# Patient Record
Sex: MALE | Race: WHITE | NOT HISPANIC OR LATINO | Employment: FULL TIME | URBAN - METROPOLITAN AREA
[De-identification: names, ages, dates, MRNs, and addresses within clinical notes are randomized per-mention and may not be internally consistent; named-entity substitution may affect disease eponyms.]

---

## 2023-11-02 PROBLEM — K56.691: Status: ACTIVE | Noted: 2023-11-02

## 2023-11-02 PROBLEM — K57.33 DIVERTICULITIS LARGE INTESTINE W/O PERFORATION OR ABSCESS W/BLEEDING: Status: ACTIVE | Noted: 2023-11-02

## 2023-11-02 PROBLEM — K91.30 ANASTOMOTIC STRICTURE OF COLORECTAL REGION: Status: ACTIVE | Noted: 2023-11-02

## 2023-11-02 RX ORDER — EPINEPHRINE 0.3 MG/.3ML
INJECTION SUBCUTANEOUS
COMMUNITY
Start: 2017-08-19

## 2023-11-02 RX ORDER — DOCUSATE SODIUM 100 MG/1
CAPSULE, LIQUID FILLED ORAL
COMMUNITY
Start: 2017-05-11

## 2023-11-03 ENCOUNTER — OFFICE VISIT (OUTPATIENT)
Dept: SURGERY | Facility: CLINIC | Age: 78
End: 2023-11-03
Payer: COMMERCIAL

## 2023-11-03 DIAGNOSIS — Z98.890 S/P COLONOSCOPIC POLYPECTOMY: Primary | ICD-10-CM

## 2023-11-03 PROCEDURE — 99213 OFFICE O/P EST LOW 20 MIN: CPT | Performed by: PHYSICIAN ASSISTANT

## 2023-11-03 PROCEDURE — 1036F TOBACCO NON-USER: CPT | Performed by: PHYSICIAN ASSISTANT

## 2023-11-03 PROCEDURE — 1159F MED LIST DOCD IN RCRD: CPT | Performed by: PHYSICIAN ASSISTANT

## 2023-11-03 NOTE — PROGRESS NOTES
Subjective   Patient ID: Ike Gar is a 77 y.o. male who presents for Follow-up (Colonoscopy on 10/17/23).    HPI 77-year-old male with a history of previous colon resection for recurrent diverticulitis and abscess multiple years ago.  He is status post a colonoscopy 2 weeks ago.  He is here for his pathology results and colonoscopy results.  Patient is doing well without complaint.  No abdominal pain.  No nausea no vomiting.  Moving his bowels well he takes occasional Metamucil.      Review of Systems  Negative other than mentioned in HPI    ENT: No earache, no sore throat, no nosebleeds  Cardiovascular: No chest pain, no shortness of breath, no leg pain, no edema  Respiratory: No shortness of breath on exertion, no wheezing  Gastrointestinal: No abdominal pain, no melena, no nausea, vomiting and/or diarrhea  Musculoskeletal: No pain moving all extremities, no back pain ambulating normally  Skin: No rashes, no lesions, and no skin changes  Neuro: No headache, no confusion, no numbness and tingling  Psychiatric, normal mood, not suicidal, not homicidal, feeling good        Physical Exam  Eyes: Conjunctiva non -icteric and eye lids are without obvious rash or drooping. Pupils are symmetric.   Ears, Nose, Mouth, and Throat: External ears and nose appear to be without deformity or rash. No lesions or masses noted. Hearing is grossly intact.   Neck:. No JVD noted, tracheal position is midline. No thyromegaly, no thyroid nodules  Head and Face: Examination of the head and face revealed no abnormalities.   Respiratory: No gasping or shortness of breath noted, no use of accessory muscles noted. Clear to auscultate bilaterally  Cardiovascular: Examination for edema is normal. Regular rate and rhythm S1 S2 without murmurs  GI: Abdomen non tender to palpation, bowel sounds present no hepatosplenomegaly  Skin: No rashes or open lesions/ulcers identified on skin.   Musk: Digits/nails show no clubbing or cyanosis. No asymmetry  or masses noted of the musculature. Examination of the muscles/joints/bones show normal range of motion. Gait is grossly normally.   Neurologic: Cranial nerves II- XII intact, motor strength 5/5 muscle strength of the lower extremities bilaterally and equal.      Objective     No diagnosis found.   Patient Active Problem List   Diagnosis    Diverticulitis large intestine w/o perforation or abscess w/bleeding    Other complete intestinal obstruction (CMS/HCC)    Anastomotic stricture of colorectal region      Allergies   Allergen Reactions    Meperidine Nausea/vomiting    Prochlorperazine Nausea/vomiting      Medication Documentation Review Audit       Reviewed by Kailyn Melendez MA (Medical Assistant) on 11/03/23 at 0858      Medication Order Taking? Sig Documenting Provider Last Dose Status   docusate sodium (Colace) 100 mg capsule [id number] Yes Colace 100 MG Oral Capsule   Refills: 0        Start : 11-May-2017   Active Historical Provider, MD Taking Active   EPINEPHrine 0.3 mg/0.3 mL injection syringe [id number] Yes USE AS DIRECTED Historical Provider, MD Taking Active                    History reviewed. No pertinent past medical history.  Social History     Tobacco Use   Smoking Status Never   Smokeless Tobacco Never     Family History   Problem Relation Name Age of Onset    Cancer Father        Past Surgical History:   Procedure Laterality Date    ABDOMINAL ADHESION SURGERY  04/10/2017    Laparoscopic Lysis Of Intestinal Adhesions    CHOLECYSTECTOMY  04/10/2017    Cholecystectomy    COLON SURGERY  03/07/2018    Colon Surgery    COLONOSCOPY  05/11/2017    Colonoscopy (Fiberoptic)       Assessment/Plan   Today we discussed his colonoscopy results.  I did remove one polyp that was negative for any malignancies.  There was a small stricture at the anastomotic site that was dilated.  This was discussed with the patient.  He is feeling well and not having any difficulties with bowel movements.  Patient was instructed  to repeat his colonoscopy in 5 years.    Encounter Diagnosis   Name Primary?    S/P colonoscopic polypectomy Yes     I have reviewed all data including labs,radiologic and previous reports.      **Portions of this medical record have been created using voice recognition software and may have minor errors which are inherent in voice recognition systems. It has not been fully edited for typographical or grammatical errors**

## 2024-12-02 ENCOUNTER — APPOINTMENT (OUTPATIENT)
Dept: SURGERY | Facility: CLINIC | Age: 79
End: 2024-12-02
Payer: COMMERCIAL

## 2024-12-02 VITALS
TEMPERATURE: 96.8 F | SYSTOLIC BLOOD PRESSURE: 142 MMHG | OXYGEN SATURATION: 94 % | HEART RATE: 75 BPM | DIASTOLIC BLOOD PRESSURE: 80 MMHG

## 2024-12-02 DIAGNOSIS — R10.31 RIGHT LOWER QUADRANT ABDOMINAL PAIN: Primary | ICD-10-CM

## 2024-12-02 PROCEDURE — 1126F AMNT PAIN NOTED NONE PRSNT: CPT | Performed by: SURGERY

## 2024-12-02 PROCEDURE — 1159F MED LIST DOCD IN RCRD: CPT | Performed by: SURGERY

## 2024-12-02 PROCEDURE — 1036F TOBACCO NON-USER: CPT | Performed by: SURGERY

## 2024-12-02 PROCEDURE — 1160F RVW MEDS BY RX/DR IN RCRD: CPT | Performed by: SURGERY

## 2024-12-02 PROCEDURE — 99214 OFFICE O/P EST MOD 30 MIN: CPT | Performed by: SURGERY

## 2024-12-02 RX ORDER — LISINOPRIL 5 MG/1
5 TABLET ORAL DAILY
COMMUNITY

## 2024-12-02 ASSESSMENT — PAIN SCALES - GENERAL: PAINLEVEL_OUTOF10: 0-NO PAIN

## 2024-12-02 NOTE — PROGRESS NOTES
Subjective   Patient ID: Ike Gar is a 78 y.o. male who presents for Constipation (Recurrent Bowel obstruction, c/o RLQ stabbing pain/on and off x  a day).      HPI the patient was admitted recently to the hospital with recurrent small bowel obstruction.  Patient had a previous multiple abdominal surgeries.  Also patient had recent heavy physical work and developed right groin pain since then  Review of Systems consistent with right lower quadrant abdominal pain.  Review of other 10 system is negative.  Physical Exam abdomen soft, no significant tenderness.  Scar from previous surgeries.  Pupils equal bilaterally, mucosa moist, bilateral breath sounds, clear to auscultation, regular rhythm and rate, no murmurs, palpable peripheral pulse, no focal neurological motor deficits.    Objective skin was consistent with a partial bowel obstruction.  No evidence of nephrolithiasis.  No evidence of hernias    No diagnosis found.   Patient Active Problem List   Diagnosis    Diverticulitis large intestine w/o perforation or abscess w/bleeding    Other complete intestinal obstruction    Anastomotic stricture of colorectal region      Allergies   Allergen Reactions    Meperidine Nausea/vomiting    Prochlorperazine Nausea/vomiting      Medication Documentation Review Audit       Reviewed by Sathish Washburn MD (Physician) on 12/02/24 at 1116      Medication Order Taking? Sig Documenting Provider Last Dose Status   docusate sodium (Colace) 100 mg capsule [id number] Yes Colace 100 MG Oral Capsule   Refills: 0        Start : 11-May-2017   Active Historical Provider, MD  Active   EPINEPHrine 0.3 mg/0.3 mL injection syringe [id number] Yes USE AS DIRECTED Historical Provider, MD  Active   lisinopril 5 mg tablet [id number] Yes Take 1 tablet (5 mg) by mouth once daily. for hypertension Historical Provider, MD  Active                    History reviewed. No pertinent past medical history.  Social History     Tobacco Use   Smoking Status  Never   Smokeless Tobacco Never     Family History   Problem Relation Name Age of Onset    Cancer Father        Past Surgical History:   Procedure Laterality Date    ABDOMINAL ADHESION SURGERY  04/10/2017    Laparoscopic Lysis Of Intestinal Adhesions    CHOLECYSTECTOMY  04/10/2017    Cholecystectomy    COLON SURGERY  03/07/2018    Colon Surgery    COLONOSCOPY  05/11/2017    Colonoscopy (Fiberoptic)       Assessment/Plan   With recurrent small bowel obstruction.  Improvement.  I recommend continue Metamucil.  I recommend MiraLAX every other day.  Follow-up in office in 2 to 3 weeks for reassessment of the pain in the right lower quadrant.  Currently there is no evidence of the hernia.  Most likely secondary to muscle strain.  No indication for surgery      Sathish Washburn MD

## 2024-12-16 ENCOUNTER — APPOINTMENT (OUTPATIENT)
Dept: RADIOLOGY | Facility: HOSPITAL | Age: 79
DRG: 957 | End: 2024-12-16
Payer: MEDICARE

## 2024-12-16 ENCOUNTER — ANESTHESIA EVENT (OUTPATIENT)
Dept: OPERATING ROOM | Facility: HOSPITAL | Age: 79
DRG: 957 | End: 2024-12-16
Payer: MEDICARE

## 2024-12-16 ENCOUNTER — HOSPITAL ENCOUNTER (INPATIENT)
Facility: HOSPITAL | Age: 79
LOS: 1 days | Discharge: ANOTHER HOSPITAL | DRG: 957 | End: 2024-12-17
Attending: STUDENT IN AN ORGANIZED HEALTH CARE EDUCATION/TRAINING PROGRAM | Admitting: SURGERY
Payer: MEDICARE

## 2024-12-16 ENCOUNTER — APPOINTMENT (OUTPATIENT)
Dept: CARDIOLOGY | Facility: HOSPITAL | Age: 79
DRG: 957 | End: 2024-12-16
Payer: MEDICARE

## 2024-12-16 ENCOUNTER — ANESTHESIA (OUTPATIENT)
Dept: OPERATING ROOM | Facility: HOSPITAL | Age: 79
DRG: 957 | End: 2024-12-16
Payer: MEDICARE

## 2024-12-16 DIAGNOSIS — R58 INTRA ABDOMINAL HEMORRHAGE: ICD-10-CM
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
## 2024-12-16 DIAGNOSIS — K63.1 BOWEL PERFORATION (MULTI): ICD-10-CM
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
## 2024-12-16 LAB
ABO GROUP (TYPE) IN BLOOD: NORMAL
ABO GROUP (TYPE) IN BLOOD: NORMAL
ALBUMIN SERPL BCP-MCNC: 3.8 G/DL (ref 3.4–5)
ALP SERPL-CCNC: 63 U/L (ref 33–136)
ALT SERPL W P-5'-P-CCNC: 32 U/L (ref 10–52)
ANION GAP BLDA CALCULATED.4IONS-SCNC: 16 MMO/L (ref 10–25)
ANION GAP BLDA CALCULATED.4IONS-SCNC: 18 MMO/L (ref 10–25)
ANION GAP BLDA CALCULATED.4IONS-SCNC: 18 MMO/L (ref 10–25)
ANION GAP SERPL CALC-SCNC: 14 MMOL/L (ref 10–20)
ANTIBODY SCREEN: NORMAL
AST SERPL W P-5'-P-CCNC: 45 U/L (ref 9–39)
BASE EXCESS BLDA CALC-SCNC: -12.1 MMOL/L (ref -2–3)
BASE EXCESS BLDA CALC-SCNC: -13.6 MMOL/L (ref -2–3)
BASE EXCESS BLDA CALC-SCNC: -14.1 MMOL/L (ref -2–3)
BASOPHILS # BLD AUTO: 0.03 X10*3/UL (ref 0–0.1)
BASOPHILS NFR BLD AUTO: 0.2 %
BILIRUB SERPL-MCNC: 1.9 MG/DL (ref 0–1.2)
BODY TEMPERATURE: ABNORMAL
BUN SERPL-MCNC: 18 MG/DL (ref 6–23)
CA-I BLD-SCNC: 0.98 MMOL/L (ref 1.1–1.33)
CA-I BLDA-SCNC: 1 MMOL/L (ref 1.1–1.33)
CA-I BLDA-SCNC: 1.07 MMOL/L (ref 1.1–1.33)
CA-I BLDA-SCNC: 1.09 MMOL/L (ref 1.1–1.33)
CALCIUM SERPL-MCNC: 8.2 MG/DL (ref 8.6–10.3)
CHLORIDE BLDA-SCNC: 101 MMOL/L (ref 98–107)
CHLORIDE BLDA-SCNC: 103 MMOL/L (ref 98–107)
CHLORIDE BLDA-SCNC: 104 MMOL/L (ref 98–107)
CHLORIDE SERPL-SCNC: 101 MMOL/L (ref 98–107)
CO2 SERPL-SCNC: 21 MMOL/L (ref 21–32)
CREAT SERPL-MCNC: 1.32 MG/DL (ref 0.5–1.3)
CRITICAL CALL TIME: 2305
CRITICAL CALLED BY: ABNORMAL
CRITICAL CALLED TO: ABNORMAL
CRITICAL READ BACK: ABNORMAL
EGFRCR SERPLBLD CKD-EPI 2021: 56 ML/MIN/1.73M*2
EOSINOPHIL # BLD AUTO: 0.07 X10*3/UL (ref 0–0.4)
EOSINOPHIL NFR BLD AUTO: 0.4 %
ERYTHROCYTE [DISTWIDTH] IN BLOOD BY AUTOMATED COUNT: 13.2 % (ref 11.5–14.5)
ETHANOL SERPL-MCNC: <10 MG/DL
GLUCOSE BLDA-MCNC: 166 MG/DL (ref 74–99)
GLUCOSE BLDA-MCNC: 186 MG/DL (ref 74–99)
GLUCOSE BLDA-MCNC: 243 MG/DL (ref 74–99)
GLUCOSE SERPL-MCNC: 217 MG/DL (ref 74–99)
HCO3 BLDA-SCNC: 14.6 MMOL/L (ref 22–26)
HCO3 BLDA-SCNC: 15 MMOL/L (ref 22–26)
HCO3 BLDA-SCNC: 17.2 MMOL/L (ref 22–26)
HCT VFR BLD AUTO: 43 % (ref 41–52)
HCT VFR BLD EST: 34 % (ref 41–52)
HCT VFR BLD EST: 35 % (ref 41–52)
HCT VFR BLD EST: 40 % (ref 41–52)
HGB BLD-MCNC: 14.2 G/DL (ref 13.5–17.5)
HGB BLDA-MCNC: 11.3 G/DL (ref 13.5–17.5)
HGB BLDA-MCNC: 11.6 G/DL (ref 13.5–17.5)
HGB BLDA-MCNC: 13.2 G/DL (ref 13.5–17.5)
IMM GRANULOCYTES # BLD AUTO: 0.08 X10*3/UL (ref 0–0.5)
IMM GRANULOCYTES NFR BLD AUTO: 0.4 % (ref 0–0.9)
INHALED O2 CONCENTRATION: 60 %
INR PPP: 1.1 (ref 0.9–1.1)
LACTATE BLDA-SCNC: 6.2 MMOL/L (ref 0.4–2)
LACTATE BLDA-SCNC: 6.7 MMOL/L (ref 0.4–2)
LACTATE BLDA-SCNC: 8 MMOL/L (ref 0.4–2)
LACTATE SERPL-SCNC: 3.4 MMOL/L (ref 0.4–2)
LYMPHOCYTES # BLD AUTO: 2.39 X10*3/UL (ref 0.8–3)
LYMPHOCYTES NFR BLD AUTO: 13.2 %
MCH RBC QN AUTO: 29.2 PG (ref 26–34)
MCHC RBC AUTO-ENTMCNC: 33 G/DL (ref 32–36)
MCV RBC AUTO: 88 FL (ref 80–100)
MONOCYTES # BLD AUTO: 1.05 X10*3/UL (ref 0.05–0.8)
MONOCYTES NFR BLD AUTO: 5.8 %
NEUTROPHILS # BLD AUTO: 14.45 X10*3/UL (ref 1.6–5.5)
NEUTROPHILS NFR BLD AUTO: 80 %
NRBC BLD-RTO: 0 /100 WBCS (ref 0–0)
OXYHGB MFR BLDA: 97.3 % (ref 94–98)
PCO2 BLDA: 42 MM HG (ref 38–42)
PCO2 BLDA: 46 MM HG (ref 38–42)
PCO2 BLDA: 53 MM HG (ref 38–42)
PEEP CMH2O: 5 CM H2O
PH BLDA: 7.12 PH (ref 7.38–7.42)
PH BLDA: 7.12 PH (ref 7.38–7.42)
PH BLDA: 7.15 PH (ref 7.38–7.42)
PLATELET # BLD AUTO: 278 X10*3/UL (ref 150–450)
PO2 BLDA: 122 MM HG (ref 85–95)
PO2 BLDA: 325 MM HG (ref 85–95)
PO2 BLDA: 372 MM HG (ref 85–95)
POTASSIUM BLDA-SCNC: 3.9 MMOL/L (ref 3.5–5.3)
POTASSIUM BLDA-SCNC: 4.6 MMOL/L (ref 3.5–5.3)
POTASSIUM BLDA-SCNC: 4.7 MMOL/L (ref 3.5–5.3)
POTASSIUM SERPL-SCNC: 4 MMOL/L (ref 3.5–5.3)
PROT SERPL-MCNC: 7.3 G/DL (ref 6.4–8.2)
PROTHROMBIN TIME: 12.5 SECONDS (ref 9.8–12.8)
RBC # BLD AUTO: 4.87 X10*6/UL (ref 4.5–5.9)
RH FACTOR (ANTIGEN D): NORMAL
RH FACTOR (ANTIGEN D): NORMAL
SAO2 % BLDA: 100 % (ref 94–100)
SODIUM BLDA-SCNC: 129 MMOL/L (ref 136–145)
SODIUM BLDA-SCNC: 130 MMOL/L (ref 136–145)
SODIUM BLDA-SCNC: 134 MMOL/L (ref 136–145)
SODIUM SERPL-SCNC: 132 MMOL/L (ref 136–145)
SPECIMEN DRAWN FROM PATIENT: ABNORMAL
TIDAL VOLUME: 450 ML
VENTILATOR RATE: 16 BPM
WBC # BLD AUTO: 18.1 X10*3/UL (ref 4.4–11.3)

## 2024-12-16 PROCEDURE — 80053 COMPREHEN METABOLIC PANEL: CPT | Performed by: STUDENT IN AN ORGANIZED HEALTH CARE EDUCATION/TRAINING PROGRAM

## 2024-12-16 PROCEDURE — 71260 CT THORAX DX C+: CPT | Performed by: RADIOLOGY

## 2024-12-16 PROCEDURE — 86920 COMPATIBILITY TEST SPIN: CPT

## 2024-12-16 PROCEDURE — 2550000001 HC RX 255 CONTRASTS: Performed by: STUDENT IN AN ORGANIZED HEALTH CARE EDUCATION/TRAINING PROGRAM

## 2024-12-16 PROCEDURE — 72131 CT LUMBAR SPINE W/O DYE: CPT | Performed by: RADIOLOGY

## 2024-12-16 PROCEDURE — 2500000004 HC RX 250 GENERAL PHARMACY W/ HCPCS (ALT 636 FOR OP/ED)

## 2024-12-16 PROCEDURE — 2500000004 HC RX 250 GENERAL PHARMACY W/ HCPCS (ALT 636 FOR OP/ED): Performed by: STUDENT IN AN ORGANIZED HEALTH CARE EDUCATION/TRAINING PROGRAM

## 2024-12-16 PROCEDURE — 2500000004 HC RX 250 GENERAL PHARMACY W/ HCPCS (ALT 636 FOR OP/ED): Performed by: SURGERY

## 2024-12-16 PROCEDURE — P9017 PLASMA 1 DONOR FRZ W/IN 8 HR: HCPCS

## 2024-12-16 PROCEDURE — 99291 CRITICAL CARE FIRST HOUR: CPT | Performed by: SURGERY

## 2024-12-16 PROCEDURE — 82435 ASSAY OF BLOOD CHLORIDE: CPT | Performed by: STUDENT IN AN ORGANIZED HEALTH CARE EDUCATION/TRAINING PROGRAM

## 2024-12-16 PROCEDURE — 44121 REMOVAL OF SMALL INTESTINE: CPT | Performed by: SURGERY

## 2024-12-16 PROCEDURE — G0378 HOSPITAL OBSERVATION PER HR: HCPCS

## 2024-12-16 PROCEDURE — 83735 ASSAY OF MAGNESIUM: CPT | Performed by: STUDENT IN AN ORGANIZED HEALTH CARE EDUCATION/TRAINING PROGRAM

## 2024-12-16 PROCEDURE — 90715 TDAP VACCINE 7 YRS/> IM: CPT | Performed by: STUDENT IN AN ORGANIZED HEALTH CARE EDUCATION/TRAINING PROGRAM

## 2024-12-16 PROCEDURE — 93005 ELECTROCARDIOGRAM TRACING: CPT

## 2024-12-16 PROCEDURE — 76377 3D RENDER W/INTRP POSTPROCES: CPT | Performed by: RADIOLOGY

## 2024-12-16 PROCEDURE — 02HV33Z INSERTION OF INFUSION DEVICE INTO SUPERIOR VENA CAVA, PERCUTANEOUS APPROACH: ICD-10-PCS | Performed by: HOSPITALIST

## 2024-12-16 PROCEDURE — 71045 X-RAY EXAM CHEST 1 VIEW: CPT

## 2024-12-16 PROCEDURE — 2500000005 HC RX 250 GENERAL PHARMACY W/O HCPCS: Performed by: HOSPITALIST

## 2024-12-16 PROCEDURE — 99232 SBSQ HOSP IP/OBS MODERATE 35: CPT | Performed by: HOSPITALIST

## 2024-12-16 PROCEDURE — 85730 THROMBOPLASTIN TIME PARTIAL: CPT | Performed by: STUDENT IN AN ORGANIZED HEALTH CARE EDUCATION/TRAINING PROGRAM

## 2024-12-16 PROCEDURE — 82077 ASSAY SPEC XCP UR&BREATH IA: CPT | Performed by: STUDENT IN AN ORGANIZED HEALTH CARE EDUCATION/TRAINING PROGRAM

## 2024-12-16 PROCEDURE — 70450 CT HEAD/BRAIN W/O DYE: CPT

## 2024-12-16 PROCEDURE — P9035 PLATELET PHERES LEUKOREDUCED: HCPCS

## 2024-12-16 PROCEDURE — 71045 X-RAY EXAM CHEST 1 VIEW: CPT | Performed by: RADIOLOGY

## 2024-12-16 PROCEDURE — 83605 ASSAY OF LACTIC ACID: CPT | Performed by: STUDENT IN AN ORGANIZED HEALTH CARE EDUCATION/TRAINING PROGRAM

## 2024-12-16 PROCEDURE — 2500000005 HC RX 250 GENERAL PHARMACY W/O HCPCS: Performed by: STUDENT IN AN ORGANIZED HEALTH CARE EDUCATION/TRAINING PROGRAM

## 2024-12-16 PROCEDURE — 71045 X-RAY EXAM CHEST 1 VIEW: CPT | Performed by: STUDENT IN AN ORGANIZED HEALTH CARE EDUCATION/TRAINING PROGRAM

## 2024-12-16 PROCEDURE — 85027 COMPLETE CBC AUTOMATED: CPT | Performed by: STUDENT IN AN ORGANIZED HEALTH CARE EDUCATION/TRAINING PROGRAM

## 2024-12-16 PROCEDURE — 72128 CT CHEST SPINE W/O DYE: CPT | Mod: RCN

## 2024-12-16 PROCEDURE — 96374 THER/PROPH/DIAG INJ IV PUSH: CPT | Mod: 59

## 2024-12-16 PROCEDURE — 70450 CT HEAD/BRAIN W/O DYE: CPT | Performed by: RADIOLOGY

## 2024-12-16 PROCEDURE — 84484 ASSAY OF TROPONIN QUANT: CPT | Performed by: STUDENT IN AN ORGANIZED HEALTH CARE EDUCATION/TRAINING PROGRAM

## 2024-12-16 PROCEDURE — 72128 CT CHEST SPINE W/O DYE: CPT | Performed by: RADIOLOGY

## 2024-12-16 PROCEDURE — 83605 ASSAY OF LACTIC ACID: CPT

## 2024-12-16 PROCEDURE — G0390 TRAUMA RESPONS W/HOSP CRITI: HCPCS

## 2024-12-16 PROCEDURE — 72131 CT LUMBAR SPINE W/O DYE: CPT | Mod: RCN

## 2024-12-16 PROCEDURE — 82435 ASSAY OF BLOOD CHLORIDE: CPT

## 2024-12-16 PROCEDURE — 96375 TX/PRO/DX INJ NEW DRUG ADDON: CPT | Mod: 59

## 2024-12-16 PROCEDURE — 3700000001 HC GENERAL ANESTHESIA TIME - INITIAL BASE CHARGE: Performed by: SURGERY

## 2024-12-16 PROCEDURE — 36415 COLL VENOUS BLD VENIPUNCTURE: CPT | Performed by: STUDENT IN AN ORGANIZED HEALTH CARE EDUCATION/TRAINING PROGRAM

## 2024-12-16 PROCEDURE — 84132 ASSAY OF SERUM POTASSIUM: CPT

## 2024-12-16 PROCEDURE — 88307 TISSUE EXAM BY PATHOLOGIST: CPT | Mod: TC,ELYLAB | Performed by: SURGERY

## 2024-12-16 PROCEDURE — 82330 ASSAY OF CALCIUM: CPT | Performed by: STUDENT IN AN ORGANIZED HEALTH CARE EDUCATION/TRAINING PROGRAM

## 2024-12-16 PROCEDURE — 84100 ASSAY OF PHOSPHORUS: CPT | Performed by: STUDENT IN AN ORGANIZED HEALTH CARE EDUCATION/TRAINING PROGRAM

## 2024-12-16 PROCEDURE — 2500000005 HC RX 250 GENERAL PHARMACY W/O HCPCS: Performed by: ANESTHESIOLOGY

## 2024-12-16 PROCEDURE — 86900 BLOOD TYPING SEROLOGIC ABO: CPT | Performed by: STUDENT IN AN ORGANIZED HEALTH CARE EDUCATION/TRAINING PROGRAM

## 2024-12-16 PROCEDURE — 85025 COMPLETE CBC W/AUTO DIFF WBC: CPT | Performed by: STUDENT IN AN ORGANIZED HEALTH CARE EDUCATION/TRAINING PROGRAM

## 2024-12-16 PROCEDURE — 3700000002 HC GENERAL ANESTHESIA TIME - EACH INCREMENTAL 1 MINUTE: Performed by: SURGERY

## 2024-12-16 PROCEDURE — 72125 CT NECK SPINE W/O DYE: CPT

## 2024-12-16 PROCEDURE — 86901 BLOOD TYPING SEROLOGIC RH(D): CPT | Performed by: STUDENT IN AN ORGANIZED HEALTH CARE EDUCATION/TRAINING PROGRAM

## 2024-12-16 PROCEDURE — 85384 FIBRINOGEN ACTIVITY: CPT | Performed by: STUDENT IN AN ORGANIZED HEALTH CARE EDUCATION/TRAINING PROGRAM

## 2024-12-16 PROCEDURE — 88307 TISSUE EXAM BY PATHOLOGIST: CPT | Performed by: PATHOLOGY

## 2024-12-16 PROCEDURE — 2500000005 HC RX 250 GENERAL PHARMACY W/O HCPCS: Performed by: SURGERY

## 2024-12-16 PROCEDURE — 84132 ASSAY OF SERUM POTASSIUM: CPT | Performed by: STUDENT IN AN ORGANIZED HEALTH CARE EDUCATION/TRAINING PROGRAM

## 2024-12-16 PROCEDURE — 99291 CRITICAL CARE FIRST HOUR: CPT | Mod: 25 | Performed by: STUDENT IN AN ORGANIZED HEALTH CARE EDUCATION/TRAINING PROGRAM

## 2024-12-16 PROCEDURE — 44120 REMOVAL OF SMALL INTESTINE: CPT | Performed by: SURGERY

## 2024-12-16 PROCEDURE — 72125 CT NECK SPINE W/O DYE: CPT | Performed by: RADIOLOGY

## 2024-12-16 PROCEDURE — 76377 3D RENDER W/INTRP POSTPROCES: CPT

## 2024-12-16 PROCEDURE — 2500000004 HC RX 250 GENERAL PHARMACY W/ HCPCS (ALT 636 FOR OP/ED): Performed by: ANESTHESIOLOGY

## 2024-12-16 PROCEDURE — 85610 PROTHROMBIN TIME: CPT | Performed by: STUDENT IN AN ORGANIZED HEALTH CARE EDUCATION/TRAINING PROGRAM

## 2024-12-16 PROCEDURE — 85018 HEMOGLOBIN: CPT | Performed by: STUDENT IN AN ORGANIZED HEALTH CARE EDUCATION/TRAINING PROGRAM

## 2024-12-16 PROCEDURE — 99285 EMERGENCY DEPT VISIT HI MDM: CPT | Mod: 25 | Performed by: STUDENT IN AN ORGANIZED HEALTH CARE EDUCATION/TRAINING PROGRAM

## 2024-12-16 PROCEDURE — 85007 BL SMEAR W/DIFF WBC COUNT: CPT | Performed by: STUDENT IN AN ORGANIZED HEALTH CARE EDUCATION/TRAINING PROGRAM

## 2024-12-16 PROCEDURE — P9016 RBC LEUKOCYTES REDUCED: HCPCS

## 2024-12-16 PROCEDURE — 36556 INSERT NON-TUNNEL CV CATH: CPT | Performed by: HOSPITALIST

## 2024-12-16 PROCEDURE — 3600000003 HC OR TIME - INITIAL BASE CHARGE - PROCEDURE LEVEL THREE: Performed by: SURGERY

## 2024-12-16 PROCEDURE — 94002 VENT MGMT INPAT INIT DAY: CPT | Mod: CCI

## 2024-12-16 PROCEDURE — 86923 COMPATIBILITY TEST ELECTRIC: CPT

## 2024-12-16 PROCEDURE — 74177 CT ABD & PELVIS W/CONTRAST: CPT | Performed by: RADIOLOGY

## 2024-12-16 PROCEDURE — 2720000007 HC OR 272 NO HCPCS: Performed by: SURGERY

## 2024-12-16 PROCEDURE — 90471 IMMUNIZATION ADMIN: CPT | Performed by: STUDENT IN AN ORGANIZED HEALTH CARE EDUCATION/TRAINING PROGRAM

## 2024-12-16 PROCEDURE — 2020000001 HC ICU ROOM DAILY

## 2024-12-16 PROCEDURE — 36430 TRANSFUSION BLD/BLD COMPNT: CPT

## 2024-12-16 PROCEDURE — 2500000004 HC RX 250 GENERAL PHARMACY W/ HCPCS (ALT 636 FOR OP/ED): Performed by: HOSPITALIST

## 2024-12-16 PROCEDURE — 3E033XZ INTRODUCTION OF VASOPRESSOR INTO PERIPHERAL VEIN, PERCUTANEOUS APPROACH: ICD-10-PCS | Performed by: ANESTHESIOLOGY

## 2024-12-16 PROCEDURE — 70486 CT MAXILLOFACIAL W/O DYE: CPT

## 2024-12-16 PROCEDURE — 3600000008 HC OR TIME - EACH INCREMENTAL 1 MINUTE - PROCEDURE LEVEL THREE: Performed by: SURGERY

## 2024-12-16 PROCEDURE — 37799 UNLISTED PX VASCULAR SURGERY: CPT | Performed by: STUDENT IN AN ORGANIZED HEALTH CARE EDUCATION/TRAINING PROGRAM

## 2024-12-16 PROCEDURE — 70486 CT MAXILLOFACIAL W/O DYE: CPT | Performed by: RADIOLOGY

## 2024-12-16 PROCEDURE — 74177 CT ABD & PELVIS W/CONTRAST: CPT

## 2024-12-16 RX ORDER — PANTOPRAZOLE SODIUM 40 MG/10ML
40 INJECTION, POWDER, LYOPHILIZED, FOR SOLUTION INTRAVENOUS DAILY
Status: DISCONTINUED | OUTPATIENT
Start: 2024-12-17 | End: 2024-12-17

## 2024-12-16 RX ORDER — PHENYLEPHRINE HCL IN 0.9% NACL 1 MG/10 ML
SYRINGE (ML) INTRAVENOUS AS NEEDED
Status: DISCONTINUED | OUTPATIENT
Start: 2024-12-16 | End: 2024-12-16

## 2024-12-16 RX ORDER — FENTANYL CITRATE-0.9 % NACL/PF 10 MCG/ML
0-200 PLASTIC BAG, INJECTION (ML) INTRAVENOUS CONTINUOUS
Status: DISCONTINUED | OUTPATIENT
Start: 2024-12-16 | End: 2024-12-17 | Stop reason: HOSPADM

## 2024-12-16 RX ORDER — CEFAZOLIN SODIUM 2 G/100ML
2 INJECTION, SOLUTION INTRAVENOUS ONCE
Status: DISCONTINUED | OUTPATIENT
Start: 2024-12-16 | End: 2024-12-17

## 2024-12-16 RX ORDER — SODIUM CHLORIDE, SODIUM LACTATE, POTASSIUM CHLORIDE, CALCIUM CHLORIDE 600; 310; 30; 20 MG/100ML; MG/100ML; MG/100ML; MG/100ML
INJECTION, SOLUTION INTRAVENOUS CONTINUOUS PRN
Status: DISCONTINUED | OUTPATIENT
Start: 2024-12-16 | End: 2024-12-16

## 2024-12-16 RX ORDER — PANTOPRAZOLE SODIUM 40 MG/1
40 TABLET, DELAYED RELEASE ORAL DAILY
Status: DISCONTINUED | OUTPATIENT
Start: 2024-12-17 | End: 2024-12-17

## 2024-12-16 RX ORDER — FLUCONAZOLE 2 MG/ML
200 INJECTION, SOLUTION INTRAVENOUS EVERY 24 HOURS
Status: DISCONTINUED | OUTPATIENT
Start: 2024-12-16 | End: 2024-12-17 | Stop reason: HOSPADM

## 2024-12-16 RX ORDER — SODIUM BICARBONATE 1 MEQ/ML
SYRINGE (ML) INTRAVENOUS AS NEEDED
Status: DISCONTINUED | OUTPATIENT
Start: 2024-12-16 | End: 2024-12-16

## 2024-12-16 RX ORDER — CEFAZOLIN SODIUM 2 G/50ML
SOLUTION INTRAVENOUS AS NEEDED
Status: DISCONTINUED | OUTPATIENT
Start: 2024-12-16 | End: 2024-12-16

## 2024-12-16 RX ORDER — PROPOFOL 10 MG/ML
INJECTION, EMULSION INTRAVENOUS CONTINUOUS PRN
Status: DISCONTINUED | OUTPATIENT
Start: 2024-12-16 | End: 2024-12-16

## 2024-12-16 RX ORDER — MIDAZOLAM HYDROCHLORIDE 1 MG/ML
INJECTION, SOLUTION INTRAMUSCULAR; INTRAVENOUS
Status: COMPLETED
Start: 2024-12-16 | End: 2024-12-17

## 2024-12-16 RX ORDER — NOREPINEPHRINE BITARTRATE/D5W 8 MG/250ML
PLASTIC BAG, INJECTION (ML) INTRAVENOUS
Status: DISCONTINUED
Start: 2024-12-16 | End: 2024-12-17 | Stop reason: HOSPADM

## 2024-12-16 RX ORDER — FENTANYL CITRATE 50 UG/ML
50 INJECTION, SOLUTION INTRAMUSCULAR; INTRAVENOUS ONCE
Status: COMPLETED | OUTPATIENT
Start: 2024-12-16 | End: 2024-12-16

## 2024-12-16 RX ORDER — ROCURONIUM BROMIDE 10 MG/ML
INJECTION, SOLUTION INTRAVENOUS AS NEEDED
Status: DISCONTINUED | OUTPATIENT
Start: 2024-12-16 | End: 2024-12-16

## 2024-12-16 RX ORDER — MIDAZOLAM HYDROCHLORIDE 1 MG/ML
INJECTION, SOLUTION INTRAMUSCULAR; INTRAVENOUS AS NEEDED
Status: DISCONTINUED | OUTPATIENT
Start: 2024-12-16 | End: 2024-12-16

## 2024-12-16 RX ORDER — SUCCINYLCHOLINE CHLORIDE 100 MG/5ML
SYRINGE (ML) INTRAVENOUS AS NEEDED
Status: DISCONTINUED | OUTPATIENT
Start: 2024-12-16 | End: 2024-12-16

## 2024-12-16 RX ORDER — FENTANYL CITRATE 50 UG/ML
INJECTION, SOLUTION INTRAMUSCULAR; INTRAVENOUS
Status: COMPLETED
Start: 2024-12-16 | End: 2024-12-16

## 2024-12-16 RX ORDER — NOREPINEPHRINE BITARTRATE/D5W 8 MG/250ML
0-.5 PLASTIC BAG, INJECTION (ML) INTRAVENOUS CONTINUOUS
Status: DISCONTINUED | OUTPATIENT
Start: 2024-12-16 | End: 2024-12-16

## 2024-12-16 RX ORDER — MIDAZOLAM HYDROCHLORIDE 1 MG/ML
1 INJECTION, SOLUTION INTRAMUSCULAR; INTRAVENOUS ONCE
Status: COMPLETED | OUTPATIENT
Start: 2024-12-17 | End: 2024-12-17

## 2024-12-16 RX ORDER — ONDANSETRON HYDROCHLORIDE 2 MG/ML
4 INJECTION, SOLUTION INTRAVENOUS ONCE
Status: COMPLETED | OUTPATIENT
Start: 2024-12-16 | End: 2024-12-16

## 2024-12-16 RX ORDER — METRONIDAZOLE 500 MG/100ML
500 INJECTION, SOLUTION INTRAVENOUS EVERY 8 HOURS
Status: DISCONTINUED | OUTPATIENT
Start: 2024-12-16 | End: 2024-12-16

## 2024-12-16 RX ORDER — NOREPINEPHRINE BITARTRATE 0.03 MG/ML
INJECTION, SOLUTION INTRAVENOUS CONTINUOUS PRN
Status: DISCONTINUED | OUTPATIENT
Start: 2024-12-16 | End: 2024-12-16

## 2024-12-16 RX ORDER — ETOMIDATE 2 MG/ML
INJECTION INTRAVENOUS AS NEEDED
Status: DISCONTINUED | OUTPATIENT
Start: 2024-12-16 | End: 2024-12-16

## 2024-12-16 RX ORDER — SODIUM CHLORIDE 0.9 G/100ML
IRRIGANT IRRIGATION AS NEEDED
Status: DISCONTINUED | OUTPATIENT
Start: 2024-12-16 | End: 2024-12-16 | Stop reason: HOSPADM

## 2024-12-16 RX ORDER — NOREPINEPHRINE BITARTRATE/D5W 16MG/250ML
.01-1 PLASTIC BAG, INJECTION (ML) INTRAVENOUS CONTINUOUS
Status: DISCONTINUED | OUTPATIENT
Start: 2024-12-16 | End: 2024-12-17 | Stop reason: HOSPADM

## 2024-12-16 RX ORDER — CALCIUM CHLORIDE INJECTION 100 MG/ML
INJECTION, SOLUTION INTRAVENOUS AS NEEDED
Status: DISCONTINUED | OUTPATIENT
Start: 2024-12-16 | End: 2024-12-16

## 2024-12-16 RX ORDER — EPINEPHRINE IN 0.9 % SOD CHLOR 4MG/250ML
PLASTIC BAG, INJECTION (ML) INTRAVENOUS CONTINUOUS PRN
Status: DISCONTINUED | OUTPATIENT
Start: 2024-12-16 | End: 2024-12-16

## 2024-12-16 RX ORDER — ONDANSETRON HYDROCHLORIDE 2 MG/ML
INJECTION, SOLUTION INTRAVENOUS
Status: COMPLETED
Start: 2024-12-16 | End: 2024-12-16

## 2024-12-16 RX ORDER — LIDOCAINE HYDROCHLORIDE 20 MG/ML
INJECTION, SOLUTION INFILTRATION; PERINEURAL AS NEEDED
Status: DISCONTINUED | OUTPATIENT
Start: 2024-12-16 | End: 2024-12-16

## 2024-12-16 RX ADMIN — FENTANYL CITRATE 25 MCG/HR: 0.05 INJECTION, SOLUTION INTRAMUSCULAR; INTRAVENOUS at 23:27

## 2024-12-16 RX ADMIN — ONDANSETRON 4 MG: 2 INJECTION INTRAMUSCULAR; INTRAVENOUS at 19:09

## 2024-12-16 RX ADMIN — FENTANYL CITRATE 50 MCG: 50 INJECTION INTRAMUSCULAR; INTRAVENOUS at 23:25

## 2024-12-16 RX ADMIN — FENTANYL CITRATE 50 MCG: 50 INJECTION INTRAMUSCULAR; INTRAVENOUS at 19:11

## 2024-12-16 RX ADMIN — FENTANYL CITRATE 50 MCG: 50 INJECTION INTRAMUSCULAR; INTRAVENOUS at 23:26

## 2024-12-16 RX ADMIN — PIPERACILLIN SODIUM AND TAZOBACTAM SODIUM 3.38 G: 3; .375 INJECTION, SOLUTION INTRAVENOUS at 23:30

## 2024-12-16 RX ADMIN — Medication 70 PERCENT: at 23:15

## 2024-12-16 RX ADMIN — ONDANSETRON HYDROCHLORIDE 4 MG: 2 INJECTION, SOLUTION INTRAVENOUS at 19:09

## 2024-12-16 RX ADMIN — CALCIUM CHLORIDE 1 G: 100 INJECTION, SOLUTION INTRAVENOUS at 23:45

## 2024-12-16 RX ADMIN — TETANUS TOXOID, REDUCED DIPHTHERIA TOXOID AND ACELLULAR PERTUSSIS VACCINE, ADSORBED 0.5 ML: 5; 2.5; 8; 8; 2.5 SUSPENSION INTRAMUSCULAR at 18:57

## 2024-12-16 RX ADMIN — IOHEXOL 100 ML: 350 INJECTION, SOLUTION INTRAVENOUS at 18:49

## 2024-12-16 RX ADMIN — FENTANYL CITRATE 50 MCG: 50 INJECTION, SOLUTION INTRAMUSCULAR; INTRAVENOUS at 19:11

## 2024-12-16 RX ADMIN — FLUCONAZOLE IN SODIUM CHLORIDE 200 MG: 2 INJECTION, SOLUTION INTRAVENOUS at 23:48

## 2024-12-16 RX ADMIN — FENTANYL CITRATE 50 MCG: 50 INJECTION, SOLUTION INTRAMUSCULAR; INTRAVENOUS at 23:25

## 2024-12-16 ASSESSMENT — COLUMBIA-SUICIDE SEVERITY RATING SCALE - C-SSRS
6. HAVE YOU EVER DONE ANYTHING, STARTED TO DO ANYTHING, OR PREPARED TO DO ANYTHING TO END YOUR LIFE?: NO
2. HAVE YOU ACTUALLY HAD ANY THOUGHTS OF KILLING YOURSELF?: NO
1. IN THE PAST MONTH, HAVE YOU WISHED YOU WERE DEAD OR WISHED YOU COULD GO TO SLEEP AND NOT WAKE UP?: NO

## 2024-12-16 ASSESSMENT — LIFESTYLE VARIABLES
HAVE YOU EVER FELT YOU SHOULD CUT DOWN ON YOUR DRINKING: NO
EVER FELT BAD OR GUILTY ABOUT YOUR DRINKING: NO
TOTAL SCORE: 0
HAVE PEOPLE ANNOYED YOU BY CRITICIZING YOUR DRINKING: NO
EVER HAD A DRINK FIRST THING IN THE MORNING TO STEADY YOUR NERVES TO GET RID OF A HANGOVER: NO

## 2024-12-16 ASSESSMENT — PAIN DESCRIPTION - PAIN TYPE: TYPE: ACUTE PAIN

## 2024-12-16 ASSESSMENT — PAIN DESCRIPTION - LOCATION: LOCATION: ABDOMEN

## 2024-12-16 ASSESSMENT — PAIN SCALES - GENERAL
PAIN_LEVEL: 0
PAINLEVEL_OUTOF10: 9

## 2024-12-16 ASSESSMENT — PAIN - FUNCTIONAL ASSESSMENT: PAIN_FUNCTIONAL_ASSESSMENT: 0-10

## 2024-12-16 ASSESSMENT — PAIN DESCRIPTION - FREQUENCY: FREQUENCY: CONSTANT/CONTINUOUS

## 2024-12-16 ASSESSMENT — PAIN DESCRIPTION - DESCRIPTORS: DESCRIPTORS: ACHING

## 2024-12-17 ENCOUNTER — APPOINTMENT (OUTPATIENT)
Dept: CARDIOLOGY | Facility: HOSPITAL | Age: 79
DRG: 957 | End: 2024-12-17
Payer: MEDICARE

## 2024-12-17 ENCOUNTER — HOSPITAL ENCOUNTER (INPATIENT)
Facility: HOSPITAL | Age: 79
End: 2024-12-17
Attending: SURGERY | Admitting: SURGERY
Payer: MEDICARE

## 2024-12-17 ENCOUNTER — ANESTHESIA EVENT (OUTPATIENT)
Dept: OPERATING ROOM | Facility: HOSPITAL | Age: 79
End: 2024-12-17
Payer: MEDICARE

## 2024-12-17 ENCOUNTER — ANESTHESIA (OUTPATIENT)
Dept: OPERATING ROOM | Facility: HOSPITAL | Age: 79
End: 2024-12-17
Payer: MEDICARE

## 2024-12-17 ENCOUNTER — APPOINTMENT (OUTPATIENT)
Dept: RADIOLOGY | Facility: HOSPITAL | Age: 79
End: 2024-12-17
Payer: MEDICARE

## 2024-12-17 ENCOUNTER — APPOINTMENT (OUTPATIENT)
Dept: CARDIOLOGY | Facility: HOSPITAL | Age: 79
End: 2024-12-17
Payer: MEDICARE

## 2024-12-17 VITALS
TEMPERATURE: 96.4 F | RESPIRATION RATE: 25 BRPM | BODY MASS INDEX: 21.21 KG/M2 | HEART RATE: 104 BPM | OXYGEN SATURATION: 95 % | DIASTOLIC BLOOD PRESSURE: 58 MMHG | WEIGHT: 174.16 LBS | HEIGHT: 76 IN | SYSTOLIC BLOOD PRESSURE: 93 MMHG

## 2024-12-17 DIAGNOSIS — Z99.2 HEMODIALYSIS PATIENT (CMS-HCC): ICD-10-CM

## 2024-12-17 DIAGNOSIS — S22.21XA CLOSED FRACTURE OF MANUBRIUM, INITIAL ENCOUNTER: ICD-10-CM

## 2024-12-17 DIAGNOSIS — Z99.2 ESRD (END STAGE RENAL DISEASE) ON DIALYSIS (MULTI): ICD-10-CM

## 2024-12-17 DIAGNOSIS — Z92.89 HISTORY OF BLOOD TRANSFUSION: ICD-10-CM

## 2024-12-17 DIAGNOSIS — R57.9 SHOCK (MULTI): ICD-10-CM
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
## 2024-12-17 DIAGNOSIS — J96.01 ACUTE RESPIRATORY FAILURE WITH HYPOXIA (MULTI): ICD-10-CM

## 2024-12-17 DIAGNOSIS — T79.4XXA: ICD-10-CM

## 2024-12-17 DIAGNOSIS — N17.9 ACUTE KIDNEY INJURY (NONTRAUMATIC) (CMS-HCC): ICD-10-CM

## 2024-12-17 DIAGNOSIS — B99.9 INFECTION REQUIRING CONTACT ISOLATION PRECAUTIONS: ICD-10-CM

## 2024-12-17 DIAGNOSIS — N18.6 ESRD (END STAGE RENAL DISEASE) ON DIALYSIS (MULTI): ICD-10-CM

## 2024-12-17 DIAGNOSIS — R57.8 OTHER SHOCK: ICD-10-CM

## 2024-12-17 DIAGNOSIS — D64.9 ANEMIA, UNSPECIFIED TYPE: ICD-10-CM

## 2024-12-17 DIAGNOSIS — R94.31 ABNORMAL EKG: Primary | ICD-10-CM

## 2024-12-17 DIAGNOSIS — A41.9 SEPTICEMIA (MULTI): ICD-10-CM

## 2024-12-17 DIAGNOSIS — K92.2 GASTROINTESTINAL HEMORRHAGE, UNSPECIFIED GASTROINTESTINAL HEMORRHAGE TYPE: ICD-10-CM
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
## 2024-12-17 DIAGNOSIS — K91.30 GASTROINTESTINAL ANASTOMOTIC STRICTURE: ICD-10-CM

## 2024-12-17 PROBLEM — K57.32 DIVERTICULITIS OF LARGE INTESTINE: Status: ACTIVE | Noted: 2023-11-02

## 2024-12-17 PROBLEM — K66.0 PERITONEAL ADHESIONS: Status: ACTIVE | Noted: 2022-03-01

## 2024-12-17 PROBLEM — E80.4 GILBERT'S SYNDROME: Status: ACTIVE | Noted: 2024-12-17

## 2024-12-17 PROBLEM — K56.601 COMPLETE INTESTINAL OBSTRUCTION (MULTI): Status: ACTIVE | Noted: 2023-11-02

## 2024-12-17 PROBLEM — Z90.49 S/P CHOLECYSTECTOMY: Status: RESOLVED | Noted: 2024-12-17 | Resolved: 2024-12-17

## 2024-12-17 PROBLEM — K57.30 DIVERTICULAR DISEASE OF LARGE INTESTINE: Status: ACTIVE | Noted: 2024-12-17

## 2024-12-17 PROBLEM — K57.92 DIVERTICULITIS: Status: ACTIVE | Noted: 2024-12-17

## 2024-12-17 LAB
ABO GROUP (TYPE) IN BLOOD: NORMAL
ABO GROUP (TYPE) IN BLOOD: NORMAL
ACANTHOCYTES BLD QL SMEAR: ABNORMAL
ALBUMIN SERPL BCP-MCNC: 1.9 G/DL (ref 3.4–5)
ALBUMIN SERPL BCP-MCNC: 2 G/DL (ref 3.4–5)
ALBUMIN SERPL BCP-MCNC: 3 G/DL (ref 3.4–5)
ALBUMIN SERPL BCP-MCNC: 3 G/DL (ref 3.4–5)
ALBUMIN SERPL BCP-MCNC: 3.2 G/DL (ref 3.4–5)
ALP SERPL-CCNC: 36 U/L (ref 33–136)
ALP SERPL-CCNC: 39 U/L (ref 33–136)
ALP SERPL-CCNC: 44 U/L (ref 33–136)
ALT SERPL W P-5'-P-CCNC: 256 U/L (ref 10–52)
ALT SERPL W P-5'-P-CCNC: 36 U/L (ref 10–52)
ALT SERPL W P-5'-P-CCNC: 38 U/L (ref 10–52)
ANION GAP BLDA CALCULATED.4IONS-SCNC: 10 MMO/L (ref 10–25)
ANION GAP BLDA CALCULATED.4IONS-SCNC: 14 MMO/L (ref 10–25)
ANION GAP BLDA CALCULATED.4IONS-SCNC: 15 MMO/L (ref 10–25)
ANION GAP BLDA CALCULATED.4IONS-SCNC: 16 MMO/L (ref 10–25)
ANION GAP BLDA CALCULATED.4IONS-SCNC: 16 MMO/L (ref 10–25)
ANION GAP BLDA CALCULATED.4IONS-SCNC: 18 MMO/L (ref 10–25)
ANION GAP BLDA CALCULATED.4IONS-SCNC: 20 MMO/L (ref 10–25)
ANION GAP BLDA CALCULATED.4IONS-SCNC: 21 MMO/L (ref 10–25)
ANION GAP BLDMV CALCULATED.4IONS-SCNC: 17 MMO/L (ref 10–25)
ANION GAP BLDV CALCULATED.4IONS-SCNC: 18 MMOL/L (ref 10–25)
ANION GAP SERPL CALC-SCNC: 15 MMOL/L (ref 10–20)
ANION GAP SERPL CALC-SCNC: 16 MMOL/L (ref 10–20)
ANION GAP SERPL CALC-SCNC: 17 MMOL/L (ref 10–20)
ANION GAP SERPL CALC-SCNC: 18 MMOL/L (ref 10–20)
ANION GAP SERPL CALC-SCNC: 21 MMOL/L (ref 10–20)
ANION GAP SERPL CALC-SCNC: 24 MMOL/L (ref 10–20)
ANTIBODY SCREEN: NORMAL
APTT PPP: 31 SECONDS (ref 27–38)
APTT PPP: 31 SECONDS (ref 27–38)
AST SERPL W P-5'-P-CCNC: 288 U/L (ref 9–39)
AST SERPL W P-5'-P-CCNC: 67 U/L (ref 9–39)
AST SERPL W P-5'-P-CCNC: 71 U/L (ref 9–39)
ATRIAL RATE: 130 BPM
BASE EXCESS BLDA CALC-SCNC: -11.5 MMOL/L (ref -2–3)
BASE EXCESS BLDA CALC-SCNC: -14.1 MMOL/L (ref -2–3)
BASE EXCESS BLDA CALC-SCNC: -4.1 MMOL/L (ref -2–3)
BASE EXCESS BLDA CALC-SCNC: -4.1 MMOL/L (ref -2–3)
BASE EXCESS BLDA CALC-SCNC: -6.4 MMOL/L (ref -2–3)
BASE EXCESS BLDA CALC-SCNC: -6.9 MMOL/L (ref -2–3)
BASE EXCESS BLDA CALC-SCNC: -6.9 MMOL/L (ref -2–3)
BASE EXCESS BLDA CALC-SCNC: -7.3 MMOL/L (ref -2–3)
BASE EXCESS BLDA CALC-SCNC: -9.6 MMOL/L (ref -2–3)
BASE EXCESS BLDMV CALC-SCNC: -6.7 MMOL/L (ref -2–3)
BASE EXCESS BLDV CALC-SCNC: -6 MMOL/L (ref -2–3)
BASOPHILS # BLD AUTO: 0 X10*3/UL (ref 0–0.1)
BASOPHILS # BLD MANUAL: 0 X10*3/UL (ref 0–0.1)
BASOPHILS # BLD MANUAL: 0 X10*3/UL (ref 0–0.1)
BASOPHILS NFR BLD AUTO: 0 %
BASOPHILS NFR BLD MANUAL: 0 %
BASOPHILS NFR BLD MANUAL: 0 %
BILIRUB DIRECT SERPL-MCNC: 1.1 MG/DL (ref 0–0.3)
BILIRUB SERPL-MCNC: 1.3 MG/DL (ref 0–1.2)
BILIRUB SERPL-MCNC: 1.7 MG/DL (ref 0–1.2)
BILIRUB SERPL-MCNC: 3.9 MG/DL (ref 0–1.2)
BLOOD EXPIRATION DATE: NORMAL
BODY TEMPERATURE: 37 DEGREES CELSIUS
BODY TEMPERATURE: ABNORMAL
BUN SERPL-MCNC: 20 MG/DL (ref 6–23)
BUN SERPL-MCNC: 20 MG/DL (ref 6–23)
BUN SERPL-MCNC: 21 MG/DL (ref 6–23)
BUN SERPL-MCNC: 26 MG/DL (ref 6–23)
BUN SERPL-MCNC: 26 MG/DL (ref 6–23)
BUN SERPL-MCNC: 30 MG/DL (ref 6–23)
BURR CELLS BLD QL SMEAR: ABNORMAL
CA-I BLDA-SCNC: 0.92 MMOL/L (ref 1.1–1.33)
CA-I BLDA-SCNC: 1.04 MMOL/L (ref 1.1–1.33)
CA-I BLDA-SCNC: 1.06 MMOL/L (ref 1.1–1.33)
CA-I BLDA-SCNC: 1.09 MMOL/L (ref 1.1–1.33)
CA-I BLDA-SCNC: 1.11 MMOL/L (ref 1.1–1.33)
CA-I BLDA-SCNC: 1.12 MMOL/L (ref 1.1–1.33)
CA-I BLDA-SCNC: 1.16 MMOL/L (ref 1.1–1.33)
CA-I BLDA-SCNC: 1.26 MMOL/L (ref 1.1–1.33)
CA-I BLDMV-SCNC: 1.17 MMOL/L (ref 1.1–1.33)
CA-I BLDV-SCNC: 1.11 MMOL/L (ref 1.1–1.33)
CALCIUM SERPL-MCNC: 6.6 MG/DL (ref 8.6–10.3)
CALCIUM SERPL-MCNC: 7.1 MG/DL (ref 8.6–10.3)
CALCIUM SERPL-MCNC: 7.7 MG/DL (ref 8.6–10.6)
CALCIUM SERPL-MCNC: 7.9 MG/DL (ref 8.6–10.6)
CALCIUM SERPL-MCNC: 8 MG/DL (ref 8.6–10.6)
CALCIUM SERPL-MCNC: 8 MG/DL (ref 8.6–10.6)
CARDIAC TROPONIN I PNL SERPL HS: 309 NG/L (ref 0–53)
CARDIAC TROPONIN I PNL SERPL HS: 323 NG/L (ref 0–53)
CARDIAC TROPONIN I PNL SERPL HS: 79 NG/L (ref 0–20)
CFT FORM KAOLIN IND BLD RES TEG: 2.2 MIN (ref 0.8–2.1)
CFT FORM KAOLIN IND BLD RES TEG: 2.5 MIN (ref 0.8–2.1)
CHLORIDE BLD-SCNC: 99 MMOL/L (ref 98–107)
CHLORIDE BLDA-SCNC: 101 MMOL/L (ref 98–107)
CHLORIDE BLDA-SCNC: 102 MMOL/L (ref 98–107)
CHLORIDE BLDA-SCNC: 103 MMOL/L (ref 98–107)
CHLORIDE BLDA-SCNC: 104 MMOL/L (ref 98–107)
CHLORIDE BLDA-SCNC: 97 MMOL/L (ref 98–107)
CHLORIDE BLDA-SCNC: 99 MMOL/L (ref 98–107)
CHLORIDE BLDV-SCNC: 98 MMOL/L (ref 98–107)
CHLORIDE SERPL-SCNC: 101 MMOL/L (ref 98–107)
CHLORIDE SERPL-SCNC: 101 MMOL/L (ref 98–107)
CHLORIDE SERPL-SCNC: 102 MMOL/L (ref 98–107)
CHLORIDE SERPL-SCNC: 106 MMOL/L (ref 98–107)
CHLORIDE SERPL-SCNC: 106 MMOL/L (ref 98–107)
CHLORIDE SERPL-SCNC: 98 MMOL/L (ref 98–107)
CLOT ANGLE.KAOLIN INDUCED BLD RES TEG: 62 DEG (ref 63–78)
CLOT ANGLE.KAOLIN INDUCED BLD RES TEG: 63 DEG (ref 63–78)
CLOT INIT KAO IND P HEP NEUT BLD RES TEG: 6 MIN (ref 4.3–8.3)
CLOT INIT KAO IND P HEP NEUT BLD RES TEG: 6.2 MIN (ref 4.3–8.3)
CLOT INIT KAO IND P HEP NEUT BLD RES TEG: 6.3 MIN (ref 4.6–9.1)
CLOT INIT KAO IND P HEP NEUT BLD RES TEG: 7 MIN (ref 4.6–9.1)
CO2 SERPL-SCNC: 17 MMOL/L (ref 21–32)
CO2 SERPL-SCNC: 20 MMOL/L (ref 21–32)
CO2 SERPL-SCNC: 20 MMOL/L (ref 21–32)
CO2 SERPL-SCNC: 21 MMOL/L (ref 21–32)
CO2 SERPL-SCNC: 23 MMOL/L (ref 21–32)
CO2 SERPL-SCNC: 24 MMOL/L (ref 21–32)
CREAT SERPL-MCNC: 1.54 MG/DL (ref 0.5–1.3)
CREAT SERPL-MCNC: 1.57 MG/DL (ref 0.5–1.3)
CREAT SERPL-MCNC: 2.02 MG/DL (ref 0.5–1.3)
CREAT SERPL-MCNC: 2.93 MG/DL (ref 0.5–1.3)
CREAT SERPL-MCNC: 3.07 MG/DL (ref 0.5–1.3)
CREAT SERPL-MCNC: 3.62 MG/DL (ref 0.5–1.3)
CRITICAL CALL TIME: 14
CRITICAL CALLED BY: ABNORMAL
CRITICAL CALLED TO: ABNORMAL
CRITICAL READ BACK: ABNORMAL
DISPENSE STATUS: NORMAL
EGFRCR SERPLBLD CKD-EPI 2021: 17 ML/MIN/1.73M*2
EGFRCR SERPLBLD CKD-EPI 2021: 20 ML/MIN/1.73M*2
EGFRCR SERPLBLD CKD-EPI 2021: 21 ML/MIN/1.73M*2
EGFRCR SERPLBLD CKD-EPI 2021: 34 ML/MIN/1.73M*2
EGFRCR SERPLBLD CKD-EPI 2021: 45 ML/MIN/1.73M*2
EGFRCR SERPLBLD CKD-EPI 2021: 46 ML/MIN/1.73M*2
EOSINOPHIL # BLD AUTO: 0.65 X10*3/UL (ref 0–0.4)
EOSINOPHIL # BLD MANUAL: 0 X10*3/UL (ref 0–0.4)
EOSINOPHIL # BLD MANUAL: 0.12 X10*3/UL (ref 0–0.4)
EOSINOPHIL NFR BLD AUTO: 23.6 %
EOSINOPHIL NFR BLD MANUAL: 0 %
EOSINOPHIL NFR BLD MANUAL: 2.7 %
ERYTHROCYTE [DISTWIDTH] IN BLOOD BY AUTOMATED COUNT: 13.2 % (ref 11.5–14.5)
ERYTHROCYTE [DISTWIDTH] IN BLOOD BY AUTOMATED COUNT: 13.5 % (ref 11.5–14.5)
ERYTHROCYTE [DISTWIDTH] IN BLOOD BY AUTOMATED COUNT: 13.7 % (ref 11.5–14.5)
ERYTHROCYTE [DISTWIDTH] IN BLOOD BY AUTOMATED COUNT: 14.2 % (ref 11.5–14.5)
ERYTHROCYTE [DISTWIDTH] IN BLOOD BY AUTOMATED COUNT: 14.5 % (ref 11.5–14.5)
ERYTHROCYTE [DISTWIDTH] IN BLOOD BY AUTOMATED COUNT: 14.7 % (ref 11.5–14.5)
FIBRINOGEN BLD CALC-MCNC: 299 MG/DL (ref 278–581)
FIBRINOGEN BLD CALC-MCNC: 343 MG/DL (ref 278–581)
FIBRINOGEN PPP-MCNC: 154 MG/DL (ref 200–400)
FIBRINOGEN PPP-MCNC: 222 MG/DL (ref 200–400)
GLUCOSE BLD MANUAL STRIP-MCNC: 109 MG/DL (ref 74–99)
GLUCOSE BLD MANUAL STRIP-MCNC: 197 MG/DL (ref 74–99)
GLUCOSE BLD MANUAL STRIP-MCNC: 44 MG/DL (ref 74–99)
GLUCOSE BLD MANUAL STRIP-MCNC: 55 MG/DL (ref 74–99)
GLUCOSE BLD MANUAL STRIP-MCNC: 57 MG/DL (ref 74–99)
GLUCOSE BLD MANUAL STRIP-MCNC: 83 MG/DL (ref 74–99)
GLUCOSE BLD MANUAL STRIP-MCNC: 84 MG/DL (ref 74–99)
GLUCOSE BLD MANUAL STRIP-MCNC: 96 MG/DL (ref 74–99)
GLUCOSE BLD-MCNC: 170 MG/DL (ref 74–99)
GLUCOSE BLDA-MCNC: 100 MG/DL (ref 74–99)
GLUCOSE BLDA-MCNC: 109 MG/DL (ref 74–99)
GLUCOSE BLDA-MCNC: 131 MG/DL (ref 74–99)
GLUCOSE BLDA-MCNC: 132 MG/DL (ref 74–99)
GLUCOSE BLDA-MCNC: 136 MG/DL (ref 74–99)
GLUCOSE BLDA-MCNC: 158 MG/DL (ref 74–99)
GLUCOSE BLDA-MCNC: 263 MG/DL (ref 74–99)
GLUCOSE BLDA-MCNC: 93 MG/DL (ref 74–99)
GLUCOSE BLDV-MCNC: 77 MG/DL (ref 74–99)
GLUCOSE SERPL-MCNC: 118 MG/DL (ref 74–99)
GLUCOSE SERPL-MCNC: 147 MG/DL (ref 74–99)
GLUCOSE SERPL-MCNC: 250 MG/DL (ref 74–99)
GLUCOSE SERPL-MCNC: 275 MG/DL (ref 74–99)
GLUCOSE SERPL-MCNC: 81 MG/DL (ref 74–99)
GLUCOSE SERPL-MCNC: 90 MG/DL (ref 74–99)
HCO3 BLDA-SCNC: 15.2 MMOL/L (ref 22–26)
HCO3 BLDA-SCNC: 17.1 MMOL/L (ref 22–26)
HCO3 BLDA-SCNC: 18.5 MMOL/L (ref 22–26)
HCO3 BLDA-SCNC: 18.5 MMOL/L (ref 22–26)
HCO3 BLDA-SCNC: 18.7 MMOL/L (ref 22–26)
HCO3 BLDA-SCNC: 21.1 MMOL/L (ref 22–26)
HCO3 BLDA-SCNC: 21.6 MMOL/L (ref 22–26)
HCO3 BLDA-SCNC: 21.6 MMOL/L (ref 22–26)
HCO3 BLDA-SCNC: 21.7 MMOL/L (ref 22–26)
HCO3 BLDMV-SCNC: 21.6 MMOL/L (ref 22–26)
HCO3 BLDV-SCNC: 21.1 MMOL/L (ref 22–26)
HCT VFR BLD AUTO: 29 % (ref 41–52)
HCT VFR BLD AUTO: 34.3 % (ref 41–52)
HCT VFR BLD AUTO: 34.7 % (ref 41–52)
HCT VFR BLD AUTO: 35.2 % (ref 41–52)
HCT VFR BLD AUTO: 38.2 % (ref 41–52)
HCT VFR BLD AUTO: 38.4 % (ref 41–52)
HCT VFR BLD EST: 31 % (ref 41–52)
HCT VFR BLD EST: 32 % (ref 41–52)
HCT VFR BLD EST: 37 % (ref 41–52)
HCT VFR BLD EST: 38 % (ref 41–52)
HCT VFR BLD EST: 39 % (ref 41–52)
HCT VFR BLD EST: 39 % (ref 41–52)
HCT VFR BLD EST: 40 % (ref 41–52)
HCT VFR BLD EST: 42 % (ref 41–52)
HGB BLD-MCNC: 11.3 G/DL (ref 13.5–17.5)
HGB BLD-MCNC: 11.6 G/DL (ref 13.5–17.5)
HGB BLD-MCNC: 11.9 G/DL (ref 13.5–17.5)
HGB BLD-MCNC: 12.5 G/DL (ref 13.5–17.5)
HGB BLD-MCNC: 12.6 G/DL (ref 13.5–17.5)
HGB BLD-MCNC: 9.8 G/DL (ref 13.5–17.5)
HGB BLDA-MCNC: 10.2 G/DL (ref 13.5–17.5)
HGB BLDA-MCNC: 10.7 G/DL (ref 13.5–17.5)
HGB BLDA-MCNC: 12.5 G/DL (ref 13.5–17.5)
HGB BLDA-MCNC: 12.7 G/DL (ref 13.5–17.5)
HGB BLDA-MCNC: 12.8 G/DL (ref 13.5–17.5)
HGB BLDA-MCNC: 12.9 G/DL (ref 13.5–17.5)
HGB BLDA-MCNC: 13.4 G/DL (ref 13.5–17.5)
HGB BLDA-MCNC: 14 G/DL (ref 13.5–17.5)
HGB BLDMV-MCNC: 12.9 G/DL (ref 13.5–17.5)
HGB BLDV-MCNC: 12.4 G/DL (ref 13.5–17.5)
HOLD SPECIMEN: NORMAL
IMM GRANULOCYTES # BLD AUTO: 0.01 X10*3/UL (ref 0–0.5)
IMM GRANULOCYTES # BLD AUTO: 0.07 X10*3/UL (ref 0–0.5)
IMM GRANULOCYTES # BLD AUTO: 0.07 X10*3/UL (ref 0–0.5)
IMM GRANULOCYTES NFR BLD AUTO: 0.3 % (ref 0–0.9)
IMM GRANULOCYTES NFR BLD AUTO: 1.5 % (ref 0–0.9)
IMM GRANULOCYTES NFR BLD AUTO: 2.5 % (ref 0–0.9)
INHALED O2 CONCENTRATION: 60 %
INHALED O2 CONCENTRATION: 70 %
INHALED O2 CONCENTRATION: 80 %
INHALED O2 CONCENTRATION: 90 %
INR PPP: 1.4 (ref 0.9–1.1)
INR PPP: 1.5 (ref 0.9–1.1)
LACTATE BLDA-SCNC: 5.7 MMOL/L (ref 0.4–2)
LACTATE BLDA-SCNC: 6.3 MMOL/L (ref 0.4–2)
LACTATE BLDA-SCNC: 7.1 MMOL/L (ref 0.4–2)
LACTATE BLDA-SCNC: 7.3 MMOL/L (ref 0.4–2)
LACTATE BLDA-SCNC: 7.8 MMOL/L (ref 0.4–2)
LACTATE BLDA-SCNC: 8.5 MMOL/L (ref 0.4–2)
LACTATE BLDA-SCNC: 9 MMOL/L (ref 0.4–2)
LACTATE BLDA-SCNC: 9.8 MMOL/L (ref 0.4–2)
LACTATE BLDMV-SCNC: 9.1 MMOL/L (ref 0.4–2)
LACTATE BLDV-SCNC: 6.8 MMOL/L (ref 0.4–2)
LACTATE BLDV-SCNC: 6.8 MMOL/L (ref 0.4–2)
LACTATE SERPL-SCNC: 9.3 MMOL/L (ref 0.4–2)
LYMPHOCYTES # BLD AUTO: 0.43 X10*3/UL (ref 0.8–3)
LYMPHOCYTES # BLD MANUAL: 0.45 X10*3/UL (ref 0.8–3)
LYMPHOCYTES # BLD MANUAL: 0.58 X10*3/UL (ref 0.8–3)
LYMPHOCYTES NFR BLD AUTO: 15.6 %
LYMPHOCYTES NFR BLD MANUAL: 12.7 %
LYMPHOCYTES NFR BLD MANUAL: 14 %
MA KAOLIN BLD RES TEG: 57 MM (ref 52–69)
MA KAOLIN BLD RES TEG: 58 MM (ref 52–69)
MA KAOLIN+TF BLD RES TEG: 57 MM (ref 52–70)
MA KAOLIN+TF BLD RES TEG: 58 MM (ref 52–70)
MA TF IND+IIB-IIIA INH BLD RES TEG: 16 MM (ref 15–32)
MA TF IND+IIB-IIIA INH BLD RES TEG: 19 MM (ref 15–32)
MAGNESIUM SERPL-MCNC: 1.33 MG/DL (ref 1.6–2.4)
MAGNESIUM SERPL-MCNC: 1.34 MG/DL (ref 1.6–2.4)
MAGNESIUM SERPL-MCNC: 1.67 MG/DL (ref 1.6–2.4)
MCH RBC QN AUTO: 28.4 PG (ref 26–34)
MCH RBC QN AUTO: 28.6 PG (ref 26–34)
MCH RBC QN AUTO: 28.7 PG (ref 26–34)
MCH RBC QN AUTO: 28.8 PG (ref 26–34)
MCH RBC QN AUTO: 28.8 PG (ref 26–34)
MCH RBC QN AUTO: 29 PG (ref 26–34)
MCHC RBC AUTO-ENTMCNC: 32.6 G/DL (ref 32–36)
MCHC RBC AUTO-ENTMCNC: 32.7 G/DL (ref 32–36)
MCHC RBC AUTO-ENTMCNC: 32.8 G/DL (ref 32–36)
MCHC RBC AUTO-ENTMCNC: 33.8 G/DL (ref 32–36)
MCV RBC AUTO: 85 FL (ref 80–100)
MCV RBC AUTO: 87 FL (ref 80–100)
MCV RBC AUTO: 87 FL (ref 80–100)
MCV RBC AUTO: 89 FL (ref 80–100)
METAMYELOCYTES # BLD MANUAL: 0.1 X10*3/UL
METAMYELOCYTES # BLD MANUAL: 0.46 X10*3/UL
METAMYELOCYTES NFR BLD MANUAL: 10 %
METAMYELOCYTES NFR BLD MANUAL: 3 %
MONOCYTES # BLD AUTO: 0.04 X10*3/UL (ref 0.05–0.8)
MONOCYTES # BLD MANUAL: 0.21 X10*3/UL (ref 0.05–0.8)
MONOCYTES # BLD MANUAL: 0.35 X10*3/UL (ref 0.05–0.8)
MONOCYTES NFR BLD AUTO: 1.5 %
MONOCYTES NFR BLD MANUAL: 11 %
MONOCYTES NFR BLD MANUAL: 4.6 %
MYELOCYTES # BLD MANUAL: 0.38 X10*3/UL
MYELOCYTES NFR BLD MANUAL: 8.2 %
NEUTROPHILS # BLD AUTO: 1.56 X10*3/UL (ref 1.6–5.5)
NEUTROPHILS # BLD MANUAL: 2.31 X10*3/UL (ref 1.6–5.5)
NEUTROPHILS # BLD MANUAL: 2.72 X10*3/UL (ref 1.6–5.5)
NEUTROPHILS NFR BLD AUTO: 56.8 %
NEUTS BAND # BLD MANUAL: 0.29 X10*3/UL (ref 0–0.5)
NEUTS BAND # BLD MANUAL: 0.96 X10*3/UL (ref 0–0.5)
NEUTS BAND NFR BLD MANUAL: 20.9 %
NEUTS BAND NFR BLD MANUAL: 9 %
NEUTS SEG # BLD MANUAL: 1.76 X10*3/UL (ref 1.6–5)
NEUTS SEG # BLD MANUAL: 2.02 X10*3/UL (ref 1.6–5)
NEUTS SEG NFR BLD MANUAL: 38.2 %
NEUTS SEG NFR BLD MANUAL: 63 %
NRBC BLD-RTO: 0 /100 WBCS (ref 0–0)
OXYHGB MFR BLDA: 89.8 % (ref 94–98)
OXYHGB MFR BLDA: 91.4 % (ref 94–98)
OXYHGB MFR BLDA: 92.1 % (ref 94–98)
OXYHGB MFR BLDA: 94.6 % (ref 94–98)
OXYHGB MFR BLDA: 95.4 % (ref 94–98)
OXYHGB MFR BLDA: 96 % (ref 94–98)
OXYHGB MFR BLDA: 96.2 % (ref 94–98)
OXYHGB MFR BLDA: 96.3 % (ref 94–98)
OXYHGB MFR BLDA: 96.6 % (ref 94–98)
OXYHGB MFR BLDMV: 68.4 % (ref 45–75)
OXYHGB MFR BLDV: 73.9 % (ref 45–75)
P AXIS: 72 DEGREES
P OFFSET: 194 MS
P ONSET: 144 MS
PCO2 BLDA: 36 MM HG (ref 38–42)
PCO2 BLDA: 36 MM HG (ref 38–42)
PCO2 BLDA: 41 MM HG (ref 38–42)
PCO2 BLDA: 41 MM HG (ref 38–42)
PCO2 BLDA: 48 MM HG (ref 38–42)
PCO2 BLDA: 48 MM HG (ref 38–42)
PCO2 BLDA: 49 MM HG (ref 38–42)
PCO2 BLDA: 53 MM HG (ref 38–42)
PCO2 BLDA: 54 MM HG (ref 38–42)
PCO2 BLDMV: 54 MM HG (ref 41–51)
PCO2 BLDV: 47 MM HG (ref 41–51)
PEEP CMH2O: 5 CM H2O
PH BLDA: 7.11 PH (ref 7.38–7.42)
PH BLDA: 7.16 PH (ref 7.38–7.42)
PH BLDA: 7.19 PH (ref 7.38–7.42)
PH BLDA: 7.2 PH (ref 7.38–7.42)
PH BLDA: 7.22 PH (ref 7.38–7.42)
PH BLDA: 7.32 PH (ref 7.38–7.42)
PH BLDA: 7.32 PH (ref 7.38–7.42)
PH BLDA: 7.33 PH (ref 7.38–7.42)
PH BLDA: 7.33 PH (ref 7.38–7.42)
PH BLDMV: 7.21 PH (ref 7.33–7.43)
PH BLDV: 7.26 PH (ref 7.33–7.43)
PHOSPHATE SERPL-MCNC: 2.5 MG/DL (ref 2.5–4.9)
PHOSPHATE SERPL-MCNC: 3.7 MG/DL (ref 2.5–4.9)
PHOSPHATE SERPL-MCNC: 3.8 MG/DL (ref 2.5–4.9)
PHOSPHATE SERPL-MCNC: 5.5 MG/DL (ref 2.5–4.9)
PLATELET # BLD AUTO: 102 X10*3/UL (ref 150–450)
PLATELET # BLD AUTO: 137 X10*3/UL (ref 150–450)
PLATELET # BLD AUTO: 145 X10*3/UL (ref 150–450)
PLATELET # BLD AUTO: 149 X10*3/UL (ref 150–450)
PLATELET # BLD AUTO: 159 X10*3/UL (ref 150–450)
PLATELET # BLD AUTO: 165 X10*3/UL (ref 150–450)
PO2 BLDA: 113 MM HG (ref 85–95)
PO2 BLDA: 58 MM HG (ref 85–95)
PO2 BLDA: 61 MM HG (ref 85–95)
PO2 BLDA: 67 MM HG (ref 85–95)
PO2 BLDA: 85 MM HG (ref 85–95)
PO2 BLDA: 87 MM HG (ref 85–95)
PO2 BLDA: 94 MM HG (ref 85–95)
PO2 BLDA: 96 MM HG (ref 85–95)
PO2 BLDA: 97 MM HG (ref 85–95)
PO2 BLDMV: 42 MM HG (ref 35–45)
PO2 BLDV: 44 MM HG (ref 35–45)
POTASSIUM BLDA-SCNC: 3.3 MMOL/L (ref 3.5–5.3)
POTASSIUM BLDA-SCNC: 3.4 MMOL/L (ref 3.5–5.3)
POTASSIUM BLDA-SCNC: 3.5 MMOL/L (ref 3.5–5.3)
POTASSIUM BLDA-SCNC: 3.5 MMOL/L (ref 3.5–5.3)
POTASSIUM BLDA-SCNC: 3.9 MMOL/L (ref 3.5–5.3)
POTASSIUM BLDA-SCNC: 4 MMOL/L (ref 3.5–5.3)
POTASSIUM BLDA-SCNC: 4.2 MMOL/L (ref 3.5–5.3)
POTASSIUM BLDA-SCNC: 4.2 MMOL/L (ref 3.5–5.3)
POTASSIUM BLDMV-SCNC: 3.1 MMOL/L (ref 3.5–5.3)
POTASSIUM BLDV-SCNC: 4 MMOL/L (ref 3.5–5.3)
POTASSIUM SERPL-SCNC: 3.4 MMOL/L (ref 3.5–5.3)
POTASSIUM SERPL-SCNC: 3.6 MMOL/L (ref 3.5–5.3)
POTASSIUM SERPL-SCNC: 3.9 MMOL/L (ref 3.5–5.3)
POTASSIUM SERPL-SCNC: 4 MMOL/L (ref 3.5–5.3)
POTASSIUM SERPL-SCNC: 4 MMOL/L (ref 3.5–5.3)
POTASSIUM SERPL-SCNC: 4.1 MMOL/L (ref 3.5–5.3)
PR INTERVAL: 140 MS
PRODUCT BLOOD TYPE: 5100
PRODUCT BLOOD TYPE: 9500
PRODUCT BLOOD TYPE: NORMAL
PRODUCT CODE: NORMAL
PROT SERPL-MCNC: 3.6 G/DL (ref 6.4–8.2)
PROT SERPL-MCNC: 3.8 G/DL (ref 6.4–8.2)
PROT SERPL-MCNC: 4.4 G/DL (ref 6.4–8.2)
PROTHROMBIN TIME: 15.9 SECONDS (ref 9.8–12.8)
PROTHROMBIN TIME: 16.4 SECONDS (ref 9.8–12.8)
Q ONSET: 214 MS
QRS COUNT: 22 BEATS
QRS DURATION: 70 MS
QT INTERVAL: 314 MS
QTC CALCULATION(BAZETT): 462 MS
QTC FREDERICIA: 406 MS
R AXIS: -31 DEGREES
RBC # BLD AUTO: 3.4 X10*6/UL (ref 4.5–5.9)
RBC # BLD AUTO: 3.89 X10*6/UL (ref 4.5–5.9)
RBC # BLD AUTO: 4.04 X10*6/UL (ref 4.5–5.9)
RBC # BLD AUTO: 4.13 X10*6/UL (ref 4.5–5.9)
RBC # BLD AUTO: 4.37 X10*6/UL (ref 4.5–5.9)
RBC # BLD AUTO: 4.44 X10*6/UL (ref 4.5–5.9)
RBC MORPH BLD: ABNORMAL
RBC MORPH BLD: ABNORMAL
RH FACTOR (ANTIGEN D): NORMAL
RH FACTOR (ANTIGEN D): NORMAL
RIGHT VENTRICLE FREE WALL PEAK S': 10.7 CM/S
SAO2 % BLDA: 92 % (ref 94–100)
SAO2 % BLDA: 94 % (ref 94–100)
SAO2 % BLDA: 94 % (ref 94–100)
SAO2 % BLDA: 97 % (ref 94–100)
SAO2 % BLDA: 98 % (ref 94–100)
SAO2 % BLDA: 98 % (ref 94–100)
SAO2 % BLDA: 99 % (ref 94–100)
SAO2 % BLDMV: 70 % (ref 45–75)
SAO2 % BLDV: 76 % (ref 45–75)
SODIUM BLDA-SCNC: 131 MMOL/L (ref 136–145)
SODIUM BLDA-SCNC: 133 MMOL/L (ref 136–145)
SODIUM BLDA-SCNC: 134 MMOL/L (ref 136–145)
SODIUM BLDA-SCNC: 134 MMOL/L (ref 136–145)
SODIUM BLDA-SCNC: 135 MMOL/L (ref 136–145)
SODIUM BLDMV-SCNC: 134 MMOL/L (ref 136–145)
SODIUM BLDV-SCNC: 133 MMOL/L (ref 136–145)
SODIUM SERPL-SCNC: 136 MMOL/L (ref 136–145)
SODIUM SERPL-SCNC: 137 MMOL/L (ref 136–145)
SODIUM SERPL-SCNC: 138 MMOL/L (ref 136–145)
SODIUM SERPL-SCNC: 138 MMOL/L (ref 136–145)
SODIUM SERPL-SCNC: 139 MMOL/L (ref 136–145)
SODIUM SERPL-SCNC: 139 MMOL/L (ref 136–145)
SPECIMEN DRAWN FROM PATIENT: ABNORMAL
T AXIS: 75 DEGREES
T OFFSET: 371 MS
TIDAL VOLUME: 450 ML
TOTAL CELLS COUNTED BLD: 100
TOTAL CELLS COUNTED BLD: 110
UNIT ABO: NORMAL
UNIT NUMBER: NORMAL
UNIT RH: NORMAL
UNIT VOLUME: 239
UNIT VOLUME: 295
UNIT VOLUME: 297
UNIT VOLUME: 308
UNIT VOLUME: 309
UNIT VOLUME: 318
UNIT VOLUME: 319
UNIT VOLUME: 323
UNIT VOLUME: 350
VARIANT LYMPHS # BLD MANUAL: 0.12 X10*3/UL (ref 0–0.3)
VARIANT LYMPHS NFR BLD: 2.7 %
VENTILATOR RATE: 25 BPM
VENTRICULAR RATE: 130 BPM
WBC # BLD AUTO: 1.7 X10*3/UL (ref 4.4–11.3)
WBC # BLD AUTO: 2.8 X10*3/UL (ref 4.4–11.3)
WBC # BLD AUTO: 3.2 X10*3/UL (ref 4.4–11.3)
WBC # BLD AUTO: 4.5 X10*3/UL (ref 4.4–11.3)
WBC # BLD AUTO: 4.6 X10*3/UL (ref 4.4–11.3)
WBC # BLD AUTO: 9.9 X10*3/UL (ref 4.4–11.3)
XM INTEP: NORMAL

## 2024-12-17 PROCEDURE — 2500000005 HC RX 250 GENERAL PHARMACY W/O HCPCS: Performed by: SURGERY

## 2024-12-17 PROCEDURE — 85384 FIBRINOGEN ACTIVITY: CPT | Performed by: STUDENT IN AN ORGANIZED HEALTH CARE EDUCATION/TRAINING PROGRAM

## 2024-12-17 PROCEDURE — 83735 ASSAY OF MAGNESIUM: CPT | Performed by: STUDENT IN AN ORGANIZED HEALTH CARE EDUCATION/TRAINING PROGRAM

## 2024-12-17 PROCEDURE — 84100 ASSAY OF PHOSPHORUS: CPT | Performed by: STUDENT IN AN ORGANIZED HEALTH CARE EDUCATION/TRAINING PROGRAM

## 2024-12-17 PROCEDURE — 71045 X-RAY EXAM CHEST 1 VIEW: CPT

## 2024-12-17 PROCEDURE — 2500000005 HC RX 250 GENERAL PHARMACY W/O HCPCS: Performed by: STUDENT IN AN ORGANIZED HEALTH CARE EDUCATION/TRAINING PROGRAM

## 2024-12-17 PROCEDURE — 2500000004 HC RX 250 GENERAL PHARMACY W/ HCPCS (ALT 636 FOR OP/ED): Performed by: STUDENT IN AN ORGANIZED HEALTH CARE EDUCATION/TRAINING PROGRAM

## 2024-12-17 PROCEDURE — 99223 1ST HOSP IP/OBS HIGH 75: CPT | Performed by: SURGERY

## 2024-12-17 PROCEDURE — 2500000004 HC RX 250 GENERAL PHARMACY W/ HCPCS (ALT 636 FOR OP/ED): Performed by: HOSPITALIST

## 2024-12-17 PROCEDURE — 2500000005 HC RX 250 GENERAL PHARMACY W/O HCPCS

## 2024-12-17 PROCEDURE — 82810 BLOOD GASES O2 SAT ONLY: CPT

## 2024-12-17 PROCEDURE — 85027 COMPLETE CBC AUTOMATED: CPT | Performed by: STUDENT IN AN ORGANIZED HEALTH CARE EDUCATION/TRAINING PROGRAM

## 2024-12-17 PROCEDURE — 0DN80ZZ RELEASE SMALL INTESTINE, OPEN APPROACH: ICD-10-PCS | Performed by: SURGERY

## 2024-12-17 PROCEDURE — 83605 ASSAY OF LACTIC ACID: CPT

## 2024-12-17 PROCEDURE — 2500000005 HC RX 250 GENERAL PHARMACY W/O HCPCS: Performed by: EMERGENCY MEDICINE

## 2024-12-17 PROCEDURE — 88307 TISSUE EXAM BY PATHOLOGIST: CPT | Performed by: PATHOLOGY

## 2024-12-17 PROCEDURE — 87081 CULTURE SCREEN ONLY: CPT | Mod: ELYLAB | Performed by: STUDENT IN AN ORGANIZED HEALTH CARE EDUCATION/TRAINING PROGRAM

## 2024-12-17 PROCEDURE — 82810 BLOOD GASES O2 SAT ONLY: CPT | Performed by: STUDENT IN AN ORGANIZED HEALTH CARE EDUCATION/TRAINING PROGRAM

## 2024-12-17 PROCEDURE — 84484 ASSAY OF TROPONIN QUANT: CPT

## 2024-12-17 PROCEDURE — 0DBH0ZZ EXCISION OF CECUM, OPEN APPROACH: ICD-10-PCS | Performed by: SURGERY

## 2024-12-17 PROCEDURE — 83605 ASSAY OF LACTIC ACID: CPT | Performed by: HOSPITALIST

## 2024-12-17 PROCEDURE — 37799 UNLISTED PX VASCULAR SURGERY: CPT

## 2024-12-17 PROCEDURE — 93325 DOPPLER ECHO COLOR FLOW MAPG: CPT | Performed by: INTERNAL MEDICINE

## 2024-12-17 PROCEDURE — 82310 ASSAY OF CALCIUM: CPT | Performed by: STUDENT IN AN ORGANIZED HEALTH CARE EDUCATION/TRAINING PROGRAM

## 2024-12-17 PROCEDURE — 44160 REMOVAL OF COLON: CPT | Performed by: SURGERY

## 2024-12-17 PROCEDURE — 3700000002 HC GENERAL ANESTHESIA TIME - EACH INCREMENTAL 1 MINUTE: Performed by: SURGERY

## 2024-12-17 PROCEDURE — 2500000004 HC RX 250 GENERAL PHARMACY W/ HCPCS (ALT 636 FOR OP/ED): Performed by: EMERGENCY MEDICINE

## 2024-12-17 PROCEDURE — 0DBU0ZZ EXCISION OF OMENTUM, OPEN APPROACH: ICD-10-PCS | Performed by: SURGERY

## 2024-12-17 PROCEDURE — 85610 PROTHROMBIN TIME: CPT | Performed by: STUDENT IN AN ORGANIZED HEALTH CARE EDUCATION/TRAINING PROGRAM

## 2024-12-17 PROCEDURE — 2720000007 HC OR 272 NO HCPCS: Performed by: SURGERY

## 2024-12-17 PROCEDURE — 2500000004 HC RX 250 GENERAL PHARMACY W/ HCPCS (ALT 636 FOR OP/ED)

## 2024-12-17 PROCEDURE — 2500000005 HC RX 250 GENERAL PHARMACY W/O HCPCS: Performed by: HOSPITALIST

## 2024-12-17 PROCEDURE — P9045 ALBUMIN (HUMAN), 5%, 250 ML: HCPCS | Mod: JZ | Performed by: HOSPITALIST

## 2024-12-17 PROCEDURE — P9045 ALBUMIN (HUMAN), 5%, 250 ML: HCPCS | Mod: JZ | Performed by: STUDENT IN AN ORGANIZED HEALTH CARE EDUCATION/TRAINING PROGRAM

## 2024-12-17 PROCEDURE — 86901 BLOOD TYPING SEROLOGIC RH(D): CPT

## 2024-12-17 PROCEDURE — 85027 COMPLETE CBC AUTOMATED: CPT

## 2024-12-17 PROCEDURE — 85018 HEMOGLOBIN: CPT | Performed by: STUDENT IN AN ORGANIZED HEALTH CARE EDUCATION/TRAINING PROGRAM

## 2024-12-17 PROCEDURE — 83605 ASSAY OF LACTIC ACID: CPT | Performed by: STUDENT IN AN ORGANIZED HEALTH CARE EDUCATION/TRAINING PROGRAM

## 2024-12-17 PROCEDURE — 84484 ASSAY OF TROPONIN QUANT: CPT | Performed by: STUDENT IN AN ORGANIZED HEALTH CARE EDUCATION/TRAINING PROGRAM

## 2024-12-17 PROCEDURE — P9012 CRYOPRECIPITATE EACH UNIT: HCPCS

## 2024-12-17 PROCEDURE — 82805 BLOOD GASES W/O2 SATURATION: CPT | Performed by: SURGERY

## 2024-12-17 PROCEDURE — 2500000002 HC RX 250 W HCPCS SELF ADMINISTERED DRUGS (ALT 637 FOR MEDICARE OP, ALT 636 FOR OP/ED): Performed by: HOSPITALIST

## 2024-12-17 PROCEDURE — 3600000004 HC OR TIME - INITIAL BASE CHARGE - PROCEDURE LEVEL FOUR: Performed by: SURGERY

## 2024-12-17 PROCEDURE — P9045 ALBUMIN (HUMAN), 5%, 250 ML: HCPCS | Mod: JZ | Performed by: EMERGENCY MEDICINE

## 2024-12-17 PROCEDURE — 0DNU0ZZ RELEASE OMENTUM, OPEN APPROACH: ICD-10-PCS | Performed by: SURGERY

## 2024-12-17 PROCEDURE — 82248 BILIRUBIN DIRECT: CPT

## 2024-12-17 PROCEDURE — 83735 ASSAY OF MAGNESIUM: CPT | Performed by: HOSPITALIST

## 2024-12-17 PROCEDURE — 2020000001 HC ICU ROOM DAILY

## 2024-12-17 PROCEDURE — 82435 ASSAY OF BLOOD CHLORIDE: CPT | Performed by: STUDENT IN AN ORGANIZED HEALTH CARE EDUCATION/TRAINING PROGRAM

## 2024-12-17 PROCEDURE — 0DTH0ZZ RESECTION OF CECUM, OPEN APPROACH: ICD-10-PCS | Performed by: SURGERY

## 2024-12-17 PROCEDURE — 0DQ80ZZ REPAIR SMALL INTESTINE, OPEN APPROACH: ICD-10-PCS | Performed by: SURGERY

## 2024-12-17 PROCEDURE — 0DBA0ZZ EXCISION OF JEJUNUM, OPEN APPROACH: ICD-10-PCS | Performed by: SURGERY

## 2024-12-17 PROCEDURE — 85347 COAGULATION TIME ACTIVATED: CPT | Performed by: STUDENT IN AN ORGANIZED HEALTH CARE EDUCATION/TRAINING PROGRAM

## 2024-12-17 PROCEDURE — 3700000001 HC GENERAL ANESTHESIA TIME - INITIAL BASE CHARGE: Performed by: SURGERY

## 2024-12-17 PROCEDURE — C8924 2D TTE W OR W/O FOL W/CON,FU: HCPCS

## 2024-12-17 PROCEDURE — 93321 DOPPLER ECHO F-UP/LMTD STD: CPT | Performed by: INTERNAL MEDICINE

## 2024-12-17 PROCEDURE — 3600000009 HC OR TIME - EACH INCREMENTAL 1 MINUTE - PROCEDURE LEVEL FOUR: Performed by: SURGERY

## 2024-12-17 PROCEDURE — 85007 BL SMEAR W/DIFF WBC COUNT: CPT

## 2024-12-17 PROCEDURE — 84132 ASSAY OF SERUM POTASSIUM: CPT | Performed by: HOSPITALIST

## 2024-12-17 PROCEDURE — 93005 ELECTROCARDIOGRAM TRACING: CPT

## 2024-12-17 PROCEDURE — 99291 CRITICAL CARE FIRST HOUR: CPT | Performed by: EMERGENCY MEDICINE

## 2024-12-17 PROCEDURE — 82330 ASSAY OF CALCIUM: CPT | Performed by: HOSPITALIST

## 2024-12-17 PROCEDURE — 0DQA0ZZ REPAIR JEJUNUM, OPEN APPROACH: ICD-10-PCS | Performed by: SURGERY

## 2024-12-17 PROCEDURE — 88307 TISSUE EXAM BY PATHOLOGIST: CPT | Mod: TC,SUR | Performed by: SURGERY

## 2024-12-17 PROCEDURE — 37799 UNLISTED PX VASCULAR SURGERY: CPT | Performed by: HOSPITALIST

## 2024-12-17 PROCEDURE — 37799 UNLISTED PX VASCULAR SURGERY: CPT | Performed by: STUDENT IN AN ORGANIZED HEALTH CARE EDUCATION/TRAINING PROGRAM

## 2024-12-17 PROCEDURE — 85730 THROMBOPLASTIN TIME PARTIAL: CPT | Performed by: STUDENT IN AN ORGANIZED HEALTH CARE EDUCATION/TRAINING PROGRAM

## 2024-12-17 PROCEDURE — 82947 ASSAY GLUCOSE BLOOD QUANT: CPT

## 2024-12-17 PROCEDURE — 85025 COMPLETE CBC W/AUTO DIFF WBC: CPT | Performed by: HOSPITALIST

## 2024-12-17 PROCEDURE — 94002 VENT MGMT INPAT INIT DAY: CPT

## 2024-12-17 PROCEDURE — 97606 NEG PRS WND THER DME>50 SQCM: CPT | Performed by: SURGERY

## 2024-12-17 PROCEDURE — 71045 X-RAY EXAM CHEST 1 VIEW: CPT | Performed by: RADIOLOGY

## 2024-12-17 PROCEDURE — 82435 ASSAY OF BLOOD CHLORIDE: CPT

## 2024-12-17 PROCEDURE — 93308 TTE F-UP OR LMTD: CPT | Performed by: INTERNAL MEDICINE

## 2024-12-17 PROCEDURE — 86923 COMPATIBILITY TEST ELECTRIC: CPT

## 2024-12-17 PROCEDURE — 93010 ELECTROCARDIOGRAM REPORT: CPT | Performed by: INTERNAL MEDICINE

## 2024-12-17 PROCEDURE — 84075 ASSAY ALKALINE PHOSPHATASE: CPT | Performed by: HOSPITALIST

## 2024-12-17 PROCEDURE — G0378 HOSPITAL OBSERVATION PER HR: HCPCS

## 2024-12-17 PROCEDURE — 94681 O2 UPTK CO2 OUTP % O2 XTRC: CPT

## 2024-12-17 RX ORDER — PROPOFOL 10 MG/ML
0-50 INJECTION, EMULSION INTRAVENOUS CONTINUOUS
Status: DISCONTINUED | OUTPATIENT
Start: 2024-12-17 | End: 2024-12-18

## 2024-12-17 RX ORDER — ALBUMIN HUMAN 50 G/1000ML
25 SOLUTION INTRAVENOUS ONCE
Status: COMPLETED | OUTPATIENT
Start: 2024-12-17 | End: 2024-12-17

## 2024-12-17 RX ORDER — SODIUM BICARBONATE 1 MEQ/ML
SYRINGE (ML) INTRAVENOUS
Status: COMPLETED
Start: 2024-12-17 | End: 2024-12-17

## 2024-12-17 RX ORDER — DEXTROSE 50 % IN WATER (D50W) INTRAVENOUS SYRINGE
25 ONCE
Status: COMPLETED | OUTPATIENT
Start: 2024-12-17 | End: 2024-12-17

## 2024-12-17 RX ORDER — METRONIDAZOLE 500 MG/100ML
500 INJECTION, SOLUTION INTRAVENOUS EVERY 8 HOURS
Status: DISCONTINUED | OUTPATIENT
Start: 2024-12-17 | End: 2024-12-17

## 2024-12-17 RX ORDER — PANTOPRAZOLE SODIUM 40 MG/10ML
40 INJECTION, POWDER, LYOPHILIZED, FOR SOLUTION INTRAVENOUS DAILY
Status: DISCONTINUED | OUTPATIENT
Start: 2024-12-17 | End: 2024-12-17 | Stop reason: HOSPADM

## 2024-12-17 RX ORDER — SODIUM CHLORIDE 0.9 G/100ML
INJECTION, SOLUTION IRRIGATION AS NEEDED
Status: DISCONTINUED | OUTPATIENT
Start: 2024-12-17 | End: 2024-12-17 | Stop reason: HOSPADM

## 2024-12-17 RX ORDER — PANTOPRAZOLE SODIUM 40 MG/10ML
40 INJECTION, POWDER, LYOPHILIZED, FOR SOLUTION INTRAVENOUS
Status: DISCONTINUED | OUTPATIENT
Start: 2024-12-17 | End: 2024-12-28

## 2024-12-17 RX ORDER — SODIUM CHLORIDE, SODIUM LACTATE, POTASSIUM CHLORIDE, CALCIUM CHLORIDE 600; 310; 30; 20 MG/100ML; MG/100ML; MG/100ML; MG/100ML
INJECTION, SOLUTION INTRAVENOUS CONTINUOUS PRN
Status: DISCONTINUED | OUTPATIENT
Start: 2024-12-17 | End: 2024-12-17

## 2024-12-17 RX ORDER — MAGNESIUM SULFATE HEPTAHYDRATE 40 MG/ML
2 INJECTION, SOLUTION INTRAVENOUS ONCE
Status: COMPLETED | OUTPATIENT
Start: 2024-12-17 | End: 2024-12-17

## 2024-12-17 RX ORDER — POTASSIUM CHLORIDE 14.9 MG/ML
20 INJECTION INTRAVENOUS ONCE
Status: COMPLETED | OUTPATIENT
Start: 2024-12-17 | End: 2024-12-17

## 2024-12-17 RX ORDER — ROCURONIUM BROMIDE 10 MG/ML
INJECTION, SOLUTION INTRAVENOUS AS NEEDED
Status: DISCONTINUED | OUTPATIENT
Start: 2024-12-17 | End: 2024-12-17

## 2024-12-17 RX ORDER — EPINEPHRINE HCL IN DEXTROSE 5% 4MG/250ML
0-1 PLASTIC BAG, INJECTION (ML) INTRAVENOUS CONTINUOUS
Status: DISCONTINUED | OUTPATIENT
Start: 2024-12-17 | End: 2024-12-17

## 2024-12-17 RX ORDER — CALCIUM GLUCONATE 20 MG/ML
2 INJECTION, SOLUTION INTRAVENOUS ONCE
Status: COMPLETED | OUTPATIENT
Start: 2024-12-17 | End: 2024-12-17

## 2024-12-17 RX ORDER — CEFTRIAXONE 1 G/50ML
1 INJECTION, SOLUTION INTRAVENOUS ONCE
Status: COMPLETED | OUTPATIENT
Start: 2024-12-17 | End: 2024-12-17

## 2024-12-17 RX ORDER — NOREPINEPHRINE BITARTRATE/D5W 8 MG/250ML
.01-1 PLASTIC BAG, INJECTION (ML) INTRAVENOUS CONTINUOUS
Status: DISCONTINUED | OUTPATIENT
Start: 2024-12-17 | End: 2024-12-24

## 2024-12-17 RX ORDER — EPINEPHRINE 1 MG/ML
INJECTION INTRAMUSCULAR; INTRAVENOUS; SUBCUTANEOUS
Status: DISPENSED
Start: 2024-12-17 | End: 2024-12-17

## 2024-12-17 RX ORDER — VASOPRESSIN 20 UNIT/100 ML (0.2 UNIT/ML) IN 0.9 % SODIUM CHLORIDE IV
0.03 SOLUTION CONTINUOUS
Status: DISCONTINUED | OUTPATIENT
Start: 2024-12-17 | End: 2024-12-17

## 2024-12-17 RX ORDER — PANTOPRAZOLE SODIUM 40 MG/1
40 TABLET, DELAYED RELEASE ORAL
Status: DISCONTINUED | OUTPATIENT
Start: 2024-12-17 | End: 2024-12-17

## 2024-12-17 RX ORDER — SODIUM BICARBONATE 1 MEQ/ML
50 SYRINGE (ML) INTRAVENOUS ONCE
Status: COMPLETED | OUTPATIENT
Start: 2024-12-17 | End: 2024-12-17

## 2024-12-17 RX ORDER — MAGNESIUM SULFATE HEPTAHYDRATE 40 MG/ML
4 INJECTION, SOLUTION INTRAVENOUS ONCE
Status: DISCONTINUED | OUTPATIENT
Start: 2024-12-17 | End: 2024-12-17 | Stop reason: HOSPADM

## 2024-12-17 RX ORDER — DEXTROSE 50 % IN WATER (D50W) INTRAVENOUS SYRINGE
12.5
Status: DISCONTINUED | OUTPATIENT
Start: 2024-12-17 | End: 2024-12-17 | Stop reason: HOSPADM

## 2024-12-17 RX ORDER — DEXTROSE 50 % IN WATER (D50W) INTRAVENOUS SYRINGE
25
Status: DISCONTINUED | OUTPATIENT
Start: 2024-12-17 | End: 2024-12-17 | Stop reason: HOSPADM

## 2024-12-17 RX ORDER — INSULIN LISPRO 100 [IU]/ML
0-10 INJECTION, SOLUTION INTRAVENOUS; SUBCUTANEOUS EVERY 4 HOURS
Status: DISCONTINUED | OUTPATIENT
Start: 2024-12-17 | End: 2024-12-17 | Stop reason: HOSPADM

## 2024-12-17 RX ORDER — THIAMINE HYDROCHLORIDE 100 MG/ML
500 INJECTION, SOLUTION INTRAMUSCULAR; INTRAVENOUS EVERY 8 HOURS
Status: COMPLETED | OUTPATIENT
Start: 2024-12-17 | End: 2024-12-20

## 2024-12-17 RX ORDER — MAGNESIUM SULFATE HEPTAHYDRATE 40 MG/ML
INJECTION, SOLUTION INTRAVENOUS
Status: DISCONTINUED
Start: 2024-12-17 | End: 2024-12-17 | Stop reason: HOSPADM

## 2024-12-17 RX ORDER — EPINEPHRINE IN 0.9 % SOD CHLOR 4MG/250ML
0-.3 PLASTIC BAG, INJECTION (ML) INTRAVENOUS CONTINUOUS
Status: DISCONTINUED | OUTPATIENT
Start: 2024-12-17 | End: 2024-12-17

## 2024-12-17 RX ORDER — METHYLENE BLUE 5 MG/ML
100 INJECTION INTRAVENOUS ONCE
Status: DISCONTINUED | OUTPATIENT
Start: 2024-12-17 | End: 2024-12-17

## 2024-12-17 RX ORDER — FENTANYL CITRATE-0.9 % NACL/PF 10 MCG/ML
25-200 PLASTIC BAG, INJECTION (ML) INTRAVENOUS CONTINUOUS
Status: DISCONTINUED | OUTPATIENT
Start: 2024-12-17 | End: 2024-12-18

## 2024-12-17 RX ORDER — ROSUVASTATIN CALCIUM 5 MG/1
5 TABLET, COATED ORAL DAILY
COMMUNITY
End: 2025-01-18 | Stop reason: HOSPADM

## 2024-12-17 RX ORDER — MAGNESIUM SULFATE HEPTAHYDRATE 40 MG/ML
4 INJECTION, SOLUTION INTRAVENOUS ONCE
Status: DISCONTINUED | OUTPATIENT
Start: 2024-12-17 | End: 2024-12-17

## 2024-12-17 RX ADMIN — ALBUMIN HUMAN 25 G: 0.05 INJECTION, SOLUTION INTRAVENOUS at 05:44

## 2024-12-17 RX ADMIN — Medication 70 PERCENT: at 11:23

## 2024-12-17 RX ADMIN — DEXTROSE MONOHYDRATE 0.05 MG/KG/HR: 50 INJECTION, SOLUTION INTRAVENOUS at 00:30

## 2024-12-17 RX ADMIN — Medication 50 MEQ: at 04:06

## 2024-12-17 RX ADMIN — THIAMINE HYDROCHLORIDE 500 MG: 100 INJECTION, SOLUTION INTRAMUSCULAR; INTRAVENOUS at 16:34

## 2024-12-17 RX ADMIN — ALBUMIN HUMAN 25 G: 0.05 INJECTION, SOLUTION INTRAVENOUS at 19:53

## 2024-12-17 RX ADMIN — PROPOFOL 10 MCG/KG/MIN: 10 INJECTION, EMULSION INTRAVENOUS at 11:27

## 2024-12-17 RX ADMIN — PERFLUTREN 6 ML OF DILUTION: 6.52 INJECTION, SUSPENSION INTRAVENOUS at 12:18

## 2024-12-17 RX ADMIN — MAGNESIUM SULFATE HEPTAHYDRATE 2 G: 40 INJECTION, SOLUTION INTRAVENOUS at 06:03

## 2024-12-17 RX ADMIN — VASOPRESSIN 0.03 UNITS/MIN: 0.2 INJECTION INTRAVENOUS at 21:49

## 2024-12-17 RX ADMIN — HYDROCORTISONE SODIUM SUCCINATE 50 MG: 100 INJECTION, POWDER, FOR SOLUTION INTRAMUSCULAR; INTRAVENOUS at 16:34

## 2024-12-17 RX ADMIN — SODIUM BICARBONATE 125 ML/HR: 84 INJECTION, SOLUTION INTRAVENOUS at 21:49

## 2024-12-17 RX ADMIN — Medication 50 MCG/HR: at 04:32

## 2024-12-17 RX ADMIN — Medication 70 PERCENT: at 08:00

## 2024-12-17 RX ADMIN — VASOPRESSIN 0.03 UNITS/MIN: 0.2 INJECTION INTRAVENOUS at 00:43

## 2024-12-17 RX ADMIN — HYDROCORTISONE SODIUM SUCCINATE 50 MG: 100 INJECTION, POWDER, FOR SOLUTION INTRAMUSCULAR; INTRAVENOUS at 21:55

## 2024-12-17 RX ADMIN — SODIUM BICARBONATE 125 ML/HR: 84 INJECTION, SOLUTION INTRAVENOUS at 13:07

## 2024-12-17 RX ADMIN — POTASSIUM CHLORIDE 20 MEQ: 200 INJECTION, SOLUTION INTRAVENOUS at 06:03

## 2024-12-17 RX ADMIN — PIPERACILLIN SODIUM AND TAZOBACTAM SODIUM 3.38 G: 3; .375 INJECTION, SOLUTION INTRAVENOUS at 11:27

## 2024-12-17 RX ADMIN — SODIUM BICARBONATE 50 MEQ: 84 INJECTION INTRAVENOUS at 04:16

## 2024-12-17 RX ADMIN — HYDROCORTISONE SODIUM SUCCINATE 50 MG: 100 INJECTION, POWDER, FOR SOLUTION INTRAMUSCULAR; INTRAVENOUS at 10:56

## 2024-12-17 RX ADMIN — CALCIUM GLUCONATE 2 G: 20 INJECTION, SOLUTION INTRAVENOUS at 10:51

## 2024-12-17 RX ADMIN — Medication 0.54 MCG/KG/MIN: at 04:31

## 2024-12-17 RX ADMIN — MAGNESIUM SULFATE HEPTAHYDRATE 4 G: 40 INJECTION, SOLUTION INTRAVENOUS at 00:41

## 2024-12-17 RX ADMIN — VASOPRESSIN 0.03 UNITS/MIN: 0.2 INJECTION INTRAVENOUS at 04:31

## 2024-12-17 RX ADMIN — MIDAZOLAM 1 MG: 1 INJECTION INTRAMUSCULAR; INTRAVENOUS at 00:53

## 2024-12-17 RX ADMIN — ALBUMIN HUMAN 25 G: 0.05 INJECTION, SOLUTION INTRAVENOUS at 00:56

## 2024-12-17 RX ADMIN — SODIUM BICARBONATE 50 MEQ: 84 INJECTION INTRAVENOUS at 10:51

## 2024-12-17 RX ADMIN — ALBUMIN HUMAN 25 G: 0.05 INJECTION, SOLUTION INTRAVENOUS at 14:59

## 2024-12-17 RX ADMIN — Medication 50 MCG/HR: at 14:32

## 2024-12-17 RX ADMIN — PIPERACILLIN SODIUM AND TAZOBACTAM SODIUM 3.38 G: 3; .375 INJECTION, SOLUTION INTRAVENOUS at 21:58

## 2024-12-17 RX ADMIN — SODIUM CHLORIDE, POTASSIUM CHLORIDE, SODIUM LACTATE AND CALCIUM CHLORIDE 1000 ML: 600; 310; 30; 20 INJECTION, SOLUTION INTRAVENOUS at 04:06

## 2024-12-17 RX ADMIN — ALBUMIN HUMAN 25 G: 0.05 INJECTION, SOLUTION INTRAVENOUS at 10:51

## 2024-12-17 RX ADMIN — CALCIUM CHLORIDE 1 G: 100 INJECTION, SOLUTION INTRAVENOUS at 00:59

## 2024-12-17 RX ADMIN — EPINEPHRINE IN SODIUM CHLORIDE 0.2 MCG/KG/MIN: 16 INJECTION INTRAVENOUS at 04:53

## 2024-12-17 RX ADMIN — DEXTROSE MONOHYDRATE 25 G: 25 INJECTION, SOLUTION INTRAVENOUS at 12:20

## 2024-12-17 RX ADMIN — MIDAZOLAM HYDROCHLORIDE 1 MG: 1 INJECTION, SOLUTION INTRAMUSCULAR; INTRAVENOUS at 00:53

## 2024-12-17 RX ADMIN — Medication 0.2 MCG/KG/MIN: at 22:37

## 2024-12-17 RX ADMIN — ALBUMIN HUMAN 25 G: 0.05 INJECTION, SOLUTION INTRAVENOUS at 11:27

## 2024-12-17 RX ADMIN — SODIUM BICARBONATE 50 MEQ: 84 INJECTION INTRAVENOUS at 04:07

## 2024-12-17 RX ADMIN — INSULIN LISPRO 6 UNITS: 100 INJECTION, SOLUTION INTRAVENOUS; SUBCUTANEOUS at 01:29

## 2024-12-17 RX ADMIN — SODIUM BICARBONATE 50 MEQ: 84 INJECTION INTRAVENOUS at 04:06

## 2024-12-17 RX ADMIN — SODIUM CHLORIDE, POTASSIUM CHLORIDE, SODIUM LACTATE AND CALCIUM CHLORIDE 1000 ML: 600; 310; 30; 20 INJECTION, SOLUTION INTRAVENOUS at 13:25

## 2024-12-17 RX ADMIN — Medication 0.2 MCG/KG/MIN: at 14:32

## 2024-12-17 RX ADMIN — PIPERACILLIN SODIUM AND TAZOBACTAM SODIUM 3.38 G: 3; .375 INJECTION, SOLUTION INTRAVENOUS at 16:34

## 2024-12-17 RX ADMIN — Medication 70 PERCENT: at 19:47

## 2024-12-17 RX ADMIN — Medication 70 PERCENT: at 01:16

## 2024-12-17 SDOH — SOCIAL STABILITY: SOCIAL INSECURITY: HAVE YOU HAD THOUGHTS OF HARMING ANYONE ELSE?: UNABLE TO ASSESS

## 2024-12-17 SDOH — ECONOMIC STABILITY: FOOD INSECURITY
HOW HARD IS IT FOR YOU TO PAY FOR THE VERY BASICS LIKE FOOD, HOUSING, MEDICAL CARE, AND HEATING?: PATIENT UNABLE TO ANSWER

## 2024-12-17 SDOH — SOCIAL STABILITY: SOCIAL INSECURITY
WITHIN THE LAST YEAR, HAVE YOU BEEN HUMILIATED OR EMOTIONALLY ABUSED IN OTHER WAYS BY YOUR PARTNER OR EX-PARTNER?: PATIENT UNABLE TO ANSWER

## 2024-12-17 SDOH — ECONOMIC STABILITY: HOUSING INSECURITY: IN THE PAST 12 MONTHS, HOW MANY TIMES HAVE YOU MOVED WHERE YOU WERE LIVING?: 1

## 2024-12-17 SDOH — SOCIAL STABILITY: SOCIAL INSECURITY: DOES ANYONE TRY TO KEEP YOU FROM HAVING/CONTACTING OTHER FRIENDS OR DOING THINGS OUTSIDE YOUR HOME?: UNABLE TO ASSESS

## 2024-12-17 SDOH — SOCIAL STABILITY: SOCIAL INSECURITY: DO YOU FEEL ANYONE HAS EXPLOITED OR TAKEN ADVANTAGE OF YOU FINANCIALLY OR OF YOUR PERSONAL PROPERTY?: UNABLE TO ASSESS

## 2024-12-17 SDOH — SOCIAL STABILITY: SOCIAL INSECURITY: ARE YOU OR HAVE YOU BEEN THREATENED OR ABUSED PHYSICALLY, EMOTIONALLY, OR SEXUALLY BY ANYONE?: UNABLE TO ASSESS

## 2024-12-17 SDOH — ECONOMIC STABILITY: FOOD INSECURITY
WITHIN THE PAST 12 MONTHS, YOU WORRIED THAT YOUR FOOD WOULD RUN OUT BEFORE YOU GOT THE MONEY TO BUY MORE.: PATIENT UNABLE TO ANSWER

## 2024-12-17 SDOH — SOCIAL STABILITY: SOCIAL INSECURITY: HAS ANYONE EVER THREATENED TO HURT YOUR FAMILY OR YOUR PETS?: UNABLE TO ASSESS

## 2024-12-17 SDOH — ECONOMIC STABILITY: INCOME INSECURITY
IN THE PAST 12 MONTHS HAS THE ELECTRIC, GAS, OIL, OR WATER COMPANY THREATENED TO SHUT OFF SERVICES IN YOUR HOME?: PATIENT UNABLE TO ANSWER

## 2024-12-17 SDOH — SOCIAL STABILITY: SOCIAL INSECURITY
WITHIN THE LAST YEAR, HAVE YOU BEEN RAPED OR FORCED TO HAVE ANY KIND OF SEXUAL ACTIVITY BY YOUR PARTNER OR EX-PARTNER?: PATIENT UNABLE TO ANSWER

## 2024-12-17 SDOH — SOCIAL STABILITY: SOCIAL INSECURITY: ARE THERE ANY APPARENT SIGNS OF INJURIES/BEHAVIORS THAT COULD BE RELATED TO ABUSE/NEGLECT?: UNABLE TO ASSESS

## 2024-12-17 SDOH — SOCIAL STABILITY: SOCIAL INSECURITY
WITHIN THE LAST YEAR, HAVE YOU BEEN KICKED, HIT, SLAPPED, OR OTHERWISE PHYSICALLY HURT BY YOUR PARTNER OR EX-PARTNER?: PATIENT UNABLE TO ANSWER

## 2024-12-17 SDOH — SOCIAL STABILITY: SOCIAL INSECURITY: DO YOU FEEL UNSAFE GOING BACK TO THE PLACE WHERE YOU ARE LIVING?: UNABLE TO ASSESS

## 2024-12-17 SDOH — SOCIAL STABILITY: SOCIAL INSECURITY: WERE YOU ABLE TO COMPLETE ALL THE BEHAVIORAL HEALTH SCREENINGS?: NO

## 2024-12-17 SDOH — ECONOMIC STABILITY: FOOD INSECURITY
WITHIN THE PAST 12 MONTHS, THE FOOD YOU BOUGHT JUST DIDN'T LAST AND YOU DIDN'T HAVE MONEY TO GET MORE.: PATIENT UNABLE TO ANSWER

## 2024-12-17 SDOH — ECONOMIC STABILITY: TRANSPORTATION INSECURITY
IN THE PAST 12 MONTHS, HAS LACK OF TRANSPORTATION KEPT YOU FROM MEDICAL APPOINTMENTS OR FROM GETTING MEDICATIONS?: PATIENT UNABLE TO ANSWER

## 2024-12-17 SDOH — SOCIAL STABILITY: SOCIAL INSECURITY: WITHIN THE LAST YEAR, HAVE YOU BEEN AFRAID OF YOUR PARTNER OR EX-PARTNER?: PATIENT UNABLE TO ANSWER

## 2024-12-17 SDOH — ECONOMIC STABILITY: HOUSING INSECURITY
IN THE LAST 12 MONTHS, WAS THERE A TIME WHEN YOU WERE NOT ABLE TO PAY THE MORTGAGE OR RENT ON TIME?: PATIENT UNABLE TO ANSWER

## 2024-12-17 SDOH — ECONOMIC STABILITY: HOUSING INSECURITY: AT ANY TIME IN THE PAST 12 MONTHS, WERE YOU HOMELESS OR LIVING IN A SHELTER (INCLUDING NOW)?: PATIENT UNABLE TO ANSWER

## 2024-12-17 SDOH — SOCIAL STABILITY: SOCIAL INSECURITY: ABUSE: ADULT

## 2024-12-17 SDOH — SOCIAL STABILITY: SOCIAL INSECURITY: HAVE YOU HAD ANY THOUGHTS OF HARMING ANYONE ELSE?: UNABLE TO ASSESS

## 2024-12-17 ASSESSMENT — COGNITIVE AND FUNCTIONAL STATUS - GENERAL
MOBILITY SCORE: 24
MOBILITY SCORE: 24
DAILY ACTIVITIY SCORE: 24
DAILY ACTIVITIY SCORE: 24
PATIENT BASELINE BEDBOUND: NO
PATIENT BASELINE BEDBOUND: NO

## 2024-12-17 ASSESSMENT — ACTIVITIES OF DAILY LIVING (ADL)
WALKS IN HOME: UNABLE TO ASSESS
BATHING: UNABLE TO ASSESS
GROOMING: UNABLE TO ASSESS
JUDGMENT_ADEQUATE_SAFELY_COMPLETE_DAILY_ACTIVITIES: UNABLE TO ASSESS
PATIENT'S MEMORY ADEQUATE TO SAFELY COMPLETE DAILY ACTIVITIES?: UNABLE TO ASSESS
TOILETING: UNABLE TO ASSESS
ADEQUATE_TO_COMPLETE_ADL: UNABLE TO ASSESS
ADEQUATE_TO_COMPLETE_ADL: UNABLE TO ASSESS
HEARING - RIGHT EAR: UNABLE TO ASSESS
LACK_OF_TRANSPORTATION: PATIENT UNABLE TO ANSWER
PATIENT'S MEMORY ADEQUATE TO SAFELY COMPLETE DAILY ACTIVITIES?: UNABLE TO ASSESS
DRESSING YOURSELF: UNABLE TO ASSESS
LACK_OF_TRANSPORTATION: PATIENT UNABLE TO ANSWER
LACK_OF_TRANSPORTATION: PATIENT UNABLE TO ANSWER
HEARING - LEFT EAR: UNABLE TO ASSESS
JUDGMENT_ADEQUATE_SAFELY_COMPLETE_DAILY_ACTIVITIES: UNABLE TO ASSESS
HEARING - LEFT EAR: UNABLE TO ASSESS
TOILETING: UNABLE TO ASSESS
GROOMING: UNABLE TO ASSESS
FEEDING YOURSELF: UNABLE TO ASSESS
HEARING - RIGHT EAR: UNABLE TO ASSESS
DRESSING YOURSELF: UNABLE TO ASSESS
FEEDING YOURSELF: UNABLE TO ASSESS
BATHING: UNABLE TO ASSESS
WALKS IN HOME: UNABLE TO ASSESS

## 2024-12-17 ASSESSMENT — COLUMBIA-SUICIDE SEVERITY RATING SCALE - C-SSRS
2. HAVE YOU ACTUALLY HAD ANY THOUGHTS OF KILLING YOURSELF?: NO
6. HAVE YOU EVER DONE ANYTHING, STARTED TO DO ANYTHING, OR PREPARED TO DO ANYTHING TO END YOUR LIFE?: NO
1. IN THE PAST MONTH, HAVE YOU WISHED YOU WERE DEAD OR WISHED YOU COULD GO TO SLEEP AND NOT WAKE UP?: NO

## 2024-12-17 ASSESSMENT — LIFESTYLE VARIABLES
HOW MANY STANDARD DRINKS CONTAINING ALCOHOL DO YOU HAVE ON A TYPICAL DAY: PATIENT UNABLE TO ANSWER
SKIP TO QUESTIONS 9-10: 0
AUDIT-C TOTAL SCORE: -1
SKIP TO QUESTIONS 9-10: 0
AUDIT-C TOTAL SCORE: -1
HOW OFTEN DO YOU HAVE A DRINK CONTAINING ALCOHOL: PATIENT UNABLE TO ANSWER
HOW OFTEN DO YOU HAVE 6 OR MORE DRINKS ON ONE OCCASION: PATIENT UNABLE TO ANSWER
HOW MANY STANDARD DRINKS CONTAINING ALCOHOL DO YOU HAVE ON A TYPICAL DAY: PATIENT UNABLE TO ANSWER
HOW OFTEN DO YOU HAVE 6 OR MORE DRINKS ON ONE OCCASION: PATIENT UNABLE TO ANSWER
HOW OFTEN DO YOU HAVE A DRINK CONTAINING ALCOHOL: PATIENT UNABLE TO ANSWER

## 2024-12-17 ASSESSMENT — PATIENT HEALTH QUESTIONNAIRE - PHQ9
SUM OF ALL RESPONSES TO PHQ9 QUESTIONS 1 & 2: 0
1. LITTLE INTEREST OR PLEASURE IN DOING THINGS: NOT AT ALL
2. FEELING DOWN, DEPRESSED OR HOPELESS: NOT AT ALL
SUM OF ALL RESPONSES TO PHQ9 QUESTIONS 1 & 2: 0
1. LITTLE INTEREST OR PLEASURE IN DOING THINGS: NOT AT ALL
2. FEELING DOWN, DEPRESSED OR HOPELESS: NOT AT ALL

## 2024-12-17 ASSESSMENT — PAIN - FUNCTIONAL ASSESSMENT
PAIN_FUNCTIONAL_ASSESSMENT: CPOT (CRITICAL CARE PAIN OBSERVATION TOOL)

## 2024-12-17 ASSESSMENT — PAIN SCALES - GENERAL: PAINLEVEL_OUTOF10: 0 - NO PAIN
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
## 2024-12-17 NOTE — SIGNIFICANT EVENT
CONSENT CLARIFICATION:    Patient's son, Ike Gar Jr. Was consented over phone for surgery 12/17 (exploratory laparotomy). Witnessed by second physician.    Alfie Guzman MD  General Surgery, pgy4  Trauma [id number]

## 2024-12-17 NOTE — NURSING NOTE
0725: Patient taken to OR, unable to perform a shift assessment due to patient being taken to OR. RN messaged pharmacy to send methylene blue to the OR, as it is due. Flagyl and ceftriaxone was handed to anesthesia to administer during OR.     0954: [facility] Levo @ 0.38, and Angio @ 20, Fent @ 50    1111: Per Yonas Herrera, go off A-line for Vasoactive medication titrations. See new order.    1115: During rounds, RN told Yonas Herrera that the CVC and indwelling catheter was placed at an OSH and recommended it be replaced to ensure sterility. Per Eula, we are okay to use the line. See new orders    1142: BGL 44 on fingertip and 57 on earlobe. MD David, made aware. See new orders    1300: RN told MD Hernandez that the patient has not had any urine output since his return from OR at 1000. No new orders.    1330: RN told MD Hernandez that his troponin is 323. EKG obtained and handed to provider.    1400: RN told FellowGiancarlo MD that the patient still continues to have no urine output. See order for LR bolus.    1415: CVP is attached to the monitor, CVP is 6-7. Fellow Giancarlo ZULUAGA aware    1600: Per Javier, turn off Angio II. Once the Angio II was turned off, MAPs dropped to 50s. Alvin Hernandez MD made aware and at bedside. Per Alvin Hernandez and cuong Mondragon MD, turn Angio II back on.
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
## 2024-12-17 NOTE — SIGNIFICANT EVENT
Since surgery, patient has received 4 units of PRBC, 3 units of FFP, 1 unit of Platelets and 1  unit of Cryo. Central line placed by ICU team. Off Epi; on Levophed 0.6 and Vaso at 0.03. MAP 78-85. 150 ml in clark. Update provided to transfer center and helicopter transport requested. Patient is accepted by Dr. Stewart, trauma surgeon, [place] [facility] [place] Still has C-collar and full spine precaution in place. Update provided to son who is at bedside. Patient started on Zosyn and Diflucan.
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
## 2024-12-17 NOTE — ANESTHESIA PROCEDURE NOTES
Airway  Date/Time: 12/16/2024 8:22 PM  Urgency: elective    Airway not difficult    Staffing  Performed: attending   Authorized by: Sathya Ni MD    Performed by: Sathya Ni MD  Patient location during procedure: OR    Indications and Patient Condition  Indications for airway management: anesthesia  Spontaneous ventilation: present  Sedation level: no sedation  Preoxygenated: yes  Patient position: reverse Trendelenburg  MILS maintained throughout  Mask difficulty assessment: 0 - not attempted  No planned trial extubation    Final Airway Details  Final airway type: endotracheal airway      Successful airway: ETT  Cuffed: yes   Successful intubation technique: video laryngoscopy  Facilitating devices/methods: intubating stylet  Endotracheal tube insertion site: oral  Blade type: [employer].  Blade size: #4  ETT size (mm): 8.0  Cormack-Lehane Classification: grade I - full view of glottis  Placement verified by: chest auscultation and capnometry   Measured from: lips  ETT to lips (cm): 24  Number of attempts at approach: 1  Ventilation between attempts: none  Number of other approaches attempted: 0    Additional Comments  Anterior of C collar removed. In line stabilization maintained. C collar secured after intubation

## 2024-12-17 NOTE — CARE PLAN
Problem: Safety - Medical Restraint  Goal: Remains free of injury from restraints (Restraint for Interference with Medical Device)  Outcome: Progressing  Goal: Free from restraint(s) (Restraint for Interference with Medical Device)  Outcome: Progressing     Problem: Pain - Adult  Goal: Verbalizes/displays adequate comfort level or baseline comfort level  Outcome: Progressing     Problem: Safety - Adult  Goal: Free from fall injury  Outcome: Progressing     Problem: Discharge Planning  Goal: Discharge to home or other facility with appropriate resources  Outcome: Progressing     Problem: Chronic Conditions and Co-morbidities  Goal: Patient's chronic conditions and co-morbidity symptoms are monitored and maintained or improved  Outcome: Progressing     Problem: Skin  Goal: Decreased wound size/increased tissue granulation at next dressing change  Outcome: Progressing  Goal: Participates in plan/prevention/treatment measures  Outcome: Progressing  Goal: Prevent/manage excess moisture  Outcome: Progressing  Goal: Prevent/minimize sheer/friction injuries  Outcome: Progressing  Goal: Promote/optimize nutrition  Outcome: Progressing  Goal: Promote skin healing  Outcome: Progressing     Problem: Fall/Injury  Goal: Not fall by end of shift  Outcome: Progressing  Goal: Be free from injury by end of the shift  Outcome: Progressing  Goal: Verbalize understanding of personal risk factors for fall in the hospital  Outcome: Progressing  Goal: Verbalize understanding of risk factor reduction measures to prevent injury from fall in the home  Outcome: Progressing  Goal: Use assistive devices by end of the shift  Outcome: Progressing  Goal: Pace activities to prevent fatigue by end of the shift  Outcome: Progressing     Problem: Pain  Goal: Takes deep breaths with improved pain control throughout the shift  Outcome: Progressing  Goal: Turns in bed with improved pain control throughout the shift  Outcome: Progressing  Goal: Walks  with improved pain control throughout the shift  Outcome: Progressing  Goal: Performs ADL's with improved pain control throughout shift  Outcome: Progressing  Goal: Participates in PT with improved pain control throughout the shift  Outcome: Progressing  Goal: Free from opioid side effects throughout the shift  Outcome: Progressing  Goal: Free from acute confusion related to pain meds throughout the shift  Outcome: Progressing     Problem: Respiratory  Goal: Clear secretions with interventions this shift  Outcome: Progressing  Goal: Minimize anxiety/maximize coping throughout shift  Outcome: Progressing  Goal: Minimal/no exertional discomfort or dyspnea this shift  Outcome: Progressing  Goal: No signs of respiratory distress (eg. Use of accessory muscles. Peds grunting)  Outcome: Progressing  Goal: Patent airway maintained this shift  Outcome: Progressing  Goal: Tolerate mechanical ventilation evidenced by VS/agitation level this shift  Outcome: Progressing  Goal: Tolerate pulmonary toileting this shift  Outcome: Progressing  Goal: Verbalize decreased shortness of breath this shift  Outcome: Progressing  Goal: Wean oxygen to maintain O2 saturation per order/standard this shift  Outcome: Progressing  Goal: Increase self care and/or family involvement in next 24 hours  Outcome: Progressing
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
## 2024-12-17 NOTE — ANESTHESIA POSTPROCEDURE EVALUATION
Patient: Ike Gar    Procedure Summary       Date: 12/16/24 Room / Location: [facility] / [facility]    Anesthesia Start: 2012 Anesthesia Stop: 2233    Procedure: EXPLORATORY LAPAROTOMY, EXTENSIVE LYSIS OF ADHESIONS, SMALL BOWEL RESECTION LEFT WITH DISCONTINUITY, TEMPORARY BOWEL CLOSURE Diagnosis:     Surgeons: José Terry MD Responsible Provider: Sathya Ni MD    Anesthesia Type: general ASA Status: 5 - Emergent            Anesthesia Type: general    Vitals Value Taken Time   BP 93/58 12/16/24 2240   Temp - 12/16/24 2242   Pulse 144 12/16/24 2240   Resp 26 12/16/24 2240   SpO2 98 12/16/24 2242   Vitals shown include unfiled device data.    Anesthesia Post Evaluation    Patient location during evaluation: ICU  Patient participation: complete - patient cannot participate  Level of consciousness: sedated  Pain score: 0 (unable to assess)  Pain management: adequate  Airway patency: patent  Cardiovascular status: hemodynamically unstable and tachycardic  Respiratory status: intubated and ventilator  Hydration status: hypovolemic  Postoperative Nausea and Vomiting: none  Comments: Septic shock. Hemodynamically unstable.        No notable events documented.
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
## 2024-12-17 NOTE — PROCEDURES
CENTRAL LINE PLACEMENT      Indication(s):  -Inadequate IV access  -Administration of vasoactive or caustic agents  -CVP monitoring     Site:  - Right subclavian vein     Catheter: 8.5 Fr, 4 lumen, 20 cm radiopaque polyurethane     Sedation: Fentanyl IVP  Patient positioned supine + slight Trendelenburg.  Sterility: Chlorhexidine skin prep. Hat and mask on myself and assistant(s). Antiseptic hand foam. Sterile gown, gloves and drape.  Local anesthesia: 0 mL of 1% lidocaine subcutaneously.  Ultrasound-guided insertion:  -YES (with sterile ultrasound probe cover)     Seldinger technique with 18 Ga x 2.5 in introducer needle. Guidewire thread easily through introducer needle.  -Needle position confirmed to be intravenous by fall of blood to gravity within pressure transduction tubing.  -Guidewire position confirmed to be intravenous by visualization on ultrasound in short and long axis views.  -Small skin incision adjacent to guidewire with #11 scalpel. 10 Fr tissue dilator over guidewire. Catheter thread easily over guidewire and guidewire removed. All ports aspirated for complete removal of air, then flushed with sterile NS.  -Catheter secured with sutures @ 19 cm at skin.  -Transparent film dressing with CHG.     Sterility maintained throughout procedure. No complications. Patient tolerated well.     Chest x-ray  -done. Catheter in acceptable central venous position. No PTX. (My read).     Other details: None.     ARROW Quad-Lumen Central Venous Catheterization Kit  Lot [id number]  Exp: 02/28/2025    Denise Liu [facility]-BC  Division of Pulmonary & Critical Care Services
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
## 2024-12-17 NOTE — ANESTHESIA PROCEDURE NOTES
Arterial Line:    Date/Time: 12/16/2024 7:50 PM    Staffing  Performed: attending   Authorized by: Sathya Ni MD    Performed by: Sathya Ni MD    An arterial line was placed. Procedure performed using ultrasound guidance.in the pre-op for the following indication(s): continuous blood pressure monitoring and blood sampling needed.    A 20 gauge (size), 1 and 3/4 inch (length), Arrow (type) catheter was placed into the Left radial artery, secured by Tegaderm,   Seldinger technique used.  Events:  patient tolerated procedure well with no complications.

## 2024-12-18 ENCOUNTER — APPOINTMENT (OUTPATIENT)
Dept: RADIOLOGY | Facility: HOSPITAL | Age: 79
End: 2024-12-18
Payer: MEDICARE

## 2024-12-18 ENCOUNTER — ANESTHESIA EVENT (OUTPATIENT)
Dept: OPERATING ROOM | Facility: HOSPITAL | Age: 79
End: 2024-12-18
Payer: MEDICARE

## 2024-12-18 ENCOUNTER — ANESTHESIA (OUTPATIENT)
Dept: OPERATING ROOM | Facility: HOSPITAL | Age: 79
End: 2024-12-18
Payer: MEDICARE

## 2024-12-18 LAB
ALBUMIN SERPL BCP-MCNC: 3 G/DL (ref 3.4–5)
ALBUMIN SERPL BCP-MCNC: 3.1 G/DL (ref 3.4–5)
ALBUMIN SERPL BCP-MCNC: 3.2 G/DL (ref 3.4–5)
ALBUMIN SERPL BCP-MCNC: 3.3 G/DL (ref 3.4–5)
ANION GAP BLDA CALCULATED.4IONS-SCNC: 19 MMO/L (ref 10–25)
ANION GAP BLDA CALCULATED.4IONS-SCNC: 20 MMO/L (ref 10–25)
ANION GAP BLDA CALCULATED.4IONS-SCNC: 23 MMO/L (ref 10–25)
ANION GAP BLDA CALCULATED.4IONS-SCNC: 25 MMO/L (ref 10–25)
ANION GAP BLDA CALCULATED.4IONS-SCNC: 25 MMO/L (ref 10–25)
ANION GAP SERPL CALC-SCNC: 25 MMOL/L (ref 10–20)
ANION GAP SERPL CALC-SCNC: 27 MMOL/L (ref 10–20)
ANION GAP SERPL CALC-SCNC: 30 MMOL/L (ref 10–20)
ANION GAP SERPL CALC-SCNC: 32 MMOL/L (ref 10–20)
ATRIAL RATE: 113 BPM
BASE EXCESS BLDA CALC-SCNC: -10 MMOL/L (ref -2–3)
BASE EXCESS BLDA CALC-SCNC: -10.2 MMOL/L (ref -2–3)
BASE EXCESS BLDA CALC-SCNC: -10.4 MMOL/L (ref -2–3)
BASE EXCESS BLDA CALC-SCNC: -5.6 MMOL/L (ref -2–3)
BASE EXCESS BLDA CALC-SCNC: -6.2 MMOL/L (ref -2–3)
BASE EXCESS BLDA CALC-SCNC: -7.8 MMOL/L (ref -2–3)
BASE EXCESS BLDA CALC-SCNC: -8.2 MMOL/L (ref -2–3)
BASE EXCESS BLDA CALC-SCNC: -8.8 MMOL/L (ref -2–3)
BASE EXCESS BLDA CALC-SCNC: -9.1 MMOL/L (ref -2–3)
BASE EXCESS BLDA CALC-SCNC: -9.6 MMOL/L (ref -2–3)
BASE EXCESS BLDMV CALC-SCNC: -8.8 MMOL/L (ref -2–3)
BLOOD EXPIRATION DATE: NORMAL
BODY TEMPERATURE: 37 DEGREES CELSIUS
BUN SERPL-MCNC: 34 MG/DL (ref 6–23)
BUN SERPL-MCNC: 35 MG/DL (ref 6–23)
BUN SERPL-MCNC: 36 MG/DL (ref 6–23)
BUN SERPL-MCNC: 39 MG/DL (ref 6–23)
CA-I BLD-SCNC: 0.94 MMOL/L (ref 1.1–1.33)
CA-I BLD-SCNC: 1.05 MMOL/L (ref 1.1–1.33)
CA-I BLDA-SCNC: 0.98 MMOL/L (ref 1.1–1.33)
CA-I BLDA-SCNC: 0.98 MMOL/L (ref 1.1–1.33)
CA-I BLDA-SCNC: 1 MMOL/L (ref 1.1–1.33)
CA-I BLDA-SCNC: 1 MMOL/L (ref 1.1–1.33)
CA-I BLDA-SCNC: 1.05 MMOL/L (ref 1.1–1.33)
CA-I BLDA-SCNC: 1.06 MMOL/L (ref 1.1–1.33)
CA-I BLDA-SCNC: 1.06 MMOL/L (ref 1.1–1.33)
CA-I BLDA-SCNC: 1.08 MMOL/L (ref 1.1–1.33)
CA-I BLDA-SCNC: 1.09 MMOL/L (ref 1.1–1.33)
CALCIUM SERPL-MCNC: 7.4 MG/DL (ref 8.6–10.6)
CALCIUM SERPL-MCNC: 7.6 MG/DL (ref 8.6–10.6)
CALCIUM SERPL-MCNC: 7.7 MG/DL (ref 8.6–10.6)
CALCIUM SERPL-MCNC: 8 MG/DL (ref 8.6–10.6)
CARDIAC TROPONIN I PNL SERPL HS: 312 NG/L (ref 0–53)
CHLORIDE BLDA-SCNC: 92 MMOL/L (ref 98–107)
CHLORIDE BLDA-SCNC: 93 MMOL/L (ref 98–107)
CHLORIDE BLDA-SCNC: 94 MMOL/L (ref 98–107)
CHLORIDE BLDA-SCNC: 94 MMOL/L (ref 98–107)
CHLORIDE BLDA-SCNC: 95 MMOL/L (ref 98–107)
CHLORIDE SERPL-SCNC: 91 MMOL/L (ref 98–107)
CHLORIDE SERPL-SCNC: 92 MMOL/L (ref 98–107)
CHLORIDE SERPL-SCNC: 93 MMOL/L (ref 98–107)
CHLORIDE SERPL-SCNC: 95 MMOL/L (ref 98–107)
CO2 SERPL-SCNC: 17 MMOL/L (ref 21–32)
CO2 SERPL-SCNC: 19 MMOL/L (ref 21–32)
CO2 SERPL-SCNC: 20 MMOL/L (ref 21–32)
CO2 SERPL-SCNC: 21 MMOL/L (ref 21–32)
CREAT SERPL-MCNC: 4.15 MG/DL (ref 0.5–1.3)
CREAT SERPL-MCNC: 4.42 MG/DL (ref 0.5–1.3)
CREAT SERPL-MCNC: 4.66 MG/DL (ref 0.5–1.3)
CREAT SERPL-MCNC: 4.83 MG/DL (ref 0.5–1.3)
DISPENSE STATUS: NORMAL
EGFRCR SERPLBLD CKD-EPI 2021: 12 ML/MIN/1.73M*2
EGFRCR SERPLBLD CKD-EPI 2021: 12 ML/MIN/1.73M*2
EGFRCR SERPLBLD CKD-EPI 2021: 13 ML/MIN/1.73M*2
EGFRCR SERPLBLD CKD-EPI 2021: 14 ML/MIN/1.73M*2
ERYTHROCYTE [DISTWIDTH] IN BLOOD BY AUTOMATED COUNT: 14.8 % (ref 11.5–14.5)
ERYTHROCYTE [DISTWIDTH] IN BLOOD BY AUTOMATED COUNT: 14.9 % (ref 11.5–14.5)
ERYTHROCYTE [DISTWIDTH] IN BLOOD BY AUTOMATED COUNT: 15 % (ref 11.5–14.5)
GLUCOSE BLD MANUAL STRIP-MCNC: 45 MG/DL (ref 74–99)
GLUCOSE BLD MANUAL STRIP-MCNC: 64 MG/DL (ref 74–99)
GLUCOSE BLD MANUAL STRIP-MCNC: 67 MG/DL (ref 74–99)
GLUCOSE BLD MANUAL STRIP-MCNC: 77 MG/DL (ref 74–99)
GLUCOSE BLD MANUAL STRIP-MCNC: 81 MG/DL (ref 74–99)
GLUCOSE BLD MANUAL STRIP-MCNC: 83 MG/DL (ref 74–99)
GLUCOSE BLD MANUAL STRIP-MCNC: 85 MG/DL (ref 74–99)
GLUCOSE BLDA-MCNC: 74 MG/DL (ref 74–99)
GLUCOSE BLDA-MCNC: 77 MG/DL (ref 74–99)
GLUCOSE BLDA-MCNC: 77 MG/DL (ref 74–99)
GLUCOSE BLDA-MCNC: 82 MG/DL (ref 74–99)
GLUCOSE BLDA-MCNC: 86 MG/DL (ref 74–99)
GLUCOSE SERPL-MCNC: 71 MG/DL (ref 74–99)
GLUCOSE SERPL-MCNC: 77 MG/DL (ref 74–99)
GLUCOSE SERPL-MCNC: 79 MG/DL (ref 74–99)
GLUCOSE SERPL-MCNC: 89 MG/DL (ref 74–99)
HCO3 BLDA-SCNC: 15.6 MMOL/L (ref 22–26)
HCO3 BLDA-SCNC: 16.2 MMOL/L (ref 22–26)
HCO3 BLDA-SCNC: 16.2 MMOL/L (ref 22–26)
HCO3 BLDA-SCNC: 16.5 MMOL/L (ref 22–26)
HCO3 BLDA-SCNC: 17 MMOL/L (ref 22–26)
HCO3 BLDA-SCNC: 17.4 MMOL/L (ref 22–26)
HCO3 BLDA-SCNC: 17.5 MMOL/L (ref 22–26)
HCO3 BLDA-SCNC: 17.9 MMOL/L (ref 22–26)
HCO3 BLDA-SCNC: 19.6 MMOL/L (ref 22–26)
HCO3 BLDA-SCNC: 20.7 MMOL/L (ref 22–26)
HCO3 BLDMV-SCNC: 18 MMOL/L (ref 22–26)
HCT VFR BLD AUTO: 24.4 % (ref 41–52)
HCT VFR BLD AUTO: 26.6 % (ref 41–52)
HCT VFR BLD AUTO: 28.3 % (ref 41–52)
HCT VFR BLD EST: 24 % (ref 41–52)
HCT VFR BLD EST: 25 % (ref 41–52)
HCT VFR BLD EST: 26 % (ref 41–52)
HCT VFR BLD EST: 27 % (ref 41–52)
HCT VFR BLD EST: 27 % (ref 41–52)
HCT VFR BLD EST: 28 % (ref 41–52)
HCT VFR BLD EST: 29 % (ref 41–52)
HGB BLD-MCNC: 8.1 G/DL (ref 13.5–17.5)
HGB BLD-MCNC: 8.8 G/DL (ref 13.5–17.5)
HGB BLD-MCNC: 9.3 G/DL (ref 13.5–17.5)
HGB BLDA-MCNC: 8.1 G/DL (ref 13.5–17.5)
HGB BLDA-MCNC: 8.2 G/DL (ref 13.5–17.5)
HGB BLDA-MCNC: 8.4 G/DL (ref 13.5–17.5)
HGB BLDA-MCNC: 8.4 G/DL (ref 13.5–17.5)
HGB BLDA-MCNC: 8.5 G/DL (ref 13.5–17.5)
HGB BLDA-MCNC: 8.9 G/DL (ref 13.5–17.5)
HGB BLDA-MCNC: 8.9 G/DL (ref 13.5–17.5)
HGB BLDA-MCNC: 9.4 G/DL (ref 13.5–17.5)
HGB BLDA-MCNC: 9.8 G/DL (ref 13.5–17.5)
INHALED O2 CONCENTRATION: 100 %
INHALED O2 CONCENTRATION: 70 %
INHALED O2 CONCENTRATION: 80 %
LACTATE BLDA-SCNC: 10.3 MMOL/L (ref 0.4–2)
LACTATE BLDA-SCNC: 11.2 MMOL/L (ref 0.4–2)
LACTATE BLDA-SCNC: 11.7 MMOL/L (ref 0.4–2)
LACTATE BLDA-SCNC: 12.5 MMOL/L (ref 0.4–2)
LACTATE BLDA-SCNC: 13.5 MMOL/L (ref 0.4–2)
LACTATE BLDA-SCNC: 13.5 MMOL/L (ref 0.4–2)
LACTATE BLDA-SCNC: 14 MMOL/L (ref 0.4–2)
LACTATE BLDA-SCNC: 14.9 MMOL/L (ref 0.4–2)
LACTATE BLDA-SCNC: 15.1 MMOL/L (ref 0.4–2)
LACTATE SERPL-SCNC: 16.7 MMOL/L (ref 0.4–2)
LACTATE SERPL-SCNC: 16.9 MMOL/L (ref 0.4–2)
MAGNESIUM SERPL-MCNC: 1.74 MG/DL (ref 1.6–2.4)
MAGNESIUM SERPL-MCNC: 1.8 MG/DL (ref 1.6–2.4)
MAGNESIUM SERPL-MCNC: 1.83 MG/DL (ref 1.6–2.4)
MCH RBC QN AUTO: 28.3 PG (ref 26–34)
MCH RBC QN AUTO: 28.9 PG (ref 26–34)
MCH RBC QN AUTO: 29.1 PG (ref 26–34)
MCHC RBC AUTO-ENTMCNC: 32.9 G/DL (ref 32–36)
MCHC RBC AUTO-ENTMCNC: 33.1 G/DL (ref 32–36)
MCHC RBC AUTO-ENTMCNC: 33.2 G/DL (ref 32–36)
MCV RBC AUTO: 86 FL (ref 80–100)
MCV RBC AUTO: 87 FL (ref 80–100)
MCV RBC AUTO: 88 FL (ref 80–100)
NRBC BLD-RTO: 0 /100 WBCS (ref 0–0)
OXYHGB MFR BLDA: 95.7 % (ref 94–98)
OXYHGB MFR BLDA: 96.2 % (ref 94–98)
OXYHGB MFR BLDA: 96.3 % (ref 94–98)
OXYHGB MFR BLDA: 96.5 % (ref 94–98)
OXYHGB MFR BLDA: 96.5 % (ref 94–98)
OXYHGB MFR BLDA: 96.7 % (ref 94–98)
OXYHGB MFR BLDA: 96.8 % (ref 94–98)
OXYHGB MFR BLDA: 97.2 % (ref 94–98)
OXYHGB MFR BLDA: 97.4 % (ref 94–98)
OXYHGB MFR BLDA: 97.6 % (ref 94–98)
OXYHGB MFR BLDMV: 82.4 % (ref 45–75)
P AXIS: 71 DEGREES
PCO2 BLDA: 34 MM HG (ref 38–42)
PCO2 BLDA: 34 MM HG (ref 38–42)
PCO2 BLDA: 36 MM HG (ref 38–42)
PCO2 BLDA: 36 MM HG (ref 38–42)
PCO2 BLDA: 37 MM HG (ref 38–42)
PCO2 BLDA: 37 MM HG (ref 38–42)
PCO2 BLDA: 38 MM HG (ref 38–42)
PCO2 BLDA: 38 MM HG (ref 38–42)
PCO2 BLDA: 39 MM HG (ref 38–42)
PCO2 BLDA: 43 MM HG (ref 38–42)
PCO2 BLDMV: 41 MM HG (ref 41–51)
PH BLDA: 7.25 PH (ref 7.38–7.42)
PH BLDA: 7.26 PH (ref 7.38–7.42)
PH BLDA: 7.27 PH (ref 7.38–7.42)
PH BLDA: 7.28 PH (ref 7.38–7.42)
PH BLDA: 7.29 PH (ref 7.38–7.42)
PH BLDA: 7.31 PH (ref 7.38–7.42)
PH BLDA: 7.32 PH (ref 7.38–7.42)
PH BLDMV: 7.25 PH (ref 7.33–7.43)
PHOSPHATE SERPL-MCNC: 4.9 MG/DL (ref 2.5–4.9)
PHOSPHATE SERPL-MCNC: 5.7 MG/DL (ref 2.5–4.9)
PHOSPHATE SERPL-MCNC: 6.3 MG/DL (ref 2.5–4.9)
PHOSPHATE SERPL-MCNC: 6.8 MG/DL (ref 2.5–4.9)
PLATELET # BLD AUTO: 67 X10*3/UL (ref 150–450)
PLATELET # BLD AUTO: 88 X10*3/UL (ref 150–450)
PLATELET # BLD AUTO: 89 X10*3/UL (ref 150–450)
PO2 BLDA: 100 MM HG (ref 85–95)
PO2 BLDA: 101 MM HG (ref 85–95)
PO2 BLDA: 105 MM HG (ref 85–95)
PO2 BLDA: 109 MM HG (ref 85–95)
PO2 BLDA: 120 MM HG (ref 85–95)
PO2 BLDA: 83 MM HG (ref 85–95)
PO2 BLDA: 87 MM HG (ref 85–95)
PO2 BLDA: 88 MM HG (ref 85–95)
PO2 BLDA: 88 MM HG (ref 85–95)
PO2 BLDA: 99 MM HG (ref 85–95)
PO2 BLDMV: 53 MM HG (ref 35–45)
POTASSIUM BLDA-SCNC: 4.4 MMOL/L (ref 3.5–5.3)
POTASSIUM BLDA-SCNC: 4.8 MMOL/L (ref 3.5–5.3)
POTASSIUM BLDA-SCNC: 4.9 MMOL/L (ref 3.5–5.3)
POTASSIUM BLDA-SCNC: 5 MMOL/L (ref 3.5–5.3)
POTASSIUM BLDA-SCNC: 5.1 MMOL/L (ref 3.5–5.3)
POTASSIUM BLDA-SCNC: 5.1 MMOL/L (ref 3.5–5.3)
POTASSIUM BLDA-SCNC: 5.2 MMOL/L (ref 3.5–5.3)
POTASSIUM SERPL-SCNC: 4.3 MMOL/L (ref 3.5–5.3)
POTASSIUM SERPL-SCNC: 4.7 MMOL/L (ref 3.5–5.3)
POTASSIUM SERPL-SCNC: 4.8 MMOL/L (ref 3.5–5.3)
POTASSIUM SERPL-SCNC: 4.9 MMOL/L (ref 3.5–5.3)
PR INTERVAL: 192 MS
PRODUCT BLOOD TYPE: 5100
PRODUCT BLOOD TYPE: 7300
PRODUCT BLOOD TYPE: 8400
PRODUCT BLOOD TYPE: 9500
PRODUCT BLOOD TYPE: 9500
PRODUCT CODE: NORMAL
Q ONSET: 222 MS
QRS COUNT: 19 BEATS
QRS DURATION: 84 MS
QT INTERVAL: 300 MS
QTC CALCULATION(BAZETT): 411 MS
QTC FREDERICIA: 370 MS
R AXIS: 44 DEGREES
RBC # BLD AUTO: 2.78 X10*6/UL (ref 4.5–5.9)
RBC # BLD AUTO: 3.05 X10*6/UL (ref 4.5–5.9)
RBC # BLD AUTO: 3.29 X10*6/UL (ref 4.5–5.9)
SAO2 % BLDA: 100 % (ref 94–100)
SAO2 % BLDA: 100 % (ref 94–100)
SAO2 % BLDA: 98 % (ref 94–100)
SAO2 % BLDA: 99 % (ref 94–100)
SAO2 % BLDMV: 85 % (ref 45–75)
SODIUM BLDA-SCNC: 128 MMOL/L (ref 136–145)
SODIUM BLDA-SCNC: 128 MMOL/L (ref 136–145)
SODIUM BLDA-SCNC: 129 MMOL/L (ref 136–145)
SODIUM BLDA-SCNC: 130 MMOL/L (ref 136–145)
SODIUM BLDA-SCNC: 130 MMOL/L (ref 136–145)
SODIUM BLDA-SCNC: 131 MMOL/L (ref 136–145)
SODIUM SERPL-SCNC: 134 MMOL/L (ref 136–145)
SODIUM SERPL-SCNC: 135 MMOL/L (ref 136–145)
SODIUM SERPL-SCNC: 137 MMOL/L (ref 136–145)
SODIUM SERPL-SCNC: 137 MMOL/L (ref 136–145)
T AXIS: 8 DEGREES
T OFFSET: 372 MS
UNIT ABO: NORMAL
UNIT NUMBER: NORMAL
UNIT RH: NORMAL
UNIT VOLUME: 198
UNIT VOLUME: 217
UNIT VOLUME: 219
UNIT VOLUME: 230
UNIT VOLUME: 251
UNIT VOLUME: 291
UNIT VOLUME: 292
UNIT VOLUME: 326
UNIT VOLUME: 350
UNIT VOLUME: 350
UNIT VOLUME: 72
UNIT VOLUME: 81
VENTRICULAR RATE: 113 BPM
WBC # BLD AUTO: 13.5 X10*3/UL (ref 4.4–11.3)
WBC # BLD AUTO: 16.8 X10*3/UL (ref 4.4–11.3)
WBC # BLD AUTO: 19.1 X10*3/UL (ref 4.4–11.3)
XM INTEP: NORMAL
XM INTEP: NORMAL

## 2024-12-18 PROCEDURE — 83605 ASSAY OF LACTIC ACID: CPT | Performed by: NURSE PRACTITIONER

## 2024-12-18 PROCEDURE — 2500000004 HC RX 250 GENERAL PHARMACY W/ HCPCS (ALT 636 FOR OP/ED): Performed by: NURSE PRACTITIONER

## 2024-12-18 PROCEDURE — 2500000004 HC RX 250 GENERAL PHARMACY W/ HCPCS (ALT 636 FOR OP/ED): Performed by: EMERGENCY MEDICINE

## 2024-12-18 PROCEDURE — 85018 HEMOGLOBIN: CPT | Performed by: STUDENT IN AN ORGANIZED HEALTH CARE EDUCATION/TRAINING PROGRAM

## 2024-12-18 PROCEDURE — P9045 ALBUMIN (HUMAN), 5%, 250 ML: HCPCS | Mod: JZ

## 2024-12-18 PROCEDURE — 3600000008 HC OR TIME - EACH INCREMENTAL 1 MINUTE - PROCEDURE LEVEL THREE: Performed by: SURGERY

## 2024-12-18 PROCEDURE — 2500000005 HC RX 250 GENERAL PHARMACY W/O HCPCS

## 2024-12-18 PROCEDURE — 84100 ASSAY OF PHOSPHORUS: CPT | Performed by: NURSE PRACTITIONER

## 2024-12-18 PROCEDURE — 82810 BLOOD GASES O2 SAT ONLY: CPT

## 2024-12-18 PROCEDURE — 3700000002 HC GENERAL ANESTHESIA TIME - EACH INCREMENTAL 1 MINUTE: Performed by: SURGERY

## 2024-12-18 PROCEDURE — 71045 X-RAY EXAM CHEST 1 VIEW: CPT

## 2024-12-18 PROCEDURE — 37799 UNLISTED PX VASCULAR SURGERY: CPT | Performed by: STUDENT IN AN ORGANIZED HEALTH CARE EDUCATION/TRAINING PROGRAM

## 2024-12-18 PROCEDURE — 84484 ASSAY OF TROPONIN QUANT: CPT | Performed by: STUDENT IN AN ORGANIZED HEALTH CARE EDUCATION/TRAINING PROGRAM

## 2024-12-18 PROCEDURE — 83735 ASSAY OF MAGNESIUM: CPT | Performed by: STUDENT IN AN ORGANIZED HEALTH CARE EDUCATION/TRAINING PROGRAM

## 2024-12-18 PROCEDURE — 82805 BLOOD GASES W/O2 SATURATION: CPT | Performed by: STUDENT IN AN ORGANIZED HEALTH CARE EDUCATION/TRAINING PROGRAM

## 2024-12-18 PROCEDURE — 82810 BLOOD GASES O2 SAT ONLY: CPT | Performed by: STUDENT IN AN ORGANIZED HEALTH CARE EDUCATION/TRAINING PROGRAM

## 2024-12-18 PROCEDURE — 2500000004 HC RX 250 GENERAL PHARMACY W/ HCPCS (ALT 636 FOR OP/ED)

## 2024-12-18 PROCEDURE — 80069 RENAL FUNCTION PANEL: CPT | Performed by: STUDENT IN AN ORGANIZED HEALTH CARE EDUCATION/TRAINING PROGRAM

## 2024-12-18 PROCEDURE — 5A1D90Z PERFORMANCE OF URINARY FILTRATION, CONTINUOUS, GREATER THAN 18 HOURS PER DAY: ICD-10-PCS | Performed by: SURGERY

## 2024-12-18 PROCEDURE — 85027 COMPLETE CBC AUTOMATED: CPT | Performed by: STUDENT IN AN ORGANIZED HEALTH CARE EDUCATION/TRAINING PROGRAM

## 2024-12-18 PROCEDURE — 2500000004 HC RX 250 GENERAL PHARMACY W/ HCPCS (ALT 636 FOR OP/ED): Mod: JZ | Performed by: STUDENT IN AN ORGANIZED HEALTH CARE EDUCATION/TRAINING PROGRAM

## 2024-12-18 PROCEDURE — 71045 X-RAY EXAM CHEST 1 VIEW: CPT | Performed by: STUDENT IN AN ORGANIZED HEALTH CARE EDUCATION/TRAINING PROGRAM

## 2024-12-18 PROCEDURE — 82330 ASSAY OF CALCIUM: CPT | Performed by: NURSE PRACTITIONER

## 2024-12-18 PROCEDURE — 2720000007 HC OR 272 NO HCPCS: Performed by: SURGERY

## 2024-12-18 PROCEDURE — 90937 HEMODIALYSIS REPEATED EVAL: CPT

## 2024-12-18 PROCEDURE — 2020000001 HC ICU ROOM DAILY

## 2024-12-18 PROCEDURE — 82330 ASSAY OF CALCIUM: CPT | Performed by: STUDENT IN AN ORGANIZED HEALTH CARE EDUCATION/TRAINING PROGRAM

## 2024-12-18 PROCEDURE — 84295 ASSAY OF SERUM SODIUM: CPT | Performed by: NURSE PRACTITIONER

## 2024-12-18 PROCEDURE — 84295 ASSAY OF SERUM SODIUM: CPT | Performed by: HEALTH CARE PROVIDER

## 2024-12-18 PROCEDURE — P9045 ALBUMIN (HUMAN), 5%, 250 ML: HCPCS | Mod: JZ | Performed by: STUDENT IN AN ORGANIZED HEALTH CARE EDUCATION/TRAINING PROGRAM

## 2024-12-18 PROCEDURE — 3700000001 HC GENERAL ANESTHESIA TIME - INITIAL BASE CHARGE: Performed by: SURGERY

## 2024-12-18 PROCEDURE — 97606 NEG PRS WND THER DME>50 SQCM: CPT | Performed by: SURGERY

## 2024-12-18 PROCEDURE — 2500000005 HC RX 250 GENERAL PHARMACY W/O HCPCS: Performed by: NURSE PRACTITIONER

## 2024-12-18 PROCEDURE — 36556 INSERT NON-TUNNEL CV CATH: CPT | Performed by: HEALTH CARE PROVIDER

## 2024-12-18 PROCEDURE — 85018 HEMOGLOBIN: CPT | Performed by: NURSE PRACTITIONER

## 2024-12-18 PROCEDURE — 99291 CRITICAL CARE FIRST HOUR: CPT | Performed by: EMERGENCY MEDICINE

## 2024-12-18 PROCEDURE — 2500000004 HC RX 250 GENERAL PHARMACY W/ HCPCS (ALT 636 FOR OP/ED): Performed by: STUDENT IN AN ORGANIZED HEALTH CARE EDUCATION/TRAINING PROGRAM

## 2024-12-18 PROCEDURE — 3600000003 HC OR TIME - INITIAL BASE CHARGE - PROCEDURE LEVEL THREE: Performed by: SURGERY

## 2024-12-18 PROCEDURE — 99291 CRITICAL CARE FIRST HOUR: CPT | Performed by: NURSE PRACTITIONER

## 2024-12-18 PROCEDURE — 99232 SBSQ HOSP IP/OBS MODERATE 35: CPT | Performed by: SURGERY

## 2024-12-18 PROCEDURE — 49002 REOPENING OF ABDOMEN: CPT | Performed by: SURGERY

## 2024-12-18 PROCEDURE — 2500000005 HC RX 250 GENERAL PHARMACY W/O HCPCS: Performed by: SURGERY

## 2024-12-18 PROCEDURE — 71045 X-RAY EXAM CHEST 1 VIEW: CPT | Performed by: RADIOLOGY

## 2024-12-18 PROCEDURE — 83735 ASSAY OF MAGNESIUM: CPT | Performed by: NURSE PRACTITIONER

## 2024-12-18 PROCEDURE — 94003 VENT MGMT INPAT SUBQ DAY: CPT

## 2024-12-18 PROCEDURE — 82947 ASSAY GLUCOSE BLOOD QUANT: CPT

## 2024-12-18 PROCEDURE — 85027 COMPLETE CBC AUTOMATED: CPT | Performed by: NURSE PRACTITIONER

## 2024-12-18 PROCEDURE — 2500000005 HC RX 250 GENERAL PHARMACY W/O HCPCS: Performed by: EMERGENCY MEDICINE

## 2024-12-18 RX ORDER — EPINEPHRINE 1 MG/ML
INJECTION INTRAMUSCULAR; INTRAVENOUS; SUBCUTANEOUS
Status: DISPENSED
Start: 2024-12-18 | End: 2024-12-19

## 2024-12-18 RX ORDER — CALCIUM GLUCONATE 20 MG/ML
1 INJECTION, SOLUTION INTRAVENOUS ONCE
Status: COMPLETED | OUTPATIENT
Start: 2024-12-18 | End: 2024-12-18

## 2024-12-18 RX ORDER — NEOSTIGMINE METHYLSULFATE 1 MG/ML
INJECTION INTRAVENOUS AS NEEDED
Status: DISCONTINUED | OUTPATIENT
Start: 2024-12-18 | End: 2024-12-18

## 2024-12-18 RX ORDER — ALBUMIN HUMAN 50 G/1000ML
SOLUTION INTRAVENOUS AS NEEDED
Status: DISCONTINUED | OUTPATIENT
Start: 2024-12-18 | End: 2024-12-18

## 2024-12-18 RX ORDER — ROCURONIUM BROMIDE 10 MG/ML
INJECTION, SOLUTION INTRAVENOUS AS NEEDED
Status: DISCONTINUED | OUTPATIENT
Start: 2024-12-18 | End: 2024-12-18

## 2024-12-18 RX ORDER — CALCIUM CHLORIDE INJECTION 100 MG/ML
INJECTION, SOLUTION INTRAVENOUS AS NEEDED
Status: DISCONTINUED | OUTPATIENT
Start: 2024-12-18 | End: 2024-12-18

## 2024-12-18 RX ORDER — FENTANYL CITRATE-0.9 % NACL/PF 10 MCG/ML
100 PLASTIC BAG, INJECTION (ML) INTRAVENOUS CONTINUOUS
Status: DISCONTINUED | OUTPATIENT
Start: 2024-12-18 | End: 2024-12-20

## 2024-12-18 RX ORDER — ALBUMIN HUMAN 50 G/1000ML
25 SOLUTION INTRAVENOUS ONCE
Status: COMPLETED | OUTPATIENT
Start: 2024-12-18 | End: 2024-12-18

## 2024-12-18 RX ORDER — GLYCOPYRROLATE 0.2 MG/ML
INJECTION INTRAMUSCULAR; INTRAVENOUS AS NEEDED
Status: DISCONTINUED | OUTPATIENT
Start: 2024-12-18 | End: 2024-12-18

## 2024-12-18 RX ORDER — CALCIUM GLUCONATE 20 MG/ML
2 INJECTION, SOLUTION INTRAVENOUS ONCE
Status: COMPLETED | OUTPATIENT
Start: 2024-12-18 | End: 2024-12-18

## 2024-12-18 RX ORDER — PROPOFOL 10 MG/ML
5 INJECTION, EMULSION INTRAVENOUS CONTINUOUS
Status: DISCONTINUED | OUTPATIENT
Start: 2024-12-18 | End: 2024-12-20

## 2024-12-18 RX ORDER — MAGNESIUM SULFATE 1 G/100ML
1 INJECTION INTRAVENOUS ONCE
Status: COMPLETED | OUTPATIENT
Start: 2024-12-18 | End: 2024-12-18

## 2024-12-18 RX ORDER — MIDAZOLAM HYDROCHLORIDE 1 MG/ML
INJECTION, SOLUTION INTRAMUSCULAR; INTRAVENOUS AS NEEDED
Status: DISCONTINUED | OUTPATIENT
Start: 2024-12-18 | End: 2024-12-18

## 2024-12-18 RX ORDER — SODIUM CHLORIDE 0.9 G/100ML
INJECTION, SOLUTION IRRIGATION AS NEEDED
Status: DISCONTINUED | OUTPATIENT
Start: 2024-12-18 | End: 2024-12-18 | Stop reason: HOSPADM

## 2024-12-18 RX ADMIN — PROPOFOL 10 MCG/KG/MIN: 10 INJECTION, EMULSION INTRAVENOUS at 00:33

## 2024-12-18 RX ADMIN — CALCIUM GLUCONATE 1 G: 20 INJECTION, SOLUTION INTRAVENOUS at 02:53

## 2024-12-18 RX ADMIN — VASOPRESSIN 0.03 UNITS/MIN: 0.2 INJECTION INTRAVENOUS at 08:14

## 2024-12-18 RX ADMIN — THIAMINE HYDROCHLORIDE 500 MG: 100 INJECTION, SOLUTION INTRAMUSCULAR; INTRAVENOUS at 07:59

## 2024-12-18 RX ADMIN — PANTOPRAZOLE SODIUM 40 MG: 40 INJECTION, POWDER, FOR SOLUTION INTRAVENOUS at 07:49

## 2024-12-18 RX ADMIN — ALBUMIN HUMAN 25 G: 0.05 INJECTION, SOLUTION INTRAVENOUS at 00:33

## 2024-12-18 RX ADMIN — SODIUM CHLORIDE, POTASSIUM CHLORIDE, SODIUM LACTATE AND CALCIUM CHLORIDE 1000 ML: 600; 310; 30; 20 INJECTION, SOLUTION INTRAVENOUS at 07:47

## 2024-12-18 RX ADMIN — PROPOFOL 10 MCG/KG/MIN: 10 INJECTION, EMULSION INTRAVENOUS at 17:58

## 2024-12-18 RX ADMIN — PIPERACILLIN SODIUM AND TAZOBACTAM SODIUM 3.38 G: 3; .375 INJECTION, SOLUTION INTRAVENOUS at 04:44

## 2024-12-18 RX ADMIN — SODIUM CHLORIDE 20 NG/KG/MIN: 9 INJECTION, SOLUTION INTRAVENOUS at 03:56

## 2024-12-18 RX ADMIN — VASOPRESSIN 0.03 UNITS/MIN: 0.2 INJECTION INTRAVENOUS at 21:10

## 2024-12-18 RX ADMIN — VASOPRESSIN 0.06 UNITS/MIN: 0.2 INJECTION INTRAVENOUS at 14:31

## 2024-12-18 RX ADMIN — Medication 0.45 MCG/KG/MIN: at 10:42

## 2024-12-18 RX ADMIN — CALCIUM CHLORIDE, MAGNESIUM CHLORIDE, DEXTROSE MONOHYDRATE, LACTIC ACID, SODIUM CHLORIDE, SODIUM BICARBONATE AND POTASSIUM CHLORIDE 3000 ML/HR: 5.15; 2.03; 22; 5.4; 6.46; 3.09; .157 INJECTION INTRAVENOUS at 14:29

## 2024-12-18 RX ADMIN — Medication 0.35 MCG/KG/MIN: at 15:10

## 2024-12-18 RX ADMIN — SODIUM BICARBONATE 125 ML/HR: 84 INJECTION, SOLUTION INTRAVENOUS at 17:27

## 2024-12-18 RX ADMIN — ALBUMIN HUMAN 25 G: 0.05 INJECTION, SOLUTION INTRAVENOUS at 06:31

## 2024-12-18 RX ADMIN — HYDROCORTISONE SODIUM SUCCINATE 50 MG: 100 INJECTION, POWDER, FOR SOLUTION INTRAMUSCULAR; INTRAVENOUS at 16:00

## 2024-12-18 RX ADMIN — Medication 100 PERCENT: at 08:15

## 2024-12-18 RX ADMIN — PIPERACILLIN SODIUM AND TAZOBACTAM SODIUM 2.25 G: 2; .25 INJECTION, SOLUTION INTRAVENOUS at 11:05

## 2024-12-18 RX ADMIN — Medication 0.18 MCG/KG/MIN: at 21:10

## 2024-12-18 RX ADMIN — MAGNESIUM SULFATE HEPTAHYDRATE 1 G: 1 INJECTION, SOLUTION INTRAVENOUS at 05:44

## 2024-12-18 RX ADMIN — THIAMINE HYDROCHLORIDE 500 MG: 100 INJECTION, SOLUTION INTRAMUSCULAR; INTRAVENOUS at 16:00

## 2024-12-18 RX ADMIN — Medication 50 MCG/HR: at 08:15

## 2024-12-18 RX ADMIN — PIPERACILLIN SODIUM AND TAZOBACTAM SODIUM 2.25 G: 2; .25 INJECTION, SOLUTION INTRAVENOUS at 22:47

## 2024-12-18 RX ADMIN — PIPERACILLIN SODIUM AND TAZOBACTAM SODIUM 2.25 G: 2; .25 INJECTION, SOLUTION INTRAVENOUS at 15:52

## 2024-12-18 RX ADMIN — SODIUM CHLORIDE, POTASSIUM CHLORIDE, SODIUM LACTATE AND CALCIUM CHLORIDE 2000 ML: 600; 310; 30; 20 INJECTION, SOLUTION INTRAVENOUS at 00:43

## 2024-12-18 RX ADMIN — THIAMINE HYDROCHLORIDE 500 MG: 100 INJECTION, SOLUTION INTRAMUSCULAR; INTRAVENOUS at 00:07

## 2024-12-18 RX ADMIN — SODIUM BICARBONATE 125 ML/HR: 84 INJECTION, SOLUTION INTRAVENOUS at 08:30

## 2024-12-18 RX ADMIN — HYDROCORTISONE SODIUM SUCCINATE 50 MG: 100 INJECTION, POWDER, FOR SOLUTION INTRAMUSCULAR; INTRAVENOUS at 22:47

## 2024-12-18 RX ADMIN — HYDROCORTISONE SODIUM SUCCINATE 50 MG: 100 INJECTION, POWDER, FOR SOLUTION INTRAMUSCULAR; INTRAVENOUS at 11:05

## 2024-12-18 RX ADMIN — Medication 80 PERCENT: at 19:40

## 2024-12-18 RX ADMIN — Medication 0.34 MCG/KG/MIN: at 05:52

## 2024-12-18 RX ADMIN — HYDROCORTISONE SODIUM SUCCINATE 50 MG: 100 INJECTION, POWDER, FOR SOLUTION INTRAMUSCULAR; INTRAVENOUS at 04:40

## 2024-12-18 RX ADMIN — Medication 100 MCG/HR: at 21:11

## 2024-12-18 RX ADMIN — CALCIUM GLUCONATE 2 G: 20 INJECTION, SOLUTION INTRAVENOUS at 08:00

## 2024-12-18 ASSESSMENT — PAIN - FUNCTIONAL ASSESSMENT
PAIN_FUNCTIONAL_ASSESSMENT: CPOT (CRITICAL CARE PAIN OBSERVATION TOOL)
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
## 2024-12-18 NOTE — PROGRESS NOTES
Physical Therapy                 Therapy Communication Note    Patient Name: Ike Gar  MRN: [id number]  Department: [facility] TSICU  Room: 16/16-A  Today's Date: 12/18/2024     Discipline: Physical Therapy    PT Missed Visit: Yes     Missed Visit Reason:  (0843: Pt medically unstable per MD, not appropriate for PT eval at this time. Will continue to follow.)    Missed Time: Attempt
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
## 2024-12-18 NOTE — PROGRESS NOTES
Occupational Therapy                 Therapy Communication Note    Patient Name: Ike Gar  MRN: [id number]  Department: [facility]  Room: 16/16-A  Today's Date: 12/18/2024     Discipline: Occupational Therapy          Missed Visit Reason: Missed Visit Reason: Patient placed on medical hold (850 per ID rounds pt medically unstable will hold OT intervention at this time)    Missed Time: Attempt    Comment:

## 2024-12-18 NOTE — PROCEDURES
Right IJ trialysis placement for initiation of CRRT    A time out was performed identifying the correct procedure and correct location with nursing staff.  The right neck was prepped with 2% Chlorhexidine and draped with a full length sterile sheet in the usual fashion.  The vein was accessed under ultrasound guidance with an 18 gauge thin wall needle. A trialysis catheter was inserted via the seldinger technique. Dark, non pulsatile blood was withdrawn from all lumens and flushed easily with normal saline. The catheter was sutured in place and a sterile dressing was applied over the site prior to removal of the drapes.     The patient tolerated the procedure well and there were no apparent complications.    Chest Xray is pending    Line was placed with the supervision of FORTINO Aldrich-SHAYLA Bates PA-C  Trauma, Critical Care, and Acute Care Surgery  [id number]

## 2024-12-18 NOTE — PROGRESS NOTES
[facility]  TRAUMA SURGERY - ATTENDING PROGRESS NOTE    Patient Name: Ike Gar  MRN: [id number]  Admit Date: 1217  : 1945  AGE: 78 y.o.   GENDER: male    Patient seen and evaluated during multidisciplinary rounds. Discussed with TICU team and Attending.  He remains in what appears to be septic shock. Volume responsive but still under-resuscitated. Escalating pressor requirements. Oliguric (<100/24hr). He is developing ARDS with low lung compliance (PIP 30 to get 6ml/kg) and hypoxia (100% and 12PEEP). Abd is rigid and he is clearly in pain. His sedation/analgesia are quite low in an effort to preserve his MAP.   He needs an urgent return to the OR for re-exploration of the abdomen. Bedside is possible but I expect him to eviscerate given his PIP and abd distension.   Volume loading should continue  Zosyn cont  ICU team will adjust vent and sedation as tolerated.  We will contact family for consent and arrange OR asap for emergent case.      DISPOSITION: Remain in ICU
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
## 2024-12-19 ENCOUNTER — APPOINTMENT (OUTPATIENT)
Dept: RADIOLOGY | Facility: HOSPITAL | Age: 79
End: 2024-12-19
Payer: MEDICARE

## 2024-12-19 LAB
ALBUMIN SERPL BCP-MCNC: 2.7 G/DL (ref 3.4–5)
ALBUMIN SERPL BCP-MCNC: 2.7 G/DL (ref 3.4–5)
ALBUMIN SERPL BCP-MCNC: 2.9 G/DL (ref 3.4–5)
ALBUMIN SERPL BCP-MCNC: 2.9 G/DL (ref 3.4–5)
ALP SERPL-CCNC: 69 U/L (ref 33–136)
ALT SERPL W P-5'-P-CCNC: 977 U/L (ref 10–52)
ANION GAP BLDA CALCULATED.4IONS-SCNC: 14 MMO/L (ref 10–25)
ANION GAP BLDA CALCULATED.4IONS-SCNC: 18 MMO/L (ref 10–25)
ANION GAP BLDA CALCULATED.4IONS-SCNC: 18 MMO/L (ref 10–25)
ANION GAP SERPL CALC-SCNC: 19 MMOL/L (ref 10–20)
ANION GAP SERPL CALC-SCNC: 19 MMOL/L (ref 10–20)
ANION GAP SERPL CALC-SCNC: 23 MMOL/L (ref 10–20)
ANION GAP SERPL CALC-SCNC: 25 MMOL/L (ref 10–20)
APTT PPP: 47 SECONDS (ref 27–38)
AST SERPL W P-5'-P-CCNC: 864 U/L (ref 9–39)
BASE EXCESS BLDA CALC-SCNC: -1.8 MMOL/L (ref -2–3)
BASE EXCESS BLDA CALC-SCNC: -4 MMOL/L (ref -2–3)
BASE EXCESS BLDA CALC-SCNC: -4.5 MMOL/L (ref -2–3)
BASOPHILS # BLD MANUAL: 0 X10*3/UL (ref 0–0.1)
BASOPHILS NFR BLD MANUAL: 0 %
BILIRUB DIRECT SERPL-MCNC: 1.7 MG/DL (ref 0–0.3)
BILIRUB SERPL-MCNC: 6.5 MG/DL (ref 0–1.2)
BODY TEMPERATURE: 37 DEGREES CELSIUS
BUN SERPL-MCNC: 24 MG/DL (ref 6–23)
BUN SERPL-MCNC: 24 MG/DL (ref 6–23)
BUN SERPL-MCNC: 27 MG/DL (ref 6–23)
BUN SERPL-MCNC: 30 MG/DL (ref 6–23)
CA-I BLD-SCNC: 1.04 MMOL/L (ref 1.1–1.33)
CA-I BLD-SCNC: 1.1 MMOL/L (ref 1.1–1.33)
CA-I BLD-SCNC: 1.12 MMOL/L (ref 1.1–1.33)
CA-I BLDA-SCNC: 0.94 MMOL/L (ref 1.1–1.33)
CA-I BLDA-SCNC: 1.17 MMOL/L (ref 1.1–1.33)
CA-I BLDA-SCNC: 1.17 MMOL/L (ref 1.1–1.33)
CALCIUM SERPL-MCNC: 7.8 MG/DL (ref 8.6–10.6)
CALCIUM SERPL-MCNC: 8.1 MG/DL (ref 8.6–10.6)
CALCIUM SERPL-MCNC: 8.1 MG/DL (ref 8.6–10.6)
CHLORIDE BLDA-SCNC: 94 MMOL/L (ref 98–107)
CHLORIDE BLDA-SCNC: 94 MMOL/L (ref 98–107)
CHLORIDE BLDA-SCNC: 95 MMOL/L (ref 98–107)
CHLORIDE SERPL-SCNC: 93 MMOL/L (ref 98–107)
CHLORIDE SERPL-SCNC: 93 MMOL/L (ref 98–107)
CHLORIDE SERPL-SCNC: 94 MMOL/L (ref 98–107)
CHLORIDE SERPL-SCNC: 94 MMOL/L (ref 98–107)
CO2 SERPL-SCNC: 21 MMOL/L (ref 21–32)
CO2 SERPL-SCNC: 22 MMOL/L (ref 21–32)
CO2 SERPL-SCNC: 25 MMOL/L (ref 21–32)
CO2 SERPL-SCNC: 25 MMOL/L (ref 21–32)
CREAT SERPL-MCNC: 2.98 MG/DL (ref 0.5–1.3)
CREAT SERPL-MCNC: 2.98 MG/DL (ref 0.5–1.3)
CREAT SERPL-MCNC: 3.44 MG/DL (ref 0.5–1.3)
CREAT SERPL-MCNC: 3.8 MG/DL (ref 0.5–1.3)
EGFRCR SERPLBLD CKD-EPI 2021: 16 ML/MIN/1.73M*2
EGFRCR SERPLBLD CKD-EPI 2021: 18 ML/MIN/1.73M*2
EGFRCR SERPLBLD CKD-EPI 2021: 21 ML/MIN/1.73M*2
EGFRCR SERPLBLD CKD-EPI 2021: 21 ML/MIN/1.73M*2
EOSINOPHIL # BLD MANUAL: 0 X10*3/UL (ref 0–0.4)
EOSINOPHIL NFR BLD MANUAL: 0 %
ERYTHROCYTE [DISTWIDTH] IN BLOOD BY AUTOMATED COUNT: 14.6 % (ref 11.5–14.5)
ERYTHROCYTE [DISTWIDTH] IN BLOOD BY AUTOMATED COUNT: 14.6 % (ref 11.5–14.5)
ERYTHROCYTE [DISTWIDTH] IN BLOOD BY AUTOMATED COUNT: 14.8 % (ref 11.5–14.5)
GLUCOSE BLD MANUAL STRIP-MCNC: 111 MG/DL (ref 74–99)
GLUCOSE BLD MANUAL STRIP-MCNC: 118 MG/DL (ref 74–99)
GLUCOSE BLD MANUAL STRIP-MCNC: 126 MG/DL (ref 74–99)
GLUCOSE BLD MANUAL STRIP-MCNC: 24 MG/DL (ref 74–99)
GLUCOSE BLD MANUAL STRIP-MCNC: 30 MG/DL (ref 74–99)
GLUCOSE BLD MANUAL STRIP-MCNC: 36 MG/DL (ref 74–99)
GLUCOSE BLD MANUAL STRIP-MCNC: 72 MG/DL (ref 74–99)
GLUCOSE BLD MANUAL STRIP-MCNC: 72 MG/DL (ref 74–99)
GLUCOSE BLD MANUAL STRIP-MCNC: <10 MG/DL (ref 74–99)
GLUCOSE BLDA-MCNC: 103 MG/DL (ref 74–99)
GLUCOSE BLDA-MCNC: 85 MG/DL (ref 74–99)
GLUCOSE BLDA-MCNC: 95 MG/DL (ref 74–99)
GLUCOSE SERPL-MCNC: 71 MG/DL (ref 74–99)
GLUCOSE SERPL-MCNC: 76 MG/DL (ref 74–99)
GLUCOSE SERPL-MCNC: 92 MG/DL (ref 74–99)
HCO3 BLDA-SCNC: 21.1 MMOL/L (ref 22–26)
HCO3 BLDA-SCNC: 21.6 MMOL/L (ref 22–26)
HCO3 BLDA-SCNC: 23.1 MMOL/L (ref 22–26)
HCT VFR BLD AUTO: 23.1 % (ref 41–52)
HCT VFR BLD AUTO: 23.4 % (ref 41–52)
HCT VFR BLD AUTO: 24.8 % (ref 41–52)
HCT VFR BLD EST: 25 % (ref 41–52)
HGB BLD-MCNC: 7.8 G/DL (ref 13.5–17.5)
HGB BLD-MCNC: 7.9 G/DL (ref 13.5–17.5)
HGB BLD-MCNC: 8.1 G/DL (ref 13.5–17.5)
HGB BLDA-MCNC: 8.3 G/DL (ref 13.5–17.5)
HGB BLDA-MCNC: 8.3 G/DL (ref 13.5–17.5)
HGB BLDA-MCNC: 8.4 G/DL (ref 13.5–17.5)
IMM GRANULOCYTES # BLD AUTO: 0.48 X10*3/UL (ref 0–0.5)
IMM GRANULOCYTES NFR BLD AUTO: 2.1 % (ref 0–0.9)
INHALED O2 CONCENTRATION: 60 %
INR PPP: 1.8 (ref 0.9–1.1)
LACTATE BLDA-SCNC: 10 MMOL/L (ref 0.4–2)
LACTATE BLDA-SCNC: 10.5 MMOL/L (ref 0.4–2)
LACTATE BLDA-SCNC: 8 MMOL/L (ref 0.4–2)
LYMPHOCYTES # BLD MANUAL: 0.36 X10*3/UL (ref 0.8–3)
LYMPHOCYTES NFR BLD MANUAL: 1.6 %
MAGNESIUM SERPL-MCNC: 1.69 MG/DL (ref 1.6–2.4)
MAGNESIUM SERPL-MCNC: 1.71 MG/DL (ref 1.6–2.4)
MAGNESIUM SERPL-MCNC: 1.75 MG/DL (ref 1.6–2.4)
MCH RBC QN AUTO: 28.8 PG (ref 26–34)
MCH RBC QN AUTO: 28.9 PG (ref 26–34)
MCH RBC QN AUTO: 29.3 PG (ref 26–34)
MCHC RBC AUTO-ENTMCNC: 32.7 G/DL (ref 32–36)
MCHC RBC AUTO-ENTMCNC: 33.8 G/DL (ref 32–36)
MCHC RBC AUTO-ENTMCNC: 33.8 G/DL (ref 32–36)
MCV RBC AUTO: 86 FL (ref 80–100)
MCV RBC AUTO: 87 FL (ref 80–100)
MCV RBC AUTO: 88 FL (ref 80–100)
METAMYELOCYTES # BLD MANUAL: 0.38 X10*3/UL
METAMYELOCYTES NFR BLD MANUAL: 1.7 %
MONOCYTES # BLD MANUAL: 2.62 X10*3/UL (ref 0.05–0.8)
MONOCYTES NFR BLD MANUAL: 11.7 %
NEUTS SEG # BLD MANUAL: 19.04 X10*3/UL (ref 1.6–5)
NEUTS SEG NFR BLD MANUAL: 85 %
NRBC BLD-RTO: 0 /100 WBCS (ref 0–0)
OXYHGB MFR BLDA: 96.2 % (ref 94–98)
OXYHGB MFR BLDA: 96.4 % (ref 94–98)
OXYHGB MFR BLDA: 96.9 % (ref 94–98)
PCO2 BLDA: 39 MM HG (ref 38–42)
PCO2 BLDA: 40 MM HG (ref 38–42)
PCO2 BLDA: 41 MM HG (ref 38–42)
PH BLDA: 7.33 PH (ref 7.38–7.42)
PH BLDA: 7.33 PH (ref 7.38–7.42)
PH BLDA: 7.38 PH (ref 7.38–7.42)
PHOSPHATE SERPL-MCNC: 3.4 MG/DL (ref 2.5–4.9)
PHOSPHATE SERPL-MCNC: 3.4 MG/DL (ref 2.5–4.9)
PHOSPHATE SERPL-MCNC: 4.3 MG/DL (ref 2.5–4.9)
PHOSPHATE SERPL-MCNC: 5.2 MG/DL (ref 2.5–4.9)
PHOSPHATE SERPL-MCNC: 5.2 MG/DL (ref 2.5–4.9)
PLATELET # BLD AUTO: 52 X10*3/UL (ref 150–450)
PLATELET # BLD AUTO: 57 X10*3/UL (ref 150–450)
PLATELET # BLD AUTO: 58 X10*3/UL (ref 150–450)
PO2 BLDA: 120 MM HG (ref 85–95)
PO2 BLDA: 85 MM HG (ref 85–95)
PO2 BLDA: 93 MM HG (ref 85–95)
POTASSIUM BLDA-SCNC: 4.5 MMOL/L (ref 3.5–5.3)
POTASSIUM BLDA-SCNC: 4.7 MMOL/L (ref 3.5–5.3)
POTASSIUM BLDA-SCNC: 4.7 MMOL/L (ref 3.5–5.3)
POTASSIUM SERPL-SCNC: 4.4 MMOL/L (ref 3.5–5.3)
POTASSIUM SERPL-SCNC: 4.7 MMOL/L (ref 3.5–5.3)
PROT SERPL-MCNC: 3.7 G/DL (ref 6.4–8.2)
PROTHROMBIN TIME: 20.7 SECONDS (ref 9.8–12.8)
RBC # BLD AUTO: 2.7 X10*6/UL (ref 4.5–5.9)
RBC # BLD AUTO: 2.7 X10*6/UL (ref 4.5–5.9)
RBC # BLD AUTO: 2.81 X10*6/UL (ref 4.5–5.9)
RBC MORPH BLD: ABNORMAL
SAO2 % BLDA: 98 % (ref 94–100)
SAO2 % BLDA: 99 % (ref 94–100)
SAO2 % BLDA: 99 % (ref 94–100)
SODIUM BLDA-SCNC: 128 MMOL/L (ref 136–145)
SODIUM BLDA-SCNC: 128 MMOL/L (ref 136–145)
SODIUM BLDA-SCNC: 129 MMOL/L (ref 136–145)
SODIUM SERPL-SCNC: 134 MMOL/L (ref 136–145)
STAPHYLOCOCCUS SPEC CULT: NORMAL
TOTAL CELLS COUNTED BLD: 120
WBC # BLD AUTO: 22.1 X10*3/UL (ref 4.4–11.3)
WBC # BLD AUTO: 22.2 X10*3/UL (ref 4.4–11.3)
WBC # BLD AUTO: 22.4 X10*3/UL (ref 4.4–11.3)

## 2024-12-19 PROCEDURE — 2500000004 HC RX 250 GENERAL PHARMACY W/ HCPCS (ALT 636 FOR OP/ED): Performed by: NURSE PRACTITIONER

## 2024-12-19 PROCEDURE — 37799 UNLISTED PX VASCULAR SURGERY: CPT | Performed by: NURSE PRACTITIONER

## 2024-12-19 PROCEDURE — 71045 X-RAY EXAM CHEST 1 VIEW: CPT | Performed by: RADIOLOGY

## 2024-12-19 PROCEDURE — 90935 HEMODIALYSIS ONE EVALUATION: CPT

## 2024-12-19 PROCEDURE — 85007 BL SMEAR W/DIFF WBC COUNT: CPT | Performed by: NURSE PRACTITIONER

## 2024-12-19 PROCEDURE — 71045 X-RAY EXAM CHEST 1 VIEW: CPT

## 2024-12-19 PROCEDURE — 80069 RENAL FUNCTION PANEL: CPT | Performed by: NURSE PRACTITIONER

## 2024-12-19 PROCEDURE — 82947 ASSAY GLUCOSE BLOOD QUANT: CPT

## 2024-12-19 PROCEDURE — 90937 HEMODIALYSIS REPEATED EVAL: CPT

## 2024-12-19 PROCEDURE — 2500000005 HC RX 250 GENERAL PHARMACY W/O HCPCS: Performed by: NURSE PRACTITIONER

## 2024-12-19 PROCEDURE — 82040 ASSAY OF SERUM ALBUMIN: CPT | Performed by: STUDENT IN AN ORGANIZED HEALTH CARE EDUCATION/TRAINING PROGRAM

## 2024-12-19 PROCEDURE — 84132 ASSAY OF SERUM POTASSIUM: CPT | Performed by: NURSE PRACTITIONER

## 2024-12-19 PROCEDURE — 82330 ASSAY OF CALCIUM: CPT | Performed by: NURSE PRACTITIONER

## 2024-12-19 PROCEDURE — 99291 CRITICAL CARE FIRST HOUR: CPT | Performed by: EMERGENCY MEDICINE

## 2024-12-19 PROCEDURE — 2500000004 HC RX 250 GENERAL PHARMACY W/ HCPCS (ALT 636 FOR OP/ED): Mod: JZ | Performed by: STUDENT IN AN ORGANIZED HEALTH CARE EDUCATION/TRAINING PROGRAM

## 2024-12-19 PROCEDURE — 85610 PROTHROMBIN TIME: CPT

## 2024-12-19 PROCEDURE — 2500000004 HC RX 250 GENERAL PHARMACY W/ HCPCS (ALT 636 FOR OP/ED): Performed by: EMERGENCY MEDICINE

## 2024-12-19 PROCEDURE — 94003 VENT MGMT INPAT SUBQ DAY: CPT

## 2024-12-19 PROCEDURE — 2020000001 HC ICU ROOM DAILY

## 2024-12-19 PROCEDURE — 2500000005 HC RX 250 GENERAL PHARMACY W/O HCPCS: Performed by: EMERGENCY MEDICINE

## 2024-12-19 PROCEDURE — 85027 COMPLETE CBC AUTOMATED: CPT | Performed by: NURSE PRACTITIONER

## 2024-12-19 PROCEDURE — 2500000004 HC RX 250 GENERAL PHARMACY W/ HCPCS (ALT 636 FOR OP/ED)

## 2024-12-19 PROCEDURE — 82435 ASSAY OF BLOOD CHLORIDE: CPT | Performed by: NURSE PRACTITIONER

## 2024-12-19 PROCEDURE — 83735 ASSAY OF MAGNESIUM: CPT | Performed by: NURSE PRACTITIONER

## 2024-12-19 PROCEDURE — 2500000005 HC RX 250 GENERAL PHARMACY W/O HCPCS

## 2024-12-19 RX ORDER — DEXTROSE 50 % IN WATER (D50W) INTRAVENOUS SYRINGE
12.5
Status: DISCONTINUED | OUTPATIENT
Start: 2024-12-19 | End: 2025-01-18 | Stop reason: HOSPADM

## 2024-12-19 RX ORDER — DEXTROSE 50 % IN WATER (D50W) INTRAVENOUS SYRINGE
12.5 ONCE
Status: COMPLETED | OUTPATIENT
Start: 2024-12-19 | End: 2024-12-19

## 2024-12-19 RX ORDER — DEXTROSE 50 % IN WATER (D50W) INTRAVENOUS SYRINGE
25 ONCE
Status: COMPLETED | OUTPATIENT
Start: 2024-12-19 | End: 2024-12-19

## 2024-12-19 RX ORDER — CALCIUM GLUCONATE 20 MG/ML
2 INJECTION, SOLUTION INTRAVENOUS ONCE
Status: COMPLETED | OUTPATIENT
Start: 2024-12-19 | End: 2024-12-19

## 2024-12-19 RX ORDER — DEXTROSE 50 % IN WATER (D50W) INTRAVENOUS SYRINGE
25
Status: DISCONTINUED | OUTPATIENT
Start: 2024-12-19 | End: 2025-01-18 | Stop reason: HOSPADM

## 2024-12-19 RX ORDER — MAGNESIUM SULFATE 1 G/100ML
1 INJECTION INTRAVENOUS ONCE
Status: COMPLETED | OUTPATIENT
Start: 2024-12-19 | End: 2024-12-19

## 2024-12-19 RX ORDER — HEPARIN SODIUM 5000 [USP'U]/ML
5000 INJECTION, SOLUTION INTRAVENOUS; SUBCUTANEOUS EVERY 12 HOURS
Status: DISCONTINUED | OUTPATIENT
Start: 2024-12-19 | End: 2024-12-20

## 2024-12-19 RX ADMIN — DEXTROSE MONOHYDRATE 25 G: 25 INJECTION, SOLUTION INTRAVENOUS at 22:11

## 2024-12-19 RX ADMIN — VASOPRESSIN 0.03 UNITS/MIN: 0.2 INJECTION INTRAVENOUS at 08:22

## 2024-12-19 RX ADMIN — CALCIUM CHLORIDE, MAGNESIUM CHLORIDE, DEXTROSE MONOHYDRATE, LACTIC ACID, SODIUM CHLORIDE, SODIUM BICARBONATE AND POTASSIUM CHLORIDE 3000 ML/HR: 5.15; 2.03; 22; 5.4; 6.46; 3.09; .157 INJECTION INTRAVENOUS at 07:54

## 2024-12-19 RX ADMIN — CALCIUM CHLORIDE, MAGNESIUM CHLORIDE, DEXTROSE MONOHYDRATE, LACTIC ACID, SODIUM CHLORIDE, SODIUM BICARBONATE AND POTASSIUM CHLORIDE 3000 ML/HR: 5.15; 2.03; 22; 5.4; 6.46; 3.09; .157 INJECTION INTRAVENOUS at 02:11

## 2024-12-19 RX ADMIN — HYDROCORTISONE SODIUM SUCCINATE 50 MG: 100 INJECTION, POWDER, FOR SOLUTION INTRAMUSCULAR; INTRAVENOUS at 22:11

## 2024-12-19 RX ADMIN — Medication 100 MCG/HR: at 18:14

## 2024-12-19 RX ADMIN — HYDROCORTISONE SODIUM SUCCINATE 50 MG: 100 INJECTION, POWDER, FOR SOLUTION INTRAMUSCULAR; INTRAVENOUS at 10:14

## 2024-12-19 RX ADMIN — VASOPRESSIN 0.03 UNITS/MIN: 0.2 INJECTION INTRAVENOUS at 18:34

## 2024-12-19 RX ADMIN — THIAMINE HYDROCHLORIDE 500 MG: 100 INJECTION, SOLUTION INTRAMUSCULAR; INTRAVENOUS at 08:23

## 2024-12-19 RX ADMIN — VASOPRESSIN 0.03 UNITS/MIN: 0.2 INJECTION INTRAVENOUS at 06:56

## 2024-12-19 RX ADMIN — CALCIUM CHLORIDE, MAGNESIUM CHLORIDE, DEXTROSE MONOHYDRATE, LACTIC ACID, SODIUM CHLORIDE, SODIUM BICARBONATE AND POTASSIUM CHLORIDE 3000 ML/HR: 5.15; 2.03; 22; 5.4; 6.46; 3.09; .157 INJECTION INTRAVENOUS at 23:10

## 2024-12-19 RX ADMIN — DEXTROSE MONOHYDRATE 12.5 G: 25 INJECTION, SOLUTION INTRAVENOUS at 03:59

## 2024-12-19 RX ADMIN — Medication 0.15 MCG/KG/MIN: at 21:26

## 2024-12-19 RX ADMIN — PIPERACILLIN SODIUM AND TAZOBACTAM SODIUM 3.38 G: 3; .375 INJECTION, SOLUTION INTRAVENOUS at 16:34

## 2024-12-19 RX ADMIN — Medication 0.25 MCG/KG/MIN: at 05:00

## 2024-12-19 RX ADMIN — PIPERACILLIN SODIUM AND TAZOBACTAM SODIUM 2.25 G: 2; .25 INJECTION, SOLUTION INTRAVENOUS at 05:11

## 2024-12-19 RX ADMIN — PIPERACILLIN SODIUM AND TAZOBACTAM SODIUM 3.38 G: 3; .375 INJECTION, SOLUTION INTRAVENOUS at 10:13

## 2024-12-19 RX ADMIN — SODIUM BICARBONATE 125 ML/HR: 84 INJECTION, SOLUTION INTRAVENOUS at 12:11

## 2024-12-19 RX ADMIN — PROPOFOL 5 MCG/KG/MIN: 10 INJECTION, EMULSION INTRAVENOUS at 16:33

## 2024-12-19 RX ADMIN — Medication 60 PERCENT: at 20:02

## 2024-12-19 RX ADMIN — THIAMINE HYDROCHLORIDE 500 MG: 100 INJECTION, SOLUTION INTRAMUSCULAR; INTRAVENOUS at 00:40

## 2024-12-19 RX ADMIN — CALCIUM CHLORIDE, MAGNESIUM CHLORIDE, DEXTROSE MONOHYDRATE, LACTIC ACID, SODIUM CHLORIDE, SODIUM BICARBONATE AND POTASSIUM CHLORIDE 3000 ML/HR: 5.15; 2.03; 22; 5.4; 6.46; 3.09; .157 INJECTION INTRAVENOUS at 23:11

## 2024-12-19 RX ADMIN — PROPOFOL 5 MCG/KG/MIN: 10 INJECTION, EMULSION INTRAVENOUS at 06:23

## 2024-12-19 RX ADMIN — HEPARIN SODIUM 5000 UNITS: 5000 INJECTION, SOLUTION INTRAVENOUS; SUBCUTANEOUS at 10:13

## 2024-12-19 RX ADMIN — PANTOPRAZOLE SODIUM 40 MG: 40 INJECTION, POWDER, FOR SOLUTION INTRAVENOUS at 06:56

## 2024-12-19 RX ADMIN — CALCIUM CHLORIDE, MAGNESIUM CHLORIDE, DEXTROSE MONOHYDRATE, LACTIC ACID, SODIUM CHLORIDE, SODIUM BICARBONATE AND POTASSIUM CHLORIDE 3000 ML/HR: 5.15; 2.03; 22; 5.4; 6.46; 3.09; .157 INJECTION INTRAVENOUS at 23:08

## 2024-12-19 RX ADMIN — MAGNESIUM SULFATE HEPTAHYDRATE 1 G: 1 INJECTION, SOLUTION INTRAVENOUS at 15:23

## 2024-12-19 RX ADMIN — Medication 100 MCG/HR: at 08:31

## 2024-12-19 RX ADMIN — HEPARIN SODIUM 5000 UNITS: 5000 INJECTION, SOLUTION INTRAVENOUS; SUBCUTANEOUS at 22:11

## 2024-12-19 RX ADMIN — CALCIUM CHLORIDE, MAGNESIUM CHLORIDE, DEXTROSE MONOHYDRATE, LACTIC ACID, SODIUM CHLORIDE, SODIUM BICARBONATE AND POTASSIUM CHLORIDE 3000 ML/HR: 5.15; 2.03; 22; 5.4; 6.46; 3.09; .157 INJECTION INTRAVENOUS at 12:31

## 2024-12-19 RX ADMIN — Medication 0.23 MCG/KG/MIN: at 10:53

## 2024-12-19 RX ADMIN — CALCIUM CHLORIDE, MAGNESIUM CHLORIDE, DEXTROSE MONOHYDRATE, LACTIC ACID, SODIUM CHLORIDE, SODIUM BICARBONATE AND POTASSIUM CHLORIDE 3000 ML/HR: 5.15; 2.03; 22; 5.4; 6.46; 3.09; .157 INJECTION INTRAVENOUS at 02:13

## 2024-12-19 RX ADMIN — CALCIUM GLUCONATE 2 G: 20 INJECTION, SOLUTION INTRAVENOUS at 02:54

## 2024-12-19 RX ADMIN — SODIUM BICARBONATE 125 ML/HR: 84 INJECTION, SOLUTION INTRAVENOUS at 02:42

## 2024-12-19 RX ADMIN — HYDROCORTISONE SODIUM SUCCINATE 50 MG: 100 INJECTION, POWDER, FOR SOLUTION INTRAMUSCULAR; INTRAVENOUS at 16:33

## 2024-12-19 RX ADMIN — CALCIUM CHLORIDE, MAGNESIUM CHLORIDE, DEXTROSE MONOHYDRATE, LACTIC ACID, SODIUM CHLORIDE, SODIUM BICARBONATE AND POTASSIUM CHLORIDE 3000 ML/HR: 5.15; 2.03; 22; 5.4; 6.46; 3.09; .157 INJECTION INTRAVENOUS at 02:12

## 2024-12-19 RX ADMIN — PIPERACILLIN SODIUM AND TAZOBACTAM SODIUM 3.38 G: 3; .375 INJECTION, SOLUTION INTRAVENOUS at 22:11

## 2024-12-19 RX ADMIN — HYDROCORTISONE SODIUM SUCCINATE 50 MG: 100 INJECTION, POWDER, FOR SOLUTION INTRAMUSCULAR; INTRAVENOUS at 05:11

## 2024-12-19 RX ADMIN — CALCIUM CHLORIDE, MAGNESIUM CHLORIDE, DEXTROSE MONOHYDRATE, LACTIC ACID, SODIUM CHLORIDE, SODIUM BICARBONATE AND POTASSIUM CHLORIDE 3000 ML/HR: 5.15; 2.03; 22; 5.4; 6.46; 3.09; .157 INJECTION INTRAVENOUS at 17:46

## 2024-12-19 RX ADMIN — THIAMINE HYDROCHLORIDE 500 MG: 100 INJECTION, SOLUTION INTRAMUSCULAR; INTRAVENOUS at 16:33

## 2024-12-19 ASSESSMENT — PAIN - FUNCTIONAL ASSESSMENT
PAIN_FUNCTIONAL_ASSESSMENT: CPOT (CRITICAL CARE PAIN OBSERVATION TOOL)
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
## 2024-12-19 NOTE — CARE PLAN
The clinical goals for the shift include Become/remain hemodynamically stable    Over the shift, the patient did not make progress toward the following goals. Barriers to progression include   Problem: Safety - Medical Restraint  Goal: Remains free of injury from restraints (Restraint for Interference with Medical Device)  Outcome: Progressing  Goal: Free from restraint(s) (Restraint for Interference with Medical Device)  Outcome: Progressing     Problem: Pain - Adult  Goal: Verbalizes/displays adequate comfort level or baseline comfort level  Outcome: Progressing     Problem: Safety - Adult  Goal: Free from fall injury  Outcome: Progressing     Problem: Chronic Conditions and Co-morbidities  Goal: Patient's chronic conditions and co-morbidity symptoms are monitored and maintained or improved  Outcome: Progressing     Problem: Skin  Goal: Decreased wound size/increased tissue granulation at next dressing change  Outcome: Progressing  Goal: Participates in plan/prevention/treatment measures  Outcome: Progressing  Goal: Prevent/manage excess moisture  Outcome: Progressing  Goal: Prevent/minimize sheer/friction injuries  Outcome: Progressing  Goal: Promote/optimize nutrition  Outcome: Progressing  Goal: Promote skin healing  Outcome: Progressing     Problem: Fall/Injury  Goal: Not fall by end of shift  Outcome: Progressing  Goal: Be free from injury by end of the shift  Outcome: Progressing  Goal: Verbalize understanding of personal risk factors for fall in the hospital  Outcome: Progressing  Goal: Verbalize understanding of risk factor reduction measures to prevent injury from fall in the home  Outcome: Progressing  Goal: Use assistive devices by end of the shift  Outcome: Progressing  Goal: Pace activities to prevent fatigue by end of the shift  Outcome: Progressing     Problem: Pain  Goal: Takes deep breaths with improved pain control throughout the shift  Outcome: Progressing  Goal: Turns in bed with improved  pain control throughout the shift  Outcome: Progressing  Goal: Walks with improved pain control throughout the shift  Outcome: Progressing  Goal: Performs ADL's with improved pain control throughout shift  Outcome: Progressing  Goal: Participates in PT with improved pain control throughout the shift  Outcome: Progressing  Goal: Free from opioid side effects throughout the shift  Outcome: Progressing  Goal: Free from acute confusion related to pain meds throughout the shift  Outcome: Progressing     Problem: Respiratory  Goal: Clear secretions with interventions this shift  Outcome: Progressing  Goal: Minimize anxiety/maximize coping throughout shift  Outcome: Progressing  Goal: Minimal/no exertional discomfort or dyspnea this shift  Outcome: Progressing  Goal: No signs of respiratory distress (eg. Use of accessory muscles. Peds grunting)  Outcome: Progressing  Goal: Patent airway maintained this shift  Outcome: Progressing  Goal: Tolerate mechanical ventilation evidenced by VS/agitation level this shift  Outcome: Progressing  Goal: Tolerate pulmonary toileting this shift  Outcome: Progressing  Goal: Verbalize decreased shortness of breath this shift  Outcome: Progressing  Goal: Wean oxygen to maintain O2 saturation per order/standard this shift  Outcome: Progressing  Goal: Increase self care and/or family involvement in next 24 hours  Outcome: Progressing   . Recommendations to address these barriers include SBT daily and decrease pressors .
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
## 2024-12-19 NOTE — CARE PLAN
Problem: Safety - Medical Restraint  Goal: Remains free of injury from restraints (Restraint for Interference with Medical Device)  Outcome: Progressing  Goal: Free from restraint(s) (Restraint for Interference with Medical Device)  Outcome: Progressing     Problem: Safety - Adult  Goal: Free from fall injury  Outcome: Progressing
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
## 2024-12-20 LAB
ALBUMIN SERPL BCP-MCNC: 2.7 G/DL (ref 3.4–5)
ALP SERPL-CCNC: 116 U/L (ref 33–136)
ALT SERPL W P-5'-P-CCNC: 771 U/L (ref 10–52)
ANION GAP BLDA CALCULATED.4IONS-SCNC: 12 MMO/L (ref 10–25)
ANION GAP BLDA CALCULATED.4IONS-SCNC: 6 MMO/L (ref 10–25)
ANION GAP SERPL CALC-SCNC: 9 MMOL/L (ref 10–20)
ANION GAP SERPL CALC-SCNC: 9 MMOL/L (ref 10–20)
APTT PPP: 37 SECONDS (ref 27–38)
AST SERPL W P-5'-P-CCNC: 566 U/L (ref 9–39)
BASE EXCESS BLDA CALC-SCNC: -1.9 MMOL/L (ref -2–3)
BASE EXCESS BLDA CALC-SCNC: 1.8 MMOL/L (ref -2–3)
BASOPHILS # BLD MANUAL: 0 X10*3/UL (ref 0–0.1)
BASOPHILS NFR BLD MANUAL: 0 %
BILIRUB DIRECT SERPL-MCNC: 2.4 MG/DL (ref 0–0.3)
BILIRUB SERPL-MCNC: 7.9 MG/DL (ref 0–1.2)
BODY TEMPERATURE: 37 DEGREES CELSIUS
BODY TEMPERATURE: 37 DEGREES CELSIUS
BUN SERPL-MCNC: 21 MG/DL (ref 6–23)
BUN SERPL-MCNC: 21 MG/DL (ref 6–23)
CA-I BLD-SCNC: 1.26 MMOL/L (ref 1.1–1.33)
CA-I BLDA-SCNC: 1.22 MMOL/L (ref 1.1–1.33)
CA-I BLDA-SCNC: 1.28 MMOL/L (ref 1.1–1.33)
CALCIUM SERPL-MCNC: 8.6 MG/DL (ref 8.6–10.6)
CFT FORM KAOLIN IND BLD RES TEG: 1.3 MIN (ref 0.8–2.1)
CHLORIDE BLDA-SCNC: 100 MMOL/L (ref 98–107)
CHLORIDE BLDA-SCNC: 99 MMOL/L (ref 98–107)
CHLORIDE SERPL-SCNC: 97 MMOL/L (ref 98–107)
CHLORIDE SERPL-SCNC: 97 MMOL/L (ref 98–107)
CLOT ANGLE.KAOLIN INDUCED BLD RES TEG: 72 DEG (ref 63–78)
CLOT INIT KAO IND P HEP NEUT BLD RES TEG: 7.7 MIN (ref 4.3–8.3)
CLOT INIT KAO IND P HEP NEUT BLD RES TEG: 7.9 MIN (ref 4.6–9.1)
CO2 SERPL-SCNC: 32 MMOL/L (ref 21–32)
CO2 SERPL-SCNC: 32 MMOL/L (ref 21–32)
CREAT SERPL-MCNC: 2.39 MG/DL (ref 0.5–1.3)
CREAT SERPL-MCNC: 2.39 MG/DL (ref 0.5–1.3)
EGFRCR SERPLBLD CKD-EPI 2021: 27 ML/MIN/1.73M*2
EGFRCR SERPLBLD CKD-EPI 2021: 27 ML/MIN/1.73M*2
EOSINOPHIL # BLD MANUAL: 0 X10*3/UL (ref 0–0.4)
EOSINOPHIL NFR BLD MANUAL: 0 %
ERYTHROCYTE [DISTWIDTH] IN BLOOD BY AUTOMATED COUNT: 14.4 % (ref 11.5–14.5)
FIBRINOGEN BLD CALC-MCNC: 520 MG/DL (ref 278–581)
GLUCOSE BLD MANUAL STRIP-MCNC: 103 MG/DL (ref 74–99)
GLUCOSE BLD MANUAL STRIP-MCNC: 108 MG/DL (ref 74–99)
GLUCOSE BLD MANUAL STRIP-MCNC: 119 MG/DL (ref 74–99)
GLUCOSE BLD MANUAL STRIP-MCNC: 78 MG/DL (ref 74–99)
GLUCOSE BLD MANUAL STRIP-MCNC: 86 MG/DL (ref 74–99)
GLUCOSE BLD MANUAL STRIP-MCNC: 97 MG/DL (ref 74–99)
GLUCOSE BLDA-MCNC: 108 MG/DL (ref 74–99)
GLUCOSE BLDA-MCNC: 79 MG/DL (ref 74–99)
GLUCOSE SERPL-MCNC: 71 MG/DL (ref 74–99)
HCO3 BLDA-SCNC: 23.1 MMOL/L (ref 22–26)
HCO3 BLDA-SCNC: 26.6 MMOL/L (ref 22–26)
HCT VFR BLD AUTO: 23.1 % (ref 41–52)
HCT VFR BLD EST: 23 % (ref 41–52)
HCT VFR BLD EST: 24 % (ref 41–52)
HGB BLD-MCNC: 7.8 G/DL (ref 13.5–17.5)
HGB BLDA-MCNC: 7.8 G/DL (ref 13.5–17.5)
HGB BLDA-MCNC: 8 G/DL (ref 13.5–17.5)
IMM GRANULOCYTES # BLD AUTO: 2.01 X10*3/UL (ref 0–0.5)
IMM GRANULOCYTES NFR BLD AUTO: 8.3 % (ref 0–0.9)
INHALED O2 CONCENTRATION: 60 %
INHALED O2 CONCENTRATION: 60 %
INR PPP: 1.4 (ref 0.9–1.1)
LACTATE BLDA-SCNC: 2.9 MMOL/L (ref 0.4–2)
LACTATE BLDA-SCNC: 4.9 MMOL/L (ref 0.4–2)
LYMPHOCYTES # BLD MANUAL: 0.55 X10*3/UL (ref 0.8–3)
LYMPHOCYTES NFR BLD MANUAL: 2.3 %
MA KAOLIN BLD RES TEG: 60 MM (ref 52–69)
MA KAOLIN+TF BLD RES TEG: 60 MM (ref 52–70)
MA TF IND+IIB-IIIA INH BLD RES TEG: 28 MM (ref 15–32)
MAGNESIUM SERPL-MCNC: 1.85 MG/DL (ref 1.6–2.4)
MCH RBC QN AUTO: 29.1 PG (ref 26–34)
MCHC RBC AUTO-ENTMCNC: 33.8 G/DL (ref 32–36)
MCV RBC AUTO: 86 FL (ref 80–100)
METAMYELOCYTES # BLD MANUAL: 0.36 X10*3/UL
METAMYELOCYTES NFR BLD MANUAL: 1.5 %
MONOCYTES # BLD MANUAL: 3.74 X10*3/UL (ref 0.05–0.8)
MONOCYTES NFR BLD MANUAL: 15.5 %
NEUTROPHILS # BLD MANUAL: 19.45 X10*3/UL (ref 1.6–5.5)
NEUTS BAND # BLD MANUAL: 1.13 X10*3/UL (ref 0–0.5)
NEUTS BAND NFR BLD MANUAL: 4.7 %
NEUTS SEG # BLD MANUAL: 18.32 X10*3/UL (ref 1.6–5)
NEUTS SEG NFR BLD MANUAL: 76 %
NRBC BLD-RTO: 0.2 /100 WBCS (ref 0–0)
OXYHGB MFR BLDA: 97 % (ref 94–98)
OXYHGB MFR BLDA: 97.5 % (ref 94–98)
PCO2 BLDA: 39 MM HG (ref 38–42)
PCO2 BLDA: 42 MM HG (ref 38–42)
PH BLDA: 7.38 PH (ref 7.38–7.42)
PH BLDA: 7.41 PH (ref 7.38–7.42)
PHOSPHATE SERPL-MCNC: 2.2 MG/DL (ref 2.5–4.9)
PLATELET # BLD AUTO: 45 X10*3/UL (ref 150–450)
PO2 BLDA: 146 MM HG (ref 85–95)
PO2 BLDA: 158 MM HG (ref 85–95)
POTASSIUM BLDA-SCNC: 3.8 MMOL/L (ref 3.5–5.3)
POTASSIUM BLDA-SCNC: 4.1 MMOL/L (ref 3.5–5.3)
POTASSIUM SERPL-SCNC: 4.2 MMOL/L (ref 3.5–5.3)
POTASSIUM SERPL-SCNC: 4.2 MMOL/L (ref 3.5–5.3)
PROT SERPL-MCNC: 4.3 G/DL (ref 6.4–8.2)
PROTHROMBIN TIME: 15.9 SECONDS (ref 9.8–12.8)
RBC # BLD AUTO: 2.68 X10*6/UL (ref 4.5–5.9)
RBC MORPH BLD: ABNORMAL
SAO2 % BLDA: 100 % (ref 94–100)
SAO2 % BLDA: 99 % (ref 94–100)
SODIUM BLDA-SCNC: 129 MMOL/L (ref 136–145)
SODIUM BLDA-SCNC: 130 MMOL/L (ref 136–145)
SODIUM SERPL-SCNC: 134 MMOL/L (ref 136–145)
SODIUM SERPL-SCNC: 134 MMOL/L (ref 136–145)
TOTAL CELLS COUNTED BLD: 129
WBC # BLD AUTO: 24.1 X10*3/UL (ref 4.4–11.3)

## 2024-12-20 PROCEDURE — 99233 SBSQ HOSP IP/OBS HIGH 50: CPT | Performed by: STUDENT IN AN ORGANIZED HEALTH CARE EDUCATION/TRAINING PROGRAM

## 2024-12-20 PROCEDURE — 2500000005 HC RX 250 GENERAL PHARMACY W/O HCPCS: Performed by: NURSE PRACTITIONER

## 2024-12-20 PROCEDURE — 2020000001 HC ICU ROOM DAILY

## 2024-12-20 PROCEDURE — 2500000004 HC RX 250 GENERAL PHARMACY W/ HCPCS (ALT 636 FOR OP/ED): Performed by: NURSE PRACTITIONER

## 2024-12-20 PROCEDURE — 85027 COMPLETE CBC AUTOMATED: CPT

## 2024-12-20 PROCEDURE — 99291 CRITICAL CARE FIRST HOUR: CPT | Performed by: EMERGENCY MEDICINE

## 2024-12-20 PROCEDURE — 82330 ASSAY OF CALCIUM: CPT | Performed by: NURSE PRACTITIONER

## 2024-12-20 PROCEDURE — 37799 UNLISTED PX VASCULAR SURGERY: CPT | Performed by: NURSE PRACTITIONER

## 2024-12-20 PROCEDURE — 2500000005 HC RX 250 GENERAL PHARMACY W/O HCPCS: Performed by: STUDENT IN AN ORGANIZED HEALTH CARE EDUCATION/TRAINING PROGRAM

## 2024-12-20 PROCEDURE — 2500000005 HC RX 250 GENERAL PHARMACY W/O HCPCS

## 2024-12-20 PROCEDURE — 85576 BLOOD PLATELET AGGREGATION: CPT | Performed by: EMERGENCY MEDICINE

## 2024-12-20 PROCEDURE — 2500000004 HC RX 250 GENERAL PHARMACY W/ HCPCS (ALT 636 FOR OP/ED): Performed by: EMERGENCY MEDICINE

## 2024-12-20 PROCEDURE — 85007 BL SMEAR W/DIFF WBC COUNT: CPT

## 2024-12-20 PROCEDURE — 99291 CRITICAL CARE FIRST HOUR: CPT | Performed by: PHYSICIAN ASSISTANT

## 2024-12-20 PROCEDURE — 2500000005 HC RX 250 GENERAL PHARMACY W/O HCPCS: Performed by: EMERGENCY MEDICINE

## 2024-12-20 PROCEDURE — 84100 ASSAY OF PHOSPHORUS: CPT | Performed by: NURSE PRACTITIONER

## 2024-12-20 PROCEDURE — 82947 ASSAY GLUCOSE BLOOD QUANT: CPT

## 2024-12-20 PROCEDURE — 2500000004 HC RX 250 GENERAL PHARMACY W/ HCPCS (ALT 636 FOR OP/ED): Performed by: STUDENT IN AN ORGANIZED HEALTH CARE EDUCATION/TRAINING PROGRAM

## 2024-12-20 PROCEDURE — 2500000004 HC RX 250 GENERAL PHARMACY W/ HCPCS (ALT 636 FOR OP/ED)

## 2024-12-20 PROCEDURE — 99232 SBSQ HOSP IP/OBS MODERATE 35: CPT | Performed by: INTERNAL MEDICINE

## 2024-12-20 PROCEDURE — 94003 VENT MGMT INPAT SUBQ DAY: CPT

## 2024-12-20 PROCEDURE — 37799 UNLISTED PX VASCULAR SURGERY: CPT | Performed by: EMERGENCY MEDICINE

## 2024-12-20 PROCEDURE — 90937 HEMODIALYSIS REPEATED EVAL: CPT

## 2024-12-20 PROCEDURE — 84132 ASSAY OF SERUM POTASSIUM: CPT

## 2024-12-20 PROCEDURE — 82248 BILIRUBIN DIRECT: CPT | Performed by: EMERGENCY MEDICINE

## 2024-12-20 PROCEDURE — 85610 PROTHROMBIN TIME: CPT | Performed by: EMERGENCY MEDICINE

## 2024-12-20 PROCEDURE — 85018 HEMOGLOBIN: CPT

## 2024-12-20 PROCEDURE — 83735 ASSAY OF MAGNESIUM: CPT

## 2024-12-20 RX ORDER — HEPARIN SODIUM 5000 [USP'U]/ML
INJECTION, SOLUTION INTRAVENOUS; SUBCUTANEOUS
Status: DISPENSED
Start: 2024-12-20 | End: 2024-12-20

## 2024-12-20 RX ORDER — DEXTROSE 50 % IN WATER (D50W) INTRAVENOUS SYRINGE
25 ONCE
Status: COMPLETED | OUTPATIENT
Start: 2024-12-20 | End: 2024-12-20

## 2024-12-20 RX ORDER — HEPARIN SODIUM 5000 [USP'U]/ML
5000 INJECTION, SOLUTION INTRAVENOUS; SUBCUTANEOUS EVERY 8 HOURS
Status: DISCONTINUED | OUTPATIENT
Start: 2024-12-20 | End: 2025-01-18 | Stop reason: HOSPADM

## 2024-12-20 RX ORDER — FENTANYL CITRATE-0.9 % NACL/PF 10 MCG/ML
25-75 PLASTIC BAG, INJECTION (ML) INTRAVENOUS CONTINUOUS
Status: DISCONTINUED | OUTPATIENT
Start: 2024-12-20 | End: 2024-12-24

## 2024-12-20 RX ORDER — FENTANYL CITRATE-0.9 % NACL/PF 10 MCG/ML
PLASTIC BAG, INJECTION (ML) INTRAVENOUS
Status: COMPLETED
Start: 2024-12-20 | End: 2024-12-20

## 2024-12-20 RX ORDER — PROPOFOL 10 MG/ML
0-50 INJECTION, EMULSION INTRAVENOUS CONTINUOUS
Status: DISCONTINUED | OUTPATIENT
Start: 2024-12-20 | End: 2024-12-24

## 2024-12-20 RX ORDER — DEXTROSE MONOHYDRATE 100 MG/ML
50 INJECTION, SOLUTION INTRAVENOUS CONTINUOUS
Status: DISPENSED | OUTPATIENT
Start: 2024-12-20 | End: 2024-12-21

## 2024-12-20 RX ORDER — MAGNESIUM SULFATE HEPTAHYDRATE 40 MG/ML
2 INJECTION, SOLUTION INTRAVENOUS ONCE
Status: COMPLETED | OUTPATIENT
Start: 2024-12-20 | End: 2024-12-20

## 2024-12-20 RX ADMIN — Medication 60 PERCENT: at 20:00

## 2024-12-20 RX ADMIN — Medication 60 PERCENT: at 07:50

## 2024-12-20 RX ADMIN — MAGNESIUM SULFATE HEPTAHYDRATE 2 G: 40 INJECTION, SOLUTION INTRAVENOUS at 06:29

## 2024-12-20 RX ADMIN — Medication 100 MCG/HR: at 10:11

## 2024-12-20 RX ADMIN — SODIUM PHOSPHATE, MONOBASIC, MONOHYDRATE AND SODIUM PHOSPHATE, DIBASIC, ANHYDROUS 15 MMOL: 142; 276 INJECTION, SOLUTION INTRAVENOUS at 12:56

## 2024-12-20 RX ADMIN — VASOPRESSIN 0.03 UNITS/MIN: 0.2 INJECTION INTRAVENOUS at 17:23

## 2024-12-20 RX ADMIN — VASOPRESSIN 0.03 UNITS/MIN: 0.2 INJECTION INTRAVENOUS at 05:18

## 2024-12-20 RX ADMIN — Medication 75 MCG/HR: at 17:36

## 2024-12-20 RX ADMIN — CALCIUM CHLORIDE, MAGNESIUM CHLORIDE, DEXTROSE MONOHYDRATE, LACTIC ACID, SODIUM CHLORIDE, SODIUM BICARBONATE AND POTASSIUM CHLORIDE 3000 ML/HR: 5.15; 2.03; 22; 5.4; 6.46; 3.09; .157 INJECTION INTRAVENOUS at 10:08

## 2024-12-20 RX ADMIN — CALCIUM CHLORIDE, MAGNESIUM CHLORIDE, DEXTROSE MONOHYDRATE, LACTIC ACID, SODIUM CHLORIDE, SODIUM BICARBONATE AND POTASSIUM CHLORIDE 3000 ML/HR: 5.15; 2.03; 22; 5.4; 6.46; 3.09; .157 INJECTION INTRAVENOUS at 17:41

## 2024-12-20 RX ADMIN — CALCIUM CHLORIDE, MAGNESIUM CHLORIDE, DEXTROSE MONOHYDRATE, LACTIC ACID, SODIUM CHLORIDE, SODIUM BICARBONATE AND POTASSIUM CHLORIDE 3000 ML/HR: 5.15; 2.03; 22; 5.4; 6.46; 3.09; .157 INJECTION INTRAVENOUS at 10:07

## 2024-12-20 RX ADMIN — CALCIUM CHLORIDE, MAGNESIUM CHLORIDE, DEXTROSE MONOHYDRATE, LACTIC ACID, SODIUM CHLORIDE, SODIUM BICARBONATE AND POTASSIUM CHLORIDE 3000 ML/HR: 5.15; 2.03; 22; 5.4; 6.46; 3.09; .157 INJECTION INTRAVENOUS at 13:01

## 2024-12-20 RX ADMIN — PROPOFOL 5 MCG/KG/MIN: 10 INJECTION, EMULSION INTRAVENOUS at 10:10

## 2024-12-20 RX ADMIN — CALCIUM CHLORIDE, MAGNESIUM CHLORIDE, DEXTROSE MONOHYDRATE, LACTIC ACID, SODIUM CHLORIDE, SODIUM BICARBONATE AND POTASSIUM CHLORIDE 3000 ML/HR: 5.15; 2.03; 22; 5.4; 6.46; 3.09; .157 INJECTION INTRAVENOUS at 13:02

## 2024-12-20 RX ADMIN — PIPERACILLIN SODIUM AND TAZOBACTAM SODIUM 3.38 G: 3; .375 INJECTION, SOLUTION INTRAVENOUS at 15:45

## 2024-12-20 RX ADMIN — HYDROCORTISONE SODIUM SUCCINATE 50 MG: 100 INJECTION, POWDER, FOR SOLUTION INTRAMUSCULAR; INTRAVENOUS at 10:27

## 2024-12-20 RX ADMIN — PANTOPRAZOLE SODIUM 40 MG: 40 INJECTION, POWDER, FOR SOLUTION INTRAVENOUS at 06:29

## 2024-12-20 RX ADMIN — Medication 100 MCG/HR: at 05:18

## 2024-12-20 RX ADMIN — CALCIUM CHLORIDE, MAGNESIUM CHLORIDE, DEXTROSE MONOHYDRATE, LACTIC ACID, SODIUM CHLORIDE, SODIUM BICARBONATE AND POTASSIUM CHLORIDE 3000 ML/HR: 5.15; 2.03; 22; 5.4; 6.46; 3.09; .157 INJECTION INTRAVENOUS at 04:34

## 2024-12-20 RX ADMIN — THIAMINE HYDROCHLORIDE 500 MG: 100 INJECTION, SOLUTION INTRAMUSCULAR; INTRAVENOUS at 08:22

## 2024-12-20 RX ADMIN — PROPOFOL 5 MCG/KG/MIN: 10 INJECTION, EMULSION INTRAVENOUS at 17:00

## 2024-12-20 RX ADMIN — DEXTROSE MONOHYDRATE 25 G: 25 INJECTION, SOLUTION INTRAVENOUS at 04:24

## 2024-12-20 RX ADMIN — CALCIUM CHLORIDE, MAGNESIUM CHLORIDE, DEXTROSE MONOHYDRATE, LACTIC ACID, SODIUM CHLORIDE, SODIUM BICARBONATE AND POTASSIUM CHLORIDE 3000 ML/HR: 5.15; 2.03; 22; 5.4; 6.46; 3.09; .157 INJECTION INTRAVENOUS at 04:33

## 2024-12-20 RX ADMIN — HYDROCORTISONE SODIUM SUCCINATE 50 MG: 100 INJECTION, POWDER, FOR SOLUTION INTRAMUSCULAR; INTRAVENOUS at 22:03

## 2024-12-20 RX ADMIN — HYDROCORTISONE SODIUM SUCCINATE 50 MG: 100 INJECTION, POWDER, FOR SOLUTION INTRAMUSCULAR; INTRAVENOUS at 04:24

## 2024-12-20 RX ADMIN — THIAMINE HYDROCHLORIDE 500 MG: 100 INJECTION, SOLUTION INTRAMUSCULAR; INTRAVENOUS at 00:21

## 2024-12-20 RX ADMIN — HYDROCORTISONE SODIUM SUCCINATE 50 MG: 100 INJECTION, POWDER, FOR SOLUTION INTRAMUSCULAR; INTRAVENOUS at 15:45

## 2024-12-20 RX ADMIN — PIPERACILLIN SODIUM AND TAZOBACTAM SODIUM 3.38 G: 3; .375 INJECTION, SOLUTION INTRAVENOUS at 10:27

## 2024-12-20 RX ADMIN — CALCIUM CHLORIDE, MAGNESIUM CHLORIDE, DEXTROSE MONOHYDRATE, LACTIC ACID, SODIUM CHLORIDE, SODIUM BICARBONATE AND POTASSIUM CHLORIDE 3000 ML/HR: 5.15; 2.03; 22; 5.4; 6.46; 3.09; .157 INJECTION INTRAVENOUS at 17:42

## 2024-12-20 RX ADMIN — CALCIUM CHLORIDE, MAGNESIUM CHLORIDE, DEXTROSE MONOHYDRATE, LACTIC ACID, SODIUM CHLORIDE, SODIUM BICARBONATE AND POTASSIUM CHLORIDE 3000 ML/HR: 5.15; 2.03; 22; 5.4; 6.46; 3.09; .157 INJECTION INTRAVENOUS at 10:06

## 2024-12-20 RX ADMIN — DEXTROSE MONOHYDRATE 50 ML/HR: 100 INJECTION, SOLUTION INTRAVENOUS at 15:17

## 2024-12-20 RX ADMIN — PROPOFOL 5 MCG/KG/MIN: 10 INJECTION, EMULSION INTRAVENOUS at 05:18

## 2024-12-20 RX ADMIN — CALCIUM CHLORIDE, MAGNESIUM CHLORIDE, DEXTROSE MONOHYDRATE, LACTIC ACID, SODIUM CHLORIDE, SODIUM BICARBONATE AND POTASSIUM CHLORIDE 3000 ML/HR: 5.15; 2.03; 22; 5.4; 6.46; 3.09; .157 INJECTION INTRAVENOUS at 13:03

## 2024-12-20 RX ADMIN — Medication 0.17 MCG/KG/MIN: at 09:28

## 2024-12-20 RX ADMIN — DEXTROSE MONOHYDRATE 50 ML/HR: 100 INJECTION, SOLUTION INTRAVENOUS at 21:00

## 2024-12-20 RX ADMIN — CALCIUM CHLORIDE, MAGNESIUM CHLORIDE, DEXTROSE MONOHYDRATE, LACTIC ACID, SODIUM CHLORIDE, SODIUM BICARBONATE AND POTASSIUM CHLORIDE 3000 ML/HR: 5.15; 2.03; 22; 5.4; 6.46; 3.09; .157 INJECTION INTRAVENOUS at 17:40

## 2024-12-20 RX ADMIN — CALCIUM CHLORIDE, MAGNESIUM CHLORIDE, DEXTROSE MONOHYDRATE, LACTIC ACID, SODIUM CHLORIDE, SODIUM BICARBONATE AND POTASSIUM CHLORIDE 3000 ML/HR: 5.15; 2.03; 22; 5.4; 6.46; 3.09; .157 INJECTION INTRAVENOUS at 04:35

## 2024-12-20 RX ADMIN — Medication 0.11 MCG/KG/MIN: at 21:00

## 2024-12-20 RX ADMIN — PIPERACILLIN SODIUM AND TAZOBACTAM SODIUM 3.38 G: 3; .375 INJECTION, SOLUTION INTRAVENOUS at 04:24

## 2024-12-20 RX ADMIN — HEPARIN SODIUM 5000 UNITS: 5000 INJECTION INTRAVENOUS; SUBCUTANEOUS at 17:01

## 2024-12-20 RX ADMIN — PIPERACILLIN SODIUM AND TAZOBACTAM SODIUM 3.38 G: 3; .375 INJECTION, SOLUTION INTRAVENOUS at 22:03

## 2024-12-20 RX ADMIN — DEXTROSE MONOHYDRATE 50 ML/HR: 100 INJECTION, SOLUTION INTRAVENOUS at 10:27

## 2024-12-20 ASSESSMENT — PAIN - FUNCTIONAL ASSESSMENT
PAIN_FUNCTIONAL_ASSESSMENT: CPOT (CRITICAL CARE PAIN OBSERVATION TOOL)

## 2024-12-20 ASSESSMENT — PAIN SCALES - GENERAL: PAINLEVEL_OUTOF10: 0 - NO PAIN
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
## 2024-12-20 NOTE — PROGRESS NOTES
Renal Staff CVVH Note    Patient seen, examined on CVVH  Currently filter changed  No significant filter issues overnight  Urine output, none recorded    Effluent bag clear  Access R int jug    Sedated, intubated, F1O2 60  Pressor requirement continued    Electrolytes, acid base acceptable  Medications reviewed    No CVVH script change  Fluid removal per primary team

## 2024-12-20 NOTE — PROGRESS NOTES
Occupational Therapy                 Therapy Communication Note    Patient Name: Ike Gar  MRN: [id number]  Department: [facility] TSICU  Room: 16/16-A  Today's Date: 12/20/2024     Discipline: Occupational Therapy          Missed Visit Reason: Missed Visit Reason:  (902, pt remains medically tenuous, pending spinal clearance/TLSO)    Missed Time: Attempt    Comment:
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
## 2024-12-21 ENCOUNTER — ANESTHESIA EVENT (OUTPATIENT)
Dept: OPERATING ROOM | Facility: HOSPITAL | Age: 79
End: 2024-12-21
Payer: MEDICARE

## 2024-12-21 ENCOUNTER — APPOINTMENT (OUTPATIENT)
Dept: RADIOLOGY | Facility: HOSPITAL | Age: 79
End: 2024-12-21
Payer: MEDICARE

## 2024-12-21 ENCOUNTER — ANESTHESIA (OUTPATIENT)
Dept: OPERATING ROOM | Facility: HOSPITAL | Age: 79
End: 2024-12-21
Payer: MEDICARE

## 2024-12-21 PROBLEM — N17.9 ACUTE KIDNEY INJURY (NONTRAUMATIC) (CMS-HCC): Status: ACTIVE | Noted: 2024-12-21

## 2024-12-21 PROBLEM — R79.89 ELEVATED LIVER FUNCTION TESTS: Status: ACTIVE | Noted: 2024-12-21

## 2024-12-21 LAB
ABO GROUP (TYPE) IN BLOOD: NORMAL
ALBUMIN SERPL BCP-MCNC: 2.4 G/DL (ref 3.4–5)
ALBUMIN SERPL BCP-MCNC: 2.7 G/DL (ref 3.4–5)
ANION GAP BLDA CALCULATED.4IONS-SCNC: 7 MMO/L (ref 10–25)
ANION GAP BLDA CALCULATED.4IONS-SCNC: 7 MMO/L (ref 10–25)
ANION GAP BLDA CALCULATED.4IONS-SCNC: 8 MMO/L (ref 10–25)
ANION GAP BLDA CALCULATED.4IONS-SCNC: ABNORMAL MMOL/L
ANION GAP SERPL CALC-SCNC: 12 MMOL/L (ref 10–20)
ANION GAP SERPL CALC-SCNC: 13 MMOL/L (ref 10–20)
ANTIBODY SCREEN: NORMAL
BASE EXCESS BLDA CALC-SCNC: -0.8 MMOL/L (ref -2–3)
BASE EXCESS BLDA CALC-SCNC: -0.8 MMOL/L (ref -2–3)
BASE EXCESS BLDA CALC-SCNC: -0.9 MMOL/L (ref -2–3)
BASE EXCESS BLDA CALC-SCNC: 1.3 MMOL/L (ref -2–3)
BLOOD EXPIRATION DATE: NORMAL
BLOOD EXPIRATION DATE: NORMAL
BODY TEMPERATURE: 37 DEGREES CELSIUS
BUN SERPL-MCNC: 22 MG/DL (ref 6–23)
BUN SERPL-MCNC: 24 MG/DL (ref 6–23)
CA-I BLD-SCNC: 1.27 MMOL/L (ref 1.1–1.33)
CA-I BLD-SCNC: 1.37 MMOL/L (ref 1.1–1.33)
CA-I BLDA-SCNC: 1.24 MMOL/L (ref 1.1–1.33)
CA-I BLDA-SCNC: 1.25 MMOL/L (ref 1.1–1.33)
CA-I BLDA-SCNC: 1.3 MMOL/L (ref 1.1–1.33)
CA-I BLDA-SCNC: 1.32 MMOL/L (ref 1.1–1.33)
CALCIUM SERPL-MCNC: 8.3 MG/DL (ref 8.6–10.6)
CALCIUM SERPL-MCNC: 8.6 MG/DL (ref 8.6–10.6)
CHLORIDE BLDA-SCNC: 100 MMOL/L (ref 98–107)
CHLORIDE BLDA-SCNC: 101 MMOL/L (ref 98–107)
CHLORIDE BLDA-SCNC: 102 MMOL/L (ref 98–107)
CHLORIDE BLDA-SCNC: ABNORMAL MMOL/L
CHLORIDE SERPL-SCNC: 99 MMOL/L (ref 98–107)
CHLORIDE SERPL-SCNC: 99 MMOL/L (ref 98–107)
CO2 SERPL-SCNC: 25 MMOL/L (ref 21–32)
CO2 SERPL-SCNC: 26 MMOL/L (ref 21–32)
CREAT SERPL-MCNC: 2.12 MG/DL (ref 0.5–1.3)
CREAT SERPL-MCNC: 2.31 MG/DL (ref 0.5–1.3)
DISPENSE STATUS: NORMAL
DISPENSE STATUS: NORMAL
EGFRCR SERPLBLD CKD-EPI 2021: 29 ML/MIN/1.73M*2
EGFRCR SERPLBLD CKD-EPI 2021: 32 ML/MIN/1.73M*2
ERYTHROCYTE [DISTWIDTH] IN BLOOD BY AUTOMATED COUNT: 14.1 % (ref 11.5–14.5)
ERYTHROCYTE [DISTWIDTH] IN BLOOD BY AUTOMATED COUNT: 14.2 % (ref 11.5–14.5)
GLUCOSE BLD MANUAL STRIP-MCNC: 114 MG/DL (ref 74–99)
GLUCOSE BLD MANUAL STRIP-MCNC: 122 MG/DL (ref 74–99)
GLUCOSE BLD MANUAL STRIP-MCNC: 54 MG/DL (ref 74–99)
GLUCOSE BLD MANUAL STRIP-MCNC: 89 MG/DL (ref 74–99)
GLUCOSE BLD MANUAL STRIP-MCNC: 93 MG/DL (ref 74–99)
GLUCOSE BLD MANUAL STRIP-MCNC: 97 MG/DL (ref 74–99)
GLUCOSE BLDA-MCNC: 116 MG/DL (ref 74–99)
GLUCOSE BLDA-MCNC: 117 MG/DL (ref 74–99)
GLUCOSE BLDA-MCNC: 118 MG/DL (ref 74–99)
GLUCOSE BLDA-MCNC: 119 MG/DL (ref 74–99)
GLUCOSE SERPL-MCNC: 117 MG/DL (ref 74–99)
GLUCOSE SERPL-MCNC: 123 MG/DL (ref 74–99)
HCO3 BLDA-SCNC: 24.3 MMOL/L (ref 22–26)
HCO3 BLDA-SCNC: 24.3 MMOL/L (ref 22–26)
HCO3 BLDA-SCNC: 24.8 MMOL/L (ref 22–26)
HCO3 BLDA-SCNC: 25.9 MMOL/L (ref 22–26)
HCT VFR BLD AUTO: 20.3 % (ref 41–52)
HCT VFR BLD AUTO: 22.4 % (ref 41–52)
HCT VFR BLD EST: 21 % (ref 41–52)
HCT VFR BLD EST: 22 % (ref 41–52)
HCT VFR BLD EST: 23 % (ref 41–52)
HCT VFR BLD EST: 24 % (ref 41–52)
HGB BLD-MCNC: 7.2 G/DL (ref 13.5–17.5)
HGB BLD-MCNC: 7.8 G/DL (ref 13.5–17.5)
HGB BLDA-MCNC: 6.9 G/DL (ref 13.5–17.5)
HGB BLDA-MCNC: 7.3 G/DL (ref 13.5–17.5)
HGB BLDA-MCNC: 7.8 G/DL (ref 13.5–17.5)
HGB BLDA-MCNC: 8 G/DL (ref 13.5–17.5)
INHALED O2 CONCENTRATION: 100 %
INHALED O2 CONCENTRATION: 100 %
INHALED O2 CONCENTRATION: 50 %
INHALED O2 CONCENTRATION: 60 %
LACTATE BLDA-SCNC: 1.6 MMOL/L (ref 0.4–2)
LACTATE BLDA-SCNC: 1.6 MMOL/L (ref 0.4–2)
LACTATE BLDA-SCNC: 1.7 MMOL/L (ref 0.4–2)
LACTATE BLDA-SCNC: 2.2 MMOL/L (ref 0.4–2)
MAGNESIUM SERPL-MCNC: 2 MG/DL (ref 1.6–2.4)
MAGNESIUM SERPL-MCNC: 2.04 MG/DL (ref 1.6–2.4)
MCH RBC QN AUTO: 28.2 PG (ref 26–34)
MCH RBC QN AUTO: 28.7 PG (ref 26–34)
MCHC RBC AUTO-ENTMCNC: 34.8 G/DL (ref 32–36)
MCHC RBC AUTO-ENTMCNC: 35.5 G/DL (ref 32–36)
MCV RBC AUTO: 81 FL (ref 80–100)
MCV RBC AUTO: 81 FL (ref 80–100)
NRBC BLD-RTO: 0.4 /100 WBCS (ref 0–0)
NRBC BLD-RTO: 0.8 /100 WBCS (ref 0–0)
OXYHGB MFR BLDA: 96.9 % (ref 94–98)
OXYHGB MFR BLDA: 97.2 % (ref 94–98)
OXYHGB MFR BLDA: 97.4 % (ref 94–98)
OXYHGB MFR BLDA: 97.7 % (ref 94–98)
PCO2 BLDA: 40 MM HG (ref 38–42)
PCO2 BLDA: 41 MM HG (ref 38–42)
PCO2 BLDA: 42 MM HG (ref 38–42)
PCO2 BLDA: 45 MM HG (ref 38–42)
PH BLDA: 7.35 PH (ref 7.38–7.42)
PH BLDA: 7.37 PH (ref 7.38–7.42)
PH BLDA: 7.38 PH (ref 7.38–7.42)
PH BLDA: 7.42 PH (ref 7.38–7.42)
PHOSPHATE SERPL-MCNC: 1.7 MG/DL (ref 2.5–4.9)
PHOSPHATE SERPL-MCNC: 2.2 MG/DL (ref 2.5–4.9)
PLATELET # BLD AUTO: 42 X10*3/UL (ref 150–450)
PLATELET # BLD AUTO: 46 X10*3/UL (ref 150–450)
PO2 BLDA: 130 MM HG (ref 85–95)
PO2 BLDA: 139 MM HG (ref 85–95)
PO2 BLDA: 145 MM HG (ref 85–95)
PO2 BLDA: 150 MM HG (ref 85–95)
POTASSIUM BLDA-SCNC: 3.4 MMOL/L (ref 3.5–5.3)
POTASSIUM BLDA-SCNC: 3.7 MMOL/L (ref 3.5–5.3)
POTASSIUM BLDA-SCNC: 3.7 MMOL/L (ref 3.5–5.3)
POTASSIUM BLDA-SCNC: 3.8 MMOL/L (ref 3.5–5.3)
POTASSIUM SERPL-SCNC: 3.5 MMOL/L (ref 3.5–5.3)
POTASSIUM SERPL-SCNC: 3.6 MMOL/L (ref 3.5–5.3)
PRODUCT BLOOD TYPE: 5100
PRODUCT BLOOD TYPE: 5100
PRODUCT CODE: NORMAL
PRODUCT CODE: NORMAL
RBC # BLD AUTO: 2.51 X10*6/UL (ref 4.5–5.9)
RBC # BLD AUTO: 2.77 X10*6/UL (ref 4.5–5.9)
RH FACTOR (ANTIGEN D): NORMAL
SAO2 % BLDA: 100 % (ref 94–100)
SAO2 % BLDA: 99 % (ref 94–100)
SODIUM BLDA-SCNC: 129 MMOL/L (ref 136–145)
SODIUM BLDA-SCNC: 130 MMOL/L (ref 136–145)
SODIUM BLDA-SCNC: 130 MMOL/L (ref 136–145)
SODIUM BLDA-SCNC: 133 MMOL/L (ref 136–145)
SODIUM SERPL-SCNC: 133 MMOL/L (ref 136–145)
SODIUM SERPL-SCNC: 133 MMOL/L (ref 136–145)
UNIT ABO: NORMAL
UNIT ABO: NORMAL
UNIT NUMBER: NORMAL
UNIT NUMBER: NORMAL
UNIT RH: NORMAL
UNIT RH: NORMAL
UNIT VOLUME: 290
UNIT VOLUME: 350
WBC # BLD AUTO: 28.9 X10*3/UL (ref 4.4–11.3)
WBC # BLD AUTO: 33.4 X10*3/UL (ref 4.4–11.3)
XM INTEP: NORMAL
XM INTEP: NORMAL

## 2024-12-21 PROCEDURE — 71045 X-RAY EXAM CHEST 1 VIEW: CPT | Performed by: RADIOLOGY

## 2024-12-21 PROCEDURE — 83735 ASSAY OF MAGNESIUM: CPT | Performed by: NURSE PRACTITIONER

## 2024-12-21 PROCEDURE — 2500000004 HC RX 250 GENERAL PHARMACY W/ HCPCS (ALT 636 FOR OP/ED): Performed by: PHYSICIAN ASSISTANT

## 2024-12-21 PROCEDURE — 2500000005 HC RX 250 GENERAL PHARMACY W/O HCPCS: Performed by: PHYSICIAN ASSISTANT

## 2024-12-21 PROCEDURE — 37799 UNLISTED PX VASCULAR SURGERY: CPT | Performed by: STUDENT IN AN ORGANIZED HEALTH CARE EDUCATION/TRAINING PROGRAM

## 2024-12-21 PROCEDURE — 84295 ASSAY OF SERUM SODIUM: CPT

## 2024-12-21 PROCEDURE — 2500000005 HC RX 250 GENERAL PHARMACY W/O HCPCS: Performed by: NURSE PRACTITIONER

## 2024-12-21 PROCEDURE — 97606 NEG PRS WND THER DME>50 SQCM: CPT | Performed by: STUDENT IN AN ORGANIZED HEALTH CARE EDUCATION/TRAINING PROGRAM

## 2024-12-21 PROCEDURE — 37799 UNLISTED PX VASCULAR SURGERY: CPT | Performed by: NURSE PRACTITIONER

## 2024-12-21 PROCEDURE — 3600000008 HC OR TIME - EACH INCREMENTAL 1 MINUTE - PROCEDURE LEVEL THREE: Performed by: STUDENT IN AN ORGANIZED HEALTH CARE EDUCATION/TRAINING PROGRAM

## 2024-12-21 PROCEDURE — 2500000004 HC RX 250 GENERAL PHARMACY W/ HCPCS (ALT 636 FOR OP/ED)

## 2024-12-21 PROCEDURE — 2500000004 HC RX 250 GENERAL PHARMACY W/ HCPCS (ALT 636 FOR OP/ED): Mod: JZ

## 2024-12-21 PROCEDURE — 3700000002 HC GENERAL ANESTHESIA TIME - EACH INCREMENTAL 1 MINUTE: Performed by: STUDENT IN AN ORGANIZED HEALTH CARE EDUCATION/TRAINING PROGRAM

## 2024-12-21 PROCEDURE — 86923 COMPATIBILITY TEST ELECTRIC: CPT

## 2024-12-21 PROCEDURE — 85027 COMPLETE CBC AUTOMATED: CPT | Performed by: PHYSICIAN ASSISTANT

## 2024-12-21 PROCEDURE — 2500000005 HC RX 250 GENERAL PHARMACY W/O HCPCS: Performed by: EMERGENCY MEDICINE

## 2024-12-21 PROCEDURE — 0DBU0ZZ EXCISION OF OMENTUM, OPEN APPROACH: ICD-10-PCS | Performed by: STUDENT IN AN ORGANIZED HEALTH CARE EDUCATION/TRAINING PROGRAM

## 2024-12-21 PROCEDURE — 90937 HEMODIALYSIS REPEATED EVAL: CPT

## 2024-12-21 PROCEDURE — 80069 RENAL FUNCTION PANEL: CPT | Performed by: NURSE PRACTITIONER

## 2024-12-21 PROCEDURE — 2720000007 HC OR 272 NO HCPCS: Performed by: STUDENT IN AN ORGANIZED HEALTH CARE EDUCATION/TRAINING PROGRAM

## 2024-12-21 PROCEDURE — 94003 VENT MGMT INPAT SUBQ DAY: CPT

## 2024-12-21 PROCEDURE — 49002 REOPENING OF ABDOMEN: CPT | Performed by: STUDENT IN AN ORGANIZED HEALTH CARE EDUCATION/TRAINING PROGRAM

## 2024-12-21 PROCEDURE — 2500000004 HC RX 250 GENERAL PHARMACY W/ HCPCS (ALT 636 FOR OP/ED): Performed by: EMERGENCY MEDICINE

## 2024-12-21 PROCEDURE — 71045 X-RAY EXAM CHEST 1 VIEW: CPT

## 2024-12-21 PROCEDURE — 83735 ASSAY OF MAGNESIUM: CPT | Performed by: PHYSICIAN ASSISTANT

## 2024-12-21 PROCEDURE — 88304 TISSUE EXAM BY PATHOLOGIST: CPT | Mod: TC,SUR | Performed by: STUDENT IN AN ORGANIZED HEALTH CARE EDUCATION/TRAINING PROGRAM

## 2024-12-21 PROCEDURE — 3600000003 HC OR TIME - INITIAL BASE CHARGE - PROCEDURE LEVEL THREE: Performed by: STUDENT IN AN ORGANIZED HEALTH CARE EDUCATION/TRAINING PROGRAM

## 2024-12-21 PROCEDURE — 85027 COMPLETE CBC AUTOMATED: CPT | Performed by: NURSE PRACTITIONER

## 2024-12-21 PROCEDURE — 86901 BLOOD TYPING SEROLOGIC RH(D): CPT | Performed by: STUDENT IN AN ORGANIZED HEALTH CARE EDUCATION/TRAINING PROGRAM

## 2024-12-21 PROCEDURE — 84132 ASSAY OF SERUM POTASSIUM: CPT | Performed by: PHYSICIAN ASSISTANT

## 2024-12-21 PROCEDURE — 2500000005 HC RX 250 GENERAL PHARMACY W/O HCPCS

## 2024-12-21 PROCEDURE — 2020000001 HC ICU ROOM DAILY

## 2024-12-21 PROCEDURE — 99291 CRITICAL CARE FIRST HOUR: CPT | Performed by: PHYSICIAN ASSISTANT

## 2024-12-21 PROCEDURE — 2500000004 HC RX 250 GENERAL PHARMACY W/ HCPCS (ALT 636 FOR OP/ED): Performed by: NURSE PRACTITIONER

## 2024-12-21 PROCEDURE — 99233 SBSQ HOSP IP/OBS HIGH 50: CPT | Performed by: STUDENT IN AN ORGANIZED HEALTH CARE EDUCATION/TRAINING PROGRAM

## 2024-12-21 PROCEDURE — 2500000004 HC RX 250 GENERAL PHARMACY W/ HCPCS (ALT 636 FOR OP/ED): Performed by: STUDENT IN AN ORGANIZED HEALTH CARE EDUCATION/TRAINING PROGRAM

## 2024-12-21 PROCEDURE — 88304 TISSUE EXAM BY PATHOLOGIST: CPT | Performed by: PATHOLOGY

## 2024-12-21 PROCEDURE — P9045 ALBUMIN (HUMAN), 5%, 250 ML: HCPCS | Mod: JZ

## 2024-12-21 PROCEDURE — 82330 ASSAY OF CALCIUM: CPT | Performed by: NURSE PRACTITIONER

## 2024-12-21 PROCEDURE — 82947 ASSAY GLUCOSE BLOOD QUANT: CPT

## 2024-12-21 PROCEDURE — 99232 SBSQ HOSP IP/OBS MODERATE 35: CPT | Performed by: INTERNAL MEDICINE

## 2024-12-21 PROCEDURE — 3700000001 HC GENERAL ANESTHESIA TIME - INITIAL BASE CHARGE: Performed by: STUDENT IN AN ORGANIZED HEALTH CARE EDUCATION/TRAINING PROGRAM

## 2024-12-21 PROCEDURE — 82330 ASSAY OF CALCIUM: CPT

## 2024-12-21 RX ORDER — NOREPINEPHRINE BITARTRATE 1 MG/ML
INJECTION, SOLUTION INTRAVENOUS
Status: COMPLETED
Start: 2024-12-21 | End: 2024-12-21

## 2024-12-21 RX ORDER — PROPOFOL 10 MG/ML
INJECTION, EMULSION INTRAVENOUS CONTINUOUS PRN
Status: DISCONTINUED | OUTPATIENT
Start: 2024-12-21 | End: 2024-12-21

## 2024-12-21 RX ORDER — ROCURONIUM BROMIDE 10 MG/ML
INJECTION, SOLUTION INTRAVENOUS AS NEEDED
Status: DISCONTINUED | OUTPATIENT
Start: 2024-12-21 | End: 2024-12-21

## 2024-12-21 RX ORDER — FENTANYL CITRATE-0.9 % NACL/PF 10 MCG/ML
PLASTIC BAG, INJECTION (ML) INTRAVENOUS
Status: COMPLETED
Start: 2024-12-21 | End: 2024-12-21

## 2024-12-21 RX ORDER — ALBUMIN HUMAN 250 G/1000ML
SOLUTION INTRAVENOUS CONTINUOUS PRN
Status: DISCONTINUED | OUTPATIENT
Start: 2024-12-21 | End: 2024-12-21

## 2024-12-21 RX ORDER — HEPARIN SODIUM 1000 [USP'U]/ML
INJECTION, SOLUTION INTRAVENOUS; SUBCUTANEOUS
Status: DISPENSED
Start: 2024-12-21 | End: 2024-12-21

## 2024-12-21 RX ORDER — FENTANYL CITRATE 50 UG/ML
INJECTION, SOLUTION INTRAMUSCULAR; INTRAVENOUS AS NEEDED
Status: DISCONTINUED | OUTPATIENT
Start: 2024-12-21 | End: 2024-12-21

## 2024-12-21 RX ORDER — DEXTROSE MONOHYDRATE 100 MG/ML
50 INJECTION, SOLUTION INTRAVENOUS CONTINUOUS
Status: DISPENSED | OUTPATIENT
Start: 2024-12-21 | End: 2024-12-22

## 2024-12-21 RX ORDER — ALBUMIN HUMAN 50 G/1000ML
SOLUTION INTRAVENOUS AS NEEDED
Status: DISCONTINUED | OUTPATIENT
Start: 2024-12-21 | End: 2024-12-21

## 2024-12-21 RX ADMIN — CALCIUM CHLORIDE, MAGNESIUM CHLORIDE, DEXTROSE MONOHYDRATE, LACTIC ACID, SODIUM CHLORIDE, SODIUM BICARBONATE AND POTASSIUM CHLORIDE 25 ML/KG/HR: 3.68; 3.05; 22; 5.4; 6.46; 3.09; .314 INJECTION INTRAVENOUS at 15:10

## 2024-12-21 RX ADMIN — CALCIUM CHLORIDE, MAGNESIUM CHLORIDE, DEXTROSE MONOHYDRATE, LACTIC ACID, SODIUM CHLORIDE, SODIUM BICARBONATE AND POTASSIUM CHLORIDE 25 ML/KG/HR: 3.68; 3.05; 22; 5.4; 6.46; 3.09; .314 INJECTION INTRAVENOUS at 10:30

## 2024-12-21 RX ADMIN — CALCIUM CHLORIDE, MAGNESIUM CHLORIDE, DEXTROSE MONOHYDRATE, LACTIC ACID, SODIUM CHLORIDE, SODIUM BICARBONATE AND POTASSIUM CHLORIDE 3000 ML/HR: 5.15; 2.03; 22; 5.4; 6.46; 3.09; .157 INJECTION INTRAVENOUS at 06:01

## 2024-12-21 RX ADMIN — HEPARIN SODIUM 5000 UNITS: 5000 INJECTION INTRAVENOUS; SUBCUTANEOUS at 10:47

## 2024-12-21 RX ADMIN — CALCIUM CHLORIDE, MAGNESIUM CHLORIDE, DEXTROSE MONOHYDRATE, LACTIC ACID, SODIUM CHLORIDE, SODIUM BICARBONATE AND POTASSIUM CHLORIDE 25 ML/KG/HR: 3.68; 3.05; 22; 5.4; 6.46; 3.09; .314 INJECTION INTRAVENOUS at 15:14

## 2024-12-21 RX ADMIN — VASOPRESSIN 0.03 UNITS/MIN: 0.2 INJECTION INTRAVENOUS at 03:16

## 2024-12-21 RX ADMIN — PIPERACILLIN SODIUM AND TAZOBACTAM SODIUM 3.38 G: 3; .375 INJECTION, SOLUTION INTRAVENOUS at 22:35

## 2024-12-21 RX ADMIN — DEXTROSE MONOHYDRATE 50 ML/HR: 100 INJECTION, SOLUTION INTRAVENOUS at 16:06

## 2024-12-21 RX ADMIN — Medication 125 MCG/HR: at 03:19

## 2024-12-21 RX ADMIN — PROPOFOL 5 MCG/KG/MIN: 10 INJECTION, EMULSION INTRAVENOUS at 06:44

## 2024-12-21 RX ADMIN — DEXTROSE MONOHYDRATE 50 ML/HR: 100 INJECTION, SOLUTION INTRAVENOUS at 10:41

## 2024-12-21 RX ADMIN — PIPERACILLIN SODIUM AND TAZOBACTAM SODIUM 3.38 G: 3; .375 INJECTION, SOLUTION INTRAVENOUS at 04:45

## 2024-12-21 RX ADMIN — Medication 175 MCG/HR: at 23:57

## 2024-12-21 RX ADMIN — PIPERACILLIN SODIUM AND TAZOBACTAM SODIUM 3.38 G: 3; .375 INJECTION, SOLUTION INTRAVENOUS at 16:07

## 2024-12-21 RX ADMIN — PIPERACILLIN SODIUM AND TAZOBACTAM SODIUM 3.38 G: 3; .375 INJECTION, SOLUTION INTRAVENOUS at 10:16

## 2024-12-21 RX ADMIN — CALCIUM CHLORIDE, MAGNESIUM CHLORIDE, DEXTROSE MONOHYDRATE, LACTIC ACID, SODIUM CHLORIDE, SODIUM BICARBONATE AND POTASSIUM CHLORIDE 3000 ML/HR: 5.15; 2.03; 22; 5.4; 6.46; 3.09; .157 INJECTION INTRAVENOUS at 00:25

## 2024-12-21 RX ADMIN — CALCIUM CHLORIDE, MAGNESIUM CHLORIDE, DEXTROSE MONOHYDRATE, LACTIC ACID, SODIUM CHLORIDE, SODIUM BICARBONATE AND POTASSIUM CHLORIDE 3000 ML/HR: 5.15; 2.03; 22; 5.4; 6.46; 3.09; .157 INJECTION INTRAVENOUS at 05:00

## 2024-12-21 RX ADMIN — DEXTROSE MONOHYDRATE 50 ML/HR: 100 INJECTION, SOLUTION INTRAVENOUS at 20:46

## 2024-12-21 RX ADMIN — NOREPINEPHRINE BITARTRATE 8000 MCG: 1 SOLUTION INTRAVENOUS at 23:24

## 2024-12-21 RX ADMIN — HYDROCORTISONE SODIUM SUCCINATE 50 MG: 100 INJECTION, POWDER, FOR SOLUTION INTRAMUSCULAR; INTRAVENOUS at 22:35

## 2024-12-21 RX ADMIN — Medication 100 MCG/HR: at 14:04

## 2024-12-21 RX ADMIN — CALCIUM CHLORIDE, MAGNESIUM CHLORIDE, DEXTROSE MONOHYDRATE, LACTIC ACID, SODIUM CHLORIDE, SODIUM BICARBONATE AND POTASSIUM CHLORIDE 3000 ML/HR: 5.15; 2.03; 22; 5.4; 6.46; 3.09; .157 INJECTION INTRAVENOUS at 09:39

## 2024-12-21 RX ADMIN — CALCIUM CHLORIDE, MAGNESIUM CHLORIDE, DEXTROSE MONOHYDRATE, LACTIC ACID, SODIUM CHLORIDE, SODIUM BICARBONATE AND POTASSIUM CHLORIDE 3000 ML/HR: 5.15; 2.03; 22; 5.4; 6.46; 3.09; .157 INJECTION INTRAVENOUS at 09:38

## 2024-12-21 RX ADMIN — HEPARIN SODIUM 5000 UNITS: 5000 INJECTION INTRAVENOUS; SUBCUTANEOUS at 17:49

## 2024-12-21 RX ADMIN — CALCIUM CHLORIDE, MAGNESIUM CHLORIDE, DEXTROSE MONOHYDRATE, LACTIC ACID, SODIUM CHLORIDE, SODIUM BICARBONATE AND POTASSIUM CHLORIDE 3000 ML/HR: 5.15; 2.03; 22; 5.4; 6.46; 3.09; .157 INJECTION INTRAVENOUS at 05:01

## 2024-12-21 RX ADMIN — DEXTROSE MONOHYDRATE 12.5 G: 25 INJECTION, SOLUTION INTRAVENOUS at 08:10

## 2024-12-21 RX ADMIN — CALCIUM CHLORIDE, MAGNESIUM CHLORIDE, DEXTROSE MONOHYDRATE, LACTIC ACID, SODIUM CHLORIDE, SODIUM BICARBONATE AND POTASSIUM CHLORIDE 3000 ML/HR: 5.15; 2.03; 22; 5.4; 6.46; 3.09; .157 INJECTION INTRAVENOUS at 00:26

## 2024-12-21 RX ADMIN — DEXTROSE MONOHYDRATE 50 ML/HR: 100 INJECTION, SOLUTION INTRAVENOUS at 08:48

## 2024-12-21 RX ADMIN — DEXTROSE MONOHYDRATE 50 ML/HR: 100 INJECTION, SOLUTION INTRAVENOUS at 04:45

## 2024-12-21 RX ADMIN — CALCIUM CHLORIDE, MAGNESIUM CHLORIDE, DEXTROSE MONOHYDRATE, LACTIC ACID, SODIUM CHLORIDE, SODIUM BICARBONATE AND POTASSIUM CHLORIDE 25 ML/KG/HR: 3.68; 3.05; 22; 5.4; 6.46; 3.09; .314 INJECTION INTRAVENOUS at 21:34

## 2024-12-21 RX ADMIN — CALCIUM CHLORIDE, MAGNESIUM CHLORIDE, DEXTROSE MONOHYDRATE, LACTIC ACID, SODIUM CHLORIDE, SODIUM BICARBONATE AND POTASSIUM CHLORIDE 25 ML/KG/HR: 3.68; 3.05; 22; 5.4; 6.46; 3.09; .314 INJECTION INTRAVENOUS at 10:28

## 2024-12-21 RX ADMIN — CALCIUM CHLORIDE, MAGNESIUM CHLORIDE, DEXTROSE MONOHYDRATE, LACTIC ACID, SODIUM CHLORIDE, SODIUM BICARBONATE AND POTASSIUM CHLORIDE 3000 ML/HR: 5.15; 2.03; 22; 5.4; 6.46; 3.09; .157 INJECTION INTRAVENOUS at 09:35

## 2024-12-21 RX ADMIN — HEPARIN SODIUM 5000 UNITS: 5000 INJECTION INTRAVENOUS; SUBCUTANEOUS at 02:33

## 2024-12-21 RX ADMIN — ASCORBIC ACID, VITAMIN A PALMITATE, CHOLECALCIFEROL, THIAMINE HYDROCHLORIDE, RIBOFLAVIN-5 PHOSPHATE SODIUM, PYRIDOXINE HYDROCHLORIDE, NIACINAMIDE, DEXPANTHENOL, ALPHA-TOCOPHEROL ACETATE, VITAMIN K1, FOLIC ACID, BIOTIN, CYANOCOBALAMIN: 200; 3300; 200; 6; 3.6; 6; 40; 15; 10; 150; 600; 60; 5 INJECTION, SOLUTION INTRAVENOUS at 20:46

## 2024-12-21 RX ADMIN — Medication 0.09 MCG/KG/MIN: at 23:18

## 2024-12-21 RX ADMIN — CALCIUM CHLORIDE, MAGNESIUM CHLORIDE, DEXTROSE MONOHYDRATE, LACTIC ACID, SODIUM CHLORIDE, SODIUM BICARBONATE AND POTASSIUM CHLORIDE 25 ML/KG/HR: 3.68; 3.05; 22; 5.4; 6.46; 3.09; .314 INJECTION INTRAVENOUS at 15:12

## 2024-12-21 RX ADMIN — Medication 60 PERCENT: at 08:00

## 2024-12-21 RX ADMIN — DEXTROSE MONOHYDRATE 12.5 G: 25 INJECTION, SOLUTION INTRAVENOUS at 08:50

## 2024-12-21 RX ADMIN — PANTOPRAZOLE SODIUM 40 MG: 40 INJECTION, POWDER, FOR SOLUTION INTRAVENOUS at 06:37

## 2024-12-21 RX ADMIN — Medication 0.09 MCG/KG/MIN: at 03:15

## 2024-12-21 RX ADMIN — CALCIUM CHLORIDE, MAGNESIUM CHLORIDE, DEXTROSE MONOHYDRATE, LACTIC ACID, SODIUM CHLORIDE, SODIUM BICARBONATE AND POTASSIUM CHLORIDE 3000 ML/HR: 5.15; 2.03; 22; 5.4; 6.46; 3.09; .157 INJECTION INTRAVENOUS at 00:27

## 2024-12-21 RX ADMIN — HYDROCORTISONE SODIUM SUCCINATE 50 MG: 100 INJECTION, POWDER, FOR SOLUTION INTRAMUSCULAR; INTRAVENOUS at 16:07

## 2024-12-21 RX ADMIN — Medication 60 PERCENT: at 19:45

## 2024-12-21 RX ADMIN — POTASSIUM PHOSPHATE, MONOBASIC AND POTASSIUM PHOSPHATE, DIBASIC 15 MMOL: 224; 236 INJECTION, SOLUTION, CONCENTRATE INTRAVENOUS at 02:33

## 2024-12-21 RX ADMIN — CALCIUM CHLORIDE, MAGNESIUM CHLORIDE, DEXTROSE MONOHYDRATE, LACTIC ACID, SODIUM CHLORIDE, SODIUM BICARBONATE AND POTASSIUM CHLORIDE 25 ML/KG/HR: 3.68; 3.05; 22; 5.4; 6.46; 3.09; .314 INJECTION INTRAVENOUS at 21:35

## 2024-12-21 RX ADMIN — CALCIUM CHLORIDE, MAGNESIUM CHLORIDE, DEXTROSE MONOHYDRATE, LACTIC ACID, SODIUM CHLORIDE, SODIUM BICARBONATE AND POTASSIUM CHLORIDE 25 ML/KG/HR: 3.68; 3.05; 22; 5.4; 6.46; 3.09; .314 INJECTION INTRAVENOUS at 21:36

## 2024-12-21 RX ADMIN — HYDROCORTISONE SODIUM SUCCINATE 50 MG: 100 INJECTION, POWDER, FOR SOLUTION INTRAMUSCULAR; INTRAVENOUS at 10:16

## 2024-12-21 RX ADMIN — PROPOFOL 5 MCG/KG/MIN: 10 INJECTION, EMULSION INTRAVENOUS at 20:54

## 2024-12-21 RX ADMIN — HYDROCORTISONE SODIUM SUCCINATE 50 MG: 100 INJECTION, POWDER, FOR SOLUTION INTRAMUSCULAR; INTRAVENOUS at 05:11

## 2024-12-21 RX ADMIN — CALCIUM CHLORIDE, MAGNESIUM CHLORIDE, DEXTROSE MONOHYDRATE, LACTIC ACID, SODIUM CHLORIDE, SODIUM BICARBONATE AND POTASSIUM CHLORIDE 25 ML/KG/HR: 3.68; 3.05; 22; 5.4; 6.46; 3.09; .314 INJECTION INTRAVENOUS at 10:36

## 2024-12-21 ASSESSMENT — PAIN - FUNCTIONAL ASSESSMENT
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
## 2024-12-21 NOTE — PROGRESS NOTES
Occupational Therapy                 Therapy Communication Note    Patient Name: Ike Gar  MRN: [id number]  Department: [facility]  Room: 16/16-A  Today's Date: 12/21/2024     Discipline: Occupational Therapy          Missed Visit Reason: Missed Visit Reason:  (842 per RN plans for OR this AM. Will hold OT intervention until post op and medically appropriate)    Missed Time: Attempt    Comment:
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
## 2024-12-21 NOTE — PROGRESS NOTES
Communication Note    Patient Name: Ike Gar  MRN: [id number]  Today's Date: 12/21/2024   Room: 16/16-A    Discipline: Physical Therapy      PT Missed Visit: Yes  Missed Visit Reason:  (PT evaluation reviewed, patient pending OR.  Will follow up as medically appropriate.)      12/21/24 at 8:50 AM   Wisam Santiago, PT   Rehab Office: [phone]

## 2024-12-22 ENCOUNTER — APPOINTMENT (OUTPATIENT)
Dept: RADIOLOGY | Facility: HOSPITAL | Age: 79
End: 2024-12-22
Payer: MEDICARE

## 2024-12-22 VITALS
SYSTOLIC BLOOD PRESSURE: 123 MMHG | TEMPERATURE: 97.5 F | WEIGHT: 254.85 LBS | BODY MASS INDEX: 31.03 KG/M2 | HEART RATE: 83 BPM | RESPIRATION RATE: 23 BRPM | DIASTOLIC BLOOD PRESSURE: 56 MMHG | OXYGEN SATURATION: 94 % | HEIGHT: 76 IN

## 2024-12-22 LAB
ALBUMIN SERPL BCP-MCNC: 2.5 G/DL (ref 3.4–5)
ALBUMIN SERPL BCP-MCNC: 2.6 G/DL (ref 3.4–5)
ALP SERPL-CCNC: 121 U/L (ref 33–136)
ALT SERPL W P-5'-P-CCNC: 392 U/L (ref 10–52)
ANION GAP BLDA CALCULATED.4IONS-SCNC: 8 MMO/L (ref 10–25)
ANION GAP BLDA CALCULATED.4IONS-SCNC: 8 MMO/L (ref 10–25)
ANION GAP SERPL CALC-SCNC: 11 MMOL/L (ref 10–20)
AST SERPL W P-5'-P-CCNC: 168 U/L (ref 9–39)
BACTERIA BLD CULT: NORMAL
BACTERIA BLD CULT: NORMAL
BASE EXCESS BLDA CALC-SCNC: 0.8 MMOL/L (ref -2–3)
BASE EXCESS BLDA CALC-SCNC: 1.2 MMOL/L (ref -2–3)
BILIRUB DIRECT SERPL-MCNC: 8.6 MG/DL (ref 0–0.3)
BILIRUB SERPL-MCNC: 15.5 MG/DL (ref 0–1.2)
BODY TEMPERATURE: 37 DEGREES CELSIUS
BODY TEMPERATURE: 37 DEGREES CELSIUS
BUN SERPL-MCNC: 28 MG/DL (ref 6–23)
CA-I BLD-SCNC: 1.17 MMOL/L (ref 1.1–1.33)
CA-I BLDA-SCNC: 1.13 MMOL/L (ref 1.1–1.33)
CA-I BLDA-SCNC: 1.22 MMOL/L (ref 1.1–1.33)
CALCIUM SERPL-MCNC: 7.9 MG/DL (ref 8.6–10.6)
CHLORIDE BLDA-SCNC: 100 MMOL/L (ref 98–107)
CHLORIDE BLDA-SCNC: 102 MMOL/L (ref 98–107)
CHLORIDE SERPL-SCNC: 99 MMOL/L (ref 98–107)
CO2 SERPL-SCNC: 27 MMOL/L (ref 21–32)
CREAT SERPL-MCNC: 2.14 MG/DL (ref 0.5–1.3)
EGFRCR SERPLBLD CKD-EPI 2021: 31 ML/MIN/1.73M*2
ERYTHROCYTE [DISTWIDTH] IN BLOOD BY AUTOMATED COUNT: 14.4 % (ref 11.5–14.5)
GLUCOSE BLD MANUAL STRIP-MCNC: 100 MG/DL (ref 74–99)
GLUCOSE BLD MANUAL STRIP-MCNC: 106 MG/DL (ref 74–99)
GLUCOSE BLD MANUAL STRIP-MCNC: 93 MG/DL (ref 74–99)
GLUCOSE BLD MANUAL STRIP-MCNC: 97 MG/DL (ref 74–99)
GLUCOSE BLDA-MCNC: 112 MG/DL (ref 74–99)
GLUCOSE BLDA-MCNC: 98 MG/DL (ref 74–99)
GLUCOSE SERPL-MCNC: 107 MG/DL (ref 74–99)
GRAM STN SPEC: NORMAL
GRAM STN SPEC: NORMAL
HCO3 BLDA-SCNC: 25.3 MMOL/L (ref 22–26)
HCO3 BLDA-SCNC: 26 MMOL/L (ref 22–26)
HCT VFR BLD AUTO: 20.5 % (ref 41–52)
HCT VFR BLD EST: 23 % (ref 41–52)
HCT VFR BLD EST: 23 % (ref 41–52)
HGB BLD-MCNC: 7.4 G/DL (ref 13.5–17.5)
HGB BLDA-MCNC: 7.5 G/DL (ref 13.5–17.5)
HGB BLDA-MCNC: 7.6 G/DL (ref 13.5–17.5)
INHALED O2 CONCENTRATION: 40 %
INHALED O2 CONCENTRATION: 60 %
INR PPP: 1.3 (ref 0.9–1.1)
LACTATE BLDA-SCNC: 1.1 MMOL/L (ref 0.4–2)
LACTATE BLDA-SCNC: 1.3 MMOL/L (ref 0.4–2)
MAGNESIUM SERPL-MCNC: 2.24 MG/DL (ref 1.6–2.4)
MCH RBC QN AUTO: 29 PG (ref 26–34)
MCHC RBC AUTO-ENTMCNC: 36.1 G/DL (ref 32–36)
MCV RBC AUTO: 80 FL (ref 80–100)
NRBC BLD-RTO: 0.9 /100 WBCS (ref 0–0)
OXYHGB MFR BLDA: 95.7 % (ref 94–98)
OXYHGB MFR BLDA: 96.9 % (ref 94–98)
PCO2 BLDA: 39 MM HG (ref 38–42)
PCO2 BLDA: 41 MM HG (ref 38–42)
PH BLDA: 7.41 PH (ref 7.38–7.42)
PH BLDA: 7.42 PH (ref 7.38–7.42)
PHOSPHATE SERPL-MCNC: 1.3 MG/DL (ref 2.5–4.9)
PLATELET # BLD AUTO: 53 X10*3/UL (ref 150–450)
PO2 BLDA: 131 MM HG (ref 85–95)
PO2 BLDA: 76 MM HG (ref 85–95)
POTASSIUM BLDA-SCNC: 3.8 MMOL/L (ref 3.5–5.3)
POTASSIUM BLDA-SCNC: 3.9 MMOL/L (ref 3.5–5.3)
POTASSIUM SERPL-SCNC: 3.5 MMOL/L (ref 3.5–5.3)
PROT SERPL-MCNC: 4 G/DL (ref 6.4–8.2)
PROTHROMBIN TIME: 14.2 SECONDS (ref 9.8–12.8)
RBC # BLD AUTO: 2.55 X10*6/UL (ref 4.5–5.9)
SAO2 % BLDA: 98 % (ref 94–100)
SAO2 % BLDA: 99 % (ref 94–100)
SODIUM BLDA-SCNC: 130 MMOL/L (ref 136–145)
SODIUM BLDA-SCNC: 131 MMOL/L (ref 136–145)
SODIUM SERPL-SCNC: 133 MMOL/L (ref 136–145)
WBC # BLD AUTO: 38.8 X10*3/UL (ref 4.4–11.3)

## 2024-12-22 PROCEDURE — 94003 VENT MGMT INPAT SUBQ DAY: CPT

## 2024-12-22 PROCEDURE — 2500000005 HC RX 250 GENERAL PHARMACY W/O HCPCS: Performed by: NURSE PRACTITIONER

## 2024-12-22 PROCEDURE — 2500000004 HC RX 250 GENERAL PHARMACY W/ HCPCS (ALT 636 FOR OP/ED): Performed by: NURSE PRACTITIONER

## 2024-12-22 PROCEDURE — 2020000001 HC ICU ROOM DAILY

## 2024-12-22 PROCEDURE — 2500000005 HC RX 250 GENERAL PHARMACY W/O HCPCS: Performed by: EMERGENCY MEDICINE

## 2024-12-22 PROCEDURE — 2500000004 HC RX 250 GENERAL PHARMACY W/ HCPCS (ALT 636 FOR OP/ED): Performed by: PHYSICIAN ASSISTANT

## 2024-12-22 PROCEDURE — 85018 HEMOGLOBIN: CPT | Performed by: PHYSICIAN ASSISTANT

## 2024-12-22 PROCEDURE — 2500000004 HC RX 250 GENERAL PHARMACY W/ HCPCS (ALT 636 FOR OP/ED): Performed by: EMERGENCY MEDICINE

## 2024-12-22 PROCEDURE — 99291 CRITICAL CARE FIRST HOUR: CPT | Performed by: EMERGENCY MEDICINE

## 2024-12-22 PROCEDURE — 99232 SBSQ HOSP IP/OBS MODERATE 35: CPT | Performed by: HEALTH CARE PROVIDER

## 2024-12-22 PROCEDURE — 85610 PROTHROMBIN TIME: CPT | Performed by: STUDENT IN AN ORGANIZED HEALTH CARE EDUCATION/TRAINING PROGRAM

## 2024-12-22 PROCEDURE — 94799 UNLISTED PULMONARY SVC/PX: CPT

## 2024-12-22 PROCEDURE — 37799 UNLISTED PX VASCULAR SURGERY: CPT | Performed by: PHYSICIAN ASSISTANT

## 2024-12-22 PROCEDURE — 84132 ASSAY OF SERUM POTASSIUM: CPT | Performed by: STUDENT IN AN ORGANIZED HEALTH CARE EDUCATION/TRAINING PROGRAM

## 2024-12-22 PROCEDURE — 90945 DIALYSIS ONE EVALUATION: CPT | Performed by: INTERNAL MEDICINE

## 2024-12-22 PROCEDURE — 82330 ASSAY OF CALCIUM: CPT | Performed by: PHYSICIAN ASSISTANT

## 2024-12-22 PROCEDURE — 87070 CULTURE OTHR SPECIMN AEROBIC: CPT | Performed by: EMERGENCY MEDICINE

## 2024-12-22 PROCEDURE — 74160 CT ABDOMEN W/CONTRAST: CPT

## 2024-12-22 PROCEDURE — 2500000005 HC RX 250 GENERAL PHARMACY W/O HCPCS: Performed by: PHYSICIAN ASSISTANT

## 2024-12-22 PROCEDURE — 36415 COLL VENOUS BLD VENIPUNCTURE: CPT | Performed by: EMERGENCY MEDICINE

## 2024-12-22 PROCEDURE — 2500000004 HC RX 250 GENERAL PHARMACY W/ HCPCS (ALT 636 FOR OP/ED): Performed by: STUDENT IN AN ORGANIZED HEALTH CARE EDUCATION/TRAINING PROGRAM

## 2024-12-22 PROCEDURE — 82947 ASSAY GLUCOSE BLOOD QUANT: CPT

## 2024-12-22 PROCEDURE — 87075 CULTR BACTERIA EXCEPT BLOOD: CPT | Performed by: EMERGENCY MEDICINE

## 2024-12-22 PROCEDURE — 2550000001 HC RX 255 CONTRASTS: Performed by: SURGERY

## 2024-12-22 PROCEDURE — 71045 X-RAY EXAM CHEST 1 VIEW: CPT | Performed by: RADIOLOGY

## 2024-12-22 PROCEDURE — 83735 ASSAY OF MAGNESIUM: CPT | Performed by: PHYSICIAN ASSISTANT

## 2024-12-22 PROCEDURE — 99233 SBSQ HOSP IP/OBS HIGH 50: CPT | Performed by: STUDENT IN AN ORGANIZED HEALTH CARE EDUCATION/TRAINING PROGRAM

## 2024-12-22 PROCEDURE — 90937 HEMODIALYSIS REPEATED EVAL: CPT

## 2024-12-22 PROCEDURE — 82947 ASSAY GLUCOSE BLOOD QUANT: CPT | Performed by: STUDENT IN AN ORGANIZED HEALTH CARE EDUCATION/TRAINING PROGRAM

## 2024-12-22 PROCEDURE — 2500000004 HC RX 250 GENERAL PHARMACY W/ HCPCS (ALT 636 FOR OP/ED)

## 2024-12-22 PROCEDURE — 3E0436Z INTRODUCTION OF NUTRITIONAL SUBSTANCE INTO CENTRAL VEIN, PERCUTANEOUS APPROACH: ICD-10-PCS

## 2024-12-22 PROCEDURE — 37799 UNLISTED PX VASCULAR SURGERY: CPT | Performed by: STUDENT IN AN ORGANIZED HEALTH CARE EDUCATION/TRAINING PROGRAM

## 2024-12-22 PROCEDURE — 71045 X-RAY EXAM CHEST 1 VIEW: CPT

## 2024-12-22 PROCEDURE — 2500000005 HC RX 250 GENERAL PHARMACY W/O HCPCS: Performed by: HEALTH CARE PROVIDER

## 2024-12-22 PROCEDURE — 82040 ASSAY OF SERUM ALBUMIN: CPT | Performed by: EMERGENCY MEDICINE

## 2024-12-22 PROCEDURE — 85027 COMPLETE CBC AUTOMATED: CPT | Performed by: PHYSICIAN ASSISTANT

## 2024-12-22 RX ORDER — MEROPENEM AND SODIUM CHLORIDE 1 G/50ML
1 INJECTION, SOLUTION INTRAVENOUS EVERY 12 HOURS
Status: DISPENSED | OUTPATIENT
Start: 2024-12-22 | End: 2024-12-27

## 2024-12-22 RX ORDER — VANCOMYCIN HYDROCHLORIDE 1 G/20ML
INJECTION, POWDER, LYOPHILIZED, FOR SOLUTION INTRAVENOUS DAILY PRN
Status: DISCONTINUED | OUTPATIENT
Start: 2024-12-22 | End: 2024-12-29

## 2024-12-22 RX ORDER — FENTANYL CITRATE-0.9 % NACL/PF 10 MCG/ML
PLASTIC BAG, INJECTION (ML) INTRAVENOUS
Status: COMPLETED
Start: 2024-12-22 | End: 2024-12-22

## 2024-12-22 RX ORDER — DEXTROSE MONOHYDRATE 100 MG/ML
50 INJECTION, SOLUTION INTRAVENOUS CONTINUOUS
Status: DISCONTINUED | OUTPATIENT
Start: 2024-12-22 | End: 2024-12-23

## 2024-12-22 RX ADMIN — DEXTROSE MONOHYDRATE 50 ML/HR: 100 INJECTION, SOLUTION INTRAVENOUS at 04:06

## 2024-12-22 RX ADMIN — CALCIUM CHLORIDE, MAGNESIUM CHLORIDE, DEXTROSE MONOHYDRATE, LACTIC ACID, SODIUM CHLORIDE, SODIUM BICARBONATE AND POTASSIUM CHLORIDE 25 ML/KG/HR: 3.68; 3.05; 22; 5.4; 6.46; 3.09; .314 INJECTION INTRAVENOUS at 15:10

## 2024-12-22 RX ADMIN — DEXTROSE MONOHYDRATE 50 ML/HR: 100 INJECTION, SOLUTION INTRAVENOUS at 09:16

## 2024-12-22 RX ADMIN — IOHEXOL 75 ML: 350 INJECTION, SOLUTION INTRAVENOUS at 18:40

## 2024-12-22 RX ADMIN — CALCIUM CHLORIDE, MAGNESIUM CHLORIDE, DEXTROSE MONOHYDRATE, LACTIC ACID, SODIUM CHLORIDE, SODIUM BICARBONATE AND POTASSIUM CHLORIDE 25 ML/KG/HR: 3.68; 3.05; 22; 5.4; 6.46; 3.09; .314 INJECTION INTRAVENOUS at 15:12

## 2024-12-22 RX ADMIN — DEXTROSE MONOHYDRATE 50 ML/HR: 100 INJECTION, SOLUTION INTRAVENOUS at 14:39

## 2024-12-22 RX ADMIN — PROPOFOL 5 MCG/KG/MIN: 10 INJECTION, EMULSION INTRAVENOUS at 19:29

## 2024-12-22 RX ADMIN — CALCIUM CHLORIDE, MAGNESIUM CHLORIDE, DEXTROSE MONOHYDRATE, LACTIC ACID, SODIUM CHLORIDE, SODIUM BICARBONATE AND POTASSIUM CHLORIDE 25 ML/KG/HR: 3.68; 3.05; 22; 5.4; 6.46; 3.09; .314 INJECTION INTRAVENOUS at 04:05

## 2024-12-22 RX ADMIN — HYDROCORTISONE SODIUM SUCCINATE 50 MG: 100 INJECTION, POWDER, FOR SOLUTION INTRAMUSCULAR; INTRAVENOUS at 04:05

## 2024-12-22 RX ADMIN — Medication 50 PERCENT: at 08:00

## 2024-12-22 RX ADMIN — CALCIUM CHLORIDE, MAGNESIUM CHLORIDE, DEXTROSE MONOHYDRATE, LACTIC ACID, SODIUM CHLORIDE, SODIUM BICARBONATE AND POTASSIUM CHLORIDE 25 ML/KG/HR: 3.68; 3.05; 22; 5.4; 6.46; 3.09; .314 INJECTION INTRAVENOUS at 09:16

## 2024-12-22 RX ADMIN — HYDROMORPHONE HYDROCHLORIDE 0.4 MG: 1 INJECTION, SOLUTION INTRAMUSCULAR; INTRAVENOUS; SUBCUTANEOUS at 07:53

## 2024-12-22 RX ADMIN — SODIUM CHLORIDE 15 MMOL: 9 INJECTION, SOLUTION INTRAVENOUS at 06:48

## 2024-12-22 RX ADMIN — MEROPENEM AND SODIUM CHLORIDE 1 G: 1 INJECTION, SOLUTION INTRAVENOUS at 12:48

## 2024-12-22 RX ADMIN — DEXTROSE MONOHYDRATE 50 ML/HR: 100 INJECTION, SOLUTION INTRAVENOUS at 20:30

## 2024-12-22 RX ADMIN — CALCIUM CHLORIDE, MAGNESIUM CHLORIDE, DEXTROSE MONOHYDRATE, LACTIC ACID, SODIUM CHLORIDE, SODIUM BICARBONATE AND POTASSIUM CHLORIDE 25 ML/KG/HR: 3.68; 3.05; 22; 5.4; 6.46; 3.09; .314 INJECTION INTRAVENOUS at 09:15

## 2024-12-22 RX ADMIN — PIPERACILLIN SODIUM AND TAZOBACTAM SODIUM 3.38 G: 3; .375 INJECTION, SOLUTION INTRAVENOUS at 10:13

## 2024-12-22 RX ADMIN — HYDROMORPHONE HYDROCHLORIDE 0.4 MG: 1 INJECTION, SOLUTION INTRAMUSCULAR; INTRAVENOUS; SUBCUTANEOUS at 11:47

## 2024-12-22 RX ADMIN — CALCIUM CHLORIDE, MAGNESIUM CHLORIDE, DEXTROSE MONOHYDRATE, LACTIC ACID, SODIUM CHLORIDE, SODIUM BICARBONATE AND POTASSIUM CHLORIDE 25 ML/KG/HR: 3.68; 3.05; 22; 5.4; 6.46; 3.09; .314 INJECTION INTRAVENOUS at 15:11

## 2024-12-22 RX ADMIN — HYDROMORPHONE HYDROCHLORIDE 0.4 MG: 1 INJECTION, SOLUTION INTRAMUSCULAR; INTRAVENOUS; SUBCUTANEOUS at 19:39

## 2024-12-22 RX ADMIN — CALCIUM CHLORIDE, MAGNESIUM CHLORIDE, DEXTROSE MONOHYDRATE, LACTIC ACID, SODIUM CHLORIDE, SODIUM BICARBONATE AND POTASSIUM CHLORIDE 25 ML/KG/HR: 3.68; 3.05; 22; 5.4; 6.46; 3.09; .314 INJECTION INTRAVENOUS at 04:06

## 2024-12-22 RX ADMIN — ASCORBIC ACID, VITAMIN A PALMITATE, CHOLECALCIFEROL, THIAMINE HYDROCHLORIDE, RIBOFLAVIN-5 PHOSPHATE SODIUM, PYRIDOXINE HYDROCHLORIDE, NIACINAMIDE, DEXPANTHENOL, ALPHA-TOCOPHEROL ACETATE, VITAMIN K1, FOLIC ACID, BIOTIN, CYANOCOBALAMIN: 200; 3300; 200; 6; 3.6; 6; 40; 15; 10; 150; 600; 60; 5 INJECTION, SOLUTION INTRAVENOUS at 19:40

## 2024-12-22 RX ADMIN — HEPARIN SODIUM 5000 UNITS: 5000 INJECTION INTRAVENOUS; SUBCUTANEOUS at 10:13

## 2024-12-22 RX ADMIN — Medication 175 MCG/HR: at 06:03

## 2024-12-22 RX ADMIN — Medication 40 PERCENT: at 19:38

## 2024-12-22 RX ADMIN — CALCIUM CHLORIDE, MAGNESIUM CHLORIDE, DEXTROSE MONOHYDRATE, LACTIC ACID, SODIUM CHLORIDE, SODIUM BICARBONATE AND POTASSIUM CHLORIDE 25 ML/KG/HR: 3.68; 3.05; 22; 5.4; 6.46; 3.09; .314 INJECTION INTRAVENOUS at 04:03

## 2024-12-22 RX ADMIN — HEPARIN SODIUM 5000 UNITS: 5000 INJECTION INTRAVENOUS; SUBCUTANEOUS at 01:29

## 2024-12-22 RX ADMIN — HEPARIN SODIUM 5000 UNITS: 5000 INJECTION INTRAVENOUS; SUBCUTANEOUS at 18:12

## 2024-12-22 RX ADMIN — PIPERACILLIN SODIUM AND TAZOBACTAM SODIUM 3.38 G: 3; .375 INJECTION, SOLUTION INTRAVENOUS at 04:05

## 2024-12-22 RX ADMIN — PANTOPRAZOLE SODIUM 40 MG: 40 INJECTION, POWDER, FOR SOLUTION INTRAVENOUS at 06:04

## 2024-12-22 RX ADMIN — Medication 150 MCG/HR: at 23:46

## 2024-12-22 RX ADMIN — HYDROMORPHONE HYDROCHLORIDE 0.4 MG: 1 INJECTION, SOLUTION INTRAMUSCULAR; INTRAVENOUS; SUBCUTANEOUS at 22:58

## 2024-12-22 RX ADMIN — Medication 100 MCG/HR: at 14:45

## 2024-12-22 RX ADMIN — HYDROMORPHONE HYDROCHLORIDE 0.4 MG: 1 INJECTION, SOLUTION INTRAMUSCULAR; INTRAVENOUS; SUBCUTANEOUS at 15:25

## 2024-12-22 RX ADMIN — VANCOMYCIN HYDROCHLORIDE 1750 MG: 5 INJECTION, POWDER, LYOPHILIZED, FOR SOLUTION INTRAVENOUS at 13:34

## 2024-12-22 RX ADMIN — PROPOFOL 5 MCG/KG/MIN: 10 INJECTION, EMULSION INTRAVENOUS at 08:20

## 2024-12-22 RX ADMIN — Medication 0.07 MCG/KG/MIN: at 08:20

## 2024-12-22 RX ADMIN — CALCIUM CHLORIDE, MAGNESIUM CHLORIDE, DEXTROSE MONOHYDRATE, LACTIC ACID, SODIUM CHLORIDE, SODIUM BICARBONATE AND POTASSIUM CHLORIDE 25 ML/KG/HR: 3.68; 3.05; 22; 5.4; 6.46; 3.09; .314 INJECTION INTRAVENOUS at 09:17

## 2024-12-22 ASSESSMENT — PAIN - FUNCTIONAL ASSESSMENT
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
## 2024-12-22 NOTE — PROGRESS NOTES
Renal Staff CVVH Note    Patient seen, examined on CVVH  No significant filter issues overnight  Urine output, none recorded  M150 no hep  200 bfr 90 / hr fluid removal   1800 rfr 50% pre    4K 2.5 Ca    Effluent bag clear  Access R int jug    Sedated, intubated, F1O2 40  Pressor requirement continued levo    Electrolytes, acid base acceptable except low phos  Medications reviewed    No CVVH script change  Fluid removal per primary team   Suppl phos

## 2024-12-22 NOTE — PROGRESS NOTES
Vancomycin Dosing by Pharmacy- INITIAL    Ike Gar is a 78 y.o. year old male who Pharmacy has been consulted for vancomycin dosing for other intraabdominal . Based on the patient's indication and renal status this patient will be dosed based on a goal trough/random level of 15-20.     Renal function is currently improving KRISTIN    Visit Vitals  /59   Pulse 67   Temp 36.1 °C (97 °F)   Resp 22        Lab Results   Component Value Date    CREATININE 2.14 (H) 2024    CREATININE 2.31 (H) 2024    CREATININE 2.12 (H) 2024    CREATININE 2.39 (H) 2024    CREATININE 2.39 (H) 2024        Patient weight is as follows:   Vitals:    24 0600   Weight: 116 kg (254 lb 13.6 oz)       Cultures:  No results found for the encounter in last 14 days.        I/O last 3 completed shifts:  In: 3391.6 (29.3 mL/kg) [I.V.:2998.5 (25.9 mL/kg); NG/GT:70]  Out: 3479 (30.1 mL/kg) [Urine:15 (0 mL/kg/hr); Emesis/NG output:50; Drains:1800; Other:1614]  Weight: 115.6 kg   I/O during current shift:  I/O this shift:  In: 463.2 [I.V.:323.2]  Out: 430 [Drains:125; Other:305]    Temp (24hrs), Av.5 °C (97.7 °F), Min:35.9 °C (96.6 °F), Max:36.8 °C (98.2 °F)         Assessment/Plan     Patient will not be given a loading dose.  Will initiate vancomycin maintenance, a one time dose of 1750 mg.  Follow-up level will be ordered on  at 1100 unless clinically indicated sooner.  Will continue to monitor renal function daily while on vancomycin and order serum creatinine at least every 48 hours if not already ordered.  Follow for continued vancomycin needs, clinical response, and signs/symptoms of toxicity.       Kailyn Rayo, MohitD
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
## 2024-12-23 ENCOUNTER — ANESTHESIA EVENT (OUTPATIENT)
Dept: OPERATING ROOM | Facility: HOSPITAL | Age: 79
End: 2024-12-23
Payer: MEDICARE

## 2024-12-23 ENCOUNTER — ANESTHESIA (OUTPATIENT)
Dept: OPERATING ROOM | Facility: HOSPITAL | Age: 79
End: 2024-12-23
Payer: MEDICARE

## 2024-12-23 PROBLEM — A41.9 SEPTICEMIA (MULTI): Status: ACTIVE | Noted: 2024-12-23

## 2024-12-23 PROBLEM — R94.31 ABNORMAL EKG: Status: ACTIVE | Noted: 2024-12-23

## 2024-12-23 PROBLEM — D64.9 ANEMIA: Status: ACTIVE | Noted: 2024-12-23

## 2024-12-23 PROBLEM — Z53.1 TRANSFUSION OF BLOOD PRODUCT REFUSED FOR RELIGIOUS REASON: Status: ACTIVE | Noted: 2024-12-23

## 2024-12-23 PROBLEM — K76.9 LIVER DISEASE: Status: ACTIVE | Noted: 2024-12-23

## 2024-12-23 PROBLEM — R07.1 CHEST PAIN ON BREATHING: Status: ACTIVE | Noted: 2024-12-23

## 2024-12-23 PROBLEM — K92.2 GI BLEED: Status: ACTIVE | Noted: 2024-12-23

## 2024-12-23 PROBLEM — B99.9 INFECTION REQUIRING CONTACT ISOLATION PRECAUTIONS: Status: ACTIVE | Noted: 2024-12-23

## 2024-12-23 PROBLEM — M19.90 OSTEOARTHRITIS: Status: ACTIVE | Noted: 2024-12-23

## 2024-12-23 PROBLEM — R94.2 DECREASED FUNCTIONAL RESIDUAL CAPACITY: Status: ACTIVE | Noted: 2024-12-23

## 2024-12-23 PROBLEM — D69.6 THROMBOCYTOPENIA (CMS-HCC): Status: ACTIVE | Noted: 2024-12-23

## 2024-12-23 PROBLEM — Z99.2 HEMODIALYSIS PATIENT (CMS-HCC): Status: ACTIVE | Noted: 2024-12-23

## 2024-12-23 LAB
ALBUMIN SERPL BCP-MCNC: 2.1 G/DL (ref 3.4–5)
ALBUMIN SERPL BCP-MCNC: 2.2 G/DL (ref 3.4–5)
ALBUMIN SERPL BCP-MCNC: 2.2 G/DL (ref 3.4–5)
ALP SERPL-CCNC: 113 U/L (ref 33–136)
ALT SERPL W P-5'-P-CCNC: 275 U/L (ref 10–52)
AMMONIA PLAS-SCNC: 30 UMOL/L (ref 16–53)
ANION GAP BLDA CALCULATED.4IONS-SCNC: 10 MMO/L (ref 10–25)
ANION GAP BLDA CALCULATED.4IONS-SCNC: 12 MMO/L (ref 10–25)
ANION GAP BLDA CALCULATED.4IONS-SCNC: 6 MMO/L (ref 10–25)
ANION GAP BLDA CALCULATED.4IONS-SCNC: ABNORMAL MMOL/L
ANION GAP SERPL CALC-SCNC: 11 MMOL/L (ref 10–20)
ANION GAP SERPL CALC-SCNC: 12 MMOL/L (ref 10–20)
APTT PPP: 26 SECONDS (ref 27–38)
AST SERPL W P-5'-P-CCNC: 101 U/L (ref 9–39)
BASE EXCESS BLDA CALC-SCNC: -2.5 MMOL/L (ref -2–3)
BASE EXCESS BLDA CALC-SCNC: -3.9 MMOL/L (ref -2–3)
BASE EXCESS BLDA CALC-SCNC: 0 MMOL/L (ref -2–3)
BASE EXCESS BLDA CALC-SCNC: 0.9 MMOL/L (ref -2–3)
BILIRUB DIRECT SERPL-MCNC: 14.1 MG/DL (ref 0–0.3)
BILIRUB SERPL-MCNC: 19.9 MG/DL (ref 0–1.2)
BLOOD EXPIRATION DATE: NORMAL
BLOOD EXPIRATION DATE: NORMAL
BODY TEMPERATURE: 37 DEGREES CELSIUS
BUN SERPL-MCNC: 35 MG/DL (ref 6–23)
BUN SERPL-MCNC: 40 MG/DL (ref 6–23)
CA-I BLD-SCNC: 1.12 MMOL/L (ref 1.1–1.33)
CA-I BLD-SCNC: 1.14 MMOL/L (ref 1.1–1.33)
CA-I BLDA-SCNC: 0.99 MMOL/L (ref 1.1–1.33)
CA-I BLDA-SCNC: 1.11 MMOL/L (ref 1.1–1.33)
CA-I BLDA-SCNC: 1.14 MMOL/L (ref 1.1–1.33)
CA-I BLDA-SCNC: 1.2 MMOL/L (ref 1.1–1.33)
CALCIUM SERPL-MCNC: 7.1 MG/DL (ref 8.6–10.6)
CALCIUM SERPL-MCNC: 7.2 MG/DL (ref 8.6–10.6)
CFT FORM KAOLIN IND BLD RES TEG: 1 MIN (ref 0.8–2.1)
CHLORIDE BLDA-SCNC: 101 MMOL/L (ref 98–107)
CHLORIDE BLDA-SCNC: 101 MMOL/L (ref 98–107)
CHLORIDE BLDA-SCNC: 102 MMOL/L (ref 98–107)
CHLORIDE BLDA-SCNC: ABNORMAL MMOL/L
CHLORIDE SERPL-SCNC: 100 MMOL/L (ref 98–107)
CHLORIDE SERPL-SCNC: 103 MMOL/L (ref 98–107)
CLOT ANGLE.KAOLIN INDUCED BLD RES TEG: 76 DEG (ref 63–78)
CLOT INIT KAO IND P HEP NEUT BLD RES TEG: 5 MIN (ref 4.6–9.1)
CLOT INIT KAO IND P HEP NEUT BLD RES TEG: 5.2 MIN (ref 4.3–8.3)
CO2 SERPL-SCNC: 24 MMOL/L (ref 21–32)
CO2 SERPL-SCNC: 27 MMOL/L (ref 21–32)
CREAT SERPL-MCNC: 2.19 MG/DL (ref 0.5–1.3)
CREAT SERPL-MCNC: 2.5 MG/DL (ref 0.5–1.3)
DISPENSE STATUS: NORMAL
DISPENSE STATUS: NORMAL
EGFRCR SERPLBLD CKD-EPI 2021: 26 ML/MIN/1.73M*2
EGFRCR SERPLBLD CKD-EPI 2021: 30 ML/MIN/1.73M*2
ERYTHROCYTE [DISTWIDTH] IN BLOOD BY AUTOMATED COUNT: 14.7 % (ref 11.5–14.5)
ERYTHROCYTE [DISTWIDTH] IN BLOOD BY AUTOMATED COUNT: 15 % (ref 11.5–14.5)
FIBRINOGEN BLD CALC-MCNC: 484 MG/DL (ref 278–581)
GLUCOSE BLD MANUAL STRIP-MCNC: 100 MG/DL (ref 74–99)
GLUCOSE BLD MANUAL STRIP-MCNC: 83 MG/DL (ref 74–99)
GLUCOSE BLD MANUAL STRIP-MCNC: 89 MG/DL (ref 74–99)
GLUCOSE BLD MANUAL STRIP-MCNC: 90 MG/DL (ref 74–99)
GLUCOSE BLD MANUAL STRIP-MCNC: 92 MG/DL (ref 74–99)
GLUCOSE BLD MANUAL STRIP-MCNC: 95 MG/DL (ref 74–99)
GLUCOSE BLD MANUAL STRIP-MCNC: 95 MG/DL (ref 74–99)
GLUCOSE BLDA-MCNC: 100 MG/DL (ref 74–99)
GLUCOSE BLDA-MCNC: 107 MG/DL (ref 74–99)
GLUCOSE BLDA-MCNC: 120 MG/DL (ref 74–99)
GLUCOSE BLDA-MCNC: 92 MG/DL (ref 74–99)
GLUCOSE SERPL-MCNC: 85 MG/DL (ref 74–99)
GLUCOSE SERPL-MCNC: 96 MG/DL (ref 74–99)
HCO3 BLDA-SCNC: 22.5 MMOL/L (ref 22–26)
HCO3 BLDA-SCNC: 22.6 MMOL/L (ref 22–26)
HCO3 BLDA-SCNC: 24.8 MMOL/L (ref 22–26)
HCO3 BLDA-SCNC: 26 MMOL/L (ref 22–26)
HCT VFR BLD AUTO: 20.4 % (ref 41–52)
HCT VFR BLD AUTO: 22.5 % (ref 41–52)
HCT VFR BLD EST: 23 % (ref 41–52)
HCT VFR BLD EST: 24 % (ref 41–52)
HCT VFR BLD EST: 26 % (ref 41–52)
HCT VFR BLD EST: 30 % (ref 41–52)
HGB BLD-MCNC: 7.3 G/DL (ref 13.5–17.5)
HGB BLD-MCNC: 8.1 G/DL (ref 13.5–17.5)
HGB BLDA-MCNC: 10.1 G/DL (ref 13.5–17.5)
HGB BLDA-MCNC: 7.5 G/DL (ref 13.5–17.5)
HGB BLDA-MCNC: 7.9 G/DL (ref 13.5–17.5)
HGB BLDA-MCNC: 8.6 G/DL (ref 13.5–17.5)
INHALED O2 CONCENTRATION: 40 %
INHALED O2 CONCENTRATION: 40 %
INHALED O2 CONCENTRATION: 80 %
INR PPP: 1.2 (ref 0.9–1.1)
LACTATE BLDA-SCNC: 1 MMOL/L (ref 0.4–2)
LACTATE BLDA-SCNC: 1.4 MMOL/L (ref 0.4–2)
LACTATE BLDA-SCNC: 1.4 MMOL/L (ref 0.4–2)
LACTATE BLDA-SCNC: 1.9 MMOL/L (ref 0.4–2)
MA KAOLIN BLD RES TEG: 64 MM (ref 52–69)
MA KAOLIN+TF BLD RES TEG: 63 MM (ref 52–70)
MA TF IND+IIB-IIIA INH BLD RES TEG: 26 MM (ref 15–32)
MAGNESIUM SERPL-MCNC: 2.42 MG/DL (ref 1.6–2.4)
MCH RBC QN AUTO: 29 PG (ref 26–34)
MCH RBC QN AUTO: 29.1 PG (ref 26–34)
MCHC RBC AUTO-ENTMCNC: 35.8 G/DL (ref 32–36)
MCHC RBC AUTO-ENTMCNC: 36 G/DL (ref 32–36)
MCV RBC AUTO: 81 FL (ref 80–100)
MCV RBC AUTO: 81 FL (ref 80–100)
NRBC BLD-RTO: 0.8 /100 WBCS (ref 0–0)
NRBC BLD-RTO: 1.1 /100 WBCS (ref 0–0)
OXYHGB MFR BLDA: 94.6 % (ref 94–98)
OXYHGB MFR BLDA: 96.4 % (ref 94–98)
OXYHGB MFR BLDA: 96.6 % (ref 94–98)
OXYHGB MFR BLDA: 96.7 % (ref 94–98)
PCO2 BLDA: 39 MM HG (ref 38–42)
PCO2 BLDA: 40 MM HG (ref 38–42)
PCO2 BLDA: 43 MM HG (ref 38–42)
PCO2 BLDA: 47 MM HG (ref 38–42)
PH BLDA: 7.29 PH (ref 7.38–7.42)
PH BLDA: 7.37 PH (ref 7.38–7.42)
PH BLDA: 7.39 PH (ref 7.38–7.42)
PH BLDA: 7.4 PH (ref 7.38–7.42)
PHOSPHATE SERPL-MCNC: 1.2 MG/DL (ref 2.5–4.9)
PHOSPHATE SERPL-MCNC: 2.2 MG/DL (ref 2.5–4.9)
PLATELET # BLD AUTO: 62 X10*3/UL (ref 150–450)
PLATELET # BLD AUTO: 73 X10*3/UL (ref 150–450)
PO2 BLDA: 117 MM HG (ref 85–95)
PO2 BLDA: 129 MM HG (ref 85–95)
PO2 BLDA: 77 MM HG (ref 85–95)
PO2 BLDA: 95 MM HG (ref 85–95)
POTASSIUM BLDA-SCNC: 3.8 MMOL/L (ref 3.5–5.3)
POTASSIUM BLDA-SCNC: 3.9 MMOL/L (ref 3.5–5.3)
POTASSIUM BLDA-SCNC: 3.9 MMOL/L (ref 3.5–5.3)
POTASSIUM BLDA-SCNC: 4 MMOL/L (ref 3.5–5.3)
POTASSIUM SERPL-SCNC: 3.5 MMOL/L (ref 3.5–5.3)
POTASSIUM SERPL-SCNC: 3.8 MMOL/L (ref 3.5–5.3)
PRODUCT BLOOD TYPE: 5100
PRODUCT BLOOD TYPE: 5100
PRODUCT CODE: NORMAL
PRODUCT CODE: NORMAL
PROT SERPL-MCNC: 3.7 G/DL (ref 6.4–8.2)
PROTHROMBIN TIME: 13.7 SECONDS (ref 9.8–12.8)
RBC # BLD AUTO: 2.52 X10*6/UL (ref 4.5–5.9)
RBC # BLD AUTO: 2.78 X10*6/UL (ref 4.5–5.9)
SAO2 % BLDA: 100 % (ref 94–100)
SAO2 % BLDA: 100 % (ref 94–100)
SAO2 % BLDA: 98 % (ref 94–100)
SAO2 % BLDA: 99 % (ref 94–100)
SODIUM BLDA-SCNC: 129 MMOL/L (ref 136–145)
SODIUM BLDA-SCNC: 130 MMOL/L (ref 136–145)
SODIUM BLDA-SCNC: 130 MMOL/L (ref 136–145)
SODIUM BLDA-SCNC: 132 MMOL/L (ref 136–145)
SODIUM SERPL-SCNC: 134 MMOL/L (ref 136–145)
SODIUM SERPL-SCNC: 135 MMOL/L (ref 136–145)
UNIT ABO: NORMAL
UNIT ABO: NORMAL
UNIT NUMBER: NORMAL
UNIT NUMBER: NORMAL
UNIT RH: NORMAL
UNIT RH: NORMAL
UNIT VOLUME: 350
UNIT VOLUME: 350
VANCOMYCIN TROUGH SERPL-MCNC: 6.9 UG/ML (ref 5–20)
WBC # BLD AUTO: 27.6 X10*3/UL (ref 4.4–11.3)
WBC # BLD AUTO: 30.6 X10*3/UL (ref 4.4–11.3)
XM INTEP: NORMAL
XM INTEP: NORMAL

## 2024-12-23 PROCEDURE — 82330 ASSAY OF CALCIUM: CPT

## 2024-12-23 PROCEDURE — 44310 ILEOSTOMY/JEJUNOSTOMY: CPT | Performed by: STUDENT IN AN ORGANIZED HEALTH CARE EDUCATION/TRAINING PROGRAM

## 2024-12-23 PROCEDURE — 0D1A0Z4 BYPASS JEJUNUM TO CUTANEOUS, OPEN APPROACH: ICD-10-PCS | Performed by: STUDENT IN AN ORGANIZED HEALTH CARE EDUCATION/TRAINING PROGRAM

## 2024-12-23 PROCEDURE — 82330 ASSAY OF CALCIUM: CPT | Performed by: PHYSICIAN ASSISTANT

## 2024-12-23 PROCEDURE — 85018 HEMOGLOBIN: CPT

## 2024-12-23 PROCEDURE — 2500000005 HC RX 250 GENERAL PHARMACY W/O HCPCS

## 2024-12-23 PROCEDURE — 90937 HEMODIALYSIS REPEATED EVAL: CPT

## 2024-12-23 PROCEDURE — 82040 ASSAY OF SERUM ALBUMIN: CPT | Performed by: PHYSICIAN ASSISTANT

## 2024-12-23 PROCEDURE — 2500000004 HC RX 250 GENERAL PHARMACY W/ HCPCS (ALT 636 FOR OP/ED): Performed by: PHYSICIAN ASSISTANT

## 2024-12-23 PROCEDURE — 85384 FIBRINOGEN ACTIVITY: CPT

## 2024-12-23 PROCEDURE — 80202 ASSAY OF VANCOMYCIN: CPT | Performed by: EMERGENCY MEDICINE

## 2024-12-23 PROCEDURE — 94003 VENT MGMT INPAT SUBQ DAY: CPT

## 2024-12-23 PROCEDURE — 3700000001 HC GENERAL ANESTHESIA TIME - INITIAL BASE CHARGE: Performed by: STUDENT IN AN ORGANIZED HEALTH CARE EDUCATION/TRAINING PROGRAM

## 2024-12-23 PROCEDURE — 3600000008 HC OR TIME - EACH INCREMENTAL 1 MINUTE - PROCEDURE LEVEL THREE: Performed by: STUDENT IN AN ORGANIZED HEALTH CARE EDUCATION/TRAINING PROGRAM

## 2024-12-23 PROCEDURE — 99100 ANES PT EXTEME AGE<1 YR&>70: CPT | Performed by: ANESTHESIOLOGY

## 2024-12-23 PROCEDURE — 83735 ASSAY OF MAGNESIUM: CPT

## 2024-12-23 PROCEDURE — 85610 PROTHROMBIN TIME: CPT | Performed by: PHYSICIAN ASSISTANT

## 2024-12-23 PROCEDURE — 2500000005 HC RX 250 GENERAL PHARMACY W/O HCPCS: Performed by: PHYSICIAN ASSISTANT

## 2024-12-23 PROCEDURE — P9045 ALBUMIN (HUMAN), 5%, 250 ML: HCPCS | Mod: JZ

## 2024-12-23 PROCEDURE — 2020000001 HC ICU ROOM DAILY

## 2024-12-23 PROCEDURE — 37799 UNLISTED PX VASCULAR SURGERY: CPT

## 2024-12-23 PROCEDURE — A47122 PR RESEC LIVER,TRISEGMENTECTOMY: Performed by: ANESTHESIOLOGY

## 2024-12-23 PROCEDURE — 82248 BILIRUBIN DIRECT: CPT | Performed by: STUDENT IN AN ORGANIZED HEALTH CARE EDUCATION/TRAINING PROGRAM

## 2024-12-23 PROCEDURE — P9016 RBC LEUKOCYTES REDUCED: HCPCS

## 2024-12-23 PROCEDURE — 0FB20ZZ EXCISION OF LEFT LOBE LIVER, OPEN APPROACH: ICD-10-PCS | Performed by: STUDENT IN AN ORGANIZED HEALTH CARE EDUCATION/TRAINING PROGRAM

## 2024-12-23 PROCEDURE — 85027 COMPLETE CBC AUTOMATED: CPT

## 2024-12-23 PROCEDURE — 2500000004 HC RX 250 GENERAL PHARMACY W/ HCPCS (ALT 636 FOR OP/ED)

## 2024-12-23 PROCEDURE — 85027 COMPLETE CBC AUTOMATED: CPT | Performed by: PHYSICIAN ASSISTANT

## 2024-12-23 PROCEDURE — 44602 SUTURE SMALL INTESTINE: CPT | Performed by: STUDENT IN AN ORGANIZED HEALTH CARE EDUCATION/TRAINING PROGRAM

## 2024-12-23 PROCEDURE — 2500000004 HC RX 250 GENERAL PHARMACY W/ HCPCS (ALT 636 FOR OP/ED): Mod: JZ

## 2024-12-23 PROCEDURE — 37799 UNLISTED PX VASCULAR SURGERY: CPT | Performed by: PHYSICIAN ASSISTANT

## 2024-12-23 PROCEDURE — 2500000005 HC RX 250 GENERAL PHARMACY W/O HCPCS: Performed by: NURSE PRACTITIONER

## 2024-12-23 PROCEDURE — 2500000004 HC RX 250 GENERAL PHARMACY W/ HCPCS (ALT 636 FOR OP/ED): Performed by: EMERGENCY MEDICINE

## 2024-12-23 PROCEDURE — 88307 TISSUE EXAM BY PATHOLOGIST: CPT | Performed by: PATHOLOGY

## 2024-12-23 PROCEDURE — 2500000004 HC RX 250 GENERAL PHARMACY W/ HCPCS (ALT 636 FOR OP/ED): Performed by: STUDENT IN AN ORGANIZED HEALTH CARE EDUCATION/TRAINING PROGRAM

## 2024-12-23 PROCEDURE — 2500000004 HC RX 250 GENERAL PHARMACY W/ HCPCS (ALT 636 FOR OP/ED): Performed by: NURSE PRACTITIONER

## 2024-12-23 PROCEDURE — 88307 TISSUE EXAM BY PATHOLOGIST: CPT | Mod: TC,SUR | Performed by: SURGERY

## 2024-12-23 PROCEDURE — 2720000007 HC OR 272 NO HCPCS: Performed by: STUDENT IN AN ORGANIZED HEALTH CARE EDUCATION/TRAINING PROGRAM

## 2024-12-23 PROCEDURE — 82140 ASSAY OF AMMONIA: CPT | Performed by: PHYSICIAN ASSISTANT

## 2024-12-23 PROCEDURE — 83605 ASSAY OF LACTIC ACID: CPT | Performed by: PHYSICIAN ASSISTANT

## 2024-12-23 PROCEDURE — 84132 ASSAY OF SERUM POTASSIUM: CPT

## 2024-12-23 PROCEDURE — 47100 WEDGE BIOPSY OF LIVER: CPT | Performed by: STUDENT IN AN ORGANIZED HEALTH CARE EDUCATION/TRAINING PROGRAM

## 2024-12-23 PROCEDURE — 3700000002 HC GENERAL ANESTHESIA TIME - EACH INCREMENTAL 1 MINUTE: Performed by: STUDENT IN AN ORGANIZED HEALTH CARE EDUCATION/TRAINING PROGRAM

## 2024-12-23 PROCEDURE — A47122 PR RESEC LIVER,TRISEGMENTECTOMY

## 2024-12-23 PROCEDURE — 2780000003 HC OR 278 NO HCPCS: Performed by: STUDENT IN AN ORGANIZED HEALTH CARE EDUCATION/TRAINING PROGRAM

## 2024-12-23 PROCEDURE — 36430 TRANSFUSION BLD/BLD COMPNT: CPT

## 2024-12-23 PROCEDURE — 2500000005 HC RX 250 GENERAL PHARMACY W/O HCPCS: Performed by: STUDENT IN AN ORGANIZED HEALTH CARE EDUCATION/TRAINING PROGRAM

## 2024-12-23 PROCEDURE — 3600000003 HC OR TIME - INITIAL BASE CHARGE - PROCEDURE LEVEL THREE: Performed by: STUDENT IN AN ORGANIZED HEALTH CARE EDUCATION/TRAINING PROGRAM

## 2024-12-23 PROCEDURE — 99291 CRITICAL CARE FIRST HOUR: CPT | Performed by: PHYSICIAN ASSISTANT

## 2024-12-23 PROCEDURE — 82947 ASSAY GLUCOSE BLOOD QUANT: CPT

## 2024-12-23 RX ORDER — GLYCOPYRROLATE 0.2 MG/ML
INJECTION INTRAMUSCULAR; INTRAVENOUS AS NEEDED
Status: DISCONTINUED | OUTPATIENT
Start: 2024-12-23 | End: 2024-12-23

## 2024-12-23 RX ORDER — ROCURONIUM BROMIDE 10 MG/ML
INJECTION, SOLUTION INTRAVENOUS AS NEEDED
Status: DISCONTINUED | OUTPATIENT
Start: 2024-12-23 | End: 2024-12-23

## 2024-12-23 RX ORDER — CALCIUM CHLORIDE INJECTION 100 MG/ML
INJECTION, SOLUTION INTRAVENOUS AS NEEDED
Status: DISCONTINUED | OUTPATIENT
Start: 2024-12-23 | End: 2024-12-23

## 2024-12-23 RX ORDER — NEOSTIGMINE METHYLSULFATE 1 MG/ML
INJECTION INTRAVENOUS AS NEEDED
Status: DISCONTINUED | OUTPATIENT
Start: 2024-12-23 | End: 2024-12-23

## 2024-12-23 RX ORDER — ALBUMIN HUMAN 50 G/1000ML
SOLUTION INTRAVENOUS AS NEEDED
Status: DISCONTINUED | OUTPATIENT
Start: 2024-12-23 | End: 2024-12-23

## 2024-12-23 RX ORDER — HEPARIN SODIUM 1000 [USP'U]/ML
INJECTION, SOLUTION INTRAVENOUS; SUBCUTANEOUS
Status: COMPLETED
Start: 2024-12-23 | End: 2024-12-23

## 2024-12-23 RX ORDER — SODIUM CHLORIDE 9 MG/ML
INJECTION, SOLUTION INTRAVENOUS CONTINUOUS PRN
Status: DISCONTINUED | OUTPATIENT
Start: 2024-12-23 | End: 2024-12-23

## 2024-12-23 RX ORDER — ACETAMINOPHEN 10 MG/ML
1000 INJECTION, SOLUTION INTRAVENOUS EVERY 8 HOURS
Status: COMPLETED | OUTPATIENT
Start: 2024-12-23 | End: 2024-12-26

## 2024-12-23 RX ORDER — SODIUM CHLORIDE 0.9 G/100ML
INJECTION, SOLUTION IRRIGATION AS NEEDED
Status: DISCONTINUED | OUTPATIENT
Start: 2024-12-23 | End: 2024-12-23 | Stop reason: HOSPADM

## 2024-12-23 RX ORDER — HEPARIN 100 UNIT/ML
5 SYRINGE INTRAVENOUS AS NEEDED
Status: DISCONTINUED | OUTPATIENT
Start: 2024-12-23 | End: 2025-01-14

## 2024-12-23 RX ORDER — VANCOMYCIN HYDROCHLORIDE 750 MG/150ML
7.5 INJECTION, SOLUTION INTRAVENOUS EVERY 12 HOURS
Status: COMPLETED | OUTPATIENT
Start: 2024-12-24 | End: 2024-12-27

## 2024-12-23 RX ADMIN — HYDROMORPHONE HYDROCHLORIDE 0.4 MG: 1 INJECTION, SOLUTION INTRAMUSCULAR; INTRAVENOUS; SUBCUTANEOUS at 14:37

## 2024-12-23 RX ADMIN — CALCIUM CHLORIDE, MAGNESIUM CHLORIDE, DEXTROSE MONOHYDRATE, LACTIC ACID, SODIUM CHLORIDE, SODIUM BICARBONATE AND POTASSIUM CHLORIDE 25 ML/KG/HR: 3.68; 3.05; 22; 5.4; 6.46; 3.09; .314 INJECTION INTRAVENOUS at 13:31

## 2024-12-23 RX ADMIN — ACETAMINOPHEN 1000 MG: 10 INJECTION, SOLUTION INTRAVENOUS at 21:15

## 2024-12-23 RX ADMIN — CALCIUM CHLORIDE, MAGNESIUM CHLORIDE, DEXTROSE MONOHYDRATE, LACTIC ACID, SODIUM CHLORIDE, SODIUM BICARBONATE AND POTASSIUM CHLORIDE 25 ML/KG/HR: 3.68; 3.05; 22; 5.4; 6.46; 3.09; .314 INJECTION INTRAVENOUS at 13:32

## 2024-12-23 RX ADMIN — CALCIUM CHLORIDE, MAGNESIUM CHLORIDE, DEXTROSE MONOHYDRATE, LACTIC ACID, SODIUM CHLORIDE, SODIUM BICARBONATE AND POTASSIUM CHLORIDE 25 ML/KG/HR: 3.68; 3.05; 22; 5.4; 6.46; 3.09; .314 INJECTION INTRAVENOUS at 01:04

## 2024-12-23 RX ADMIN — LEUCINE, PHENYLALANINE, LYSINE HYDROCHLORIDE, METHIONINE, ISOLEUCINE, VALINE, HISTIDINE, THREONINE, TRYPTOPHAN, ALANINE, GLYCINE, ARGININE, PROLINE, TYROSINE, SERINE 25 G: 730; 560; 580; 400; 600; 580; 480; 420; 180; 2.07; 1.03; 1.15; 680; 40; 5 INJECTION INTRAVENOUS at 14:56

## 2024-12-23 RX ADMIN — DEXTROSE MONOHYDRATE 50 ML/HR: 100 INJECTION, SOLUTION INTRAVENOUS at 02:05

## 2024-12-23 RX ADMIN — MEROPENEM AND SODIUM CHLORIDE 1 G: 1 INJECTION, SOLUTION INTRAVENOUS at 00:30

## 2024-12-23 RX ADMIN — PROPOFOL 5 MCG/KG/MIN: 10 INJECTION, EMULSION INTRAVENOUS at 14:16

## 2024-12-23 RX ADMIN — SODIUM CHLORIDE 15 MMOL: 9 INJECTION, SOLUTION INTRAVENOUS at 17:40

## 2024-12-23 RX ADMIN — HEPARIN SODIUM 5000 UNITS: 5000 INJECTION INTRAVENOUS; SUBCUTANEOUS at 01:47

## 2024-12-23 RX ADMIN — HYDROMORPHONE HYDROCHLORIDE 0.4 MG: 1 INJECTION, SOLUTION INTRAMUSCULAR; INTRAVENOUS; SUBCUTANEOUS at 20:07

## 2024-12-23 RX ADMIN — HEPARIN SODIUM 10000 UNITS: 1000 INJECTION INTRAVENOUS; SUBCUTANEOUS at 07:27

## 2024-12-23 RX ADMIN — HYDROMORPHONE HYDROCHLORIDE 0.4 MG: 1 INJECTION, SOLUTION INTRAMUSCULAR; INTRAVENOUS; SUBCUTANEOUS at 22:10

## 2024-12-23 RX ADMIN — ASCORBIC ACID, VITAMIN A PALMITATE, CHOLECALCIFEROL, THIAMINE HYDROCHLORIDE, RIBOFLAVIN-5 PHOSPHATE SODIUM, PYRIDOXINE HYDROCHLORIDE, NIACINAMIDE, DEXPANTHENOL, ALPHA-TOCOPHEROL ACETATE, VITAMIN K1, FOLIC ACID, BIOTIN, CYANOCOBALAMIN: 200; 3300; 200; 6; 3.6; 6; 40; 15; 10; 150; 600; 60; 5 INJECTION, SOLUTION INTRAVENOUS at 20:07

## 2024-12-23 RX ADMIN — CALCIUM CHLORIDE, MAGNESIUM CHLORIDE, DEXTROSE MONOHYDRATE, LACTIC ACID, SODIUM CHLORIDE, SODIUM BICARBONATE AND POTASSIUM CHLORIDE 25 ML/KG/HR: 3.68; 3.05; 22; 5.4; 6.46; 3.09; .314 INJECTION INTRAVENOUS at 13:30

## 2024-12-23 RX ADMIN — CALCIUM CHLORIDE, MAGNESIUM CHLORIDE, DEXTROSE MONOHYDRATE, LACTIC ACID, SODIUM CHLORIDE, SODIUM BICARBONATE AND POTASSIUM CHLORIDE 25 ML/KG/HR: 3.68; 3.05; 22; 5.4; 6.46; 3.09; .314 INJECTION INTRAVENOUS at 07:13

## 2024-12-23 RX ADMIN — Medication 40 PERCENT: at 08:00

## 2024-12-23 RX ADMIN — Medication 125 MCG/HR: at 05:55

## 2024-12-23 RX ADMIN — MEROPENEM AND SODIUM CHLORIDE 1 G: 1 INJECTION, SOLUTION INTRAVENOUS at 17:40

## 2024-12-23 RX ADMIN — Medication 40 PERCENT: at 19:17

## 2024-12-23 RX ADMIN — VANCOMYCIN HYDROCHLORIDE 1750 MG: 5 INJECTION, POWDER, LYOPHILIZED, FOR SOLUTION INTRAVENOUS at 17:40

## 2024-12-23 RX ADMIN — HYDROMORPHONE HYDROCHLORIDE 0.4 MG: 1 INJECTION, SOLUTION INTRAMUSCULAR; INTRAVENOUS; SUBCUTANEOUS at 06:30

## 2024-12-23 RX ADMIN — CALCIUM CHLORIDE, MAGNESIUM CHLORIDE, DEXTROSE MONOHYDRATE, LACTIC ACID, SODIUM CHLORIDE, SODIUM BICARBONATE AND POTASSIUM CHLORIDE 25 ML/KG/HR: 3.68; 3.05; 22; 5.4; 6.46; 3.09; .314 INJECTION INTRAVENOUS at 01:05

## 2024-12-23 RX ADMIN — DEXTROSE MONOHYDRATE 50 ML/HR: 100 INJECTION, SOLUTION INTRAVENOUS at 07:51

## 2024-12-23 RX ADMIN — CALCIUM CHLORIDE, MAGNESIUM CHLORIDE, DEXTROSE MONOHYDRATE, LACTIC ACID, SODIUM CHLORIDE, SODIUM BICARBONATE AND POTASSIUM CHLORIDE 25 ML/KG/HR: 3.68; 3.05; 22; 5.4; 6.46; 3.09; .314 INJECTION INTRAVENOUS at 07:14

## 2024-12-23 RX ADMIN — CALCIUM CHLORIDE, MAGNESIUM CHLORIDE, DEXTROSE MONOHYDRATE, LACTIC ACID, SODIUM CHLORIDE, SODIUM BICARBONATE AND POTASSIUM CHLORIDE 25 ML/KG/HR: 3.68; 3.05; 22; 5.4; 6.46; 3.09; .314 INJECTION INTRAVENOUS at 01:06

## 2024-12-23 RX ADMIN — SODIUM CHLORIDE 15 MMOL: 9 INJECTION, SOLUTION INTRAVENOUS at 06:04

## 2024-12-23 RX ADMIN — PANTOPRAZOLE SODIUM 40 MG: 40 INJECTION, POWDER, FOR SOLUTION INTRAVENOUS at 06:02

## 2024-12-23 RX ADMIN — Medication 75 MCG/HR: at 14:15

## 2024-12-23 RX ADMIN — ACETAMINOPHEN 1000 MG: 10 INJECTION, SOLUTION INTRAVENOUS at 14:19

## 2024-12-23 RX ADMIN — HEPARIN SODIUM 5000 UNITS: 5000 INJECTION INTRAVENOUS; SUBCUTANEOUS at 17:53

## 2024-12-23 RX ADMIN — CALCIUM CHLORIDE, MAGNESIUM CHLORIDE, DEXTROSE MONOHYDRATE, LACTIC ACID, SODIUM CHLORIDE, SODIUM BICARBONATE AND POTASSIUM CHLORIDE 25 ML/KG/HR: 3.68; 3.05; 22; 5.4; 6.46; 3.09; .314 INJECTION INTRAVENOUS at 07:15

## 2024-12-23 RX ADMIN — HYDROMORPHONE HYDROCHLORIDE 0.4 MG: 1 INJECTION, SOLUTION INTRAMUSCULAR; INTRAVENOUS; SUBCUTANEOUS at 01:57

## 2024-12-23 SDOH — HEALTH STABILITY: MENTAL HEALTH: CURRENT SMOKER: 0

## 2024-12-23 ASSESSMENT — PAIN - FUNCTIONAL ASSESSMENT

## 2024-12-23 ASSESSMENT — PAIN SCALES - PAIN ASSESSMENT IN ADVANCED DEMENTIA (PAINAD): TOTALSCORE: MEDICATION (SEE MAR)
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
## 2024-12-23 NOTE — INTERVAL H&P NOTE
H&P reviewed. The patient was examined and there are no changes to the H&P.    Plan abdominal closure, end jejunostomy, possible wedge resection of liver.
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
## 2024-12-23 NOTE — PROGRESS NOTES
Vancomycin Dosing by Pharmacy- FOLLOW UP    Ike Gar is a 78 y.o. year old male who Pharmacy has been consulted for vancomycin dosing for Abdominal Infection. Based on the patient's indication and renal status this patient is being dosed based on a goal trough/random level of 15-20.     Renal function is currently declined - on CVVH (started 12/21, OFF 12/23 @0730 to go to OR, confirmed with nurse 12/23 @1630 that it was restarted 12/23 @1330)    Current vancomycin dose:  1750mg once 12/22 @1334    Most recent trough level: 6.9 mcg/mL ~24hr level     Estimated Creatinine Clearance: 34.2 mL/min (A) (by C-G formula based on SCr of 2.5 mg/dL (H)).     Results from last 7 days   Lab Units 12/23/24  1358 12/23/24  1253 12/23/24  0145 12/22/24  0121 12/22/24  0120   BUN mg/dL  --  40* 35* 28*  --    CREATININE mg/dL  --  2.50* 2.19* 2.14*  --    WBC AUTO x10*3/uL 27.6*  --  30.6*  --  38.8*   VANCOMYCIN TR ug/mL  --  6.9  --   --   --         Staph/MRSA Screen Culture   Date/Time Value Ref Range Status   12/17/2024 01:23 AM No Staphylococcus aureus isolated  Final     Blood Culture   Date/Time Value Ref Range Status   12/22/2024 11:39 AM No growth at 1 day  Preliminary     Gram Stain   Date/Time Value Ref Range Status   12/22/2024 04:17 PM (2+) Few Polymorphonuclear leukocytes  Preliminary   12/22/2024 04:17 PM No organisms seen  Preliminary        No results found for the last 90 days.      Visit Vitals  /56   Pulse 79   Temp 35.9 °C (96.6 °F)   Resp 23        Assessment/Plan    KRISTIN -currently on CVVH (started 12/21, OFF 12/23 @0730 to go to OR, confirmed with nurse 12/23 @1630 that it was restarted 12/23 @1330)    Pt received Vancomycin 1750mg x1 12/22 @1334  Most recent trough level: 6.9 mcg/mL 12/23 @1253 (~24hr level)     Reload pt with Vancomycin 1750mg x1, scheduled for 12/23 @1730.   Start maintenance dose Vancomycin 750mg every 12 hours, to start 12/24 @0530  The next level will be obtained on 12/24  @1700. May be obtained sooner if clinically indicated.   Will continue to monitor renal function daily while on vancomycin and order serum creatinine at least every 48 hours if not already ordered.  Follow for continued vancomycin needs, clinical response, and signs/symptoms of toxicity.       Carol Trejo, PharmD
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
## 2024-12-24 ENCOUNTER — APPOINTMENT (OUTPATIENT)
Dept: RADIOLOGY | Facility: HOSPITAL | Age: 79
End: 2024-12-24
Payer: MEDICARE

## 2024-12-24 LAB
ABO GROUP (TYPE) IN BLOOD: NORMAL
ALBUMIN SERPL BCP-MCNC: 2 G/DL (ref 3.4–5)
ALBUMIN SERPL BCP-MCNC: 2 G/DL (ref 3.4–5)
ALBUMIN SERPL BCP-MCNC: 2.2 G/DL (ref 3.4–5)
ALP SERPL-CCNC: 107 U/L (ref 33–136)
ALP SERPL-CCNC: 112 U/L (ref 33–136)
ALT SERPL W P-5'-P-CCNC: 115 U/L (ref 10–52)
ALT SERPL W P-5'-P-CCNC: 153 U/L (ref 10–52)
AMMONIA PLAS-SCNC: 29 UMOL/L (ref 16–53)
ANION GAP BLDA CALCULATED.4IONS-SCNC: 8 MMO/L (ref 10–25)
ANION GAP BLDA CALCULATED.4IONS-SCNC: 9 MMO/L (ref 10–25)
ANION GAP SERPL CALC-SCNC: 11 MMOL/L (ref 10–20)
ANTIBODY SCREEN: NORMAL
AST SERPL W P-5'-P-CCNC: 43 U/L (ref 9–39)
AST SERPL W P-5'-P-CCNC: 66 U/L (ref 9–39)
BACTERIA SPEC RESP CULT: NORMAL
BASE EXCESS BLDA CALC-SCNC: -0.3 MMOL/L (ref -2–3)
BASE EXCESS BLDA CALC-SCNC: -0.9 MMOL/L (ref -2–3)
BASE EXCESS BLDA CALC-SCNC: -1.5 MMOL/L (ref -2–3)
BASE EXCESS BLDA CALC-SCNC: 0.5 MMOL/L (ref -2–3)
BILIRUB DIRECT SERPL-MCNC: 12.3 MG/DL (ref 0–0.3)
BILIRUB DIRECT SERPL-MCNC: 15 MG/DL (ref 0–0.3)
BILIRUB FLD-MCNC: 11.8 MG/DL
BILIRUB SERPL-MCNC: 21 MG/DL (ref 0–1.2)
BILIRUB SERPL-MCNC: 21.6 MG/DL (ref 0–1.2)
BODY TEMPERATURE: 37 DEGREES CELSIUS
BUN SERPL-MCNC: 41 MG/DL (ref 6–23)
CA-I BLD-SCNC: 1.06 MMOL/L (ref 1.1–1.33)
CA-I BLDA-SCNC: 1.05 MMOL/L (ref 1.1–1.33)
CA-I BLDA-SCNC: 1.06 MMOL/L (ref 1.1–1.33)
CA-I BLDA-SCNC: 1.08 MMOL/L (ref 1.1–1.33)
CA-I BLDA-SCNC: 1.1 MMOL/L (ref 1.1–1.33)
CALCIUM SERPL-MCNC: 6.9 MG/DL (ref 8.6–10.6)
CFT FORM KAOLIN IND BLD RES TEG: 1.1 MIN (ref 0.8–2.1)
CHLORIDE BLDA-SCNC: 101 MMOL/L (ref 98–107)
CHLORIDE BLDA-SCNC: 103 MMOL/L (ref 98–107)
CHLORIDE BLDA-SCNC: 103 MMOL/L (ref 98–107)
CHLORIDE BLDA-SCNC: 104 MMOL/L (ref 98–107)
CHLORIDE SERPL-SCNC: 103 MMOL/L (ref 98–107)
CLOT ANGLE.KAOLIN INDUCED BLD RES TEG: 74 DEG (ref 63–78)
CLOT INIT KAO IND P HEP NEUT BLD RES TEG: 6.9 MIN (ref 4.3–8.3)
CLOT INIT KAO IND P HEP NEUT BLD RES TEG: 7.1 MIN (ref 4.6–9.1)
CO2 SERPL-SCNC: 24 MMOL/L (ref 21–32)
CREAT SERPL-MCNC: 2.23 MG/DL (ref 0.5–1.3)
EGFRCR SERPLBLD CKD-EPI 2021: 30 ML/MIN/1.73M*2
ERYTHROCYTE [DISTWIDTH] IN BLOOD BY AUTOMATED COUNT: 15.3 % (ref 11.5–14.5)
FIBRINOGEN BLD CALC-MCNC: 524 MG/DL (ref 278–581)
FIBRINOGEN PPP-MCNC: 545 MG/DL (ref 200–400)
GLUCOSE BLD MANUAL STRIP-MCNC: 100 MG/DL (ref 74–99)
GLUCOSE BLD MANUAL STRIP-MCNC: 100 MG/DL (ref 74–99)
GLUCOSE BLD MANUAL STRIP-MCNC: 109 MG/DL (ref 74–99)
GLUCOSE BLD MANUAL STRIP-MCNC: 91 MG/DL (ref 74–99)
GLUCOSE BLDA-MCNC: 103 MG/DL (ref 74–99)
GLUCOSE BLDA-MCNC: 106 MG/DL (ref 74–99)
GLUCOSE BLDA-MCNC: 108 MG/DL (ref 74–99)
GLUCOSE BLDA-MCNC: 92 MG/DL (ref 74–99)
GLUCOSE SERPL-MCNC: 87 MG/DL (ref 74–99)
GRAM STN SPEC: NORMAL
GRAM STN SPEC: NORMAL
HAPTOGLOB SERPL NEPH-MCNC: 169 MG/DL (ref 30–200)
HCO3 BLDA-SCNC: 22.8 MMOL/L (ref 22–26)
HCO3 BLDA-SCNC: 23.2 MMOL/L (ref 22–26)
HCO3 BLDA-SCNC: 23.9 MMOL/L (ref 22–26)
HCO3 BLDA-SCNC: 24.2 MMOL/L (ref 22–26)
HCT VFR BLD AUTO: 22.1 % (ref 41–52)
HCT VFR BLD EST: 23 % (ref 41–52)
HCT VFR BLD EST: 23 % (ref 41–52)
HCT VFR BLD EST: 24 % (ref 41–52)
HCT VFR BLD EST: 32 % (ref 41–52)
HGB BLD-MCNC: 7.9 G/DL (ref 13.5–17.5)
HGB BLDA-MCNC: 10.7 G/DL (ref 13.5–17.5)
HGB BLDA-MCNC: 7.6 G/DL (ref 13.5–17.5)
HGB BLDA-MCNC: 7.8 G/DL (ref 13.5–17.5)
HGB BLDA-MCNC: 8 G/DL (ref 13.5–17.5)
HGB RETIC QN: 29 PG (ref 28–38)
IMMATURE RETIC FRACTION: 38.5 %
INHALED O2 CONCENTRATION: 40 %
INHALED O2 CONCENTRATION: 40 %
INHALED O2 CONCENTRATION: 55 %
INHALED O2 CONCENTRATION: 55 %
LABORATORY COMMENT REPORT: NORMAL
LACTATE BLDA-SCNC: 1 MMOL/L (ref 0.4–2)
LACTATE BLDA-SCNC: 1 MMOL/L (ref 0.4–2)
LACTATE BLDA-SCNC: 1.2 MMOL/L (ref 0.4–2)
LACTATE BLDA-SCNC: 1.4 MMOL/L (ref 0.4–2)
LDH SERPL L TO P-CCNC: 276 U/L (ref 84–246)
MA KAOLIN BLD RES TEG: 65 MM (ref 52–69)
MA KAOLIN+TF BLD RES TEG: 66 MM (ref 52–70)
MA TF IND+IIB-IIIA INH BLD RES TEG: 29 MM (ref 15–32)
MAGNESIUM SERPL-MCNC: 2.38 MG/DL (ref 1.6–2.4)
MCH RBC QN AUTO: 27.9 PG (ref 26–34)
MCHC RBC AUTO-ENTMCNC: 35.7 G/DL (ref 32–36)
MCV RBC AUTO: 78 FL (ref 80–100)
NRBC BLD-RTO: 0.5 /100 WBCS (ref 0–0)
OXYHGB MFR BLDA: 91.2 % (ref 94–98)
OXYHGB MFR BLDA: 94.3 % (ref 94–98)
OXYHGB MFR BLDA: 95.4 % (ref 94–98)
OXYHGB MFR BLDA: 95.5 % (ref 94–98)
PATH REPORT.FINAL DX SPEC: NORMAL
PATH REPORT.GROSS SPEC: NORMAL
PATH REPORT.RELEVANT HX SPEC: NORMAL
PATH REPORT.TOTAL CANCER: NORMAL
PCO2 BLDA: 34 MM HG (ref 38–42)
PCO2 BLDA: 35 MM HG (ref 38–42)
PCO2 BLDA: 36 MM HG (ref 38–42)
PCO2 BLDA: 36 MM HG (ref 38–42)
PH BLDA: 7.41 PH (ref 7.38–7.42)
PH BLDA: 7.43 PH (ref 7.38–7.42)
PH BLDA: 7.43 PH (ref 7.38–7.42)
PH BLDA: 7.46 PH (ref 7.38–7.42)
PHOSPHATE SERPL-MCNC: 2.1 MG/DL (ref 2.5–4.9)
PLATELET # BLD AUTO: 81 X10*3/UL (ref 150–450)
PO2 BLDA: 60 MM HG (ref 85–95)
PO2 BLDA: 71 MM HG (ref 85–95)
PO2 BLDA: 76 MM HG (ref 85–95)
PO2 BLDA: 89 MM HG (ref 85–95)
POTASSIUM BLDA-SCNC: 3.9 MMOL/L (ref 3.5–5.3)
POTASSIUM BLDA-SCNC: 4.1 MMOL/L (ref 3.5–5.3)
POTASSIUM BLDA-SCNC: 4.1 MMOL/L (ref 3.5–5.3)
POTASSIUM BLDA-SCNC: 4.5 MMOL/L (ref 3.5–5.3)
POTASSIUM SERPL-SCNC: 3.8 MMOL/L (ref 3.5–5.3)
PROT SERPL-MCNC: 3.5 G/DL (ref 6.4–8.2)
PROT SERPL-MCNC: 4 G/DL (ref 6.4–8.2)
RBC # BLD AUTO: 2.83 X10*6/UL (ref 4.5–5.9)
RESIDENT REVIEW: NORMAL
RETICS #: 0.09 X10*6/UL (ref 0.02–0.11)
RETICS/RBC NFR AUTO: 3.2 % (ref 0.5–2)
RH FACTOR (ANTIGEN D): NORMAL
SAO2 % BLDA: 94 % (ref 94–100)
SAO2 % BLDA: 97 % (ref 94–100)
SAO2 % BLDA: 98 % (ref 94–100)
SAO2 % BLDA: 99 % (ref 94–100)
SODIUM BLDA-SCNC: 129 MMOL/L (ref 136–145)
SODIUM BLDA-SCNC: 131 MMOL/L (ref 136–145)
SODIUM SERPL-SCNC: 134 MMOL/L (ref 136–145)
VANCOMYCIN TROUGH SERPL-MCNC: 18.7 UG/ML (ref 5–20)
WBC # BLD AUTO: 32.3 X10*3/UL (ref 4.4–11.3)

## 2024-12-24 PROCEDURE — 83615 LACTATE (LD) (LDH) ENZYME: CPT

## 2024-12-24 PROCEDURE — 80202 ASSAY OF VANCOMYCIN: CPT

## 2024-12-24 PROCEDURE — 2500000005 HC RX 250 GENERAL PHARMACY W/O HCPCS

## 2024-12-24 PROCEDURE — 82248 BILIRUBIN DIRECT: CPT

## 2024-12-24 PROCEDURE — 82140 ASSAY OF AMMONIA: CPT

## 2024-12-24 PROCEDURE — 82810 BLOOD GASES O2 SAT ONLY: CPT

## 2024-12-24 PROCEDURE — 99291 CRITICAL CARE FIRST HOUR: CPT | Performed by: SURGERY

## 2024-12-24 PROCEDURE — 2020000001 HC ICU ROOM DAILY

## 2024-12-24 PROCEDURE — 2500000004 HC RX 250 GENERAL PHARMACY W/ HCPCS (ALT 636 FOR OP/ED): Performed by: PHYSICIAN ASSISTANT

## 2024-12-24 PROCEDURE — 2500000004 HC RX 250 GENERAL PHARMACY W/ HCPCS (ALT 636 FOR OP/ED): Mod: JZ

## 2024-12-24 PROCEDURE — 85045 AUTOMATED RETICULOCYTE COUNT: CPT

## 2024-12-24 PROCEDURE — 2500000004 HC RX 250 GENERAL PHARMACY W/ HCPCS (ALT 636 FOR OP/ED)

## 2024-12-24 PROCEDURE — 85027 COMPLETE CBC AUTOMATED: CPT | Performed by: PHYSICIAN ASSISTANT

## 2024-12-24 PROCEDURE — 85384 FIBRINOGEN ACTIVITY: CPT

## 2024-12-24 PROCEDURE — 90937 HEMODIALYSIS REPEATED EVAL: CPT

## 2024-12-24 PROCEDURE — 71045 X-RAY EXAM CHEST 1 VIEW: CPT | Performed by: STUDENT IN AN ORGANIZED HEALTH CARE EDUCATION/TRAINING PROGRAM

## 2024-12-24 PROCEDURE — 94003 VENT MGMT INPAT SUBQ DAY: CPT

## 2024-12-24 PROCEDURE — 2500000004 HC RX 250 GENERAL PHARMACY W/ HCPCS (ALT 636 FOR OP/ED): Performed by: STUDENT IN AN ORGANIZED HEALTH CARE EDUCATION/TRAINING PROGRAM

## 2024-12-24 PROCEDURE — 90945 DIALYSIS ONE EVALUATION: CPT | Performed by: INTERNAL MEDICINE

## 2024-12-24 PROCEDURE — 71045 X-RAY EXAM CHEST 1 VIEW: CPT

## 2024-12-24 PROCEDURE — 82947 ASSAY GLUCOSE BLOOD QUANT: CPT

## 2024-12-24 PROCEDURE — 82435 ASSAY OF BLOOD CHLORIDE: CPT | Performed by: PHYSICIAN ASSISTANT

## 2024-12-24 PROCEDURE — 2500000005 HC RX 250 GENERAL PHARMACY W/O HCPCS: Performed by: PHYSICIAN ASSISTANT

## 2024-12-24 PROCEDURE — 83735 ASSAY OF MAGNESIUM: CPT

## 2024-12-24 PROCEDURE — 80076 HEPATIC FUNCTION PANEL: CPT | Mod: CCI | Performed by: STUDENT IN AN ORGANIZED HEALTH CARE EDUCATION/TRAINING PROGRAM

## 2024-12-24 PROCEDURE — 37799 UNLISTED PX VASCULAR SURGERY: CPT | Performed by: PHYSICIAN ASSISTANT

## 2024-12-24 PROCEDURE — 85576 BLOOD PLATELET AGGREGATION: CPT

## 2024-12-24 PROCEDURE — 86923 COMPATIBILITY TEST ELECTRIC: CPT

## 2024-12-24 PROCEDURE — 86901 BLOOD TYPING SEROLOGIC RH(D): CPT

## 2024-12-24 PROCEDURE — 99024 POSTOP FOLLOW-UP VISIT: CPT | Performed by: STUDENT IN AN ORGANIZED HEALTH CARE EDUCATION/TRAINING PROGRAM

## 2024-12-24 PROCEDURE — P9045 ALBUMIN (HUMAN), 5%, 250 ML: HCPCS | Mod: JZ

## 2024-12-24 PROCEDURE — 74018 RADEX ABDOMEN 1 VIEW: CPT | Performed by: STUDENT IN AN ORGANIZED HEALTH CARE EDUCATION/TRAINING PROGRAM

## 2024-12-24 PROCEDURE — 82330 ASSAY OF CALCIUM: CPT | Performed by: PHYSICIAN ASSISTANT

## 2024-12-24 PROCEDURE — 83010 ASSAY OF HAPTOGLOBIN QUANT: CPT

## 2024-12-24 PROCEDURE — 74018 RADEX ABDOMEN 1 VIEW: CPT

## 2024-12-24 PROCEDURE — 37799 UNLISTED PX VASCULAR SURGERY: CPT

## 2024-12-24 PROCEDURE — 82247 BILIRUBIN TOTAL: CPT | Performed by: STUDENT IN AN ORGANIZED HEALTH CARE EDUCATION/TRAINING PROGRAM

## 2024-12-24 RX ORDER — ALBUMIN HUMAN 50 G/1000ML
12.5 SOLUTION INTRAVENOUS ONCE
Status: DISCONTINUED | OUTPATIENT
Start: 2024-12-24 | End: 2024-12-24

## 2024-12-24 RX ORDER — ALBUMIN HUMAN 50 G/1000ML
25 SOLUTION INTRAVENOUS ONCE
Status: COMPLETED | OUTPATIENT
Start: 2024-12-24 | End: 2024-12-24

## 2024-12-24 RX ORDER — ALBUMIN HUMAN 250 G/1000ML
SOLUTION INTRAVENOUS
Status: DISPENSED
Start: 2024-12-24 | End: 2024-12-25

## 2024-12-24 RX ORDER — HYDROMORPHONE HYDROCHLORIDE 1 MG/ML
0.6 INJECTION, SOLUTION INTRAMUSCULAR; INTRAVENOUS; SUBCUTANEOUS EVERY 2 HOUR PRN
Status: DISCONTINUED | OUTPATIENT
Start: 2024-12-24 | End: 2024-12-25

## 2024-12-24 RX ORDER — METHOCARBAMOL 100 MG/ML
1000 INJECTION, SOLUTION INTRAMUSCULAR; INTRAVENOUS ONCE
Status: COMPLETED | OUTPATIENT
Start: 2024-12-24 | End: 2024-12-24

## 2024-12-24 RX ADMIN — HEPARIN SODIUM 5000 UNITS: 5000 INJECTION INTRAVENOUS; SUBCUTANEOUS at 18:00

## 2024-12-24 RX ADMIN — VANCOMYCIN HYDROCHLORIDE 750 MG: 750 INJECTION, SOLUTION INTRAVENOUS at 04:30

## 2024-12-24 RX ADMIN — CALCIUM CHLORIDE, MAGNESIUM CHLORIDE, DEXTROSE MONOHYDRATE, LACTIC ACID, SODIUM CHLORIDE, SODIUM BICARBONATE AND POTASSIUM CHLORIDE 25 ML/KG/HR: 3.68; 3.05; 22; 5.4; 6.46; 3.09; .314 INJECTION INTRAVENOUS at 01:13

## 2024-12-24 RX ADMIN — CALCIUM CHLORIDE, MAGNESIUM CHLORIDE, DEXTROSE MONOHYDRATE, LACTIC ACID, SODIUM CHLORIDE, SODIUM BICARBONATE AND POTASSIUM CHLORIDE 25 ML/KG/HR: 3.68; 3.05; 22; 5.4; 6.46; 3.09; .314 INJECTION INTRAVENOUS at 13:01

## 2024-12-24 RX ADMIN — MEROPENEM AND SODIUM CHLORIDE 1 G: 1 INJECTION, SOLUTION INTRAVENOUS at 05:18

## 2024-12-24 RX ADMIN — ACETAMINOPHEN 1000 MG: 10 INJECTION, SOLUTION INTRAVENOUS at 06:03

## 2024-12-24 RX ADMIN — PANTOPRAZOLE SODIUM 40 MG: 40 INJECTION, POWDER, FOR SOLUTION INTRAVENOUS at 06:08

## 2024-12-24 RX ADMIN — CALCIUM CHLORIDE, MAGNESIUM CHLORIDE, DEXTROSE MONOHYDRATE, LACTIC ACID, SODIUM CHLORIDE, SODIUM BICARBONATE AND POTASSIUM CHLORIDE 25 ML/KG/HR: 3.68; 3.05; 22; 5.4; 6.46; 3.09; .314 INJECTION INTRAVENOUS at 06:29

## 2024-12-24 RX ADMIN — ASCORBIC ACID, VITAMIN A PALMITATE, CHOLECALCIFEROL, THIAMINE HYDROCHLORIDE, RIBOFLAVIN-5 PHOSPHATE SODIUM, PYRIDOXINE HYDROCHLORIDE, NIACINAMIDE, DEXPANTHENOL, ALPHA-TOCOPHEROL ACETATE, VITAMIN K1, FOLIC ACID, BIOTIN, CYANOCOBALAMIN: 200; 3300; 200; 6; 3.6; 6; 40; 15; 10; 150; 600; 60; 5 INJECTION, SOLUTION INTRAVENOUS at 21:27

## 2024-12-24 RX ADMIN — ACETAMINOPHEN 1000 MG: 10 INJECTION, SOLUTION INTRAVENOUS at 14:01

## 2024-12-24 RX ADMIN — VANCOMYCIN HYDROCHLORIDE 750 MG: 750 INJECTION, SOLUTION INTRAVENOUS at 18:00

## 2024-12-24 RX ADMIN — MEROPENEM AND SODIUM CHLORIDE 1 G: 1 INJECTION, SOLUTION INTRAVENOUS at 18:00

## 2024-12-24 RX ADMIN — Medication 40 PERCENT: at 07:57

## 2024-12-24 RX ADMIN — PROPOFOL 5 MCG/KG/MIN: 10 INJECTION, EMULSION INTRAVENOUS at 03:45

## 2024-12-24 RX ADMIN — METHOCARBAMOL 1000 MG: 1000 INJECTION, SOLUTION INTRAMUSCULAR; INTRAVENOUS at 21:33

## 2024-12-24 RX ADMIN — LEUCINE, PHENYLALANINE, LYSINE HYDROCHLORIDE, METHIONINE, ISOLEUCINE, VALINE, HISTIDINE, THREONINE, TRYPTOPHAN, ALANINE, GLYCINE, ARGININE, PROLINE, TYROSINE, SERINE 25 G: 730; 560; 580; 400; 600; 580; 480; 420; 180; 2.07; 1.03; 1.15; 680; 40; 5 INJECTION INTRAVENOUS at 08:31

## 2024-12-24 RX ADMIN — ALBUMIN HUMAN 25 G: 0.05 INJECTION, SOLUTION INTRAVENOUS at 10:42

## 2024-12-24 RX ADMIN — SODIUM CHLORIDE 30 MMOL: 9 INJECTION, SOLUTION INTRAVENOUS at 06:08

## 2024-12-24 RX ADMIN — HEPARIN SODIUM 5000 UNITS: 5000 INJECTION INTRAVENOUS; SUBCUTANEOUS at 10:41

## 2024-12-24 RX ADMIN — Medication 55 PERCENT: at 20:09

## 2024-12-24 RX ADMIN — CALCIUM CHLORIDE, MAGNESIUM CHLORIDE, DEXTROSE MONOHYDRATE, LACTIC ACID, SODIUM CHLORIDE, SODIUM BICARBONATE AND POTASSIUM CHLORIDE 25 ML/KG/HR: 3.68; 3.05; 22; 5.4; 6.46; 3.09; .314 INJECTION INTRAVENOUS at 06:28

## 2024-12-24 RX ADMIN — HYDROMORPHONE HYDROCHLORIDE 0.4 MG: 1 INJECTION, SOLUTION INTRAMUSCULAR; INTRAVENOUS; SUBCUTANEOUS at 20:23

## 2024-12-24 RX ADMIN — HYDROMORPHONE HYDROCHLORIDE 0.4 MG: 1 INJECTION, SOLUTION INTRAMUSCULAR; INTRAVENOUS; SUBCUTANEOUS at 02:14

## 2024-12-24 RX ADMIN — HYDROMORPHONE HYDROCHLORIDE 0.6 MG: 1 INJECTION, SOLUTION INTRAMUSCULAR; INTRAVENOUS; SUBCUTANEOUS at 23:42

## 2024-12-24 RX ADMIN — CALCIUM CHLORIDE, MAGNESIUM CHLORIDE, DEXTROSE MONOHYDRATE, LACTIC ACID, SODIUM CHLORIDE, SODIUM BICARBONATE AND POTASSIUM CHLORIDE 25 ML/KG/HR: 3.68; 3.05; 22; 5.4; 6.46; 3.09; .314 INJECTION INTRAVENOUS at 01:10

## 2024-12-24 RX ADMIN — CALCIUM CHLORIDE, MAGNESIUM CHLORIDE, DEXTROSE MONOHYDRATE, LACTIC ACID, SODIUM CHLORIDE, SODIUM BICARBONATE AND POTASSIUM CHLORIDE 25 ML/KG/HR: 3.68; 3.05; 22; 5.4; 6.46; 3.09; .314 INJECTION INTRAVENOUS at 06:30

## 2024-12-24 RX ADMIN — CALCIUM CHLORIDE, MAGNESIUM CHLORIDE, DEXTROSE MONOHYDRATE, LACTIC ACID, SODIUM CHLORIDE, SODIUM BICARBONATE AND POTASSIUM CHLORIDE 25 ML/KG/HR: 3.68; 3.05; 22; 5.4; 6.46; 3.09; .314 INJECTION INTRAVENOUS at 01:11

## 2024-12-24 RX ADMIN — HYDROMORPHONE HYDROCHLORIDE 0.4 MG: 1 INJECTION, SOLUTION INTRAMUSCULAR; INTRAVENOUS; SUBCUTANEOUS at 12:39

## 2024-12-24 RX ADMIN — ACETAMINOPHEN 1000 MG: 10 INJECTION, SOLUTION INTRAVENOUS at 22:00

## 2024-12-24 RX ADMIN — HYDROMORPHONE HYDROCHLORIDE 0.4 MG: 1 INJECTION, SOLUTION INTRAMUSCULAR; INTRAVENOUS; SUBCUTANEOUS at 00:16

## 2024-12-24 RX ADMIN — HYDROMORPHONE HYDROCHLORIDE 0.4 MG: 1 INJECTION, SOLUTION INTRAMUSCULAR; INTRAVENOUS; SUBCUTANEOUS at 18:25

## 2024-12-24 RX ADMIN — HYDROMORPHONE HYDROCHLORIDE 0.4 MG: 1 INJECTION, SOLUTION INTRAMUSCULAR; INTRAVENOUS; SUBCUTANEOUS at 04:17

## 2024-12-24 RX ADMIN — Medication 75 MCG/HR: at 07:52

## 2024-12-24 RX ADMIN — HYDROMORPHONE HYDROCHLORIDE 0.4 MG: 1 INJECTION, SOLUTION INTRAMUSCULAR; INTRAVENOUS; SUBCUTANEOUS at 07:54

## 2024-12-24 RX ADMIN — HEPARIN SODIUM 5000 UNITS: 5000 INJECTION INTRAVENOUS; SUBCUTANEOUS at 02:14

## 2024-12-24 RX ADMIN — CALCIUM CHLORIDE, MAGNESIUM CHLORIDE, DEXTROSE MONOHYDRATE, LACTIC ACID, SODIUM CHLORIDE, SODIUM BICARBONATE AND POTASSIUM CHLORIDE 25 ML/KG/HR: 3.68; 3.05; 22; 5.4; 6.46; 3.09; .314 INJECTION INTRAVENOUS at 18:40

## 2024-12-24 ASSESSMENT — PAIN - FUNCTIONAL ASSESSMENT
PAIN_FUNCTIONAL_ASSESSMENT: CPOT (CRITICAL CARE PAIN OBSERVATION TOOL)
PAIN_FUNCTIONAL_ASSESSMENT: CPOT (CRITICAL CARE PAIN OBSERVATION TOOL)
PAIN_FUNCTIONAL_ASSESSMENT: 0-10
PAIN_FUNCTIONAL_ASSESSMENT: CPOT (CRITICAL CARE PAIN OBSERVATION TOOL)

## 2024-12-24 ASSESSMENT — PAIN SCALES - GENERAL: PAINLEVEL_OUTOF10: 0 - NO PAIN

## 2024-12-24 ASSESSMENT — PAIN SCALES - PAIN ASSESSMENT IN ADVANCED DEMENTIA (PAINAD)
TOTALSCORE: MEDICATION (SEE MAR)
TOTALSCORE: MEDICATION (SEE MAR)

## 2024-12-24 NOTE — SIGNIFICANT EVENT
12/24/2024  10:08 AM    Ike Gar  [id number]    I evaluated and examined the patient with the critical care team. As a multidisciplinary team, we discussed all findings, as well as assessment and plan. I personally spent 35 minutes of critical care time excluding procedures at the bedside with the patient. I personally reviewed all labs, results and studies. My independent note with assessment and plan are below:    Neuro  Spinal fx-> strict spinal precautions  Following commands  Comfortable on current pain/sedation regiment  TLSO per spine    CV  Continuing minimal pressor need for MAP goal of 65  Septic shock continues to improve  I think intra-vascularly depleted->albumin challenge    Pulmonary  Weaning to extubate  Pain control/pulmonary hygiene for rib fx    GI  Grade II liver injury stable  NPO, TPN back to goal  Minimal out of ostomies    Renal/  Electrolyte replacements  BID RFPs    Heme  Hb stable  Daily CBC    MSK  No current issues    Endo  Finished stress dose steroids  SSI, good glycemic control    ID  Meropenem/Vanc-> talk to primary for stop date    Skin  ICU surveillance no current DTIs    Lines/Tubes/Drains  ETT, PIV, ostomy bag, arterial line, central line, OGT, intra-abdominal drains    Prophylaxis  subQ hep, SCDs, PPI    Restraints  Yes, re-ordered    Disposition  TICU care    Doug Low, DO, FACS
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
## 2024-12-24 NOTE — CARE PLAN
The clinical goals for the shift include HDS throughout the shift    Problem: Safety - Medical Restraint  Goal: Remains free of injury from restraints (Restraint for Interference with Medical Device)  Outcome: Progressing  Flowsheets (Taken 12/23/2024 2157)  Remains free of injury from restraints (restraint for interference with medical device):   Determine that other, less restrictive measures have been tried or would not be effective before applying the restraint   Evaluate the patient's condition at the time of restraint application   Inform patient/family regarding the reason for restraint   Every 2 hours: Monitor safety, psychosocial status, comfort, nutrition and hydration  Goal: Free from restraint(s) (Restraint for Interference with Medical Device)  Outcome: Progressing  Flowsheets (Taken 12/23/2024 2157)  Free from restraint(s) (restraint for interference with medical device):   Every 24 hours: Continued use of restraint requires Licensed Independent Practitioner to perform face to face examination and written order   ONCE/SHIFT or MINIMUM Every 12 hours: Assess and document the continuing need for restraints   Identify and implement measures to help patient regain control     Problem: Pain - Adult  Goal: Verbalizes/displays adequate comfort level or baseline comfort level  Outcome: Progressing     Problem: Safety - Adult  Goal: Free from fall injury  Outcome: Progressing     Problem: Discharge Planning  Goal: Discharge to home or other facility with appropriate resources  Outcome: Progressing     Problem: Chronic Conditions and Co-morbidities  Goal: Patient's chronic conditions and co-morbidity symptoms are monitored and maintained or improved  Outcome: Progressing     Problem: Skin  Goal: Decreased wound size/increased tissue granulation at next dressing change  Outcome: Progressing  Flowsheets (Taken 12/23/2024 2157)  Decreased wound size/increased tissue granulation at next dressing change:   Promote  sleep for wound healing   Protective dressings over bony prominences  Goal: Participates in plan/prevention/treatment measures  Outcome: Progressing  Flowsheets (Taken 12/23/2024 2157)  Participates in plan/prevention/treatment measures: Elevate heels  Goal: Prevent/manage excess moisture  Outcome: Progressing  Flowsheets (Taken 12/23/2024 2157)  Prevent/manage excess moisture:   Monitor for/manage infection if present   Cleanse incontinence/protect with barrier cream  Goal: Prevent/minimize sheer/friction injuries  Outcome: Progressing  Flowsheets (Taken 12/23/2024 2157)  Prevent/minimize sheer/friction injuries:   Use pull sheet   Turn/reposition every 2 hours/use positioning/transfer devices  Goal: Promote/optimize nutrition  Outcome: Progressing  Flowsheets (Taken 12/23/2024 2157)  Promote/optimize nutrition: Monitor/record intake including meals  Goal: Promote skin healing  Outcome: Progressing  Flowsheets (Taken 12/23/2024 2157)  Promote skin healing:   Turn/reposition every 2 hours/use positioning/transfer devices   Protective dressings over bony prominences   Assess skin/pad under line(s)/device(s)   Rotate device position/do not position patient on device     Problem: Fall/Injury  Goal: Not fall by end of shift  Outcome: Progressing  Goal: Be free from injury by end of the shift  Outcome: Progressing  Goal: Verbalize understanding of personal risk factors for fall in the hospital  Outcome: Progressing  Goal: Verbalize understanding of risk factor reduction measures to prevent injury from fall in the home  Outcome: Progressing  Goal: Use assistive devices by end of the shift  Outcome: Progressing  Goal: Pace activities to prevent fatigue by end of the shift  Outcome: Progressing     Problem: Pain  Goal: Takes deep breaths with improved pain control throughout the shift  Outcome: Progressing  Goal: Turns in bed with improved pain control throughout the shift  Outcome: Progressing  Goal: Walks with improved  pain control throughout the shift  Outcome: Progressing  Goal: Performs ADL's with improved pain control throughout shift  Outcome: Progressing  Goal: Participates in PT with improved pain control throughout the shift  Outcome: Progressing  Goal: Free from opioid side effects throughout the shift  Outcome: Progressing  Goal: Free from acute confusion related to pain meds throughout the shift  Outcome: Progressing     Problem: Respiratory  Goal: Clear secretions with interventions this shift  Outcome: Progressing  Goal: Minimize anxiety/maximize coping throughout shift  Outcome: Progressing  Goal: Minimal/no exertional discomfort or dyspnea this shift  Outcome: Progressing  Goal: No signs of respiratory distress (eg. Use of accessory muscles. Peds grunting)  Outcome: Progressing  Goal: Patent airway maintained this shift  Outcome: Progressing  Goal: Tolerate mechanical ventilation evidenced by VS/agitation level this shift  Outcome: Progressing  Goal: Tolerate pulmonary toileting this shift  Outcome: Progressing  Goal: Verbalize decreased shortness of breath this shift  Outcome: Progressing  Goal: Wean oxygen to maintain O2 saturation per order/standard this shift  Outcome: Progressing  Goal: Increase self care and/or family involvement in next 24 hours  Outcome: Progressing

## 2024-12-24 NOTE — PROGRESS NOTES
Occupational Therapy                 Therapy Communication Note    Patient Name: Ike Gar  MRN: [id number]  [place] [facility]  Room: 16/16-A  Today's Date: 12/24/2024     Discipline: Occupational Therapy          Missed Visit Reason: Missed Visit Reason:  (916 remains in strict TLS precautions, will hold OT intervention at this time)    Missed Time: Attempt    Comment:

## 2024-12-24 NOTE — PROGRESS NOTES
Physical Therapy                 Therapy Communication Note    Patient Name: Ike Gar  MRN: [id number]  [place] [facility]  Room: 16/16-A  Today's Date: 12/24/2024     Discipline: Physical Therapy    PT Missed Visit: Yes     Missed Visit Reason: Missed Visit Reason:  (Pt remains on strict T/L/S precautions, will hold PT eval)    Missed Time: Attempt    Laura Gallagher, PT
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
## 2024-12-24 NOTE — PROGRESS NOTES
Vancomycin Dosing by Pharmacy  FOLLOW UP    Ike Gar is a 78 y.o. male who Pharmacy is consulted to dose vancomycin for abdominal infection.     Based on the patient's indication and renal status, this patient is being dosed based on a goal vancomycin level of 15-20.     Current vancomycin dose: 750 mg every 12 hours    Most recent vancomycin trough: 18.7 mcg/mL    Renal function is currently dependent on Continuous Renal Replacement Therapy (CRRT).    Estimated Creatinine Clearance: 38.6 mL/min (A) (by C-G formula based on SCr of 2.23 mg/dL (H)).    Results from last 7 days   Lab Units 12/24/24  0126 12/23/24  1358 12/23/24  1253 12/23/24  0145 12/22/24  0121 12/22/24  0120   CREATININE mg/dL 2.23*  --  2.50* 2.19* 2.14*  --    BUN mg/dL 41*  --  40* 35* 28*  --    WBC AUTO x10*3/uL 32.3* 27.6*  --  30.6*  --  38.8*        Visit Vitals  /60 (BP Location: Right arm, Patient Position: Lying)   Pulse 93   Temp 36.6 °C (97.9 °F) (Temporal)   Resp 17       Staph/MRSA Screen Culture   Date/Time Value Ref Range Status   12/17/2024 01:23 AM No Staphylococcus aureus isolated  Final     Gram Stain   Date/Time Value Ref Range Status   12/22/2024 04:17 PM (2+) Few Polymorphonuclear leukocytes  Final   12/22/2024 04:17 PM No organisms seen  Final     Blood Culture   Date/Time Value Ref Range Status   12/22/2024 11:39 AM No growth at 2 days  Preliminary        No results found for the last 90 days.      Assessment/Plan    Vancomycin level is within goal range of 15-20. Will continue current regimen.    The next vancomycin level will be ordered for 12/26 PM at 1700, unless clinically indicated sooner.  Will continue to monitor renal function daily while on vancomycin and order serum creatinine at least every 48 hours if not already ordered.  Will follow for continued vancomycin needs, clinical response, and signs/symptoms of toxicity.       Santana Bhakta, MohitD

## 2024-12-25 ENCOUNTER — APPOINTMENT (OUTPATIENT)
Dept: RADIOLOGY | Facility: HOSPITAL | Age: 79
End: 2024-12-25
Payer: MEDICARE

## 2024-12-25 LAB
ALBUMIN SERPL BCP-MCNC: 2.4 G/DL (ref 3.4–5)
ANION GAP BLDA CALCULATED.4IONS-SCNC: 7 MMO/L (ref 10–25)
ANION GAP BLDA CALCULATED.4IONS-SCNC: 8 MMO/L (ref 10–25)
ANION GAP BLDA CALCULATED.4IONS-SCNC: 9 MMO/L (ref 10–25)
ANION GAP SERPL CALC-SCNC: 11 MMOL/L (ref 10–20)
APTT PPP: 27 SECONDS (ref 27–38)
BASE EXCESS BLDA CALC-SCNC: -0.2 MMOL/L (ref -2–3)
BASE EXCESS BLDA CALC-SCNC: -0.2 MMOL/L (ref -2–3)
BASE EXCESS BLDA CALC-SCNC: -0.5 MMOL/L (ref -2–3)
BASE EXCESS BLDA CALC-SCNC: -0.5 MMOL/L (ref -2–3)
BASE EXCESS BLDA CALC-SCNC: -0.7 MMOL/L (ref -2–3)
BILIRUB FLD-MCNC: 11.1 MG/DL
BODY TEMPERATURE: 37 DEGREES CELSIUS
BUN SERPL-MCNC: 39 MG/DL (ref 6–23)
CA-I BLD-SCNC: 1.04 MMOL/L (ref 1.1–1.33)
CA-I BLDA-SCNC: 1.08 MMOL/L (ref 1.1–1.33)
CA-I BLDA-SCNC: 1.09 MMOL/L (ref 1.1–1.33)
CA-I BLDA-SCNC: 1.13 MMOL/L (ref 1.1–1.33)
CALCIUM SERPL-MCNC: 7.5 MG/DL (ref 8.6–10.6)
CHLORIDE BLDA-SCNC: 103 MMOL/L (ref 98–107)
CHLORIDE BLDA-SCNC: 104 MMOL/L (ref 98–107)
CHLORIDE SERPL-SCNC: 101 MMOL/L (ref 98–107)
CO2 SERPL-SCNC: 25 MMOL/L (ref 21–32)
CREAT SERPL-MCNC: 2.15 MG/DL (ref 0.5–1.3)
EGFRCR SERPLBLD CKD-EPI 2021: 31 ML/MIN/1.73M*2
ERYTHROCYTE [DISTWIDTH] IN BLOOD BY AUTOMATED COUNT: 15.5 % (ref 11.5–14.5)
GLUCOSE BLD MANUAL STRIP-MCNC: 100 MG/DL (ref 74–99)
GLUCOSE BLD MANUAL STRIP-MCNC: 102 MG/DL (ref 74–99)
GLUCOSE BLD MANUAL STRIP-MCNC: 111 MG/DL (ref 74–99)
GLUCOSE BLD MANUAL STRIP-MCNC: 116 MG/DL (ref 74–99)
GLUCOSE BLD MANUAL STRIP-MCNC: 119 MG/DL (ref 74–99)
GLUCOSE BLDA-MCNC: 103 MG/DL (ref 74–99)
GLUCOSE BLDA-MCNC: 104 MG/DL (ref 74–99)
GLUCOSE BLDA-MCNC: 109 MG/DL (ref 74–99)
GLUCOSE BLDA-MCNC: 110 MG/DL (ref 74–99)
GLUCOSE BLDA-MCNC: 111 MG/DL (ref 74–99)
GLUCOSE SERPL-MCNC: 117 MG/DL (ref 74–99)
HCO3 BLDA-SCNC: 23.8 MMOL/L (ref 22–26)
HCO3 BLDA-SCNC: 23.8 MMOL/L (ref 22–26)
HCO3 BLDA-SCNC: 24.1 MMOL/L (ref 22–26)
HCO3 BLDA-SCNC: 24.2 MMOL/L (ref 22–26)
HCO3 BLDA-SCNC: 24.2 MMOL/L (ref 22–26)
HCT VFR BLD AUTO: 20.3 % (ref 41–52)
HCT VFR BLD EST: 24 % (ref 41–52)
HGB BLD-MCNC: 7.5 G/DL (ref 13.5–17.5)
HGB BLDA-MCNC: 8 G/DL (ref 13.5–17.5)
HGB BLDA-MCNC: 8.1 G/DL (ref 13.5–17.5)
HGB BLDA-MCNC: 8.1 G/DL (ref 13.5–17.5)
INHALED O2 CONCENTRATION: 100 %
INHALED O2 CONCENTRATION: 50 %
INHALED O2 CONCENTRATION: 50 %
INHALED O2 CONCENTRATION: 80 %
INHALED O2 CONCENTRATION: 80 %
INR PPP: 1.2 (ref 0.9–1.1)
LACTATE BLDA-SCNC: 0.9 MMOL/L (ref 0.4–2)
LACTATE BLDA-SCNC: 1 MMOL/L (ref 0.4–2)
LACTATE BLDA-SCNC: 1.1 MMOL/L (ref 0.4–2)
MAGNESIUM SERPL-MCNC: 2.57 MG/DL (ref 1.6–2.4)
MCH RBC QN AUTO: 28.7 PG (ref 26–34)
MCHC RBC AUTO-ENTMCNC: 36.9 G/DL (ref 32–36)
MCV RBC AUTO: 78 FL (ref 80–100)
NRBC BLD-RTO: 0.2 /100 WBCS (ref 0–0)
OXYHGB MFR BLDA: 91.7 % (ref 94–98)
OXYHGB MFR BLDA: 92.3 % (ref 94–98)
OXYHGB MFR BLDA: 94.4 % (ref 94–98)
OXYHGB MFR BLDA: 96.2 % (ref 94–98)
OXYHGB MFR BLDA: 97.5 % (ref 94–98)
PCO2 BLDA: 35 MM HG (ref 38–42)
PCO2 BLDA: 35 MM HG (ref 38–42)
PCO2 BLDA: 38 MM HG (ref 38–42)
PCO2 BLDA: 39 MM HG (ref 38–42)
PCO2 BLDA: 40 MM HG (ref 38–42)
PH BLDA: 7.39 PH (ref 7.38–7.42)
PH BLDA: 7.4 PH (ref 7.38–7.42)
PH BLDA: 7.41 PH (ref 7.38–7.42)
PH BLDA: 7.44 PH (ref 7.38–7.42)
PH BLDA: 7.44 PH (ref 7.38–7.42)
PHOSPHATE SERPL-MCNC: 2.4 MG/DL (ref 2.5–4.9)
PLATELET # BLD AUTO: 105 X10*3/UL (ref 150–450)
PO2 BLDA: 206 MM HG (ref 85–95)
PO2 BLDA: 62 MM HG (ref 85–95)
PO2 BLDA: 65 MM HG (ref 85–95)
PO2 BLDA: 73 MM HG (ref 85–95)
PO2 BLDA: 82 MM HG (ref 85–95)
POTASSIUM BLDA-SCNC: 4.2 MMOL/L (ref 3.5–5.3)
POTASSIUM BLDA-SCNC: 4.3 MMOL/L (ref 3.5–5.3)
POTASSIUM BLDA-SCNC: 4.3 MMOL/L (ref 3.5–5.3)
POTASSIUM BLDA-SCNC: 4.4 MMOL/L (ref 3.5–5.3)
POTASSIUM BLDA-SCNC: 4.4 MMOL/L (ref 3.5–5.3)
POTASSIUM SERPL-SCNC: 4.1 MMOL/L (ref 3.5–5.3)
PROTHROMBIN TIME: 13 SECONDS (ref 9.8–12.8)
RBC # BLD AUTO: 2.61 X10*6/UL (ref 4.5–5.9)
SAO2 % BLDA: 100 % (ref 94–100)
SAO2 % BLDA: 95 % (ref 94–100)
SAO2 % BLDA: 95 % (ref 94–100)
SAO2 % BLDA: 97 % (ref 94–100)
SAO2 % BLDA: 98 % (ref 94–100)
SODIUM BLDA-SCNC: 131 MMOL/L (ref 136–145)
SODIUM SERPL-SCNC: 133 MMOL/L (ref 136–145)
VANCOMYCIN SERPL-MCNC: 18.9 UG/ML (ref 5–20)
WBC # BLD AUTO: 27.6 X10*3/UL (ref 4.4–11.3)

## 2024-12-25 PROCEDURE — 90945 DIALYSIS ONE EVALUATION: CPT | Performed by: INTERNAL MEDICINE

## 2024-12-25 PROCEDURE — 37799 UNLISTED PX VASCULAR SURGERY: CPT | Performed by: PHYSICIAN ASSISTANT

## 2024-12-25 PROCEDURE — 90937 HEMODIALYSIS REPEATED EVAL: CPT

## 2024-12-25 PROCEDURE — 2020000001 HC ICU ROOM DAILY

## 2024-12-25 PROCEDURE — 2500000004 HC RX 250 GENERAL PHARMACY W/ HCPCS (ALT 636 FOR OP/ED): Performed by: PHYSICIAN ASSISTANT

## 2024-12-25 PROCEDURE — 84132 ASSAY OF SERUM POTASSIUM: CPT | Performed by: PHYSICIAN ASSISTANT

## 2024-12-25 PROCEDURE — 80202 ASSAY OF VANCOMYCIN: CPT | Performed by: STUDENT IN AN ORGANIZED HEALTH CARE EDUCATION/TRAINING PROGRAM

## 2024-12-25 PROCEDURE — 83605 ASSAY OF LACTIC ACID: CPT

## 2024-12-25 PROCEDURE — 2500000005 HC RX 250 GENERAL PHARMACY W/O HCPCS

## 2024-12-25 PROCEDURE — 2500000004 HC RX 250 GENERAL PHARMACY W/ HCPCS (ALT 636 FOR OP/ED): Mod: JZ | Performed by: PHYSICIAN ASSISTANT

## 2024-12-25 PROCEDURE — 85027 COMPLETE CBC AUTOMATED: CPT | Performed by: PHYSICIAN ASSISTANT

## 2024-12-25 PROCEDURE — 83735 ASSAY OF MAGNESIUM: CPT

## 2024-12-25 PROCEDURE — 2500000004 HC RX 250 GENERAL PHARMACY W/ HCPCS (ALT 636 FOR OP/ED)

## 2024-12-25 PROCEDURE — 99291 CRITICAL CARE FIRST HOUR: CPT | Performed by: SURGERY

## 2024-12-25 PROCEDURE — 74018 RADEX ABDOMEN 1 VIEW: CPT

## 2024-12-25 PROCEDURE — 99223 1ST HOSP IP/OBS HIGH 75: CPT | Performed by: STUDENT IN AN ORGANIZED HEALTH CARE EDUCATION/TRAINING PROGRAM

## 2024-12-25 PROCEDURE — 85610 PROTHROMBIN TIME: CPT

## 2024-12-25 PROCEDURE — 71045 X-RAY EXAM CHEST 1 VIEW: CPT | Performed by: STUDENT IN AN ORGANIZED HEALTH CARE EDUCATION/TRAINING PROGRAM

## 2024-12-25 PROCEDURE — 94002 VENT MGMT INPAT INIT DAY: CPT

## 2024-12-25 PROCEDURE — 2500000005 HC RX 250 GENERAL PHARMACY W/O HCPCS: Performed by: STUDENT IN AN ORGANIZED HEALTH CARE EDUCATION/TRAINING PROGRAM

## 2024-12-25 PROCEDURE — 82947 ASSAY GLUCOSE BLOOD QUANT: CPT

## 2024-12-25 PROCEDURE — 82330 ASSAY OF CALCIUM: CPT | Performed by: PHYSICIAN ASSISTANT

## 2024-12-25 PROCEDURE — 37799 UNLISTED PX VASCULAR SURGERY: CPT | Performed by: STUDENT IN AN ORGANIZED HEALTH CARE EDUCATION/TRAINING PROGRAM

## 2024-12-25 PROCEDURE — 2500000004 HC RX 250 GENERAL PHARMACY W/ HCPCS (ALT 636 FOR OP/ED): Performed by: STUDENT IN AN ORGANIZED HEALTH CARE EDUCATION/TRAINING PROGRAM

## 2024-12-25 PROCEDURE — 74018 RADEX ABDOMEN 1 VIEW: CPT | Performed by: STUDENT IN AN ORGANIZED HEALTH CARE EDUCATION/TRAINING PROGRAM

## 2024-12-25 PROCEDURE — 71045 X-RAY EXAM CHEST 1 VIEW: CPT

## 2024-12-25 PROCEDURE — 82247 BILIRUBIN TOTAL: CPT | Performed by: STUDENT IN AN ORGANIZED HEALTH CARE EDUCATION/TRAINING PROGRAM

## 2024-12-25 RX ORDER — PROPOFOL 10 MG/ML
150 INJECTION, EMULSION INTRAVENOUS AS NEEDED
Status: DISCONTINUED | OUTPATIENT
Start: 2024-12-25 | End: 2024-12-30

## 2024-12-25 RX ORDER — NOREPINEPHRINE BITARTRATE/D5W 8 MG/250ML
.01-1 PLASTIC BAG, INJECTION (ML) INTRAVENOUS CONTINUOUS
Status: DISCONTINUED | OUTPATIENT
Start: 2024-12-25 | End: 2024-12-31

## 2024-12-25 RX ORDER — ROCURONIUM BROMIDE 10 MG/ML
70 INJECTION, SOLUTION INTRAVENOUS ONCE
Status: COMPLETED | OUTPATIENT
Start: 2024-12-25 | End: 2024-12-25

## 2024-12-25 RX ORDER — CALCIUM GLUCONATE 20 MG/ML
1 INJECTION, SOLUTION INTRAVENOUS ONCE
Status: COMPLETED | OUTPATIENT
Start: 2024-12-25 | End: 2024-12-25

## 2024-12-25 RX ORDER — PROPOFOL 10 MG/ML
0-50 INJECTION, EMULSION INTRAVENOUS CONTINUOUS
Status: DISCONTINUED | OUTPATIENT
Start: 2024-12-25 | End: 2024-12-30

## 2024-12-25 RX ORDER — ETOMIDATE 2 MG/ML
0.3 INJECTION INTRAVENOUS ONCE
Status: DISCONTINUED | OUTPATIENT
Start: 2024-12-25 | End: 2024-12-25

## 2024-12-25 RX ORDER — ROCURONIUM BROMIDE 10 MG/ML
100 INJECTION, SOLUTION INTRAVENOUS ONCE
Status: DISCONTINUED | OUTPATIENT
Start: 2024-12-25 | End: 2024-12-25

## 2024-12-25 RX ORDER — FENTANYL CITRATE-0.9 % NACL/PF 10 MCG/ML
25-200 PLASTIC BAG, INJECTION (ML) INTRAVENOUS CONTINUOUS
Status: DISCONTINUED | OUTPATIENT
Start: 2024-12-25 | End: 2024-12-30

## 2024-12-25 RX ORDER — PHENYLEPHRINE HCL IN 0.9% NACL 0.4MG/10ML
SYRINGE (ML) INTRAVENOUS
Status: DISPENSED
Start: 2024-12-25 | End: 2024-12-25

## 2024-12-25 RX ADMIN — NOREPINEPHRINE BITARTRATE 0.04 MCG/KG/MIN: 8 INJECTION, SOLUTION INTRAVENOUS at 04:15

## 2024-12-25 RX ADMIN — CALCIUM CHLORIDE, MAGNESIUM CHLORIDE, DEXTROSE MONOHYDRATE, LACTIC ACID, SODIUM CHLORIDE, SODIUM BICARBONATE AND POTASSIUM CHLORIDE 25 ML/KG/HR: 3.68; 3.05; 22; 5.4; 6.46; 3.09; .314 INJECTION INTRAVENOUS at 00:48

## 2024-12-25 RX ADMIN — ACETAMINOPHEN 1000 MG: 10 INJECTION, SOLUTION INTRAVENOUS at 22:07

## 2024-12-25 RX ADMIN — ASCORBIC ACID, VITAMIN A PALMITATE, CHOLECALCIFEROL, THIAMINE HYDROCHLORIDE, RIBOFLAVIN-5 PHOSPHATE SODIUM, PYRIDOXINE HYDROCHLORIDE, NIACINAMIDE, DEXPANTHENOL, ALPHA-TOCOPHEROL ACETATE, VITAMIN K1, FOLIC ACID, BIOTIN, CYANOCOBALAMIN: 200; 3300; 200; 6; 3.6; 6; 40; 15; 10; 150; 600; 60; 5 INJECTION, SOLUTION INTRAVENOUS at 21:30

## 2024-12-25 RX ADMIN — MEROPENEM AND SODIUM CHLORIDE 1 G: 1 INJECTION, SOLUTION INTRAVENOUS at 17:19

## 2024-12-25 RX ADMIN — ACETAMINOPHEN 1000 MG: 10 INJECTION, SOLUTION INTRAVENOUS at 06:32

## 2024-12-25 RX ADMIN — LEUCINE, PHENYLALANINE, LYSINE HYDROCHLORIDE, METHIONINE, ISOLEUCINE, VALINE, HISTIDINE, THREONINE, TRYPTOPHAN, ALANINE, GLYCINE, ARGININE, PROLINE, TYROSINE, SERINE 25 G: 730; 560; 580; 400; 600; 580; 480; 420; 180; 2.07; 1.03; 1.15; 680; 40; 5 INJECTION INTRAVENOUS at 07:38

## 2024-12-25 RX ADMIN — PROPOFOL 20 MCG/KG/MIN: 10 INJECTION, EMULSION INTRAVENOUS at 17:19

## 2024-12-25 RX ADMIN — VANCOMYCIN HYDROCHLORIDE 750 MG: 750 INJECTION, SOLUTION INTRAVENOUS at 05:36

## 2024-12-25 RX ADMIN — HEPARIN SODIUM 5000 UNITS: 5000 INJECTION INTRAVENOUS; SUBCUTANEOUS at 17:19

## 2024-12-25 RX ADMIN — PANTOPRAZOLE SODIUM 40 MG: 40 INJECTION, POWDER, FOR SOLUTION INTRAVENOUS at 07:38

## 2024-12-25 RX ADMIN — Medication 50 MCG/HR: at 15:29

## 2024-12-25 RX ADMIN — HYDROMORPHONE HYDROCHLORIDE 0.6 MG: 1 INJECTION, SOLUTION INTRAMUSCULAR; INTRAVENOUS; SUBCUTANEOUS at 02:53

## 2024-12-25 RX ADMIN — CALCIUM GLUCONATE 1 G: 20 INJECTION, SOLUTION INTRAVENOUS at 03:07

## 2024-12-25 RX ADMIN — ROCURONIUM BROMIDE 70 MG: 10 INJECTION INTRAVENOUS at 04:10

## 2024-12-25 RX ADMIN — SODIUM CHLORIDE 21 MMOL: 9 INJECTION, SOLUTION INTRAVENOUS at 04:49

## 2024-12-25 RX ADMIN — MEROPENEM AND SODIUM CHLORIDE 1 G: 1 INJECTION, SOLUTION INTRAVENOUS at 05:43

## 2024-12-25 RX ADMIN — CALCIUM CHLORIDE, MAGNESIUM CHLORIDE, DEXTROSE MONOHYDRATE, LACTIC ACID, SODIUM CHLORIDE, SODIUM BICARBONATE AND POTASSIUM CHLORIDE 25 ML/KG/HR: 3.68; 3.05; 22; 5.4; 6.46; 3.09; .314 INJECTION INTRAVENOUS at 11:44

## 2024-12-25 RX ADMIN — HEPARIN SODIUM 5000 UNITS: 5000 INJECTION INTRAVENOUS; SUBCUTANEOUS at 02:53

## 2024-12-25 RX ADMIN — PROPOFOL 10 MCG/KG/MIN: 10 INJECTION, EMULSION INTRAVENOUS at 04:07

## 2024-12-25 RX ADMIN — ACETAMINOPHEN 1000 MG: 10 INJECTION, SOLUTION INTRAVENOUS at 14:19

## 2024-12-25 RX ADMIN — Medication 40 PERCENT: at 20:34

## 2024-12-25 RX ADMIN — CALCIUM CHLORIDE, MAGNESIUM CHLORIDE, DEXTROSE MONOHYDRATE, LACTIC ACID, SODIUM CHLORIDE, SODIUM BICARBONATE AND POTASSIUM CHLORIDE 25 ML/KG/HR: 3.68; 3.05; 22; 5.4; 6.46; 3.09; .314 INJECTION INTRAVENOUS at 06:40

## 2024-12-25 RX ADMIN — CALCIUM CHLORIDE, MAGNESIUM CHLORIDE, DEXTROSE MONOHYDRATE, LACTIC ACID, SODIUM CHLORIDE, SODIUM BICARBONATE AND POTASSIUM CHLORIDE 25 ML/KG/HR: 3.68; 3.05; 22; 5.4; 6.46; 3.09; .314 INJECTION INTRAVENOUS at 06:45

## 2024-12-25 RX ADMIN — Medication 25 MCG/HR: at 03:45

## 2024-12-25 RX ADMIN — HEPARIN SODIUM 5000 UNITS: 5000 INJECTION INTRAVENOUS; SUBCUTANEOUS at 09:11

## 2024-12-25 RX ADMIN — CALCIUM CHLORIDE, MAGNESIUM CHLORIDE, DEXTROSE MONOHYDRATE, LACTIC ACID, SODIUM CHLORIDE, SODIUM BICARBONATE AND POTASSIUM CHLORIDE 25 ML/KG/HR: 3.68; 3.05; 22; 5.4; 6.46; 3.09; .314 INJECTION INTRAVENOUS at 18:00

## 2024-12-25 RX ADMIN — CALCIUM CHLORIDE, MAGNESIUM CHLORIDE, DEXTROSE MONOHYDRATE, LACTIC ACID, SODIUM CHLORIDE, SODIUM BICARBONATE AND POTASSIUM CHLORIDE 25 ML/KG/HR: 3.68; 3.05; 22; 5.4; 6.46; 3.09; .314 INJECTION INTRAVENOUS at 01:29

## 2024-12-25 RX ADMIN — PROPOFOL 15 MCG/KG/MIN: 10 INJECTION, EMULSION INTRAVENOUS at 09:11

## 2024-12-25 RX ADMIN — Medication 60 PERCENT: at 07:38

## 2024-12-25 RX ADMIN — PROPOFOL 20 MCG/KG/MIN: 10 INJECTION, EMULSION INTRAVENOUS at 23:53

## 2024-12-25 RX ADMIN — VANCOMYCIN HYDROCHLORIDE 750 MG: 750 INJECTION, SOLUTION INTRAVENOUS at 17:19

## 2024-12-25 RX ADMIN — CALCIUM CHLORIDE, MAGNESIUM CHLORIDE, DEXTROSE MONOHYDRATE, LACTIC ACID, SODIUM CHLORIDE, SODIUM BICARBONATE AND POTASSIUM CHLORIDE 25 ML/KG/HR: 3.68; 3.05; 22; 5.4; 6.46; 3.09; .314 INJECTION INTRAVENOUS at 00:44

## 2024-12-25 ASSESSMENT — PAIN - FUNCTIONAL ASSESSMENT
PAIN_FUNCTIONAL_ASSESSMENT: CPOT (CRITICAL CARE PAIN OBSERVATION TOOL)
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
## 2024-12-25 NOTE — NURSING NOTE
03:40- Trauma and SICU team at bedside for intubation   03:43 100 mg of propofol administered by Nancy ZULUAGA  03:44 70 mg of Rocuronium administered by Nancy ZULUAGA  03:47 Patient intubated by Lama Luis ZULUAGA

## 2024-12-25 NOTE — SIGNIFICANT EVENT
.   12/25/2024  10:52 AM    Ike Amayar  [id number]    I evaluated and examined the patient with the critical care team. As a multidisciplinary team, we discussed all findings, as well as assessment and plan. I personally spent 35 minutes of critical care time excluding procedures at the bedside with the patient. I personally reviewed all labs, results and studies. My independent note with assessment and plan are below:    Neuro  Fent gtt, good control   Mild delirium, possibly related to liver dysfunction secondary to sepsis vs bile leak    CV  MAP >65, Levo at .02 wean as able. Not felt to be pre-load related  Completed stress-dose steroids    Pulmonary  Re-intubated last night secondary to decompensation semi-electively   Felt to be pain/delirium    GI  Obvious bile leak to drain near liver resection  Discuss ERCP with primary  Awaiting jejunostomy output before feeds    Renal/  BID bladder scan    Heme  H/H stable, daily CBC    MSK  No current issues    Endo  Good glycemic control    ID  Vanc/Wil until 12/27    Skin  Midline care per primary    Lines/Tubes/Drains  ETT, Drrain x2, NGT, Art line, Trialysis, Central line    Prophylaxis  PPI, subQ heparin, SCDs    Restraints  Re-ordered    Disposition  TICU    Doug Low, DO, FACS
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
## 2024-12-25 NOTE — PROGRESS NOTES
Renal Staff CVVH Note    Patient seen, examined on CVVH  No significant filter issues overnight  Urine output, none recorded  M150 no hep  200 bfr 90 / hr fluid removal   1800 rfr 50% pre    4K 2.5 Ca    Effluent bag clear  Access R int jug    Sedated, intubated, F1O2 50  Pressor requirement none    Electrolytes, acid base acceptable except low calcium  Medications reviewed    No CVVH script change  Fluid removal per primary team   Suppl miguelito as needed

## 2024-12-26 ENCOUNTER — APPOINTMENT (OUTPATIENT)
Dept: RADIOLOGY | Facility: HOSPITAL | Age: 79
End: 2024-12-26
Payer: MEDICARE

## 2024-12-26 ENCOUNTER — APPOINTMENT (OUTPATIENT)
Dept: SURGERY | Facility: CLINIC | Age: 79
End: 2024-12-26
Payer: COMMERCIAL

## 2024-12-26 LAB
ALBUMIN SERPL BCP-MCNC: 2.1 G/DL (ref 3.4–5)
ALBUMIN SERPL BCP-MCNC: 2.2 G/DL (ref 3.4–5)
ALP SERPL-CCNC: 118 U/L (ref 33–136)
ALT SERPL W P-5'-P-CCNC: 67 U/L (ref 10–52)
ANION GAP BLDA CALCULATED.4IONS-SCNC: 7 MMO/L (ref 10–25)
ANION GAP SERPL CALC-SCNC: 10 MMOL/L (ref 10–20)
AST SERPL W P-5'-P-CCNC: 36 U/L (ref 9–39)
BACTERIA BLD CULT: NORMAL
BACTERIA BLD CULT: NORMAL
BASE EXCESS BLDA CALC-SCNC: 0.4 MMOL/L (ref -2–3)
BILIRUB DIRECT SERPL-MCNC: 7.9 MG/DL (ref 0–0.3)
BILIRUB SERPL-MCNC: 12.3 MG/DL (ref 0–1.2)
BODY TEMPERATURE: 37 DEGREES CELSIUS
BUN SERPL-MCNC: 40 MG/DL (ref 6–23)
CA-I BLD-SCNC: 1.08 MMOL/L (ref 1.1–1.33)
CA-I BLDA-SCNC: 1.08 MMOL/L (ref 1.1–1.33)
CALCIUM SERPL-MCNC: 7.3 MG/DL (ref 8.6–10.6)
CHLORIDE BLDA-SCNC: 103 MMOL/L (ref 98–107)
CHLORIDE SERPL-SCNC: 101 MMOL/L (ref 98–107)
CO2 SERPL-SCNC: 25 MMOL/L (ref 21–32)
CREAT SERPL-MCNC: 1.99 MG/DL (ref 0.5–1.3)
EGFRCR SERPLBLD CKD-EPI 2021: 34 ML/MIN/1.73M*2
ERYTHROCYTE [DISTWIDTH] IN BLOOD BY AUTOMATED COUNT: 16.3 % (ref 11.5–14.5)
GLUCOSE BLD MANUAL STRIP-MCNC: 90 MG/DL (ref 74–99)
GLUCOSE BLD MANUAL STRIP-MCNC: 96 MG/DL (ref 74–99)
GLUCOSE BLDA-MCNC: 97 MG/DL (ref 74–99)
GLUCOSE SERPL-MCNC: 99 MG/DL (ref 74–99)
HCO3 BLDA-SCNC: 24.5 MMOL/L (ref 22–26)
HCT VFR BLD AUTO: 20.9 % (ref 41–52)
HCT VFR BLD EST: 24 % (ref 41–52)
HGB BLD-MCNC: 7.4 G/DL (ref 13.5–17.5)
HGB BLDA-MCNC: 8.1 G/DL (ref 13.5–17.5)
INHALED O2 CONCENTRATION: 40 %
LABORATORY COMMENT REPORT: NORMAL
LACTATE BLDA-SCNC: 1.1 MMOL/L (ref 0.4–2)
MAGNESIUM SERPL-MCNC: 2.57 MG/DL (ref 1.6–2.4)
MCH RBC QN AUTO: 28 PG (ref 26–34)
MCHC RBC AUTO-ENTMCNC: 35.4 G/DL (ref 32–36)
MCV RBC AUTO: 79 FL (ref 80–100)
NRBC BLD-RTO: 0.2 /100 WBCS (ref 0–0)
OXYHGB MFR BLDA: 94.9 % (ref 94–98)
PATH REPORT.FINAL DX SPEC: NORMAL
PATH REPORT.GROSS SPEC: NORMAL
PATH REPORT.RELEVANT HX SPEC: NORMAL
PATH REPORT.TOTAL CANCER: NORMAL
PCO2 BLDA: 36 MM HG (ref 38–42)
PH BLDA: 7.44 PH (ref 7.38–7.42)
PHOSPHATE SERPL-MCNC: 3 MG/DL (ref 2.5–4.9)
PLATELET # BLD AUTO: 145 X10*3/UL (ref 150–450)
PO2 BLDA: 74 MM HG (ref 85–95)
POTASSIUM BLDA-SCNC: 4.4 MMOL/L (ref 3.5–5.3)
POTASSIUM SERPL-SCNC: 4.3 MMOL/L (ref 3.5–5.3)
PROT SERPL-MCNC: 4.8 G/DL (ref 6.4–8.2)
RBC # BLD AUTO: 2.64 X10*6/UL (ref 4.5–5.9)
RESIDENT REVIEW: NORMAL
SAO2 % BLDA: 98 % (ref 94–100)
SODIUM BLDA-SCNC: 130 MMOL/L (ref 136–145)
SODIUM SERPL-SCNC: 132 MMOL/L (ref 136–145)
VANCOMYCIN TROUGH SERPL-MCNC: 20 UG/ML (ref 5–20)
WBC # BLD AUTO: 17.3 X10*3/UL (ref 4.4–11.3)

## 2024-12-26 PROCEDURE — 37799 UNLISTED PX VASCULAR SURGERY: CPT | Performed by: STUDENT IN AN ORGANIZED HEALTH CARE EDUCATION/TRAINING PROGRAM

## 2024-12-26 PROCEDURE — 73130 X-RAY EXAM OF HAND: CPT | Mod: LEFT SIDE | Performed by: STUDENT IN AN ORGANIZED HEALTH CARE EDUCATION/TRAINING PROGRAM

## 2024-12-26 PROCEDURE — 82947 ASSAY GLUCOSE BLOOD QUANT: CPT

## 2024-12-26 PROCEDURE — 2500000004 HC RX 250 GENERAL PHARMACY W/ HCPCS (ALT 636 FOR OP/ED): Performed by: PHYSICIAN ASSISTANT

## 2024-12-26 PROCEDURE — 71045 X-RAY EXAM CHEST 1 VIEW: CPT | Performed by: RADIOLOGY

## 2024-12-26 PROCEDURE — 82330 ASSAY OF CALCIUM: CPT | Performed by: PHYSICIAN ASSISTANT

## 2024-12-26 PROCEDURE — 2500000004 HC RX 250 GENERAL PHARMACY W/ HCPCS (ALT 636 FOR OP/ED): Mod: JZ

## 2024-12-26 PROCEDURE — 80202 ASSAY OF VANCOMYCIN: CPT | Performed by: STUDENT IN AN ORGANIZED HEALTH CARE EDUCATION/TRAINING PROGRAM

## 2024-12-26 PROCEDURE — 71045 X-RAY EXAM CHEST 1 VIEW: CPT

## 2024-12-26 PROCEDURE — 99291 CRITICAL CARE FIRST HOUR: CPT | Performed by: SURGERY

## 2024-12-26 PROCEDURE — 84132 ASSAY OF SERUM POTASSIUM: CPT | Performed by: PHYSICIAN ASSISTANT

## 2024-12-26 PROCEDURE — 83735 ASSAY OF MAGNESIUM: CPT

## 2024-12-26 PROCEDURE — 37799 UNLISTED PX VASCULAR SURGERY: CPT | Performed by: PHYSICIAN ASSISTANT

## 2024-12-26 PROCEDURE — 2500000004 HC RX 250 GENERAL PHARMACY W/ HCPCS (ALT 636 FOR OP/ED): Performed by: STUDENT IN AN ORGANIZED HEALTH CARE EDUCATION/TRAINING PROGRAM

## 2024-12-26 PROCEDURE — 2500000005 HC RX 250 GENERAL PHARMACY W/O HCPCS

## 2024-12-26 PROCEDURE — 2500000004 HC RX 250 GENERAL PHARMACY W/ HCPCS (ALT 636 FOR OP/ED)

## 2024-12-26 PROCEDURE — 85027 COMPLETE CBC AUTOMATED: CPT | Performed by: PHYSICIAN ASSISTANT

## 2024-12-26 PROCEDURE — 2020000001 HC ICU ROOM DAILY

## 2024-12-26 PROCEDURE — 94003 VENT MGMT INPAT SUBQ DAY: CPT

## 2024-12-26 PROCEDURE — 82248 BILIRUBIN DIRECT: CPT | Performed by: STUDENT IN AN ORGANIZED HEALTH CARE EDUCATION/TRAINING PROGRAM

## 2024-12-26 PROCEDURE — 90937 HEMODIALYSIS REPEATED EVAL: CPT

## 2024-12-26 PROCEDURE — 2500000004 HC RX 250 GENERAL PHARMACY W/ HCPCS (ALT 636 FOR OP/ED): Mod: JZ | Performed by: PHYSICIAN ASSISTANT

## 2024-12-26 PROCEDURE — 73130 X-RAY EXAM OF HAND: CPT | Mod: LT

## 2024-12-26 PROCEDURE — 82805 BLOOD GASES W/O2 SATURATION: CPT

## 2024-12-26 RX ORDER — ACETAMINOPHEN 10 MG/ML
1000 INJECTION, SOLUTION INTRAVENOUS EVERY 8 HOURS
Status: DISPENSED | OUTPATIENT
Start: 2024-12-26 | End: 2024-12-29

## 2024-12-26 RX ORDER — CALCIUM GLUCONATE 20 MG/ML
1 INJECTION, SOLUTION INTRAVENOUS ONCE
Status: COMPLETED | OUTPATIENT
Start: 2024-12-26 | End: 2024-12-26

## 2024-12-26 RX ADMIN — HEPARIN SODIUM 5000 UNITS: 5000 INJECTION INTRAVENOUS; SUBCUTANEOUS at 18:23

## 2024-12-26 RX ADMIN — MEROPENEM AND SODIUM CHLORIDE 1 G: 1 INJECTION, SOLUTION INTRAVENOUS at 05:28

## 2024-12-26 RX ADMIN — CALCIUM GLUCONATE 1 G: 20 INJECTION, SOLUTION INTRAVENOUS at 03:10

## 2024-12-26 RX ADMIN — ASCORBIC ACID, VITAMIN A PALMITATE, CHOLECALCIFEROL, THIAMINE HYDROCHLORIDE, RIBOFLAVIN-5 PHOSPHATE SODIUM, PYRIDOXINE HYDROCHLORIDE, NIACINAMIDE, DEXPANTHENOL, ALPHA-TOCOPHEROL ACETATE, VITAMIN K1, FOLIC ACID, BIOTIN, CYANOCOBALAMIN: 200; 3300; 200; 6; 3.6; 6; 40; 15; 10; 150; 600; 60; 5 INJECTION, SOLUTION INTRAVENOUS at 19:46

## 2024-12-26 RX ADMIN — LEUCINE, PHENYLALANINE, LYSINE HYDROCHLORIDE, METHIONINE, ISOLEUCINE, VALINE, HISTIDINE, THREONINE, TRYPTOPHAN, ALANINE, GLYCINE, ARGININE, PROLINE, TYROSINE, SERINE 25 G: 730; 560; 580; 400; 600; 580; 480; 420; 180; 2.07; 1.03; 1.15; 680; 40; 5 INJECTION INTRAVENOUS at 08:29

## 2024-12-26 RX ADMIN — PANTOPRAZOLE SODIUM 40 MG: 40 INJECTION, POWDER, FOR SOLUTION INTRAVENOUS at 08:00

## 2024-12-26 RX ADMIN — CALCIUM CHLORIDE, MAGNESIUM CHLORIDE, DEXTROSE MONOHYDRATE, LACTIC ACID, SODIUM CHLORIDE, SODIUM BICARBONATE AND POTASSIUM CHLORIDE 25 ML/KG/HR: 3.68; 3.05; 22; 5.4; 6.46; 3.09; .314 INJECTION INTRAVENOUS at 00:17

## 2024-12-26 RX ADMIN — CALCIUM CHLORIDE, MAGNESIUM CHLORIDE, DEXTROSE MONOHYDRATE, LACTIC ACID, SODIUM CHLORIDE, SODIUM BICARBONATE AND POTASSIUM CHLORIDE 25 ML/KG/HR: 3.68; 3.05; 22; 5.4; 6.46; 3.09; .314 INJECTION INTRAVENOUS at 00:15

## 2024-12-26 RX ADMIN — ACETAMINOPHEN 1000 MG: 10 INJECTION, SOLUTION INTRAVENOUS at 15:39

## 2024-12-26 RX ADMIN — HEPARIN SODIUM 5000 UNITS: 5000 INJECTION INTRAVENOUS; SUBCUTANEOUS at 10:37

## 2024-12-26 RX ADMIN — CALCIUM CHLORIDE, MAGNESIUM CHLORIDE, DEXTROSE MONOHYDRATE, LACTIC ACID, SODIUM CHLORIDE, SODIUM BICARBONATE AND POTASSIUM CHLORIDE 25 ML/KG/HR: 3.68; 3.05; 22; 5.4; 6.46; 3.09; .314 INJECTION INTRAVENOUS at 18:11

## 2024-12-26 RX ADMIN — CALCIUM CHLORIDE, MAGNESIUM CHLORIDE, DEXTROSE MONOHYDRATE, LACTIC ACID, SODIUM CHLORIDE, SODIUM BICARBONATE AND POTASSIUM CHLORIDE 25 ML/KG/HR: 3.68; 3.05; 22; 5.4; 6.46; 3.09; .314 INJECTION INTRAVENOUS at 06:03

## 2024-12-26 RX ADMIN — CALCIUM CHLORIDE, MAGNESIUM CHLORIDE, DEXTROSE MONOHYDRATE, LACTIC ACID, SODIUM CHLORIDE, SODIUM BICARBONATE AND POTASSIUM CHLORIDE 25 ML/KG/HR: 3.68; 3.05; 22; 5.4; 6.46; 3.09; .314 INJECTION INTRAVENOUS at 00:16

## 2024-12-26 RX ADMIN — HEPARIN SODIUM 5000 UNITS: 5000 INJECTION INTRAVENOUS; SUBCUTANEOUS at 02:00

## 2024-12-26 RX ADMIN — Medication 40 PERCENT: at 08:00

## 2024-12-26 RX ADMIN — CALCIUM CHLORIDE, MAGNESIUM CHLORIDE, DEXTROSE MONOHYDRATE, LACTIC ACID, SODIUM CHLORIDE, SODIUM BICARBONATE AND POTASSIUM CHLORIDE 25 ML/KG/HR: 3.68; 3.05; 22; 5.4; 6.46; 3.09; .314 INJECTION INTRAVENOUS at 06:00

## 2024-12-26 RX ADMIN — PROPOFOL 20 MCG/KG/MIN: 10 INJECTION, EMULSION INTRAVENOUS at 05:39

## 2024-12-26 RX ADMIN — CALCIUM CHLORIDE, MAGNESIUM CHLORIDE, DEXTROSE MONOHYDRATE, LACTIC ACID, SODIUM CHLORIDE, SODIUM BICARBONATE AND POTASSIUM CHLORIDE 25 ML/KG/HR: 3.68; 3.05; 22; 5.4; 6.46; 3.09; .314 INJECTION INTRAVENOUS at 12:08

## 2024-12-26 RX ADMIN — VANCOMYCIN HYDROCHLORIDE 750 MG: 750 INJECTION, SOLUTION INTRAVENOUS at 17:21

## 2024-12-26 RX ADMIN — VANCOMYCIN HYDROCHLORIDE 750 MG: 750 INJECTION, SOLUTION INTRAVENOUS at 05:24

## 2024-12-26 RX ADMIN — PROPOFOL 20 MCG/KG/MIN: 10 INJECTION, EMULSION INTRAVENOUS at 18:48

## 2024-12-26 RX ADMIN — MEROPENEM AND SODIUM CHLORIDE 1 G: 1 INJECTION, SOLUTION INTRAVENOUS at 18:23

## 2024-12-26 RX ADMIN — PROPOFOL 20 MCG/KG/MIN: 10 INJECTION, EMULSION INTRAVENOUS at 12:51

## 2024-12-26 RX ADMIN — ACETAMINOPHEN 1000 MG: 10 INJECTION, SOLUTION INTRAVENOUS at 05:28

## 2024-12-26 RX ADMIN — Medication 50 MCG/HR: at 09:44

## 2024-12-26 RX ADMIN — Medication 40 PERCENT: at 20:05

## 2024-12-26 RX ADMIN — CALCIUM CHLORIDE, MAGNESIUM CHLORIDE, DEXTROSE MONOHYDRATE, LACTIC ACID, SODIUM CHLORIDE, SODIUM BICARBONATE AND POTASSIUM CHLORIDE 25 ML/KG/HR: 3.68; 3.05; 22; 5.4; 6.46; 3.09; .314 INJECTION INTRAVENOUS at 06:01

## 2024-12-26 ASSESSMENT — PAIN - FUNCTIONAL ASSESSMENT
PAIN_FUNCTIONAL_ASSESSMENT: CPOT (CRITICAL CARE PAIN OBSERVATION TOOL)
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
## 2024-12-26 NOTE — PROGRESS NOTES
Vancomycin Dosing by Pharmacy- FOLLOW UP    Ike Gar is a 78 y.o. year old male who Pharmacy has been consulted for vancomycin dosing for other IAI . Today is day 5 of therapy. Based on the patient's indication and renal status this patient is being dosed based on a goal trough/random level of 15-20.     Renal function is currently dependent on CRRT.     Current vancomycin dose: 750 mg given every 12 hours    Most recent trough level: 20 mcg/mL ( at 0448)    Visit Vitals  /59   Pulse 68   Temp 36.4 °C (97.5 °F) (Temporal)   Resp 20        Lab Results   Component Value Date    CREATININE 1.99 (H) 2024    CREATININE 2.15 (H) 2024    CREATININE 2.23 (H) 2024    CREATININE 2.50 (H) 2024        Patient weight is as follows:   Vitals:    24 0544   Weight: 112 kg (246 lb 7.6 oz)       Cultures:  No results found for the encounter in last 14 days.       I/O last 3 completed shifts:  In: 4031.1 (36.1 mL/kg) [I.V.:452 (4 mL/kg); IV Piggyback:1349.6]  Out: 4460 (39.9 mL/kg) [Emesis/NG output:750; Drains:471; Other:3209; Stool:30]  Weight: 111.8 kg   I/O during current shift:  No intake/output data recorded.    Temp (24hrs), Av.2 °C (97.2 °F), Min:36.1 °C (97 °F), Max:36.4 °C (97.5 °F)      Assessment/Plan    Within goal random/trough level. Continue current dosing regimen.    The next level will be obtained on  at 0500. May be obtained sooner if clinically indicated.   Will continue to monitor renal function daily while on vancomycin and order serum creatinine at least every 48 hours if not already ordered.  Follow for changes in renal replacement modalities, continued vancomycin needs, clinical response, and signs/symptoms of toxicity.     Luis Felipe Terrazas, PharmD
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
## 2024-12-26 NOTE — CARE PLAN
The patient's goals for the shift include      The clinical goals for the shift include HDS      Problem: Safety - Medical Restraint  Goal: Remains free of injury from restraints (Restraint for Interference with Medical Device)  Outcome: Progressing  Goal: Free from restraint(s) (Restraint for Interference with Medical Device)  Outcome: Progressing     Problem: Pain - Adult  Goal: Verbalizes/displays adequate comfort level or baseline comfort level  Outcome: Progressing     Problem: Safety - Adult  Goal: Free from fall injury  Outcome: Progressing     Problem: Discharge Planning  Goal: Discharge to home or other facility with appropriate resources  Outcome: Progressing     Problem: Chronic Conditions and Co-morbidities  Goal: Patient's chronic conditions and co-morbidity symptoms are monitored and maintained or improved  Outcome: Progressing     Problem: Skin  Goal: Decreased wound size/increased tissue granulation at next dressing change  Outcome: Progressing  Goal: Participates in plan/prevention/treatment measures  Outcome: Progressing  Goal: Prevent/manage excess moisture  Outcome: Progressing  Goal: Prevent/minimize sheer/friction injuries  Outcome: Progressing  Goal: Promote/optimize nutrition  Outcome: Progressing  Goal: Promote skin healing  Outcome: Progressing     Problem: Fall/Injury  Goal: Not fall by end of shift  Outcome: Progressing  Goal: Be free from injury by end of the shift  Outcome: Progressing  Goal: Verbalize understanding of personal risk factors for fall in the hospital  Outcome: Progressing  Goal: Verbalize understanding of risk factor reduction measures to prevent injury from fall in the home  Outcome: Progressing  Goal: Use assistive devices by end of the shift  Outcome: Progressing  Goal: Pace activities to prevent fatigue by end of the shift  Outcome: Progressing     Problem: Pain  Goal: Takes deep breaths with improved pain control throughout the shift  Outcome: Progressing  Goal:  Turns in bed with improved pain control throughout the shift  Outcome: Progressing  Goal: Walks with improved pain control throughout the shift  Outcome: Progressing  Goal: Performs ADL's with improved pain control throughout shift  Outcome: Progressing  Goal: Participates in PT with improved pain control throughout the shift  Outcome: Progressing  Goal: Free from opioid side effects throughout the shift  Outcome: Progressing  Goal: Free from acute confusion related to pain meds throughout the shift  Outcome: Progressing     Problem: Respiratory  Goal: Clear secretions with interventions this shift  Outcome: Progressing  Goal: Minimize anxiety/maximize coping throughout shift  Outcome: Progressing  Goal: Minimal/no exertional discomfort or dyspnea this shift  Outcome: Progressing  Goal: No signs of respiratory distress (eg. Use of accessory muscles. Peds grunting)  Outcome: Progressing  Goal: Patent airway maintained this shift  Outcome: Progressing  Goal: Tolerate mechanical ventilation evidenced by VS/agitation level this shift  Outcome: Progressing  Goal: Tolerate pulmonary toileting this shift  Outcome: Progressing  Goal: Verbalize decreased shortness of breath this shift  Outcome: Progressing  Goal: Wean oxygen to maintain O2 saturation per order/standard this shift  Outcome: Progressing  Goal: Increase self care and/or family involvement in next 24 hours  Outcome: Progressing
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
## 2024-12-26 NOTE — PROGRESS NOTES
Physical Therapy                 Therapy Communication Note    Patient Name: Ike Gar  MRN: [id number]  Department: [facility] [place] [facility]  Room: 16/16-A  Today's Date: 12/26/2024     Discipline: Physical Therapy    PT Missed Visit: Yes     Missed Visit Reason: Missed Visit Reason:  (Pt remains on TLS precautions pending spine clearance, will hold until appropriate.)    Missed Time: Attempt    Laura Gallagher, PT

## 2024-12-27 ENCOUNTER — APPOINTMENT (OUTPATIENT)
Dept: RADIOLOGY | Facility: HOSPITAL | Age: 79
End: 2024-12-27
Payer: MEDICARE

## 2024-12-27 ENCOUNTER — APPOINTMENT (OUTPATIENT)
Dept: CARDIOLOGY | Facility: HOSPITAL | Age: 79
End: 2024-12-27
Payer: MEDICARE

## 2024-12-27 LAB
ALBUMIN SERPL BCP-MCNC: 1.9 G/DL (ref 3.4–5)
ALBUMIN SERPL BCP-MCNC: 2.1 G/DL (ref 3.4–5)
ALBUMIN SERPL BCP-MCNC: 2.1 G/DL (ref 3.4–5)
ALP SERPL-CCNC: 112 U/L (ref 33–136)
ALP SERPL-CCNC: 118 U/L (ref 33–136)
ALT SERPL W P-5'-P-CCNC: 52 U/L (ref 10–52)
ALT SERPL W P-5'-P-CCNC: 54 U/L (ref 10–52)
ANION GAP BLDA CALCULATED.4IONS-SCNC: 7 MMO/L (ref 10–25)
ANION GAP BLDA CALCULATED.4IONS-SCNC: 7 MMO/L (ref 10–25)
ANION GAP SERPL CALC-SCNC: 10 MMOL/L (ref 10–20)
ANION GAP SERPL CALC-SCNC: 9 MMOL/L (ref 10–20)
AORTIC VALVE PEAK VELOCITY: 1.43 M/S
APTT PPP: 29 SECONDS (ref 27–38)
AST SERPL W P-5'-P-CCNC: 37 U/L (ref 9–39)
AST SERPL W P-5'-P-CCNC: 41 U/L (ref 9–39)
AV PEAK GRADIENT: 8 MMHG
AVA (PEAK VEL): 2.88 CM2
BASE EXCESS BLDA CALC-SCNC: 0.2 MMOL/L (ref -2–3)
BASE EXCESS BLDA CALC-SCNC: 1 MMOL/L (ref -2–3)
BILIRUB DIRECT SERPL-MCNC: 5.3 MG/DL (ref 0–0.3)
BILIRUB SERPL-MCNC: 8.7 MG/DL (ref 0–1.2)
BILIRUB SERPL-MCNC: 9 MG/DL (ref 0–1.2)
BLOOD EXPIRATION DATE: NORMAL
BLOOD EXPIRATION DATE: NORMAL
BNP SERPL-MCNC: 74 PG/ML (ref 0–99)
BODY TEMPERATURE: 37 DEGREES CELSIUS
BODY TEMPERATURE: 37 DEGREES CELSIUS
BUN SERPL-MCNC: 40 MG/DL (ref 6–23)
BUN SERPL-MCNC: 41 MG/DL (ref 6–23)
CA-I BLDA-SCNC: 1.12 MMOL/L (ref 1.1–1.33)
CA-I BLDA-SCNC: 1.12 MMOL/L (ref 1.1–1.33)
CALCIUM SERPL-MCNC: 6.8 MG/DL (ref 8.6–10.6)
CALCIUM SERPL-MCNC: 7.6 MG/DL (ref 8.6–10.6)
CHLORIDE BLDA-SCNC: 103 MMOL/L (ref 98–107)
CHLORIDE BLDA-SCNC: 104 MMOL/L (ref 98–107)
CHLORIDE SERPL-SCNC: 101 MMOL/L (ref 98–107)
CHLORIDE SERPL-SCNC: 101 MMOL/L (ref 98–107)
CO2 SERPL-SCNC: 25 MMOL/L (ref 21–32)
CO2 SERPL-SCNC: 26 MMOL/L (ref 21–32)
CREAT SERPL-MCNC: 1.88 MG/DL (ref 0.5–1.3)
CREAT SERPL-MCNC: 1.88 MG/DL (ref 0.5–1.3)
DISPENSE STATUS: NORMAL
DISPENSE STATUS: NORMAL
EGFRCR SERPLBLD CKD-EPI 2021: 37 ML/MIN/1.73M*2
EGFRCR SERPLBLD CKD-EPI 2021: 37 ML/MIN/1.73M*2
ERYTHROCYTE [DISTWIDTH] IN BLOOD BY AUTOMATED COUNT: 16.9 % (ref 11.5–14.5)
ERYTHROCYTE [DISTWIDTH] IN BLOOD BY AUTOMATED COUNT: 17.1 % (ref 11.5–14.5)
GLUCOSE BLD MANUAL STRIP-MCNC: 85 MG/DL (ref 74–99)
GLUCOSE BLD MANUAL STRIP-MCNC: 90 MG/DL (ref 74–99)
GLUCOSE BLD MANUAL STRIP-MCNC: 93 MG/DL (ref 74–99)
GLUCOSE BLDA-MCNC: 90 MG/DL (ref 74–99)
GLUCOSE BLDA-MCNC: 94 MG/DL (ref 74–99)
GLUCOSE SERPL-MCNC: 83 MG/DL (ref 74–99)
GLUCOSE SERPL-MCNC: 86 MG/DL (ref 74–99)
HCO3 BLDA-SCNC: 23.8 MMOL/L (ref 22–26)
HCO3 BLDA-SCNC: 25 MMOL/L (ref 22–26)
HCT VFR BLD AUTO: 20.2 % (ref 41–52)
HCT VFR BLD AUTO: 20.4 % (ref 41–52)
HCT VFR BLD EST: 15 % (ref 41–52)
HCT VFR BLD EST: 24 % (ref 41–52)
HGB BLD-MCNC: 7.4 G/DL (ref 13.5–17.5)
HGB BLD-MCNC: 7.5 G/DL (ref 13.5–17.5)
HGB BLDA-MCNC: 5 G/DL (ref 13.5–17.5)
HGB BLDA-MCNC: 7.9 G/DL (ref 13.5–17.5)
INHALED O2 CONCENTRATION: 40 %
INHALED O2 CONCENTRATION: 40 %
INR PPP: 1.1 (ref 0.9–1.1)
LACTATE BLDA-SCNC: 1.2 MMOL/L (ref 0.4–2)
LACTATE BLDA-SCNC: 1.2 MMOL/L (ref 0.4–2)
LEFT VENTRICULAR OUTFLOW TRACT DIAMETER: 2.1 CM
MAGNESIUM SERPL-MCNC: 2.47 MG/DL (ref 1.6–2.4)
MAGNESIUM SERPL-MCNC: 2.55 MG/DL (ref 1.6–2.4)
MCH RBC QN AUTO: 28.5 PG (ref 26–34)
MCH RBC QN AUTO: 28.6 PG (ref 26–34)
MCHC RBC AUTO-ENTMCNC: 36.3 G/DL (ref 32–36)
MCHC RBC AUTO-ENTMCNC: 37.1 G/DL (ref 32–36)
MCV RBC AUTO: 77 FL (ref 80–100)
MCV RBC AUTO: 79 FL (ref 80–100)
MITRAL VALVE E/A RATIO: 0.9
NRBC BLD-RTO: 0 /100 WBCS (ref 0–0)
NRBC BLD-RTO: 0.1 /100 WBCS (ref 0–0)
OXYHGB MFR BLDA: 94.8 % (ref 94–98)
OXYHGB MFR BLDA: 95.4 % (ref 94–98)
PCO2 BLDA: 32 MM HG (ref 38–42)
PCO2 BLDA: 36 MM HG (ref 38–42)
PH BLDA: 7.45 PH (ref 7.38–7.42)
PH BLDA: 7.48 PH (ref 7.38–7.42)
PHOSPHATE SERPL-MCNC: 2.7 MG/DL (ref 2.5–4.9)
PHOSPHATE SERPL-MCNC: 3 MG/DL (ref 2.5–4.9)
PLATELET # BLD AUTO: 184 X10*3/UL (ref 150–450)
PLATELET # BLD AUTO: 228 X10*3/UL (ref 150–450)
PO2 BLDA: 74 MM HG (ref 85–95)
PO2 BLDA: 75 MM HG (ref 85–95)
POTASSIUM BLDA-SCNC: 4.8 MMOL/L (ref 3.5–5.3)
POTASSIUM BLDA-SCNC: 4.9 MMOL/L (ref 3.5–5.3)
POTASSIUM SERPL-SCNC: 4.5 MMOL/L (ref 3.5–5.3)
POTASSIUM SERPL-SCNC: 4.8 MMOL/L (ref 3.5–5.3)
PRODUCT BLOOD TYPE: 5100
PRODUCT BLOOD TYPE: 5100
PRODUCT CODE: NORMAL
PRODUCT CODE: NORMAL
PROT SERPL-MCNC: 5 G/DL (ref 6.4–8.2)
PROT SERPL-MCNC: 5 G/DL (ref 6.4–8.2)
PROTHROMBIN TIME: 12 SECONDS (ref 9.8–12.8)
RBC # BLD AUTO: 2.6 X10*6/UL (ref 4.5–5.9)
RBC # BLD AUTO: 2.62 X10*6/UL (ref 4.5–5.9)
RIGHT VENTRICLE FREE WALL PEAK S': 12.8 CM/S
SAO2 % BLDA: 98 % (ref 94–100)
SAO2 % BLDA: 99 % (ref 94–100)
SODIUM BLDA-SCNC: 130 MMOL/L (ref 136–145)
SODIUM BLDA-SCNC: 130 MMOL/L (ref 136–145)
SODIUM SERPL-SCNC: 131 MMOL/L (ref 136–145)
SODIUM SERPL-SCNC: 131 MMOL/L (ref 136–145)
TRICUSPID ANNULAR PLANE SYSTOLIC EXCURSION: 1.8 CM
UNIT ABO: NORMAL
UNIT ABO: NORMAL
UNIT NUMBER: NORMAL
UNIT NUMBER: NORMAL
UNIT RH: NORMAL
UNIT RH: NORMAL
UNIT VOLUME: 277
UNIT VOLUME: 350
VANCOMYCIN TROUGH SERPL-MCNC: 20.8 UG/ML (ref 5–20)
WBC # BLD AUTO: 14.5 X10*3/UL (ref 4.4–11.3)
WBC # BLD AUTO: 15.6 X10*3/UL (ref 4.4–11.3)
XM INTEP: NORMAL
XM INTEP: NORMAL

## 2024-12-27 PROCEDURE — 85027 COMPLETE CBC AUTOMATED: CPT | Performed by: PHYSICIAN ASSISTANT

## 2024-12-27 PROCEDURE — 85027 COMPLETE CBC AUTOMATED: CPT

## 2024-12-27 PROCEDURE — 37799 UNLISTED PX VASCULAR SURGERY: CPT

## 2024-12-27 PROCEDURE — 2500000005 HC RX 250 GENERAL PHARMACY W/O HCPCS

## 2024-12-27 PROCEDURE — 2500000004 HC RX 250 GENERAL PHARMACY W/ HCPCS (ALT 636 FOR OP/ED)

## 2024-12-27 PROCEDURE — 85610 PROTHROMBIN TIME: CPT

## 2024-12-27 PROCEDURE — 2500000004 HC RX 250 GENERAL PHARMACY W/ HCPCS (ALT 636 FOR OP/ED): Performed by: PHYSICIAN ASSISTANT

## 2024-12-27 PROCEDURE — 32551 INSERTION OF CHEST TUBE: CPT | Performed by: SURGERY

## 2024-12-27 PROCEDURE — 71045 X-RAY EXAM CHEST 1 VIEW: CPT

## 2024-12-27 PROCEDURE — 83880 ASSAY OF NATRIURETIC PEPTIDE: CPT

## 2024-12-27 PROCEDURE — 82435 ASSAY OF BLOOD CHLORIDE: CPT

## 2024-12-27 PROCEDURE — 94003 VENT MGMT INPAT SUBQ DAY: CPT

## 2024-12-27 PROCEDURE — 99291 CRITICAL CARE FIRST HOUR: CPT | Performed by: SURGERY

## 2024-12-27 PROCEDURE — 90937 HEMODIALYSIS REPEATED EVAL: CPT

## 2024-12-27 PROCEDURE — 71045 X-RAY EXAM CHEST 1 VIEW: CPT | Performed by: RADIOLOGY

## 2024-12-27 PROCEDURE — 2500000004 HC RX 250 GENERAL PHARMACY W/ HCPCS (ALT 636 FOR OP/ED): Mod: JZ

## 2024-12-27 PROCEDURE — 82040 ASSAY OF SERUM ALBUMIN: CPT

## 2024-12-27 PROCEDURE — 90945 DIALYSIS ONE EVALUATION: CPT | Performed by: INTERNAL MEDICINE

## 2024-12-27 PROCEDURE — 3E0G76Z INTRODUCTION OF NUTRITIONAL SUBSTANCE INTO UPPER GI, VIA NATURAL OR ARTIFICIAL OPENING: ICD-10-PCS | Performed by: SURGERY

## 2024-12-27 PROCEDURE — 2500000004 HC RX 250 GENERAL PHARMACY W/ HCPCS (ALT 636 FOR OP/ED): Performed by: STUDENT IN AN ORGANIZED HEALTH CARE EDUCATION/TRAINING PROGRAM

## 2024-12-27 PROCEDURE — 93325 DOPPLER ECHO COLOR FLOW MAPG: CPT

## 2024-12-27 PROCEDURE — C8924 2D TTE W OR W/O FOL W/CON,FU: HCPCS

## 2024-12-27 PROCEDURE — 80202 ASSAY OF VANCOMYCIN: CPT

## 2024-12-27 PROCEDURE — 82947 ASSAY GLUCOSE BLOOD QUANT: CPT

## 2024-12-27 PROCEDURE — 82248 BILIRUBIN DIRECT: CPT

## 2024-12-27 PROCEDURE — 93308 TTE F-UP OR LMTD: CPT | Performed by: SPECIALIST

## 2024-12-27 PROCEDURE — 93325 DOPPLER ECHO COLOR FLOW MAPG: CPT | Performed by: SPECIALIST

## 2024-12-27 PROCEDURE — 83735 ASSAY OF MAGNESIUM: CPT

## 2024-12-27 PROCEDURE — 93321 DOPPLER ECHO F-UP/LMTD STD: CPT | Performed by: SPECIALIST

## 2024-12-27 PROCEDURE — 2500000004 HC RX 250 GENERAL PHARMACY W/ HCPCS (ALT 636 FOR OP/ED): Mod: JZ | Performed by: PHYSICIAN ASSISTANT

## 2024-12-27 PROCEDURE — 2020000001 HC ICU ROOM DAILY

## 2024-12-27 PROCEDURE — 84100 ASSAY OF PHOSPHORUS: CPT | Performed by: PHYSICIAN ASSISTANT

## 2024-12-27 PROCEDURE — 84100 ASSAY OF PHOSPHORUS: CPT

## 2024-12-27 PROCEDURE — 71045 X-RAY EXAM CHEST 1 VIEW: CPT | Performed by: STUDENT IN AN ORGANIZED HEALTH CARE EDUCATION/TRAINING PROGRAM

## 2024-12-27 RX ADMIN — PROPOFOL 20 MCG/KG/MIN: 10 INJECTION, EMULSION INTRAVENOUS at 08:58

## 2024-12-27 RX ADMIN — ASCORBIC ACID, VITAMIN A PALMITATE, CHOLECALCIFEROL, THIAMINE HYDROCHLORIDE, RIBOFLAVIN-5 PHOSPHATE SODIUM, PYRIDOXINE HYDROCHLORIDE, NIACINAMIDE, DEXPANTHENOL, ALPHA-TOCOPHEROL ACETATE, VITAMIN K1, FOLIC ACID, BIOTIN, CYANOCOBALAMIN: 200; 3300; 200; 6; 3.6; 6; 40; 15; 10; 150; 600; 60; 5 INJECTION, SOLUTION INTRAVENOUS at 20:03

## 2024-12-27 RX ADMIN — CALCIUM CHLORIDE, MAGNESIUM CHLORIDE, DEXTROSE MONOHYDRATE, LACTIC ACID, SODIUM CHLORIDE, SODIUM BICARBONATE AND POTASSIUM CHLORIDE 25 ML/KG/HR: 3.68; 3.05; 22; 5.4; 6.46; 3.09; .314 INJECTION INTRAVENOUS at 01:02

## 2024-12-27 RX ADMIN — MEROPENEM AND SODIUM CHLORIDE 1 G: 1 INJECTION, SOLUTION INTRAVENOUS at 19:00

## 2024-12-27 RX ADMIN — CALCIUM CHLORIDE, MAGNESIUM CHLORIDE, DEXTROSE MONOHYDRATE, LACTIC ACID, SODIUM CHLORIDE, SODIUM BICARBONATE AND POTASSIUM CHLORIDE 25 ML/KG/HR: 3.68; 3.05; 22; 5.4; 6.46; 3.09; .314 INJECTION INTRAVENOUS at 07:00

## 2024-12-27 RX ADMIN — Medication 50 MCG/HR: at 21:08

## 2024-12-27 RX ADMIN — HEPARIN SODIUM 5000 UNITS: 5000 INJECTION INTRAVENOUS; SUBCUTANEOUS at 18:40

## 2024-12-27 RX ADMIN — CALCIUM CHLORIDE, MAGNESIUM CHLORIDE, DEXTROSE MONOHYDRATE, LACTIC ACID, SODIUM CHLORIDE, SODIUM BICARBONATE AND POTASSIUM CHLORIDE 25 ML/KG/HR: 3.68; 3.05; 22; 5.4; 6.46; 3.09; .314 INJECTION INTRAVENOUS at 01:01

## 2024-12-27 RX ADMIN — ACETAMINOPHEN 1000 MG: 10 INJECTION, SOLUTION INTRAVENOUS at 06:30

## 2024-12-27 RX ADMIN — PROPOFOL 20 MCG/KG/MIN: 10 INJECTION, EMULSION INTRAVENOUS at 22:04

## 2024-12-27 RX ADMIN — MEROPENEM AND SODIUM CHLORIDE 1 G: 1 INJECTION, SOLUTION INTRAVENOUS at 06:29

## 2024-12-27 RX ADMIN — LEUCINE, PHENYLALANINE, LYSINE HYDROCHLORIDE, METHIONINE, ISOLEUCINE, VALINE, HISTIDINE, THREONINE, TRYPTOPHAN, ALANINE, GLYCINE, ARGININE, PROLINE, TYROSINE, SERINE 25 G: 730; 560; 580; 400; 600; 580; 480; 420; 180; 2.07; 1.03; 1.15; 680; 40; 5 INJECTION INTRAVENOUS at 08:46

## 2024-12-27 RX ADMIN — PERFLUTREN 5 ML OF DILUTION: 6.52 INJECTION, SUSPENSION INTRAVENOUS at 09:44

## 2024-12-27 RX ADMIN — PANTOPRAZOLE SODIUM 40 MG: 40 INJECTION, POWDER, FOR SOLUTION INTRAVENOUS at 06:30

## 2024-12-27 RX ADMIN — CALCIUM CHLORIDE, MAGNESIUM CHLORIDE, DEXTROSE MONOHYDRATE, LACTIC ACID, SODIUM CHLORIDE, SODIUM BICARBONATE AND POTASSIUM CHLORIDE 25 ML/KG/HR: 3.68; 3.05; 22; 5.4; 6.46; 3.09; .314 INJECTION INTRAVENOUS at 18:56

## 2024-12-27 RX ADMIN — CALCIUM CHLORIDE, MAGNESIUM CHLORIDE, DEXTROSE MONOHYDRATE, LACTIC ACID, SODIUM CHLORIDE, SODIUM BICARBONATE AND POTASSIUM CHLORIDE 25 ML/KG/HR: 3.68; 3.05; 22; 5.4; 6.46; 3.09; .314 INJECTION INTRAVENOUS at 01:00

## 2024-12-27 RX ADMIN — VANCOMYCIN HYDROCHLORIDE 750 MG: 750 INJECTION, SOLUTION INTRAVENOUS at 05:31

## 2024-12-27 RX ADMIN — PROPOFOL 20 MCG/KG/MIN: 10 INJECTION, EMULSION INTRAVENOUS at 15:02

## 2024-12-27 RX ADMIN — CALCIUM CHLORIDE, MAGNESIUM CHLORIDE, DEXTROSE MONOHYDRATE, LACTIC ACID, SODIUM CHLORIDE, SODIUM BICARBONATE AND POTASSIUM CHLORIDE 25 ML/KG/HR: 3.68; 3.05; 22; 5.4; 6.46; 3.09; .314 INJECTION INTRAVENOUS at 06:28

## 2024-12-27 RX ADMIN — ACETAMINOPHEN 1000 MG: 10 INJECTION, SOLUTION INTRAVENOUS at 00:14

## 2024-12-27 RX ADMIN — ACETAMINOPHEN 1000 MG: 10 INJECTION, SOLUTION INTRAVENOUS at 23:16

## 2024-12-27 RX ADMIN — CALCIUM CHLORIDE, MAGNESIUM CHLORIDE, DEXTROSE MONOHYDRATE, LACTIC ACID, SODIUM CHLORIDE, SODIUM BICARBONATE AND POTASSIUM CHLORIDE 25 ML/KG/HR: 3.68; 3.05; 22; 5.4; 6.46; 3.09; .314 INJECTION INTRAVENOUS at 12:49

## 2024-12-27 RX ADMIN — HEPARIN SODIUM 5000 UNITS: 5000 INJECTION INTRAVENOUS; SUBCUTANEOUS at 01:03

## 2024-12-27 RX ADMIN — Medication 50 MCG/HR: at 01:03

## 2024-12-27 RX ADMIN — CALCIUM CHLORIDE, MAGNESIUM CHLORIDE, DEXTROSE MONOHYDRATE, LACTIC ACID, SODIUM CHLORIDE, SODIUM BICARBONATE AND POTASSIUM CHLORIDE 25 ML/KG/HR: 3.68; 3.05; 22; 5.4; 6.46; 3.09; .314 INJECTION INTRAVENOUS at 06:59

## 2024-12-27 RX ADMIN — VANCOMYCIN HYDROCHLORIDE 750 MG: 750 INJECTION, SOLUTION INTRAVENOUS at 18:30

## 2024-12-27 RX ADMIN — Medication 40 PERCENT: at 08:00

## 2024-12-27 RX ADMIN — HEPARIN SODIUM 5000 UNITS: 5000 INJECTION INTRAVENOUS; SUBCUTANEOUS at 10:21

## 2024-12-27 RX ADMIN — Medication 40 PERCENT: at 20:09

## 2024-12-27 RX ADMIN — PROPOFOL 20 MCG/KG/MIN: 10 INJECTION, EMULSION INTRAVENOUS at 02:33

## 2024-12-27 ASSESSMENT — PAIN - FUNCTIONAL ASSESSMENT
PAIN_FUNCTIONAL_ASSESSMENT: CPOT (CRITICAL CARE PAIN OBSERVATION TOOL)

## 2024-12-27 NOTE — PROGRESS NOTES
Renal Staff [facility] Note    Patient seen, examined on [facility]  No significant filter issues overnight  Urine output, none recorded  M150 no hep  200 bfr 130 / hr fluid removal 2.4 off  1800 rfr 50% pre    4K 2.5 Ca    Effluent bag clear  Access R int jug    Sedated, intubated, F1O2 40  Pressor requirement none    Electrolytes, acid base acceptable except low calcium  Medications reviewed    No [facility] script change  Fluid removal per primary team
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
## 2024-12-27 NOTE — PROGRESS NOTES
Vancomycin Dosing by Pharmacy- FOLLOW UP    Ike Gar is a 78 y.o. year old male who Pharmacy has been consulted for vancomycin dosing for abdominal infection. Based on the patient's indication and renal status this patient is being dosed based on a goal trough/random level of 15-20.     Patient is currently on CVVH.    Current vancomycin dose: 750 mg given every 12 hours    Most recent trough level: 20.8 mcg/mL    Visit Vitals  BP 94/53   Pulse 67   Temp 36.1 °C (97 °F) (Temporal)   Resp 15        Lab Results   Component Value Date    CREATININE 1.88 (H) 2024    CREATININE 1.99 (H) 2024    CREATININE 2.15 (H) 2024    CREATININE 2.23 (H) 2024        Patient weight is as follows:   Vitals:    24 0430   Weight: 103 kg (226 lb 6.6 oz)       Cultures:  No results found for the encounter in last 14 days.       I/O last 3 completed shifts:  In: 4000.6 (39 mL/kg) [I.V.:663.8 (6.5 mL/kg); IV Piggyback:1000]  Out: 5060 (49.3 mL/kg) [Emesis/NG output:1200; Drains:275; Other:3550; Stool:35]  Weight: 102.7 kg   I/O during current shift:  I/O this shift:  In: 83.4 [I.V.:18.4]  Out: 142 [Other:142]    Temp (24hrs), Av.3 °C (97.3 °F), Min:36 °C (96.8 °F), Max:36.6 °C (97.8 °F)      Assessment/Plan    Vancomycin trough slightly supratherapeutic at 20.8 mcg/mL.  Will continue 750 mg Q12.  The next level will be obtained on  at 0530. May be obtained sooner if clinically indicated.   Will continue to monitor renal function daily while on vancomycin and order serum creatinine at least every 48 hours if not already ordered.  Follow for continued vancomycin needs, clinical response, and signs/symptoms of toxicity.       Rich Saldana, PharmD

## 2024-12-27 NOTE — PROCEDURES
[facility]  DEPARTMENT OF SURGERY    PROCEDURE NOTE    PROCEDURE:  Insertion right chest pigtail catheter    INDICATION:  Right-sided pleural effusion, acute hypoxic respiratory failure    PERFORMING PHYSICIAN:  Jay Fleming MD    ASSISTING PHYSICIAN:  None    CONSENT:  Written, obtained from spouse    ESTIMATED BLOOD LOSS:  5 cc    ANESTHESIA:  Local, sedation    PROCEDURE DETAILS:  The patient was prepped in proper sterile fashion.  Anesthesia was obtained with local infiltration of 5 ml 1% lidocaine. A finder needle attached to a syringe with saline was inserted just superior to the 5th rib at the midaxillary line. The syringe was aspirated as it was advanced until bubbles were observed in the syringe. The syringe was then removed and a guide wire was passed through the needle. An 11 blade scalpel was then use to make a skin incision adjacent to the wire. The needle was then removed and a dilator was passed over the wire and used to dilate the skin. The dilator was then removed and the 14Fr pigtail, with the obturator in place, was passed over the wire. The pigtail catheter was then advanced and the wire was removed. The  pigtail catheter was then connected to a pleuravac on suction and air was observed in the pleuravac. The pigtail catheter was then secured to the skin with suture. A post procedure chest x-ray was ordered pending review.     COMPLICATIONS:  None    PATIENT CONDITION:  Tolerated well
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
## 2024-12-27 NOTE — SIGNIFICANT EVENT
12/27/2024  12:48 PM    Ike Gar  [id number]    I evaluated and examined the patient with the critical care team. As a multidisciplinary team, we discussed all findings, as well as assessment and plan. I personally spent 35 minutes of critical care time excluding procedures at the bedside with the patient. I personally reviewed all labs, results and studies. My independent note with assessment and plan are below:    Neuro  Interactive and comfortable of current regiment    CV  Pressors briefly while pulling fluid off CRRT  MAP >65    Pulmonary  Bilateral pigtails to help with pleural effusions  Thoracic sx -> no indication currently for surgery  PS as much as tolerated    GI  LLQ decreasing  Other stable  Drains gradually decreasing in character  Trickle TF today  Bowel sweat and gas to ostomy bag    Renal/  Negative 700cc  CRRT with volume off  Still anuric    Heme  Daily CBC    MSK  TLSO orthotics on the way    Endo  Good glycemic control    ID  Final days of abx    Skin  Hand wound-> non op, local wound care  Pressor toes CDI  Scrotal support    Lines/Tubes/Drains  ETT, central line, Trialysis line, A line, PIV    Prophylaxis  SCDs, subQ heparin    Restraints  Re-ordered    Disposition  ALLEGRA Low, DO, FACS

## 2024-12-27 NOTE — SIGNIFICANT EVENT
Trauma ICU Goals of Care Discussion  Dr. Low held discussion with patient's son regarding his clinical picture and ongoing ventilatory requirements.  Discussed with patient's son that while he is in a much better place than he was last week, in that he is no longer requiring pressor support, his abdomen closed, no longer in septic shock, we still have several systems with ongoing issues.  These include his ongoing renal failure, requiring CRRT, as patient remains anuric. Further, we have been unable to wean him off the ventilator since his prior extubation, though he has been stable on CPAP for the last 2 days.  Per thoracic surgery, he is unlikely to benefit from rib plating.  As a result, there is a very real possibility the patient ends up requiring a tracheostomy.  On top of that, given his continuous requirement for CRRT, it is possible that he may require dialysis in the long-term if his kidneys do not recover from this insult.  Made it clear to the son that best case scenario, patient ends up either with several procedures to potentially optimize his respiratory status prior to possible extubation, with low confidence that he would succeed with extubation versus a tracheostomy, which could potentially be temporary.  Discussed that his path to recovery is going to be very long, and again would likely require him to be weaned from a tracheostomy, as well as long-term placement in either a LTAC or SNF.  With this knowledge, patient's son requested that we optimize patient to trial extubation 1 more time, and if he is not able to be successfully extubated, or if he does not remain extubated, would like to proceed with tracheostomy.  Patient's son, as well as his wife, believes that if there is a chance that patient can be weaned from a tracheostomy, they would like to pursue that chance.  They believe this is in line with the patient's wishes.  We will proceed with attempts to optimize patient for possible  extubation, including right sided pigtail, with possible left-sided pigtail down the line, TLSO brace so that we may sit patient upright, and continued spontaneous breathing trials.  If we are unable to successfully extubate the patient, patient's family would like to proceed with tracheostomy, but we will revisit it at that time.  Discussed this with patient's wife as well while consenting her for pigtail, and she agrees that this is what the patient would likely wish for himself.    Jay Fleming MD  PGY-2 General Surgery  Trauma ICU [id number]

## 2024-12-28 ENCOUNTER — APPOINTMENT (OUTPATIENT)
Dept: RADIOLOGY | Facility: HOSPITAL | Age: 79
End: 2024-12-28
Payer: MEDICARE

## 2024-12-28 LAB
ALBUMIN SERPL BCP-MCNC: 2 G/DL (ref 3.4–5)
ANION GAP BLDA CALCULATED.4IONS-SCNC: 5 MMO/L (ref 10–25)
ANION GAP BLDA CALCULATED.4IONS-SCNC: 6 MMO/L (ref 10–25)
ANION GAP BLDA CALCULATED.4IONS-SCNC: 7 MMO/L (ref 10–25)
ANION GAP SERPL CALC-SCNC: 10 MMOL/L (ref 10–20)
APTT PPP: 28 SECONDS (ref 27–38)
BASE EXCESS BLDA CALC-SCNC: -2.7 MMOL/L (ref -2–3)
BASE EXCESS BLDA CALC-SCNC: -3.1 MMOL/L (ref -2–3)
BASE EXCESS BLDA CALC-SCNC: 0.2 MMOL/L (ref -2–3)
BODY TEMPERATURE: 37 DEGREES CELSIUS
BUN SERPL-MCNC: 39 MG/DL (ref 6–23)
CA-I BLD-SCNC: 1.09 MMOL/L (ref 1.1–1.33)
CA-I BLDA-SCNC: 1.13 MMOL/L (ref 1.1–1.33)
CA-I BLDA-SCNC: 1.18 MMOL/L (ref 1.1–1.33)
CA-I BLDA-SCNC: 1.2 MMOL/L (ref 1.1–1.33)
CALCIUM SERPL-MCNC: 7.3 MG/DL (ref 8.6–10.6)
CHLORIDE BLDA-SCNC: 102 MMOL/L (ref 98–107)
CHLORIDE BLDA-SCNC: 103 MMOL/L (ref 98–107)
CHLORIDE BLDA-SCNC: 104 MMOL/L (ref 98–107)
CHLORIDE SERPL-SCNC: 100 MMOL/L (ref 98–107)
CO2 SERPL-SCNC: 26 MMOL/L (ref 21–32)
CREAT SERPL-MCNC: 1.79 MG/DL (ref 0.5–1.3)
EGFRCR SERPLBLD CKD-EPI 2021: 39 ML/MIN/1.73M*2
ERYTHROCYTE [DISTWIDTH] IN BLOOD BY AUTOMATED COUNT: 17.6 % (ref 11.5–14.5)
ERYTHROCYTE [DISTWIDTH] IN BLOOD BY AUTOMATED COUNT: 19 % (ref 11.5–14.5)
GLUCOSE BLD MANUAL STRIP-MCNC: 100 MG/DL (ref 74–99)
GLUCOSE BLD MANUAL STRIP-MCNC: 116 MG/DL (ref 74–99)
GLUCOSE BLD MANUAL STRIP-MCNC: 117 MG/DL (ref 74–99)
GLUCOSE BLD MANUAL STRIP-MCNC: 123 MG/DL (ref 74–99)
GLUCOSE BLD MANUAL STRIP-MCNC: 136 MG/DL (ref 74–99)
GLUCOSE BLD MANUAL STRIP-MCNC: 148 MG/DL (ref 74–99)
GLUCOSE BLD MANUAL STRIP-MCNC: 91 MG/DL (ref 74–99)
GLUCOSE BLDA-MCNC: 116 MG/DL (ref 74–99)
GLUCOSE BLDA-MCNC: 156 MG/DL (ref 74–99)
GLUCOSE BLDA-MCNC: 157 MG/DL (ref 74–99)
GLUCOSE SERPL-MCNC: 97 MG/DL (ref 74–99)
HCO3 BLDA-SCNC: 23.2 MMOL/L (ref 22–26)
HCO3 BLDA-SCNC: 24.4 MMOL/L (ref 22–26)
HCO3 BLDA-SCNC: 24.6 MMOL/L (ref 22–26)
HCT VFR BLD AUTO: 21.2 % (ref 41–52)
HCT VFR BLD AUTO: 21.9 % (ref 41–52)
HCT VFR BLD EST: 21 % (ref 41–52)
HCT VFR BLD EST: 24 % (ref 41–52)
HCT VFR BLD EST: 29 % (ref 41–52)
HGB BLD-MCNC: 7.4 G/DL (ref 13.5–17.5)
HGB BLD-MCNC: 7.6 G/DL (ref 13.5–17.5)
HGB BLDA-MCNC: 7.1 G/DL (ref 13.5–17.5)
HGB BLDA-MCNC: 8 G/DL (ref 13.5–17.5)
HGB BLDA-MCNC: 9.6 G/DL (ref 13.5–17.5)
INHALED O2 CONCENTRATION: 40 %
INR PPP: 1 (ref 0.9–1.1)
LACTATE BLDA-SCNC: 0.8 MMOL/L (ref 0.4–2)
LACTATE BLDA-SCNC: 0.9 MMOL/L (ref 0.4–2)
LACTATE BLDA-SCNC: 1.2 MMOL/L (ref 0.4–2)
MAGNESIUM SERPL-MCNC: 2.61 MG/DL (ref 1.6–2.4)
MCH RBC QN AUTO: 28.2 PG (ref 26–34)
MCH RBC QN AUTO: 28.8 PG (ref 26–34)
MCHC RBC AUTO-ENTMCNC: 33.8 G/DL (ref 32–36)
MCHC RBC AUTO-ENTMCNC: 35.8 G/DL (ref 32–36)
MCV RBC AUTO: 80 FL (ref 80–100)
MCV RBC AUTO: 84 FL (ref 80–100)
NRBC BLD-RTO: 0 /100 WBCS (ref 0–0)
NRBC BLD-RTO: 0 /100 WBCS (ref 0–0)
OXYHGB MFR BLDA: 95.3 % (ref 94–98)
OXYHGB MFR BLDA: 96 % (ref 94–98)
OXYHGB MFR BLDA: 96.6 % (ref 94–98)
PCO2 BLDA: 38 MM HG (ref 38–42)
PCO2 BLDA: 45 MM HG (ref 38–42)
PCO2 BLDA: 57 MM HG (ref 38–42)
PH BLDA: 7.24 PH (ref 7.38–7.42)
PH BLDA: 7.32 PH (ref 7.38–7.42)
PH BLDA: 7.42 PH (ref 7.38–7.42)
PHOSPHATE SERPL-MCNC: 3.1 MG/DL (ref 2.5–4.9)
PLATELET # BLD AUTO: 254 X10*3/UL (ref 150–450)
PLATELET # BLD AUTO: 305 X10*3/UL (ref 150–450)
PO2 BLDA: 110 MM HG (ref 85–95)
PO2 BLDA: 86 MM HG (ref 85–95)
PO2 BLDA: 86 MM HG (ref 85–95)
POTASSIUM BLDA-SCNC: 4.9 MMOL/L (ref 3.5–5.3)
POTASSIUM BLDA-SCNC: 5.1 MMOL/L (ref 3.5–5.3)
POTASSIUM BLDA-SCNC: 5.3 MMOL/L (ref 3.5–5.3)
POTASSIUM SERPL-SCNC: 5 MMOL/L (ref 3.5–5.3)
PROTHROMBIN TIME: 11.7 SECONDS (ref 9.8–12.8)
RBC # BLD AUTO: 2.62 X10*6/UL (ref 4.5–5.9)
RBC # BLD AUTO: 2.64 X10*6/UL (ref 4.5–5.9)
SAO2 % BLDA: 98 % (ref 94–100)
SAO2 % BLDA: 99 % (ref 94–100)
SAO2 % BLDA: 99 % (ref 94–100)
SODIUM BLDA-SCNC: 127 MMOL/L (ref 136–145)
SODIUM BLDA-SCNC: 128 MMOL/L (ref 136–145)
SODIUM BLDA-SCNC: 129 MMOL/L (ref 136–145)
SODIUM SERPL-SCNC: 131 MMOL/L (ref 136–145)
VANCOMYCIN SERPL-MCNC: 21.7 UG/ML (ref 5–20)
WBC # BLD AUTO: 17.4 X10*3/UL (ref 4.4–11.3)
WBC # BLD AUTO: 25.1 X10*3/UL (ref 4.4–11.3)

## 2024-12-28 PROCEDURE — 2500000004 HC RX 250 GENERAL PHARMACY W/ HCPCS (ALT 636 FOR OP/ED)

## 2024-12-28 PROCEDURE — 80202 ASSAY OF VANCOMYCIN: CPT | Performed by: PHYSICIAN ASSISTANT

## 2024-12-28 PROCEDURE — 32556 INSERT CATH PLEURA W/O IMAGE: CPT | Performed by: SURGERY

## 2024-12-28 PROCEDURE — 2500000004 HC RX 250 GENERAL PHARMACY W/ HCPCS (ALT 636 FOR OP/ED): Mod: JZ

## 2024-12-28 PROCEDURE — 2500000004 HC RX 250 GENERAL PHARMACY W/ HCPCS (ALT 636 FOR OP/ED): Performed by: STUDENT IN AN ORGANIZED HEALTH CARE EDUCATION/TRAINING PROGRAM

## 2024-12-28 PROCEDURE — 82330 ASSAY OF CALCIUM: CPT | Performed by: PHYSICIAN ASSISTANT

## 2024-12-28 PROCEDURE — 2500000005 HC RX 250 GENERAL PHARMACY W/O HCPCS

## 2024-12-28 PROCEDURE — 80069 RENAL FUNCTION PANEL: CPT | Performed by: PHYSICIAN ASSISTANT

## 2024-12-28 PROCEDURE — 83735 ASSAY OF MAGNESIUM: CPT

## 2024-12-28 PROCEDURE — 85027 COMPLETE CBC AUTOMATED: CPT | Performed by: PHYSICIAN ASSISTANT

## 2024-12-28 PROCEDURE — 90937 HEMODIALYSIS REPEATED EVAL: CPT

## 2024-12-28 PROCEDURE — 94003 VENT MGMT INPAT SUBQ DAY: CPT

## 2024-12-28 PROCEDURE — 82947 ASSAY GLUCOSE BLOOD QUANT: CPT

## 2024-12-28 PROCEDURE — 71045 X-RAY EXAM CHEST 1 VIEW: CPT

## 2024-12-28 PROCEDURE — 71045 X-RAY EXAM CHEST 1 VIEW: CPT | Performed by: STUDENT IN AN ORGANIZED HEALTH CARE EDUCATION/TRAINING PROGRAM

## 2024-12-28 PROCEDURE — 99223 1ST HOSP IP/OBS HIGH 75: CPT | Performed by: STUDENT IN AN ORGANIZED HEALTH CARE EDUCATION/TRAINING PROGRAM

## 2024-12-28 PROCEDURE — 2500000005 HC RX 250 GENERAL PHARMACY W/O HCPCS: Performed by: STUDENT IN AN ORGANIZED HEALTH CARE EDUCATION/TRAINING PROGRAM

## 2024-12-28 PROCEDURE — 2500000004 HC RX 250 GENERAL PHARMACY W/ HCPCS (ALT 636 FOR OP/ED): Performed by: PHYSICIAN ASSISTANT

## 2024-12-28 PROCEDURE — 0W9B30Z DRAINAGE OF LEFT PLEURAL CAVITY WITH DRAINAGE DEVICE, PERCUTANEOUS APPROACH: ICD-10-PCS

## 2024-12-28 PROCEDURE — P9047 ALBUMIN (HUMAN), 25%, 50ML: HCPCS | Mod: JZ

## 2024-12-28 PROCEDURE — 99291 CRITICAL CARE FIRST HOUR: CPT | Performed by: SURGERY

## 2024-12-28 PROCEDURE — 82435 ASSAY OF BLOOD CHLORIDE: CPT

## 2024-12-28 PROCEDURE — 99232 SBSQ HOSP IP/OBS MODERATE 35: CPT | Performed by: SURGERY

## 2024-12-28 PROCEDURE — 85610 PROTHROMBIN TIME: CPT

## 2024-12-28 PROCEDURE — 37799 UNLISTED PX VASCULAR SURGERY: CPT | Performed by: PHYSICIAN ASSISTANT

## 2024-12-28 PROCEDURE — 2020000001 HC ICU ROOM DAILY

## 2024-12-28 PROCEDURE — 90945 DIALYSIS ONE EVALUATION: CPT | Performed by: INTERNAL MEDICINE

## 2024-12-28 RX ORDER — FAMOTIDINE 40 MG/5ML
20 POWDER, FOR SUSPENSION ORAL 2 TIMES DAILY
Status: DISCONTINUED | OUTPATIENT
Start: 2024-12-28 | End: 2024-12-28

## 2024-12-28 RX ORDER — CALCIUM GLUCONATE 20 MG/ML
1 INJECTION, SOLUTION INTRAVENOUS EVERY 4 HOURS
Status: COMPLETED | OUTPATIENT
Start: 2024-12-28 | End: 2024-12-28

## 2024-12-28 RX ORDER — FENTANYL CITRATE 50 UG/ML
50 INJECTION, SOLUTION INTRAMUSCULAR; INTRAVENOUS ONCE
Status: COMPLETED | OUTPATIENT
Start: 2024-12-28 | End: 2024-12-28

## 2024-12-28 RX ORDER — VANCOMYCIN HYDROCHLORIDE 500 MG/100ML
500 INJECTION, SOLUTION INTRAVENOUS EVERY 12 HOURS
Status: DISCONTINUED | OUTPATIENT
Start: 2024-12-28 | End: 2024-12-29

## 2024-12-28 RX ORDER — FAMOTIDINE 10 MG/ML
20 INJECTION INTRAVENOUS EVERY 24 HOURS
Status: DISCONTINUED | OUTPATIENT
Start: 2024-12-28 | End: 2024-12-29

## 2024-12-28 RX ORDER — ALBUMIN HUMAN 250 G/1000ML
12.5 SOLUTION INTRAVENOUS ONCE
Status: COMPLETED | OUTPATIENT
Start: 2024-12-28 | End: 2024-12-28

## 2024-12-28 RX ADMIN — HEPARIN SODIUM 5000 UNITS: 5000 INJECTION INTRAVENOUS; SUBCUTANEOUS at 02:13

## 2024-12-28 RX ADMIN — NOREPINEPHRINE BITARTRATE 0.05 MCG/KG/MIN: 8 INJECTION, SOLUTION INTRAVENOUS at 23:27

## 2024-12-28 RX ADMIN — ALBUMIN HUMAN 12.5 G: 0.25 SOLUTION INTRAVENOUS at 20:04

## 2024-12-28 RX ADMIN — PROPOFOL 20 MCG/KG/MIN: 10 INJECTION, EMULSION INTRAVENOUS at 03:59

## 2024-12-28 RX ADMIN — FAMOTIDINE 20 MG: 10 INJECTION INTRAVENOUS at 23:05

## 2024-12-28 RX ADMIN — PANTOPRAZOLE SODIUM 40 MG: 40 INJECTION, POWDER, FOR SOLUTION INTRAVENOUS at 07:26

## 2024-12-28 RX ADMIN — CALCIUM CHLORIDE, MAGNESIUM CHLORIDE, DEXTROSE MONOHYDRATE, LACTIC ACID, SODIUM CHLORIDE, SODIUM BICARBONATE AND POTASSIUM CHLORIDE 26 ML/KG/HR: 5.15; 2.03; 22; 5.4; 6.46; 3.09; .157 INJECTION INTRAVENOUS at 17:23

## 2024-12-28 RX ADMIN — VASOPRESSIN 0.03 UNITS/MIN: 0.2 INJECTION INTRAVENOUS at 20:04

## 2024-12-28 RX ADMIN — ACETAMINOPHEN 1000 MG: 10 INJECTION, SOLUTION INTRAVENOUS at 17:21

## 2024-12-28 RX ADMIN — Medication 50 MCG/HR: at 17:59

## 2024-12-28 RX ADMIN — PROPOFOL 30 MCG/KG/MIN: 10 INJECTION, EMULSION INTRAVENOUS at 22:05

## 2024-12-28 RX ADMIN — ASCORBIC ACID, VITAMIN A PALMITATE, CHOLECALCIFEROL, THIAMINE HYDROCHLORIDE, RIBOFLAVIN-5 PHOSPHATE SODIUM, PYRIDOXINE HYDROCHLORIDE, NIACINAMIDE, DEXPANTHENOL, ALPHA-TOCOPHEROL ACETATE, VITAMIN K1, FOLIC ACID, BIOTIN, CYANOCOBALAMIN: 200; 3300; 200; 6; 3.6; 6; 40; 15; 10; 150; 600; 60; 5 INJECTION, SOLUTION INTRAVENOUS at 20:00

## 2024-12-28 RX ADMIN — CALCIUM GLUCONATE 1 G: 20 INJECTION, SOLUTION INTRAVENOUS at 05:10

## 2024-12-28 RX ADMIN — Medication 40 PERCENT: at 08:39

## 2024-12-28 RX ADMIN — CALCIUM CHLORIDE, MAGNESIUM CHLORIDE, DEXTROSE MONOHYDRATE, LACTIC ACID, SODIUM CHLORIDE, SODIUM BICARBONATE AND POTASSIUM CHLORIDE 25 ML/KG/HR: 3.68; 3.05; 22; 5.4; 6.46; 3.09; .314 INJECTION INTRAVENOUS at 05:46

## 2024-12-28 RX ADMIN — PROPOFOL 25 MCG/KG/MIN: 10 INJECTION, EMULSION INTRAVENOUS at 17:17

## 2024-12-28 RX ADMIN — CALCIUM GLUCONATE 1 G: 20 INJECTION, SOLUTION INTRAVENOUS at 08:28

## 2024-12-28 RX ADMIN — FENTANYL CITRATE 50 MCG: 50 INJECTION, SOLUTION INTRAMUSCULAR; INTRAVENOUS at 20:26

## 2024-12-28 RX ADMIN — HEPARIN SODIUM 5000 UNITS: 5000 INJECTION INTRAVENOUS; SUBCUTANEOUS at 09:12

## 2024-12-28 RX ADMIN — LEUCINE, PHENYLALANINE, LYSINE HYDROCHLORIDE, METHIONINE, ISOLEUCINE, VALINE, HISTIDINE, THREONINE, TRYPTOPHAN, ALANINE, GLYCINE, ARGININE, PROLINE, TYROSINE, SERINE 25 G: 730; 560; 580; 400; 600; 580; 480; 420; 180; 2.07; 1.03; 1.15; 680; 40; 5 INJECTION INTRAVENOUS at 08:28

## 2024-12-28 RX ADMIN — Medication 40 PERCENT: at 20:00

## 2024-12-28 RX ADMIN — HEPARIN SODIUM 5000 UNITS: 5000 INJECTION INTRAVENOUS; SUBCUTANEOUS at 17:21

## 2024-12-28 RX ADMIN — ACETAMINOPHEN 1000 MG: 10 INJECTION, SOLUTION INTRAVENOUS at 07:26

## 2024-12-28 RX ADMIN — PROPOFOL 20 MCG/KG/MIN: 10 INJECTION, EMULSION INTRAVENOUS at 11:24

## 2024-12-28 RX ADMIN — VANCOMYCIN HYDROCHLORIDE 500 MG: 500 INJECTION, SOLUTION INTRAVENOUS at 17:20

## 2024-12-28 RX ADMIN — ACETAMINOPHEN 1000 MG: 10 INJECTION, SOLUTION INTRAVENOUS at 23:06

## 2024-12-28 ASSESSMENT — PAIN - FUNCTIONAL ASSESSMENT
PAIN_FUNCTIONAL_ASSESSMENT: CPOT (CRITICAL CARE PAIN OBSERVATION TOOL)

## 2024-12-28 NOTE — CONSULTS
Ike Gar is a 78 y.o. year old male patient who presents for Procedure(s):  Re-opening of recent laparotomy, wedge resection segment 3 liver, creation of jejunostomy w/ mucous fistula. with Swapnil Stewart MD on 12/23/2024.  Acute Pain consulted for assistance with pain control.     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    History of transfusion     Lumbosacral plexus lesion     Peritoneal abscess (Multi)     Peritoneal adhesions 03/01/2022    S/P cholecystectomy 12/17/2024    SBO (small bowel obstruction) (Multi)         Past Surgical History:   Procedure Laterality Date    ABDOMINAL ADHESION SURGERY  04/10/2017    Laparoscopic Lysis Of Intestinal Adhesions    CHOLECYSTECTOMY  04/10/2017    Cholecystectomy    COLECTOMY PARTIAL / TOTAL Left     Partial with lysis of adhesions    COLONOSCOPY  05/11/2017    Colonoscopy (Fiberoptic)    SIGMOIDECTOMY      open        Family History   Problem Relation Name Age of Onset    Cancer Father          Social History     Socioeconomic History    Marital status:      Spouse name: Not on file    Number of children: Not on file    Years of education: Not on file    Highest education level: Not on file   Occupational History    Not on file   Tobacco Use    Smoking status: Never    Smokeless tobacco: Never   Vaping Use    Vaping status: Never Used   Substance and Sexual Activity    Alcohol use: Yes     Comment: occ    Drug use: Never    Sexual activity: Defer   Other Topics Concern    Not on file   Social History Narrative     Not on file     Social Drivers of Health     Financial Resource Strain: Patient Unable To Answer (12/17/2024)    Overall Financial Resource Strain (CARDIA)     Difficulty of Paying Living Expenses: Patient unable to answer   Food Insecurity: Patient Unable To Answer (12/17/2024)    Hunger Vital Sign     Worried About Running Out of Food in the Last Year: Patient unable to answer     Ran Out of Food in the Last Year: Patient unable to answer   Transportation Needs: Patient Unable To Answer (12/17/2024)    PRAPARE - Transportation     Lack of Transportation (Medical): Patient unable to answer     Lack of Transportation (Non-Medical): Patient unable to answer   Physical Activity: Not on file   Stress: Not on file   Social Connections: Not on file   Intimate Partner Violence: Patient Unable To Answer (12/17/2024)    Humiliation, Afraid, Rape, and Kick questionnaire     Fear of Current or Ex-Partner: Patient unable to answer     Emotionally Abused: Patient unable to answer     Physically Abused: Patient unable to answer     Sexually Abused: Patient unable to answer   Housing Stability: Patient Unable To Answer (12/17/2024)    Housing Stability Vital Sign     Unable to Pay for Housing in the Last Year: Patient unable to answer     Number of Times Moved in the Last Year: 1     Homeless in the Last Year: Patient unable to answer        Allergies   Allergen Reactions    Meperidine Nausea/vomiting    Prochlorperazine Nausea/vomiting         Review of Systems  Gen: No fatigue, anorexia, insomnia, fever.   Eyes: No vision loss, double vision, drainage, eye pain.   ENT: No pharyngitis, dry mouth, no hearing changes or ear discharge  Cardiac: No chest pain, palpitations, syncope, near syncope.   Pulmonary: No shortness of breath, cough, hemoptysis.   Heme/lymph: No swollen glands, fever, bleeding.   GI: No abdominal pain, change in bowel habits, melena, hematemesis, hematochezia, nausea, vomiting, diarrhea.   : No discharge,  dysuria, frequency, urgency, hematuria.  Endo: No polyuria or weight loss.   Musculoskeletal: Negative for any pain or loss of ROM/weakness  Skin: No rashes or lesions  Neuro: Normal speech, no numbness or weakness. No gait difficulties  Review of systems is otherwise negative unless stated above or in history of present illness.    Physical Exam:  Constitutional:  no distress, alert and cooperative  Eyes: clear sclera  Head/Neck: No apparent injury, trachea midline  Respiratory/Thorax: Patent airways, thorax symmetric, breathing comfortably  Cardiovascular: no pitting edema  Gastrointestinal: Nondistended  Musculoskeletal: ROM intact  Extremities: no clubbing  Neurological: alert, green x4  Psychological: Appropriate affect    Results for orders placed or performed during the hospital encounter of 12/17/24 (from the past 24 hours)   Prepare RBC: 2 Units   Result Value Ref Range    PRODUCT CODE D2718R73     Unit Number [id number]     Unit ABO O     Unit RH POS     XM INTEP COMP     Dispense Status RE     Blood Expiration Date 1/15/2025 11:59:00 PM EST     PRODUCT BLOOD TYPE 5100     UNIT VOLUME 277     PRODUCT CODE Q6462G02     Unit Number [id number]     Unit ABO O     Unit RH POS     XM INTEP COMP     Dispense Status RE     Blood Expiration Date 1/14/2025 11:59:00 PM EST     PRODUCT BLOOD TYPE 5100     UNIT VOLUME 350    Blood Gas Arterial Full Panel   Result Value Ref Range    POCT pH, Arterial 7.48 (H) 7.38 - 7.42 pH    POCT pCO2, Arterial 32 (L) 38 - 42 mm Hg    POCT pO2, Arterial 74 (L) 85 - 95 mm Hg    POCT SO2, Arterial 99 94 - 100 %    POCT Oxy Hemoglobin, Arterial 95.4 94.0 - 98.0 %    POCT Hematocrit Calculated, Arterial 15.0 (L) 41.0 - 52.0 %    POCT Sodium, Arterial 130 (L) 136 - 145 mmol/L    POCT Potassium, Arterial 4.9 3.5 - 5.3 mmol/L    POCT Chloride, Arterial 104 98 - 107 mmol/L    POCT Ionized Calcium, Arterial 1.12 1.10 - 1.33 mmol/L    POCT Glucose, Arterial 90 74 - 99 mg/dL    POCT  Lactate, Arterial 1.2 0.4 - 2.0 mmol/L    POCT Base Excess, Arterial 0.2 -2.0 - 3.0 mmol/L    POCT HCO3 Calculated, Arterial 23.8 22.0 - 26.0 mmol/L    POCT Hemoglobin, Arterial 5.0 (LL) 13.5 - 17.5 g/dL    POCT Anion Gap, Arterial 7 (L) 10 - 25 mmo/L    Patient Temperature 37.0 degrees Celsius    FiO2 40 %   CBC   Result Value Ref Range    WBC 15.6 (H) 4.4 - 11.3 x10*3/uL    nRBC 0.0 0.0 - 0.0 /100 WBCs    RBC 2.62 (L) 4.50 - 5.90 x10*6/uL    Hemoglobin 7.5 (L) 13.5 - 17.5 g/dL    Hematocrit 20.2 (L) 41.0 - 52.0 %    MCV 77 (L) 80 - 100 fL    MCH 28.6 26.0 - 34.0 pg    MCHC 37.1 (H) 32.0 - 36.0 g/dL    RDW 17.1 (H) 11.5 - 14.5 %    Platelets 228 150 - 450 x10*3/uL   Comprehensive Metabolic Panel   Result Value Ref Range    Glucose 86 74 - 99 mg/dL    Sodium 131 (L) 136 - 145 mmol/L    Potassium 4.8 3.5 - 5.3 mmol/L    Chloride 101 98 - 107 mmol/L    Bicarbonate 26 21 - 32 mmol/L    Anion Gap 9 (L) 10 - 20 mmol/L    Urea Nitrogen 40 (H) 6 - 23 mg/dL    Creatinine 1.88 (H) 0.50 - 1.30 mg/dL    eGFR 37 (L) >60 mL/min/1.73m*2    Calcium 7.6 (L) 8.6 - 10.6 mg/dL    Albumin 2.1 (L) 3.4 - 5.0 g/dL    Alkaline Phosphatase 118 33 - 136 U/L    Total Protein 5.0 (L) 6.4 - 8.2 g/dL    AST 41 (H) 9 - 39 U/L    Bilirubin, Total 8.7 (H) 0.0 - 1.2 mg/dL    ALT 52 10 - 52 U/L   Magnesium   Result Value Ref Range    Magnesium 2.55 (H) 1.60 - 2.40 mg/dL   Phosphorus   Result Value Ref Range    Phosphorus 2.7 2.5 - 4.9 mg/dL   POCT GLUCOSE   Result Value Ref Range    POCT Glucose 85 74 - 99 mg/dL   Calcium, ionized   Result Value Ref Range    POCT Calcium, Ionized 1.09 (L) 1.1 - 1.33 mmol/L   CBC   Result Value Ref Range    WBC 17.4 (H) 4.4 - 11.3 x10*3/uL    nRBC 0.0 0.0 - 0.0 /100 WBCs    RBC 2.64 (L) 4.50 - 5.90 x10*6/uL    Hemoglobin 7.6 (L) 13.5 - 17.5 g/dL    Hematocrit 21.2 (L) 41.0 - 52.0 %    MCV 80 80 - 100 fL    MCH 28.8 26.0 - 34.0 pg    MCHC 35.8 32.0 - 36.0 g/dL    RDW 17.6 (H) 11.5 - 14.5 %    Platelets 254 150 - 450  x10*3/uL   Coagulation Screen   Result Value Ref Range    Protime 11.7 9.8 - 12.8 seconds    INR 1.0 0.9 - 1.1    aPTT 28 27 - 38 seconds   Magnesium   Result Value Ref Range    Magnesium 2.61 (H) 1.60 - 2.40 mg/dL   Renal function panel   Result Value Ref Range    Glucose 97 74 - 99 mg/dL    Sodium 131 (L) 136 - 145 mmol/L    Potassium 5.0 3.5 - 5.3 mmol/L    Chloride 100 98 - 107 mmol/L    Bicarbonate 26 21 - 32 mmol/L    Anion Gap 10 10 - 20 mmol/L    Urea Nitrogen 39 (H) 6 - 23 mg/dL    Creatinine 1.79 (H) 0.50 - 1.30 mg/dL    eGFR 39 (L) >60 mL/min/1.73m*2    Calcium 7.3 (L) 8.6 - 10.6 mg/dL    Phosphorus 3.1 2.5 - 4.9 mg/dL    Albumin 2.0 (L) 3.4 - 5.0 g/dL   POCT GLUCOSE   Result Value Ref Range    POCT Glucose 91 74 - 99 mg/dL   POCT GLUCOSE   Result Value Ref Range    POCT Glucose 100 (H) 74 - 99 mg/dL   Blood Gas Arterial Full Panel   Result Value Ref Range    POCT pH, Arterial 7.42 7.38 - 7.42 pH    POCT pCO2, Arterial 38 38 - 42 mm Hg    POCT pO2, Arterial 110 (H) 85 - 95 mm Hg    POCT SO2, Arterial 99 94 - 100 %    POCT Oxy Hemoglobin, Arterial 96.6 94.0 - 98.0 %    POCT Hematocrit Calculated, Arterial 29.0 (L) 41.0 - 52.0 %    POCT Sodium, Arterial 128 (L) 136 - 145 mmol/L    POCT Potassium, Arterial 4.9 3.5 - 5.3 mmol/L    POCT Chloride, Arterial 102 98 - 107 mmol/L    POCT Ionized Calcium, Arterial 1.13 1.10 - 1.33 mmol/L    POCT Glucose, Arterial 116 (H) 74 - 99 mg/dL    POCT Lactate, Arterial 1.2 0.4 - 2.0 mmol/L    POCT Base Excess, Arterial 0.2 -2.0 - 3.0 mmol/L    POCT HCO3 Calculated, Arterial 24.6 22.0 - 26.0 mmol/L    POCT Hemoglobin, Arterial 9.6 (L) 13.5 - 17.5 g/dL    POCT Anion Gap, Arterial 6 (L) 10 - 25 mmo/L    Patient Temperature 37.0 degrees Celsius    FiO2 40 %        Ike Gar is a 78 y.o. year old male patient who presents for Procedure(s):  Re-opening of recent laparotomy, wedge resection segment 3 liver, creation of jejunostomy w/ mucous fistula. with Swapnil Stewart MD on  12/23/2024. Acute Pain consulted for assistance with pain control.     Plan:  - Oxy 5mg/10mg Q4H for moderate pain/severe  - Dilaudid 0.2mg Q2H for breakthrough pain  - Continue Tylenol as scheduled  - IV mag 2g x 1  - Plan for nerve block on 12/30 if post-extubation successful   - Continuous pulse ox    Acute Pain Resident  pg [phone] ph [phone]       Leighann Cartwright MD

## 2024-12-28 NOTE — PROGRESS NOTES
Physical Therapy                 Therapy Communication Note    Patient Name: Ike Gar  MRN: [id number]  Department: [facility] TSICU  Room: 16/16-A  Today's Date: 12/28/2024     Discipline: Physical Therapy    PT Missed Visit: Yes     Missed Visit Reason: Missed Visit Reason:  (Pending TLSO, will re-attempt once brace properly donned)    Missed Time: Attempt    Laura Gallagher, PT

## 2024-12-28 NOTE — PROGRESS NOTES
Vancomycin Dosing by Pharmacy- FOLLOW UP    Ike Gar is a 78 y.o. year old male who Pharmacy has been consulted for vancomycin dosing for other abdominal infection . Based on the patient's indication and renal status this patient is being dosed based on a goal AUC of 400-600.     Renal function is currently on CVVH.    Current vancomycin dose: 750 mg given every 12 hours    Most recent random level: 21.7 mcg/mL    Visit Vitals  /59   Pulse 69   Temp 36.2 °C (97.2 °F) (Temporal)   Resp 21        Lab Results   Component Value Date    CREATININE 1.79 (H) 2024    CREATININE 1.88 (H) 2024    CREATININE 1.88 (H) 2024    CREATININE 1.99 (H) 2024        Patient weight is as follows:   Vitals:    24 0430   Weight: 103 kg (226 lb 6.6 oz)       Cultures:  No results found for the encounter in last 14 days.       I/O last 3 completed shifts:  In: 3987.7 (38.8 mL/kg) [I.V.:710 (6.9 mL/kg); NG/GT:270; IV Piggyback:750]  Out: 7151 (69.6 mL/kg) [Emesis/NG output:1100; Drains:130; Other:4471; Chest Tube:1450]  Weight: 102.7 kg   I/O during current shift:  I/O this shift:  In: 917.1 [I.V.:167.1; NG/GT:10; IV Piggyback:350]  Out: 172 [Drains:5; Other:147; Chest Tube:20]    Temp (24hrs), Av °C (96.8 °F), Min:35.9 °C (96.6 °F), Max:36.2 °C (97.2 °F)      Assessment/Plan    Above goal random/trough level. Orders placed for new vancomycin regimen of 500 every 12 hours to begin at 1415 .    The next level will be obtained on  at 1300. May be obtained sooner if clinically indicated.   Will continue to monitor renal function daily while on vancomycin and order serum creatinine at least every 48 hours if not already ordered.  Follow for continued vancomycin needs, clinical response, and signs/symptoms of toxicity.       Janet Forde, PharmD

## 2024-12-28 NOTE — SIGNIFICANT EVENT
GASTROENTEROLOGY SIGNOFF NOTE    Liver enzymes continue to normalize and hyperbilirubinemia is improving consistent with resolving ischemic hepatitis. No further indication for endoscopic intervention at this time.    Gastroenterology will sign off at this time. Please reach out with further questions or concerns should they arise.     Roberto Jolly MD  Gastroenterology Fellow
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
## 2024-12-28 NOTE — PROCEDURES
[facility]  DEPARTMENT OF SURGERY    PROCEDURE NOTE    PROCEDURE:  Left chest pigtail insertion    INDICATION:  Pleural effusions    PERFORMING PHYSICIAN:  DO Jay Otero MD    ASSISTING PHYSICIAN:  None    CONSENT:  Written, obtained from spouse.    ESTIMATED BLOOD LOSS:  5 cc    ANESTHESIA:  Moderate sedation, lidocaine 1% w/o epi 5cc    PROCEDURE DETAILS:  The patient was prepped in proper sterile fashion.  Anesthesia was obtained with local infiltration of 5 ml 1% lidocaine. A finder needle attached to a syringe with saline was inserted just superior to the 5th rib at the midaxillary line. The syringe was aspirated as it was advanced until bubbles were observed in the syringe. The syringe was then removed and a guide wire was passed through the needle. An 11 blade scalpel was then use to make a skin incision adjacent to the wire. The needle was then removed and a dilator was passed over the wire and used to dilate the skin. The dilator was then removed and the 14 Fr pigtail, with the obturator in place, was passed over the wire. The pigtail catheter was then advanced and the wire was removed. The  pigtail catheter was then connected to a pleuravac on suction and air was observed in the pleuravac. The pigtail catheter was then secured to the skin with suture. A post procedure chest x-ray was ordered pending review.     COMPLICATIONS:  None    PATIENT CONDITION:  Tolerated well

## 2024-12-28 NOTE — SIGNIFICANT EVENT
12/28/2024  9:21 AM    Ike Cong  [id number]    I evaluated and examined the patient with the critical care team. As a multidisciplinary team, we discussed all findings, as well as assessment and plan. I personally spent 35 minutes of critical care time excluding procedures at the bedside with the patient. I personally reviewed all labs, results and studies. My independent note with assessment and plan are below:    TLSO yesterday. Discuss with neuro timing of upright XR. Comfortable with fentanyl/propofol. Oxygenation much better with R pigtail. Place one on left today. Tbili continues to fall. Pressor requirement only needed when taking fluid off with CRRT. Remains anuric. Doing well on Pressure support. TF to 20cc/hr. Bile and gas in his ostomy bag. GRACIELA output decreasing. Character improving. Hand/toes stable. Abx conclude today.     Doug Low, DO, FACS

## 2024-12-29 ENCOUNTER — APPOINTMENT (OUTPATIENT)
Dept: RADIOLOGY | Facility: HOSPITAL | Age: 79
End: 2024-12-29
Payer: MEDICARE

## 2024-12-29 VITALS
TEMPERATURE: 97.5 F | OXYGEN SATURATION: 98 % | RESPIRATION RATE: 14 BRPM | HEIGHT: 76 IN | WEIGHT: 226.41 LBS | DIASTOLIC BLOOD PRESSURE: 45 MMHG | HEART RATE: 83 BPM | BODY MASS INDEX: 27.57 KG/M2 | SYSTOLIC BLOOD PRESSURE: 104 MMHG

## 2024-12-29 LAB
ABO GROUP (TYPE) IN BLOOD: NORMAL
ALBUMIN SERPL BCP-MCNC: 2.2 G/DL (ref 3.4–5)
ALP SERPL-CCNC: 125 U/L (ref 33–136)
ALT SERPL W P-5'-P-CCNC: 44 U/L (ref 10–52)
ANION GAP BLDA CALCULATED.4IONS-SCNC: 5 MMO/L (ref 10–25)
ANION GAP BLDA CALCULATED.4IONS-SCNC: 7 MMO/L (ref 10–25)
ANION GAP BLDA CALCULATED.4IONS-SCNC: 7 MMO/L (ref 10–25)
ANION GAP SERPL CALC-SCNC: 13 MMOL/L (ref 10–20)
ANTIBODY SCREEN: NORMAL
APPEARANCE UR: ABNORMAL
APTT PPP: 29 SECONDS (ref 27–38)
AST SERPL W P-5'-P-CCNC: 41 U/L (ref 9–39)
BACTERIA #/AREA URNS AUTO: ABNORMAL /HPF
BACTERIA BLD CULT: NORMAL
BACTERIA BLD CULT: NORMAL
BASE EXCESS BLDA CALC-SCNC: -0.1 MMOL/L (ref -2–3)
BASE EXCESS BLDA CALC-SCNC: -0.2 MMOL/L (ref -2–3)
BASE EXCESS BLDA CALC-SCNC: 0.3 MMOL/L (ref -2–3)
BILIRUB DIRECT SERPL-MCNC: 4.6 MG/DL (ref 0–0.3)
BILIRUB FLD-MCNC: 5.8 MG/DL
BILIRUB SERPL-MCNC: 7 MG/DL (ref 0–1.2)
BILIRUB UR STRIP.AUTO-MCNC: ABNORMAL MG/DL
BLOOD EXPIRATION DATE: NORMAL
BODY TEMPERATURE: 37 DEGREES CELSIUS
BUN SERPL-MCNC: 37 MG/DL (ref 6–23)
CA-I BLD-SCNC: 1.19 MMOL/L (ref 1.1–1.33)
CA-I BLDA-SCNC: 1.19 MMOL/L (ref 1.1–1.33)
CA-I BLDA-SCNC: 1.26 MMOL/L (ref 1.1–1.33)
CA-I BLDA-SCNC: 1.29 MMOL/L (ref 1.1–1.33)
CALCIUM SERPL-MCNC: 8 MG/DL (ref 8.6–10.6)
CHLORIDE BLDA-SCNC: 101 MMOL/L (ref 98–107)
CHLORIDE SERPL-SCNC: 98 MMOL/L (ref 98–107)
CO2 SERPL-SCNC: 25 MMOL/L (ref 21–32)
COLOR UR: ABNORMAL
CREAT SERPL-MCNC: 1.61 MG/DL (ref 0.5–1.3)
DISPENSE STATUS: NORMAL
EGFRCR SERPLBLD CKD-EPI 2021: 44 ML/MIN/1.73M*2
ERYTHROCYTE [DISTWIDTH] IN BLOOD BY AUTOMATED COUNT: 18.2 % (ref 11.5–14.5)
ERYTHROCYTE [DISTWIDTH] IN BLOOD BY AUTOMATED COUNT: 18.8 % (ref 11.5–14.5)
GLUCOSE BLD MANUAL STRIP-MCNC: 101 MG/DL (ref 74–99)
GLUCOSE BLD MANUAL STRIP-MCNC: 117 MG/DL (ref 74–99)
GLUCOSE BLD MANUAL STRIP-MCNC: 120 MG/DL (ref 74–99)
GLUCOSE BLD MANUAL STRIP-MCNC: 132 MG/DL (ref 74–99)
GLUCOSE BLDA-MCNC: 123 MG/DL (ref 74–99)
GLUCOSE BLDA-MCNC: 128 MG/DL (ref 74–99)
GLUCOSE BLDA-MCNC: 140 MG/DL (ref 74–99)
GLUCOSE SERPL-MCNC: 126 MG/DL (ref 74–99)
GLUCOSE UR STRIP.AUTO-MCNC: ABNORMAL MG/DL
HCO3 BLDA-SCNC: 23.8 MMOL/L (ref 22–26)
HCO3 BLDA-SCNC: 24.6 MMOL/L (ref 22–26)
HCO3 BLDA-SCNC: 24.8 MMOL/L (ref 22–26)
HCT VFR BLD AUTO: 20.1 % (ref 41–52)
HCT VFR BLD AUTO: 22.9 % (ref 41–52)
HCT VFR BLD EST: 14 % (ref 41–52)
HCT VFR BLD EST: 24 % (ref 41–52)
HCT VFR BLD EST: 25 % (ref 41–52)
HGB BLD-MCNC: 6.8 G/DL (ref 13.5–17.5)
HGB BLD-MCNC: 8.2 G/DL (ref 13.5–17.5)
HGB BLDA-MCNC: 4.7 G/DL (ref 13.5–17.5)
HGB BLDA-MCNC: 8.1 G/DL (ref 13.5–17.5)
HGB BLDA-MCNC: 8.4 G/DL (ref 13.5–17.5)
INHALED O2 CONCENTRATION: 40 %
INR PPP: 1 (ref 0.9–1.1)
KETONES UR STRIP.AUTO-MCNC: NEGATIVE MG/DL
LACTATE BLDA-SCNC: 1.3 MMOL/L (ref 0.4–2)
LACTATE BLDA-SCNC: 1.6 MMOL/L (ref 0.4–2)
LACTATE BLDA-SCNC: 1.9 MMOL/L (ref 0.4–2)
LACTATE SERPL-SCNC: 1.4 MMOL/L (ref 0.4–2)
LEUKOCYTE ESTERASE UR QL STRIP.AUTO: NEGATIVE
MAGNESIUM SERPL-MCNC: 2.26 MG/DL (ref 1.6–2.4)
MCH RBC QN AUTO: 28.5 PG (ref 26–34)
MCH RBC QN AUTO: 28.9 PG (ref 26–34)
MCHC RBC AUTO-ENTMCNC: 33.8 G/DL (ref 32–36)
MCHC RBC AUTO-ENTMCNC: 35.8 G/DL (ref 32–36)
MCV RBC AUTO: 81 FL (ref 80–100)
MCV RBC AUTO: 84 FL (ref 80–100)
MUCOUS THREADS #/AREA URNS AUTO: ABNORMAL /LPF
NITRITE UR QL STRIP.AUTO: NEGATIVE
NRBC BLD-RTO: 0 /100 WBCS (ref 0–0)
NRBC BLD-RTO: 0 /100 WBCS (ref 0–0)
OXYHGB MFR BLDA: 96.6 % (ref 94–98)
OXYHGB MFR BLDA: 96.8 % (ref 94–98)
OXYHGB MFR BLDA: 97.4 % (ref 94–98)
PCO2 BLDA: 35 MM HG (ref 38–42)
PCO2 BLDA: 37 MM HG (ref 38–42)
PCO2 BLDA: 41 MM HG (ref 38–42)
PH BLDA: 7.39 PH (ref 7.38–7.42)
PH BLDA: 7.43 PH (ref 7.38–7.42)
PH BLDA: 7.44 PH (ref 7.38–7.42)
PH UR STRIP.AUTO: 6.5 [PH]
PHOSPHATE SERPL-MCNC: 3.9 MG/DL (ref 2.5–4.9)
PLATELET # BLD AUTO: 288 X10*3/UL (ref 150–450)
PLATELET # BLD AUTO: 293 X10*3/UL (ref 150–450)
PO2 BLDA: 100 MM HG (ref 85–95)
PO2 BLDA: 102 MM HG (ref 85–95)
PO2 BLDA: 90 MM HG (ref 85–95)
POTASSIUM BLDA-SCNC: 5.1 MMOL/L (ref 3.5–5.3)
POTASSIUM BLDA-SCNC: 5.2 MMOL/L (ref 3.5–5.3)
POTASSIUM BLDA-SCNC: 5.3 MMOL/L (ref 3.5–5.3)
POTASSIUM SERPL-SCNC: 5.1 MMOL/L (ref 3.5–5.3)
PRODUCT BLOOD TYPE: 5100
PRODUCT CODE: NORMAL
PROT SERPL-MCNC: 4.7 G/DL (ref 6.4–8.2)
PROT UR STRIP.AUTO-MCNC: ABNORMAL MG/DL
PROTHROMBIN TIME: 11.6 SECONDS (ref 9.8–12.8)
RBC # BLD AUTO: 2.39 X10*6/UL (ref 4.5–5.9)
RBC # BLD AUTO: 2.84 X10*6/UL (ref 4.5–5.9)
RBC # UR STRIP.AUTO: ABNORMAL /UL
RBC #/AREA URNS AUTO: >20 /HPF
RH FACTOR (ANTIGEN D): NORMAL
SAO2 % BLDA: 99 % (ref 94–100)
SODIUM BLDA-SCNC: 126 MMOL/L (ref 136–145)
SODIUM BLDA-SCNC: 126 MMOL/L (ref 136–145)
SODIUM BLDA-SCNC: 127 MMOL/L (ref 136–145)
SODIUM SERPL-SCNC: 131 MMOL/L (ref 136–145)
SP GR UR STRIP.AUTO: 1.02
UNIT ABO: NORMAL
UNIT NUMBER: NORMAL
UNIT RH: NORMAL
UNIT VOLUME: 350
UROBILINOGEN UR STRIP.AUTO-MCNC: NORMAL MG/DL
VANCOMYCIN SERPL-MCNC: 16.2 UG/ML (ref 5–20)
WBC # BLD AUTO: 27.9 X10*3/UL (ref 4.4–11.3)
WBC # BLD AUTO: 31.6 X10*3/UL (ref 4.4–11.3)
WBC #/AREA URNS AUTO: ABNORMAL /HPF
XM INTEP: NORMAL

## 2024-12-29 PROCEDURE — 86901 BLOOD TYPING SEROLOGIC RH(D): CPT

## 2024-12-29 PROCEDURE — 2500000005 HC RX 250 GENERAL PHARMACY W/O HCPCS

## 2024-12-29 PROCEDURE — 86923 COMPATIBILITY TEST ELECTRIC: CPT

## 2024-12-29 PROCEDURE — 2500000004 HC RX 250 GENERAL PHARMACY W/ HCPCS (ALT 636 FOR OP/ED): Performed by: PHYSICIAN ASSISTANT

## 2024-12-29 PROCEDURE — 85027 COMPLETE CBC AUTOMATED: CPT | Performed by: PHYSICIAN ASSISTANT

## 2024-12-29 PROCEDURE — 99291 CRITICAL CARE FIRST HOUR: CPT | Performed by: SURGERY

## 2024-12-29 PROCEDURE — 83605 ASSAY OF LACTIC ACID: CPT

## 2024-12-29 PROCEDURE — P9016 RBC LEUKOCYTES REDUCED: HCPCS

## 2024-12-29 PROCEDURE — 83605 ASSAY OF LACTIC ACID: CPT | Performed by: PHYSICIAN ASSISTANT

## 2024-12-29 PROCEDURE — 2500000004 HC RX 250 GENERAL PHARMACY W/ HCPCS (ALT 636 FOR OP/ED)

## 2024-12-29 PROCEDURE — 83735 ASSAY OF MAGNESIUM: CPT

## 2024-12-29 PROCEDURE — 82435 ASSAY OF BLOOD CHLORIDE: CPT | Performed by: PHYSICIAN ASSISTANT

## 2024-12-29 PROCEDURE — 87086 URINE CULTURE/COLONY COUNT: CPT

## 2024-12-29 PROCEDURE — 2500000004 HC RX 250 GENERAL PHARMACY W/ HCPCS (ALT 636 FOR OP/ED): Performed by: STUDENT IN AN ORGANIZED HEALTH CARE EDUCATION/TRAINING PROGRAM

## 2024-12-29 PROCEDURE — 74018 RADEX ABDOMEN 1 VIEW: CPT

## 2024-12-29 PROCEDURE — 36415 COLL VENOUS BLD VENIPUNCTURE: CPT

## 2024-12-29 PROCEDURE — 2020000001 HC ICU ROOM DAILY

## 2024-12-29 PROCEDURE — 82247 BILIRUBIN TOTAL: CPT

## 2024-12-29 PROCEDURE — 82330 ASSAY OF CALCIUM: CPT | Performed by: PHYSICIAN ASSISTANT

## 2024-12-29 PROCEDURE — 84100 ASSAY OF PHOSPHORUS: CPT | Performed by: PHYSICIAN ASSISTANT

## 2024-12-29 PROCEDURE — 2500000001 HC RX 250 WO HCPCS SELF ADMINISTERED DRUGS (ALT 637 FOR MEDICARE OP)

## 2024-12-29 PROCEDURE — 82947 ASSAY GLUCOSE BLOOD QUANT: CPT

## 2024-12-29 PROCEDURE — 99232 SBSQ HOSP IP/OBS MODERATE 35: CPT | Performed by: SURGERY

## 2024-12-29 PROCEDURE — P9047 ALBUMIN (HUMAN), 25%, 50ML: HCPCS | Mod: JZ

## 2024-12-29 PROCEDURE — 87075 CULTR BACTERIA EXCEPT BLOOD: CPT

## 2024-12-29 PROCEDURE — 80202 ASSAY OF VANCOMYCIN: CPT

## 2024-12-29 PROCEDURE — 37799 UNLISTED PX VASCULAR SURGERY: CPT | Performed by: PHYSICIAN ASSISTANT

## 2024-12-29 PROCEDURE — 94003 VENT MGMT INPAT SUBQ DAY: CPT

## 2024-12-29 PROCEDURE — 85730 THROMBOPLASTIN TIME PARTIAL: CPT

## 2024-12-29 PROCEDURE — 90945 DIALYSIS ONE EVALUATION: CPT | Performed by: INTERNAL MEDICINE

## 2024-12-29 PROCEDURE — 36430 TRANSFUSION BLD/BLD COMPNT: CPT

## 2024-12-29 PROCEDURE — 81001 URINALYSIS AUTO W/SCOPE: CPT

## 2024-12-29 PROCEDURE — 90937 HEMODIALYSIS REPEATED EVAL: CPT

## 2024-12-29 PROCEDURE — 99231 SBSQ HOSP IP/OBS SF/LOW 25: CPT | Performed by: STUDENT IN AN ORGANIZED HEALTH CARE EDUCATION/TRAINING PROGRAM

## 2024-12-29 PROCEDURE — 82248 BILIRUBIN DIRECT: CPT

## 2024-12-29 RX ORDER — PANTOPRAZOLE SODIUM 40 MG/10ML
40 INJECTION, POWDER, LYOPHILIZED, FOR SOLUTION INTRAVENOUS EVERY 12 HOURS
Status: DISCONTINUED | OUTPATIENT
Start: 2024-12-29 | End: 2024-12-31

## 2024-12-29 RX ORDER — MIDODRINE HYDROCHLORIDE 10 MG/1
10 TABLET ORAL EVERY 8 HOURS
Status: DISCONTINUED | OUTPATIENT
Start: 2024-12-29 | End: 2024-12-31

## 2024-12-29 RX ORDER — ACETAMINOPHEN 10 MG/ML
1000 INJECTION, SOLUTION INTRAVENOUS EVERY 8 HOURS
Status: DISCONTINUED | OUTPATIENT
Start: 2024-12-29 | End: 2025-01-02

## 2024-12-29 RX ORDER — ALBUMIN HUMAN 250 G/1000ML
12.5 SOLUTION INTRAVENOUS ONCE
Status: COMPLETED | OUTPATIENT
Start: 2024-12-29 | End: 2024-12-29

## 2024-12-29 RX ADMIN — ASCORBIC ACID, VITAMIN A PALMITATE, CHOLECALCIFEROL, THIAMINE HYDROCHLORIDE, RIBOFLAVIN-5 PHOSPHATE SODIUM, PYRIDOXINE HYDROCHLORIDE, NIACINAMIDE, DEXPANTHENOL, ALPHA-TOCOPHEROL ACETATE, VITAMIN K1, FOLIC ACID, BIOTIN, CYANOCOBALAMIN: 200; 3300; 200; 6; 3.6; 6; 40; 15; 10; 150; 600; 60; 5 INJECTION, SOLUTION INTRAVENOUS at 19:49

## 2024-12-29 RX ADMIN — ACETAMINOPHEN 1000 MG: 10 INJECTION, SOLUTION INTRAVENOUS at 07:34

## 2024-12-29 RX ADMIN — MIDODRINE HYDROCHLORIDE 10 MG: 10 TABLET ORAL at 15:18

## 2024-12-29 RX ADMIN — HEPARIN SODIUM 5000 UNITS: 5000 INJECTION INTRAVENOUS; SUBCUTANEOUS at 19:30

## 2024-12-29 RX ADMIN — VANCOMYCIN HYDROCHLORIDE 500 MG: 500 INJECTION, SOLUTION INTRAVENOUS at 02:11

## 2024-12-29 RX ADMIN — PROPOFOL 25 MCG/KG/MIN: 10 INJECTION, EMULSION INTRAVENOUS at 17:03

## 2024-12-29 RX ADMIN — PROPOFOL 30 MCG/KG/MIN: 10 INJECTION, EMULSION INTRAVENOUS at 12:55

## 2024-12-29 RX ADMIN — ALBUMIN HUMAN 12.5 G: 0.25 SOLUTION INTRAVENOUS at 01:22

## 2024-12-29 RX ADMIN — HEPARIN SODIUM 5000 UNITS: 5000 INJECTION INTRAVENOUS; SUBCUTANEOUS at 10:59

## 2024-12-29 RX ADMIN — ACETAMINOPHEN 1000 MG: 10 INJECTION, SOLUTION INTRAVENOUS at 15:18

## 2024-12-29 RX ADMIN — FAMOTIDINE 20 MG: 10 INJECTION INTRAVENOUS at 22:20

## 2024-12-29 RX ADMIN — HEPARIN SODIUM 5000 UNITS: 5000 INJECTION INTRAVENOUS; SUBCUTANEOUS at 01:22

## 2024-12-29 RX ADMIN — Medication 40 PERCENT: at 08:53

## 2024-12-29 RX ADMIN — PROPOFOL 35 MCG/KG/MIN: 10 INJECTION, EMULSION INTRAVENOUS at 02:40

## 2024-12-29 RX ADMIN — Medication 40 PERCENT: at 20:02

## 2024-12-29 RX ADMIN — PANTOPRAZOLE SODIUM 40 MG: 40 INJECTION, POWDER, FOR SOLUTION INTRAVENOUS at 23:34

## 2024-12-29 RX ADMIN — LEUCINE, PHENYLALANINE, LYSINE HYDROCHLORIDE, METHIONINE, ISOLEUCINE, VALINE, HISTIDINE, THREONINE, TRYPTOPHAN, ALANINE, GLYCINE, ARGININE, PROLINE, TYROSINE, SERINE 25 G: 730; 560; 580; 400; 600; 580; 480; 420; 180; 2.07; 1.03; 1.15; 680; 40; 5 INJECTION INTRAVENOUS at 08:22

## 2024-12-29 RX ADMIN — PROPOFOL 35 MCG/KG/MIN: 10 INJECTION, EMULSION INTRAVENOUS at 06:14

## 2024-12-29 RX ADMIN — Medication 75 MCG/HR: at 19:48

## 2024-12-29 RX ADMIN — ACETAMINOPHEN 1000 MG: 10 INJECTION, SOLUTION INTRAVENOUS at 22:20

## 2024-12-29 RX ADMIN — VASOPRESSIN 0.03 UNITS/MIN: 0.2 INJECTION INTRAVENOUS at 06:14

## 2024-12-29 RX ADMIN — Medication 100 MCG/HR: at 06:14

## 2024-12-29 RX ADMIN — PROPOFOL 25 MCG/KG/MIN: 10 INJECTION, EMULSION INTRAVENOUS at 22:20

## 2024-12-29 ASSESSMENT — PAIN - FUNCTIONAL ASSESSMENT
PAIN_FUNCTIONAL_ASSESSMENT: CPOT (CRITICAL CARE PAIN OBSERVATION TOOL)
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
## 2024-12-29 NOTE — PROGRESS NOTES
Vancomycin Dosing by Pharmacy- Cessation of Therapy    Consult to pharmacy for vancomycin dosing has been discontinued by the prescriber, pharmacy will sign off at this time.    Please call pharmacy if there are further questions or re-enter a consult if vancomycin is resumed.     Kings [place], MohitD
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
## 2024-12-29 NOTE — PROGRESS NOTES
12/29/2024  2:12 PM    Ike Gar  [id number]    I evaluated and examined the patient with the critical care team. As a multidisciplinary team, we discussed all findings, as well as assessment and plan. I personally spent 35 minutes of critical care time excluding procedures at the bedside with the patient. I personally reviewed all labs, results and studies. My independent note with assessment and plan are below:    Neuro  Comfortable with current pain/sedation regiment  Block tomorrow AM before extubation attempt    CV  Pressor requirements temporarily increased, likely over-diuresed  Responded with Fluid  Try midodrine today    Pulmonary  CXR good  No secretions  WS both pigtails    GI  Ostomy output increased  TF to goal, then DC TPN  Tbili stable, following    Renal/  Continued CRRT per Nephro    Heme  Daily CBC    MSK  PT/OT    Endo  Good glycemic control    ID  Leukocytosis increased  ? Secondary to pulmonary re-expansion, also possibly volume status  UA with reflex UC  Blood cultures    Skin  Scrotum improved    Lines/Tubes/Drains  ETT, Trialysis, CVC, NGT, Chest tubes, PIV    Prophylaxis  Hold morning chemoprophylaxis tomorrow, SCDs    Restraints  reordered    Disposition  TICU    Doug Low, DO, FACS
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
## 2024-12-29 NOTE — PROGRESS NOTES
Renal Staff CVVH Note     Patient seen, examined on CVVH  No significant filter issues overnight  Urine output, none recorded  M150 no hep  200 bfr 130 / hr fluid removal 2.4 off  1800 rfr 50% pre     4K 2.5 Ca     Effluent bag clear  Access R int jugular     Sedated, intubated, F1O2 40  Pressor requirement none     Electrolytes, acid base acceptable Medications reviewed    Change to 2K replacement fluid  Fluid removal per primary team

## 2024-12-30 LAB
ALBUMIN SERPL BCP-MCNC: 2 G/DL (ref 3.4–5)
ANION GAP BLDA CALCULATED.4IONS-SCNC: 6 MMO/L (ref 10–25)
ANION GAP SERPL CALC-SCNC: 10 MMOL/L (ref 10–20)
BACTERIA UR CULT: NO GROWTH
BASE EXCESS BLDA CALC-SCNC: 0.3 MMOL/L (ref -2–3)
BLOOD EXPIRATION DATE: NORMAL
BODY TEMPERATURE: 37 DEGREES CELSIUS
BUN SERPL-MCNC: 37 MG/DL (ref 6–23)
CA-I BLD-SCNC: 1.28 MMOL/L (ref 1.1–1.33)
CA-I BLDA-SCNC: 1.32 MMOL/L (ref 1.1–1.33)
CALCIUM SERPL-MCNC: 8.1 MG/DL (ref 8.6–10.6)
CHLORIDE BLDA-SCNC: 102 MMOL/L (ref 98–107)
CHLORIDE SERPL-SCNC: 100 MMOL/L (ref 98–107)
CO2 SERPL-SCNC: 25 MMOL/L (ref 21–32)
CREAT SERPL-MCNC: 1.7 MG/DL (ref 0.5–1.3)
DISPENSE STATUS: NORMAL
EGFRCR SERPLBLD CKD-EPI 2021: 41 ML/MIN/1.73M*2
ERYTHROCYTE [DISTWIDTH] IN BLOOD BY AUTOMATED COUNT: 18.7 % (ref 11.5–14.5)
GLUCOSE BLD MANUAL STRIP-MCNC: 100 MG/DL (ref 74–99)
GLUCOSE BLD MANUAL STRIP-MCNC: 107 MG/DL (ref 74–99)
GLUCOSE BLD MANUAL STRIP-MCNC: 109 MG/DL (ref 74–99)
GLUCOSE BLD MANUAL STRIP-MCNC: 84 MG/DL (ref 74–99)
GLUCOSE BLD MANUAL STRIP-MCNC: 84 MG/DL (ref 74–99)
GLUCOSE BLD MANUAL STRIP-MCNC: 92 MG/DL (ref 74–99)
GLUCOSE BLDA-MCNC: 85 MG/DL (ref 74–99)
GLUCOSE SERPL-MCNC: 80 MG/DL (ref 74–99)
HCO3 BLDA-SCNC: 24.6 MMOL/L (ref 22–26)
HCT VFR BLD AUTO: 21.3 % (ref 41–52)
HCT VFR BLD EST: 24 % (ref 41–52)
HGB BLD-MCNC: 7.5 G/DL (ref 13.5–17.5)
HGB BLDA-MCNC: 8 G/DL (ref 13.5–17.5)
HOLD SPECIMEN: NORMAL
INHALED O2 CONCENTRATION: 40 %
LACTATE BLDA-SCNC: 1.1 MMOL/L (ref 0.4–2)
MAGNESIUM SERPL-MCNC: 1.99 MG/DL (ref 1.6–2.4)
MCH RBC QN AUTO: 28.8 PG (ref 26–34)
MCHC RBC AUTO-ENTMCNC: 35.2 G/DL (ref 32–36)
MCV RBC AUTO: 82 FL (ref 80–100)
NRBC BLD-RTO: 0 /100 WBCS (ref 0–0)
OXYHGB MFR BLDA: 97.1 % (ref 94–98)
PCO2 BLDA: 37 MM HG (ref 38–42)
PH BLDA: 7.43 PH (ref 7.38–7.42)
PHOSPHATE SERPL-MCNC: 3.2 MG/DL (ref 2.5–4.9)
PLATELET # BLD AUTO: 292 X10*3/UL (ref 150–450)
PO2 BLDA: 108 MM HG (ref 85–95)
POTASSIUM BLDA-SCNC: 4.5 MMOL/L (ref 3.5–5.3)
POTASSIUM SERPL-SCNC: 4.3 MMOL/L (ref 3.5–5.3)
PRODUCT BLOOD TYPE: 5100
PRODUCT CODE: NORMAL
RBC # BLD AUTO: 2.6 X10*6/UL (ref 4.5–5.9)
SAO2 % BLDA: 100 % (ref 94–100)
SODIUM BLDA-SCNC: 128 MMOL/L (ref 136–145)
SODIUM SERPL-SCNC: 131 MMOL/L (ref 136–145)
UNIT ABO: NORMAL
UNIT NUMBER: NORMAL
UNIT RH: NORMAL
UNIT VOLUME: 295
WBC # BLD AUTO: 27.7 X10*3/UL (ref 4.4–11.3)
XM INTEP: NORMAL

## 2024-12-30 PROCEDURE — 97530 THERAPEUTIC ACTIVITIES: CPT | Mod: GO

## 2024-12-30 PROCEDURE — 99024 POSTOP FOLLOW-UP VISIT: CPT | Performed by: SURGERY

## 2024-12-30 PROCEDURE — 2500000005 HC RX 250 GENERAL PHARMACY W/O HCPCS

## 2024-12-30 PROCEDURE — 85027 COMPLETE CBC AUTOMATED: CPT | Performed by: PHYSICIAN ASSISTANT

## 2024-12-30 PROCEDURE — 2500000004 HC RX 250 GENERAL PHARMACY W/ HCPCS (ALT 636 FOR OP/ED): Performed by: STUDENT IN AN ORGANIZED HEALTH CARE EDUCATION/TRAINING PROGRAM

## 2024-12-30 PROCEDURE — 2500000001 HC RX 250 WO HCPCS SELF ADMINISTERED DRUGS (ALT 637 FOR MEDICARE OP)

## 2024-12-30 PROCEDURE — 90945 DIALYSIS ONE EVALUATION: CPT | Performed by: INTERNAL MEDICINE

## 2024-12-30 PROCEDURE — 97530 THERAPEUTIC ACTIVITIES: CPT | Mod: GP | Performed by: PHYSICAL THERAPIST

## 2024-12-30 PROCEDURE — 82947 ASSAY GLUCOSE BLOOD QUANT: CPT

## 2024-12-30 PROCEDURE — 94003 VENT MGMT INPAT SUBQ DAY: CPT

## 2024-12-30 PROCEDURE — 2500000004 HC RX 250 GENERAL PHARMACY W/ HCPCS (ALT 636 FOR OP/ED)

## 2024-12-30 PROCEDURE — 97162 PT EVAL MOD COMPLEX 30 MIN: CPT | Mod: GP | Performed by: PHYSICAL THERAPIST

## 2024-12-30 PROCEDURE — 82330 ASSAY OF CALCIUM: CPT | Performed by: PHYSICIAN ASSISTANT

## 2024-12-30 PROCEDURE — 36430 TRANSFUSION BLD/BLD COMPNT: CPT

## 2024-12-30 PROCEDURE — 2500000005 HC RX 250 GENERAL PHARMACY W/O HCPCS: Performed by: STUDENT IN AN ORGANIZED HEALTH CARE EDUCATION/TRAINING PROGRAM

## 2024-12-30 PROCEDURE — 90937 HEMODIALYSIS REPEATED EVAL: CPT

## 2024-12-30 PROCEDURE — 83735 ASSAY OF MAGNESIUM: CPT

## 2024-12-30 PROCEDURE — 99291 CRITICAL CARE FIRST HOUR: CPT | Performed by: SURGERY

## 2024-12-30 PROCEDURE — 37799 UNLISTED PX VASCULAR SURGERY: CPT | Performed by: PHYSICIAN ASSISTANT

## 2024-12-30 PROCEDURE — 2020000001 HC ICU ROOM DAILY

## 2024-12-30 PROCEDURE — 97166 OT EVAL MOD COMPLEX 45 MIN: CPT | Mod: GO

## 2024-12-30 PROCEDURE — 2500000004 HC RX 250 GENERAL PHARMACY W/ HCPCS (ALT 636 FOR OP/ED): Mod: JZ

## 2024-12-30 PROCEDURE — 83605 ASSAY OF LACTIC ACID: CPT

## 2024-12-30 PROCEDURE — 2500000004 HC RX 250 GENERAL PHARMACY W/ HCPCS (ALT 636 FOR OP/ED): Performed by: PHYSICIAN ASSISTANT

## 2024-12-30 PROCEDURE — 99231 SBSQ HOSP IP/OBS SF/LOW 25: CPT

## 2024-12-30 PROCEDURE — 82435 ASSAY OF BLOOD CHLORIDE: CPT

## 2024-12-30 PROCEDURE — P9016 RBC LEUKOCYTES REDUCED: HCPCS

## 2024-12-30 RX ORDER — ROPIVACAINE IN 0.9% SOD CHL/PF 0.2 %
14 PLASTIC BAG, INJECTION (ML) EPIDURAL CONTINUOUS
Status: DISCONTINUED | OUTPATIENT
Start: 2024-12-30 | End: 2025-01-05

## 2024-12-30 RX ADMIN — NOREPINEPHRINE BITARTRATE 0.03 MCG/KG/MIN: 8 INJECTION, SOLUTION INTRAVENOUS at 04:36

## 2024-12-30 RX ADMIN — HYDROMORPHONE HYDROCHLORIDE 0.4 MG: 1 INJECTION, SOLUTION INTRAMUSCULAR; INTRAVENOUS; SUBCUTANEOUS at 15:18

## 2024-12-30 RX ADMIN — HYDROMORPHONE HYDROCHLORIDE 0.4 MG: 1 INJECTION, SOLUTION INTRAMUSCULAR; INTRAVENOUS; SUBCUTANEOUS at 20:23

## 2024-12-30 RX ADMIN — HEPARIN SODIUM 5000 UNITS: 5000 INJECTION INTRAVENOUS; SUBCUTANEOUS at 18:22

## 2024-12-30 RX ADMIN — MIDODRINE HYDROCHLORIDE 10 MG: 10 TABLET ORAL at 06:16

## 2024-12-30 RX ADMIN — MIDODRINE HYDROCHLORIDE 10 MG: 10 TABLET ORAL at 23:45

## 2024-12-30 RX ADMIN — MIDODRINE HYDROCHLORIDE 10 MG: 10 TABLET ORAL at 15:19

## 2024-12-30 RX ADMIN — Medication 4 L/MIN: at 20:00

## 2024-12-30 RX ADMIN — HYDROMORPHONE HYDROCHLORIDE 0.4 MG: 1 INJECTION, SOLUTION INTRAMUSCULAR; INTRAVENOUS; SUBCUTANEOUS at 18:21

## 2024-12-30 RX ADMIN — ACETAMINOPHEN 1000 MG: 10 INJECTION, SOLUTION INTRAVENOUS at 06:16

## 2024-12-30 RX ADMIN — PROPOFOL 25 MCG/KG/MIN: 10 INJECTION, EMULSION INTRAVENOUS at 04:36

## 2024-12-30 RX ADMIN — LEUCINE, PHENYLALANINE, LYSINE HYDROCHLORIDE, METHIONINE, ISOLEUCINE, VALINE, HISTIDINE, THREONINE, TRYPTOPHAN, ALANINE, GLYCINE, ARGININE, PROLINE, TYROSINE, SERINE 25 G: 730; 560; 580; 400; 600; 580; 480; 420; 180; 2.07; 1.03; 1.15; 680; 40; 5 INJECTION INTRAVENOUS at 08:10

## 2024-12-30 RX ADMIN — ACETAMINOPHEN 1000 MG: 10 INJECTION, SOLUTION INTRAVENOUS at 23:45

## 2024-12-30 RX ADMIN — PANTOPRAZOLE SODIUM 40 MG: 40 INJECTION, POWDER, FOR SOLUTION INTRAVENOUS at 11:54

## 2024-12-30 RX ADMIN — Medication 50 MCG/HR: at 08:10

## 2024-12-30 RX ADMIN — CALCIUM CHLORIDE, MAGNESIUM CHLORIDE, DEXTROSE MONOHYDRATE, LACTIC ACID, SODIUM CHLORIDE, SODIUM BICARBONATE AND POTASSIUM CHLORIDE 26 ML/KG/HR: 5.15; 2.03; 22; 5.4; 6.46; 3.09; .157 INJECTION INTRAVENOUS at 18:42

## 2024-12-30 RX ADMIN — Medication 40 PERCENT: at 07:45

## 2024-12-30 RX ADMIN — PANTOPRAZOLE SODIUM 40 MG: 40 INJECTION, POWDER, FOR SOLUTION INTRAVENOUS at 23:45

## 2024-12-30 RX ADMIN — ASCORBIC ACID, VITAMIN A PALMITATE, CHOLECALCIFEROL, THIAMINE HYDROCHLORIDE, RIBOFLAVIN-5 PHOSPHATE SODIUM, PYRIDOXINE HYDROCHLORIDE, NIACINAMIDE, DEXPANTHENOL, ALPHA-TOCOPHEROL ACETATE, VITAMIN K1, FOLIC ACID, BIOTIN, CYANOCOBALAMIN: 200; 3300; 200; 6; 3.6; 6; 40; 15; 10; 150; 600; 60; 5 INJECTION, SOLUTION INTRAVENOUS at 19:57

## 2024-12-30 RX ADMIN — ACETAMINOPHEN 1000 MG: 10 INJECTION, SOLUTION INTRAVENOUS at 15:18

## 2024-12-30 ASSESSMENT — COGNITIVE AND FUNCTIONAL STATUS - GENERAL
WALKING IN HOSPITAL ROOM: TOTAL
MOVING TO AND FROM BED TO CHAIR: TOTAL
PERSONAL GROOMING: TOTAL
STANDING UP FROM CHAIR USING ARMS: TOTAL
CLIMB 3 TO 5 STEPS WITH RAILING: TOTAL
EATING MEALS: TOTAL
HELP NEEDED FOR BATHING: TOTAL
TURNING FROM BACK TO SIDE WHILE IN FLAT BAD: TOTAL
DAILY ACTIVITIY SCORE: 6
MOVING FROM LYING ON BACK TO SITTING ON SIDE OF FLAT BED WITH BEDRAILS: TOTAL
MOBILITY SCORE: 6
TOILETING: TOTAL
DRESSING REGULAR LOWER BODY CLOTHING: TOTAL
DRESSING REGULAR UPPER BODY CLOTHING: TOTAL

## 2024-12-30 ASSESSMENT — PAIN DESCRIPTION - LOCATION: LOCATION: ABDOMEN

## 2024-12-30 ASSESSMENT — PAIN SCALES - GENERAL: PAINLEVEL_OUTOF10: 9

## 2024-12-30 ASSESSMENT — PAIN - FUNCTIONAL ASSESSMENT
PAIN_FUNCTIONAL_ASSESSMENT: CPOT (CRITICAL CARE PAIN OBSERVATION TOOL)
PAIN_FUNCTIONAL_ASSESSMENT: 0-10
PAIN_FUNCTIONAL_ASSESSMENT: 0-10

## 2024-12-30 ASSESSMENT — ACTIVITIES OF DAILY LIVING (ADL): BATHING_ASSISTANCE: TOTAL
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
## 2024-12-30 NOTE — PROCEDURES
Peripheral Block    Patient location during procedure: pre-op  Start time: 12/30/2024 8:20 AM  End time: 12/30/2024 8:45 AM  Reason for block: at surgeon's request and post-op pain management  Staffing  Performed: resident   Authorized by: Sanjuanita Mcgovern MD    Performed by: Sanjuanita Mcgovern MD  Preanesthetic Checklist  Completed: patient identified, IV checked, site marked, risks and benefits discussed, surgical consent, monitors and equipment checked, pre-op evaluation and timeout performed   Timeout performed at: 12/30/2024 8:18 AM  Peripheral Block  Patient position: sitting  Prep: ChloraPrep  Patient monitoring: heart rate and continuous pulse ox  Block type: ADAM  Injection technique: catheter  Guidance: ultrasound guided  Local infiltration: ropivacaine  Needle  Needle type: Tuohy   Needle gauge: 22 G  Needle length: 8 cm  Needle localization: ultrasound guidance     image stored in chart  Assessment  Injection assessment: negative aspiration for heme, no paresthesia on injection, incremental injection and local visualized surrounding nerve on ultrasound  Additional Notes  Erector spinae plane block: Informed consent obtained.  Risks, benefits, and alternatives discussed.  ASA monitors placed, and timeout performed.  Patient laterally positioned, prepped with chlorhexidine, and draped with sterile towels. Anatomical landmarks clearly marked.    Ultrasound guidance used to visualize the erector spine a muscle above the TP/costal junction at right T7, left T10.  Good visualization of the needle throughout duration of the procedure.  Aspiration was negative. A total of Type of Local: 30 cc of 0.5% ropivacaine, was divided and injected bilaterally.  Catheters threaded and secured. Patient tolerated procedure well.

## 2024-12-30 NOTE — PROGRESS NOTES
[facility]  TRAUMA ICU - ATTENDING PROGRESS NOTE    Patient Name: Ike Gar  MRN: [id number]  : 1945  AGE: 79 y.o.   GENDER: male  ==============================================================================    2024  1:27 PM    I evaluated and examined the patient with the critical care team. As a multidisciplinary team, we discussed all findings, as well as assessment and plan. I personally spent 35 minutes of critical care time excluding procedures at the bedside with the patient. I personally reviewed all labs, results and studies. My independent note with assessment and plan are below:    Neurologic:        Analgesia: DC fentanyl drip, IV PRN dilaudid, tylenol       Sedation: DC propofol  HEENT: none  Cardiovascular:        Blunt cardiac injury, resolved       Undifferentiated shock  Midodrine 10mg Q8       Vasopressors: none  Pulmonary:        Acute hypoxic respiratory failure, daily CXR  Multiple rib fx (L 1,3, 8,9, 11, 12) (R 6, 7,9), Sternal fx   Nerve blocks   Right hemothorax, pigtail  Left hemothorax, pigtail  SAT/SBT for extubation evaluation  Initially extubated, reintubated        Oxygen requirement:  CPAP 5/5/40%  Gastrointestinal:        Grade 2 liver laceration       Small bowel injury:   : Ex-lap, TENNILLE, SBR X2, GRACIELA x3, discontinuity, ABTHERA  : Ex-lap, washout, SBR, ileocecetomy, ABTHERA  : Ex-lap, washout, ABTHERA  : partial omentectomy, ABTHERA  : partial hepatectomy, end jejunostomy with mucous fistula creation, fascial closure       Diet: NPO for extubation, TPN       GI prophylaxis: PPI  Renal/:        Acute renal failure, anuric   CVVH, net 0       Clark catheter: DC clark  Hematologic:       Central line: yes, maintain       Arterial line: yes, maintain       DVT prophylaxis: SCD's; heparin  Musculoskeletal:       T5 vertebral body fracture       L4/L5 superior endplate fracture       ICU Mobility Score:  3       Skin breakdown: none       Restraints: yes, renew  Endocrine:       MARLYS       Glucose control <180, POC glucose checks and insulin sliding scale  ID:       Completed empiric vancomycin, meropenem       Antibiotics: none    Disposition: The patient remains remains critically ill.    Willi Murillo MD
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
## 2024-12-30 NOTE — PROGRESS NOTES
Occupational Therapy                 Therapy Communication Note    Patient Name: Ike Gar  MRN: [id number]  Department: [facility]  Room: 16/16-A  Today's Date: 12/30/2024     Discipline: Occupational Therapy          Missed Visit Reason: Missed Visit Reason:  (notified team that pt still does not have straps for TLSO. Will reattempt as appropriate equipment in place)    Missed Time: Attempt    Comment:
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
## 2024-12-30 NOTE — PROCEDURES
I evaluated the patient during while he was receiving CVVH.    BP: 123/58 on levo BFR: 200  Replacement fluid: Prismasol 2K/3.5Ca  Flow rate: 900/pump/900/filter/900    Plan: Continue CVVH until BP stable enough for IHD or renal function recovers.
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
## 2024-12-30 NOTE — PROCEDURES
Renal Staff CVVH Note     Patient seen, examined on CVVH  No significant filter issues overnight  M150 filter, no heparin   200 bfr    2K 2.5 Ca     Effluent bag clear  Access R int jugular     Sedated, intubated, F1O2 40  On pressors     Electrolytes, acid base acceptable Medications reviewed  Continue current regimen, fluid removal per primary team

## 2024-12-31 ENCOUNTER — APPOINTMENT (OUTPATIENT)
Dept: RADIOLOGY | Facility: HOSPITAL | Age: 79
End: 2024-12-31
Payer: MEDICARE

## 2024-12-31 LAB
ALBUMIN SERPL BCP-MCNC: 1.9 G/DL (ref 3.4–5)
ANION GAP BLDA CALCULATED.4IONS-SCNC: 7 MMO/L (ref 10–25)
ANION GAP BLDA CALCULATED.4IONS-SCNC: 8 MMO/L (ref 10–25)
ANION GAP SERPL CALC-SCNC: 15 MMOL/L (ref 10–20)
APTT PPP: 30 SECONDS (ref 27–38)
BASE EXCESS BLDA CALC-SCNC: -0.2 MMOL/L (ref -2–3)
BASE EXCESS BLDA CALC-SCNC: 0.5 MMOL/L (ref -2–3)
BODY TEMPERATURE: 37 DEGREES CELSIUS
BODY TEMPERATURE: 37 DEGREES CELSIUS
BUN SERPL-MCNC: 38 MG/DL (ref 6–23)
CA-I BLD-SCNC: 1.27 MMOL/L (ref 1.1–1.33)
CA-I BLDA-SCNC: 1.28 MMOL/L (ref 1.1–1.33)
CA-I BLDA-SCNC: 1.3 MMOL/L (ref 1.1–1.33)
CALCIUM SERPL-MCNC: 8.4 MG/DL (ref 8.6–10.6)
CHLORIDE BLDA-SCNC: 101 MMOL/L (ref 98–107)
CHLORIDE BLDA-SCNC: 103 MMOL/L (ref 98–107)
CHLORIDE SERPL-SCNC: 99 MMOL/L (ref 98–107)
CO2 SERPL-SCNC: 23 MMOL/L (ref 21–32)
CREAT SERPL-MCNC: 1.91 MG/DL (ref 0.5–1.3)
EGFRCR SERPLBLD CKD-EPI 2021: 36 ML/MIN/1.73M*2
ERYTHROCYTE [DISTWIDTH] IN BLOOD BY AUTOMATED COUNT: 17.7 % (ref 11.5–14.5)
FLOW: 4 LPM
GLUCOSE BLD MANUAL STRIP-MCNC: 103 MG/DL (ref 74–99)
GLUCOSE BLD MANUAL STRIP-MCNC: 87 MG/DL (ref 74–99)
GLUCOSE BLD MANUAL STRIP-MCNC: 90 MG/DL (ref 74–99)
GLUCOSE BLD MANUAL STRIP-MCNC: 91 MG/DL (ref 74–99)
GLUCOSE BLD MANUAL STRIP-MCNC: 91 MG/DL (ref 74–99)
GLUCOSE BLDA-MCNC: 101 MG/DL (ref 74–99)
GLUCOSE BLDA-MCNC: 89 MG/DL (ref 74–99)
GLUCOSE SERPL-MCNC: 100 MG/DL (ref 74–99)
HCO3 BLDA-SCNC: 23.8 MMOL/L (ref 22–26)
HCO3 BLDA-SCNC: 23.8 MMOL/L (ref 22–26)
HCT VFR BLD AUTO: 22 % (ref 41–52)
HCT VFR BLD EST: 23 % (ref 41–52)
HCT VFR BLD EST: 25 % (ref 41–52)
HGB BLD-MCNC: 8 G/DL (ref 13.5–17.5)
HGB BLDA-MCNC: 7.6 G/DL (ref 13.5–17.5)
HGB BLDA-MCNC: 8.3 G/DL (ref 13.5–17.5)
INHALED O2 CONCENTRATION: 0 %
INHALED O2 CONCENTRATION: 36 %
INR PPP: 1.2 (ref 0.9–1.1)
LABORATORY COMMENT REPORT: NORMAL
LABORATORY COMMENT REPORT: NORMAL
LACTATE BLDA-SCNC: 1.2 MMOL/L (ref 0.4–2)
LACTATE BLDA-SCNC: 1.8 MMOL/L (ref 0.4–2)
MAGNESIUM SERPL-MCNC: 2.02 MG/DL (ref 1.6–2.4)
MCH RBC QN AUTO: 28.8 PG (ref 26–34)
MCHC RBC AUTO-ENTMCNC: 36.4 G/DL (ref 32–36)
MCV RBC AUTO: 79 FL (ref 80–100)
NRBC BLD-RTO: 0 /100 WBCS (ref 0–0)
OXYHGB MFR BLDA: 94.3 % (ref 94–98)
OXYHGB MFR BLDA: 96.4 % (ref 94–98)
PATH REPORT.FINAL DX SPEC: NORMAL
PATH REPORT.FINAL DX SPEC: NORMAL
PATH REPORT.GROSS SPEC: NORMAL
PATH REPORT.GROSS SPEC: NORMAL
PATH REPORT.RELEVANT HX SPEC: NORMAL
PATH REPORT.RELEVANT HX SPEC: NORMAL
PATH REPORT.TOTAL CANCER: NORMAL
PATH REPORT.TOTAL CANCER: NORMAL
PCO2 BLDA: 32 MM HG (ref 38–42)
PCO2 BLDA: 35 MM HG (ref 38–42)
PH BLDA: 7.44 PH (ref 7.38–7.42)
PH BLDA: 7.48 PH (ref 7.38–7.42)
PHOSPHATE SERPL-MCNC: 3.3 MG/DL (ref 2.5–4.9)
PLATELET # BLD AUTO: 322 X10*3/UL (ref 150–450)
PO2 BLDA: 72 MM HG (ref 85–95)
PO2 BLDA: 91 MM HG (ref 85–95)
POTASSIUM BLDA-SCNC: 4.7 MMOL/L (ref 3.5–5.3)
POTASSIUM BLDA-SCNC: 5 MMOL/L (ref 3.5–5.3)
POTASSIUM SERPL-SCNC: 4.7 MMOL/L (ref 3.5–5.3)
PROTHROMBIN TIME: 13.8 SECONDS (ref 9.8–12.8)
RBC # BLD AUTO: 2.78 X10*6/UL (ref 4.5–5.9)
RESIDENT REVIEW: NORMAL
SAO2 % BLDA: 97 % (ref 94–100)
SAO2 % BLDA: 99 % (ref 94–100)
SODIUM BLDA-SCNC: 128 MMOL/L (ref 136–145)
SODIUM BLDA-SCNC: 129 MMOL/L (ref 136–145)
SODIUM SERPL-SCNC: 132 MMOL/L (ref 136–145)
WBC # BLD AUTO: 22.9 X10*3/UL (ref 4.4–11.3)

## 2024-12-31 PROCEDURE — 85027 COMPLETE CBC AUTOMATED: CPT | Performed by: PHYSICIAN ASSISTANT

## 2024-12-31 PROCEDURE — 82435 ASSAY OF BLOOD CHLORIDE: CPT

## 2024-12-31 PROCEDURE — 2500000005 HC RX 250 GENERAL PHARMACY W/O HCPCS

## 2024-12-31 PROCEDURE — 2500000004 HC RX 250 GENERAL PHARMACY W/ HCPCS (ALT 636 FOR OP/ED): Performed by: STUDENT IN AN ORGANIZED HEALTH CARE EDUCATION/TRAINING PROGRAM

## 2024-12-31 PROCEDURE — 2500000004 HC RX 250 GENERAL PHARMACY W/ HCPCS (ALT 636 FOR OP/ED)

## 2024-12-31 PROCEDURE — 82947 ASSAY GLUCOSE BLOOD QUANT: CPT

## 2024-12-31 PROCEDURE — 99231 SBSQ HOSP IP/OBS SF/LOW 25: CPT

## 2024-12-31 PROCEDURE — 71045 X-RAY EXAM CHEST 1 VIEW: CPT | Performed by: RADIOLOGY

## 2024-12-31 PROCEDURE — 83735 ASSAY OF MAGNESIUM: CPT

## 2024-12-31 PROCEDURE — 37799 UNLISTED PX VASCULAR SURGERY: CPT | Performed by: PHYSICIAN ASSISTANT

## 2024-12-31 PROCEDURE — 92610 EVALUATE SWALLOWING FUNCTION: CPT | Mod: GN | Performed by: SPEECH-LANGUAGE PATHOLOGIST

## 2024-12-31 PROCEDURE — 99024 POSTOP FOLLOW-UP VISIT: CPT | Performed by: SURGERY

## 2024-12-31 PROCEDURE — 82330 ASSAY OF CALCIUM: CPT | Performed by: PHYSICIAN ASSISTANT

## 2024-12-31 PROCEDURE — 83605 ASSAY OF LACTIC ACID: CPT

## 2024-12-31 PROCEDURE — 37799 UNLISTED PX VASCULAR SURGERY: CPT

## 2024-12-31 PROCEDURE — 90945 DIALYSIS ONE EVALUATION: CPT | Performed by: INTERNAL MEDICINE

## 2024-12-31 PROCEDURE — 85730 THROMBOPLASTIN TIME PARTIAL: CPT

## 2024-12-31 PROCEDURE — 97530 THERAPEUTIC ACTIVITIES: CPT | Mod: GP | Performed by: PHYSICAL THERAPIST

## 2024-12-31 PROCEDURE — 2500000001 HC RX 250 WO HCPCS SELF ADMINISTERED DRUGS (ALT 637 FOR MEDICARE OP): Performed by: STUDENT IN AN ORGANIZED HEALTH CARE EDUCATION/TRAINING PROGRAM

## 2024-12-31 PROCEDURE — 2500000001 HC RX 250 WO HCPCS SELF ADMINISTERED DRUGS (ALT 637 FOR MEDICARE OP)

## 2024-12-31 PROCEDURE — 2020000001 HC ICU ROOM DAILY

## 2024-12-31 PROCEDURE — 2500000004 HC RX 250 GENERAL PHARMACY W/ HCPCS (ALT 636 FOR OP/ED): Performed by: PHYSICIAN ASSISTANT

## 2024-12-31 PROCEDURE — 71045 X-RAY EXAM CHEST 1 VIEW: CPT

## 2024-12-31 PROCEDURE — 90937 HEMODIALYSIS REPEATED EVAL: CPT

## 2024-12-31 RX ORDER — METHOCARBAMOL 500 MG/1
500 TABLET, FILM COATED ORAL EVERY 6 HOURS SCHEDULED
Status: DISCONTINUED | OUTPATIENT
Start: 2024-12-31 | End: 2025-01-03

## 2024-12-31 RX ORDER — GABAPENTIN 100 MG/1
200 CAPSULE ORAL 2 TIMES DAILY
Status: DISCONTINUED | OUTPATIENT
Start: 2024-12-31 | End: 2025-01-03

## 2024-12-31 RX ORDER — OXYCODONE HYDROCHLORIDE 5 MG/1
5 TABLET ORAL
Status: DISCONTINUED | OUTPATIENT
Start: 2024-12-31 | End: 2025-01-01

## 2024-12-31 RX ORDER — MIDODRINE HYDROCHLORIDE 2.5 MG/1
5 TABLET ORAL EVERY 8 HOURS
Status: DISCONTINUED | OUTPATIENT
Start: 2024-12-31 | End: 2025-01-01

## 2024-12-31 RX ORDER — INSULIN LISPRO 100 [IU]/ML
0-5 INJECTION, SOLUTION INTRAVENOUS; SUBCUTANEOUS EVERY 6 HOURS
Status: DISCONTINUED | OUTPATIENT
Start: 2024-12-31 | End: 2025-01-18 | Stop reason: HOSPADM

## 2024-12-31 RX ORDER — OXYCODONE HYDROCHLORIDE 5 MG/1
5 TABLET ORAL EVERY 4 HOURS PRN
Status: DISCONTINUED | OUTPATIENT
Start: 2024-12-31 | End: 2024-12-31

## 2024-12-31 RX ADMIN — Medication 4 L/MIN: at 20:00

## 2024-12-31 RX ADMIN — METHOCARBAMOL TABLETS 500 MG: 500 TABLET, COATED ORAL at 18:22

## 2024-12-31 RX ADMIN — METHOCARBAMOL TABLETS 500 MG: 500 TABLET, COATED ORAL at 23:39

## 2024-12-31 RX ADMIN — ASCORBIC ACID, VITAMIN A PALMITATE, CHOLECALCIFEROL, THIAMINE HYDROCHLORIDE, RIBOFLAVIN-5 PHOSPHATE SODIUM, PYRIDOXINE HYDROCHLORIDE, NIACINAMIDE, DEXPANTHENOL, ALPHA-TOCOPHEROL ACETATE, VITAMIN K1, FOLIC ACID, BIOTIN, CYANOCOBALAMIN: 200; 3300; 200; 6; 3.6; 6; 40; 15; 10; 150; 600; 60; 5 INJECTION, SOLUTION INTRAVENOUS at 20:00

## 2024-12-31 RX ADMIN — HYDROMORPHONE HYDROCHLORIDE 0.4 MG: 1 INJECTION, SOLUTION INTRAMUSCULAR; INTRAVENOUS; SUBCUTANEOUS at 21:06

## 2024-12-31 RX ADMIN — Medication 4 L/MIN: at 04:00

## 2024-12-31 RX ADMIN — PANTOPRAZOLE SODIUM 40 MG: 40 INJECTION, POWDER, FOR SOLUTION INTRAVENOUS at 10:17

## 2024-12-31 RX ADMIN — Medication 4 L/MIN: at 07:57

## 2024-12-31 RX ADMIN — ACETAMINOPHEN 1000 MG: 10 INJECTION, SOLUTION INTRAVENOUS at 15:19

## 2024-12-31 RX ADMIN — MIDODRINE HYDROCHLORIDE 10 MG: 10 TABLET ORAL at 06:41

## 2024-12-31 RX ADMIN — HYDROMORPHONE HYDROCHLORIDE 0.4 MG: 1 INJECTION, SOLUTION INTRAMUSCULAR; INTRAVENOUS; SUBCUTANEOUS at 15:51

## 2024-12-31 RX ADMIN — GABAPENTIN 200 MG: 100 CAPSULE ORAL at 20:18

## 2024-12-31 RX ADMIN — ACETAMINOPHEN 1000 MG: 10 INJECTION, SOLUTION INTRAVENOUS at 06:41

## 2024-12-31 RX ADMIN — HYDROMORPHONE HYDROCHLORIDE 0.4 MG: 1 INJECTION, SOLUTION INTRAMUSCULAR; INTRAVENOUS; SUBCUTANEOUS at 03:27

## 2024-12-31 RX ADMIN — OXYCODONE HYDROCHLORIDE 5 MG: 5 TABLET ORAL at 13:37

## 2024-12-31 RX ADMIN — ACETAMINOPHEN 1000 MG: 10 INJECTION, SOLUTION INTRAVENOUS at 23:39

## 2024-12-31 RX ADMIN — HEPARIN SODIUM 5000 UNITS: 5000 INJECTION INTRAVENOUS; SUBCUTANEOUS at 10:16

## 2024-12-31 RX ADMIN — HEPARIN SODIUM 5000 UNITS: 5000 INJECTION INTRAVENOUS; SUBCUTANEOUS at 01:25

## 2024-12-31 RX ADMIN — MIDODRINE HYDROCHLORIDE 5 MG: 2.5 TABLET ORAL at 23:39

## 2024-12-31 RX ADMIN — LEUCINE, PHENYLALANINE, LYSINE HYDROCHLORIDE, METHIONINE, ISOLEUCINE, VALINE, HISTIDINE, THREONINE, TRYPTOPHAN, ALANINE, GLYCINE, ARGININE, PROLINE, TYROSINE, SERINE 25 G: 730; 560; 580; 400; 600; 580; 480; 420; 180; 2.07; 1.03; 1.15; 680; 40; 5 INJECTION INTRAVENOUS at 07:59

## 2024-12-31 RX ADMIN — HEPARIN SODIUM 5000 UNITS: 5000 INJECTION INTRAVENOUS; SUBCUTANEOUS at 18:22

## 2024-12-31 RX ADMIN — MIDODRINE HYDROCHLORIDE 5 MG: 2.5 TABLET ORAL at 15:18

## 2024-12-31 RX ADMIN — Medication 4 L/MIN: at 00:00

## 2024-12-31 RX ADMIN — GABAPENTIN 200 MG: 100 CAPSULE ORAL at 12:38

## 2024-12-31 RX ADMIN — METHOCARBAMOL TABLETS 500 MG: 500 TABLET, COATED ORAL at 12:38

## 2024-12-31 ASSESSMENT — COGNITIVE AND FUNCTIONAL STATUS - GENERAL
MOBILITY SCORE: 6
STANDING UP FROM CHAIR USING ARMS: TOTAL
MOVING TO AND FROM BED TO CHAIR: TOTAL
TURNING FROM BACK TO SIDE WHILE IN FLAT BAD: TOTAL
CLIMB 3 TO 5 STEPS WITH RAILING: TOTAL
WALKING IN HOSPITAL ROOM: TOTAL
MOVING FROM LYING ON BACK TO SITTING ON SIDE OF FLAT BED WITH BEDRAILS: TOTAL

## 2024-12-31 ASSESSMENT — PAIN - FUNCTIONAL ASSESSMENT
PAIN_FUNCTIONAL_ASSESSMENT: CPOT (CRITICAL CARE PAIN OBSERVATION TOOL)
PAIN_FUNCTIONAL_ASSESSMENT: 0-10
PAIN_FUNCTIONAL_ASSESSMENT: CPOT (CRITICAL CARE PAIN OBSERVATION TOOL)
PAIN_FUNCTIONAL_ASSESSMENT: 0-10
PAIN_FUNCTIONAL_ASSESSMENT: CPOT (CRITICAL CARE PAIN OBSERVATION TOOL)

## 2024-12-31 ASSESSMENT — ACTIVITIES OF DAILY LIVING (ADL): LACK_OF_TRANSPORTATION: NO

## 2024-12-31 ASSESSMENT — PAIN DESCRIPTION - LOCATION: LOCATION: ABDOMEN

## 2024-12-31 ASSESSMENT — PAIN SCALES - GENERAL
PAINLEVEL_OUTOF10: 7
PAINLEVEL_OUTOF10: 0 - NO PAIN
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
## 2024-12-31 NOTE — CARE PLAN
Problem: Skin  Goal: Decreased wound size/increased tissue granulation at next dressing change  Outcome: Progressing  Goal: Participates in plan/prevention/treatment measures  Outcome: Progressing  Flowsheets (Taken 12/23/2024 2157 by Hank Renae RN)  Participates in plan/prevention/treatment measures: Elevate heels  Goal: Prevent/manage excess moisture  Outcome: Progressing  Flowsheets (Taken 12/23/2024 2157 by Hank Renae RN)  Prevent/manage excess moisture:   Monitor for/manage infection if present   Cleanse incontinence/protect with barrier cream  Goal: Prevent/minimize sheer/friction injuries  Outcome: Progressing  Flowsheets (Taken 12/23/2024 2157 by Hank Renae RN)  Prevent/minimize sheer/friction injuries:   Use pull sheet   Turn/reposition every 2 hours/use positioning/transfer devices  Goal: Promote/optimize nutrition  Outcome: Progressing  Flowsheets (Taken 12/23/2024 2157 by Hank Renae RN)  Promote/optimize nutrition: Monitor/record intake including meals  Goal: Promote skin healing  Outcome: Progressing  Flowsheets (Taken 12/23/2024 2157 by Hank Renae RN)  Promote skin healing:   Turn/reposition every 2 hours/use positioning/transfer devices   Protective dressings over bony prominences   Assess skin/pad under line(s)/device(s)   Rotate device position/do not position patient on device

## 2024-12-31 NOTE — PROCEDURES
I evaluated the patient during while he was receiving CVVH.    BP: 145/64 on midodrine BFR: 200  Replacement fluid: Prismasol 2K/3.5Ca  Flow rate: 900/pump/900/filter/900    Plan: Continue CVVH until until tonight. Will then eval for possible SLED tomorrow.
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
## 2024-12-31 NOTE — PROGRESS NOTES
Postop Pain HPI -   Palliative: relieved with IV analgesics and regional local anesthetics  Provocative: movement  Quality:  burning and aching  Radiation:  none  Severity:  Unable to obtain pain score due to patient being delirious.  Timing: constant    24-HOUR OPIOID CONSUMPTION:  Dilaudid 1.6 mg     Scheduled medications  acetaminophen, 1,000 mg, intravenous, q8h  heparin, 5,000 Units, subcutaneous, q8h  midodrine, 10 mg, nasogastric tube, q8h  oxygen, , inhalation, Continuous - Inhalation  pantoprazole, 40 mg, intravenous, q12h  Travasol, 25 g, intravenous, Daily Amino Acids      Continuous medications  Adult Clinimix Parenteral Nutrition Continuous, 40 mL/hr, Last Rate: 40 mL/hr (12/31/24 0800)  PrismaSol BGK 2/3.5, 26 mL/kg/hr, Last Rate: 26 mL/kg/hr (12/30/24 1842)  ropivacaine (PF) in NS cmpd, 14 mL/hr      PRN medications  PRN medications: alteplase, dextrose, dextrose, glucagon, glucagon, heparin flush, HYDROmorphone, lubricating eye drops, oxygen     Physical Exam:  Constitutional:  no distress, alert and cooperative  Eyes: clear sclera  Head/Neck: No apparent injury, trachea midline  Respiratory/Thorax: Patent airways, thorax symmetric, breathing comfortably  Cardiovascular: no pitting edema  Gastrointestinal: Nondistended  Musculoskeletal: ROM intact  Extremities: no clubbing  Neurological: alert, green x4  Psychological: Appropriate affect    Results for orders placed or performed during the hospital encounter of 12/17/24 (from the past 24 hours)   POCT GLUCOSE   Result Value Ref Range    POCT Glucose 84 74 - 99 mg/dL   Prepare RBC: 1 Units   Result Value Ref Range    PRODUCT CODE E6435C08     Unit Number [id number]     Unit ABO O     Unit RH POS     XM INTEP COMP     Dispense Status TR     Blood Expiration Date 1/2/2025 11:59:00 PM EST     PRODUCT BLOOD TYPE 5100     UNIT VOLUME 295    POCT GLUCOSE   Result Value Ref Range    POCT Glucose 107 (H) 74 - 99 mg/dL   POCT GLUCOSE   Result Value Ref  Range    POCT Glucose 109 (H) 74 - 99 mg/dL   POCT GLUCOSE   Result Value Ref Range    POCT Glucose 100 (H) 74 - 99 mg/dL   Calcium, ionized   Result Value Ref Range    POCT Calcium, Ionized 1.27 1.1 - 1.33 mmol/L   Renal Function Panel   Result Value Ref Range    Glucose 100 (H) 74 - 99 mg/dL    Sodium 132 (L) 136 - 145 mmol/L    Potassium 4.7 3.5 - 5.3 mmol/L    Chloride 99 98 - 107 mmol/L    Bicarbonate 23 21 - 32 mmol/L    Anion Gap 15 10 - 20 mmol/L    Urea Nitrogen 38 (H) 6 - 23 mg/dL    Creatinine 1.91 (H) 0.50 - 1.30 mg/dL    eGFR 36 (L) >60 mL/min/1.73m*2    Calcium 8.4 (L) 8.6 - 10.6 mg/dL    Phosphorus 3.3 2.5 - 4.9 mg/dL    Albumin 1.9 (L) 3.4 - 5.0 g/dL   Magnesium   Result Value Ref Range    Magnesium 2.02 1.60 - 2.40 mg/dL   Coagulation Screen   Result Value Ref Range    Protime 13.8 (H) 9.8 - 12.8 seconds    INR 1.2 (H) 0.9 - 1.1    aPTT 30 27 - 38 seconds   CBC   Result Value Ref Range    WBC 22.9 (H) 4.4 - 11.3 x10*3/uL    nRBC 0.0 0.0 - 0.0 /100 WBCs    RBC 2.78 (L) 4.50 - 5.90 x10*6/uL    Hemoglobin 8.0 (L) 13.5 - 17.5 g/dL    Hematocrit 22.0 (L) 41.0 - 52.0 %    MCV 79 (L) 80 - 100 fL    MCH 28.8 26.0 - 34.0 pg    MCHC 36.4 (H) 32.0 - 36.0 g/dL    RDW 17.7 (H) 11.5 - 14.5 %    Platelets 322 150 - 450 x10*3/uL   Blood Gas Arterial Full Panel   Result Value Ref Range    POCT pH, Arterial 7.48 (H) 7.38 - 7.42 pH    POCT pCO2, Arterial 32 (L) 38 - 42 mm Hg    POCT pO2, Arterial 72 (L) 85 - 95 mm Hg    POCT SO2, Arterial 97 94 - 100 %    POCT Oxy Hemoglobin, Arterial 94.3 94.0 - 98.0 %    POCT Hematocrit Calculated, Arterial 25.0 (L) 41.0 - 52.0 %    POCT Sodium, Arterial 128 (L) 136 - 145 mmol/L    POCT Potassium, Arterial 5.0 3.5 - 5.3 mmol/L    POCT Chloride, Arterial 101 98 - 107 mmol/L    POCT Ionized Calcium, Arterial 1.30 1.10 - 1.33 mmol/L    POCT Glucose, Arterial 101 (H) 74 - 99 mg/dL    POCT Lactate, Arterial 1.8 0.4 - 2.0 mmol/L    POCT Base Excess, Arterial 0.5 -2.0 - 3.0 mmol/L    POCT  HCO3 Calculated, Arterial 23.8 22.0 - 26.0 mmol/L    POCT Hemoglobin, Arterial 8.3 (L) 13.5 - 17.5 g/dL    POCT Anion Gap, Arterial 8 (L) 10 - 25 mmo/L    Patient Temperature 37.0 degrees Celsius    FiO2 36 %   POCT GLUCOSE   Result Value Ref Range    POCT Glucose 91 74 - 99 mg/dL       Ike Gar is a 78 y.o. year old male patient who presents for Procedure(s):  Re-opening of recent laparotomy, wedge resection segment 3 liver, creation of jejunostomy w/ mucous fistula. with Swapnil Stewart MD on 12/23/2024. Acute Pain consulted for assistance with pain control.      Plan:  - Bilateral erector spinae blocks with catheters performed preoperatively on 12/30/2024  - Ambit ball with Ropivacaine 0.2%/NaCl 0.9% 500mL, Rate 7 cc/hr bilaterally  - Ambit medication will not interfere with pain medication prescribed by the primary team.   - Please be aware of local anesthetic toxic dose and absorption variability before considering lidocaine patches  - Ok to restart heparin for DVT prophylaxis.  - Acute pain service will follow while catheters in place  - Rest of pain management per primary team     Acute Pain Resident  pg [phone] ph [phone]
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
## 2024-12-31 NOTE — PROCEDURES
Left IJ Trialysis Placement for Continuation of CRRT     A time out was performed identifying the correct procedure and correct location with nursing staff.  The left neck was prepped with 2% Chlorhexidine and draped with a full length sterile sheet in the usual fashion.  The vein was accessed under ultrasound guidance with an 18 gauge thin wall needle. A trialysis catheter was inserted via the seldinger technique. Dark, non pulsatile blood was withdrawn from all lumens and flushed easily with normal saline. The catheter was sutured in place and a sterile dressing was applied over the site prior to removal of the drapes.      The patient tolerated the procedure well without apparent complications.  CXR is pending.    Kori August DO, PGY3

## 2024-12-31 NOTE — SIGNIFICANT EVENT
Patient's RIJ trialysis line was accidentally pulled out by nursing staff during repositioning of patient. Due to emergent need for CVVH continuation, LIJ trialysis was placed with consent obtained from patient's son via telephone. Patient care discussed with attending physician.    Kori August DO, PGY3

## 2024-12-31 NOTE — PROGRESS NOTES
[facility]  TRAUMA ICU - ATTENDING PROGRESS NOTE    Patient Name: Ike Gar  MRN: [id number]  : 1945  AGE: 79 y.o.   GENDER: male  ==============================================================================    2024  11:02 AM    I evaluated and examined the patient with the critical care team. As a multidisciplinary team, we discussed all findings, as well as assessment and plan. I personally spent 35 minutes of critical care time excluding procedures at the bedside with the patient. I personally reviewed all labs, results and studies. My independent note with assessment and plan are below:    Neurologic:        Analgesia: Tylenol, dilaudid, oxycodone       Sedation: none  HEENT: none  Cardiovascular:        Blunt cardiac injury, resolved       Undifferentiated shock  Midodrine 5mg Q8, goal off        Vasopressors: none  Pulmonary:        Multiple rib fx (L 1,3, 8,9, 11, 12) (R 6, 7,9), Sternal fx  Nerve blocks   Right hemothorax, pigtail, WS  Left hemothorax, pigtail, WS  Daily CXR       Oxygen requirement: Extubated ; 4L NC  Gastrointestinal:        Grade 2 liver laceration       Small bowel injury:   : Ex-lap, TENNILLE, SBR X2, GRACIELA x3, discontinuity, ABTHERA  : Ex-lap, washout, SBR, ileocecetomy, ABTHERA  : Ex-lap, washout, ABTHERA  : partial omentectomy, ABTHERA  : partial hepatectomy, end jejunostomy with mucous fistula creation, fascial closure       Diet: Swallow study before diet; TPN; Trickle TF via NG       GI prophylaxis: none  Renal/:        Acute renal failure, anuric, CVVH, -20mL/hour       Clark catheter: no clark, daily bladder scan  Hematologic:       Central line: HD line (), central line for TPN       Arterial line: yes, maintain       DVT prophylaxis: SCD's; heparin  Musculoskeletal:       T5 vertebral body fracture, L4/L5 superior endplate fracture   Non-op   TLSO       ICU Mobility Score: 3       Skin  breakdown: none       Restraints: none  Endocrine:       MARLYS       Glucose control <180, POC glucose checks and insulin sliding scale  ID:       Completed empiric vancomycin, meropenem; trend leukocytosis       Antibiotics: none    Disposition: The patient remains remains critically ill.    Willi Murillo MD

## 2025-01-01 ENCOUNTER — APPOINTMENT (OUTPATIENT)
Dept: RADIOLOGY | Facility: HOSPITAL | Age: 80
End: 2025-01-01
Payer: MEDICARE

## 2025-01-01 LAB
ABO GROUP (TYPE) IN BLOOD: NORMAL
ALBUMIN SERPL BCP-MCNC: 1.8 G/DL (ref 3.4–5)
ALBUMIN SERPL BCP-MCNC: 1.9 G/DL (ref 3.4–5)
ALBUMIN SERPL BCP-MCNC: 2.1 G/DL (ref 3.4–5)
ALP SERPL-CCNC: 128 U/L (ref 33–136)
ALT SERPL W P-5'-P-CCNC: 56 U/L (ref 10–52)
ANION GAP BLDA CALCULATED.4IONS-SCNC: 11 MMO/L (ref 10–25)
ANION GAP BLDA CALCULATED.4IONS-SCNC: 12 MMO/L (ref 10–25)
ANION GAP BLDA CALCULATED.4IONS-SCNC: 12 MMO/L (ref 10–25)
ANION GAP BLDA CALCULATED.4IONS-SCNC: 8 MMO/L (ref 10–25)
ANION GAP SERPL CALC-SCNC: 12 MMOL/L (ref 10–20)
ANION GAP SERPL CALC-SCNC: 14 MMOL/L (ref 10–20)
ANION GAP SERPL CALC-SCNC: 15 MMOL/L (ref 10–20)
ANTIBODY SCREEN: NORMAL
APTT PPP: 28 SECONDS (ref 27–38)
AST SERPL W P-5'-P-CCNC: 76 U/L (ref 9–39)
BASE EXCESS BLDA CALC-SCNC: -2.4 MMOL/L (ref -2–3)
BASE EXCESS BLDA CALC-SCNC: -5.3 MMOL/L (ref -2–3)
BASE EXCESS BLDA CALC-SCNC: -5.7 MMOL/L (ref -2–3)
BASE EXCESS BLDA CALC-SCNC: -5.7 MMOL/L (ref -2–3)
BASE EXCESS BLDA CALC-SCNC: -7.2 MMOL/L (ref -2–3)
BASOPHILS # BLD AUTO: 0.02 X10*3/UL (ref 0–0.1)
BASOPHILS NFR BLD AUTO: 0.1 %
BILIRUB DIRECT SERPL-MCNC: 3.5 MG/DL (ref 0–0.3)
BILIRUB SERPL-MCNC: 6 MG/DL (ref 0–1.2)
BLOOD EXPIRATION DATE: NORMAL
BLOOD EXPIRATION DATE: NORMAL
BODY TEMPERATURE: 37 DEGREES CELSIUS
BUN SERPL-MCNC: 60 MG/DL (ref 6–23)
BUN SERPL-MCNC: 78 MG/DL (ref 6–23)
BUN SERPL-MCNC: 82 MG/DL (ref 6–23)
CA-I BLD-SCNC: 1.18 MMOL/L (ref 1.1–1.33)
CA-I BLD-SCNC: 1.29 MMOL/L (ref 1.1–1.33)
CA-I BLDA-SCNC: 1.03 MMOL/L (ref 1.1–1.33)
CA-I BLDA-SCNC: 1.19 MMOL/L (ref 1.1–1.33)
CA-I BLDA-SCNC: 1.2 MMOL/L (ref 1.1–1.33)
CA-I BLDA-SCNC: 1.21 MMOL/L (ref 1.1–1.33)
CALCIUM SERPL-MCNC: 7.8 MG/DL (ref 8.6–10.6)
CALCIUM SERPL-MCNC: 8.1 MG/DL (ref 8.6–10.6)
CALCIUM SERPL-MCNC: 8.2 MG/DL (ref 8.6–10.6)
CHLORIDE BLDA-SCNC: 100 MMOL/L (ref 98–107)
CHLORIDE BLDA-SCNC: 100 MMOL/L (ref 98–107)
CHLORIDE BLDA-SCNC: 101 MMOL/L (ref 98–107)
CHLORIDE BLDA-SCNC: 106 MMOL/L (ref 98–107)
CHLORIDE SERPL-SCNC: 100 MMOL/L (ref 98–107)
CHLORIDE SERPL-SCNC: 98 MMOL/L (ref 98–107)
CHLORIDE SERPL-SCNC: 98 MMOL/L (ref 98–107)
CO2 SERPL-SCNC: 23 MMOL/L (ref 21–32)
CREAT SERPL-MCNC: 2.76 MG/DL (ref 0.5–1.3)
CREAT SERPL-MCNC: 3.53 MG/DL (ref 0.5–1.3)
CREAT SERPL-MCNC: 3.85 MG/DL (ref 0.5–1.3)
DISPENSE STATUS: NORMAL
DISPENSE STATUS: NORMAL
EGFRCR SERPLBLD CKD-EPI 2021: 15 ML/MIN/1.73M*2
EGFRCR SERPLBLD CKD-EPI 2021: 17 ML/MIN/1.73M*2
EGFRCR SERPLBLD CKD-EPI 2021: 23 ML/MIN/1.73M*2
EOSINOPHIL # BLD AUTO: 0.02 X10*3/UL (ref 0–0.4)
EOSINOPHIL NFR BLD AUTO: 0.1 %
ERYTHROCYTE [DISTWIDTH] IN BLOOD BY AUTOMATED COUNT: 17.9 % (ref 11.5–14.5)
ERYTHROCYTE [DISTWIDTH] IN BLOOD BY AUTOMATED COUNT: 18.1 % (ref 11.5–14.5)
ERYTHROCYTE [DISTWIDTH] IN BLOOD BY AUTOMATED COUNT: 19.1 % (ref 11.5–14.5)
ERYTHROCYTE [DISTWIDTH] IN BLOOD BY AUTOMATED COUNT: 19.4 % (ref 11.5–14.5)
GLUCOSE BLD MANUAL STRIP-MCNC: 78 MG/DL (ref 74–99)
GLUCOSE BLD MANUAL STRIP-MCNC: 82 MG/DL (ref 74–99)
GLUCOSE BLD MANUAL STRIP-MCNC: 84 MG/DL (ref 74–99)
GLUCOSE BLD MANUAL STRIP-MCNC: 84 MG/DL (ref 74–99)
GLUCOSE BLD MANUAL STRIP-MCNC: 89 MG/DL (ref 74–99)
GLUCOSE BLD MANUAL STRIP-MCNC: 92 MG/DL (ref 74–99)
GLUCOSE BLD MANUAL STRIP-MCNC: 94 MG/DL (ref 74–99)
GLUCOSE BLDA-MCNC: 68 MG/DL (ref 74–99)
GLUCOSE BLDA-MCNC: 75 MG/DL (ref 74–99)
GLUCOSE BLDA-MCNC: 85 MG/DL (ref 74–99)
GLUCOSE BLDA-MCNC: 93 MG/DL (ref 74–99)
GLUCOSE SERPL-MCNC: 81 MG/DL (ref 74–99)
GLUCOSE SERPL-MCNC: 82 MG/DL (ref 74–99)
GLUCOSE SERPL-MCNC: 84 MG/DL (ref 74–99)
HCO3 BLDA-SCNC: 20 MMOL/L (ref 22–26)
HCO3 BLDA-SCNC: 20.1 MMOL/L (ref 22–26)
HCO3 BLDA-SCNC: 20.5 MMOL/L (ref 22–26)
HCO3 BLDA-SCNC: 21.4 MMOL/L (ref 22–26)
HCO3 BLDA-SCNC: 21.6 MMOL/L (ref 22–26)
HCT VFR BLD AUTO: 17.5 % (ref 41–52)
HCT VFR BLD AUTO: 17.8 % (ref 41–52)
HCT VFR BLD AUTO: 19.1 % (ref 41–52)
HCT VFR BLD AUTO: 19.5 % (ref 41–52)
HCT VFR BLD EST: 22 % (ref 41–52)
HCT VFR BLD EST: 23 % (ref 41–52)
HCT VFR BLD EST: 26 % (ref 41–52)
HCT VFR BLD EST: ABNORMAL %
HGB BLD-MCNC: 6.1 G/DL (ref 13.5–17.5)
HGB BLD-MCNC: 6.2 G/DL (ref 13.5–17.5)
HGB BLD-MCNC: 6.8 G/DL (ref 13.5–17.5)
HGB BLD-MCNC: 7.1 G/DL (ref 13.5–17.5)
HGB BLDA-MCNC: 7.3 G/DL (ref 13.5–17.5)
HGB BLDA-MCNC: 7.5 G/DL (ref 13.5–17.5)
HGB BLDA-MCNC: 8.5 G/DL (ref 13.5–17.5)
HGB BLDA-MCNC: <3 G/DL (ref 13.5–17.5)
IMM GRANULOCYTES # BLD AUTO: 0.13 X10*3/UL (ref 0–0.5)
IMM GRANULOCYTES NFR BLD AUTO: 0.8 % (ref 0–0.9)
INHALED O2 CONCENTRATION: 100 %
INHALED O2 CONCENTRATION: 36 %
INHALED O2 CONCENTRATION: 60 %
INHALED O2 CONCENTRATION: 60 %
INHALED O2 CONCENTRATION: 80 %
INR PPP: 1.1 (ref 0.9–1.1)
LACTATE BLDA-SCNC: 0.6 MMOL/L (ref 0.4–2)
LACTATE BLDA-SCNC: 0.9 MMOL/L (ref 0.4–2)
LACTATE BLDA-SCNC: 1 MMOL/L (ref 0.4–2)
LACTATE BLDA-SCNC: 1.6 MMOL/L (ref 0.4–2)
LYMPHOCYTES # BLD AUTO: 0.93 X10*3/UL (ref 0.8–3)
LYMPHOCYTES NFR BLD AUTO: 5.6 %
MAGNESIUM SERPL-MCNC: 2.14 MG/DL (ref 1.6–2.4)
MAGNESIUM SERPL-MCNC: 2.73 MG/DL (ref 1.6–2.4)
MCH RBC QN AUTO: 28.6 PG (ref 26–34)
MCH RBC QN AUTO: 29.5 PG (ref 26–34)
MCH RBC QN AUTO: 29.6 PG (ref 26–34)
MCH RBC QN AUTO: 29.6 PG (ref 26–34)
MCHC RBC AUTO-ENTMCNC: 34.3 G/DL (ref 32–36)
MCHC RBC AUTO-ENTMCNC: 35.4 G/DL (ref 32–36)
MCHC RBC AUTO-ENTMCNC: 35.6 G/DL (ref 32–36)
MCHC RBC AUTO-ENTMCNC: 36.4 G/DL (ref 32–36)
MCV RBC AUTO: 81 FL (ref 80–100)
MCV RBC AUTO: 83 FL (ref 80–100)
MCV RBC AUTO: 83 FL (ref 80–100)
MCV RBC AUTO: 84 FL (ref 80–100)
MONOCYTES # BLD AUTO: 1.74 X10*3/UL (ref 0.05–0.8)
MONOCYTES NFR BLD AUTO: 10.5 %
NEUTROPHILS # BLD AUTO: 13.81 X10*3/UL (ref 1.6–5.5)
NEUTROPHILS NFR BLD AUTO: 82.9 %
NRBC BLD-RTO: 0 /100 WBCS (ref 0–0)
OXYHGB MFR BLDA: 94 % (ref 94–98)
OXYHGB MFR BLDA: 95.4 % (ref 94–98)
OXYHGB MFR BLDA: 96 % (ref 94–98)
OXYHGB MFR BLDA: 96.9 % (ref 94–98)
OXYHGB MFR BLDA: ABNORMAL %
PCO2 BLDA: 34 MM HG (ref 38–42)
PCO2 BLDA: 37 MM HG (ref 38–42)
PCO2 BLDA: 40 MM HG (ref 38–42)
PCO2 BLDA: 49 MM HG (ref 38–42)
PCO2 BLDA: 50 MM HG (ref 38–42)
PH BLDA: 7.23 PH (ref 7.38–7.42)
PH BLDA: 7.24 PH (ref 7.38–7.42)
PH BLDA: 7.31 PH (ref 7.38–7.42)
PH BLDA: 7.34 PH (ref 7.38–7.42)
PH BLDA: 7.41 PH (ref 7.38–7.42)
PHOSPHATE SERPL-MCNC: 4.2 MG/DL (ref 2.5–4.9)
PHOSPHATE SERPL-MCNC: 5.3 MG/DL (ref 2.5–4.9)
PHOSPHATE SERPL-MCNC: 6.1 MG/DL (ref 2.5–4.9)
PLATELET # BLD AUTO: 306 X10*3/UL (ref 150–450)
PLATELET # BLD AUTO: 330 X10*3/UL (ref 150–450)
PLATELET # BLD AUTO: 338 X10*3/UL (ref 150–450)
PLATELET # BLD AUTO: 345 X10*3/UL (ref 150–450)
PO2 BLDA: 176 MM HG (ref 85–95)
PO2 BLDA: 78 MM HG (ref 85–95)
PO2 BLDA: 82 MM HG (ref 85–95)
PO2 BLDA: 92 MM HG (ref 85–95)
PO2 BLDA: 98 MM HG (ref 85–95)
POTASSIUM BLDA-SCNC: 4.5 MMOL/L (ref 3.5–5.3)
POTASSIUM BLDA-SCNC: 5.8 MMOL/L (ref 3.5–5.3)
POTASSIUM BLDA-SCNC: 5.9 MMOL/L (ref 3.5–5.3)
POTASSIUM BLDA-SCNC: 5.9 MMOL/L (ref 3.5–5.3)
POTASSIUM SERPL-SCNC: 4.8 MMOL/L (ref 3.5–5.3)
POTASSIUM SERPL-SCNC: 5.2 MMOL/L (ref 3.5–5.3)
POTASSIUM SERPL-SCNC: 5.6 MMOL/L (ref 3.5–5.3)
PRODUCT BLOOD TYPE: 5100
PRODUCT BLOOD TYPE: 5100
PRODUCT CODE: NORMAL
PRODUCT CODE: NORMAL
PROT SERPL-MCNC: 4.6 G/DL (ref 6.4–8.2)
PROTHROMBIN TIME: 12.5 SECONDS (ref 9.8–12.8)
RBC # BLD AUTO: 2.1 X10*6/UL (ref 4.5–5.9)
RBC # BLD AUTO: 2.13 X10*6/UL (ref 4.5–5.9)
RBC # BLD AUTO: 2.3 X10*6/UL (ref 4.5–5.9)
RBC # BLD AUTO: 2.4 X10*6/UL (ref 4.5–5.9)
RH FACTOR (ANTIGEN D): NORMAL
SAO2 % BLDA: 100 % (ref 94–100)
SAO2 % BLDA: 97 % (ref 94–100)
SAO2 % BLDA: 99 % (ref 94–100)
SAO2 % BLDA: 99 % (ref 94–100)
SAO2 % BLDA: ABNORMAL %
SODIUM BLDA-SCNC: 126 MMOL/L (ref 136–145)
SODIUM BLDA-SCNC: 126 MMOL/L (ref 136–145)
SODIUM BLDA-SCNC: 127 MMOL/L (ref 136–145)
SODIUM BLDA-SCNC: 131 MMOL/L (ref 136–145)
SODIUM SERPL-SCNC: 128 MMOL/L (ref 136–145)
SODIUM SERPL-SCNC: 129 MMOL/L (ref 136–145)
SODIUM SERPL-SCNC: 133 MMOL/L (ref 136–145)
UNIT ABO: NORMAL
UNIT ABO: NORMAL
UNIT NUMBER: NORMAL
UNIT NUMBER: NORMAL
UNIT RH: NORMAL
UNIT RH: NORMAL
UNIT VOLUME: 282
UNIT VOLUME: 350
WBC # BLD AUTO: 16.5 X10*3/UL (ref 4.4–11.3)
WBC # BLD AUTO: 16.7 X10*3/UL (ref 4.4–11.3)
WBC # BLD AUTO: 16.8 X10*3/UL (ref 4.4–11.3)
WBC # BLD AUTO: 18 X10*3/UL (ref 4.4–11.3)
XM INTEP: NORMAL
XM INTEP: NORMAL

## 2025-01-01 PROCEDURE — 83735 ASSAY OF MAGNESIUM: CPT

## 2025-01-01 PROCEDURE — 2500000005 HC RX 250 GENERAL PHARMACY W/O HCPCS

## 2025-01-01 PROCEDURE — 94002 VENT MGMT INPAT INIT DAY: CPT

## 2025-01-01 PROCEDURE — 85027 COMPLETE CBC AUTOMATED: CPT | Performed by: PHYSICIAN ASSISTANT

## 2025-01-01 PROCEDURE — 90937 HEMODIALYSIS REPEATED EVAL: CPT

## 2025-01-01 PROCEDURE — 99231 SBSQ HOSP IP/OBS SF/LOW 25: CPT

## 2025-01-01 PROCEDURE — 2500000004 HC RX 250 GENERAL PHARMACY W/ HCPCS (ALT 636 FOR OP/ED)

## 2025-01-01 PROCEDURE — 82330 ASSAY OF CALCIUM: CPT

## 2025-01-01 PROCEDURE — P9047 ALBUMIN (HUMAN), 25%, 50ML: HCPCS | Mod: JZ,TB | Performed by: STUDENT IN AN ORGANIZED HEALTH CARE EDUCATION/TRAINING PROGRAM

## 2025-01-01 PROCEDURE — 2500000004 HC RX 250 GENERAL PHARMACY W/ HCPCS (ALT 636 FOR OP/ED): Performed by: INTERNAL MEDICINE

## 2025-01-01 PROCEDURE — 84100 ASSAY OF PHOSPHORUS: CPT

## 2025-01-01 PROCEDURE — P9016 RBC LEUKOCYTES REDUCED: HCPCS

## 2025-01-01 PROCEDURE — 84295 ASSAY OF SERUM SODIUM: CPT

## 2025-01-01 PROCEDURE — 82947 ASSAY GLUCOSE BLOOD QUANT: CPT

## 2025-01-01 PROCEDURE — 71045 X-RAY EXAM CHEST 1 VIEW: CPT

## 2025-01-01 PROCEDURE — 2500000005 HC RX 250 GENERAL PHARMACY W/O HCPCS: Performed by: STUDENT IN AN ORGANIZED HEALTH CARE EDUCATION/TRAINING PROGRAM

## 2025-01-01 PROCEDURE — 94640 AIRWAY INHALATION TREATMENT: CPT

## 2025-01-01 PROCEDURE — 94667 MNPJ CHEST WALL 1ST: CPT

## 2025-01-01 PROCEDURE — 2500000001 HC RX 250 WO HCPCS SELF ADMINISTERED DRUGS (ALT 637 FOR MEDICARE OP): Performed by: STUDENT IN AN ORGANIZED HEALTH CARE EDUCATION/TRAINING PROGRAM

## 2025-01-01 PROCEDURE — 84075 ASSAY ALKALINE PHOSPHATASE: CPT

## 2025-01-01 PROCEDURE — 31720 CLEARANCE OF AIRWAYS: CPT

## 2025-01-01 PROCEDURE — 2500000004 HC RX 250 GENERAL PHARMACY W/ HCPCS (ALT 636 FOR OP/ED): Mod: JZ,TB | Performed by: STUDENT IN AN ORGANIZED HEALTH CARE EDUCATION/TRAINING PROGRAM

## 2025-01-01 PROCEDURE — 2500000004 HC RX 250 GENERAL PHARMACY W/ HCPCS (ALT 636 FOR OP/ED): Performed by: STUDENT IN AN ORGANIZED HEALTH CARE EDUCATION/TRAINING PROGRAM

## 2025-01-01 PROCEDURE — 2500000004 HC RX 250 GENERAL PHARMACY W/ HCPCS (ALT 636 FOR OP/ED): Performed by: PHYSICIAN ASSISTANT

## 2025-01-01 PROCEDURE — 85027 COMPLETE CBC AUTOMATED: CPT

## 2025-01-01 PROCEDURE — 99233 SBSQ HOSP IP/OBS HIGH 50: CPT

## 2025-01-01 PROCEDURE — 37799 UNLISTED PX VASCULAR SURGERY: CPT

## 2025-01-01 PROCEDURE — 71045 X-RAY EXAM CHEST 1 VIEW: CPT | Performed by: RADIOLOGY

## 2025-01-01 PROCEDURE — 37799 UNLISTED PX VASCULAR SURGERY: CPT | Performed by: PHYSICIAN ASSISTANT

## 2025-01-01 PROCEDURE — 84132 ASSAY OF SERUM POTASSIUM: CPT

## 2025-01-01 PROCEDURE — 82374 ASSAY BLOOD CARBON DIOXIDE: CPT

## 2025-01-01 PROCEDURE — 82805 BLOOD GASES W/O2 SATURATION: CPT | Performed by: INTERNAL MEDICINE

## 2025-01-01 PROCEDURE — 86901 BLOOD TYPING SEROLOGIC RH(D): CPT

## 2025-01-01 PROCEDURE — 85730 THROMBOPLASTIN TIME PARTIAL: CPT

## 2025-01-01 PROCEDURE — 82330 ASSAY OF CALCIUM: CPT | Performed by: PHYSICIAN ASSISTANT

## 2025-01-01 PROCEDURE — 2500000001 HC RX 250 WO HCPCS SELF ADMINISTERED DRUGS (ALT 637 FOR MEDICARE OP)

## 2025-01-01 PROCEDURE — 85025 COMPLETE CBC W/AUTO DIFF WBC: CPT

## 2025-01-01 PROCEDURE — 2020000001 HC ICU ROOM DAILY

## 2025-01-01 PROCEDURE — 86923 COMPATIBILITY TEST ELECTRIC: CPT

## 2025-01-01 PROCEDURE — 36430 TRANSFUSION BLD/BLD COMPNT: CPT

## 2025-01-01 PROCEDURE — 31500 INSERT EMERGENCY AIRWAY: CPT | Performed by: SURGERY

## 2025-01-01 PROCEDURE — 94660 CPAP INITIATION&MGMT: CPT

## 2025-01-01 PROCEDURE — 99291 CRITICAL CARE FIRST HOUR: CPT | Performed by: SURGERY

## 2025-01-01 PROCEDURE — P9040 RBC LEUKOREDUCED IRRADIATED: HCPCS

## 2025-01-01 RX ORDER — NALOXONE HYDROCHLORIDE 0.4 MG/ML
0.2 INJECTION, SOLUTION INTRAMUSCULAR; INTRAVENOUS; SUBCUTANEOUS EVERY 5 MIN PRN
Status: DISCONTINUED | OUTPATIENT
Start: 2025-01-01 | End: 2025-01-18 | Stop reason: HOSPADM

## 2025-01-01 RX ORDER — ROCURONIUM BROMIDE 10 MG/ML
1 INJECTION, SOLUTION INTRAVENOUS ONCE
Status: COMPLETED | OUTPATIENT
Start: 2025-01-01 | End: 2025-01-01

## 2025-01-01 RX ORDER — NOREPINEPHRINE BITARTRATE/D5W 8 MG/250ML
PLASTIC BAG, INJECTION (ML) INTRAVENOUS
Status: COMPLETED
Start: 2025-01-01 | End: 2025-01-01

## 2025-01-01 RX ORDER — ROCURONIUM BROMIDE 10 MG/ML
INJECTION, SOLUTION INTRAVENOUS
Status: COMPLETED
Start: 2025-01-01 | End: 2025-01-01

## 2025-01-01 RX ORDER — ETOMIDATE 2 MG/ML
INJECTION INTRAVENOUS
Status: COMPLETED
Start: 2025-01-01 | End: 2025-01-01

## 2025-01-01 RX ORDER — ETOMIDATE 2 MG/ML
INJECTION INTRAVENOUS
Status: DISPENSED
Start: 2025-01-01 | End: 2025-01-02

## 2025-01-01 RX ORDER — NOREPINEPHRINE BITARTRATE/D5W 8 MG/250ML
.01-1 PLASTIC BAG, INJECTION (ML) INTRAVENOUS CONTINUOUS
Status: DISCONTINUED | OUTPATIENT
Start: 2025-01-01 | End: 2025-01-06

## 2025-01-01 RX ORDER — ALBUMIN HUMAN 250 G/1000ML
50 SOLUTION INTRAVENOUS ONCE
Status: COMPLETED | OUTPATIENT
Start: 2025-01-01 | End: 2025-01-01

## 2025-01-01 RX ORDER — OXYCODONE HYDROCHLORIDE 5 MG/1
5 TABLET ORAL
Status: DISCONTINUED | OUTPATIENT
Start: 2025-01-01 | End: 2025-01-04

## 2025-01-01 RX ORDER — MAGNESIUM SULFATE HEPTAHYDRATE 40 MG/ML
2 INJECTION, SOLUTION INTRAVENOUS ONCE
Status: COMPLETED | OUTPATIENT
Start: 2025-01-01 | End: 2025-01-01

## 2025-01-01 RX ORDER — PHENYLEPHRINE HCL IN 0.9% NACL 0.4MG/10ML
160 SYRINGE (ML) INTRAVENOUS ONCE
Status: COMPLETED | OUTPATIENT
Start: 2025-01-01 | End: 2025-01-01

## 2025-01-01 RX ORDER — ETOMIDATE 2 MG/ML
0.3 INJECTION INTRAVENOUS ONCE
Status: COMPLETED | OUTPATIENT
Start: 2025-01-01 | End: 2025-01-01

## 2025-01-01 RX ORDER — HEPARIN SODIUM 1000 [USP'U]/ML
1000 INJECTION, SOLUTION INTRAVENOUS; SUBCUTANEOUS AS NEEDED
Status: DISCONTINUED | OUTPATIENT
Start: 2025-01-01 | End: 2025-01-14

## 2025-01-01 RX ORDER — PROPOFOL 10 MG/ML
0-50 INJECTION, EMULSION INTRAVENOUS CONTINUOUS
Status: DISCONTINUED | OUTPATIENT
Start: 2025-01-01 | End: 2025-01-02

## 2025-01-01 RX ORDER — PROPOFOL 10 MG/ML
INJECTION, EMULSION INTRAVENOUS
Status: COMPLETED
Start: 2025-01-01 | End: 2025-01-01

## 2025-01-01 RX ADMIN — ACETAMINOPHEN 1000 MG: 10 INJECTION, SOLUTION INTRAVENOUS at 16:21

## 2025-01-01 RX ADMIN — HEPARIN SODIUM 5000 UNITS: 5000 INJECTION INTRAVENOUS; SUBCUTANEOUS at 10:21

## 2025-01-01 RX ADMIN — NOREPINEPHRINE BITARTRATE 0.06 MCG/KG/MIN: 8 INJECTION, SOLUTION INTRAVENOUS at 19:58

## 2025-01-01 RX ADMIN — Medication 80 PERCENT: at 20:22

## 2025-01-01 RX ADMIN — METHOCARBAMOL TABLETS 500 MG: 500 TABLET, COATED ORAL at 12:46

## 2025-01-01 RX ADMIN — MAGNESIUM SULFATE HEPTAHYDRATE 2 G: 2 INJECTION, SOLUTION INTRAVENOUS at 16:45

## 2025-01-01 RX ADMIN — ALBUMIN HUMAN 50 G: 0.25 SOLUTION INTRAVENOUS at 19:42

## 2025-01-01 RX ADMIN — GABAPENTIN 200 MG: 100 CAPSULE ORAL at 22:18

## 2025-01-01 RX ADMIN — HEPARIN SODIUM 5000 UNITS: 5000 INJECTION INTRAVENOUS; SUBCUTANEOUS at 02:25

## 2025-01-01 RX ADMIN — HEPARIN SODIUM 5000 UNITS: 5000 INJECTION INTRAVENOUS; SUBCUTANEOUS at 17:47

## 2025-01-01 RX ADMIN — Medication 160 MCG: at 22:14

## 2025-01-01 RX ADMIN — PROPOFOL 10 MCG/KG/MIN: 10 INJECTION, EMULSION INTRAVENOUS at 22:16

## 2025-01-01 RX ADMIN — ACETAMINOPHEN 1000 MG: 10 INJECTION, SOLUTION INTRAVENOUS at 06:43

## 2025-01-01 RX ADMIN — METHOCARBAMOL TABLETS 500 MG: 500 TABLET, COATED ORAL at 17:47

## 2025-01-01 RX ADMIN — ROCURONIUM BROMIDE 92 MG: 10 INJECTION INTRAVENOUS at 22:13

## 2025-01-01 RX ADMIN — HYDROMORPHONE HYDROCHLORIDE 0.4 MG: 1 INJECTION, SOLUTION INTRAMUSCULAR; INTRAVENOUS; SUBCUTANEOUS at 17:47

## 2025-01-01 RX ADMIN — ROCURONIUM BROMIDE 92 MG: 10 INJECTION, SOLUTION INTRAVENOUS at 22:13

## 2025-01-01 RX ADMIN — ETOMIDATE 28 MG: 2 INJECTION INTRAVENOUS at 22:13

## 2025-01-01 RX ADMIN — Medication 60 L/MIN: at 19:52

## 2025-01-01 RX ADMIN — METHOCARBAMOL TABLETS 500 MG: 500 TABLET, COATED ORAL at 23:45

## 2025-01-01 RX ADMIN — HYDROMORPHONE HYDROCHLORIDE 0.4 MG: 1 INJECTION, SOLUTION INTRAMUSCULAR; INTRAVENOUS; SUBCUTANEOUS at 04:45

## 2025-01-01 RX ADMIN — LEUCINE, PHENYLALANINE, LYSINE HYDROCHLORIDE, METHIONINE, ISOLEUCINE, VALINE, HISTIDINE, THREONINE, TRYPTOPHAN, ALANINE, GLYCINE, ARGININE, PROLINE, TYROSINE, SERINE 25 G: 730; 560; 580; 400; 600; 580; 480; 420; 180; 2.07; 1.03; 1.15; 680; 40; 5 INJECTION INTRAVENOUS at 08:03

## 2025-01-01 RX ADMIN — GABAPENTIN 200 MG: 100 CAPSULE ORAL at 08:03

## 2025-01-01 RX ADMIN — CALCIUM CHLORIDE, MAGNESIUM CHLORIDE, DEXTROSE MONOHYDRATE, LACTIC ACID, SODIUM CHLORIDE, SODIUM BICARBONATE AND POTASSIUM CHLORIDE 26 ML/KG/HR: 5.15; 2.03; 22; 5.4; 6.46; 3.09; .157 INJECTION INTRAVENOUS at 23:45

## 2025-01-01 RX ADMIN — HYDROMORPHONE HYDROCHLORIDE 0.4 MG: 1 INJECTION, SOLUTION INTRAMUSCULAR; INTRAVENOUS; SUBCUTANEOUS at 12:46

## 2025-01-01 RX ADMIN — ACETAMINOPHEN 1000 MG: 10 INJECTION, SOLUTION INTRAVENOUS at 23:45

## 2025-01-01 RX ADMIN — NALOXONE HYDROCHLORIDE 0.2 MG: 0.4 INJECTION, SOLUTION INTRAMUSCULAR; INTRAVENOUS; SUBCUTANEOUS at 19:42

## 2025-01-01 RX ADMIN — ASCORBIC ACID, VITAMIN A PALMITATE, CHOLECALCIFEROL, THIAMINE HYDROCHLORIDE, RIBOFLAVIN-5 PHOSPHATE SODIUM, PYRIDOXINE HYDROCHLORIDE, NIACINAMIDE, DEXPANTHENOL, ALPHA-TOCOPHEROL ACETATE, VITAMIN K1, FOLIC ACID, BIOTIN, CYANOCOBALAMIN: 200; 3300; 200; 6; 3.6; 6; 40; 15; 10; 150; 600; 60; 5 INJECTION, SOLUTION INTRAVENOUS at 20:12

## 2025-01-01 RX ADMIN — MIDODRINE HYDROCHLORIDE 5 MG: 2.5 TABLET ORAL at 06:43

## 2025-01-01 RX ADMIN — METHOCARBAMOL TABLETS 500 MG: 500 TABLET, COATED ORAL at 06:43

## 2025-01-01 ASSESSMENT — PAIN - FUNCTIONAL ASSESSMENT
PAIN_FUNCTIONAL_ASSESSMENT: CPOT (CRITICAL CARE PAIN OBSERVATION TOOL)
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
## 2025-01-01 NOTE — PROGRESS NOTES
[facility]  TRAUMA ICU - ATTENDING PROGRESS NOTE    Patient Name: Ike Gar  MRN: [id number]  : 1945  AGE: 79 y.o.   GENDER: male  ==============================================================================    2025  11:47 AM    I evaluated and examined the patient with the critical care team. As a multidisciplinary team, we discussed all findings, as well as assessment and plan. I personally spent 35 minutes of critical care time excluding procedures at the bedside with the patient. I personally reviewed all labs, results and studies. My independent note with assessment and plan are below:    Neurologic:        Analgesia: Tylenol, dilaudid, oxycodone, robaxin       Sedation: none  HEENT: none  Cardiovascular:        Blunt cardiac injury, resolved       Undifferentiated shock, resolved  Pulmonary:        Multiple rib fx (L 1,3, 8,9, 11, 12) (R 6, 7,9), Sternal fx  Nerve blocks   Right hemothorax, pigtail, WS  Left hemothorax, pigtail, WS  Daily CXR       Oxygen requirement: Extubated ; 4L NC  Gastrointestinal:        Grade 2 liver laceration       Elevated bilirubin, mixed, history of gilberts       Small bowel injury:   : Ex-lap, TENNILLE, SBR X2, GRACIELA x3, discontinuity, ABTHERA  : Ex-lap, washout, SBR, ileocecetomy, ABTHERA  : Ex-lap, washout, ABTHERA  : partial omentectomy, ABTHERA  : partial hepatectomy, end jejunostomy with mucous fistula creation, fascial closure       Dysphagia, failed MBS, recheck weekly       Diet: TPN; Trickle TF via NG       GI prophylaxis: none  Renal/:        Acute renal failure, anuric, CVVH to SLED       Clark catheter: no clark, daily bladder scan  Hematologic:       Central line: HD line (), central line for TPN       Arterial line: yes, maintain       DVT prophylaxis: SCD's; heparin  Musculoskeletal:       T5 vertebral body fracture, L4/L5 superior endplate fracture   Non-op   TLSO       ICU  Mobility Score: 3       Skin breakdown: none       Restraints: none  Endocrine:       MARLYS       Glucose control <180, POC glucose checks and insulin sliding scale  ID:       Completed empiric vancomycin, meropenem; trend leukocytosis       Antibiotics: none    Disposition: The patient remains remains critically ill.    Willi Murillo MD
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
## 2025-01-02 ENCOUNTER — APPOINTMENT (OUTPATIENT)
Dept: RADIOLOGY | Facility: HOSPITAL | Age: 80
End: 2025-01-02
Payer: MEDICARE

## 2025-01-02 LAB
ALBUMIN SERPL BCP-MCNC: 2.2 G/DL (ref 3.4–5)
ALBUMIN SERPL BCP-MCNC: 2.3 G/DL (ref 3.4–5)
ALBUMIN SERPL BCP-MCNC: 2.4 G/DL (ref 3.4–5)
ALP SERPL-CCNC: 145 U/L (ref 33–136)
ALT SERPL W P-5'-P-CCNC: 73 U/L (ref 10–52)
ANION GAP BLDA CALCULATED.4IONS-SCNC: 10 MMO/L (ref 10–25)
ANION GAP BLDA CALCULATED.4IONS-SCNC: 10 MMO/L (ref 10–25)
ANION GAP BLDA CALCULATED.4IONS-SCNC: 11 MMO/L (ref 10–25)
ANION GAP BLDA CALCULATED.4IONS-SCNC: 8 MMO/L (ref 10–25)
ANION GAP BLDA CALCULATED.4IONS-SCNC: 9 MMO/L (ref 10–25)
ANION GAP BLDA CALCULATED.4IONS-SCNC: 9 MMO/L (ref 10–25)
ANION GAP SERPL CALC-SCNC: 12 MMOL/L (ref 10–20)
ANION GAP SERPL CALC-SCNC: 14 MMOL/L (ref 10–20)
ANION GAP SERPL CALC-SCNC: 15 MMOL/L (ref 10–20)
ANION GAP SERPL CALC-SCNC: 15 MMOL/L (ref 10–20)
ANION GAP SERPL CALC-SCNC: 16 MMOL/L (ref 10–20)
APTT PPP: 26 SECONDS (ref 27–38)
AST SERPL W P-5'-P-CCNC: 96 U/L (ref 9–39)
BACTERIA BLD CULT: NORMAL
BACTERIA BLD CULT: NORMAL
BASE EXCESS BLDA CALC-SCNC: -2.6 MMOL/L (ref -2–3)
BASE EXCESS BLDA CALC-SCNC: -3.2 MMOL/L (ref -2–3)
BASE EXCESS BLDA CALC-SCNC: -3.6 MMOL/L (ref -2–3)
BASE EXCESS BLDA CALC-SCNC: -3.8 MMOL/L (ref -2–3)
BASE EXCESS BLDA CALC-SCNC: -4.1 MMOL/L (ref -2–3)
BASE EXCESS BLDA CALC-SCNC: -5.2 MMOL/L (ref -2–3)
BASOPHILS # BLD AUTO: 0.04 X10*3/UL (ref 0–0.1)
BASOPHILS NFR BLD AUTO: 0.3 %
BILIRUB DIRECT SERPL-MCNC: 4.6 MG/DL (ref 0–0.3)
BILIRUB SERPL-MCNC: 7.1 MG/DL (ref 0–1.2)
BODY TEMPERATURE: 37 DEGREES CELSIUS
BUN SERPL-MCNC: 66 MG/DL (ref 6–23)
BUN SERPL-MCNC: 66 MG/DL (ref 6–23)
BUN SERPL-MCNC: 76 MG/DL (ref 6–23)
BUN SERPL-MCNC: 85 MG/DL (ref 6–23)
BUN SERPL-MCNC: 85 MG/DL (ref 6–23)
CA-I BLD-SCNC: 1.12 MMOL/L (ref 1.1–1.33)
CA-I BLD-SCNC: 1.23 MMOL/L (ref 1.1–1.33)
CA-I BLD-SCNC: 1.23 MMOL/L (ref 1.1–1.33)
CA-I BLDA-SCNC: 1.15 MMOL/L (ref 1.1–1.33)
CA-I BLDA-SCNC: 1.19 MMOL/L (ref 1.1–1.33)
CA-I BLDA-SCNC: 1.2 MMOL/L (ref 1.1–1.33)
CA-I BLDA-SCNC: 1.22 MMOL/L (ref 1.1–1.33)
CA-I BLDA-SCNC: 1.24 MMOL/L (ref 1.1–1.33)
CA-I BLDA-SCNC: 1.28 MMOL/L (ref 1.1–1.33)
CALCIUM SERPL-MCNC: 7.7 MG/DL (ref 8.6–10.6)
CALCIUM SERPL-MCNC: 8 MG/DL (ref 8.6–10.6)
CALCIUM SERPL-MCNC: 8 MG/DL (ref 8.6–10.6)
CHLORIDE BLDA-SCNC: 100 MMOL/L (ref 98–107)
CHLORIDE BLDA-SCNC: 100 MMOL/L (ref 98–107)
CHLORIDE BLDA-SCNC: 101 MMOL/L (ref 98–107)
CHLORIDE SERPL-SCNC: 100 MMOL/L (ref 98–107)
CHLORIDE SERPL-SCNC: 98 MMOL/L (ref 98–107)
CHLORIDE SERPL-SCNC: 99 MMOL/L (ref 98–107)
CO2 SERPL-SCNC: 21 MMOL/L (ref 21–32)
CO2 SERPL-SCNC: 21 MMOL/L (ref 21–32)
CO2 SERPL-SCNC: 22 MMOL/L (ref 21–32)
CO2 SERPL-SCNC: 24 MMOL/L (ref 21–32)
CO2 SERPL-SCNC: 24 MMOL/L (ref 21–32)
CREAT SERPL-MCNC: 2.98 MG/DL (ref 0.5–1.3)
CREAT SERPL-MCNC: 2.99 MG/DL (ref 0.5–1.3)
CREAT SERPL-MCNC: 3.42 MG/DL (ref 0.5–1.3)
CREAT SERPL-MCNC: 3.9 MG/DL (ref 0.5–1.3)
CREAT SERPL-MCNC: 3.9 MG/DL (ref 0.5–1.3)
EGFRCR SERPLBLD CKD-EPI 2021: 15 ML/MIN/1.73M*2
EGFRCR SERPLBLD CKD-EPI 2021: 15 ML/MIN/1.73M*2
EGFRCR SERPLBLD CKD-EPI 2021: 18 ML/MIN/1.73M*2
EGFRCR SERPLBLD CKD-EPI 2021: 21 ML/MIN/1.73M*2
EGFRCR SERPLBLD CKD-EPI 2021: 21 ML/MIN/1.73M*2
EOSINOPHIL # BLD AUTO: 0.03 X10*3/UL (ref 0–0.4)
EOSINOPHIL NFR BLD AUTO: 0.2 %
ERYTHROCYTE [DISTWIDTH] IN BLOOD BY AUTOMATED COUNT: 16.9 % (ref 11.5–14.5)
ERYTHROCYTE [DISTWIDTH] IN BLOOD BY AUTOMATED COUNT: 17.5 % (ref 11.5–14.5)
ERYTHROCYTE [DISTWIDTH] IN BLOOD BY AUTOMATED COUNT: 17.6 % (ref 11.5–14.5)
GLUCOSE BLD MANUAL STRIP-MCNC: 101 MG/DL (ref 74–99)
GLUCOSE BLD MANUAL STRIP-MCNC: 75 MG/DL (ref 74–99)
GLUCOSE BLD MANUAL STRIP-MCNC: 78 MG/DL (ref 74–99)
GLUCOSE BLD MANUAL STRIP-MCNC: 82 MG/DL (ref 74–99)
GLUCOSE BLDA-MCNC: 80 MG/DL (ref 74–99)
GLUCOSE BLDA-MCNC: 83 MG/DL (ref 74–99)
GLUCOSE BLDA-MCNC: 87 MG/DL (ref 74–99)
GLUCOSE BLDA-MCNC: 87 MG/DL (ref 74–99)
GLUCOSE BLDA-MCNC: 91 MG/DL (ref 74–99)
GLUCOSE BLDA-MCNC: 92 MG/DL (ref 74–99)
GLUCOSE SERPL-MCNC: 74 MG/DL (ref 74–99)
GLUCOSE SERPL-MCNC: 83 MG/DL (ref 74–99)
GLUCOSE SERPL-MCNC: 93 MG/DL (ref 74–99)
HAPTOGLOB SERPL NEPH-MCNC: 245 MG/DL (ref 30–200)
HCO3 BLDA-SCNC: 19.9 MMOL/L (ref 22–26)
HCO3 BLDA-SCNC: 21.5 MMOL/L (ref 22–26)
HCO3 BLDA-SCNC: 22 MMOL/L (ref 22–26)
HCO3 BLDA-SCNC: 22.1 MMOL/L (ref 22–26)
HCO3 BLDA-SCNC: 22.1 MMOL/L (ref 22–26)
HCO3 BLDA-SCNC: 22.6 MMOL/L (ref 22–26)
HCT VFR BLD AUTO: 18.9 % (ref 41–52)
HCT VFR BLD AUTO: 22.4 % (ref 41–52)
HCT VFR BLD AUTO: 22.8 % (ref 41–52)
HCT VFR BLD EST: 22 % (ref 41–52)
HCT VFR BLD EST: 24 % (ref 41–52)
HCT VFR BLD EST: 26 % (ref 41–52)
HCT VFR BLD EST: 29 % (ref 41–52)
HGB BLD-MCNC: 6.8 G/DL (ref 13.5–17.5)
HGB BLD-MCNC: 7.8 G/DL (ref 13.5–17.5)
HGB BLD-MCNC: 7.9 G/DL (ref 13.5–17.5)
HGB BLDA-MCNC: 7.3 G/DL (ref 13.5–17.5)
HGB BLDA-MCNC: 8 G/DL (ref 13.5–17.5)
HGB BLDA-MCNC: 8.5 G/DL (ref 13.5–17.5)
HGB BLDA-MCNC: 8.5 G/DL (ref 13.5–17.5)
HGB BLDA-MCNC: 8.6 G/DL (ref 13.5–17.5)
HGB BLDA-MCNC: 9.5 G/DL (ref 13.5–17.5)
IMM GRANULOCYTES # BLD AUTO: 0.12 X10*3/UL (ref 0–0.5)
IMM GRANULOCYTES NFR BLD AUTO: 0.9 % (ref 0–0.9)
INHALED O2 CONCENTRATION: 50 %
INHALED O2 CONCENTRATION: 60 %
INHALED O2 CONCENTRATION: 70 %
INHALED O2 CONCENTRATION: 80 %
INR PPP: 1.1 (ref 0.9–1.1)
LACTATE BLDA-SCNC: 0.9 MMOL/L (ref 0.4–2)
LACTATE BLDA-SCNC: 0.9 MMOL/L (ref 0.4–2)
LACTATE BLDA-SCNC: 1 MMOL/L (ref 0.4–2)
LACTATE BLDA-SCNC: 1.1 MMOL/L (ref 0.4–2)
LACTATE BLDA-SCNC: 1.1 MMOL/L (ref 0.4–2)
LACTATE BLDA-SCNC: 1.2 MMOL/L (ref 0.4–2)
LDH SERPL L TO P-CCNC: 179 U/L (ref 84–246)
LYMPHOCYTES # BLD AUTO: 1.11 X10*3/UL (ref 0.8–3)
LYMPHOCYTES NFR BLD AUTO: 8.4 %
MAGNESIUM SERPL-MCNC: 2.25 MG/DL (ref 1.6–2.4)
MAGNESIUM SERPL-MCNC: 2.58 MG/DL (ref 1.6–2.4)
MCH RBC QN AUTO: 29.3 PG (ref 26–34)
MCH RBC QN AUTO: 29.6 PG (ref 26–34)
MCH RBC QN AUTO: 29.7 PG (ref 26–34)
MCHC RBC AUTO-ENTMCNC: 34.2 G/DL (ref 32–36)
MCHC RBC AUTO-ENTMCNC: 35.3 G/DL (ref 32–36)
MCHC RBC AUTO-ENTMCNC: 36 G/DL (ref 32–36)
MCV RBC AUTO: 82 FL (ref 80–100)
MCV RBC AUTO: 84 FL (ref 80–100)
MCV RBC AUTO: 86 FL (ref 80–100)
MONOCYTES # BLD AUTO: 1.68 X10*3/UL (ref 0.05–0.8)
MONOCYTES NFR BLD AUTO: 12.7 %
NEUTROPHILS # BLD AUTO: 10.28 X10*3/UL (ref 1.6–5.5)
NEUTROPHILS NFR BLD AUTO: 77.5 %
NRBC BLD-RTO: 0 /100 WBCS (ref 0–0)
OXYHGB MFR BLDA: 92.1 % (ref 94–98)
OXYHGB MFR BLDA: 94.4 % (ref 94–98)
OXYHGB MFR BLDA: 96.4 % (ref 94–98)
OXYHGB MFR BLDA: 97 % (ref 94–98)
OXYHGB MFR BLDA: 97.1 % (ref 94–98)
OXYHGB MFR BLDA: 97.7 % (ref 94–98)
PCO2 BLDA: 36 MM HG (ref 38–42)
PCO2 BLDA: 39 MM HG (ref 38–42)
PCO2 BLDA: 39 MM HG (ref 38–42)
PCO2 BLDA: 40 MM HG (ref 38–42)
PCO2 BLDA: 42 MM HG (ref 38–42)
PCO2 BLDA: 45 MM HG (ref 38–42)
PH BLDA: 7.3 PH (ref 7.38–7.42)
PH BLDA: 7.33 PH (ref 7.38–7.42)
PH BLDA: 7.35 PH (ref 7.38–7.42)
PH BLDA: 7.35 PH (ref 7.38–7.42)
PH BLDA: 7.36 PH (ref 7.38–7.42)
PH BLDA: 7.36 PH (ref 7.38–7.42)
PHOSPHATE SERPL-MCNC: 4.2 MG/DL (ref 2.5–4.9)
PHOSPHATE SERPL-MCNC: 4.3 MG/DL (ref 2.5–4.9)
PHOSPHATE SERPL-MCNC: 5.1 MG/DL (ref 2.5–4.9)
PHOSPHATE SERPL-MCNC: 5.7 MG/DL (ref 2.5–4.9)
PLATELET # BLD AUTO: 254 X10*3/UL (ref 150–450)
PLATELET # BLD AUTO: 285 X10*3/UL (ref 150–450)
PLATELET # BLD AUTO: 313 X10*3/UL (ref 150–450)
PO2 BLDA: 101 MM HG (ref 85–95)
PO2 BLDA: 111 MM HG (ref 85–95)
PO2 BLDA: 150 MM HG (ref 85–95)
PO2 BLDA: 66 MM HG (ref 85–95)
PO2 BLDA: 75 MM HG (ref 85–95)
PO2 BLDA: 85 MM HG (ref 85–95)
POTASSIUM BLDA-SCNC: 4.7 MMOL/L (ref 3.5–5.3)
POTASSIUM BLDA-SCNC: 4.7 MMOL/L (ref 3.5–5.3)
POTASSIUM BLDA-SCNC: 4.8 MMOL/L (ref 3.5–5.3)
POTASSIUM BLDA-SCNC: 4.9 MMOL/L (ref 3.5–5.3)
POTASSIUM BLDA-SCNC: 5 MMOL/L (ref 3.5–5.3)
POTASSIUM BLDA-SCNC: 5.6 MMOL/L (ref 3.5–5.3)
POTASSIUM SERPL-SCNC: 4.6 MMOL/L (ref 3.5–5.3)
POTASSIUM SERPL-SCNC: 4.8 MMOL/L (ref 3.5–5.3)
POTASSIUM SERPL-SCNC: 5 MMOL/L (ref 3.5–5.3)
POTASSIUM SERPL-SCNC: 5.4 MMOL/L (ref 3.5–5.3)
POTASSIUM SERPL-SCNC: 5.4 MMOL/L (ref 3.5–5.3)
PROT SERPL-MCNC: 5.1 G/DL (ref 6.4–8.2)
PROTHROMBIN TIME: 12.4 SECONDS (ref 9.8–12.8)
RBC # BLD AUTO: 2.3 X10*6/UL (ref 4.5–5.9)
RBC # BLD AUTO: 2.66 X10*6/UL (ref 4.5–5.9)
RBC # BLD AUTO: 2.66 X10*6/UL (ref 4.5–5.9)
SAO2 % BLDA: 100 % (ref 94–100)
SAO2 % BLDA: 100 % (ref 94–100)
SAO2 % BLDA: 95 % (ref 94–100)
SAO2 % BLDA: 97 % (ref 94–100)
SAO2 % BLDA: 99 % (ref 94–100)
SAO2 % BLDA: 99 % (ref 94–100)
SODIUM BLDA-SCNC: 125 MMOL/L (ref 136–145)
SODIUM BLDA-SCNC: 127 MMOL/L (ref 136–145)
SODIUM BLDA-SCNC: 128 MMOL/L (ref 136–145)
SODIUM SERPL-SCNC: 129 MMOL/L (ref 136–145)
SODIUM SERPL-SCNC: 129 MMOL/L (ref 136–145)
SODIUM SERPL-SCNC: 131 MMOL/L (ref 136–145)
SODIUM SERPL-SCNC: 131 MMOL/L (ref 136–145)
SODIUM SERPL-SCNC: 132 MMOL/L (ref 136–145)
WBC # BLD AUTO: 13.3 X10*3/UL (ref 4.4–11.3)
WBC # BLD AUTO: 13.7 X10*3/UL (ref 4.4–11.3)
WBC # BLD AUTO: 15.5 X10*3/UL (ref 4.4–11.3)

## 2025-01-02 PROCEDURE — 2500000001 HC RX 250 WO HCPCS SELF ADMINISTERED DRUGS (ALT 637 FOR MEDICARE OP)

## 2025-01-02 PROCEDURE — 2020000001 HC ICU ROOM DAILY

## 2025-01-02 PROCEDURE — 94003 VENT MGMT INPAT SUBQ DAY: CPT

## 2025-01-02 PROCEDURE — 80069 RENAL FUNCTION PANEL: CPT | Mod: CCI

## 2025-01-02 PROCEDURE — 2500000004 HC RX 250 GENERAL PHARMACY W/ HCPCS (ALT 636 FOR OP/ED): Mod: JZ,TB

## 2025-01-02 PROCEDURE — 2500000004 HC RX 250 GENERAL PHARMACY W/ HCPCS (ALT 636 FOR OP/ED): Performed by: STUDENT IN AN ORGANIZED HEALTH CARE EDUCATION/TRAINING PROGRAM

## 2025-01-02 PROCEDURE — 02HV33Z INSERTION OF INFUSION DEVICE INTO SUPERIOR VENA CAVA, PERCUTANEOUS APPROACH: ICD-10-PCS | Performed by: STUDENT IN AN ORGANIZED HEALTH CARE EDUCATION/TRAINING PROGRAM

## 2025-01-02 PROCEDURE — 85018 HEMOGLOBIN: CPT

## 2025-01-02 PROCEDURE — 84100 ASSAY OF PHOSPHORUS: CPT | Performed by: INTERNAL MEDICINE

## 2025-01-02 PROCEDURE — 82330 ASSAY OF CALCIUM: CPT | Performed by: PHYSICIAN ASSISTANT

## 2025-01-02 PROCEDURE — 2500000005 HC RX 250 GENERAL PHARMACY W/O HCPCS: Performed by: STUDENT IN AN ORGANIZED HEALTH CARE EDUCATION/TRAINING PROGRAM

## 2025-01-02 PROCEDURE — 82330 ASSAY OF CALCIUM: CPT | Performed by: SURGERY

## 2025-01-02 PROCEDURE — 2500000005 HC RX 250 GENERAL PHARMACY W/O HCPCS

## 2025-01-02 PROCEDURE — 71045 X-RAY EXAM CHEST 1 VIEW: CPT

## 2025-01-02 PROCEDURE — 99231 SBSQ HOSP IP/OBS SF/LOW 25: CPT | Performed by: STUDENT IN AN ORGANIZED HEALTH CARE EDUCATION/TRAINING PROGRAM

## 2025-01-02 PROCEDURE — 83010 ASSAY OF HAPTOGLOBIN QUANT: CPT | Performed by: STUDENT IN AN ORGANIZED HEALTH CARE EDUCATION/TRAINING PROGRAM

## 2025-01-02 PROCEDURE — 84100 ASSAY OF PHOSPHORUS: CPT | Performed by: SURGERY

## 2025-01-02 PROCEDURE — 36430 TRANSFUSION BLD/BLD COMPNT: CPT

## 2025-01-02 PROCEDURE — 80069 RENAL FUNCTION PANEL: CPT | Mod: CCI | Performed by: INTERNAL MEDICINE

## 2025-01-02 PROCEDURE — 37799 UNLISTED PX VASCULAR SURGERY: CPT

## 2025-01-02 PROCEDURE — 85025 COMPLETE CBC W/AUTO DIFF WBC: CPT

## 2025-01-02 PROCEDURE — 71045 X-RAY EXAM CHEST 1 VIEW: CPT | Performed by: RADIOLOGY

## 2025-01-02 PROCEDURE — 99291 CRITICAL CARE FIRST HOUR: CPT | Performed by: SURGERY

## 2025-01-02 PROCEDURE — 83735 ASSAY OF MAGNESIUM: CPT

## 2025-01-02 PROCEDURE — 36556 INSERT NON-TUNNEL CV CATH: CPT | Performed by: STUDENT IN AN ORGANIZED HEALTH CARE EDUCATION/TRAINING PROGRAM

## 2025-01-02 PROCEDURE — 82248 BILIRUBIN DIRECT: CPT | Performed by: INTERNAL MEDICINE

## 2025-01-02 PROCEDURE — 90945 DIALYSIS ONE EVALUATION: CPT | Performed by: INTERNAL MEDICINE

## 2025-01-02 PROCEDURE — 99232 SBSQ HOSP IP/OBS MODERATE 35: CPT | Performed by: SURGERY

## 2025-01-02 PROCEDURE — 90937 HEMODIALYSIS REPEATED EVAL: CPT

## 2025-01-02 PROCEDURE — P9016 RBC LEUKOCYTES REDUCED: HCPCS

## 2025-01-02 PROCEDURE — 80047 BASIC METABLC PNL IONIZED CA: CPT | Performed by: INTERNAL MEDICINE

## 2025-01-02 PROCEDURE — 2500000004 HC RX 250 GENERAL PHARMACY W/ HCPCS (ALT 636 FOR OP/ED): Performed by: INTERNAL MEDICINE

## 2025-01-02 PROCEDURE — 36558 INSERT TUNNELED CV CATH: CPT | Performed by: SURGERY

## 2025-01-02 PROCEDURE — 82947 ASSAY GLUCOSE BLOOD QUANT: CPT

## 2025-01-02 PROCEDURE — 85027 COMPLETE CBC AUTOMATED: CPT | Performed by: PHYSICIAN ASSISTANT

## 2025-01-02 PROCEDURE — 37799 UNLISTED PX VASCULAR SURGERY: CPT | Performed by: INTERNAL MEDICINE

## 2025-01-02 PROCEDURE — 84295 ASSAY OF SERUM SODIUM: CPT | Performed by: SURGERY

## 2025-01-02 PROCEDURE — 83735 ASSAY OF MAGNESIUM: CPT | Performed by: INTERNAL MEDICINE

## 2025-01-02 PROCEDURE — 83615 LACTATE (LD) (LDH) ENZYME: CPT | Performed by: STUDENT IN AN ORGANIZED HEALTH CARE EDUCATION/TRAINING PROGRAM

## 2025-01-02 PROCEDURE — 84520 ASSAY OF UREA NITROGEN: CPT | Performed by: SURGERY

## 2025-01-02 PROCEDURE — 82435 ASSAY OF BLOOD CHLORIDE: CPT

## 2025-01-02 PROCEDURE — 85730 THROMBOPLASTIN TIME PARTIAL: CPT

## 2025-01-02 RX ORDER — DEXMEDETOMIDINE HYDROCHLORIDE 4 UG/ML
0-1.5 INJECTION, SOLUTION INTRAVENOUS CONTINUOUS
Status: DISCONTINUED | OUTPATIENT
Start: 2025-01-02 | End: 2025-01-07

## 2025-01-02 RX ORDER — FAMOTIDINE 10 MG/ML
20 INJECTION INTRAVENOUS
Status: DISCONTINUED | OUTPATIENT
Start: 2025-01-02 | End: 2025-01-03

## 2025-01-02 RX ADMIN — HEPARIN SODIUM 5000 UNITS: 5000 INJECTION INTRAVENOUS; SUBCUTANEOUS at 03:30

## 2025-01-02 RX ADMIN — CALCIUM CHLORIDE, MAGNESIUM CHLORIDE, DEXTROSE MONOHYDRATE, LACTIC ACID, SODIUM CHLORIDE, SODIUM BICARBONATE AND POTASSIUM CHLORIDE 26 ML/KG/HR: 5.15; 2.03; 22; 5.4; 6.46; 3.09; .157 INJECTION INTRAVENOUS at 20:34

## 2025-01-02 RX ADMIN — ASCORBIC ACID, VITAMIN A PALMITATE, CHOLECALCIFEROL, THIAMINE HYDROCHLORIDE, RIBOFLAVIN-5 PHOSPHATE SODIUM, PYRIDOXINE HYDROCHLORIDE, NIACINAMIDE, DEXPANTHENOL, ALPHA-TOCOPHEROL ACETATE, VITAMIN K1, FOLIC ACID, BIOTIN, CYANOCOBALAMIN: 200; 3300; 200; 6; 3.6; 6; 40; 15; 10; 150; 600; 60; 5 INJECTION, SOLUTION INTRAVENOUS at 20:05

## 2025-01-02 RX ADMIN — Medication 50 PERCENT: at 20:02

## 2025-01-02 RX ADMIN — HEPARIN SODIUM 5000 UNITS: 5000 INJECTION INTRAVENOUS; SUBCUTANEOUS at 18:06

## 2025-01-02 RX ADMIN — METHOCARBAMOL TABLETS 500 MG: 500 TABLET, COATED ORAL at 06:17

## 2025-01-02 RX ADMIN — ACETAMINOPHEN 1000 MG: 10 INJECTION, SOLUTION INTRAVENOUS at 06:17

## 2025-01-02 RX ADMIN — DEXMEDETOMIDINE HYDROCHLORIDE 0.2 MCG/KG/HR: 400 INJECTION INTRAVENOUS at 08:45

## 2025-01-02 RX ADMIN — HEPARIN SODIUM 5000 UNITS: 5000 INJECTION INTRAVENOUS; SUBCUTANEOUS at 09:46

## 2025-01-02 RX ADMIN — CALCIUM CHLORIDE, MAGNESIUM CHLORIDE, DEXTROSE MONOHYDRATE, LACTIC ACID, SODIUM CHLORIDE, SODIUM BICARBONATE AND POTASSIUM CHLORIDE 26 ML/KG/HR: 5.15; 2.03; 22; 5.4; 6.46; 3.09; .157 INJECTION INTRAVENOUS at 12:05

## 2025-01-02 RX ADMIN — FAMOTIDINE 20 MG: 10 INJECTION INTRAVENOUS at 08:45

## 2025-01-02 RX ADMIN — GABAPENTIN 200 MG: 100 CAPSULE ORAL at 08:45

## 2025-01-02 RX ADMIN — DEXMEDETOMIDINE HYDROCHLORIDE 0.2 MCG/KG/HR: 400 INJECTION INTRAVENOUS at 18:07

## 2025-01-02 RX ADMIN — CALCIUM CHLORIDE, MAGNESIUM CHLORIDE, DEXTROSE MONOHYDRATE, LACTIC ACID, SODIUM CHLORIDE, SODIUM BICARBONATE AND POTASSIUM CHLORIDE 26 ML/KG/HR: 5.15; 2.03; 22; 5.4; 6.46; 3.09; .157 INJECTION INTRAVENOUS at 20:38

## 2025-01-02 RX ADMIN — GABAPENTIN 200 MG: 100 CAPSULE ORAL at 20:05

## 2025-01-02 RX ADMIN — NOREPINEPHRINE BITARTRATE 0.04 MCG/KG/MIN: 8 INJECTION, SOLUTION INTRAVENOUS at 18:07

## 2025-01-02 RX ADMIN — METHOCARBAMOL TABLETS 500 MG: 500 TABLET, COATED ORAL at 12:08

## 2025-01-02 RX ADMIN — CALCIUM CHLORIDE, MAGNESIUM CHLORIDE, DEXTROSE MONOHYDRATE, LACTIC ACID, SODIUM CHLORIDE, SODIUM BICARBONATE AND POTASSIUM CHLORIDE 26 ML/KG/HR: 5.15; 2.03; 22; 5.4; 6.46; 3.09; .157 INJECTION INTRAVENOUS at 20:37

## 2025-01-02 RX ADMIN — CALCIUM CHLORIDE, MAGNESIUM CHLORIDE, DEXTROSE MONOHYDRATE, LACTIC ACID, SODIUM CHLORIDE, SODIUM BICARBONATE AND POTASSIUM CHLORIDE 26 ML/KG/HR: 5.15; 2.03; 22; 5.4; 6.46; 3.09; .157 INJECTION INTRAVENOUS at 05:45

## 2025-01-02 RX ADMIN — METHOCARBAMOL TABLETS 500 MG: 500 TABLET, COATED ORAL at 18:07

## 2025-01-02 RX ADMIN — Medication 60 PERCENT: at 07:34

## 2025-01-02 ASSESSMENT — PAIN - FUNCTIONAL ASSESSMENT
PAIN_FUNCTIONAL_ASSESSMENT: CPOT (CRITICAL CARE PAIN OBSERVATION TOOL)
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
## 2025-01-02 NOTE — NURSING NOTE
TSICU team brought to beside for emergent intubation due to worsening respiratory status. Levophed and phenylephrine at bedside for rescue meds. Intubation medications include 28 mg etomidate and 92 rocuronium. Post intubation medications include propofol. Dr. Malave and Dr. Low at bedside for intubation.    2117- timeout performed. Pt identified with 2 identifiers and allergies and code status checked    2118 - 28 mg etomidate given    2119 - 92 mg rocuronium given    2120 - levophed increased from 0.06 to 0.1 mcg/kg    2120 - 2 ml, (80 mcg), of phenyl given    2121 - levophed increased 0.1 to 0.14 mcg/kg    2122 - 2 ml, (80 mcg), phenylephrine given    2122 - color change confirmed, 27 @ teeth, 7.0 ETT     2123 - bilateral breath sounds confirmed    2124- chest Xray ordered

## 2025-01-02 NOTE — PROGRESS NOTES
25 1030   Ileostomy Other (Comment) RUQ   Placement Date/Time: 24 1122   Placed by: hilary ZULUAGA  Hand Hygiene Completed: Yes  Ileostomy Type: (c) Other (Comment)  Location: RUQ   Stomal Appliance 1 piece   Site/Stoma Assessment Clean;Intact;Red   Stoma Size (cm)   (1 3/4 oval)   Peristomal Assessment Clean;Intact   Treatment Pouch change;Site care   Output Description Brown       Ostomy type: jejunostomy and mucus fistula  size: 1 3/4 oval color: red and moist    protruding: budded   Ricky: none   Functioning: loose green effluent   Mucocutaneous junction: intact   Peristomal skin: fragile, clean, dry and intact   Pouchin piece Crystal flat premier pouch and barrier ring

## 2025-01-02 NOTE — PROGRESS NOTES
Ike Gar is a 78 y.o. year old male patient who presents for Procedure(s):  Re-opening of recent laparotomy, wedge resection segment 3 liver, creation of jejunostomy w/ mucous fistula. with Swapnil Stewart MD on 12/23/2024. Acute Pain consulted for assistance with pain control.       Postop Pain HPI -   Palliative: relieved with IV analgesics and regional local anesthetics  Provocative: movement  Quality:  burning and aching  Radiation:  none  Severity: Pt intubated/sedated   Timing: constant    24-HOUR OPIOID CONSUMPTION:  Dilaudid 0.8 mg     Scheduled medications  acetaminophen, 1,000 mg, intravenous, q8h  etomidate, , ,   gabapentin, 200 mg, oral, BID  heparin, 5,000 Units, subcutaneous, q8h  insulin lispro, 0-5 Units, subcutaneous, q6h  methocarbamol, 500 mg, oral, q6h WILLIAM  oxygen, , inhalation, Continuous - Inhalation  oxygen, , inhalation, Continuous - Inhalation  oxygen, , inhalation, Continuous - Inhalation  Travasol, 25 g, intravenous, Daily Amino Acids      Continuous medications  Adult Clinimix Parenteral Nutrition Continuous, 40 mL/hr, Last Rate: 40 mL/hr (01/02/25 0400)  norepinephrine, 0.01-1 mcg/kg/min, Last Rate: 0.04 mcg/kg/min (01/02/25 0748)  PrismaSol BGK 2/3.5, 26 mL/kg/hr, Last Rate: 26 mL/kg/hr (01/02/25 0545)  propofol, 0-50 mcg/kg/min, Last Rate: 15 mcg/kg/min (01/02/25 0400)  ropivacaine (PF) in NS cmpd, 14 mL/hr      PRN medications  PRN medications: alteplase, dextrose, dextrose, etomidate, glucagon, glucagon, heparin, heparin, heparin flush, lubricating eye drops, naloxone, oxyCODONE, oxygen     Physical Exam:  Constitutional:  obtunded   Eyes: clear sclera  Head/Neck: No apparent injury, trachea midline  Respiratory/Thorax: Patent airways, thorax symmetric, tachypnea   Cardiovascular: no pitting edema  Gastrointestinal: Nondistended  Musculoskeletal: ROM intact  Extremities: no clubbing  Neurological: obtunded, will open eyes to name  Psychological: can not access    Results for  orders placed or performed during the hospital encounter of 12/17/24 (from the past 24 hours)   CBC   Result Value Ref Range    WBC 16.8 (H) 4.4 - 11.3 x10*3/uL    nRBC 0.0 0.0 - 0.0 /100 WBCs    RBC 2.40 (L) 4.50 - 5.90 x10*6/uL    Hemoglobin 7.1 (L) 13.5 - 17.5 g/dL    Hematocrit 19.5 (L) 41.0 - 52.0 %    MCV 81 80 - 100 fL    MCH 29.6 26.0 - 34.0 pg    MCHC 36.4 (H) 32.0 - 36.0 g/dL    RDW 18.1 (H) 11.5 - 14.5 %    Platelets 330 150 - 450 x10*3/uL   POCT GLUCOSE   Result Value Ref Range    POCT Glucose 92 74 - 99 mg/dL   POCT GLUCOSE   Result Value Ref Range    POCT Glucose 78 74 - 99 mg/dL   BLOOD GAS ARTERIAL FULL PANEL   Result Value Ref Range    POCT pH, Arterial 7.34 (L) 7.38 - 7.42 pH    POCT pCO2, Arterial 37 (L) 38 - 42 mm Hg    POCT pO2, Arterial 92 85 - 95 mm Hg    POCT SO2, Arterial 99 94 - 100 %    POCT Oxy Hemoglobin, Arterial 95.4 94.0 - 98.0 %    POCT Hematocrit Calculated, Arterial 26.0 (L) 41.0 - 52.0 %    POCT Sodium, Arterial 127 (L) 136 - 145 mmol/L    POCT Potassium, Arterial 5.8 (H) 3.5 - 5.3 mmol/L    POCT Chloride, Arterial 101 98 - 107 mmol/L    POCT Ionized Calcium, Arterial 1.21 1.10 - 1.33 mmol/L    POCT Glucose, Arterial 68 (L) 74 - 99 mg/dL    POCT Lactate, Arterial 1.6 0.4 - 2.0 mmol/L    POCT Base Excess, Arterial -5.3 (L) -2.0 - 3.0 mmol/L    POCT HCO3 Calculated, Arterial 20.0 (L) 22.0 - 26.0 mmol/L    POCT Hemoglobin, Arterial 8.5 (L) 13.5 - 17.5 g/dL    POCT Anion Gap, Arterial 12 10 - 25 mmo/L    Patient Temperature 37.0 degrees Celsius    FiO2 60 %   Renal Function Panel   Result Value Ref Range    Glucose 81 74 - 99 mg/dL    Sodium 128 (L) 136 - 145 mmol/L    Potassium 5.2 3.5 - 5.3 mmol/L    Chloride 98 98 - 107 mmol/L    Bicarbonate 23 21 - 32 mmol/L    Anion Gap 12 10 - 20 mmol/L    Urea Nitrogen 78 (H) 6 - 23 mg/dL    Creatinine 3.53 (H) 0.50 - 1.30 mg/dL    eGFR 17 (L) >60 mL/min/1.73m*2    Calcium 8.2 (L) 8.6 - 10.6 mg/dL    Phosphorus 5.3 (H) 2.5 - 4.9 mg/dL     Albumin 2.1 (L) 3.4 - 5.0 g/dL   POCT GLUCOSE   Result Value Ref Range    POCT Glucose 84 74 - 99 mg/dL   CBC   Result Value Ref Range    WBC 18.0 (H) 4.4 - 11.3 x10*3/uL    nRBC 0.0 0.0 - 0.0 /100 WBCs    RBC 2.30 (L) 4.50 - 5.90 x10*6/uL    Hemoglobin 6.8 (L) 13.5 - 17.5 g/dL    Hematocrit 19.1 (L) 41.0 - 52.0 %    MCV 83 80 - 100 fL    MCH 29.6 26.0 - 34.0 pg    MCHC 35.6 32.0 - 36.0 g/dL    RDW 17.9 (H) 11.5 - 14.5 %    Platelets 306 150 - 450 x10*3/uL   POCT GLUCOSE   Result Value Ref Range    POCT Glucose 84 74 - 99 mg/dL   Calcium, ionized   Result Value Ref Range    POCT Calcium, Ionized 1.18 1.1 - 1.33 mmol/L   Coagulation Screen   Result Value Ref Range    Protime 12.5 9.8 - 12.8 seconds    INR 1.1 0.9 - 1.1    aPTT 28 27 - 38 seconds   Magnesium   Result Value Ref Range    Magnesium 2.73 (H) 1.60 - 2.40 mg/dL   Renal Function Panel   Result Value Ref Range    Glucose 84 74 - 99 mg/dL    Sodium 129 (L) 136 - 145 mmol/L    Potassium 5.6 (H) 3.5 - 5.3 mmol/L    Chloride 98 98 - 107 mmol/L    Bicarbonate 23 21 - 32 mmol/L    Anion Gap 14 10 - 20 mmol/L    Urea Nitrogen 82 (H) 6 - 23 mg/dL    Creatinine 3.85 (H) 0.50 - 1.30 mg/dL    eGFR 15 (L) >60 mL/min/1.73m*2    Calcium 7.8 (L) 8.6 - 10.6 mg/dL    Phosphorus 6.1 (H) 2.5 - 4.9 mg/dL    Albumin 1.9 (L) 3.4 - 5.0 g/dL   BLOOD GAS ARTERIAL FULL PANEL   Result Value Ref Range    POCT pH, Arterial 7.24 (LL) 7.38 - 7.42 pH    POCT pCO2, Arterial 50 (H) 38 - 42 mm Hg    POCT pO2, Arterial 78 (L) 85 - 95 mm Hg    POCT SO2, Arterial 97 94 - 100 %    POCT Oxy Hemoglobin, Arterial 94.0 94.0 - 98.0 %    POCT Hematocrit Calculated, Arterial 22.0 (L) 41.0 - 52.0 %    POCT Sodium, Arterial 126 (L) 136 - 145 mmol/L    POCT Potassium, Arterial 5.9 (H) 3.5 - 5.3 mmol/L    POCT Chloride, Arterial 100 98 - 107 mmol/L    POCT Ionized Calcium, Arterial 1.03 (L) 1.10 - 1.33 mmol/L    POCT Glucose, Arterial 85 74 - 99 mg/dL    POCT Lactate, Arterial 0.6 0.4 - 2.0 mmol/L    POCT  Base Excess, Arterial -5.7 (L) -2.0 - 3.0 mmol/L    POCT HCO3 Calculated, Arterial 21.4 (L) 22.0 - 26.0 mmol/L    POCT Hemoglobin, Arterial 7.3 (L) 13.5 - 17.5 g/dL    POCT Anion Gap, Arterial 11 10 - 25 mmo/L    Patient Temperature 37.0 degrees Celsius    FiO2 60 %   Prepare RBC: 1 Units   Result Value Ref Range    PRODUCT CODE [id number]     Unit Number [id number]     Unit ABO O     Unit RH POS     XM INTEP COMP     Dispense Status RE     Blood Expiration Date 1/18/2025 11:59:00 PM EST     PRODUCT BLOOD TYPE 5100     UNIT VOLUME 282    Blood Gas Arterial   Result Value Ref Range    POCT pH, Arterial 7.23 (LL) 7.38 - 7.42 pH    POCT pCO2, Arterial 49 (H) 38 - 42 mm Hg    POCT pO2, Arterial 98 (H) 85 - 95 mm Hg    POCT SO2, Arterial 99 94 - 100 %    POCT Oxy Hemoglobin, Arterial 96.0 94.0 - 98.0 %    POCT Base Excess, Arterial -7.2 (L) -2.0 - 3.0 mmol/L    POCT HCO3 Calculated, Arterial 20.5 (L) 22.0 - 26.0 mmol/L    Patient Temperature 37.0 degrees Celsius    FiO2 80 %   BLOOD GAS ARTERIAL FULL PANEL   Result Value Ref Range    POCT pH, Arterial 7.31 (L) 7.38 - 7.42 pH    POCT pCO2, Arterial 40 38 - 42 mm Hg    POCT pO2, Arterial 176 (H) 85 - 95 mm Hg    POCT SO2, Arterial 100 94 - 100 %    POCT Oxy Hemoglobin, Arterial 96.9 94.0 - 98.0 %    POCT Hematocrit Calculated, Arterial 23.0 (L) 41.0 - 52.0 %    POCT Sodium, Arterial 126 (L) 136 - 145 mmol/L    POCT Potassium, Arterial 5.9 (H) 3.5 - 5.3 mmol/L    POCT Chloride, Arterial 100 98 - 107 mmol/L    POCT Ionized Calcium, Arterial 1.19 1.10 - 1.33 mmol/L    POCT Glucose, Arterial 93 74 - 99 mg/dL    POCT Lactate, Arterial 0.9 0.4 - 2.0 mmol/L    POCT Base Excess, Arterial -5.7 (L) -2.0 - 3.0 mmol/L    POCT HCO3 Calculated, Arterial 20.1 (L) 22.0 - 26.0 mmol/L    POCT Hemoglobin, Arterial 7.5 (L) 13.5 - 17.5 g/dL    POCT Anion Gap, Arterial 12 10 - 25 mmo/L    Patient Temperature 37.0 degrees Celsius    FiO2 100 %   POCT GLUCOSE   Result Value Ref Range    POCT  Glucose 94 74 - 99 mg/dL   Magnesium   Result Value Ref Range    Magnesium 2.58 (H) 1.60 - 2.40 mg/dL   Hepatic function panel   Result Value Ref Range    Albumin 2.4 (L) 3.4 - 5.0 g/dL    Bilirubin, Total 7.1 (H) 0.0 - 1.2 mg/dL    Bilirubin, Direct 4.6 (H) 0.0 - 0.3 mg/dL    Alkaline Phosphatase 145 (H) 33 - 136 U/L    ALT 73 (H) 10 - 52 U/L    AST 96 (H) 9 - 39 U/L    Total Protein 5.1 (L) 6.4 - 8.2 g/dL   BUN   Result Value Ref Range    Urea Nitrogen 85 (H) 6 - 23 mg/dL   Creatinine, Serum   Result Value Ref Range    Creatinine 3.90 (H) 0.50 - 1.30 mg/dL    eGFR 15 (L) >60 mL/min/1.73m*2   Electrolyte panel   Result Value Ref Range    Sodium 129 (L) 136 - 145 mmol/L    Potassium 5.4 (H) 3.5 - 5.3 mmol/L    Chloride 98 98 - 107 mmol/L    Bicarbonate 21 21 - 32 mmol/L    Anion Gap 15 10 - 20 mmol/L   Calcium, ionized   Result Value Ref Range    POCT Calcium, Ionized 1.12 1.1 - 1.33 mmol/L   Phosphorus   Result Value Ref Range    Phosphorus 5.7 (H) 2.5 - 4.9 mg/dL   CBC   Result Value Ref Range    WBC 15.5 (H) 4.4 - 11.3 x10*3/uL    nRBC 0.0 0.0 - 0.0 /100 WBCs    RBC 2.30 (L) 4.50 - 5.90 x10*6/uL    Hemoglobin 6.8 (L) 13.5 - 17.5 g/dL    Hematocrit 18.9 (L) 41.0 - 52.0 %    MCV 82 80 - 100 fL    MCH 29.6 26.0 - 34.0 pg    MCHC 36.0 32.0 - 36.0 g/dL    RDW 16.9 (H) 11.5 - 14.5 %    Platelets 313 150 - 450 x10*3/uL   Coagulation Screen   Result Value Ref Range    Protime 12.4 9.8 - 12.8 seconds    INR 1.1 0.9 - 1.1    aPTT 26 (L) 27 - 38 seconds   Basic Metabolic Panel   Result Value Ref Range    Glucose 93 74 - 99 mg/dL    Sodium 129 (L) 136 - 145 mmol/L    Potassium 5.4 (H) 3.5 - 5.3 mmol/L    Chloride 98 98 - 107 mmol/L    Bicarbonate 21 21 - 32 mmol/L    Anion Gap 15 10 - 20 mmol/L    Urea Nitrogen 85 (H) 6 - 23 mg/dL    Creatinine 3.90 (H) 0.50 - 1.30 mg/dL    eGFR 15 (L) >60 mL/min/1.73m*2    Calcium 7.7 (L) 8.6 - 10.6 mg/dL   BLOOD GAS ARTERIAL FULL PANEL   Result Value Ref Range    POCT pH, Arterial 7.35 (L)  7.38 - 7.42 pH    POCT pCO2, Arterial 36 (L) 38 - 42 mm Hg    POCT pO2, Arterial 150 (H) 85 - 95 mm Hg    POCT SO2, Arterial 100 94 - 100 %    POCT Oxy Hemoglobin, Arterial 97.7 94.0 - 98.0 %    POCT Hematocrit Calculated, Arterial 22.0 (L) 41.0 - 52.0 %    POCT Sodium, Arterial 125 (L) 136 - 145 mmol/L    POCT Potassium, Arterial 5.6 (H) 3.5 - 5.3 mmol/L    POCT Chloride, Arterial 100 98 - 107 mmol/L    POCT Ionized Calcium, Arterial 1.15 1.10 - 1.33 mmol/L    POCT Glucose, Arterial 92 74 - 99 mg/dL    POCT Lactate, Arterial 1.2 0.4 - 2.0 mmol/L    POCT Base Excess, Arterial -5.2 (L) -2.0 - 3.0 mmol/L    POCT HCO3 Calculated, Arterial 19.9 (L) 22.0 - 26.0 mmol/L    POCT Hemoglobin, Arterial 7.3 (L) 13.5 - 17.5 g/dL    POCT Anion Gap, Arterial 11 10 - 25 mmo/L    Patient Temperature 37.0 degrees Celsius    FiO2 80 %   Haptoglobin   Result Value Ref Range    Haptoglobin 245 (H) 30 - 200 mg/dL   Lactate dehydrogenase   Result Value Ref Range     84 - 246 U/L   Prepare RBC: 1 Units   Result Value Ref Range    PRODUCT CODE H7614S19     Unit Number [id number]     Unit ABO O     Unit RH POS     XM INTEP COMP     Dispense Status IS     Blood Expiration Date 1/12/2025 11:59:00 PM EST     PRODUCT BLOOD TYPE 5100     UNIT VOLUME 350    POCT GLUCOSE   Result Value Ref Range    POCT Glucose 75 74 - 99 mg/dL   CBC and Auto Differential   Result Value Ref Range    WBC 13.3 (H) 4.4 - 11.3 x10*3/uL    nRBC 0.0 0.0 - 0.0 /100 WBCs    RBC 2.66 (L) 4.50 - 5.90 x10*6/uL    Hemoglobin 7.8 (L) 13.5 - 17.5 g/dL    Hematocrit 22.8 (L) 41.0 - 52.0 %    MCV 86 80 - 100 fL    MCH 29.3 26.0 - 34.0 pg    MCHC 34.2 32.0 - 36.0 g/dL    RDW 17.6 (H) 11.5 - 14.5 %    Platelets 285 150 - 450 x10*3/uL    Neutrophils % 77.5 40.0 - 80.0 %    Immature Granulocytes %, Automated 0.9 0.0 - 0.9 %    Lymphocytes % 8.4 13.0 - 44.0 %    Monocytes % 12.7 2.0 - 10.0 %    Eosinophils % 0.2 0.0 - 6.0 %    Basophils % 0.3 0.0 - 2.0 %    Neutrophils  Absolute 10.28 (H) 1.60 - 5.50 x10*3/uL    Immature Granulocytes Absolute, Automated 0.12 0.00 - 0.50 x10*3/uL    Lymphocytes Absolute 1.11 0.80 - 3.00 x10*3/uL    Monocytes Absolute 1.68 (H) 0.05 - 0.80 x10*3/uL    Eosinophils Absolute 0.03 0.00 - 0.40 x10*3/uL    Basophils Absolute 0.04 0.00 - 0.10 x10*3/uL   Renal function panel   Result Value Ref Range    Glucose 74 74 - 99 mg/dL    Sodium 131 (L) 136 - 145 mmol/L    Potassium 5.0 3.5 - 5.3 mmol/L    Chloride 98 98 - 107 mmol/L    Bicarbonate 22 21 - 32 mmol/L    Anion Gap 16 10 - 20 mmol/L    Urea Nitrogen 76 (H) 6 - 23 mg/dL    Creatinine 3.42 (H) 0.50 - 1.30 mg/dL    eGFR 18 (L) >60 mL/min/1.73m*2    Calcium 8.0 (L) 8.6 - 10.6 mg/dL    Phosphorus 5.1 (H) 2.5 - 4.9 mg/dL    Albumin 2.3 (L) 3.4 - 5.0 g/dL   BLOOD GAS ARTERIAL FULL PANEL   Result Value Ref Range    POCT pH, Arterial 7.33 (L) 7.38 - 7.42 pH    POCT pCO2, Arterial 42 38 - 42 mm Hg    POCT pO2, Arterial 75 (L) 85 - 95 mm Hg    POCT SO2, Arterial 97 94 - 100 %    POCT Oxy Hemoglobin, Arterial 94.4 94.0 - 98.0 %    POCT Hematocrit Calculated, Arterial 26.0 (L) 41.0 - 52.0 %    POCT Sodium, Arterial 127 (L) 136 - 145 mmol/L    POCT Potassium, Arterial 5.0 3.5 - 5.3 mmol/L    POCT Chloride, Arterial 100 98 - 107 mmol/L    POCT Ionized Calcium, Arterial 1.20 1.10 - 1.33 mmol/L    POCT Glucose, Arterial 80 74 - 99 mg/dL    POCT Lactate, Arterial 1.0 0.4 - 2.0 mmol/L    POCT Base Excess, Arterial -3.6 (L) -2.0 - 3.0 mmol/L    POCT HCO3 Calculated, Arterial 22.1 22.0 - 26.0 mmol/L    POCT Hemoglobin, Arterial 8.5 (L) 13.5 - 17.5 g/dL    POCT Anion Gap, Arterial 10 10 - 25 mmo/L    Patient Temperature 37.0 degrees Celsius    FiO2 60 %   POCT GLUCOSE   Result Value Ref Range    POCT Glucose 78 74 - 99 mg/dL   BLOOD GAS ARTERIAL FULL PANEL   Result Value Ref Range    POCT pH, Arterial 7.30 (L) 7.38 - 7.42 pH    POCT pCO2, Arterial 45 (H) 38 - 42 mm Hg    POCT pO2, Arterial 66 (L) 85 - 95 mm Hg    POCT SO2,  Arterial 95 94 - 100 %    POCT Oxy Hemoglobin, Arterial 92.1 (L) 94.0 - 98.0 %    POCT Hematocrit Calculated, Arterial 26.0 (L) 41.0 - 52.0 %    POCT Sodium, Arterial 127 (L) 136 - 145 mmol/L    POCT Potassium, Arterial 4.9 3.5 - 5.3 mmol/L    POCT Chloride, Arterial 101 98 - 107 mmol/L    POCT Ionized Calcium, Arterial 1.24 1.10 - 1.33 mmol/L    POCT Glucose, Arterial 91 74 - 99 mg/dL    POCT Lactate, Arterial 0.9 0.4 - 2.0 mmol/L    POCT Base Excess, Arterial -4.1 (L) -2.0 - 3.0 mmol/L    POCT HCO3 Calculated, Arterial 22.1 22.0 - 26.0 mmol/L    POCT Hemoglobin, Arterial 8.6 (L) 13.5 - 17.5 g/dL    POCT Anion Gap, Arterial 9 (L) 10 - 25 mmo/L    Patient Temperature 37.0 degrees Celsius    FiO2 60 %       Ike Gar is a 78 y.o. year old male patient who presents for Procedure(s):  Re-opening of recent laparotomy, wedge resection segment 3 liver, creation of jejunostomy w/ mucous fistula. with Swapnil Stewart MD on 12/23/2024. Acute Pain consulted for assistance with pain control.      Plan:  - Bilateral erector spinae blocks with catheters performed preoperatively on 12/30/2024  - Bilateral catheters removed with tips intact on 1/2/25, pt tolerated procedure well.  - Rest of pain management per primary team  - Acute Pain team will sign off at this time, thank you      Acute Pain Resident  pg [phone] ph [phone]
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
## 2025-01-02 NOTE — PROCEDURES
Central Line    Date/Time: 1/2/2025 2:04 PM    Performed by: Elvie De Leon MD  Authorized by: Elvie De Leon MD    Consent:     Consent obtained:  Verbal    Consent given by:  Healthcare agent    Risks, benefits, and alternatives were discussed: yes      Risks discussed:  Arterial puncture, incorrect placement, bleeding, infection and pneumothorax    Alternatives discussed:  Delayed treatment  Universal protocol:     Procedure explained and questions answered to patient or proxy's satisfaction: yes      Relevant documents present and verified: yes      Test results available: yes      Imaging studies available: yes      Required blood products, implants, devices, and special equipment available: yes      Site/side marked: yes      Immediately prior to procedure, a time out was called: yes      Patient identity confirmed:  Hospital-assigned identification number and arm band  Pre-procedure details:     Indication(s): central venous access and insufficient peripheral access      Hand hygiene: Hand hygiene performed prior to insertion      Sterile barrier technique: All elements of maximal sterile technique followed      Skin preparation:  Chlorhexidine    Skin preparation agent: Skin preparation agent completely dried prior to procedure    Sedation:     Sedation type:  Moderate sedation  Anesthesia:     Anesthesia method:  Local infiltration    Local anesthetic:  Lidocaine 1% w/o epi  Procedure details:     Location:  L subclavian    Patient position:  Trendelenburg    Procedural supplies:  Triple lumen    Catheter size:  7 Fr    Catheter length:  20    Landmarks identified: yes      Number of attempts:  2    Successful placement: yes    Post-procedure details:     Post-procedure:  Dressing applied and line sutured    Assessment:  Blood return through all ports and free fluid flow    Procedure completion:  Tolerated well, no immediate complications  Comments:      Left subclavian central line placed under  seldinger technique. Venous cannulation confirmed with manometry tubing.  Catheter thread over guidewire and guidewire removed. Return of dark venous blood through all ports. Patient tolerated well. Post procedure CXR pending.         Elvie De Leon MD   Surgical Critical Care Fellow

## 2025-01-02 NOTE — SIGNIFICANT EVENT
Patient with an acute episode of hypotension and hypoxia around 6:30 PM.  Started on Levophed and given albumin given respiratory variation and A-line.  Stat labs sent.  Noted to be hypercarbic on gas.,  Required transition to Airvo, and then BiPAP.    Concern for hypercarbic respiratory failure in the setting of worsening mental status/hemodynamic status after CRRT was discontinued today.  He does have rib fractures that make ventilation a challenge as well as a TLSO brace.    I updated family about his clinical status.  At this point they are okay with proceeding with intubation I spoke to his son, who also conferences with his POA, his wife.  They were okay with intubation.  I updated Dr. Low who came to bedside.    Plan for short interval ABG and possible intubation.    Austin Malave MD  General Surgery Resident  TICU [id number]    After 30 minutes on BIPAP, escalated to FiO2 of 80%, patient did not have an appreciable improvement in his PCO2. He appeared tired and was confused. At this point decision was made to intubate for impending respiratory failure. Family was notified and consented to intubation, and were updated afterwards. See note for intubation.     Will resume CVVH and resusictate as needed to reduce levophed requirement.     Austin Malave MD  General Surgery Resident  TICU [id number]

## 2025-01-02 NOTE — PROGRESS NOTES
[facility]  TRAUMA ICU - ATTENDING PROGRESS NOTE    Patient Name: Ike Gar  MRN: [id number]  : 1945  AGE: 79 y.o.   GENDER: male  ==============================================================================    2025  8:20 AM    I evaluated and examined the patient with the critical care team. As a multidisciplinary team, we discussed all findings, as well as assessment and plan. I personally spent 35 minutes of critical care time excluding procedures at the bedside with the patient. I personally reviewed all labs, results and studies. My independent note with assessment and plan are below:    Neurologic:        Analgesia: oxycodone, robaxin, pain block       Sedation: propofol to precidex  HEENT: none  Cardiovascular:        Blunt cardiac injury, resolved       Undifferentiated shock   Vasopressors: levophed 0.03  Pulmonary:        Multiple rib fx (L 1,3, 8,9, 11, 12) (R 6, 7,9), Sternal fx  Nerve blocks   Right hemothorax, pigtail, WS  Left hemothorax, pigtail, WS       Acute hypoxic respiratory failure   Extubated , reintubated 1/   Daily CXR  Oxygen requirement: CMV 20/450/8/70  Gastrointestinal:        Grade 2 liver laceration       Elevated bilirubin, mixed, history of gilberts       Small bowel injury:   : Ex-lap, TENNILLE, SBR X2, GRACIELA x3, discontinuity, ABTHERA  : Ex-lap, washout, SBR, ileocecetomy, ABTHERA  : Ex-lap, washout, ABTHERA  : partial omentectomy, ABTHERA  : partial hepatectomy, end jejunostomy with mucous fistula creation, fascial closure       Dysphagia, failed MBS, recheck weekly       Diet: TPN; ostomy functioning, Trickle TF advance to goal via NG       GI prophylaxis: none  Renal/:        Acute renal failure, anuric, CVVH, goal net 0       Clark catheter: no clark, daily bladder scan  Hematologic:       Central line: HD line (), central line       Arterial line: yes, maintain       DVT prophylaxis: SCD's;  heparin  Musculoskeletal:       T5 vertebral body fracture, L4/L5 superior endplate fracture   Non-op   TLSO       ICU Mobility Score: 3       Skin breakdown: none       Restraints: yes, renew  Endocrine:       MARLYS       Glucose control <180, POC glucose checks and insulin sliding scale  ID:       Completed empiric vancomycin, meropenem; trend leukocytosis       Antibiotics: none    Disposition: The patient remains remains critically ill.    Willi Murillo MD

## 2025-01-02 NOTE — PROCEDURES
Endotracheal Intubation Procedure Note  Indication for endotracheal intubation: impending respiratory failure  Sedation: etomidate  Paralytic: rocuronium  Lidocaine: no  Atropine: no  Equipment:  Video laryngoscope blade and 7.0mm cuffed endotracheal tube  Cricoid Pressure: yes  Number of attempts: 2  ETT location confirmed by by auscultation, by CXR, and ETCO2 monitor.    Comments  Upper Airway with crusted blood, grade 1 view after suctioning. First attempt with 7.5 tube which couldn't easily pass. Second attempt with 7.0 tube passed easily, 27 cm at the teeth.     Dr. Low was present for the intubation    Austin Malave MD  General Surgery Resident  TICU [id number]

## 2025-01-02 NOTE — PROCEDURES
I evaluated the patient during while he was receiving CVVH.    BP: 144/72 on levophed BFR: 200  Replacement fluid: Prismasol 2K/3.5Ca  Flow rate: 800/pump/800/filter/800    Plan: Continue CVVH until SLED or IHD is hemodynamically tolerated.

## 2025-01-03 ENCOUNTER — APPOINTMENT (OUTPATIENT)
Dept: RADIOLOGY | Facility: HOSPITAL | Age: 80
End: 2025-01-03
Payer: MEDICARE

## 2025-01-03 LAB
ALBUMIN SERPL BCP-MCNC: 1.9 G/DL (ref 3.4–5)
ALBUMIN SERPL BCP-MCNC: 2 G/DL (ref 3.4–5)
ALBUMIN SERPL BCP-MCNC: 2.2 G/DL (ref 3.4–5)
ALP SERPL-CCNC: 143 U/L (ref 33–136)
ALP SERPL-CCNC: 145 U/L (ref 33–136)
ALP SERPL-CCNC: 150 U/L (ref 33–136)
ALT SERPL W P-5'-P-CCNC: 78 U/L (ref 10–52)
ALT SERPL W P-5'-P-CCNC: 91 U/L (ref 10–52)
ALT SERPL W P-5'-P-CCNC: 91 U/L (ref 10–52)
ANION GAP BLDA CALCULATED.4IONS-SCNC: 9 MMO/L (ref 10–25)
ANION GAP BLDA CALCULATED.4IONS-SCNC: 9 MMO/L (ref 10–25)
ANION GAP SERPL CALC-SCNC: 13 MMOL/L (ref 10–20)
ANION GAP SERPL CALC-SCNC: 13 MMOL/L (ref 10–20)
ANION GAP SERPL CALC-SCNC: 14 MMOL/L (ref 10–20)
ANION GAP SERPL CALC-SCNC: 15 MMOL/L (ref 10–20)
APTT PPP: 26 SECONDS (ref 27–38)
APTT PPP: 26 SECONDS (ref 27–38)
AST SERPL W P-5'-P-CCNC: 80 U/L (ref 9–39)
AST SERPL W P-5'-P-CCNC: 81 U/L (ref 9–39)
AST SERPL W P-5'-P-CCNC: 83 U/L (ref 9–39)
BASE EXCESS BLDA CALC-SCNC: -1.7 MMOL/L (ref -2–3)
BASE EXCESS BLDA CALC-SCNC: -1.7 MMOL/L (ref -2–3)
BASOPHILS # BLD AUTO: 0.03 X10*3/UL (ref 0–0.1)
BASOPHILS # BLD AUTO: 0.04 X10*3/UL (ref 0–0.1)
BASOPHILS NFR BLD AUTO: 0.2 %
BASOPHILS NFR BLD AUTO: 0.3 %
BILIRUB DIRECT SERPL-MCNC: 3.3 MG/DL (ref 0–0.3)
BILIRUB DIRECT SERPL-MCNC: 3.7 MG/DL (ref 0–0.3)
BILIRUB SERPL-MCNC: 5.3 MG/DL (ref 0–1.2)
BILIRUB SERPL-MCNC: 5.6 MG/DL (ref 0–1.2)
BILIRUB SERPL-MCNC: 5.6 MG/DL (ref 0–1.2)
BLOOD EXPIRATION DATE: NORMAL
BLOOD EXPIRATION DATE: NORMAL
BODY TEMPERATURE: 37 DEGREES CELSIUS
BODY TEMPERATURE: 37 DEGREES CELSIUS
BUN SERPL-MCNC: 61 MG/DL (ref 6–23)
BUN SERPL-MCNC: 61 MG/DL (ref 6–23)
BUN SERPL-MCNC: 67 MG/DL (ref 6–23)
BUN SERPL-MCNC: 67 MG/DL (ref 6–23)
CA-I BLD-SCNC: 1.25 MMOL/L (ref 1.1–1.33)
CA-I BLDA-SCNC: 1.27 MMOL/L (ref 1.1–1.33)
CA-I BLDA-SCNC: 1.29 MMOL/L (ref 1.1–1.33)
CALCIUM SERPL-MCNC: 8 MG/DL (ref 8.6–10.6)
CALCIUM SERPL-MCNC: 8 MG/DL (ref 8.6–10.6)
CALCIUM SERPL-MCNC: 8.5 MG/DL (ref 8.6–10.6)
CHLORIDE BLDA-SCNC: 101 MMOL/L (ref 98–107)
CHLORIDE BLDA-SCNC: 101 MMOL/L (ref 98–107)
CHLORIDE SERPL-SCNC: 100 MMOL/L (ref 98–107)
CHLORIDE SERPL-SCNC: 100 MMOL/L (ref 98–107)
CHLORIDE SERPL-SCNC: 99 MMOL/L (ref 98–107)
CHLORIDE SERPL-SCNC: 99 MMOL/L (ref 98–107)
CO2 SERPL-SCNC: 23 MMOL/L (ref 21–32)
CO2 SERPL-SCNC: 23 MMOL/L (ref 21–32)
CO2 SERPL-SCNC: 24 MMOL/L (ref 21–32)
CO2 SERPL-SCNC: 24 MMOL/L (ref 21–32)
CREAT SERPL-MCNC: 2.81 MG/DL (ref 0.5–1.3)
CREAT SERPL-MCNC: 2.81 MG/DL (ref 0.5–1.3)
CREAT SERPL-MCNC: 3.19 MG/DL (ref 0.5–1.3)
CREAT SERPL-MCNC: 3.21 MG/DL (ref 0.5–1.3)
DISPENSE STATUS: NORMAL
DISPENSE STATUS: NORMAL
EGFRCR SERPLBLD CKD-EPI 2021: 19 ML/MIN/1.73M*2
EGFRCR SERPLBLD CKD-EPI 2021: 19 ML/MIN/1.73M*2
EGFRCR SERPLBLD CKD-EPI 2021: 22 ML/MIN/1.73M*2
EGFRCR SERPLBLD CKD-EPI 2021: 22 ML/MIN/1.73M*2
EOSINOPHIL # BLD AUTO: 0.05 X10*3/UL (ref 0–0.4)
EOSINOPHIL # BLD AUTO: 0.19 X10*3/UL (ref 0–0.4)
EOSINOPHIL NFR BLD AUTO: 0.4 %
EOSINOPHIL NFR BLD AUTO: 1.2 %
ERYTHROCYTE [DISTWIDTH] IN BLOOD BY AUTOMATED COUNT: 17.3 % (ref 11.5–14.5)
ERYTHROCYTE [DISTWIDTH] IN BLOOD BY AUTOMATED COUNT: 17.7 % (ref 11.5–14.5)
GLUCOSE BLD MANUAL STRIP-MCNC: 119 MG/DL (ref 74–99)
GLUCOSE BLD MANUAL STRIP-MCNC: 125 MG/DL (ref 74–99)
GLUCOSE BLD MANUAL STRIP-MCNC: 91 MG/DL (ref 74–99)
GLUCOSE BLD MANUAL STRIP-MCNC: 94 MG/DL (ref 74–99)
GLUCOSE BLD MANUAL STRIP-MCNC: 95 MG/DL (ref 74–99)
GLUCOSE BLDA-MCNC: 104 MG/DL (ref 74–99)
GLUCOSE BLDA-MCNC: 111 MG/DL (ref 74–99)
GLUCOSE SERPL-MCNC: 107 MG/DL (ref 74–99)
GLUCOSE SERPL-MCNC: 109 MG/DL (ref 74–99)
GLUCOSE SERPL-MCNC: 88 MG/DL (ref 74–99)
HCO3 BLDA-SCNC: 22.9 MMOL/L (ref 22–26)
HCO3 BLDA-SCNC: 22.9 MMOL/L (ref 22–26)
HCT VFR BLD AUTO: 19.7 % (ref 41–52)
HCT VFR BLD AUTO: 22 % (ref 41–52)
HCT VFR BLD EST: 22 % (ref 41–52)
HCT VFR BLD EST: 23 % (ref 41–52)
HGB BLD-MCNC: 6.8 G/DL (ref 13.5–17.5)
HGB BLD-MCNC: 7.5 G/DL (ref 13.5–17.5)
HGB BLDA-MCNC: 7.3 G/DL (ref 13.5–17.5)
HGB BLDA-MCNC: 7.5 G/DL (ref 13.5–17.5)
IMM GRANULOCYTES # BLD AUTO: 0.11 X10*3/UL (ref 0–0.5)
IMM GRANULOCYTES # BLD AUTO: 0.21 X10*3/UL (ref 0–0.5)
IMM GRANULOCYTES NFR BLD AUTO: 0.8 % (ref 0–0.9)
IMM GRANULOCYTES NFR BLD AUTO: 1.3 % (ref 0–0.9)
INHALED O2 CONCENTRATION: 40 %
INHALED O2 CONCENTRATION: 40 %
INR PPP: 1.3 (ref 0.9–1.1)
INR PPP: 1.3 (ref 0.9–1.1)
LACTATE BLDA-SCNC: 1.5 MMOL/L (ref 0.4–2)
LACTATE BLDA-SCNC: 1.6 MMOL/L (ref 0.4–2)
LACTATE SERPL-SCNC: 1.6 MMOL/L (ref 0.4–2)
LYMPHOCYTES # BLD AUTO: 0.73 X10*3/UL (ref 0.8–3)
LYMPHOCYTES # BLD AUTO: 1.4 X10*3/UL (ref 0.8–3)
LYMPHOCYTES NFR BLD AUTO: 5.5 %
LYMPHOCYTES NFR BLD AUTO: 8.8 %
MAGNESIUM SERPL-MCNC: 2.08 MG/DL (ref 1.6–2.4)
MAGNESIUM SERPL-MCNC: 2.11 MG/DL (ref 1.6–2.4)
MCH RBC QN AUTO: 29.1 PG (ref 26–34)
MCH RBC QN AUTO: 29.3 PG (ref 26–34)
MCHC RBC AUTO-ENTMCNC: 34.1 G/DL (ref 32–36)
MCHC RBC AUTO-ENTMCNC: 34.5 G/DL (ref 32–36)
MCV RBC AUTO: 85 FL (ref 80–100)
MCV RBC AUTO: 85 FL (ref 80–100)
MONOCYTES # BLD AUTO: 1.51 X10*3/UL (ref 0.05–0.8)
MONOCYTES # BLD AUTO: 1.8 X10*3/UL (ref 0.05–0.8)
MONOCYTES NFR BLD AUTO: 11.3 %
MONOCYTES NFR BLD AUTO: 11.4 %
NEUTROPHILS # BLD AUTO: 10.86 X10*3/UL (ref 1.6–5.5)
NEUTROPHILS # BLD AUTO: 12.26 X10*3/UL (ref 1.6–5.5)
NEUTROPHILS NFR BLD AUTO: 77.1 %
NEUTROPHILS NFR BLD AUTO: 81.7 %
NRBC BLD-RTO: 0 /100 WBCS (ref 0–0)
NRBC BLD-RTO: 0 /100 WBCS (ref 0–0)
OXYHGB MFR BLDA: 96 % (ref 94–98)
OXYHGB MFR BLDA: 97.3 % (ref 94–98)
PCO2 BLDA: 37 MM HG (ref 38–42)
PCO2 BLDA: 37 MM HG (ref 38–42)
PH BLDA: 7.4 PH (ref 7.38–7.42)
PH BLDA: 7.4 PH (ref 7.38–7.42)
PHOSPHATE SERPL-MCNC: 3.2 MG/DL (ref 2.5–4.9)
PHOSPHATE SERPL-MCNC: 3.4 MG/DL (ref 2.5–4.9)
PLATELET # BLD AUTO: 218 X10*3/UL (ref 150–450)
PLATELET # BLD AUTO: 241 X10*3/UL (ref 150–450)
PO2 BLDA: 82 MM HG (ref 85–95)
PO2 BLDA: 87 MM HG (ref 85–95)
POTASSIUM BLDA-SCNC: 4.6 MMOL/L (ref 3.5–5.3)
POTASSIUM BLDA-SCNC: 4.8 MMOL/L (ref 3.5–5.3)
POTASSIUM SERPL-SCNC: 4.6 MMOL/L (ref 3.5–5.3)
POTASSIUM SERPL-SCNC: 4.6 MMOL/L (ref 3.5–5.3)
POTASSIUM SERPL-SCNC: 4.7 MMOL/L (ref 3.5–5.3)
POTASSIUM SERPL-SCNC: 4.7 MMOL/L (ref 3.5–5.3)
PRODUCT BLOOD TYPE: 5100
PRODUCT BLOOD TYPE: 5100
PRODUCT CODE: NORMAL
PRODUCT CODE: NORMAL
PROT SERPL-MCNC: 4.6 G/DL (ref 6.4–8.2)
PROT SERPL-MCNC: 4.7 G/DL (ref 6.4–8.2)
PROT SERPL-MCNC: 5.3 G/DL (ref 6.4–8.2)
PROTHROMBIN TIME: 14.1 SECONDS (ref 9.8–12.8)
PROTHROMBIN TIME: 14.5 SECONDS (ref 9.8–12.8)
RBC # BLD AUTO: 2.32 X10*6/UL (ref 4.5–5.9)
RBC # BLD AUTO: 2.58 X10*6/UL (ref 4.5–5.9)
SAO2 % BLDA: 100 % (ref 94–100)
SAO2 % BLDA: 99 % (ref 94–100)
SODIUM BLDA-SCNC: 128 MMOL/L (ref 136–145)
SODIUM BLDA-SCNC: 128 MMOL/L (ref 136–145)
SODIUM SERPL-SCNC: 131 MMOL/L (ref 136–145)
SODIUM SERPL-SCNC: 131 MMOL/L (ref 136–145)
SODIUM SERPL-SCNC: 132 MMOL/L (ref 136–145)
SODIUM SERPL-SCNC: 133 MMOL/L (ref 136–145)
UNIT ABO: NORMAL
UNIT ABO: NORMAL
UNIT NUMBER: NORMAL
UNIT NUMBER: NORMAL
UNIT RH: NORMAL
UNIT RH: NORMAL
UNIT VOLUME: 324
UNIT VOLUME: 350
WBC # BLD AUTO: 13.3 X10*3/UL (ref 4.4–11.3)
WBC # BLD AUTO: 15.9 X10*3/UL (ref 4.4–11.3)
XM INTEP: NORMAL
XM INTEP: NORMAL

## 2025-01-03 PROCEDURE — 85025 COMPLETE CBC W/AUTO DIFF WBC: CPT

## 2025-01-03 PROCEDURE — 82248 BILIRUBIN DIRECT: CPT

## 2025-01-03 PROCEDURE — 0BH17EZ INSERTION OF ENDOTRACHEAL AIRWAY INTO TRACHEA, VIA NATURAL OR ARTIFICIAL OPENING: ICD-10-PCS | Performed by: STUDENT IN AN ORGANIZED HEALTH CARE EDUCATION/TRAINING PROGRAM

## 2025-01-03 PROCEDURE — 97530 THERAPEUTIC ACTIVITIES: CPT | Mod: GO

## 2025-01-03 PROCEDURE — 2020000001 HC ICU ROOM DAILY

## 2025-01-03 PROCEDURE — 87040 BLOOD CULTURE FOR BACTERIA: CPT

## 2025-01-03 PROCEDURE — 2500000004 HC RX 250 GENERAL PHARMACY W/ HCPCS (ALT 636 FOR OP/ED): Performed by: STUDENT IN AN ORGANIZED HEALTH CARE EDUCATION/TRAINING PROGRAM

## 2025-01-03 PROCEDURE — 71045 X-RAY EXAM CHEST 1 VIEW: CPT

## 2025-01-03 PROCEDURE — 84100 ASSAY OF PHOSPHORUS: CPT

## 2025-01-03 PROCEDURE — 2500000005 HC RX 250 GENERAL PHARMACY W/O HCPCS

## 2025-01-03 PROCEDURE — P9045 ALBUMIN (HUMAN), 5%, 250 ML: HCPCS | Mod: JZ,TB

## 2025-01-03 PROCEDURE — 2500000004 HC RX 250 GENERAL PHARMACY W/ HCPCS (ALT 636 FOR OP/ED): Performed by: INTERNAL MEDICINE

## 2025-01-03 PROCEDURE — 37799 UNLISTED PX VASCULAR SURGERY: CPT

## 2025-01-03 PROCEDURE — 37799 UNLISTED PX VASCULAR SURGERY: CPT | Performed by: SURGERY

## 2025-01-03 PROCEDURE — 84100 ASSAY OF PHOSPHORUS: CPT | Performed by: SURGERY

## 2025-01-03 PROCEDURE — 31500 INSERT EMERGENCY AIRWAY: CPT | Performed by: SURGERY

## 2025-01-03 PROCEDURE — 31500 INSERT EMERGENCY AIRWAY: CPT | Performed by: STUDENT IN AN ORGANIZED HEALTH CARE EDUCATION/TRAINING PROGRAM

## 2025-01-03 PROCEDURE — 36556 INSERT NON-TUNNEL CV CATH: CPT | Performed by: SURGERY

## 2025-01-03 PROCEDURE — 83605 ASSAY OF LACTIC ACID: CPT

## 2025-01-03 PROCEDURE — 87075 CULTR BACTERIA EXCEPT BLOOD: CPT

## 2025-01-03 PROCEDURE — 36415 COLL VENOUS BLD VENIPUNCTURE: CPT

## 2025-01-03 PROCEDURE — 2500000004 HC RX 250 GENERAL PHARMACY W/ HCPCS (ALT 636 FOR OP/ED): Mod: JZ,TB

## 2025-01-03 PROCEDURE — 2500000001 HC RX 250 WO HCPCS SELF ADMINISTERED DRUGS (ALT 637 FOR MEDICARE OP)

## 2025-01-03 PROCEDURE — 2500000005 HC RX 250 GENERAL PHARMACY W/O HCPCS: Performed by: STUDENT IN AN ORGANIZED HEALTH CARE EDUCATION/TRAINING PROGRAM

## 2025-01-03 PROCEDURE — 82435 ASSAY OF BLOOD CHLORIDE: CPT

## 2025-01-03 PROCEDURE — 84295 ASSAY OF SERUM SODIUM: CPT

## 2025-01-03 PROCEDURE — 83735 ASSAY OF MAGNESIUM: CPT

## 2025-01-03 PROCEDURE — 85610 PROTHROMBIN TIME: CPT

## 2025-01-03 PROCEDURE — 99291 CRITICAL CARE FIRST HOUR: CPT | Performed by: SURGERY

## 2025-01-03 PROCEDURE — 90945 DIALYSIS ONE EVALUATION: CPT | Performed by: INTERNAL MEDICINE

## 2025-01-03 PROCEDURE — 80047 BASIC METABLC PNL IONIZED CA: CPT | Performed by: SURGERY

## 2025-01-03 PROCEDURE — 94003 VENT MGMT INPAT SUBQ DAY: CPT

## 2025-01-03 PROCEDURE — 2500000004 HC RX 250 GENERAL PHARMACY W/ HCPCS (ALT 636 FOR OP/ED): Performed by: PHYSICIAN ASSISTANT

## 2025-01-03 PROCEDURE — 71045 X-RAY EXAM CHEST 1 VIEW: CPT | Performed by: RADIOLOGY

## 2025-01-03 PROCEDURE — 99232 SBSQ HOSP IP/OBS MODERATE 35: CPT | Performed by: SURGERY

## 2025-01-03 PROCEDURE — 87070 CULTURE OTHR SPECIMN AEROBIC: CPT

## 2025-01-03 PROCEDURE — 5A1955Z RESPIRATORY VENTILATION, GREATER THAN 96 CONSECUTIVE HOURS: ICD-10-PCS | Performed by: STUDENT IN AN ORGANIZED HEALTH CARE EDUCATION/TRAINING PROGRAM

## 2025-01-03 PROCEDURE — 82947 ASSAY GLUCOSE BLOOD QUANT: CPT

## 2025-01-03 RX ORDER — ALBUMIN HUMAN 50 G/1000ML
25 SOLUTION INTRAVENOUS ONCE
Status: DISCONTINUED | OUTPATIENT
Start: 2025-01-03 | End: 2025-01-04

## 2025-01-03 RX ORDER — ETOMIDATE 2 MG/ML
20 INJECTION INTRAVENOUS ONCE
Status: COMPLETED | OUTPATIENT
Start: 2025-01-03 | End: 2025-01-03

## 2025-01-03 RX ORDER — MEROPENEM AND SODIUM CHLORIDE 1 G/50ML
1 INJECTION, SOLUTION INTRAVENOUS EVERY 12 HOURS
Status: DISCONTINUED | OUTPATIENT
Start: 2025-01-03 | End: 2025-01-04

## 2025-01-03 RX ORDER — GABAPENTIN 100 MG/1
100 CAPSULE ORAL DAILY
Status: DISCONTINUED | OUTPATIENT
Start: 2025-01-04 | End: 2025-01-03

## 2025-01-03 RX ORDER — ACETAMINOPHEN 325 MG/1
650 TABLET ORAL EVERY 6 HOURS PRN
Status: DISCONTINUED | OUTPATIENT
Start: 2025-01-03 | End: 2025-01-05

## 2025-01-03 RX ORDER — SUCCINYLCHOLINE CHLORIDE 20 MG/ML
100 INJECTION INTRAMUSCULAR; INTRAVENOUS ONCE
Status: COMPLETED | OUTPATIENT
Start: 2025-01-03 | End: 2025-01-03

## 2025-01-03 RX ORDER — MICAFUNGIN SODIUM 100 MG/5ML
100 INJECTION, POWDER, LYOPHILIZED, FOR SOLUTION INTRAVENOUS EVERY 24 HOURS
Status: DISCONTINUED | OUTPATIENT
Start: 2025-01-03 | End: 2025-01-05

## 2025-01-03 RX ORDER — VASOPRESSIN 20 UNIT/100 ML (0.2 UNIT/ML) IN 0.9 % SODIUM CHLORIDE IV
0.03 SOLUTION CONTINUOUS
Status: DISCONTINUED | OUTPATIENT
Start: 2025-01-03 | End: 2025-01-03

## 2025-01-03 RX ADMIN — CALCIUM CHLORIDE, MAGNESIUM CHLORIDE, DEXTROSE MONOHYDRATE, LACTIC ACID, SODIUM CHLORIDE, SODIUM BICARBONATE AND POTASSIUM CHLORIDE 26 ML/KG/HR: 5.15; 2.03; 22; 5.4; 6.46; 3.09; .157 INJECTION INTRAVENOUS at 09:00

## 2025-01-03 RX ADMIN — METHOCARBAMOL TABLETS 500 MG: 500 TABLET, COATED ORAL at 11:41

## 2025-01-03 RX ADMIN — ETOMIDATE 20 MG: 2 INJECTION INTRAVENOUS at 13:19

## 2025-01-03 RX ADMIN — ALBUMIN HUMAN 25 G: 0.05 INJECTION, SOLUTION INTRAVENOUS at 22:24

## 2025-01-03 RX ADMIN — SUCCINYLCHOLINE CHLORIDE 100 MG: 20 INJECTION, SOLUTION INTRAMUSCULAR; INTRAVENOUS at 13:19

## 2025-01-03 RX ADMIN — HEPARIN SODIUM 5000 UNITS: 5000 INJECTION INTRAVENOUS; SUBCUTANEOUS at 17:53

## 2025-01-03 RX ADMIN — GABAPENTIN 200 MG: 100 CAPSULE ORAL at 08:32

## 2025-01-03 RX ADMIN — Medication 40 PERCENT: at 08:00

## 2025-01-03 RX ADMIN — LEUCINE, PHENYLALANINE, LYSINE HYDROCHLORIDE, METHIONINE, ISOLEUCINE, VALINE, HISTIDINE, THREONINE, TRYPTOPHAN, ALANINE, GLYCINE, ARGININE, PROLINE, TYROSINE, SERINE 25 G: 730; 560; 580; 400; 600; 580; 480; 420; 180; 2.07; 1.03; 1.15; 680; 40; 5 INJECTION INTRAVENOUS at 08:32

## 2025-01-03 RX ADMIN — Medication 40 PERCENT: at 00:44

## 2025-01-03 RX ADMIN — HEPARIN SODIUM 5000 UNITS: 5000 INJECTION INTRAVENOUS; SUBCUTANEOUS at 02:28

## 2025-01-03 RX ADMIN — METHOCARBAMOL TABLETS 500 MG: 500 TABLET, COATED ORAL at 17:53

## 2025-01-03 RX ADMIN — METHOCARBAMOL TABLETS 500 MG: 500 TABLET, COATED ORAL at 06:12

## 2025-01-03 RX ADMIN — FAMOTIDINE 20 MG: 10 INJECTION INTRAVENOUS at 08:32

## 2025-01-03 RX ADMIN — DEXMEDETOMIDINE HYDROCHLORIDE 0.4 MCG/KG/HR: 400 INJECTION INTRAVENOUS at 09:06

## 2025-01-03 RX ADMIN — CALCIUM CHLORIDE, MAGNESIUM CHLORIDE, DEXTROSE MONOHYDRATE, LACTIC ACID, SODIUM CHLORIDE, SODIUM BICARBONATE AND POTASSIUM CHLORIDE 26 ML/KG/HR: 5.15; 2.03; 22; 5.4; 6.46; 3.09; .157 INJECTION INTRAVENOUS at 09:01

## 2025-01-03 RX ADMIN — METHOCARBAMOL TABLETS 500 MG: 500 TABLET, COATED ORAL at 00:40

## 2025-01-03 RX ADMIN — DEXMEDETOMIDINE HYDROCHLORIDE 0.4 MCG/KG/HR: 400 INJECTION INTRAVENOUS at 20:35

## 2025-01-03 RX ADMIN — SODIUM CHLORIDE, POTASSIUM CHLORIDE, SODIUM LACTATE AND CALCIUM CHLORIDE 1000 ML: 600; 310; 30; 20 INJECTION, SOLUTION INTRAVENOUS at 22:48

## 2025-01-03 RX ADMIN — HEPARIN SODIUM 5000 UNITS: 5000 INJECTION INTRAVENOUS; SUBCUTANEOUS at 09:06

## 2025-01-03 RX ADMIN — Medication 40 PERCENT: at 20:21

## 2025-01-03 RX ADMIN — ACETAMINOPHEN 650 MG: 325 TABLET ORAL at 21:08

## 2025-01-03 RX ADMIN — CALCIUM CHLORIDE, MAGNESIUM CHLORIDE, DEXTROSE MONOHYDRATE, LACTIC ACID, SODIUM CHLORIDE, SODIUM BICARBONATE AND POTASSIUM CHLORIDE 26 ML/KG/HR: 5.15; 2.03; 22; 5.4; 6.46; 3.09; .157 INJECTION INTRAVENOUS at 09:02

## 2025-01-03 ASSESSMENT — COGNITIVE AND FUNCTIONAL STATUS - GENERAL
TOILETING: TOTAL
EATING MEALS: TOTAL
HELP NEEDED FOR BATHING: TOTAL
DRESSING REGULAR UPPER BODY CLOTHING: TOTAL
DRESSING REGULAR LOWER BODY CLOTHING: TOTAL
PERSONAL GROOMING: TOTAL
DAILY ACTIVITIY SCORE: 6

## 2025-01-03 ASSESSMENT — PAIN - FUNCTIONAL ASSESSMENT
PAIN_FUNCTIONAL_ASSESSMENT: CPOT (CRITICAL CARE PAIN OBSERVATION TOOL)

## 2025-01-03 ASSESSMENT — PAIN SCALES - GENERAL
PAINLEVEL_OUTOF10: 0 - NO PAIN
PAINLEVEL_OUTOF10: 0 - NO PAIN

## 2025-01-03 NOTE — PROGRESS NOTES
[facility]  TRAUMA ICU - ATTENDING PROGRESS NOTE    Patient Name: Ike Gar  MRN: [id number]  : 1945  AGE: 79 y.o.   GENDER: male  ==============================================================================    1/3/2025  10:53 AM    I evaluated and examined the patient with the critical care team. As a multidisciplinary team, we discussed all findings, as well as assessment and plan. I personally spent 35 minutes of critical care time excluding procedures at the bedside with the patient. I personally reviewed all labs, results and studies. My independent note with assessment and plan are below:    Neurologic:        Analgesia: oxycodone, robaxin, pain block       Sedation: precidex  HEENT: none  Cardiovascular:        Blunt cardiac injury, resolved       Undifferentiated shock, resolved   Vasopressors: none  Pulmonary:        Multiple rib fx (L 1,3, 8,9, 11, 12) (R 6, 7,9), Sternal fx  Nerve blocks   Right hemothorax, pigtail, WS  Left hemothorax, pigtail, WS       Acute hypoxic respiratory failure   Extubated , reintubated , tube exchange planned 1/3   GOC planned, possible tracheostomy   Daily CXR  Oxygen requirement: CMV 20/450/8/70  Gastrointestinal:        Grade 2 liver laceration       Elevated bilirubin, mixed, history of gilberts       Small bowel injury:   : Ex-lap, TENNILLE, SBR X2, GRACIELA x3, discontinuity, ABTHERA  : Ex-lap, washout, SBR, ileocecetomy, ABTHERA  : Ex-lap, washout, ABTHERA  : partial omentectomy, ABTHERA  : partial hepatectomy, end jejunostomy with mucous fistula creation, fascial closure       Dysphagia, failed MBS, recheck weekly       Diet: DC TPN; TF at goal; monitor ostomy output       GI prophylaxis: DC pepcid  Renal/:        Acute renal failure, anuric, CVVH, goal net 0       Clark catheter: no clark, daily bladder scan  Hematologic:       Anemia of chronic disease, transfuse to hemoglobin >7       Central  line: HD line (12/30), central line       Arterial line: yes, maintain       DVT prophylaxis: SCD's; heparin  Musculoskeletal:       T5 vertebral body fracture, L4/L5 superior endplate fracture   Non-op   TLSO       ICU Mobility Score: 3       Skin breakdown: none       Restraints: yes, renew  Endocrine:       MARLYS       Glucose control <180, POC glucose checks and insulin sliding scale  ID:       Completed empiric vancomycin, meropenem; trend leukocytosis       Antibiotics: none    Disposition: The patient remains remains critically ill.    Willi Murillo MD

## 2025-01-03 NOTE — PROCEDURES
I evaluated the patient during while he was receiving CVVH.    BP: 126/53  BFR: 200  Replacement fluid: Prismasol 2K/3.5Ca  Flow rate: 800/pump/800/filter/800    Plan: Can stop CVVH today and consider SLED or IHD tomorrow.
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
## 2025-01-03 NOTE — PROCEDURES
Intubation    Date/Time: 1/3/2025 3:19 PM    Performed by: Elvie De Leon MD  Authorized by: Elvie De Leon MD    Consent:     Consent obtained:  Verbal    Consent given by:  Healthcare agent    Risks, benefits, and alternatives were discussed: yes      Risks discussed:  Aspiration, bleeding, hypoxia and pneumothorax    Alternatives discussed:  Delayed treatment  Universal protocol:     Procedure explained and questions answered to patient or proxy's satisfaction: yes      Relevant documents present and verified: yes      Test results available: yes      Imaging studies available: yes      Required blood products, implants, devices, and special equipment available: yes      Site/side marked: yes      Immediately prior to procedure, a time out was called: yes      Patient identity confirmed:  Hospital-assigned identification number and arm band  Pre-procedure details:     Indications: airway protection      Patient status:  Altered mental status    Look externally: no concerns      Neck mobility: normal      Pharmacologic strategy: RSI      Induction agents:  Etomidate    Paralytics:  Succinylcholine  Procedure details:     Preoxygenation: ventilator.  Successful intubation attempt details:     Intubation technique: video assisted      Laryngoscope blade:  Mac 4    Bougie used: yes      Grade view: I      Tube size (mm):  8.0    Tube type:  Cuffed    Tube visualized through cords: yes    Placement assessment:     ETT at teeth/gumline (cm):  25    Tube secured with:  ETT nair    Breath sounds:  Equal    Placement verification: chest rise, colorimetric ETCO2 and equal breath sounds      CXR findings:  Appropriate position  Post-procedure details:     Procedure completion:  Tolerated well, no immediate complications  Comments:      Prior 7 ET tube exchanged over bougie under direct visualization for 8 ET tube due to persistent air leak. Tolerated well.         Elvie De Leon MD   Surgical Critical Care  Fellow

## 2025-01-03 NOTE — CARE PLAN
The patient's goals for the shift include  SUSAN    The clinical goals for the shift include patient will remain HDS while titrating vasopressors    Over the shift, the patient did not make progress toward the following goals. Barriers to progression include blood pressure labile with fluid shifting. Recommendations to address these barriers include slow repositioning and allowing adequate time for fluid to shift before changing positions to normalize blood pressures.      Problem: Safety - Medical Restraint  Goal: Remains free of injury from restraints (Restraint for Interference with Medical Device)  Outcome: Progressing  Goal: Free from restraint(s) (Restraint for Interference with Medical Device)  Outcome: Progressing     Problem: Pain - Adult  Goal: Verbalizes/displays adequate comfort level or baseline comfort level  Outcome: Progressing     Problem: Safety - Adult  Goal: Free from fall injury  Outcome: Progressing     Problem: Discharge Planning  Goal: Discharge to home or other facility with appropriate resources  Outcome: Progressing     Problem: Chronic Conditions and Co-morbidities  Goal: Patient's chronic conditions and co-morbidity symptoms are monitored and maintained or improved  Outcome: Progressing     Problem: Skin  Goal: Decreased wound size/increased tissue granulation at next dressing change  Outcome: Progressing  Flowsheets (Taken 1/2/2025 2329)  Decreased wound size/increased tissue granulation at next dressing change:   Promote sleep for wound healing   Protective dressings over bony prominences   Utilize specialty bed per algorithm  Goal: Participates in plan/prevention/treatment measures  Outcome: Progressing  Flowsheets (Taken 1/2/2025 2329)  Participates in plan/prevention/treatment measures:   Discuss with provider PT/OT consult   Elevate heels   Increase activity/out of bed for meals  Goal: Prevent/manage excess moisture  Outcome: Progressing  Flowsheets (Taken 1/2/2025  2329)  Prevent/manage excess moisture:   Cleanse incontinence/protect with barrier cream   Monitor for/manage infection if present   Follow provider orders for dressing changes   Moisturize dry skin  Goal: Prevent/minimize sheer/friction injuries  Outcome: Progressing  Flowsheets (Taken 1/2/2025 2329)  Prevent/minimize sheer/friction injuries:   Complete micro-shifts as needed if patient unable. Adjust patient position to relieve pressure points, not a full turn   Increase activity/out of bed for meals   Use pull sheet   HOB 30 degrees or less   Turn/reposition every 2 hours/use positioning/transfer devices   Utilize specialty bed per algorithm  Goal: Promote/optimize nutrition  Outcome: Progressing  Flowsheets (Taken 1/2/2025 2329)  Promote/optimize nutrition:   Discuss with provider if NPO > 2 days   Reassess MST if dietician not consulted  Goal: Promote skin healing  Outcome: Progressing  Flowsheets (Taken 1/2/2025 2329)  Promote skin healing:   Assess skin/pad under line(s)/device(s)   Protective dressings over bony prominences   Turn/reposition every 2 hours/use positioning/transfer devices   Ensure correct size (line/device) and apply per  instructions   Rotate device position/do not position patient on device     Problem: Fall/Injury  Goal: Not fall by end of shift  Outcome: Progressing  Goal: Be free from injury by end of the shift  Outcome: Progressing  Goal: Verbalize understanding of personal risk factors for fall in the hospital  Outcome: Progressing  Goal: Verbalize understanding of risk factor reduction measures to prevent injury from fall in the home  Outcome: Progressing  Goal: Use assistive devices by end of the shift  Outcome: Progressing  Goal: Pace activities to prevent fatigue by end of the shift  Outcome: Progressing     Problem: Pain  Goal: Takes deep breaths with improved pain control throughout the shift  Outcome: Progressing  Goal: Turns in bed with improved pain control  throughout the shift  Outcome: Progressing  Goal: Walks with improved pain control throughout the shift  Outcome: Progressing  Goal: Performs ADL's with improved pain control throughout shift  Outcome: Progressing  Goal: Participates in PT with improved pain control throughout the shift  Outcome: Progressing  Goal: Free from opioid side effects throughout the shift  Outcome: Progressing  Goal: Free from acute confusion related to pain meds throughout the shift  Outcome: Progressing     Problem: Respiratory  Goal: Clear secretions with interventions this shift  Outcome: Progressing  Goal: Minimize anxiety/maximize coping throughout shift  Outcome: Progressing  Goal: Minimal/no exertional discomfort or dyspnea this shift  Outcome: Progressing  Goal: No signs of respiratory distress (eg. Use of accessory muscles. Peds grunting)  Outcome: Progressing  Goal: Patent airway maintained this shift  Outcome: Progressing  Goal: Tolerate mechanical ventilation evidenced by VS/agitation level this shift  Outcome: Progressing  Goal: Tolerate pulmonary toileting this shift  Outcome: Progressing  Goal: Verbalize decreased shortness of breath this shift  Outcome: Progressing  Goal: Wean oxygen to maintain O2 saturation per order/standard this shift  Outcome: Progressing  Goal: Increase self care and/or family involvement in next 24 hours  Outcome: Progressing     Problem: Swallowing  Goal: LTG - Patient will demonstrate safe swallowing Intervention/techniques  Outcome: Progressing

## 2025-01-03 NOTE — CARE PLAN
The patient's goals for the shift include      The clinical goals for the shift include HDS      Problem: Safety - Medical Restraint  Goal: Remains free of injury from restraints (Restraint for Interference with Medical Device)  Outcome: Progressing  Goal: Free from restraint(s) (Restraint for Interference with Medical Device)  Outcome: Progressing     Problem: Pain - Adult  Goal: Verbalizes/displays adequate comfort level or baseline comfort level  Outcome: Progressing     Problem: Safety - Adult  Goal: Free from fall injury  Outcome: Progressing     Problem: Discharge Planning  Goal: Discharge to home or other facility with appropriate resources  Outcome: Progressing     Problem: Chronic Conditions and Co-morbidities  Goal: Patient's chronic conditions and co-morbidity symptoms are monitored and maintained or improved  Outcome: Progressing     Problem: Skin  Goal: Decreased wound size/increased tissue granulation at next dressing change  Outcome: Progressing  Goal: Participates in plan/prevention/treatment measures  Outcome: Progressing  Goal: Prevent/manage excess moisture  Outcome: Progressing  Goal: Prevent/minimize sheer/friction injuries  Outcome: Progressing  Goal: Promote/optimize nutrition  Outcome: Progressing  Goal: Promote skin healing  Outcome: Progressing     Problem: Fall/Injury  Goal: Not fall by end of shift  Outcome: Progressing  Goal: Be free from injury by end of the shift  Outcome: Progressing  Goal: Verbalize understanding of personal risk factors for fall in the hospital  Outcome: Progressing  Goal: Verbalize understanding of risk factor reduction measures to prevent injury from fall in the home  Outcome: Progressing  Goal: Use assistive devices by end of the shift  Outcome: Progressing  Goal: Pace activities to prevent fatigue by end of the shift  Outcome: Progressing     Problem: Pain  Goal: Takes deep breaths with improved pain control throughout the shift  Outcome: Progressing  Goal:  Turns in bed with improved pain control throughout the shift  Outcome: Progressing  Goal: Walks with improved pain control throughout the shift  Outcome: Progressing  Goal: Performs ADL's with improved pain control throughout shift  Outcome: Progressing  Goal: Participates in PT with improved pain control throughout the shift  Outcome: Progressing  Goal: Free from opioid side effects throughout the shift  Outcome: Progressing  Goal: Free from acute confusion related to pain meds throughout the shift  Outcome: Progressing     Problem: Respiratory  Goal: Clear secretions with interventions this shift  Outcome: Progressing  Goal: Minimize anxiety/maximize coping throughout shift  Outcome: Progressing  Goal: Minimal/no exertional discomfort or dyspnea this shift  Outcome: Progressing  Goal: No signs of respiratory distress (eg. Use of accessory muscles. Peds grunting)  Outcome: Progressing  Goal: Patent airway maintained this shift  Outcome: Progressing  Goal: Tolerate mechanical ventilation evidenced by VS/agitation level this shift  Outcome: Progressing  Goal: Tolerate pulmonary toileting this shift  Outcome: Progressing  Goal: Verbalize decreased shortness of breath this shift  Outcome: Progressing  Goal: Wean oxygen to maintain O2 saturation per order/standard this shift  Outcome: Progressing  Goal: Increase self care and/or family involvement in next 24 hours  Outcome: Progressing     Problem: Swallowing  Goal: LTG - Patient will demonstrate safe swallowing Intervention/techniques  Outcome: Progressing

## 2025-01-03 NOTE — NURSING NOTE
1301: Tube exchange, jessica performed    1302: Etomidate given    1303: 100 succ given    1307: 8.0 ett @ 25 lip    1307: + color change

## 2025-01-03 NOTE — PROGRESS NOTES
Occupational Therapy    OT Treatment    Patient Name: Ike Gar  MRN: [id number]  Department: [facility] TSICU  Room: 16/16-A  Today's Date: 1/3/2025  Time Calculation  Start Time: 1437  Stop Time: 1501  Time Calculation (min): 24 min        Assessment:  OT Assessment: Ike continues to require skilled OT intervention to address current functional goals and return to PLOF  Barriers to Discharge Home: Caregiver assistance, Physical needs, Cognition needs  Caregiver Assistance: Caregiver assistance needed per identified barriers - however, level of patient's required assistance exceeds assistance available at home  Cognition Needs: 24hr supervision for safety awareness needed  Physical Needs: 24hr mobility assistance needed, 24hr ADL assistance needed     Plan:  Treatment Interventions: ADL retraining, Functional transfer training, UE strengthening/ROM, Cognitive reorientation, Patient/family training, Equipment evaluation/education, Neuromuscular reeducation, Compensatory technique education  OT Frequency: 3 times per week  OT Discharge Recommendations: Moderate intensity level of continued care  Equipment Recommended upon Discharge:  (TBD)  OT Recommended Transfer Status: Dependent  OT - OK to Discharge: Yes  Treatment Interventions: ADL retraining, Functional transfer training, UE strengthening/ROM, Cognitive reorientation, Patient/family training, Equipment evaluation/education, Neuromuscular reeducation, Compensatory technique education    Subjective   Previous Visit Info:  OT Last Visit  OT Received On: 01/03/25  General:  General  Prior to Session Communication: Bedside nurse  Patient Position Received: Bed, 3 rail up  Preferred Learning Style: auditory, verbal, visual  General Comment: Pt supine in bed upon entry to room, multiple nurses in room to assist wth bed change, bathing and repositioning. OT entering to assist with doffing/donning brace appropriately, currently under gown and pt has skin  wear.  Precautions:  Medical Precautions: Fall precautions, Spinal precautions (MITT precautions)  Post-Surgical Precautions: Abdominal surgery precautions  Braces Applied: TLSO needed for mobility - ABD binder on to protect skin/incisions due to multiple drains, ostomy, and CTs.    Vital Signs (Past 2hrs)        Date/Time Vitals Session Patient Position Pulse Resp SpO2 BP MAP (mmHg)    01/03/25 1330 --  --  71  22  94 %  135/51  74     01/03/25 1345 --  --  73  23  93 %  --  --     01/03/25 1400 --  --  79  25  88 %  138/123  130     01/03/25 1415 --  --  75  22  94 %  --  --     01/03/25 1430 --  --  73  25  92 %  --  55     01/03/25 1445 --  --  79  23  96 %  --  --     01/03/25 1500 --  --  84  25  94 %  144/59  82     01/03/25 1515 --  --  85  24  95 %  --  --                         Pain:  Pain Assessment  Pain Assessment: CPOT (Critical Care Pain Observation Tool)  0-10 (Numeric) Pain Score: 0 - No pain    Objective    Cognition:  Cognition  Overall Cognitive Status: Impaired  l Standing Tolerance:     Bed Mobility/Transfers: Bed Mobility  Bed Mobility: Yes  Bed Mobility 1  Bed Mobility 1: Rolling right, Rolling left  Level of Assistance 1: Dependent (x2)  Bed Mobility Comments 1: bed flat, x2 rolls each direction for bedding, pads, abdominal binder and TLSO positioning       Therapy/Activity: Therapeutic Activity  Therapeutic Activity Performed: Yes  Therapeutic Activity 1: Additional time in room to assist nursing with appropriate fitting and wear of TLSO. Pt in supine for entire session. Dependent x2 for rolling each way. therapist doffing old abdominal binder and adjusting TLSO. Pt demos proper fit    Outcome Measures:[facility] Daily Activity  Putting on and taking off regular lower body clothing: Total  Bathing (including washing, rinsing, drying): Total  Putting on and taking off regular upper body clothing: Total  Toileting, which includes using toilet, bedpan or urinal: Total  Taking care of personal  grooming such as brushing teeth: Total  Eating Meals: Total  Daily Activity - Total Score: 6        , Confusion Assessment Method-ICU (CAM-ICU)  Feature 1: Acute Onset or Fluctuating Course: Positive  Feature 2: Inattention: Positive  Feature 4: Disorganized Thinking: Positive  Overall CAM-ICU: Positive  , and Early Mobility/Exercise Safety Screen: Proceed with mobilization - No exclusion criteria met  ICU Mobility Scale: Sitting in bed, exercises in bed [1]    Education Documentation  Body Mechanics, taught by Shabana Ordonez OT at 1/3/2025  3:19 PM.  Learner: Patient  Readiness: Acceptance  Method: Explanation  Response: Needs Reinforcement  Comment: educated on OT POC, TLSO donning, upright activity    Precautions, taught by Shabana Ordonez OT at 1/3/2025  3:19 PM.  Learner: Patient  Readiness: Acceptance  Method: Explanation  Response: Needs Reinforcement  Comment: educated on OT POC, TLSO donning, upright activity    ADL Training, taught by Shabana Ordonez OT at 1/3/2025  3:19 PM.  Learner: Patient  Readiness: Acceptance  Method: Explanation  Response: Needs Reinforcement  Comment: educated on OT POC, TLSO donning, upright activity    Education Comments  No comments found.        OP EDUCATION:       Goals:  Encounter Problems       Encounter Problems (Active)       ADLs       Patient will complete daily grooming tasks  with set-up level of assistance and PRN adaptive equipment while supported sitting. (Not Progressing)       Start:  12/30/24    Expected End:  01/20/25               BALANCE       Pt will maintain dynamic sitting  balance during ADL task with moderate assist level of assistance in order to demonstrate decreased risk of falling and improved postural control. (Not Progressing)       Start:  12/30/24    Expected End:  01/20/25               COGNITION/SAFETY       Patient will recall and adhere to spinal, MITT and abdominal precautions during all functional mobility/ADL tasks in order to demonstrate  improved understanding and promote healing post op (Progressing)       Start:  12/30/24    Expected End:  01/20/25            Patient will score WFL on standardized cognitive assessment with visual cues and within reasonable time frame (Not Progressing)       Start:  12/30/24    Expected End:  01/20/25            Patient will follow >75% Simple commands to allow improved ADL performance. (Not Progressing)       Start:  12/30/24    Expected End:  01/20/25               TRANSFERS       Patient will perform bed mobility moderate assist level of assistance and bed rails and draw sheet in order to improve safety and independence with mobility (Not Progressing)       Start:  12/30/24    Expected End:  01/20/25            Patient will complete functional transfer to chair/BSC with least restrictive device with moderate assist level of assistance. (Not Progressing)       Start:  12/30/24    Expected End:  01/20/25 01/03/25 at 3:21 PM - Shabana Ordonez OT

## 2025-01-04 ENCOUNTER — APPOINTMENT (OUTPATIENT)
Dept: RADIOLOGY | Facility: HOSPITAL | Age: 80
End: 2025-01-04
Payer: MEDICARE

## 2025-01-04 LAB
ABO GROUP (TYPE) IN BLOOD: NORMAL
ALBUMIN SERPL BCP-MCNC: 1.9 G/DL (ref 3.4–5)
ALBUMIN SERPL BCP-MCNC: 2 G/DL (ref 3.4–5)
ALP SERPL-CCNC: 153 U/L (ref 33–136)
ALT SERPL W P-5'-P-CCNC: 90 U/L (ref 10–52)
ANION GAP BLDA CALCULATED.4IONS-SCNC: 10 MMO/L (ref 10–25)
ANION GAP BLDA CALCULATED.4IONS-SCNC: 9 MMO/L (ref 10–25)
ANION GAP SERPL CALC-SCNC: 14 MMOL/L (ref 10–20)
ANION GAP SERPL CALC-SCNC: 14 MMOL/L (ref 10–20)
ANTIBODY SCREEN: NORMAL
APPEARANCE UR: ABNORMAL
APTT PPP: 25 SECONDS (ref 27–38)
APTT PPP: 27 SECONDS (ref 27–38)
AST SERPL W P-5'-P-CCNC: 78 U/L (ref 9–39)
BASE EXCESS BLDA CALC-SCNC: -2.1 MMOL/L (ref -2–3)
BASE EXCESS BLDA CALC-SCNC: -2.7 MMOL/L (ref -2–3)
BASOPHILS # BLD AUTO: 0.05 X10*3/UL (ref 0–0.1)
BASOPHILS NFR BLD AUTO: 0.3 %
BASOPHILS NFR FLD MANUAL: 0 %
BILIRUB DIRECT SERPL-MCNC: 4.5 MG/DL (ref 0–0.3)
BILIRUB SERPL-MCNC: 6.6 MG/DL (ref 0–1.2)
BILIRUB UR STRIP.AUTO-MCNC: NEGATIVE MG/DL
BLASTS NFR FLD MANUAL: 0 %
BODY TEMPERATURE: 37 DEGREES CELSIUS
BODY TEMPERATURE: 37 DEGREES CELSIUS
BUN SERPL-MCNC: 72 MG/DL (ref 6–23)
BUN SERPL-MCNC: 76 MG/DL (ref 6–23)
CA-I BLDA-SCNC: 1.2 MMOL/L (ref 1.1–1.33)
CA-I BLDA-SCNC: 1.25 MMOL/L (ref 1.1–1.33)
CALCIUM SERPL-MCNC: 7.4 MG/DL (ref 8.6–10.6)
CALCIUM SERPL-MCNC: 7.8 MG/DL (ref 8.6–10.6)
CHLORIDE BLDA-SCNC: 101 MMOL/L (ref 98–107)
CHLORIDE BLDA-SCNC: 102 MMOL/L (ref 98–107)
CHLORIDE SERPL-SCNC: 100 MMOL/L (ref 98–107)
CHLORIDE SERPL-SCNC: 98 MMOL/L (ref 98–107)
CLARITY FLD: ABNORMAL
CO2 SERPL-SCNC: 21 MMOL/L (ref 21–32)
CO2 SERPL-SCNC: 22 MMOL/L (ref 21–32)
COLOR FLD: YELLOW
COLOR UR: ABNORMAL
CREAT SERPL-MCNC: 3.46 MG/DL (ref 0.5–1.3)
CREAT SERPL-MCNC: 4.1 MG/DL (ref 0.5–1.3)
EGFRCR SERPLBLD CKD-EPI 2021: 14 ML/MIN/1.73M*2
EGFRCR SERPLBLD CKD-EPI 2021: 17 ML/MIN/1.73M*2
EOSINOPHIL # BLD AUTO: 0.11 X10*3/UL (ref 0–0.4)
EOSINOPHIL NFR BLD AUTO: 0.7 %
EOSINOPHIL NFR FLD MANUAL: 0 %
ERYTHROCYTE [DISTWIDTH] IN BLOOD BY AUTOMATED COUNT: 17.2 % (ref 11.5–14.5)
ERYTHROCYTE [DISTWIDTH] IN BLOOD BY AUTOMATED COUNT: 17.2 % (ref 11.5–14.5)
ERYTHROCYTE [DISTWIDTH] IN BLOOD BY AUTOMATED COUNT: 17.9 % (ref 11.5–14.5)
GLUCOSE BLD MANUAL STRIP-MCNC: 112 MG/DL (ref 74–99)
GLUCOSE BLD MANUAL STRIP-MCNC: 89 MG/DL (ref 74–99)
GLUCOSE BLD MANUAL STRIP-MCNC: 92 MG/DL (ref 74–99)
GLUCOSE BLDA-MCNC: 114 MG/DL (ref 74–99)
GLUCOSE BLDA-MCNC: 90 MG/DL (ref 74–99)
GLUCOSE SERPL-MCNC: 114 MG/DL (ref 74–99)
GLUCOSE SERPL-MCNC: 89 MG/DL (ref 74–99)
GLUCOSE UR STRIP.AUTO-MCNC: ABNORMAL MG/DL
HCO3 BLDA-SCNC: 20.9 MMOL/L (ref 22–26)
HCO3 BLDA-SCNC: 21.9 MMOL/L (ref 22–26)
HCT VFR BLD AUTO: 18.4 % (ref 41–52)
HCT VFR BLD AUTO: 19.9 % (ref 41–52)
HCT VFR BLD AUTO: 20.2 % (ref 41–52)
HCT VFR BLD EST: 22 % (ref 41–52)
HCT VFR BLD EST: 23 % (ref 41–52)
HGB BLD-MCNC: 6.6 G/DL (ref 13.5–17.5)
HGB BLD-MCNC: 6.8 G/DL (ref 13.5–17.5)
HGB BLD-MCNC: 7.2 G/DL (ref 13.5–17.5)
HGB BLDA-MCNC: 7.4 G/DL (ref 13.5–17.5)
HGB BLDA-MCNC: 7.7 G/DL (ref 13.5–17.5)
HOLD SPECIMEN: NORMAL
IMM GRANULOCYTES # BLD AUTO: 0.25 X10*3/UL (ref 0–0.5)
IMM GRANULOCYTES NFR BLD AUTO: 1.6 % (ref 0–0.9)
IMMATURE GRANULOCYTES IN FLUID: 0 %
INHALED O2 CONCENTRATION: 100 %
INHALED O2 CONCENTRATION: 40 %
INR PPP: 1.3 (ref 0.9–1.1)
INR PPP: 1.5 (ref 0.9–1.1)
KETONES UR STRIP.AUTO-MCNC: NEGATIVE MG/DL
LACTATE BLDA-SCNC: 1.1 MMOL/L (ref 0.4–2)
LACTATE BLDA-SCNC: 1.3 MMOL/L (ref 0.4–2)
LACTATE SERPL-SCNC: 1.3 MMOL/L (ref 0.4–2)
LEUKOCYTE ESTERASE UR QL STRIP.AUTO: NEGATIVE
LYMPHOCYTES # BLD AUTO: 1.4 X10*3/UL (ref 0.8–3)
LYMPHOCYTES NFR BLD AUTO: 9.2 %
LYMPHOCYTES NFR FLD MANUAL: 0 %
MAGNESIUM SERPL-MCNC: 2.07 MG/DL (ref 1.6–2.4)
MAGNESIUM SERPL-MCNC: 2.1 MG/DL (ref 1.6–2.4)
MCH RBC QN AUTO: 28 PG (ref 26–34)
MCH RBC QN AUTO: 28.6 PG (ref 26–34)
MCH RBC QN AUTO: 29.3 PG (ref 26–34)
MCHC RBC AUTO-ENTMCNC: 33.7 G/DL (ref 32–36)
MCHC RBC AUTO-ENTMCNC: 35.9 G/DL (ref 32–36)
MCHC RBC AUTO-ENTMCNC: 36.2 G/DL (ref 32–36)
MCV RBC AUTO: 80 FL (ref 80–100)
MCV RBC AUTO: 81 FL (ref 80–100)
MCV RBC AUTO: 83 FL (ref 80–100)
MONOCYTES # BLD AUTO: 1.59 X10*3/UL (ref 0.05–0.8)
MONOCYTES NFR BLD AUTO: 10.4 %
MONOS+MACROS NFR FLD MANUAL: 0 %
MUCOUS THREADS #/AREA URNS AUTO: ABNORMAL /LPF
NEUTROPHILS # BLD AUTO: 11.83 X10*3/UL (ref 1.6–5.5)
NEUTROPHILS NFR BLD AUTO: 77.8 %
NEUTROPHILS NFR FLD MANUAL: 0 %
NITRITE UR QL STRIP.AUTO: NEGATIVE
NRBC BLD-RTO: 0 /100 WBCS (ref 0–0)
OTHER CELLS NFR FLD MANUAL: 0 %
OXYHGB MFR BLDA: 95.9 % (ref 94–98)
OXYHGB MFR BLDA: 97.3 % (ref 94–98)
PCO2 BLDA: 30 MM HG (ref 38–42)
PCO2 BLDA: 33 MM HG (ref 38–42)
PH BLDA: 7.43 PH (ref 7.38–7.42)
PH BLDA: 7.45 PH (ref 7.38–7.42)
PH UR STRIP.AUTO: 6.5 [PH]
PHOSPHATE SERPL-MCNC: 3.2 MG/DL (ref 2.5–4.9)
PHOSPHATE SERPL-MCNC: 3.2 MG/DL (ref 2.5–4.9)
PLASMA CELLS NFR FLD MANUAL: 0 %
PLATELET # BLD AUTO: 202 X10*3/UL (ref 150–450)
PLATELET # BLD AUTO: 220 X10*3/UL (ref 150–450)
PLATELET # BLD AUTO: 222 X10*3/UL (ref 150–450)
PO2 BLDA: 261 MM HG (ref 85–95)
PO2 BLDA: 80 MM HG (ref 85–95)
POLYCHROMASIA BLD QL SMEAR: NORMAL
POTASSIUM BLDA-SCNC: 5.3 MMOL/L (ref 3.5–5.3)
POTASSIUM BLDA-SCNC: 5.3 MMOL/L (ref 3.5–5.3)
POTASSIUM SERPL-SCNC: 5 MMOL/L (ref 3.5–5.3)
POTASSIUM SERPL-SCNC: 5.1 MMOL/L (ref 3.5–5.3)
PROT SERPL-MCNC: 4.4 G/DL (ref 6.4–8.2)
PROT UR STRIP.AUTO-MCNC: ABNORMAL MG/DL
PROTHROMBIN TIME: 15.1 SECONDS (ref 9.8–12.8)
PROTHROMBIN TIME: 16.7 SECONDS (ref 9.8–12.8)
RBC # BLD AUTO: 2.31 X10*6/UL (ref 4.5–5.9)
RBC # BLD AUTO: 2.43 X10*6/UL (ref 4.5–5.9)
RBC # BLD AUTO: 2.46 X10*6/UL (ref 4.5–5.9)
RBC # FLD AUTO: ABNORMAL /UL
RBC # UR STRIP.AUTO: ABNORMAL /UL
RBC #/AREA URNS AUTO: ABNORMAL /HPF
RBC MORPH BLD: NORMAL
RH FACTOR (ANTIGEN D): NORMAL
SAO2 % BLDA: 100 % (ref 94–100)
SAO2 % BLDA: 99 % (ref 94–100)
SODIUM BLDA-SCNC: 127 MMOL/L (ref 136–145)
SODIUM BLDA-SCNC: 128 MMOL/L (ref 136–145)
SODIUM SERPL-SCNC: 128 MMOL/L (ref 136–145)
SODIUM SERPL-SCNC: 131 MMOL/L (ref 136–145)
SP GR UR STRIP.AUTO: 1.02
TARGETS BLD QL SMEAR: NORMAL
TOTAL CELLS COUNTED FLD: 0
UROBILINOGEN UR STRIP.AUTO-MCNC: NORMAL MG/DL
WBC # BLD AUTO: 15.2 X10*3/UL (ref 4.4–11.3)
WBC # BLD AUTO: 16.2 X10*3/UL (ref 4.4–11.3)
WBC # BLD AUTO: 16.7 X10*3/UL (ref 4.4–11.3)
WBC # FLD AUTO: ABNORMAL /UL
WBC #/AREA URNS AUTO: ABNORMAL /HPF

## 2025-01-04 PROCEDURE — 87075 CULTR BACTERIA EXCEPT BLOOD: CPT

## 2025-01-04 PROCEDURE — 71260 CT THORAX DX C+: CPT | Performed by: STUDENT IN AN ORGANIZED HEALTH CARE EDUCATION/TRAINING PROGRAM

## 2025-01-04 PROCEDURE — 2720000007 HC OR 272 NO HCPCS

## 2025-01-04 PROCEDURE — 85027 COMPLETE CBC AUTOMATED: CPT | Performed by: PHYSICIAN ASSISTANT

## 2025-01-04 PROCEDURE — 36430 TRANSFUSION BLD/BLD COMPNT: CPT

## 2025-01-04 PROCEDURE — 84295 ASSAY OF SERUM SODIUM: CPT

## 2025-01-04 PROCEDURE — 84100 ASSAY OF PHOSPHORUS: CPT

## 2025-01-04 PROCEDURE — 94003 VENT MGMT INPAT SUBQ DAY: CPT

## 2025-01-04 PROCEDURE — 86923 COMPATIBILITY TEST ELECTRIC: CPT

## 2025-01-04 PROCEDURE — 49406 IMAGE CATH FLUID PERI/RETRO: CPT

## 2025-01-04 PROCEDURE — 0W9F3ZZ DRAINAGE OF ABDOMINAL WALL, PERCUTANEOUS APPROACH: ICD-10-PCS | Performed by: RADIOLOGY

## 2025-01-04 PROCEDURE — 37799 UNLISTED PX VASCULAR SURGERY: CPT | Performed by: PHYSICIAN ASSISTANT

## 2025-01-04 PROCEDURE — 2500000001 HC RX 250 WO HCPCS SELF ADMINISTERED DRUGS (ALT 637 FOR MEDICARE OP)

## 2025-01-04 PROCEDURE — 82330 ASSAY OF CALCIUM: CPT

## 2025-01-04 PROCEDURE — 80069 RENAL FUNCTION PANEL: CPT | Mod: CCI

## 2025-01-04 PROCEDURE — 83605 ASSAY OF LACTIC ACID: CPT | Performed by: PHYSICIAN ASSISTANT

## 2025-01-04 PROCEDURE — 99232 SBSQ HOSP IP/OBS MODERATE 35: CPT | Performed by: SURGERY

## 2025-01-04 PROCEDURE — 99232 SBSQ HOSP IP/OBS MODERATE 35: CPT | Performed by: INTERNAL MEDICINE

## 2025-01-04 PROCEDURE — 85025 COMPLETE CBC W/AUTO DIFF WBC: CPT

## 2025-01-04 PROCEDURE — 89051 BODY FLUID CELL COUNT: CPT

## 2025-01-04 PROCEDURE — 2020000001 HC ICU ROOM DAILY

## 2025-01-04 PROCEDURE — 2500000004 HC RX 250 GENERAL PHARMACY W/ HCPCS (ALT 636 FOR OP/ED): Performed by: STUDENT IN AN ORGANIZED HEALTH CARE EDUCATION/TRAINING PROGRAM

## 2025-01-04 PROCEDURE — 2500000004 HC RX 250 GENERAL PHARMACY W/ HCPCS (ALT 636 FOR OP/ED)

## 2025-01-04 PROCEDURE — 87081 CULTURE SCREEN ONLY: CPT

## 2025-01-04 PROCEDURE — 37799 UNLISTED PX VASCULAR SURGERY: CPT

## 2025-01-04 PROCEDURE — C1729 CATH, DRAINAGE: HCPCS

## 2025-01-04 PROCEDURE — 90937 HEMODIALYSIS REPEATED EVAL: CPT

## 2025-01-04 PROCEDURE — 2500000005 HC RX 250 GENERAL PHARMACY W/O HCPCS: Performed by: STUDENT IN AN ORGANIZED HEALTH CARE EDUCATION/TRAINING PROGRAM

## 2025-01-04 PROCEDURE — 87086 URINE CULTURE/COLONY COUNT: CPT

## 2025-01-04 PROCEDURE — 99291 CRITICAL CARE FIRST HOUR: CPT | Performed by: SURGERY

## 2025-01-04 PROCEDURE — 82248 BILIRUBIN DIRECT: CPT

## 2025-01-04 PROCEDURE — 71260 CT THORAX DX C+: CPT

## 2025-01-04 PROCEDURE — P9016 RBC LEUKOCYTES REDUCED: HCPCS

## 2025-01-04 PROCEDURE — 88104 CYTOPATH FL NONGYN SMEARS: CPT | Performed by: PATHOLOGY

## 2025-01-04 PROCEDURE — 86901 BLOOD TYPING SEROLOGIC RH(D): CPT

## 2025-01-04 PROCEDURE — 74177 CT ABD & PELVIS W/CONTRAST: CPT | Performed by: STUDENT IN AN ORGANIZED HEALTH CARE EDUCATION/TRAINING PROGRAM

## 2025-01-04 PROCEDURE — 83735 ASSAY OF MAGNESIUM: CPT

## 2025-01-04 PROCEDURE — 85730 THROMBOPLASTIN TIME PARTIAL: CPT

## 2025-01-04 PROCEDURE — 74177 CT ABD & PELVIS W/CONTRAST: CPT

## 2025-01-04 PROCEDURE — 2500000004 HC RX 250 GENERAL PHARMACY W/ HCPCS (ALT 636 FOR OP/ED): Mod: TB

## 2025-01-04 PROCEDURE — 82947 ASSAY GLUCOSE BLOOD QUANT: CPT

## 2025-01-04 PROCEDURE — 76937 US GUIDE VASCULAR ACCESS: CPT

## 2025-01-04 PROCEDURE — 2550000001 HC RX 255 CONTRASTS: Performed by: SURGERY

## 2025-01-04 PROCEDURE — 81001 URINALYSIS AUTO W/SCOPE: CPT

## 2025-01-04 RX ORDER — VANCOMYCIN HYDROCHLORIDE 1 G/20ML
INJECTION, POWDER, LYOPHILIZED, FOR SOLUTION INTRAVENOUS DAILY PRN
Status: DISCONTINUED | OUTPATIENT
Start: 2025-01-04 | End: 2025-01-05

## 2025-01-04 RX ORDER — HYDROMORPHONE HYDROCHLORIDE 0.2 MG/ML
0.2 INJECTION INTRAMUSCULAR; INTRAVENOUS; SUBCUTANEOUS EVERY 4 HOURS PRN
Status: DISCONTINUED | OUTPATIENT
Start: 2025-01-04 | End: 2025-01-07

## 2025-01-04 RX ORDER — VANCOMYCIN 2 GRAM/500 ML IN 0.9 % SODIUM CHLORIDE INTRAVENOUS
2000 ONCE
Status: COMPLETED | OUTPATIENT
Start: 2025-01-04 | End: 2025-01-04

## 2025-01-04 RX ORDER — MORPHINE SULFATE 4 MG/ML
2 INJECTION INTRAVENOUS ONCE
Status: COMPLETED | OUTPATIENT
Start: 2025-01-04 | End: 2025-01-04

## 2025-01-04 RX ORDER — MORPHINE SULFATE 4 MG/ML
INJECTION INTRAVENOUS
Status: COMPLETED
Start: 2025-01-04 | End: 2025-01-04

## 2025-01-04 RX ORDER — SODIUM CHLORIDE, SODIUM LACTATE, POTASSIUM CHLORIDE, CALCIUM CHLORIDE 600; 310; 30; 20 MG/100ML; MG/100ML; MG/100ML; MG/100ML
50 INJECTION, SOLUTION INTRAVENOUS CONTINUOUS
Status: DISCONTINUED | OUTPATIENT
Start: 2025-01-04 | End: 2025-01-05

## 2025-01-04 RX ADMIN — MICAFUNGIN SODIUM 100 MG: 100 INJECTION, POWDER, LYOPHILIZED, FOR SOLUTION INTRAVENOUS at 00:52

## 2025-01-04 RX ADMIN — MEROPENEM AND SODIUM CHLORIDE 1 G: 1 INJECTION, SOLUTION INTRAVENOUS at 00:19

## 2025-01-04 RX ADMIN — DEXMEDETOMIDINE HYDROCHLORIDE 0.75 MCG/KG/HR: 400 INJECTION INTRAVENOUS at 17:30

## 2025-01-04 RX ADMIN — Medication 40 PERCENT: at 20:00

## 2025-01-04 RX ADMIN — MORPHINE SULFATE 2 MG: 4 INJECTION INTRAVENOUS at 02:30

## 2025-01-04 RX ADMIN — IOHEXOL 90 ML: 350 INJECTION, SOLUTION INTRAVENOUS at 02:15

## 2025-01-04 RX ADMIN — PIPERACILLIN SODIUM AND TAZOBACTAM SODIUM 2.25 G: 2; .25 INJECTION, SOLUTION INTRAVENOUS at 17:47

## 2025-01-04 RX ADMIN — VASOPRESSIN 0.03 UNITS/MIN: 0.2 INJECTION INTRAVENOUS at 00:18

## 2025-01-04 RX ADMIN — NOREPINEPHRINE BITARTRATE 0.06 MCG/KG/MIN: 8 INJECTION, SOLUTION INTRAVENOUS at 02:00

## 2025-01-04 RX ADMIN — ACETAMINOPHEN 650 MG: 325 TABLET ORAL at 08:51

## 2025-01-04 RX ADMIN — PIPERACILLIN SODIUM AND TAZOBACTAM SODIUM 2.25 G: 2; .25 INJECTION, SOLUTION INTRAVENOUS at 12:27

## 2025-01-04 RX ADMIN — VASOPRESSIN 0.03 UNITS/MIN: 0.2 INJECTION INTRAVENOUS at 10:04

## 2025-01-04 RX ADMIN — DEXMEDETOMIDINE HYDROCHLORIDE 0.75 MCG/KG/HR: 400 INJECTION INTRAVENOUS at 23:38

## 2025-01-04 RX ADMIN — SODIUM CHLORIDE, POTASSIUM CHLORIDE, SODIUM LACTATE AND CALCIUM CHLORIDE 50 ML/HR: 600; 310; 30; 20 INJECTION, SOLUTION INTRAVENOUS at 13:06

## 2025-01-04 RX ADMIN — DEXMEDETOMIDINE HYDROCHLORIDE 0.75 MCG/KG/HR: 400 INJECTION INTRAVENOUS at 05:01

## 2025-01-04 RX ADMIN — HEPARIN SODIUM 5000 UNITS: 5000 INJECTION INTRAVENOUS; SUBCUTANEOUS at 21:33

## 2025-01-04 RX ADMIN — SODIUM CHLORIDE, POTASSIUM CHLORIDE, SODIUM LACTATE AND CALCIUM CHLORIDE 500 ML: 600; 310; 30; 20 INJECTION, SOLUTION INTRAVENOUS at 12:05

## 2025-01-04 RX ADMIN — DEXMEDETOMIDINE HYDROCHLORIDE 0.75 MCG/KG/HR: 400 INJECTION INTRAVENOUS at 11:07

## 2025-01-04 RX ADMIN — HEPARIN SODIUM 5000 UNITS: 5000 INJECTION INTRAVENOUS; SUBCUTANEOUS at 13:27

## 2025-01-04 RX ADMIN — PIPERACILLIN SODIUM AND TAZOBACTAM SODIUM 2.25 G: 2; .25 INJECTION, SOLUTION INTRAVENOUS at 23:02

## 2025-01-04 RX ADMIN — ACETAMINOPHEN 650 MG: 325 TABLET ORAL at 18:56

## 2025-01-04 RX ADMIN — MICAFUNGIN SODIUM 100 MG: 100 INJECTION, POWDER, LYOPHILIZED, FOR SOLUTION INTRAVENOUS at 23:02

## 2025-01-04 RX ADMIN — Medication 40 PERCENT: at 08:27

## 2025-01-04 RX ADMIN — SODIUM CHLORIDE, POTASSIUM CHLORIDE, SODIUM LACTATE AND CALCIUM CHLORIDE 1000 ML: 600; 310; 30; 20 INJECTION, SOLUTION INTRAVENOUS at 00:52

## 2025-01-04 RX ADMIN — Medication 2000 MG: at 05:01

## 2025-01-04 RX ADMIN — HEPARIN SODIUM 5000 UNITS: 5000 INJECTION INTRAVENOUS; SUBCUTANEOUS at 04:46

## 2025-01-04 RX ADMIN — MEROPENEM AND SODIUM CHLORIDE 1 G: 1 INJECTION, SOLUTION INTRAVENOUS at 10:05

## 2025-01-04 RX ADMIN — SODIUM CHLORIDE, POTASSIUM CHLORIDE, SODIUM LACTATE AND CALCIUM CHLORIDE 50 ML/HR: 600; 310; 30; 20 INJECTION, SOLUTION INTRAVENOUS at 23:03

## 2025-01-04 ASSESSMENT — PAIN - FUNCTIONAL ASSESSMENT
PAIN_FUNCTIONAL_ASSESSMENT: 0-10
PAIN_FUNCTIONAL_ASSESSMENT: CPOT (CRITICAL CARE PAIN OBSERVATION TOOL)
PAIN_FUNCTIONAL_ASSESSMENT: 0-10
PAIN_FUNCTIONAL_ASSESSMENT: 0-10
PAIN_FUNCTIONAL_ASSESSMENT: CPOT (CRITICAL CARE PAIN OBSERVATION TOOL)

## 2025-01-04 ASSESSMENT — PAIN SCALES - GENERAL
PAINLEVEL_OUTOF10: 0 - NO PAIN

## 2025-01-04 NOTE — CONSULTS
Vancomycin Dosing by Pharmacy- INITIAL    Ike Gar is a 79 y.o. year old male who Pharmacy has been consulted for vancomycin dosing for pneumonia. Based on the patient's indication and renal status this patient will be dosed based on a goal trough/random level of 15-20.     Renal function is currently unstable.  -CVVH stopped 1/3  -per nephro notes, plan for either SLED or iHD today     Visit Vitals  /57   Pulse 68   Temp (!) 38.4 °C (101.1 °F) (Esophageal)   Resp 22        Lab Results   Component Value Date    CREATININE 3.19 (H) 2025    CREATININE 3.21 (H) 2025    CREATININE 2.81 (H) 2025    CREATININE 2.81 (H) 2025        Patient weight is as follows:   Vitals:    25 0600   Weight: 104 kg (229 lb 15 oz)       Cultures:  No results found for the encounter in last 14 days.        I/O last 3 completed shifts:  In: 3442.6 (33 mL/kg) [I.V.:423.3 (4.1 mL/kg); NG/GT:1330; IV Piggyback:250]  Out: 3495 (33.5 mL/kg) [Emesis/NG output:100; Drains:55; Other:770; Stool:1900; Chest Tube:670]  Weight: 104.3 kg   I/O during current shift:  I/O this shift:  In: 444.2 [I.V.:20.2; Blood:100; NG/GT:180; IV Piggyback:50]  Out: 570 [Drains:20; Stool:550]    Temp (24hrs), Av.8 °C (100.1 °F), Min:36 °C (96.8 °F), Max:38.4 °C (101.1 °F)         Assessment/Plan     Patient will be given a loading dose of 2000 mg once    Will initiate vancomycin maintenance, once renal plans confirmed.    Follow-up level will be ordered on , unless clinically indicated sooner.  Will continue to monitor renal function daily while on vancomycin and order serum creatinine at least every 48 hours if not already ordered.  Follow for continued vancomycin needs, clinical response, and signs/symptoms of toxicity.       Prerna Carey, PharmD

## 2025-01-04 NOTE — SIGNIFICANT EVENT
Patient requiring increasing Levophed doses with maps below 60.  Neurologic exam appears stable.  Patient also has a low-grade temperature.  Sepsis workup initiated, blood cultures obtained.  Wound culture due to purulent discharge from penis obtained as well. No palpable crepitus concerning for acute necrotizing infection. Penis is edematous. Tylenol given.  Will likely initiate broad spectrum antibiotics after obtaining pan cultures. Meropenem and micafungin ordered.  Will obtain CT C/A/P as well. Nephrology reengaged for CVVH.  Fluid resuscitation initiated with IVF LR. Will start vasopressin as well and hold tube feeds. Bladder scan obtained. Nephrology states they will likely restart CVVH tomorrow. Family updated via phone call.
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
## 2025-01-04 NOTE — SIGNIFICANT EVENT
Patient has been identified as having an emergent need for administration of iodinated contrast for CT scan prior to result of laboratory studies OR despite known elevated GFR due to possibility of life and/or limb threatening pathology.    I acknowledge the risks and benefits of emergently proceeding with contrast administration including that, at present, it is the position of the [employer] that contrast induced nephropathy (ASHLIE) is a rare but possible consequence. At this time the benefits of proceeding with contrast administration outweigh the risks.    Attempts will be made to mitigate possible ASHLIE risk with IV fluid hydration if able.    Kori August DO,PGY3
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
## 2025-01-04 NOTE — POST-PROCEDURE NOTE
Interventional Radiology Brief Postprocedure Note    Attending: Grabiel Hartmann MD    Assistant: Kevin Gutiérrez MD    Diagnosis: Intrabdominal abscess    Description of procedure: US guided intrabdominal abscess drainage    Patient was brought to the procedure area.  Limited diagnostic scanning of the abdomen was performed, which demonstrated a hypoechoic fluid collection in the  Left hemiabdomen .  Subsequently the patient was prepped and draped in the usual sterile manner.  Lidocaine was administered for local analgesia.  Subsequently, the fluid collection was accessed under ultrasound guidance with a 10F pigtail skater drain. 120 mL of purulent fluid was aspirated for cytology and microbiology/chemistry.  Patient tolerated the procedure. See PACS for full details.      If abscess drain is not draining correctly or there are questions regarding care and management of this drain while patient is inpatient status, please call [phone] for IR nursing to triage.      Anesthesia:  Patient on precedex drip    Complications: None    Estimated Blood Loss: minimal    See detailed result report with images in PACS.    The patient tolerated the procedure well without incident or complication and is in stable condition.     Reviewed and approved by KEVIN GUTIÉRREZ on 1/4/25 at 3:25 PM.

## 2025-01-04 NOTE — PROGRESS NOTES
[facility]  TRAUMA ICU - ATTENDING PROGRESS NOTE    Patient Name: Ike Gar  MRN: [id number]  : 1945  AGE: 79 y.o.   GENDER: male  ==============================================================================    2025  11:03 AM    I evaluated and examined the patient with the critical care team. As a multidisciplinary team, we discussed all findings, as well as assessment and plan. I personally spent 35 minutes of critical care time excluding procedures at the bedside with the patient. I personally reviewed all labs, results and studies. My independent note with assessment and plan are below:    Neurologic:        Analgesia: dilaudid, tylenol, pain block       Sedation: precidex  HEENT: none  Cardiovascular:        Blunt cardiac injury, resolved       Septic shock   3L fluid volume expansion   Cultures obtained   Source abdomen   Vasopressors: levophed 0.04, vasopressin 0.03  Pulmonary:        Multiple rib fx (L 1,3, 8,9, 11, 12) (R 6, 7,9), Sternal fx  Nerve blocks   Right hemothorax, pigtail, WS  Left hemothorax, pigtail, WS       Acute hypoxic respiratory failure   Extubated , reintubated , tube exchange planned 1/3   GOC planned, anticipate tracheostomy   Daily CXR  Oxygen requirement: CMV 22/450/8/40  Gastrointestinal:        Grade 2 liver laceration       Elevated bilirubin, mixed, history of gilberts       Small bowel injury:   : Ex-lap, TENNILLE, SBR X2, GRACIELA x3, discontinuity, ABTHERA  : Ex-lap, washout, SBR, ileocecetomy, ABTHERA  : Ex-lap, washout, ABTHERA  : partial omentectomy, ABTHERA  : partial hepatectomy, end jejunostomy with mucous fistula creation, fascial closure       Dysphagia, failed MBS       Diet: TF at goal; monitor ostomy output       GI prophylaxis: DC pepcid  Renal/:        LR@50       Acute renal failure  Anuric  Nephrology planning SLED, given hemodynamics will clarify vs CRRT       Barrett catheter: no  clark, daily bladder scan  Hematologic:       Anemia of chronic disease, transfuse to hemoglobin >7       Central line: HD line (12/30), central line       Arterial line: yes, maintain       DVT prophylaxis: SCD's; heparin  Musculoskeletal:       T5 vertebral body fracture, L4/L5 superior endplate fracture   Non-op   TLSO       ICU Mobility Score: 3       Skin breakdown: none       Restraints: yes, renew  Endocrine:       MARLYS       Glucose control <180, POC glucose checks and insulin sliding scale  ID:       Completed empiric vancomycin, meropenem       Fever, hypotension 1/3, trend leukocytosis  Blood cultures negative to date  UA negative  Respiratory culture GPC likely contaminate  CT A/P, Abdominal abscess, IR consult for drain       Antibiotics: Vancomycin, meropenem to zosyn, micafungin    Disposition: The patient remains remains critically ill.    Willi Murillo MD
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
## 2025-01-05 ENCOUNTER — APPOINTMENT (OUTPATIENT)
Dept: RADIOLOGY | Facility: HOSPITAL | Age: 80
End: 2025-01-05
Payer: MEDICARE

## 2025-01-05 VITALS
HEIGHT: 76 IN | RESPIRATION RATE: 28 BRPM | SYSTOLIC BLOOD PRESSURE: 120 MMHG | OXYGEN SATURATION: 95 % | WEIGHT: 229.72 LBS | TEMPERATURE: 100.9 F | DIASTOLIC BLOOD PRESSURE: 53 MMHG | BODY MASS INDEX: 27.97 KG/M2 | HEART RATE: 71 BPM

## 2025-01-05 LAB
ALBUMIN SERPL BCP-MCNC: 1.8 G/DL (ref 3.4–5)
ALBUMIN SERPL BCP-MCNC: 1.9 G/DL (ref 3.4–5)
ALP SERPL-CCNC: 186 U/L (ref 33–136)
ALP SERPL-CCNC: 215 U/L (ref 33–136)
ALT SERPL W P-5'-P-CCNC: 89 U/L (ref 10–52)
ALT SERPL W P-5'-P-CCNC: 97 U/L (ref 10–52)
ANION GAP BLDA CALCULATED.4IONS-SCNC: 7 MMO/L (ref 10–25)
ANION GAP SERPL CALC-SCNC: 11 MMOL/L (ref 10–20)
ANION GAP SERPL CALC-SCNC: 13 MMOL/L (ref 10–20)
APTT PPP: 24 SECONDS (ref 27–38)
APTT PPP: 26 SECONDS (ref 27–38)
AST SERPL W P-5'-P-CCNC: 75 U/L (ref 9–39)
AST SERPL W P-5'-P-CCNC: 81 U/L (ref 9–39)
BACTERIA BLD CULT: NORMAL
BACTERIA BLD CULT: NORMAL
BACTERIA FLD CULT: NORMAL
BACTERIA SPEC CULT: ABNORMAL
BACTERIA SPEC CULT: ABNORMAL
BACTERIA SPEC RESP CULT: ABNORMAL
BACTERIA UR CULT: NORMAL
BASE EXCESS BLDA CALC-SCNC: 2.4 MMOL/L (ref -2–3)
BASOPHILS # BLD MANUAL: 0 X10*3/UL (ref 0–0.1)
BASOPHILS NFR BLD MANUAL: 0 %
BILIRUB DIRECT SERPL-MCNC: 2.8 MG/DL (ref 0–0.3)
BILIRUB DIRECT SERPL-MCNC: 3.7 MG/DL (ref 0–0.3)
BILIRUB SERPL-MCNC: 4.5 MG/DL (ref 0–1.2)
BILIRUB SERPL-MCNC: 5.8 MG/DL (ref 0–1.2)
BLOOD EXPIRATION DATE: NORMAL
BLOOD EXPIRATION DATE: NORMAL
BODY TEMPERATURE: 37 DEGREES CELSIUS
BUN SERPL-MCNC: 28 MG/DL (ref 6–23)
BUN SERPL-MCNC: 40 MG/DL (ref 6–23)
CA-I BLDA-SCNC: 1.14 MMOL/L (ref 1.1–1.33)
CALCIUM SERPL-MCNC: 7.1 MG/DL (ref 8.6–10.6)
CALCIUM SERPL-MCNC: 7.3 MG/DL (ref 8.6–10.6)
CHLORIDE BLDA-SCNC: 102 MMOL/L (ref 98–107)
CHLORIDE SERPL-SCNC: 97 MMOL/L (ref 98–107)
CHLORIDE SERPL-SCNC: 97 MMOL/L (ref 98–107)
CO2 SERPL-SCNC: 25 MMOL/L (ref 21–32)
CO2 SERPL-SCNC: 27 MMOL/L (ref 21–32)
CREAT SERPL-MCNC: 1.64 MG/DL (ref 0.5–1.3)
CREAT SERPL-MCNC: 2.6 MG/DL (ref 0.5–1.3)
DISPENSE STATUS: NORMAL
DISPENSE STATUS: NORMAL
EGFRCR SERPLBLD CKD-EPI 2021: 25 ML/MIN/1.73M*2
EGFRCR SERPLBLD CKD-EPI 2021: 43 ML/MIN/1.73M*2
EOSINOPHIL # BLD MANUAL: 0 X10*3/UL (ref 0–0.4)
EOSINOPHIL NFR BLD MANUAL: 0 %
ERYTHROCYTE [DISTWIDTH] IN BLOOD BY AUTOMATED COUNT: 16.3 % (ref 11.5–14.5)
ERYTHROCYTE [DISTWIDTH] IN BLOOD BY AUTOMATED COUNT: 16.9 % (ref 11.5–14.5)
ERYTHROCYTE [DISTWIDTH] IN BLOOD BY AUTOMATED COUNT: 17.4 % (ref 11.5–14.5)
GLUCOSE BLD MANUAL STRIP-MCNC: 115 MG/DL (ref 74–99)
GLUCOSE BLD MANUAL STRIP-MCNC: 130 MG/DL (ref 74–99)
GLUCOSE BLD MANUAL STRIP-MCNC: 88 MG/DL (ref 74–99)
GLUCOSE BLDA-MCNC: 109 MG/DL (ref 74–99)
GLUCOSE SERPL-MCNC: 101 MG/DL (ref 74–99)
GLUCOSE SERPL-MCNC: 108 MG/DL (ref 74–99)
GRAM STN SPEC: ABNORMAL
GRAM STN SPEC: NORMAL
GRAM STN SPEC: NORMAL
HCO3 BLDA-SCNC: 25.1 MMOL/L (ref 22–26)
HCT VFR BLD AUTO: 18.5 % (ref 41–52)
HCT VFR BLD AUTO: 19.9 % (ref 41–52)
HCT VFR BLD AUTO: 20.1 % (ref 41–52)
HCT VFR BLD EST: 21 % (ref 41–52)
HGB BLD-MCNC: 6.5 G/DL (ref 13.5–17.5)
HGB BLD-MCNC: 6.9 G/DL (ref 13.5–17.5)
HGB BLD-MCNC: 7.1 G/DL (ref 13.5–17.5)
HGB BLDA-MCNC: 7.1 G/DL (ref 13.5–17.5)
IMM GRANULOCYTES # BLD AUTO: 0.53 X10*3/UL (ref 0–0.5)
IMM GRANULOCYTES NFR BLD AUTO: 2.6 % (ref 0–0.9)
INHALED O2 CONCENTRATION: 40 %
INR PPP: 1.4 (ref 0.9–1.1)
INR PPP: 1.5 (ref 0.9–1.1)
LACTATE BLDA-SCNC: 0.8 MMOL/L (ref 0.4–2)
LYMPHOCYTES # BLD MANUAL: 1.22 X10*3/UL (ref 0.8–3)
LYMPHOCYTES NFR BLD MANUAL: 5.9 %
MAGNESIUM SERPL-MCNC: 1.81 MG/DL (ref 1.6–2.4)
MAGNESIUM SERPL-MCNC: 1.85 MG/DL (ref 1.6–2.4)
MCH RBC QN AUTO: 28.5 PG (ref 26–34)
MCH RBC QN AUTO: 28.6 PG (ref 26–34)
MCH RBC QN AUTO: 28.8 PG (ref 26–34)
MCHC RBC AUTO-ENTMCNC: 34.3 G/DL (ref 32–36)
MCHC RBC AUTO-ENTMCNC: 35.1 G/DL (ref 32–36)
MCHC RBC AUTO-ENTMCNC: 35.7 G/DL (ref 32–36)
MCV RBC AUTO: 80 FL (ref 80–100)
MCV RBC AUTO: 82 FL (ref 80–100)
MCV RBC AUTO: 83 FL (ref 80–100)
MONOCYTES # BLD MANUAL: 0.7 X10*3/UL (ref 0.05–0.8)
MONOCYTES NFR BLD MANUAL: 3.4 %
MYELOCYTES # BLD MANUAL: 0.19 X10*3/UL
MYELOCYTES NFR BLD MANUAL: 0.9 %
NEUTROPHILS # BLD MANUAL: 18.15 X10*3/UL (ref 1.6–5.5)
NEUTS BAND # BLD MANUAL: 1.57 X10*3/UL (ref 0–0.5)
NEUTS BAND NFR BLD MANUAL: 7.6 %
NEUTS SEG # BLD MANUAL: 16.58 X10*3/UL (ref 1.6–5)
NEUTS SEG NFR BLD MANUAL: 80.5 %
NRBC BLD-RTO: 0 /100 WBCS (ref 0–0)
OXYHGB MFR BLDA: 96.7 % (ref 94–98)
PCO2 BLDA: 30 MM HG (ref 38–42)
PH BLDA: 7.53 PH (ref 7.38–7.42)
PHOSPHATE SERPL-MCNC: 1.7 MG/DL (ref 2.5–4.9)
PHOSPHATE SERPL-MCNC: 2 MG/DL (ref 2.5–4.9)
PLATELET # BLD AUTO: 226 X10*3/UL (ref 150–450)
PLATELET # BLD AUTO: 230 X10*3/UL (ref 150–450)
PLATELET # BLD AUTO: 244 X10*3/UL (ref 150–450)
PO2 BLDA: 95 MM HG (ref 85–95)
POTASSIUM BLDA-SCNC: 3.6 MMOL/L (ref 3.5–5.3)
POTASSIUM SERPL-SCNC: 3.4 MMOL/L (ref 3.5–5.3)
POTASSIUM SERPL-SCNC: 4 MMOL/L (ref 3.5–5.3)
PRODUCT BLOOD TYPE: 5100
PRODUCT BLOOD TYPE: 5100
PRODUCT CODE: NORMAL
PRODUCT CODE: NORMAL
PROT SERPL-MCNC: 4.8 G/DL (ref 6.4–8.2)
PROT SERPL-MCNC: 4.8 G/DL (ref 6.4–8.2)
PROTHROMBIN TIME: 16.1 SECONDS (ref 9.8–12.8)
PROTHROMBIN TIME: 16.7 SECONDS (ref 9.8–12.8)
RBC # BLD AUTO: 2.26 X10*6/UL (ref 4.5–5.9)
RBC # BLD AUTO: 2.41 X10*6/UL (ref 4.5–5.9)
RBC # BLD AUTO: 2.49 X10*6/UL (ref 4.5–5.9)
RBC MORPH BLD: ABNORMAL
SAO2 % BLDA: 99 % (ref 94–100)
SODIUM BLDA-SCNC: 130 MMOL/L (ref 136–145)
SODIUM SERPL-SCNC: 131 MMOL/L (ref 136–145)
SODIUM SERPL-SCNC: 132 MMOL/L (ref 136–145)
STAPHYLOCOCCUS SPEC CULT: NORMAL
TARGETS BLD QL SMEAR: ABNORMAL
TOTAL CELLS COUNTED BLD: 118
UNIT ABO: NORMAL
UNIT ABO: NORMAL
UNIT NUMBER: NORMAL
UNIT NUMBER: NORMAL
UNIT RH: NORMAL
UNIT RH: NORMAL
UNIT VOLUME: 350
UNIT VOLUME: 350
VANCOMYCIN SERPL-MCNC: 12.3 UG/ML (ref 5–20)
VARIANT LYMPHS # BLD MANUAL: 0.35 X10*3/UL (ref 0–0.3)
VARIANT LYMPHS NFR BLD: 1.7 %
WBC # BLD AUTO: 18.1 X10*3/UL (ref 4.4–11.3)
WBC # BLD AUTO: 18.1 X10*3/UL (ref 4.4–11.3)
WBC # BLD AUTO: 20.6 X10*3/UL (ref 4.4–11.3)
XM INTEP: NORMAL
XM INTEP: NORMAL

## 2025-01-05 PROCEDURE — 80053 COMPREHEN METABOLIC PANEL: CPT

## 2025-01-05 PROCEDURE — 37799 UNLISTED PX VASCULAR SURGERY: CPT

## 2025-01-05 PROCEDURE — P9016 RBC LEUKOCYTES REDUCED: HCPCS

## 2025-01-05 PROCEDURE — 83735 ASSAY OF MAGNESIUM: CPT

## 2025-01-05 PROCEDURE — 82248 BILIRUBIN DIRECT: CPT

## 2025-01-05 PROCEDURE — 80202 ASSAY OF VANCOMYCIN: CPT

## 2025-01-05 PROCEDURE — 85007 BL SMEAR W/DIFF WBC COUNT: CPT

## 2025-01-05 PROCEDURE — 2020000001 HC ICU ROOM DAILY

## 2025-01-05 PROCEDURE — 71045 X-RAY EXAM CHEST 1 VIEW: CPT | Performed by: RADIOLOGY

## 2025-01-05 PROCEDURE — 85610 PROTHROMBIN TIME: CPT

## 2025-01-05 PROCEDURE — 85027 COMPLETE CBC AUTOMATED: CPT

## 2025-01-05 PROCEDURE — 2500000005 HC RX 250 GENERAL PHARMACY W/O HCPCS: Performed by: STUDENT IN AN ORGANIZED HEALTH CARE EDUCATION/TRAINING PROGRAM

## 2025-01-05 PROCEDURE — 71045 X-RAY EXAM CHEST 1 VIEW: CPT

## 2025-01-05 PROCEDURE — 36430 TRANSFUSION BLD/BLD COMPNT: CPT

## 2025-01-05 PROCEDURE — 85027 COMPLETE CBC AUTOMATED: CPT | Performed by: PHYSICIAN ASSISTANT

## 2025-01-05 PROCEDURE — 2500000001 HC RX 250 WO HCPCS SELF ADMINISTERED DRUGS (ALT 637 FOR MEDICARE OP)

## 2025-01-05 PROCEDURE — 82947 ASSAY GLUCOSE BLOOD QUANT: CPT

## 2025-01-05 PROCEDURE — 2500000004 HC RX 250 GENERAL PHARMACY W/ HCPCS (ALT 636 FOR OP/ED): Performed by: STUDENT IN AN ORGANIZED HEALTH CARE EDUCATION/TRAINING PROGRAM

## 2025-01-05 PROCEDURE — 99291 CRITICAL CARE FIRST HOUR: CPT | Performed by: SURGERY

## 2025-01-05 PROCEDURE — 94003 VENT MGMT INPAT SUBQ DAY: CPT

## 2025-01-05 PROCEDURE — 99233 SBSQ HOSP IP/OBS HIGH 50: CPT | Performed by: INTERNAL MEDICINE

## 2025-01-05 PROCEDURE — 2500000004 HC RX 250 GENERAL PHARMACY W/ HCPCS (ALT 636 FOR OP/ED)

## 2025-01-05 PROCEDURE — 84100 ASSAY OF PHOSPHORUS: CPT

## 2025-01-05 PROCEDURE — 82810 BLOOD GASES O2 SAT ONLY: CPT

## 2025-01-05 RX ORDER — SODIUM CHLORIDE, SODIUM LACTATE, POTASSIUM CHLORIDE, CALCIUM CHLORIDE 600; 310; 30; 20 MG/100ML; MG/100ML; MG/100ML; MG/100ML
50 INJECTION, SOLUTION INTRAVENOUS CONTINUOUS
Status: DISCONTINUED | OUTPATIENT
Start: 2025-01-05 | End: 2025-01-06

## 2025-01-05 RX ORDER — ACETAMINOPHEN 10 MG/ML
1000 INJECTION, SOLUTION INTRAVENOUS EVERY 6 HOURS SCHEDULED
Status: DISCONTINUED | OUTPATIENT
Start: 2025-01-06 | End: 2025-01-06

## 2025-01-05 RX ORDER — METHOCARBAMOL 100 MG/ML
1000 INJECTION, SOLUTION INTRAMUSCULAR; INTRAVENOUS ONCE
Status: COMPLETED | OUTPATIENT
Start: 2025-01-06 | End: 2025-01-05

## 2025-01-05 RX ADMIN — HEPARIN SODIUM 5000 UNITS: 5000 INJECTION INTRAVENOUS; SUBCUTANEOUS at 12:04

## 2025-01-05 RX ADMIN — PIPERACILLIN SODIUM AND TAZOBACTAM SODIUM 2.25 G: 2; .25 INJECTION, SOLUTION INTRAVENOUS at 04:47

## 2025-01-05 RX ADMIN — SODIUM CHLORIDE, POTASSIUM CHLORIDE, SODIUM LACTATE AND CALCIUM CHLORIDE 50 ML/HR: 600; 310; 30; 20 INJECTION, SOLUTION INTRAVENOUS at 18:36

## 2025-01-05 RX ADMIN — HEPARIN SODIUM 5000 UNITS: 5000 INJECTION INTRAVENOUS; SUBCUTANEOUS at 20:56

## 2025-01-05 RX ADMIN — PIPERACILLIN SODIUM AND TAZOBACTAM SODIUM 2.25 G: 2; .25 INJECTION, SOLUTION INTRAVENOUS at 12:04

## 2025-01-05 RX ADMIN — DEXMEDETOMIDINE HYDROCHLORIDE 0.75 MCG/KG/HR: 400 INJECTION INTRAVENOUS at 16:31

## 2025-01-05 RX ADMIN — METHOCARBAMOL 1000 MG: 1000 INJECTION, SOLUTION INTRAMUSCULAR; INTRAVENOUS at 23:43

## 2025-01-05 RX ADMIN — DEXMEDETOMIDINE HYDROCHLORIDE 0.75 MCG/KG/HR: 400 INJECTION INTRAVENOUS at 21:45

## 2025-01-05 RX ADMIN — PIPERACILLIN SODIUM AND TAZOBACTAM SODIUM 2.25 G: 2; .25 INJECTION, SOLUTION INTRAVENOUS at 18:36

## 2025-01-05 RX ADMIN — ACETAMINOPHEN 1000 MG: 10 INJECTION, SOLUTION INTRAVENOUS at 23:43

## 2025-01-05 RX ADMIN — DEXMEDETOMIDINE HYDROCHLORIDE 0.75 MCG/KG/HR: 400 INJECTION INTRAVENOUS at 04:32

## 2025-01-05 RX ADMIN — Medication 40 PERCENT: at 08:26

## 2025-01-05 RX ADMIN — HYDROMORPHONE HYDROCHLORIDE 0.2 MG: 0.2 INJECTION, SOLUTION INTRAMUSCULAR; INTRAVENOUS; SUBCUTANEOUS at 20:52

## 2025-01-05 RX ADMIN — PIPERACILLIN SODIUM AND TAZOBACTAM SODIUM 2.25 G: 2; .25 INJECTION, SOLUTION INTRAVENOUS at 23:43

## 2025-01-05 RX ADMIN — HEPARIN SODIUM 5000 UNITS: 5000 INJECTION INTRAVENOUS; SUBCUTANEOUS at 04:33

## 2025-01-05 RX ADMIN — ACETAMINOPHEN 650 MG: 325 TABLET ORAL at 15:52

## 2025-01-05 RX ADMIN — DEXMEDETOMIDINE HYDROCHLORIDE 0.75 MCG/KG/HR: 400 INJECTION INTRAVENOUS at 10:29

## 2025-01-05 ASSESSMENT — PAIN - FUNCTIONAL ASSESSMENT
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
## 2025-01-05 NOTE — PROGRESS NOTES
Vancomycin Dosing by Pharmacy- Cessation of Therapy    Consult to pharmacy for vancomycin dosing has been discontinued by the prescriber, pharmacy will sign off at this time.    Please call pharmacy if there are further questions or re-enter a consult if vancomycin is resumed.     Danni Nash, MohitD

## 2025-01-05 NOTE — PROGRESS NOTES
[facility]  TRAUMA ICU - ATTENDING PROGRESS NOTE    Patient Name: Ike Gar  MRN: [id number]  : 1945  AGE: 79 y.o.   GENDER: male  ==============================================================================    2025  12:20 PM    I evaluated and examined the patient with the critical care team. As a multidisciplinary team, we discussed all findings, as well as assessment and plan. I personally spent 35 minutes of critical care time excluding procedures at the bedside with the patient. I personally reviewed all labs, results and studies. My independent note with assessment and plan are below:    Neurologic:        Analgesia: dilaudid, tylenol       Sedation: precidex  HEENT: none  Cardiovascular:        Blunt cardiac injury, resolved       Septic shock   3L fluid volume expansion   Cultures obtained   Source abdomen   Vasopressors: none  Pulmonary:        Multiple rib fx (L 1,3, 8,9, 11, 12) (R 6, 7,9), Sternal fx  Nerve blocks . Removed   Right hemothorax, pigtail, WS  Left hemothorax, pigtail, WS, remove        Acute hypoxic respiratory failure   Extubated , reintubated , tube exchange planned 1/3   GOC planned, anticipate tracheostomy   Daily CXR  Oxygen requirement: CMV 22/450/8/40  Gastrointestinal:        Grade 2 liver laceration       Elevated bilirubin, mixed, history of gilberts       Small bowel injury:   : Ex-lap, TENNILLE, SBR X2, GRACIELA x3, discontinuity, ABTHERA  : Ex-lap, washout, SBR, ileocecetomy, ABTHERA  : Ex-lap, washout, ABTHERA  : partial omentectomy, ABTHERA  : partial hepatectomy, end jejunostomy with mucous fistula creation, fascial closure       Dysphagia, failed MBS       Diet: TF at goal; monitor ostomy output       GI prophylaxis: none  Renal/:        Acute renal failure  Anuric  SLED, next session   LR 50%       Clark catheter: no clark, daily bladder scan  Hematologic:       Anemia of chronic disease,  transfuse to hemoglobin >7       Central line: HD line (12/30), central line       Arterial line: yes, maintain       DVT prophylaxis: SCD's; heparin  Musculoskeletal:       T5 vertebral body fracture, L4/L5 superior endplate fracture   Non-op   TLSO       ICU Mobility Score: 3       Skin breakdown: none       Restraints: yes, renew  Endocrine:       MARLYS       Glucose control <180, POC glucose checks and insulin sliding scale  ID:       Completed empiric vancomycin, meropenem       Fever, hypotension 1/3, trend leukocytosis  Blood cultures negative to date  UA negative  Respiratory culture GPC likely contaminate  Abdominal abscess, IR drain 1/4       Antibiotics: zosyn, DC micafungin, DC Vancomycin,     Disposition: The patient remains remains critically ill.    Willi Murillo MD
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
## 2025-01-06 ENCOUNTER — APPOINTMENT (OUTPATIENT)
Dept: RADIOLOGY | Facility: HOSPITAL | Age: 80
End: 2025-01-06
Payer: MEDICARE

## 2025-01-06 LAB
ALBUMIN SERPL BCP-MCNC: 1.8 G/DL (ref 3.4–5)
ALBUMIN SERPL BCP-MCNC: 1.9 G/DL (ref 3.4–5)
ALP SERPL-CCNC: 281 U/L (ref 33–136)
ALT SERPL W P-5'-P-CCNC: 100 U/L (ref 10–52)
ANION GAP BLDA CALCULATED.4IONS-SCNC: 9 MMO/L (ref 10–25)
ANION GAP SERPL CALC-SCNC: 13 MMOL/L (ref 10–20)
APTT PPP: 25 SECONDS (ref 27–38)
AST SERPL W P-5'-P-CCNC: 93 U/L (ref 9–39)
BACTERIA SPEC CULT: ABNORMAL
BACTERIA SPEC CULT: ABNORMAL
BACTERIA SPEC RESP CULT: ABNORMAL
BASE EXCESS BLDA CALC-SCNC: 1.6 MMOL/L (ref -2–3)
BILIRUB DIRECT SERPL-MCNC: 3.1 MG/DL (ref 0–0.3)
BILIRUB SERPL-MCNC: 5 MG/DL (ref 0–1.2)
BODY TEMPERATURE: 37 DEGREES CELSIUS
BUN SERPL-MCNC: 46 MG/DL (ref 6–23)
CA-I BLD-SCNC: 1.11 MMOL/L (ref 1.1–1.33)
CA-I BLDA-SCNC: 0.99 MMOL/L (ref 1.1–1.33)
CALCIUM SERPL-MCNC: 7.2 MG/DL (ref 8.6–10.6)
CHLORIDE BLDA-SCNC: 100 MMOL/L (ref 98–107)
CHLORIDE SERPL-SCNC: 98 MMOL/L (ref 98–107)
CO2 SERPL-SCNC: 25 MMOL/L (ref 21–32)
CREAT SERPL-MCNC: 3.15 MG/DL (ref 0.5–1.3)
EGFRCR SERPLBLD CKD-EPI 2021: 20 ML/MIN/1.73M*2
ERYTHROCYTE [DISTWIDTH] IN BLOOD BY AUTOMATED COUNT: 16.8 % (ref 11.5–14.5)
GLUCOSE BLD MANUAL STRIP-MCNC: 72 MG/DL (ref 74–99)
GLUCOSE BLD MANUAL STRIP-MCNC: 81 MG/DL (ref 74–99)
GLUCOSE BLD MANUAL STRIP-MCNC: 86 MG/DL (ref 74–99)
GLUCOSE BLD MANUAL STRIP-MCNC: 91 MG/DL (ref 74–99)
GLUCOSE BLD MANUAL STRIP-MCNC: 92 MG/DL (ref 74–99)
GLUCOSE BLDA-MCNC: 82 MG/DL (ref 74–99)
GLUCOSE SERPL-MCNC: 79 MG/DL (ref 74–99)
GRAM STN SPEC: ABNORMAL
HCO3 BLDA-SCNC: 24.5 MMOL/L (ref 22–26)
HCT VFR BLD AUTO: 20.4 % (ref 41–52)
HCT VFR BLD EST: 20 % (ref 41–52)
HGB BLD-MCNC: 7.1 G/DL (ref 13.5–17.5)
HGB BLDA-MCNC: 6.6 G/DL (ref 13.5–17.5)
INHALED O2 CONCENTRATION: 40 %
INR PPP: 1.5 (ref 0.9–1.1)
LACTATE BLDA-SCNC: 1 MMOL/L (ref 0.4–2)
MAGNESIUM SERPL-MCNC: 1.89 MG/DL (ref 1.6–2.4)
MCH RBC QN AUTO: 28.7 PG (ref 26–34)
MCHC RBC AUTO-ENTMCNC: 34.8 G/DL (ref 32–36)
MCV RBC AUTO: 83 FL (ref 80–100)
NRBC BLD-RTO: 0 /100 WBCS (ref 0–0)
OXYHGB MFR BLDA: 97.3 % (ref 94–98)
PATH REVIEW-CELL CT,FLUID: NORMAL
PCO2 BLDA: 30 MM HG (ref 38–42)
PH BLDA: 7.52 PH (ref 7.38–7.42)
PHOSPHATE SERPL-MCNC: 2.3 MG/DL (ref 2.5–4.9)
PLATELET # BLD AUTO: 267 X10*3/UL (ref 150–450)
PO2 BLDA: 86 MM HG (ref 85–95)
POTASSIUM BLDA-SCNC: 4.1 MMOL/L (ref 3.5–5.3)
POTASSIUM SERPL-SCNC: 4.1 MMOL/L (ref 3.5–5.3)
PROT SERPL-MCNC: 4.9 G/DL (ref 6.4–8.2)
PROTHROMBIN TIME: 16.5 SECONDS (ref 9.8–12.8)
RBC # BLD AUTO: 2.47 X10*6/UL (ref 4.5–5.9)
SAO2 % BLDA: 100 % (ref 94–100)
SODIUM BLDA-SCNC: 129 MMOL/L (ref 136–145)
SODIUM SERPL-SCNC: 132 MMOL/L (ref 136–145)
WBC # BLD AUTO: 19.5 X10*3/UL (ref 4.4–11.3)

## 2025-01-06 PROCEDURE — 82330 ASSAY OF CALCIUM: CPT | Performed by: STUDENT IN AN ORGANIZED HEALTH CARE EDUCATION/TRAINING PROGRAM

## 2025-01-06 PROCEDURE — 37799 UNLISTED PX VASCULAR SURGERY: CPT

## 2025-01-06 PROCEDURE — 99291 CRITICAL CARE FIRST HOUR: CPT | Performed by: SURGERY

## 2025-01-06 PROCEDURE — 82248 BILIRUBIN DIRECT: CPT

## 2025-01-06 PROCEDURE — 71045 X-RAY EXAM CHEST 1 VIEW: CPT

## 2025-01-06 PROCEDURE — 82947 ASSAY GLUCOSE BLOOD QUANT: CPT

## 2025-01-06 PROCEDURE — 83735 ASSAY OF MAGNESIUM: CPT

## 2025-01-06 PROCEDURE — 2020000001 HC ICU ROOM DAILY

## 2025-01-06 PROCEDURE — 85027 COMPLETE CBC AUTOMATED: CPT | Performed by: PHYSICIAN ASSISTANT

## 2025-01-06 PROCEDURE — 99024 POSTOP FOLLOW-UP VISIT: CPT | Performed by: SURGERY

## 2025-01-06 PROCEDURE — 71045 X-RAY EXAM CHEST 1 VIEW: CPT | Performed by: RADIOLOGY

## 2025-01-06 PROCEDURE — 2500000004 HC RX 250 GENERAL PHARMACY W/ HCPCS (ALT 636 FOR OP/ED): Performed by: STUDENT IN AN ORGANIZED HEALTH CARE EDUCATION/TRAINING PROGRAM

## 2025-01-06 PROCEDURE — 99233 SBSQ HOSP IP/OBS HIGH 50: CPT

## 2025-01-06 PROCEDURE — 85730 THROMBOPLASTIN TIME PARTIAL: CPT

## 2025-01-06 PROCEDURE — 94003 VENT MGMT INPAT SUBQ DAY: CPT

## 2025-01-06 PROCEDURE — 37799 UNLISTED PX VASCULAR SURGERY: CPT | Performed by: STUDENT IN AN ORGANIZED HEALTH CARE EDUCATION/TRAINING PROGRAM

## 2025-01-06 PROCEDURE — 2500000004 HC RX 250 GENERAL PHARMACY W/ HCPCS (ALT 636 FOR OP/ED)

## 2025-01-06 PROCEDURE — 2500000004 HC RX 250 GENERAL PHARMACY W/ HCPCS (ALT 636 FOR OP/ED): Mod: JZ,TB | Performed by: STUDENT IN AN ORGANIZED HEALTH CARE EDUCATION/TRAINING PROGRAM

## 2025-01-06 PROCEDURE — 82330 ASSAY OF CALCIUM: CPT

## 2025-01-06 RX ORDER — FLUCONAZOLE 2 MG/ML
400 INJECTION, SOLUTION INTRAVENOUS EVERY 24 HOURS
Status: DISCONTINUED | OUTPATIENT
Start: 2025-01-06 | End: 2025-01-11

## 2025-01-06 RX ORDER — MAGNESIUM SULFATE 1 G/100ML
1 INJECTION INTRAVENOUS ONCE
Status: COMPLETED | OUTPATIENT
Start: 2025-01-06 | End: 2025-01-06

## 2025-01-06 RX ORDER — MICAFUNGIN SODIUM 100 MG/5ML
100 INJECTION, POWDER, LYOPHILIZED, FOR SOLUTION INTRAVENOUS EVERY 24 HOURS
Status: DISCONTINUED | OUTPATIENT
Start: 2025-01-06 | End: 2025-01-06

## 2025-01-06 RX ORDER — NOREPINEPHRINE BITARTRATE/D5W 8 MG/250ML
0-1 PLASTIC BAG, INJECTION (ML) INTRAVENOUS CONTINUOUS
Status: DISCONTINUED | OUTPATIENT
Start: 2025-01-06 | End: 2025-01-07

## 2025-01-06 RX ORDER — ACETAMINOPHEN 160 MG/5ML
650 SOLUTION ORAL EVERY 6 HOURS PRN
Status: DISCONTINUED | OUTPATIENT
Start: 2025-01-06 | End: 2025-01-14

## 2025-01-06 RX ORDER — FLUCONAZOLE 2 MG/ML
200 INJECTION, SOLUTION INTRAVENOUS EVERY 24 HOURS
Status: DISCONTINUED | OUTPATIENT
Start: 2025-01-06 | End: 2025-01-06

## 2025-01-06 RX ORDER — FLUCONAZOLE 2 MG/ML
400 INJECTION, SOLUTION INTRAVENOUS EVERY 24 HOURS
Status: DISCONTINUED | OUTPATIENT
Start: 2025-01-06 | End: 2025-01-06

## 2025-01-06 RX ADMIN — PIPERACILLIN SODIUM AND TAZOBACTAM SODIUM 2.25 G: 2; .25 INJECTION, SOLUTION INTRAVENOUS at 12:27

## 2025-01-06 RX ADMIN — DEXMEDETOMIDINE HYDROCHLORIDE 0.75 MCG/KG/HR: 400 INJECTION INTRAVENOUS at 19:00

## 2025-01-06 RX ADMIN — FLUCONAZOLE 400 MG: 2 INJECTION, SOLUTION INTRAVENOUS at 16:00

## 2025-01-06 RX ADMIN — HEPARIN SODIUM 5000 UNITS: 5000 INJECTION INTRAVENOUS; SUBCUTANEOUS at 12:05

## 2025-01-06 RX ADMIN — HYDROMORPHONE HYDROCHLORIDE 0.2 MG: 0.2 INJECTION, SOLUTION INTRAMUSCULAR; INTRAVENOUS; SUBCUTANEOUS at 01:56

## 2025-01-06 RX ADMIN — ACETAMINOPHEN 1000 MG: 10 INJECTION, SOLUTION INTRAVENOUS at 05:03

## 2025-01-06 RX ADMIN — DEXMEDETOMIDINE HYDROCHLORIDE 0.93 MCG/KG/HR: 400 INJECTION INTRAVENOUS at 08:52

## 2025-01-06 RX ADMIN — PIPERACILLIN SODIUM AND TAZOBACTAM SODIUM 3.38 G: 3; .375 INJECTION, SOLUTION INTRAVENOUS at 18:44

## 2025-01-06 RX ADMIN — DEXMEDETOMIDINE HYDROCHLORIDE 0.93 MCG/KG/HR: 400 INJECTION INTRAVENOUS at 22:50

## 2025-01-06 RX ADMIN — PIPERACILLIN SODIUM AND TAZOBACTAM SODIUM 3.38 G: 3; .375 INJECTION, SOLUTION INTRAVENOUS at 22:55

## 2025-01-06 RX ADMIN — HEPARIN SODIUM 5000 UNITS: 5000 INJECTION INTRAVENOUS; SUBCUTANEOUS at 05:04

## 2025-01-06 RX ADMIN — MAGNESIUM SULFATE HEPTAHYDRATE 1 G: 1 INJECTION, SOLUTION INTRAVENOUS at 05:00

## 2025-01-06 RX ADMIN — ACETAMINOPHEN 1000 MG: 10 INJECTION, SOLUTION INTRAVENOUS at 12:05

## 2025-01-06 RX ADMIN — PIPERACILLIN SODIUM AND TAZOBACTAM SODIUM 2.25 G: 2; .25 INJECTION, SOLUTION INTRAVENOUS at 05:03

## 2025-01-06 RX ADMIN — HEPARIN SODIUM 5000 UNITS: 5000 INJECTION INTRAVENOUS; SUBCUTANEOUS at 20:11

## 2025-01-06 RX ADMIN — DEXMEDETOMIDINE HYDROCHLORIDE 0.93 MCG/KG/HR: 400 INJECTION INTRAVENOUS at 13:37

## 2025-01-06 RX ADMIN — HYDROMORPHONE HYDROCHLORIDE 0.2 MG: 0.2 INJECTION, SOLUTION INTRAMUSCULAR; INTRAVENOUS; SUBCUTANEOUS at 22:50

## 2025-01-06 RX ADMIN — DEXMEDETOMIDINE HYDROCHLORIDE 0.93 MCG/KG/HR: 400 INJECTION INTRAVENOUS at 04:33

## 2025-01-06 RX ADMIN — HYDROMORPHONE HYDROCHLORIDE 0.2 MG: 0.2 INJECTION, SOLUTION INTRAMUSCULAR; INTRAVENOUS; SUBCUTANEOUS at 17:54

## 2025-01-06 ASSESSMENT — PAIN - FUNCTIONAL ASSESSMENT
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
## 2025-01-06 NOTE — PROGRESS NOTES
Ostomy Care & Teaching Progress Note     01/06/25 1414   Ileostomy Other (Comment) RUQ   Placement Date/Time: 12/23/24 1122   Placed by: hilary ZULUAGA  Hand Hygiene Completed: Yes  Ileostomy Type: (c) Other (Comment)  Location: RUQ   Stomal Appliance 1 piece;Changed   Site/Stoma Assessment Pink;Red   Peristomal Assessment Other (Comment)  (healing blisters, midline wound packed w2d)   Treatment Site care;Pouch change   Output (mL) 20 mL   Output Description Brown;Loose     Pt seen resting intubated in TSICU bed. Jejunostomy and mucous fistula present in RLQ. Bedside nurse reporting c/f high output (expected with jejunostomy & tube feeds) and is requesting high output pouch. Old appliance removed and site cleaned. Stoma appears pink, lightly budded, oval-shaped and intact. ~20cc dark brown liquid stool in old appliance. Skin prepped with barrier film, and new 57mm 2-piece flat appliance with high output pouch placed, dated. Explained to bedside RN that this appliance can easily be connected to gravity drainage to keep up with high output.    Pt not appropriate for teaching at this time. No visitors present. Will continue to follow.    Machelle Clifton, RN, CWON  Wound/Ostomy Nurse
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
## 2025-01-06 NOTE — CARE PLAN
Transitional Care Coordinator Note: Patient discussed with medical team, per medical team patient is not medically ready. Pending tracheostomy tomorrow. 1/6  Discharge dispo: VIRGINIE Lopez RN  Transitional Care Coordinator
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
## 2025-01-07 ENCOUNTER — APPOINTMENT (OUTPATIENT)
Dept: RADIOLOGY | Facility: HOSPITAL | Age: 80
End: 2025-01-07
Payer: MEDICARE

## 2025-01-07 ENCOUNTER — ANESTHESIA (OUTPATIENT)
Dept: OPERATING ROOM | Facility: HOSPITAL | Age: 80
End: 2025-01-07
Payer: MEDICARE

## 2025-01-07 ENCOUNTER — ANESTHESIA EVENT (OUTPATIENT)
Dept: OPERATING ROOM | Facility: HOSPITAL | Age: 80
End: 2025-01-07
Payer: MEDICARE

## 2025-01-07 LAB
ABO GROUP (TYPE) IN BLOOD: NORMAL
ALBUMIN SERPL BCP-MCNC: 1.7 G/DL (ref 3.4–5)
ALBUMIN SERPL BCP-MCNC: 2 G/DL (ref 3.4–5)
ALP SERPL-CCNC: 280 U/L (ref 33–136)
ALT SERPL W P-5'-P-CCNC: 107 U/L (ref 10–52)
ANION GAP BLDA CALCULATED.4IONS-SCNC: 6 MMO/L (ref 10–25)
ANION GAP BLDA CALCULATED.4IONS-SCNC: 9 MMO/L (ref 10–25)
ANION GAP SERPL CALC-SCNC: 13 MMOL/L (ref 10–20)
ANION GAP SERPL CALC-SCNC: 14 MMOL/L (ref 10–20)
ANTIBODY SCREEN: NORMAL
APTT PPP: 25 SECONDS (ref 27–38)
APTT PPP: 26 SECONDS (ref 27–38)
AST SERPL W P-5'-P-CCNC: 104 U/L (ref 9–39)
BACTERIA FLD CULT: ABNORMAL
BASE EXCESS BLDA CALC-SCNC: 0.4 MMOL/L (ref -2–3)
BASE EXCESS BLDA CALC-SCNC: 3 MMOL/L (ref -2–3)
BASOPHILS # BLD AUTO: 0.05 X10*3/UL (ref 0–0.1)
BASOPHILS # BLD AUTO: 0.06 X10*3/UL (ref 0–0.1)
BASOPHILS NFR BLD AUTO: 0.3 %
BASOPHILS NFR BLD AUTO: 0.3 %
BILIRUB DIRECT SERPL-MCNC: 2.3 MG/DL (ref 0–0.3)
BILIRUB SERPL-MCNC: 3.8 MG/DL (ref 0–1.2)
BLOOD EXPIRATION DATE: NORMAL
BODY TEMPERATURE: 37 DEGREES CELSIUS
BODY TEMPERATURE: 37 DEGREES CELSIUS
BUN SERPL-MCNC: 28 MG/DL (ref 6–23)
BUN SERPL-MCNC: 31 MG/DL (ref 6–23)
CA-I BLD-SCNC: 1.05 MMOL/L (ref 1.1–1.33)
CA-I BLD-SCNC: 1.1 MMOL/L (ref 1.1–1.33)
CA-I BLDA-SCNC: 1.08 MMOL/L (ref 1.1–1.33)
CA-I BLDA-SCNC: 1.1 MMOL/L (ref 1.1–1.33)
CALCIUM SERPL-MCNC: 7 MG/DL (ref 8.6–10.6)
CALCIUM SERPL-MCNC: 7 MG/DL (ref 8.6–10.6)
CHLORIDE BLDA-SCNC: 101 MMOL/L (ref 98–107)
CHLORIDE BLDA-SCNC: 99 MMOL/L (ref 98–107)
CHLORIDE SERPL-SCNC: 97 MMOL/L (ref 98–107)
CHLORIDE SERPL-SCNC: 98 MMOL/L (ref 98–107)
CO2 SERPL-SCNC: 25 MMOL/L (ref 21–32)
CO2 SERPL-SCNC: 26 MMOL/L (ref 21–32)
CREAT SERPL-MCNC: 2.1 MG/DL (ref 0.5–1.3)
CREAT SERPL-MCNC: 2.5 MG/DL (ref 0.5–1.3)
DISPENSE STATUS: NORMAL
EGFRCR SERPLBLD CKD-EPI 2021: 26 ML/MIN/1.73M*2
EGFRCR SERPLBLD CKD-EPI 2021: 32 ML/MIN/1.73M*2
EOSINOPHIL # BLD AUTO: 0.24 X10*3/UL (ref 0–0.4)
EOSINOPHIL # BLD AUTO: 0.25 X10*3/UL (ref 0–0.4)
EOSINOPHIL NFR BLD AUTO: 1.3 %
EOSINOPHIL NFR BLD AUTO: 1.3 %
ERYTHROCYTE [DISTWIDTH] IN BLOOD BY AUTOMATED COUNT: 15.8 % (ref 11.5–14.5)
ERYTHROCYTE [DISTWIDTH] IN BLOOD BY AUTOMATED COUNT: 15.8 % (ref 11.5–14.5)
ERYTHROCYTE [DISTWIDTH] IN BLOOD BY AUTOMATED COUNT: 16.1 % (ref 11.5–14.5)
GLUCOSE BLD MANUAL STRIP-MCNC: 102 MG/DL (ref 74–99)
GLUCOSE BLD MANUAL STRIP-MCNC: 78 MG/DL (ref 74–99)
GLUCOSE BLD MANUAL STRIP-MCNC: 82 MG/DL (ref 74–99)
GLUCOSE BLD MANUAL STRIP-MCNC: 95 MG/DL (ref 74–99)
GLUCOSE BLDA-MCNC: 80 MG/DL (ref 74–99)
GLUCOSE BLDA-MCNC: 80 MG/DL (ref 74–99)
GLUCOSE SERPL-MCNC: 75 MG/DL (ref 74–99)
GLUCOSE SERPL-MCNC: 80 MG/DL (ref 74–99)
GRAM STN SPEC: ABNORMAL
GRAM STN SPEC: ABNORMAL
HCO3 BLDA-SCNC: 24.3 MMOL/L (ref 22–26)
HCO3 BLDA-SCNC: 26.5 MMOL/L (ref 22–26)
HCT VFR BLD AUTO: 19.3 % (ref 41–52)
HCT VFR BLD AUTO: 22.1 % (ref 41–52)
HCT VFR BLD AUTO: 26 % (ref 41–52)
HCT VFR BLD EST: 16 % (ref 41–52)
HCT VFR BLD EST: 25 % (ref 41–52)
HGB BLD-MCNC: 6.9 G/DL (ref 13.5–17.5)
HGB BLD-MCNC: 7.7 G/DL (ref 13.5–17.5)
HGB BLD-MCNC: 9.2 G/DL (ref 13.5–17.5)
HGB BLDA-MCNC: 5.3 G/DL (ref 13.5–17.5)
HGB BLDA-MCNC: 8.3 G/DL (ref 13.5–17.5)
IMM GRANULOCYTES # BLD AUTO: 0.4 X10*3/UL (ref 0–0.5)
IMM GRANULOCYTES # BLD AUTO: 0.66 X10*3/UL (ref 0–0.5)
IMM GRANULOCYTES NFR BLD AUTO: 2 % (ref 0–0.9)
IMM GRANULOCYTES NFR BLD AUTO: 3.5 % (ref 0–0.9)
INHALED O2 CONCENTRATION: 40 %
INHALED O2 CONCENTRATION: 40 %
INR PPP: 1.4 (ref 0.9–1.1)
INR PPP: 1.4 (ref 0.9–1.1)
LACTATE BLDA-SCNC: 0.8 MMOL/L (ref 0.4–2)
LACTATE BLDA-SCNC: 0.9 MMOL/L (ref 0.4–2)
LACTATE SERPL-SCNC: 0.7 MMOL/L (ref 0.4–2)
LYMPHOCYTES # BLD AUTO: 0.98 X10*3/UL (ref 0.8–3)
LYMPHOCYTES # BLD AUTO: 1.01 X10*3/UL (ref 0.8–3)
LYMPHOCYTES NFR BLD AUTO: 4.9 %
LYMPHOCYTES NFR BLD AUTO: 5.4 %
MAGNESIUM SERPL-MCNC: 1.8 MG/DL (ref 1.6–2.4)
MAGNESIUM SERPL-MCNC: 2.24 MG/DL (ref 1.6–2.4)
MCH RBC QN AUTO: 29.1 PG (ref 26–34)
MCH RBC QN AUTO: 29.2 PG (ref 26–34)
MCH RBC QN AUTO: 29.6 PG (ref 26–34)
MCHC RBC AUTO-ENTMCNC: 34.8 G/DL (ref 32–36)
MCHC RBC AUTO-ENTMCNC: 35.4 G/DL (ref 32–36)
MCHC RBC AUTO-ENTMCNC: 35.8 G/DL (ref 32–36)
MCV RBC AUTO: 81 FL (ref 80–100)
MCV RBC AUTO: 84 FL (ref 80–100)
MCV RBC AUTO: 84 FL (ref 80–100)
MONOCYTES # BLD AUTO: 0.98 X10*3/UL (ref 0.05–0.8)
MONOCYTES # BLD AUTO: 1.28 X10*3/UL (ref 0.05–0.8)
MONOCYTES NFR BLD AUTO: 5.2 %
MONOCYTES NFR BLD AUTO: 6.4 %
NEUTROPHILS # BLD AUTO: 15.77 X10*3/UL (ref 1.6–5.5)
NEUTROPHILS # BLD AUTO: 17.02 X10*3/UL (ref 1.6–5.5)
NEUTROPHILS NFR BLD AUTO: 84.3 %
NEUTROPHILS NFR BLD AUTO: 85.1 %
NRBC BLD-RTO: 0 /100 WBCS (ref 0–0)
OXYHGB MFR BLDA: 94 % (ref 94–98)
OXYHGB MFR BLDA: 96.6 % (ref 94–98)
PCO2 BLDA: 34 MM HG (ref 38–42)
PCO2 BLDA: 35 MM HG (ref 38–42)
PH BLDA: 7.45 PH (ref 7.38–7.42)
PH BLDA: 7.5 PH (ref 7.38–7.42)
PHOSPHATE SERPL-MCNC: 2.2 MG/DL (ref 2.5–4.9)
PHOSPHATE SERPL-MCNC: 3 MG/DL (ref 2.5–4.9)
PLATELET # BLD AUTO: 280 X10*3/UL (ref 150–450)
PLATELET # BLD AUTO: 311 X10*3/UL (ref 150–450)
PLATELET # BLD AUTO: 408 X10*3/UL (ref 150–450)
PO2 BLDA: 69 MM HG (ref 85–95)
PO2 BLDA: 96 MM HG (ref 85–95)
POTASSIUM BLDA-SCNC: 4 MMOL/L (ref 3.5–5.3)
POTASSIUM BLDA-SCNC: 4.2 MMOL/L (ref 3.5–5.3)
POTASSIUM SERPL-SCNC: 3.8 MMOL/L (ref 3.5–5.3)
POTASSIUM SERPL-SCNC: 3.9 MMOL/L (ref 3.5–5.3)
PRODUCT BLOOD TYPE: 5100
PRODUCT CODE: NORMAL
PROT SERPL-MCNC: 4.5 G/DL (ref 6.4–8.2)
PROTHROMBIN TIME: 15.4 SECONDS (ref 9.8–12.8)
PROTHROMBIN TIME: 15.8 SECONDS (ref 9.8–12.8)
RBC # BLD AUTO: 2.37 X10*6/UL (ref 4.5–5.9)
RBC # BLD AUTO: 2.64 X10*6/UL (ref 4.5–5.9)
RBC # BLD AUTO: 3.11 X10*6/UL (ref 4.5–5.9)
RH FACTOR (ANTIGEN D): NORMAL
SAO2 % BLDA: 97 % (ref 94–100)
SAO2 % BLDA: 99 % (ref 94–100)
SODIUM BLDA-SCNC: 128 MMOL/L (ref 136–145)
SODIUM BLDA-SCNC: 129 MMOL/L (ref 136–145)
SODIUM SERPL-SCNC: 132 MMOL/L (ref 136–145)
SODIUM SERPL-SCNC: 133 MMOL/L (ref 136–145)
UNIT ABO: NORMAL
UNIT NUMBER: NORMAL
UNIT RH: NORMAL
UNIT VOLUME: 274
UNIT VOLUME: 278
UNIT VOLUME: 350
WBC # BLD AUTO: 18.7 X10*3/UL (ref 4.4–11.3)
WBC # BLD AUTO: 19.4 X10*3/UL (ref 4.4–11.3)
WBC # BLD AUTO: 20 X10*3/UL (ref 4.4–11.3)
XM INTEP: NORMAL

## 2025-01-07 PROCEDURE — 85730 THROMBOPLASTIN TIME PARTIAL: CPT

## 2025-01-07 PROCEDURE — 2020000001 HC ICU ROOM DAILY

## 2025-01-07 PROCEDURE — 2500000004 HC RX 250 GENERAL PHARMACY W/ HCPCS (ALT 636 FOR OP/ED)

## 2025-01-07 PROCEDURE — 85025 COMPLETE CBC W/AUTO DIFF WBC: CPT

## 2025-01-07 PROCEDURE — 82947 ASSAY GLUCOSE BLOOD QUANT: CPT

## 2025-01-07 PROCEDURE — 86850 RBC ANTIBODY SCREEN: CPT

## 2025-01-07 PROCEDURE — 99291 CRITICAL CARE FIRST HOUR: CPT | Performed by: SURGERY

## 2025-01-07 PROCEDURE — 71045 X-RAY EXAM CHEST 1 VIEW: CPT

## 2025-01-07 PROCEDURE — 2720000007 HC OR 272 NO HCPCS: Performed by: SURGERY

## 2025-01-07 PROCEDURE — 3600000009 HC OR TIME - EACH INCREMENTAL 1 MINUTE - PROCEDURE LEVEL FOUR: Performed by: SURGERY

## 2025-01-07 PROCEDURE — 36430 TRANSFUSION BLD/BLD COMPNT: CPT

## 2025-01-07 PROCEDURE — 85027 COMPLETE CBC AUTOMATED: CPT

## 2025-01-07 PROCEDURE — 82330 ASSAY OF CALCIUM: CPT

## 2025-01-07 PROCEDURE — 71045 X-RAY EXAM CHEST 1 VIEW: CPT | Performed by: STUDENT IN AN ORGANIZED HEALTH CARE EDUCATION/TRAINING PROGRAM

## 2025-01-07 PROCEDURE — 37799 UNLISTED PX VASCULAR SURGERY: CPT | Performed by: STUDENT IN AN ORGANIZED HEALTH CARE EDUCATION/TRAINING PROGRAM

## 2025-01-07 PROCEDURE — 31600 PLANNED TRACHEOSTOMY: CPT | Performed by: SURGERY

## 2025-01-07 PROCEDURE — 85018 HEMOGLOBIN: CPT

## 2025-01-07 PROCEDURE — 36430 TRANSFUSION BLD/BLD COMPNT: CPT | Performed by: ANESTHESIOLOGIST ASSISTANT

## 2025-01-07 PROCEDURE — 83735 ASSAY OF MAGNESIUM: CPT

## 2025-01-07 PROCEDURE — 86923 COMPATIBILITY TEST ELECTRIC: CPT

## 2025-01-07 PROCEDURE — 2500000004 HC RX 250 GENERAL PHARMACY W/ HCPCS (ALT 636 FOR OP/ED): Performed by: SURGERY

## 2025-01-07 PROCEDURE — A31600 PR TRACHEOSTOMY, PLANNED: Performed by: ANESTHESIOLOGY

## 2025-01-07 PROCEDURE — 0DH63UZ INSERTION OF FEEDING DEVICE INTO STOMACH, PERCUTANEOUS APPROACH: ICD-10-PCS | Performed by: SURGERY

## 2025-01-07 PROCEDURE — 2500000001 HC RX 250 WO HCPCS SELF ADMINISTERED DRUGS (ALT 637 FOR MEDICARE OP)

## 2025-01-07 PROCEDURE — 2500000005 HC RX 250 GENERAL PHARMACY W/O HCPCS: Performed by: SURGERY

## 2025-01-07 PROCEDURE — 82248 BILIRUBIN DIRECT: CPT

## 2025-01-07 PROCEDURE — 2780000003 HC OR 278 NO HCPCS: Performed by: SURGERY

## 2025-01-07 PROCEDURE — 3600000004 HC OR TIME - INITIAL BASE CHARGE - PROCEDURE LEVEL FOUR: Performed by: SURGERY

## 2025-01-07 PROCEDURE — 2500000004 HC RX 250 GENERAL PHARMACY W/ HCPCS (ALT 636 FOR OP/ED): Performed by: STUDENT IN AN ORGANIZED HEALTH CARE EDUCATION/TRAINING PROGRAM

## 2025-01-07 PROCEDURE — 99100 ANES PT EXTEME AGE<1 YR&>70: CPT | Performed by: ANESTHESIOLOGY

## 2025-01-07 PROCEDURE — 2500000005 HC RX 250 GENERAL PHARMACY W/O HCPCS: Performed by: STUDENT IN AN ORGANIZED HEALTH CARE EDUCATION/TRAINING PROGRAM

## 2025-01-07 PROCEDURE — 84100 ASSAY OF PHOSPHORUS: CPT

## 2025-01-07 PROCEDURE — 83605 ASSAY OF LACTIC ACID: CPT | Performed by: PHYSICIAN ASSISTANT

## 2025-01-07 PROCEDURE — A31600 PR TRACHEOSTOMY, PLANNED: Performed by: ANESTHESIOLOGIST ASSISTANT

## 2025-01-07 PROCEDURE — P9045 ALBUMIN (HUMAN), 5%, 250 ML: HCPCS | Mod: JZ,TB | Performed by: ANESTHESIOLOGIST ASSISTANT

## 2025-01-07 PROCEDURE — P9016 RBC LEUKOCYTES REDUCED: HCPCS

## 2025-01-07 PROCEDURE — 3700000002 HC GENERAL ANESTHESIA TIME - EACH INCREMENTAL 1 MINUTE: Performed by: SURGERY

## 2025-01-07 PROCEDURE — 82310 ASSAY OF CALCIUM: CPT

## 2025-01-07 PROCEDURE — 3700000001 HC GENERAL ANESTHESIA TIME - INITIAL BASE CHARGE: Performed by: SURGERY

## 2025-01-07 PROCEDURE — 2500000005 HC RX 250 GENERAL PHARMACY W/O HCPCS

## 2025-01-07 PROCEDURE — 43246 EGD PLACE GASTROSTOMY TUBE: CPT | Performed by: SURGERY

## 2025-01-07 PROCEDURE — 3E043XZ INTRODUCTION OF VASOPRESSOR INTO CENTRAL VEIN, PERCUTANEOUS APPROACH: ICD-10-PCS | Performed by: SURGERY

## 2025-01-07 PROCEDURE — 2500000004 HC RX 250 GENERAL PHARMACY W/ HCPCS (ALT 636 FOR OP/ED): Performed by: ANESTHESIOLOGIST ASSISTANT

## 2025-01-07 PROCEDURE — 82330 ASSAY OF CALCIUM: CPT | Performed by: STUDENT IN AN ORGANIZED HEALTH CARE EDUCATION/TRAINING PROGRAM

## 2025-01-07 PROCEDURE — 0B110F4 BYPASS TRACHEA TO CUTANEOUS WITH TRACHEOSTOMY DEVICE, OPEN APPROACH: ICD-10-PCS | Performed by: SURGERY

## 2025-01-07 PROCEDURE — 94003 VENT MGMT INPAT SUBQ DAY: CPT

## 2025-01-07 RX ORDER — PHENYLEPHRINE HCL IN 0.9% NACL 0.4MG/10ML
SYRINGE (ML) INTRAVENOUS AS NEEDED
Status: DISCONTINUED | OUTPATIENT
Start: 2025-01-07 | End: 2025-01-07

## 2025-01-07 RX ORDER — ALBUMIN HUMAN 50 G/1000ML
SOLUTION INTRAVENOUS AS NEEDED
Status: DISCONTINUED | OUTPATIENT
Start: 2025-01-07 | End: 2025-01-07

## 2025-01-07 RX ORDER — PROPOFOL 10 MG/ML
INJECTION, EMULSION INTRAVENOUS
Status: COMPLETED
Start: 2025-01-07 | End: 2025-01-07

## 2025-01-07 RX ORDER — ROCURONIUM BROMIDE 10 MG/ML
INJECTION, SOLUTION INTRAVENOUS AS NEEDED
Status: DISCONTINUED | OUTPATIENT
Start: 2025-01-07 | End: 2025-01-07

## 2025-01-07 RX ORDER — SODIUM CHLORIDE 0.9 G/100ML
INJECTION, SOLUTION IRRIGATION AS NEEDED
Status: DISCONTINUED | OUTPATIENT
Start: 2025-01-07 | End: 2025-01-07 | Stop reason: HOSPADM

## 2025-01-07 RX ORDER — MAGNESIUM SULFATE HEPTAHYDRATE 40 MG/ML
2 INJECTION, SOLUTION INTRAVENOUS ONCE
Status: COMPLETED | OUTPATIENT
Start: 2025-01-07 | End: 2025-01-07

## 2025-01-07 RX ORDER — PROPOFOL 10 MG/ML
0-50 INJECTION, EMULSION INTRAVENOUS CONTINUOUS
Status: DISCONTINUED | OUTPATIENT
Start: 2025-01-07 | End: 2025-01-07

## 2025-01-07 RX ORDER — MIDAZOLAM HYDROCHLORIDE 1 MG/ML
INJECTION INTRAMUSCULAR; INTRAVENOUS AS NEEDED
Status: DISCONTINUED | OUTPATIENT
Start: 2025-01-07 | End: 2025-01-07

## 2025-01-07 RX ORDER — OXYCODONE HYDROCHLORIDE 5 MG/1
5 TABLET ORAL EVERY 4 HOURS PRN
Status: DISCONTINUED | OUTPATIENT
Start: 2025-01-07 | End: 2025-01-09

## 2025-01-07 RX ORDER — MORPHINE SULFATE 4 MG/ML
1 INJECTION INTRAVENOUS EVERY 2 HOUR PRN
Status: DISCONTINUED | OUTPATIENT
Start: 2025-01-07 | End: 2025-01-09

## 2025-01-07 RX ORDER — PROPOFOL 10 MG/ML
INJECTION, EMULSION INTRAVENOUS AS NEEDED
Status: DISCONTINUED | OUTPATIENT
Start: 2025-01-07 | End: 2025-01-07

## 2025-01-07 RX ORDER — PANTOPRAZOLE SODIUM 40 MG/10ML
40 INJECTION, POWDER, LYOPHILIZED, FOR SOLUTION INTRAVENOUS DAILY
Status: DISCONTINUED | OUTPATIENT
Start: 2025-01-07 | End: 2025-01-08

## 2025-01-07 RX ORDER — LIDOCAINE HYDROCHLORIDE 10 MG/ML
INJECTION, SOLUTION INFILTRATION; PERINEURAL AS NEEDED
Status: DISCONTINUED | OUTPATIENT
Start: 2025-01-07 | End: 2025-01-07 | Stop reason: HOSPADM

## 2025-01-07 RX ORDER — CEFAZOLIN 1 G/1
INJECTION, POWDER, FOR SOLUTION INTRAVENOUS AS NEEDED
Status: DISCONTINUED | OUTPATIENT
Start: 2025-01-07 | End: 2025-01-07

## 2025-01-07 RX ORDER — OXYBUTYNIN CHLORIDE 5 MG/5ML
5 SYRUP ORAL 2 TIMES DAILY
Status: DISCONTINUED | OUTPATIENT
Start: 2025-01-07 | End: 2025-01-17

## 2025-01-07 RX ORDER — HYDROMORPHONE HYDROCHLORIDE 0.2 MG/ML
0.2 INJECTION INTRAMUSCULAR; INTRAVENOUS; SUBCUTANEOUS EVERY 4 HOURS PRN
Status: DISCONTINUED | OUTPATIENT
Start: 2025-01-07 | End: 2025-01-07

## 2025-01-07 RX ADMIN — MAGNESIUM SULFATE HEPTAHYDRATE 2 G: 40 INJECTION, SOLUTION INTRAVENOUS at 06:53

## 2025-01-07 RX ADMIN — HEPARIN SODIUM 5000 UNITS: 5000 INJECTION INTRAVENOUS; SUBCUTANEOUS at 05:50

## 2025-01-07 RX ADMIN — DEXMEDETOMIDINE HYDROCHLORIDE 0.75 MCG/KG/HR: 400 INJECTION INTRAVENOUS at 06:54

## 2025-01-07 RX ADMIN — PIPERACILLIN SODIUM AND TAZOBACTAM SODIUM 3.38 G: 3; .375 INJECTION, SOLUTION INTRAVENOUS at 17:10

## 2025-01-07 RX ADMIN — PROPOFOL 50 MG: 10 INJECTION, EMULSION INTRAVENOUS at 09:05

## 2025-01-07 RX ADMIN — MIDAZOLAM HYDROCHLORIDE 2 MG: 1 INJECTION, SOLUTION INTRAMUSCULAR; INTRAVENOUS at 07:43

## 2025-01-07 RX ADMIN — PROPOFOL 60 MG: 10 INJECTION, EMULSION INTRAVENOUS at 07:53

## 2025-01-07 RX ADMIN — PIPERACILLIN SODIUM AND TAZOBACTAM SODIUM 3.38 G: 3; .375 INJECTION, SOLUTION INTRAVENOUS at 05:50

## 2025-01-07 RX ADMIN — CEFAZOLIN 2 G: 1 INJECTION, POWDER, FOR SOLUTION INTRAMUSCULAR; INTRAVENOUS at 07:54

## 2025-01-07 RX ADMIN — MORPHINE SULFATE 1 MG: 4 INJECTION, SOLUTION INTRAMUSCULAR; INTRAVENOUS at 20:50

## 2025-01-07 RX ADMIN — ROCURONIUM BROMIDE 50 MG: 10 INJECTION INTRAVENOUS at 07:43

## 2025-01-07 RX ADMIN — FLUCONAZOLE 400 MG: 2 INJECTION, SOLUTION INTRAVENOUS at 15:18

## 2025-01-07 RX ADMIN — ALBUMIN HUMAN 250 ML: 0.05 INJECTION, SOLUTION INTRAVENOUS at 08:48

## 2025-01-07 RX ADMIN — PROPOFOL 50 MG: 10 INJECTION, EMULSION INTRAVENOUS at 08:42

## 2025-01-07 RX ADMIN — HYDROMORPHONE HYDROCHLORIDE 0.2 MG: 0.2 INJECTION, SOLUTION INTRAMUSCULAR; INTRAVENOUS; SUBCUTANEOUS at 18:47

## 2025-01-07 RX ADMIN — PROPOFOL 40 MG: 10 INJECTION, EMULSION INTRAVENOUS at 08:05

## 2025-01-07 RX ADMIN — HEPARIN SODIUM 5000 UNITS: 5000 INJECTION INTRAVENOUS; SUBCUTANEOUS at 13:28

## 2025-01-07 RX ADMIN — SODIUM CHLORIDE, SODIUM LACTATE, POTASSIUM CHLORIDE, AND CALCIUM CHLORIDE: 600; 310; 30; 20 INJECTION, SOLUTION INTRAVENOUS at 07:43

## 2025-01-07 RX ADMIN — ROCURONIUM BROMIDE 20 MG: 10 INJECTION INTRAVENOUS at 08:42

## 2025-01-07 RX ADMIN — Medication 120 MCG: at 08:09

## 2025-01-07 RX ADMIN — Medication 40 MCG: at 08:15

## 2025-01-07 RX ADMIN — HEPARIN SODIUM 5000 UNITS: 5000 INJECTION INTRAVENOUS; SUBCUTANEOUS at 20:50

## 2025-01-07 RX ADMIN — SODIUM CHLORIDE, POTASSIUM CHLORIDE, SODIUM LACTATE AND CALCIUM CHLORIDE 500 ML: 600; 310; 30; 20 INJECTION, SOLUTION INTRAVENOUS at 03:25

## 2025-01-07 RX ADMIN — NOREPINEPHRINE BITARTRATE 0.02 MCG/KG/MIN: 8 INJECTION, SOLUTION INTRAVENOUS at 03:26

## 2025-01-07 RX ADMIN — OXYBUTYNIN CHLORIDE 5 MG: 5 SYRUP ORAL at 21:09

## 2025-01-07 RX ADMIN — Medication 80 MCG: at 08:01

## 2025-01-07 RX ADMIN — ALBUMIN HUMAN 250 ML: 0.05 INJECTION, SOLUTION INTRAVENOUS at 09:02

## 2025-01-07 RX ADMIN — PROPOFOL 5 MCG/KG/MIN: 10 INJECTION, EMULSION INTRAVENOUS at 03:10

## 2025-01-07 RX ADMIN — Medication 80 MCG: at 08:35

## 2025-01-07 RX ADMIN — SUGAMMADEX 200 MG: 100 INJECTION, SOLUTION INTRAVENOUS at 09:20

## 2025-01-07 RX ADMIN — PANTOPRAZOLE SODIUM 40 MG: 40 INJECTION, POWDER, FOR SOLUTION INTRAVENOUS at 20:50

## 2025-01-07 RX ADMIN — Medication 40 PERCENT: at 07:55

## 2025-01-07 ASSESSMENT — PAIN - FUNCTIONAL ASSESSMENT

## 2025-01-07 ASSESSMENT — PAIN SCALES - GENERAL
PAINLEVEL_OUTOF10: 0 - NO PAIN
PAIN_LEVEL: 0
PAINLEVEL_OUTOF10: 4
PAINLEVEL_OUTOF10: 0 - NO PAIN

## 2025-01-07 ASSESSMENT — PAIN DESCRIPTION - ORIENTATION: ORIENTATION: MID

## 2025-01-07 NOTE — ANESTHESIA PREPROCEDURE EVALUATION
Patient: Ike Gar    Procedure Information       Anesthesia Start Date/Time: 01/07/25 0748    Procedures:       CREATION, TRACHEOSTOMY      EGD, WITH PEG TUBE INSERTION    Location: [facility] OR  / [facility]    Surgeons: Mitchell Putnam MD            Relevant Problems   Cardiac   (+) Abnormal EKG   (+) Chest pain on breathing      Pulmonary   (+) Decreased functional residual capacity      GI   (+) GI bleed      /Renal   (+) Acute kidney injury (nontraumatic) (CMS-HCC)   (+) Hemodialysis patient (CMS-[facility])      Liver   (+) Elevated liver function tests   (+) Liver disease      Hematology   (+) Anemia (Severe Anemia between 5.3 and 6.9)   (+) History of blood transfusion   (+) Thrombocytopenia (CMS-[facility])      Musculoskeletal   (+) Osteoarthritis      ID   (+) Infection requiring contact isolation precautions   (+) Septicemia (Multi)       Clinical information reviewed:   Tobacco  Allergies  Meds   Med Hx  Surg Hx   Fam Hx  Soc Hx        NPO Detail:  No data recorded     Physical Exam    Airway  Mallampati: unable to assess     Cardiovascular - normal exam  Rhythm: regular  Rate: normal     Dental    Pulmonary   (+) rhonchi     Abdominal            Anesthesia Plan    History of general anesthesia?: yes  History of complications of general anesthesia?: no    ASA 4     general     Patient did not smoke on day of procedure.    intravenous induction   Postoperative administration of opioids is intended.    Plan discussed with attending.

## 2025-01-07 NOTE — PROGRESS NOTES
Occupational Therapy    Communication Note    Patient Name: Ike Gar  MRN: [id number]  Today's Date: 1/7/2025   Room: [facility]*    Discipline: Occupational Therapy      Missed Visit Reason:  (0838: pt off unit for tracheostomy. not available for OT evaluation)      01/07/25 at 8:39 AM   Radha Foster, OT   Rehab Office: [phone]
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
## 2025-01-07 NOTE — CARE PLAN
Problem: Safety - Medical Restraint  Goal: Remains free of injury from restraints (Restraint for Interference with Medical Device)  Outcome: Progressing  Goal: Free from restraint(s) (Restraint for Interference with Medical Device)  Outcome: Progressing     Problem: Pain - Adult  Goal: Verbalizes/displays adequate comfort level or baseline comfort level  Outcome: Progressing     Problem: Safety - Adult  Goal: Free from fall injury  Outcome: Progressing     Problem: Discharge Planning  Goal: Discharge to home or other facility with appropriate resources  Outcome: Progressing     Problem: Chronic Conditions and Co-morbidities  Goal: Patient's chronic conditions and co-morbidity symptoms are monitored and maintained or improved  Outcome: Progressing     Problem: Skin  Goal: Decreased wound size/increased tissue granulation at next dressing change  Outcome: Progressing  Goal: Participates in plan/prevention/treatment measures  Outcome: Progressing  Goal: Prevent/manage excess moisture  Outcome: Progressing  Goal: Prevent/minimize sheer/friction injuries  Outcome: Progressing  Goal: Promote/optimize nutrition  Outcome: Progressing  Goal: Promote skin healing  Outcome: Progressing     Problem: Fall/Injury  Goal: Not fall by end of shift  Outcome: Progressing  Goal: Be free from injury by end of the shift  Outcome: Progressing  Goal: Verbalize understanding of personal risk factors for fall in the hospital  Outcome: Progressing  Goal: Verbalize understanding of risk factor reduction measures to prevent injury from fall in the home  Outcome: Progressing  Goal: Use assistive devices by end of the shift  Outcome: Progressing  Goal: Pace activities to prevent fatigue by end of the shift  Outcome: Progressing     Problem: Pain  Goal: Takes deep breaths with improved pain control throughout the shift  Outcome: Progressing  Goal: Turns in bed with improved pain control throughout the shift  Outcome: Progressing  Goal: Walks  with improved pain control throughout the shift  Outcome: Progressing  Goal: Performs ADL's with improved pain control throughout shift  Outcome: Progressing  Goal: Participates in PT with improved pain control throughout the shift  Outcome: Progressing  Goal: Free from opioid side effects throughout the shift  Outcome: Progressing  Goal: Free from acute confusion related to pain meds throughout the shift  Outcome: Progressing     Problem: Respiratory  Goal: Clear secretions with interventions this shift  Outcome: Progressing  Goal: Minimize anxiety/maximize coping throughout shift  Outcome: Progressing  Goal: Minimal/no exertional discomfort or dyspnea this shift  Outcome: Progressing  Goal: No signs of respiratory distress (eg. Use of accessory muscles. Peds grunting)  Outcome: Progressing  Goal: Patent airway maintained this shift  Outcome: Progressing  Goal: Tolerate mechanical ventilation evidenced by VS/agitation level this shift  Outcome: Progressing  Goal: Tolerate pulmonary toileting this shift  Outcome: Progressing  Goal: Verbalize decreased shortness of breath this shift  Outcome: Progressing  Goal: Wean oxygen to maintain O2 saturation per order/standard this shift  Outcome: Progressing  Goal: Increase self care and/or family involvement in next 24 hours  Outcome: Progressing     Problem: Swallowing  Goal: LTG - Patient will demonstrate safe swallowing Intervention/techniques  Outcome: Progressing
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
## 2025-01-07 NOTE — SIGNIFICANT EVENT
Nephrology entry     Patient off floor for Trach/PEG   SLED overnight last evening   No plans for RRT today   Will re-assess for dialysis needs tomorrow     Jorge Alberto Farah MD   Nephrology fellow   Nephrology pager [phone]  Available via Epic Chat
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
## 2025-01-08 ENCOUNTER — APPOINTMENT (OUTPATIENT)
Dept: DIALYSIS | Facility: HOSPITAL | Age: 80
End: 2025-01-08
Payer: MEDICARE

## 2025-01-08 ENCOUNTER — APPOINTMENT (OUTPATIENT)
Dept: RADIOLOGY | Facility: HOSPITAL | Age: 80
End: 2025-01-08
Payer: MEDICARE

## 2025-01-08 PROBLEM — N18.6 ESRD (END STAGE RENAL DISEASE) ON DIALYSIS (MULTI): Status: ACTIVE | Noted: 2025-01-08

## 2025-01-08 PROBLEM — Z99.2 ESRD (END STAGE RENAL DISEASE) ON DIALYSIS (MULTI): Status: ACTIVE | Noted: 2025-01-08

## 2025-01-08 LAB
ALBUMIN SERPL BCP-MCNC: 2.1 G/DL (ref 3.4–5)
ANION GAP SERPL CALC-SCNC: 14 MMOL/L (ref 10–20)
APTT PPP: 27 SECONDS (ref 27–38)
APTT PPP: 27 SECONDS (ref 27–38)
BACTERIA BLD CULT: NORMAL
BACTERIA BLD CULT: NORMAL
BASOPHILS # BLD AUTO: 0.09 X10*3/UL (ref 0–0.1)
BASOPHILS NFR BLD AUTO: 0.3 %
BLOOD EXPIRATION DATE: NORMAL
BUN SERPL-MCNC: 37 MG/DL (ref 6–23)
CA-I BLD-SCNC: 1.04 MMOL/L (ref 1.1–1.33)
CALCIUM SERPL-MCNC: 7.1 MG/DL (ref 8.6–10.6)
CFT FORM KAOLIN IND BLD RES TEG: 0.8 MIN (ref 0.8–2.1)
CFT FORM KAOLIN IND BLD RES TEG: 0.9 MIN (ref 0.8–2.1)
CHLORIDE SERPL-SCNC: 97 MMOL/L (ref 98–107)
CLOT ANGLE.KAOLIN INDUCED BLD RES TEG: 78 DEG (ref 63–78)
CLOT ANGLE.KAOLIN INDUCED BLD RES TEG: 81 DEG (ref 63–78)
CLOT INIT KAO IND P HEP NEUT BLD RES TEG: 4.8 MIN (ref 4.3–8.3)
CLOT INIT KAO IND P HEP NEUT BLD RES TEG: 5.3 MIN (ref 4.6–9.1)
CLOT INIT KAO IND P HEP NEUT BLD RES TEG: 5.9 MIN (ref 4.6–9.1)
CLOT INIT KAO IND P HEP NEUT BLD RES TEG: 6.9 MIN (ref 4.3–8.3)
CO2 SERPL-SCNC: 25 MMOL/L (ref 21–32)
CREAT SERPL-MCNC: 3.33 MG/DL (ref 0.5–1.3)
DISPENSE STATUS: NORMAL
EGFRCR SERPLBLD CKD-EPI 2021: 18 ML/MIN/1.73M*2
EOSINOPHIL # BLD AUTO: 0.13 X10*3/UL (ref 0–0.4)
EOSINOPHIL NFR BLD AUTO: 0.5 %
ERYTHROCYTE [DISTWIDTH] IN BLOOD BY AUTOMATED COUNT: 15.7 % (ref 11.5–14.5)
ERYTHROCYTE [DISTWIDTH] IN BLOOD BY AUTOMATED COUNT: 15.7 % (ref 11.5–14.5)
ERYTHROCYTE [DISTWIDTH] IN BLOOD BY AUTOMATED COUNT: 15.9 % (ref 11.5–14.5)
ERYTHROCYTE [DISTWIDTH] IN BLOOD BY AUTOMATED COUNT: 15.9 % (ref 11.5–14.5)
ERYTHROCYTE [DISTWIDTH] IN BLOOD BY AUTOMATED COUNT: 16 % (ref 11.5–14.5)
FIBRINOGEN BLD CALC-MCNC: 595 MG/DL (ref 278–581)
FIBRINOGEN BLD CALC-MCNC: 630 MG/DL (ref 278–581)
GLUCOSE BLD MANUAL STRIP-MCNC: 125 MG/DL (ref 74–99)
GLUCOSE BLD MANUAL STRIP-MCNC: 126 MG/DL (ref 74–99)
GLUCOSE BLD MANUAL STRIP-MCNC: 83 MG/DL (ref 74–99)
GLUCOSE BLD MANUAL STRIP-MCNC: 91 MG/DL (ref 74–99)
GLUCOSE SERPL-MCNC: 82 MG/DL (ref 74–99)
HBV SURFACE AB SER-ACNC: 3.5 MIU/ML
HBV SURFACE AG SERPL QL IA: NONREACTIVE
HCT VFR BLD AUTO: 19.2 % (ref 41–52)
HCT VFR BLD AUTO: 21.7 % (ref 41–52)
HCT VFR BLD AUTO: 22.2 % (ref 41–52)
HCT VFR BLD AUTO: 22.5 % (ref 41–52)
HCT VFR BLD AUTO: 23.9 % (ref 41–52)
HGB BLD-MCNC: 6.4 G/DL (ref 13.5–17.5)
HGB BLD-MCNC: 7.5 G/DL (ref 13.5–17.5)
HGB BLD-MCNC: 7.7 G/DL (ref 13.5–17.5)
HGB BLD-MCNC: 7.9 G/DL (ref 13.5–17.5)
HGB BLD-MCNC: 8.3 G/DL (ref 13.5–17.5)
IMM GRANULOCYTES # BLD AUTO: 0.54 X10*3/UL (ref 0–0.5)
IMM GRANULOCYTES NFR BLD AUTO: 2 % (ref 0–0.9)
INR PPP: 1.3 (ref 0.9–1.1)
INR PPP: 1.4 (ref 0.9–1.1)
LYMPHOCYTES # BLD AUTO: 1.15 X10*3/UL (ref 0.8–3)
LYMPHOCYTES NFR BLD AUTO: 4.3 %
MA KAOLIN BLD RES TEG: 68 MM (ref 52–69)
MA KAOLIN BLD RES TEG: 70 MM (ref 52–69)
MA KAOLIN+TF BLD RES TEG: 70 MM (ref 52–70)
MA KAOLIN+TF BLD RES TEG: 71 MM (ref 52–70)
MA TF IND+IIB-IIIA INH BLD RES TEG: 33 MM (ref 15–32)
MA TF IND+IIB-IIIA INH BLD RES TEG: 34 MM (ref 15–32)
MAGNESIUM SERPL-MCNC: 1.93 MG/DL (ref 1.6–2.4)
MCH RBC QN AUTO: 29.2 PG (ref 26–34)
MCH RBC QN AUTO: 29.4 PG (ref 26–34)
MCH RBC QN AUTO: 29.5 PG (ref 26–34)
MCH RBC QN AUTO: 29.7 PG (ref 26–34)
MCH RBC QN AUTO: 29.8 PG (ref 26–34)
MCHC RBC AUTO-ENTMCNC: 33.3 G/DL (ref 32–36)
MCHC RBC AUTO-ENTMCNC: 34.6 G/DL (ref 32–36)
MCHC RBC AUTO-ENTMCNC: 34.7 G/DL (ref 32–36)
MCHC RBC AUTO-ENTMCNC: 34.7 G/DL (ref 32–36)
MCHC RBC AUTO-ENTMCNC: 35.1 G/DL (ref 32–36)
MCV RBC AUTO: 85 FL (ref 80–100)
MCV RBC AUTO: 86 FL (ref 80–100)
MCV RBC AUTO: 88 FL (ref 80–100)
MONOCYTES # BLD AUTO: 1.84 X10*3/UL (ref 0.05–0.8)
MONOCYTES NFR BLD AUTO: 6.8 %
NEUTROPHILS # BLD AUTO: 23.15 X10*3/UL (ref 1.6–5.5)
NEUTROPHILS NFR BLD AUTO: 86.1 %
NRBC BLD-RTO: 0 /100 WBCS (ref 0–0)
PHOSPHATE SERPL-MCNC: 3 MG/DL (ref 2.5–4.9)
PLATELET # BLD AUTO: 401 X10*3/UL (ref 150–450)
PLATELET # BLD AUTO: 410 X10*3/UL (ref 150–450)
PLATELET # BLD AUTO: 424 X10*3/UL (ref 150–450)
PLATELET # BLD AUTO: 430 X10*3/UL (ref 150–450)
PLATELET # BLD AUTO: 437 X10*3/UL (ref 150–450)
POTASSIUM SERPL-SCNC: 4.2 MMOL/L (ref 3.5–5.3)
PRODUCT BLOOD TYPE: 5100
PRODUCT BLOOD TYPE: 6200
PRODUCT CODE: NORMAL
PROTHROMBIN TIME: 14.2 SECONDS (ref 9.8–12.8)
PROTHROMBIN TIME: 15.5 SECONDS (ref 9.8–12.8)
RBC # BLD AUTO: 2.19 X10*6/UL (ref 4.5–5.9)
RBC # BLD AUTO: 2.54 X10*6/UL (ref 4.5–5.9)
RBC # BLD AUTO: 2.62 X10*6/UL (ref 4.5–5.9)
RBC # BLD AUTO: 2.65 X10*6/UL (ref 4.5–5.9)
RBC # BLD AUTO: 2.79 X10*6/UL (ref 4.5–5.9)
SODIUM SERPL-SCNC: 132 MMOL/L (ref 136–145)
UNIT ABO: NORMAL
UNIT NUMBER: NORMAL
UNIT RH: NORMAL
UNIT VOLUME: 289
UNIT VOLUME: 290
UNIT VOLUME: 350
UNIT VOLUME: 350
WBC # BLD AUTO: 21.3 X10*3/UL (ref 4.4–11.3)
WBC # BLD AUTO: 24.4 X10*3/UL (ref 4.4–11.3)
WBC # BLD AUTO: 26.9 X10*3/UL (ref 4.4–11.3)
WBC # BLD AUTO: 28.8 X10*3/UL (ref 4.4–11.3)
WBC # BLD AUTO: 31.2 X10*3/UL (ref 4.4–11.3)
XM INTEP: NORMAL

## 2025-01-08 PROCEDURE — 82947 ASSAY GLUCOSE BLOOD QUANT: CPT

## 2025-01-08 PROCEDURE — 36430 TRANSFUSION BLD/BLD COMPNT: CPT

## 2025-01-08 PROCEDURE — 99024 POSTOP FOLLOW-UP VISIT: CPT | Performed by: SURGERY

## 2025-01-08 PROCEDURE — 83735 ASSAY OF MAGNESIUM: CPT

## 2025-01-08 PROCEDURE — 85027 COMPLETE CBC AUTOMATED: CPT | Performed by: STUDENT IN AN ORGANIZED HEALTH CARE EDUCATION/TRAINING PROGRAM

## 2025-01-08 PROCEDURE — 2500000004 HC RX 250 GENERAL PHARMACY W/ HCPCS (ALT 636 FOR OP/ED)

## 2025-01-08 PROCEDURE — 94003 VENT MGMT INPAT SUBQ DAY: CPT

## 2025-01-08 PROCEDURE — 86706 HEP B SURFACE ANTIBODY: CPT

## 2025-01-08 PROCEDURE — 97530 THERAPEUTIC ACTIVITIES: CPT | Mod: GO

## 2025-01-08 PROCEDURE — 71045 X-RAY EXAM CHEST 1 VIEW: CPT | Performed by: RADIOLOGY

## 2025-01-08 PROCEDURE — 99232 SBSQ HOSP IP/OBS MODERATE 35: CPT | Performed by: INTERNAL MEDICINE

## 2025-01-08 PROCEDURE — 85576 BLOOD PLATELET AGGREGATION: CPT | Performed by: STUDENT IN AN ORGANIZED HEALTH CARE EDUCATION/TRAINING PROGRAM

## 2025-01-08 PROCEDURE — 99291 CRITICAL CARE FIRST HOUR: CPT | Performed by: SURGERY

## 2025-01-08 PROCEDURE — 8010000001 HC DIALYSIS - HEMODIALYSIS PER DAY

## 2025-01-08 PROCEDURE — 2500000001 HC RX 250 WO HCPCS SELF ADMINISTERED DRUGS (ALT 637 FOR MEDICARE OP): Performed by: STUDENT IN AN ORGANIZED HEALTH CARE EDUCATION/TRAINING PROGRAM

## 2025-01-08 PROCEDURE — 85384 FIBRINOGEN ACTIVITY: CPT

## 2025-01-08 PROCEDURE — P9016 RBC LEUKOCYTES REDUCED: HCPCS

## 2025-01-08 PROCEDURE — 85027 COMPLETE CBC AUTOMATED: CPT

## 2025-01-08 PROCEDURE — 71045 X-RAY EXAM CHEST 1 VIEW: CPT

## 2025-01-08 PROCEDURE — 85025 COMPLETE CBC W/AUTO DIFF WBC: CPT

## 2025-01-08 PROCEDURE — 97530 THERAPEUTIC ACTIVITIES: CPT | Mod: GP

## 2025-01-08 PROCEDURE — 82330 ASSAY OF CALCIUM: CPT

## 2025-01-08 PROCEDURE — 85610 PROTHROMBIN TIME: CPT | Performed by: STUDENT IN AN ORGANIZED HEALTH CARE EDUCATION/TRAINING PROGRAM

## 2025-01-08 PROCEDURE — 87340 HEPATITIS B SURFACE AG IA: CPT

## 2025-01-08 PROCEDURE — 85730 THROMBOPLASTIN TIME PARTIAL: CPT

## 2025-01-08 PROCEDURE — 80069 RENAL FUNCTION PANEL: CPT

## 2025-01-08 PROCEDURE — 2500000001 HC RX 250 WO HCPCS SELF ADMINISTERED DRUGS (ALT 637 FOR MEDICARE OP)

## 2025-01-08 PROCEDURE — 2500000005 HC RX 250 GENERAL PHARMACY W/O HCPCS

## 2025-01-08 PROCEDURE — 2020000001 HC ICU ROOM DAILY

## 2025-01-08 PROCEDURE — 37799 UNLISTED PX VASCULAR SURGERY: CPT

## 2025-01-08 PROCEDURE — 2500000004 HC RX 250 GENERAL PHARMACY W/ HCPCS (ALT 636 FOR OP/ED): Performed by: STUDENT IN AN ORGANIZED HEALTH CARE EDUCATION/TRAINING PROGRAM

## 2025-01-08 RX ORDER — HYDRALAZINE HYDROCHLORIDE 20 MG/ML
10 INJECTION INTRAMUSCULAR; INTRAVENOUS ONCE
Status: COMPLETED | OUTPATIENT
Start: 2025-01-08 | End: 2025-01-08

## 2025-01-08 RX ORDER — PANTOPRAZOLE SODIUM 40 MG/10ML
40 INJECTION, POWDER, LYOPHILIZED, FOR SOLUTION INTRAVENOUS EVERY 12 HOURS
Status: DISCONTINUED | OUTPATIENT
Start: 2025-01-08 | End: 2025-01-10

## 2025-01-08 RX ORDER — LOPERAMIDE HYDROCHLORIDE 2 MG/1
2 CAPSULE ORAL 4 TIMES DAILY
Status: DISCONTINUED | OUTPATIENT
Start: 2025-01-08 | End: 2025-01-13

## 2025-01-08 RX ORDER — HEPARIN SODIUM 1000 [USP'U]/ML
1000 INJECTION, SOLUTION INTRAVENOUS; SUBCUTANEOUS
Status: DISCONTINUED | OUTPATIENT
Start: 2025-01-09 | End: 2025-01-14

## 2025-01-08 RX ADMIN — OXYCODONE HYDROCHLORIDE 5 MG: 5 TABLET ORAL at 07:48

## 2025-01-08 RX ADMIN — LOPERAMIDE HYDROCHLORIDE 2 MG: 2 CAPSULE ORAL at 16:00

## 2025-01-08 RX ADMIN — PANTOPRAZOLE SODIUM 40 MG: 40 INJECTION, POWDER, FOR SOLUTION INTRAVENOUS at 22:10

## 2025-01-08 RX ADMIN — HEPARIN SODIUM 5000 UNITS: 5000 INJECTION INTRAVENOUS; SUBCUTANEOUS at 05:59

## 2025-01-08 RX ADMIN — PIPERACILLIN SODIUM AND TAZOBACTAM SODIUM 2.25 G: 2; .25 INJECTION, SOLUTION INTRAVENOUS at 22:10

## 2025-01-08 RX ADMIN — LOPERAMIDE HYDROCHLORIDE 2 MG: 2 CAPSULE ORAL at 20:25

## 2025-01-08 RX ADMIN — OXYBUTYNIN CHLORIDE 5 MG: 5 SYRUP ORAL at 20:25

## 2025-01-08 RX ADMIN — FLUCONAZOLE 400 MG: 2 INJECTION, SOLUTION INTRAVENOUS at 14:54

## 2025-01-08 RX ADMIN — PIPERACILLIN SODIUM AND TAZOBACTAM SODIUM 2.25 G: 2; .25 INJECTION, SOLUTION INTRAVENOUS at 13:17

## 2025-01-08 RX ADMIN — OXYCODONE HYDROCHLORIDE 5 MG: 5 TABLET ORAL at 00:12

## 2025-01-08 RX ADMIN — OXYCODONE HYDROCHLORIDE 5 MG: 5 TABLET ORAL at 13:14

## 2025-01-08 RX ADMIN — PANTOPRAZOLE SODIUM 40 MG: 40 INJECTION, POWDER, FOR SOLUTION INTRAVENOUS at 08:29

## 2025-01-08 RX ADMIN — Medication 40 PERCENT: at 07:49

## 2025-01-08 RX ADMIN — OXYBUTYNIN CHLORIDE 5 MG: 5 SYRUP ORAL at 08:29

## 2025-01-08 RX ADMIN — MORPHINE SULFATE 1 MG: 4 INJECTION, SOLUTION INTRAMUSCULAR; INTRAVENOUS at 11:21

## 2025-01-08 RX ADMIN — HYDRALAZINE HYDROCHLORIDE 10 MG: 20 INJECTION INTRAMUSCULAR; INTRAVENOUS at 13:28

## 2025-01-08 RX ADMIN — ALTEPLASE 1 MG: 2.2 INJECTION, POWDER, LYOPHILIZED, FOR SOLUTION INTRAVENOUS at 10:51

## 2025-01-08 RX ADMIN — PIPERACILLIN SODIUM AND TAZOBACTAM SODIUM 3.38 G: 3; .375 INJECTION, SOLUTION INTRAVENOUS at 05:58

## 2025-01-08 RX ADMIN — LOPERAMIDE HYDROCHLORIDE 2 MG: 2 CAPSULE ORAL at 13:17

## 2025-01-08 ASSESSMENT — COGNITIVE AND FUNCTIONAL STATUS - GENERAL
WALKING IN HOSPITAL ROOM: TOTAL
MOBILITY SCORE: 6
MOVING FROM LYING ON BACK TO SITTING ON SIDE OF FLAT BED WITH BEDRAILS: TOTAL
DRESSING REGULAR UPPER BODY CLOTHING: A LOT
DRESSING REGULAR LOWER BODY CLOTHING: TOTAL
EATING MEALS: TOTAL
STANDING UP FROM CHAIR USING ARMS: TOTAL
HELP NEEDED FOR BATHING: TOTAL
TOILETING: TOTAL
PERSONAL GROOMING: A LOT
MOVING TO AND FROM BED TO CHAIR: TOTAL
CLIMB 3 TO 5 STEPS WITH RAILING: TOTAL
TURNING FROM BACK TO SIDE WHILE IN FLAT BAD: TOTAL
DAILY ACTIVITIY SCORE: 8

## 2025-01-08 ASSESSMENT — PAIN - FUNCTIONAL ASSESSMENT
PAIN_FUNCTIONAL_ASSESSMENT: 0-10
PAIN_FUNCTIONAL_ASSESSMENT: CPOT (CRITICAL CARE PAIN OBSERVATION TOOL)
PAIN_FUNCTIONAL_ASSESSMENT: 0-10
PAIN_FUNCTIONAL_ASSESSMENT: CPOT (CRITICAL CARE PAIN OBSERVATION TOOL)

## 2025-01-08 ASSESSMENT — PAIN SCALES - GENERAL
PAINLEVEL_OUTOF10: 4
PAINLEVEL_OUTOF10: 0 - NO PAIN
PAINLEVEL_OUTOF10: 4
PAINLEVEL_OUTOF10: 0 - NO PAIN
PAINLEVEL_OUTOF10: 4
PAINLEVEL_OUTOF10: 0 - NO PAIN

## 2025-01-08 ASSESSMENT — PAIN DESCRIPTION - DESCRIPTORS
DESCRIPTORS: ACHING;DISCOMFORT
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
## 2025-01-08 NOTE — PROGRESS NOTES
LSW consulted with medical on patient's dispo and ADOD. It is anticipated that the patient may be ready early next week. LSW will continue to follow patient to ensure a safe and timely discharge.

## 2025-01-08 NOTE — CARE PLAN
Problem: Safety - Medical Restraint  Goal: Remains free of injury from restraints (Restraint for Interference with Medical Device)  Outcome: Met  Goal: Free from restraint(s) (Restraint for Interference with Medical Device)  Outcome: Met     Problem: Pain - Adult  Goal: Verbalizes/displays adequate comfort level or baseline comfort level  Outcome: Progressing     Problem: Safety - Adult  Goal: Free from fall injury  Outcome: Progressing     Problem: Discharge Planning  Goal: Discharge to home or other facility with appropriate resources  Outcome: Progressing     Problem: Chronic Conditions and Co-morbidities  Goal: Patient's chronic conditions and co-morbidity symptoms are monitored and maintained or improved  Outcome: Progressing     Problem: Skin  Goal: Decreased wound size/increased tissue granulation at next dressing change  Outcome: Progressing  Goal: Participates in plan/prevention/treatment measures  Outcome: Progressing  Goal: Prevent/manage excess moisture  Outcome: Progressing  Goal: Prevent/minimize sheer/friction injuries  Outcome: Progressing  Goal: Promote/optimize nutrition  Outcome: Progressing  Goal: Promote skin healing  Outcome: Progressing     Problem: Fall/Injury  Goal: Not fall by end of shift  Outcome: Progressing  Goal: Be free from injury by end of the shift  Outcome: Progressing  Goal: Verbalize understanding of personal risk factors for fall in the hospital  Outcome: Progressing  Goal: Verbalize understanding of risk factor reduction measures to prevent injury from fall in the home  Outcome: Progressing  Goal: Use assistive devices by end of the shift  Outcome: Progressing  Goal: Pace activities to prevent fatigue by end of the shift  Outcome: Progressing     Problem: Pain  Goal: Takes deep breaths with improved pain control throughout the shift  Outcome: Progressing  Goal: Turns in bed with improved pain control throughout the shift  Outcome: Progressing  Goal: Walks with improved pain  control throughout the shift  Outcome: Progressing  Goal: Performs ADL's with improved pain control throughout shift  Outcome: Progressing  Goal: Participates in PT with improved pain control throughout the shift  Outcome: Progressing  Goal: Free from opioid side effects throughout the shift  Outcome: Progressing  Goal: Free from acute confusion related to pain meds throughout the shift  Outcome: Progressing     Problem: Respiratory  Goal: Clear secretions with interventions this shift  Outcome: Progressing  Goal: Minimize anxiety/maximize coping throughout shift  Outcome: Progressing  Goal: Minimal/no exertional discomfort or dyspnea this shift  Outcome: Progressing  Goal: No signs of respiratory distress (eg. Use of accessory muscles. Peds grunting)  Outcome: Progressing  Goal: Patent airway maintained this shift  Outcome: Progressing  Goal: Tolerate mechanical ventilation evidenced by VS/agitation level this shift  Outcome: Progressing  Goal: Tolerate pulmonary toileting this shift  Outcome: Progressing  Goal: Verbalize decreased shortness of breath this shift  Outcome: Progressing  Goal: Wean oxygen to maintain O2 saturation per order/standard this shift  Outcome: Progressing  Goal: Increase self care and/or family involvement in next 24 hours  Outcome: Progressing

## 2025-01-08 NOTE — PROGRESS NOTES
Social Work Note:  BREE called patient's son Ike yesterday to discuss discharge plans.  Patient got trach yesterday. PAULAW explained to patient's son that patient may benefit from LTACH.  PAULAW explained what an LTACH is.  Patient's son was agreeable.  BREE discussed locations and patient's son wanted [facility] in [place].  BREE sent a referral there.  BREE will continue to follow  CLAUDE Ahn, BREE-S
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
## 2025-01-08 NOTE — PROGRESS NOTES
Physical Therapy    Physical Therapy Treatment    Patient Name: Ike Gar  MRN: [id number]  Department: [facility]  Room: 16/16A  Today's Date: 1/8/2025  Time Calculation  Start Time: 0946  Stop Time: 1025  Time Calculation (min): 39 min         Assessment/Plan   PT Assessment  End of Session Communication: Bedside nurse  Assessment Comment: Pt progressed to sitting EOB with min-modA x 15 minutes and trialled standing with maxA x 2. Pt tolerated session well with mild increase in abdominal pain and fatigue with activity. Pt continues to benefit from skilled PT to address ongoing deficits.  End of Session Patient Position: Bed, 3 rail up, Alarm off, not on at start of session     PT Plan  Treatment/Interventions: Bed mobility, Transfer training, Gait training, Balance training, Neuromuscular re-education, Strengthening, Endurance training, Range of motion, Therapeutic exercise, Home exercise program, Therapeutic activity, Positioning, Postural re-education, Orthotic fitting/training  PT Plan: Ongoing PT  PT Frequency: 3 times per week  PT Discharge Recommendations: Moderate intensity level of continued care  PT Recommended Transfer Status: Total assist  PT - OK to Discharge: Yes      General Visit Information:   PT  Visit  PT Received On: 01/08/25  General  Co-Treatment: OT  Co-Treatment Reason: vent dependence with intent to mobilize requiring 2 skilled therapists  Prior to Session Communication: Bedside nurse  Patient Position Received: Bed, 3 rail up  General Comment: Pt received tracheostomy/PEG since last PT visit, supine in bed with TLSO donned, agreeable to PT. Pt trach to vent, CPAP 40% FiO2. 8 PEEP. 8 Psupp. multiple abdominal drains, 1 noted to be leaking, RN notified and applied new dressing prior to mobilization    Subjective   Precautions:  Precautions  Medical Precautions: Fall precautions, Spinal precautions (MITT)  Post-Surgical Precautions: Abdominal surgery precautions  Braces Applied: TLSO needed  for mobility - ABD binder on to protect skin/incisions due to multiple drains and ostomy  Precautions Comment: SBP >90, SpO2 >92%    Vital Signs (Past 2hrs)        Date/Time Vitals Session Patient Position Pulse Resp SpO2 BP MAP (mmHg)    01/08/25 0945 Pre PT  --  89  22  94 %  163/72  99     01/08/25 1000 --  --  94  26  95 %  169/74  100     01/08/25 1024 Post PT  --  90  22  95 %  162/69  94     01/08/25 1100 --  --  85  25  97 %  159/59  86                         Objective   Pain:  Pain Assessment  Pain Assessment: 0-10  0-10 (Numeric) Pain Score: 0 - No pain  Cognition:  Cognition  Overall Cognitive Status: Impaired  Orientation Level: Disoriented to situation, Disoriented to time (nodded yes to December)  Following Commands: Follows one step commands with increased time  Cognition Comments: overall flat affect    Postural Control:  Postural Control  Postural Control: Impaired  Head Control: WFL    Activity Tolerance:  Activity Tolerance  Endurance: Tolerates 10 - 20 min exercise with multiple rests  Early Mobility/Exercise Safety Screen: Proceed with mobilization - No exclusion criteria met  Treatments:       Therapeutic Activity  Therapeutic Activity Performed: Yes  Therapeutic Activity 1: Additional time spent changing gown prior to mobilization 2/2 drain leak and adjusting TLSO brace. Once sitting applied new abdominal binder.  Therapeutic Activity 2: Pt tolerated sitting EOB ~15 minutes with mostly modA, occasional Siri with cues for core engagement and upright posture. Pt with R lean t/o  Therapeutic Activity 3: Pt stood 75% with maxA x 2 ~10 seconds with B arm in arm assist, knee block, heavy R lean         Bed Mobility  Bed Mobility: Yes  Bed Mobility 1  Bed Mobility 1: Rolling left  Level of Assistance 1: Dependent  Bed Mobility Comments 1: x2 rolls  Bed Mobility 2  Bed Mobility  2: Supine to sitting, Sitting to supine  Level of Assistance 2: Dependent (x2)  Bed Mobility Comments 2: HOB elevated, draw  sheet, modified log roll       Transfers  Transfer: Yes  Transfer 1  Transfer From 1: Sit to, Stand to  Transfer to 1: Sit, Stand  Technique 1: Stand to sit, Sit to stand  Transfer Level of Assistance 1: Maximum assistance (x2)  Trials/Comments 1: B arm in arm, B knee block, able to achieve 75% of full stand         Outcome Measures:  [facility] Basic Mobility  Turning from your back to your side while in a flat bed without using bedrails: Total  Moving from lying on your back to sitting on the side of a flat bed without using bedrails: Total  Moving to and from bed to chair (including a wheelchair): Total  Standing up from a chair using your arms (e.g. wheelchair or bedside chair): Total  To walk in hospital room: Total  Climbing 3-5 steps with railing: Total  Basic Mobility - Total Score: 6    FSS-ICU  Ambulation: Unable to attempt due to weakness  Rolling: Total assistance (performs 25% or requires another person)  Sitting: Moderate assistance (performs 50 - 74% of task)  Transfer Sit-to-Stand: Total assistance (performs 25% or requires another person)  Transfer Supine-to-Sit: Total assistance (performs 25% or requires another person)  Total Score: 6      Early Mobility/Exercise Safety Screen: Proceed with mobilization - No exclusion criteria met  ICU Mobility Scale: Standing [4]    Education Documentation  Precautions, taught by Laura Gallagher PT at 1/8/2025 11:30 AM.  Learner: Patient  Readiness: Acceptance  Method: Explanation  Response: Needs Reinforcement  Comment: PT POC, precautions    Body Mechanics, taught by Laura Gallagher PT at 1/8/2025 11:30 AM.  Learner: Patient  Readiness: Acceptance  Method: Explanation  Response: Needs Reinforcement  Comment: PT POC, precautions    Mobility Training, taught by Laura Gallagher PT at 1/8/2025 11:30 AM.  Learner: Patient  Readiness: Acceptance  Method: Explanation  Response: Needs Reinforcement  Comment: PT POC, precautions    Education Comments  No comments  found.        OP EDUCATION:       Encounter Problems       Encounter Problems (Active)       Balance       STG - Maintains dynamic standing balance with upper extremity support on LRAD with Mod A x1  (Progressing)       Start:  12/30/24    Expected End:  01/20/25            STG - Maintains dynamic sitting balance without upper extremity support with Min A  (Progressing)       Start:  12/30/24    Expected End:  01/20/25               Mobility       STG - Patient will ambulate x10 steps to assist with transfers with Max A x1 using LRAD (Progressing)       Start:  12/30/24    Expected End:  01/20/25               PT Transfers       STG - Patient will perform bed mobility with Mod A  (Progressing)       Start:  12/30/24    Expected End:  01/20/25            STG - Patient will transfer sit to and from stand with Mod A using LRAD (Progressing)       Start:  12/30/24    Expected End:  01/20/25            Bilat LE strength grossly >/= 3+/5 (Progressing)       Start:  12/30/24    Expected End:  01/20/25               Pain - Adult                  Laura Gallagher, PT

## 2025-01-08 NOTE — PROGRESS NOTES
Occupational Therapy    Occupational Therapy Treatment    Name: Ike Gar  MRN: [id number]  : 1945  Date: 25  Room:       Time Calculation  Start Time: 946  Stop Time:   Time Calculation (min): 40 min    Assessment:  Barriers to Discharge Home: Caregiver assistance, Physical needs, Cognition needs  Caregiver Assistance: Caregiver assistance needed per identified barriers - however, level of patient's required assistance exceeds assistance available at home  Cognition Needs: 24hr supervision for safety awareness needed  Physical Needs: 24hr mobility assistance needed, 24hr ADL assistance needed  End of Session Communication: Bedside nurse  End of Session Patient Position: Bed, 3 rail up, Alarm off, not on at start of session    Plan:  Treatment Interventions: ADL retraining, Functional transfer training, UE strengthening/ROM, Cognitive reorientation, Patient/family training, Equipment evaluation/education, Neuromuscular reeducation, Compensatory technique education  OT Frequency: 3 times per week  OT Discharge Recommendations: Moderate intensity level of continued care  Equipment Recommended upon Discharge:  (TBD)  OT Recommended Transfer Status: Dependent  OT - OK to Discharge: Yes    Subjective   General:  OT Last Visit  OT Received On: 25  Co-Treatment: PT  Co-Treatment Reason: vent dependence with intent to mobilize requiring 2 skilled therapists  Prior to Session Communication: Bedside nurse  Patient Position Received: Bed, 3 rail up  Family/Caregiver Present: No  General Comment: Pt supine in bed on arrival. S/p trach and PEG since last OT session. Tracheostomy 40% FiO2, PEEP/PS 8. Pt with multiple abd drains, 1 leaking on arrival. RN notified and site assessed and redressed prior to mobility.     Precautions:  Medical Precautions: Fall precautions, Spinal precautions  Post-Surgical Precautions: Abdominal surgery precautions  Braces Applied: TLSO needed for mobility - ABD binder  on to protect skin/incisions due to multiple drains and ostomy  Precautions Comment: MITT precautions, SBP >90, SpO2 >92%    Vitals:  Vital Signs (Past 2hrs)        Date/Time Vitals Session Patient Position Pulse Resp SpO2 BP MAP (mmHg)    01/08/25 0945 Pre OT  --  89  22  94 %  163/72  99     01/08/25 1000 --  --  94  26  95 %  169/74  100     01/08/25 1024 Post OT  --  90  22  95 %  162/69  94                   Lines/Tubes/Drains:  CVC 01/02/25 Triple lumen Non-tunneled Left Subclavian (Active)   Number of days: 5       Surgical Airway Shiley Cuffed 6 (Active)   Number of days: 1       Closed/Suction Drain 1 Left LUQ Bulb 19 Fr. (Active)   Number of days: 16       Closed/Suction Drain Lateral LUQ Accordion 10 Fr. (Active)   Number of days: 3       Gastrostomy/Enterostomy Percutaneous endoscopic gastrostomy (PEG) 1 20 Fr. LUQ (Active)   Number of days: 1       Ileostomy Other (Comment) RUQ (Active)   Number of days: 15       Hemodialysis Cath 12/31/24 Triple lumen Left Non-tunneled catheter Jugular (Active)   Number of days: 8       Cognition:  Overall Cognitive Status: Impaired  Orientation Level: Disoriented to time, Disoriented to situation (with choices)  Following Commands: Follows one step commands with increased time (and repetition)  Cognition Comments: flat affect, initiation deficits present.  Processing Speed: Delayed    Pain Assessment:  Pain Assessment  Pain Assessment:  (0/10 at rest, endorsed abdominal pain with activity.)     Objective   Activities of Daily Living:     UE Dressing  UE Dressing Level of Assistance: Maximum assistance  UE Dressing Where Assessed: Bed level  UE Dressing Comments: Don gown     Bed Mobility/Transfers:   Bed Mobility  Bed Mobility: Yes  Bed Mobility 1  Bed Mobility 1: Rolling left  Level of Assistance 1: Dependent  Bed Mobility Comments 1: x2 rolls  Bed Mobility 2  Bed Mobility  2: Supine to sitting, Sitting to supine  Level of Assistance 2: Dependent, +2  Bed Mobility  Comments 2: HOB elevated, draw sheet, log roll  Transfers  Transfer: Yes  Transfer 1  Transfer From 1: Sit to  Transfer to 1: Stand  Transfer Level of Assistance 1: Maximum assistance, +2  Trials/Comments 1: bilateral arm in arm assist, draw sheet under hips. achieved ~75% partial stand. Significant lateral lean R.      Therapy/Activity:   Therapeutic Exercise  Therapeutic Exercise Performed: Yes  Therapeutic Exercise Activity 1: Pt provided with yellow resistive sponges for engagement in hand exercises outside of therapy for improved grasp strength.  Therapeutic Activity  Therapeutic Activity Performed: Yes  Therapeutic Activity 1: Additional time adjusting TLSO and replacing abdominal binder 2/2 leakage from abdomen.  Therapeutic Activity 2: Pt sat EOB ~10 minutes. mostly mod A, occasional min A with cues for postural adjustments. R lateral lean present, able to correct with cues but unable to sustain neutral posture.  Therapeutic Activity 3: Pt performed x5 AAROM BUE shoulder flexion to 90 degrees. Increased assist with RUE versus LUE and initiation deficits appreciated.     Outcome Measures:  [facility] Daily Activity  Putting on and taking off regular lower body clothing: Total  Bathing (including washing, rinsing, drying): Total  Putting on and taking off regular upper body clothing: A lot  Toileting, which includes using toilet, bedpan or urinal: Total  Taking care of personal grooming such as brushing teeth: A lot  Eating Meals: Total  Daily Activity - Total Score: 8  ICU Mobility Screen  ICU Mobility Scale: Standing     Education Documentation  Body Mechanics, taught by Radha Foster OT at 1/8/2025 11:01 AM.  Learner: Patient  Readiness: Acceptance  Method: Explanation  Response: Needs Reinforcement    Precautions, taught by Radha Foster OT at 1/8/2025 11:01 AM.  Learner: Patient  Readiness: Acceptance  Method: Explanation  Response: Needs Reinforcement    ADL Training, taught by  Radha Foster, OT at 1/8/2025 11:01 AM.  Learner: Patient  Readiness: Acceptance  Method: Explanation  Response: Needs Reinforcement    Education Comments  No comments found.        Goals:  Encounter Problems       Encounter Problems (Active)       ADLs       Patient will complete daily grooming tasks  with set-up level of assistance and PRN adaptive equipment while supported sitting. (Progressing)       Start:  12/30/24    Expected End:  01/20/25               BALANCE       Pt will maintain dynamic sitting  balance during ADL task with moderate assist level of assistance in order to demonstrate decreased risk of falling and improved postural control. (Progressing)       Start:  12/30/24    Expected End:  01/20/25               COGNITION/SAFETY       Patient will recall and adhere to spinal, MITT and abdominal precautions during all functional mobility/ADL tasks in order to demonstrate improved understanding and promote healing post op (Progressing)       Start:  12/30/24    Expected End:  01/20/25            Patient will score WFL on standardized cognitive assessment with visual cues and within reasonable time frame (Progressing)       Start:  12/30/24    Expected End:  01/20/25            Patient will follow >75% Simple commands to allow improved ADL performance. (Progressing)       Start:  12/30/24    Expected End:  01/20/25               TRANSFERS       Patient will perform bed mobility moderate assist level of assistance and bed rails and draw sheet in order to improve safety and independence with mobility (Progressing)       Start:  12/30/24    Expected End:  01/20/25            Patient will complete functional transfer to chair/BSC with least restrictive device with moderate assist level of assistance. (Progressing)       Start:  12/30/24    Expected End:  01/20/25 01/08/25 at 11:02 AM   Radha Foster, OT   [phone]
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
## 2025-01-08 NOTE — ASSESSMENT & PLAN NOTE
KRISTIN-D  -Baseline Cr: Uncertain, 1.3 on presentation  -Etiology of KRISTIN: Ischemic ATN from hemorrhagic shock and ASHLIE  -CVVH  12/18- 12/30 , 01/01-01/03  -Had SLED, for HD today
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
## 2025-01-09 ENCOUNTER — APPOINTMENT (OUTPATIENT)
Dept: GASTROENTEROLOGY | Facility: HOSPITAL | Age: 80
End: 2025-01-09
Payer: MEDICARE

## 2025-01-09 ENCOUNTER — APPOINTMENT (OUTPATIENT)
Dept: RADIOLOGY | Facility: HOSPITAL | Age: 80
End: 2025-01-09
Payer: MEDICARE

## 2025-01-09 LAB
ABO GROUP (TYPE) IN BLOOD: NORMAL
ALBUMIN SERPL BCP-MCNC: 1.8 G/DL (ref 3.4–5)
ALP SERPL-CCNC: 193 U/L (ref 33–136)
ALT SERPL W P-5'-P-CCNC: 59 U/L (ref 10–52)
ANION GAP SERPL CALC-SCNC: 12 MMOL/L (ref 10–20)
ANTIBODY SCREEN: NORMAL
APTT PPP: 26 SECONDS (ref 27–38)
APTT PPP: 26 SECONDS (ref 27–38)
AST SERPL W P-5'-P-CCNC: 79 U/L (ref 9–39)
BASOPHILS # BLD MANUAL: 0 X10*3/UL (ref 0–0.1)
BASOPHILS NFR BLD MANUAL: 0 %
BILIRUB DIRECT SERPL-MCNC: 2 MG/DL (ref 0–0.3)
BILIRUB SERPL-MCNC: 3.6 MG/DL (ref 0–1.2)
BLOOD EXPIRATION DATE: NORMAL
BUN SERPL-MCNC: 36 MG/DL (ref 6–23)
CA-I BLD-SCNC: 1.06 MMOL/L (ref 1.1–1.33)
CALCIUM SERPL-MCNC: 7 MG/DL (ref 8.6–10.6)
CFT FORM KAOLIN IND BLD RES TEG: 1 MIN (ref 0.8–2.1)
CHLORIDE SERPL-SCNC: 99 MMOL/L (ref 98–107)
CLOT ANGLE.KAOLIN INDUCED BLD RES TEG: 78 DEG (ref 63–78)
CLOT INIT KAO IND P HEP NEUT BLD RES TEG: 5.1 MIN (ref 4.6–9.1)
CLOT INIT KAO IND P HEP NEUT BLD RES TEG: 5.2 MIN (ref 4.3–8.3)
CO2 SERPL-SCNC: 26 MMOL/L (ref 21–32)
CREAT SERPL-MCNC: 3.34 MG/DL (ref 0.5–1.3)
DISPENSE STATUS: NORMAL
EGFRCR SERPLBLD CKD-EPI 2021: 18 ML/MIN/1.73M*2
EOSINOPHIL # BLD MANUAL: 0 X10*3/UL (ref 0–0.4)
EOSINOPHIL NFR BLD MANUAL: 0 %
ERYTHROCYTE [DISTWIDTH] IN BLOOD BY AUTOMATED COUNT: 15.9 % (ref 11.5–14.5)
ERYTHROCYTE [DISTWIDTH] IN BLOOD BY AUTOMATED COUNT: 16.1 % (ref 11.5–14.5)
ERYTHROCYTE [DISTWIDTH] IN BLOOD BY AUTOMATED COUNT: 16.1 % (ref 11.5–14.5)
ERYTHROCYTE [DISTWIDTH] IN BLOOD BY AUTOMATED COUNT: 16.9 % (ref 11.5–14.5)
FIBRINOGEN BLD CALC-MCNC: 573 MG/DL (ref 278–581)
GLUCOSE BLD MANUAL STRIP-MCNC: 111 MG/DL (ref 74–99)
GLUCOSE BLD MANUAL STRIP-MCNC: 115 MG/DL (ref 74–99)
GLUCOSE BLD MANUAL STRIP-MCNC: 82 MG/DL (ref 74–99)
GLUCOSE BLD MANUAL STRIP-MCNC: 86 MG/DL (ref 74–99)
GLUCOSE BLD MANUAL STRIP-MCNC: 95 MG/DL (ref 74–99)
GLUCOSE SERPL-MCNC: 122 MG/DL (ref 74–99)
HCT VFR BLD AUTO: 17.8 % (ref 41–52)
HCT VFR BLD AUTO: 20.3 % (ref 41–52)
HCT VFR BLD AUTO: 21.6 % (ref 41–52)
HCT VFR BLD AUTO: 24 % (ref 41–52)
HGB BLD-MCNC: 6.2 G/DL (ref 13.5–17.5)
HGB BLD-MCNC: 7.3 G/DL (ref 13.5–17.5)
HGB BLD-MCNC: 7.5 G/DL (ref 13.5–17.5)
HGB BLD-MCNC: 8 G/DL (ref 13.5–17.5)
IMM GRANULOCYTES # BLD AUTO: 0.34 X10*3/UL (ref 0–0.5)
IMM GRANULOCYTES NFR BLD AUTO: 1.4 % (ref 0–0.9)
INR PPP: 1.3 (ref 0.9–1.1)
INR PPP: 1.4 (ref 0.9–1.1)
LACTATE SERPL-SCNC: 0.8 MMOL/L (ref 0.4–2)
LYMPHOCYTES # BLD MANUAL: 0.65 X10*3/UL (ref 0.8–3)
LYMPHOCYTES NFR BLD MANUAL: 2.6 %
MA KAOLIN BLD RES TEG: 69 MM (ref 52–69)
MA KAOLIN+TF BLD RES TEG: 70 MM (ref 52–70)
MA TF IND+IIB-IIIA INH BLD RES TEG: 31 MM (ref 15–32)
MAGNESIUM SERPL-MCNC: 2.12 MG/DL (ref 1.6–2.4)
MCH RBC QN AUTO: 28.8 PG (ref 26–34)
MCH RBC QN AUTO: 29.1 PG (ref 26–34)
MCH RBC QN AUTO: 29.4 PG (ref 26–34)
MCH RBC QN AUTO: 29.7 PG (ref 26–34)
MCHC RBC AUTO-ENTMCNC: 33.3 G/DL (ref 32–36)
MCHC RBC AUTO-ENTMCNC: 34.7 G/DL (ref 32–36)
MCHC RBC AUTO-ENTMCNC: 34.8 G/DL (ref 32–36)
MCHC RBC AUTO-ENTMCNC: 36 G/DL (ref 32–36)
MCV RBC AUTO: 82 FL (ref 80–100)
MCV RBC AUTO: 84 FL (ref 80–100)
MCV RBC AUTO: 85 FL (ref 80–100)
MCV RBC AUTO: 86 FL (ref 80–100)
MONOCYTES # BLD MANUAL: 0.65 X10*3/UL (ref 0.05–0.8)
MONOCYTES NFR BLD MANUAL: 2.6 %
MYELOCYTES # BLD MANUAL: 0.2 X10*3/UL
MYELOCYTES NFR BLD MANUAL: 0.8 %
NEUTS SEG # BLD MANUAL: 23.39 X10*3/UL (ref 1.6–5)
NEUTS SEG NFR BLD MANUAL: 93.2 %
NRBC BLD-RTO: 0 /100 WBCS (ref 0–0)
PHOSPHATE SERPL-MCNC: 3.7 MG/DL (ref 2.5–4.9)
PLATELET # BLD AUTO: 365 X10*3/UL (ref 150–450)
PLATELET # BLD AUTO: 398 X10*3/UL (ref 150–450)
PLATELET # BLD AUTO: 423 X10*3/UL (ref 150–450)
PLATELET # BLD AUTO: 433 X10*3/UL (ref 150–450)
POTASSIUM SERPL-SCNC: 4.7 MMOL/L (ref 3.5–5.3)
PRODUCT BLOOD TYPE: 5100
PRODUCT CODE: NORMAL
PROT SERPL-MCNC: 4.3 G/DL (ref 6.4–8.2)
PROTHROMBIN TIME: 15.2 SECONDS (ref 9.8–12.8)
PROTHROMBIN TIME: 15.6 SECONDS (ref 9.8–12.8)
RBC # BLD AUTO: 2.09 X10*6/UL (ref 4.5–5.9)
RBC # BLD AUTO: 2.48 X10*6/UL (ref 4.5–5.9)
RBC # BLD AUTO: 2.58 X10*6/UL (ref 4.5–5.9)
RBC # BLD AUTO: 2.78 X10*6/UL (ref 4.5–5.9)
RBC MORPH BLD: ABNORMAL
RH FACTOR (ANTIGEN D): NORMAL
SODIUM SERPL-SCNC: 132 MMOL/L (ref 136–145)
TOTAL CELLS COUNTED BLD: 117
UNIT ABO: NORMAL
UNIT NUMBER: NORMAL
UNIT RH: NORMAL
UNIT VOLUME: 350
VARIANT LYMPHS # BLD MANUAL: 0.2 X10*3/UL (ref 0–0.3)
VARIANT LYMPHS NFR BLD: 0.8 %
WBC # BLD AUTO: 22.9 X10*3/UL (ref 4.4–11.3)
WBC # BLD AUTO: 24.3 X10*3/UL (ref 4.4–11.3)
WBC # BLD AUTO: 24.5 X10*3/UL (ref 4.4–11.3)
WBC # BLD AUTO: 25.1 X10*3/UL (ref 4.4–11.3)
XM INTEP: NORMAL

## 2025-01-09 PROCEDURE — 99024 POSTOP FOLLOW-UP VISIT: CPT | Performed by: SURGERY

## 2025-01-09 PROCEDURE — 83735 ASSAY OF MAGNESIUM: CPT

## 2025-01-09 PROCEDURE — 36430 TRANSFUSION BLD/BLD COMPNT: CPT

## 2025-01-09 PROCEDURE — 84100 ASSAY OF PHOSPHORUS: CPT

## 2025-01-09 PROCEDURE — 94003 VENT MGMT INPAT SUBQ DAY: CPT

## 2025-01-09 PROCEDURE — 85027 COMPLETE CBC AUTOMATED: CPT

## 2025-01-09 PROCEDURE — 2500000004 HC RX 250 GENERAL PHARMACY W/ HCPCS (ALT 636 FOR OP/ED)

## 2025-01-09 PROCEDURE — 85007 BL SMEAR W/DIFF WBC COUNT: CPT

## 2025-01-09 PROCEDURE — 37799 UNLISTED PX VASCULAR SURGERY: CPT

## 2025-01-09 PROCEDURE — 2500000004 HC RX 250 GENERAL PHARMACY W/ HCPCS (ALT 636 FOR OP/ED): Performed by: STUDENT IN AN ORGANIZED HEALTH CARE EDUCATION/TRAINING PROGRAM

## 2025-01-09 PROCEDURE — 44360 SMALL BOWEL ENDOSCOPY: CPT | Performed by: INTERNAL MEDICINE

## 2025-01-09 PROCEDURE — 2500000001 HC RX 250 WO HCPCS SELF ADMINISTERED DRUGS (ALT 637 FOR MEDICARE OP)

## 2025-01-09 PROCEDURE — 86923 COMPATIBILITY TEST ELECTRIC: CPT

## 2025-01-09 PROCEDURE — 82248 BILIRUBIN DIRECT: CPT

## 2025-01-09 PROCEDURE — 83605 ASSAY OF LACTIC ACID: CPT

## 2025-01-09 PROCEDURE — 80053 COMPREHEN METABOLIC PANEL: CPT

## 2025-01-09 PROCEDURE — 85610 PROTHROMBIN TIME: CPT

## 2025-01-09 PROCEDURE — 71045 X-RAY EXAM CHEST 1 VIEW: CPT

## 2025-01-09 PROCEDURE — 82330 ASSAY OF CALCIUM: CPT

## 2025-01-09 PROCEDURE — 85576 BLOOD PLATELET AGGREGATION: CPT

## 2025-01-09 PROCEDURE — 2500000005 HC RX 250 GENERAL PHARMACY W/O HCPCS

## 2025-01-09 PROCEDURE — 86901 BLOOD TYPING SEROLOGIC RH(D): CPT

## 2025-01-09 PROCEDURE — 99222 1ST HOSP IP/OBS MODERATE 55: CPT | Performed by: STUDENT IN AN ORGANIZED HEALTH CARE EDUCATION/TRAINING PROGRAM

## 2025-01-09 PROCEDURE — 44382 SMALL BOWEL ENDOSCOPY: CPT | Performed by: INTERNAL MEDICINE

## 2025-01-09 PROCEDURE — 71045 X-RAY EXAM CHEST 1 VIEW: CPT | Performed by: RADIOLOGY

## 2025-01-09 PROCEDURE — P9016 RBC LEUKOCYTES REDUCED: HCPCS

## 2025-01-09 PROCEDURE — 2500000001 HC RX 250 WO HCPCS SELF ADMINISTERED DRUGS (ALT 637 FOR MEDICARE OP): Performed by: STUDENT IN AN ORGANIZED HEALTH CARE EDUCATION/TRAINING PROGRAM

## 2025-01-09 PROCEDURE — 82947 ASSAY GLUCOSE BLOOD QUANT: CPT

## 2025-01-09 PROCEDURE — 99291 CRITICAL CARE FIRST HOUR: CPT | Performed by: SURGERY

## 2025-01-09 PROCEDURE — 2020000001 HC ICU ROOM DAILY

## 2025-01-09 RX ORDER — OXYCODONE HYDROCHLORIDE 5 MG/1
5 TABLET ORAL EVERY 4 HOURS PRN
Status: DISCONTINUED | OUTPATIENT
Start: 2025-01-09 | End: 2025-01-14

## 2025-01-09 RX ORDER — NOREPINEPHRINE BITARTRATE/D5W 8 MG/250ML
.01-1 PLASTIC BAG, INJECTION (ML) INTRAVENOUS CONTINUOUS
Status: DISCONTINUED | OUTPATIENT
Start: 2025-01-09 | End: 2025-01-09

## 2025-01-09 RX ORDER — CALCIUM GLUCONATE 20 MG/ML
1 INJECTION, SOLUTION INTRAVENOUS ONCE
Status: COMPLETED | OUTPATIENT
Start: 2025-01-09 | End: 2025-01-09

## 2025-01-09 RX ORDER — NOREPINEPHRINE BITARTRATE/D5W 8 MG/250ML
PLASTIC BAG, INJECTION (ML) INTRAVENOUS
Status: DISPENSED
Start: 2025-01-09 | End: 2025-01-10

## 2025-01-09 RX ORDER — PROPOFOL 10 MG/ML
0-50 INJECTION, EMULSION INTRAVENOUS CONTINUOUS
Status: DISCONTINUED | OUTPATIENT
Start: 2025-01-09 | End: 2025-01-09

## 2025-01-09 RX ADMIN — Medication 40 PERCENT: at 08:02

## 2025-01-09 RX ADMIN — LOPERAMIDE HYDROCHLORIDE 2 MG: 2 CAPSULE ORAL at 20:07

## 2025-01-09 RX ADMIN — PIPERACILLIN SODIUM AND TAZOBACTAM SODIUM 2.25 G: 2; .25 INJECTION, SOLUTION INTRAVENOUS at 12:54

## 2025-01-09 RX ADMIN — PANTOPRAZOLE SODIUM 40 MG: 40 INJECTION, POWDER, FOR SOLUTION INTRAVENOUS at 08:01

## 2025-01-09 RX ADMIN — OXYBUTYNIN CHLORIDE 5 MG: 5 SYRUP ORAL at 20:07

## 2025-01-09 RX ADMIN — Medication 40 PERCENT: at 19:39

## 2025-01-09 RX ADMIN — PIPERACILLIN SODIUM AND TAZOBACTAM SODIUM 2.25 G: 2; .25 INJECTION, SOLUTION INTRAVENOUS at 20:50

## 2025-01-09 RX ADMIN — PANTOPRAZOLE SODIUM 40 MG: 40 INJECTION, POWDER, FOR SOLUTION INTRAVENOUS at 20:07

## 2025-01-09 RX ADMIN — SODIUM CHLORIDE 1000 ML: 9 INJECTION, SOLUTION INTRAVENOUS at 16:53

## 2025-01-09 RX ADMIN — OXYBUTYNIN CHLORIDE 5 MG: 5 SYRUP ORAL at 08:03

## 2025-01-09 RX ADMIN — CALCIUM GLUCONATE 1 G: 20 INJECTION, SOLUTION INTRAVENOUS at 10:26

## 2025-01-09 RX ADMIN — FLUCONAZOLE 400 MG: 2 INJECTION, SOLUTION INTRAVENOUS at 15:58

## 2025-01-09 RX ADMIN — LOPERAMIDE HYDROCHLORIDE 2 MG: 2 CAPSULE ORAL at 16:01

## 2025-01-09 RX ADMIN — PROPOFOL 50 MCG/KG/MIN: 10 INJECTION, EMULSION INTRAVENOUS at 13:54

## 2025-01-09 RX ADMIN — PIPERACILLIN SODIUM AND TAZOBACTAM SODIUM 2.25 G: 2; .25 INJECTION, SOLUTION INTRAVENOUS at 05:28

## 2025-01-09 ASSESSMENT — PAIN - FUNCTIONAL ASSESSMENT
PAIN_FUNCTIONAL_ASSESSMENT: 0-10
PAIN_FUNCTIONAL_ASSESSMENT: CPOT (CRITICAL CARE PAIN OBSERVATION TOOL)
PAIN_FUNCTIONAL_ASSESSMENT: CPOT (CRITICAL CARE PAIN OBSERVATION TOOL)
PAIN_FUNCTIONAL_ASSESSMENT: 0-10
PAIN_FUNCTIONAL_ASSESSMENT: 0-10

## 2025-01-09 ASSESSMENT — PAIN SCALES - GENERAL
PAINLEVEL_OUTOF10: 0 - NO PAIN

## 2025-01-09 NOTE — PROGRESS NOTES
According to medical, the patient's anticipated discharge should be early next week. LSW will continue to monitor to ensure a safe and timely discharge.
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
## 2025-01-09 NOTE — CARE PLAN
Problem: Pain - Adult  Goal: Verbalizes/displays adequate comfort level or baseline comfort level  Outcome: Progressing     Problem: Safety - Adult  Goal: Free from fall injury  Outcome: Progressing     Problem: Discharge Planning  Goal: Discharge to home or other facility with appropriate resources  Outcome: Progressing     Problem: Chronic Conditions and Co-morbidities  Goal: Patient's chronic conditions and co-morbidity symptoms are monitored and maintained or improved  Outcome: Progressing     Problem: Skin  Goal: Decreased wound size/increased tissue granulation at next dressing change  Outcome: Progressing  Goal: Participates in plan/prevention/treatment measures  Outcome: Progressing  Goal: Prevent/manage excess moisture  Outcome: Progressing  Goal: Prevent/minimize sheer/friction injuries  Outcome: Progressing  Goal: Promote/optimize nutrition  Outcome: Progressing  Goal: Promote skin healing  Outcome: Progressing     Problem: Fall/Injury  Goal: Not fall by end of shift  Outcome: Progressing  Goal: Be free from injury by end of the shift  Outcome: Progressing  Goal: Verbalize understanding of personal risk factors for fall in the hospital  Outcome: Progressing  Goal: Verbalize understanding of risk factor reduction measures to prevent injury from fall in the home  Outcome: Progressing  Goal: Use assistive devices by end of the shift  Outcome: Progressing  Goal: Pace activities to prevent fatigue by end of the shift  Outcome: Progressing     Problem: Pain  Goal: Takes deep breaths with improved pain control throughout the shift  Outcome: Progressing  Goal: Turns in bed with improved pain control throughout the shift  Outcome: Progressing  Goal: Walks with improved pain control throughout the shift  Outcome: Progressing  Goal: Performs ADL's with improved pain control throughout shift  Outcome: Progressing  Goal: Participates in PT with improved pain control throughout the shift  Outcome: Progressing  Goal:  Free from opioid side effects throughout the shift  Outcome: Progressing  Goal: Free from acute confusion related to pain meds throughout the shift  Outcome: Progressing     Problem: Respiratory  Goal: Clear secretions with interventions this shift  Outcome: Progressing     Problem: Respiratory  Goal: Clear secretions with interventions this shift  Outcome: Progressing  Goal: Minimize anxiety/maximize coping throughout shift  Outcome: Progressing  Goal: Minimal/no exertional discomfort or dyspnea this shift  Outcome: Progressing  Goal: No signs of respiratory distress (eg. Use of accessory muscles. Peds grunting)  Outcome: Progressing  Goal: Patent airway maintained this shift  Outcome: Progressing  Goal: Tolerate mechanical ventilation evidenced by VS/agitation level this shift  Outcome: Progressing  Goal: Tolerate pulmonary toileting this shift  Outcome: Progressing  Goal: Verbalize decreased shortness of breath this shift  Outcome: Progressing  Goal: Wean oxygen to maintain O2 saturation per order/standard this shift  Outcome: Progressing  Goal: Increase self care and/or family involvement in next 24 hours  Outcome: Progressing     Problem: Swallowing  Goal: LTG - Patient will demonstrate safe swallowing Intervention/techniques  Outcome: Progressing
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
## 2025-01-10 ENCOUNTER — APPOINTMENT (OUTPATIENT)
Dept: RADIOLOGY | Facility: HOSPITAL | Age: 80
End: 2025-01-10
Payer: MEDICARE

## 2025-01-10 ENCOUNTER — APPOINTMENT (OUTPATIENT)
Dept: DIALYSIS | Facility: HOSPITAL | Age: 80
End: 2025-01-10
Payer: MEDICARE

## 2025-01-10 ENCOUNTER — APPOINTMENT (OUTPATIENT)
Dept: CARDIOLOGY | Facility: HOSPITAL | Age: 80
End: 2025-01-10
Payer: MEDICARE

## 2025-01-10 LAB
ALBUMIN SERPL BCP-MCNC: 1.9 G/DL (ref 3.4–5)
ALP SERPL-CCNC: 242 U/L (ref 33–136)
ALT SERPL W P-5'-P-CCNC: 64 U/L (ref 10–52)
ANION GAP SERPL CALC-SCNC: 16 MMOL/L (ref 10–20)
AST SERPL W P-5'-P-CCNC: 106 U/L (ref 9–39)
BASOPHILS # BLD AUTO: 0.11 X10*3/UL (ref 0–0.1)
BASOPHILS NFR BLD AUTO: 0.5 %
BILIRUB DIRECT SERPL-MCNC: 2 MG/DL (ref 0–0.3)
BILIRUB SERPL-MCNC: 3.3 MG/DL (ref 0–1.2)
BUN SERPL-MCNC: 49 MG/DL (ref 6–23)
CA-I BLD-SCNC: 1.07 MMOL/L (ref 1.1–1.33)
CALCIUM SERPL-MCNC: 7.3 MG/DL (ref 8.6–10.6)
CHLORIDE SERPL-SCNC: 101 MMOL/L (ref 98–107)
CO2 SERPL-SCNC: 23 MMOL/L (ref 21–32)
CREAT SERPL-MCNC: 4.81 MG/DL (ref 0.5–1.3)
EGFRCR SERPLBLD CKD-EPI 2021: 12 ML/MIN/1.73M*2
EOSINOPHIL # BLD AUTO: 0.19 X10*3/UL (ref 0–0.4)
EOSINOPHIL NFR BLD AUTO: 0.8 %
ERYTHROCYTE [DISTWIDTH] IN BLOOD BY AUTOMATED COUNT: 16.4 % (ref 11.5–14.5)
GLUCOSE BLD MANUAL STRIP-MCNC: 76 MG/DL (ref 74–99)
GLUCOSE BLD MANUAL STRIP-MCNC: 83 MG/DL (ref 74–99)
GLUCOSE BLD MANUAL STRIP-MCNC: 87 MG/DL (ref 74–99)
GLUCOSE BLD MANUAL STRIP-MCNC: 88 MG/DL (ref 74–99)
GLUCOSE BLD MANUAL STRIP-MCNC: 90 MG/DL (ref 74–99)
GLUCOSE SERPL-MCNC: 71 MG/DL (ref 74–99)
HCT VFR BLD AUTO: 20.6 % (ref 41–52)
HGB BLD-MCNC: 7.3 G/DL (ref 13.5–17.5)
IMM GRANULOCYTES # BLD AUTO: 0.47 X10*3/UL (ref 0–0.5)
IMM GRANULOCYTES NFR BLD AUTO: 2.1 % (ref 0–0.9)
LYMPHOCYTES # BLD AUTO: 1.95 X10*3/UL (ref 0.8–3)
LYMPHOCYTES NFR BLD AUTO: 8.6 %
MAGNESIUM SERPL-MCNC: 2.16 MG/DL (ref 1.6–2.4)
MCH RBC QN AUTO: 29.6 PG (ref 26–34)
MCHC RBC AUTO-ENTMCNC: 35.4 G/DL (ref 32–36)
MCV RBC AUTO: 83 FL (ref 80–100)
MONOCYTES # BLD AUTO: 1.74 X10*3/UL (ref 0.05–0.8)
MONOCYTES NFR BLD AUTO: 7.7 %
NEUTROPHILS # BLD AUTO: 18.19 X10*3/UL (ref 1.6–5.5)
NEUTROPHILS NFR BLD AUTO: 80.3 %
NRBC BLD-RTO: 0 /100 WBCS (ref 0–0)
PHOSPHATE SERPL-MCNC: 4.5 MG/DL (ref 2.5–4.9)
PLATELET # BLD AUTO: 498 X10*3/UL (ref 150–450)
POTASSIUM SERPL-SCNC: 4.7 MMOL/L (ref 3.5–5.3)
PROT SERPL-MCNC: 4.7 G/DL (ref 6.4–8.2)
RBC # BLD AUTO: 2.47 X10*6/UL (ref 4.5–5.9)
RBC MORPH BLD: NORMAL
SODIUM SERPL-SCNC: 135 MMOL/L (ref 136–145)
WBC # BLD AUTO: 22.7 X10*3/UL (ref 4.4–11.3)

## 2025-01-10 PROCEDURE — 82330 ASSAY OF CALCIUM: CPT

## 2025-01-10 PROCEDURE — 2550000001 HC RX 255 CONTRASTS

## 2025-01-10 PROCEDURE — 93005 ELECTROCARDIOGRAM TRACING: CPT

## 2025-01-10 PROCEDURE — 80053 COMPREHEN METABOLIC PANEL: CPT

## 2025-01-10 PROCEDURE — 97530 THERAPEUTIC ACTIVITIES: CPT | Mod: GO

## 2025-01-10 PROCEDURE — 97530 THERAPEUTIC ACTIVITIES: CPT | Mod: GP

## 2025-01-10 PROCEDURE — 94003 VENT MGMT INPAT SUBQ DAY: CPT

## 2025-01-10 PROCEDURE — 82947 ASSAY GLUCOSE BLOOD QUANT: CPT

## 2025-01-10 PROCEDURE — 99024 POSTOP FOLLOW-UP VISIT: CPT | Performed by: SURGERY

## 2025-01-10 PROCEDURE — 74177 CT ABD & PELVIS W/CONTRAST: CPT

## 2025-01-10 PROCEDURE — 2500000001 HC RX 250 WO HCPCS SELF ADMINISTERED DRUGS (ALT 637 FOR MEDICARE OP): Performed by: STUDENT IN AN ORGANIZED HEALTH CARE EDUCATION/TRAINING PROGRAM

## 2025-01-10 PROCEDURE — 2500000004 HC RX 250 GENERAL PHARMACY W/ HCPCS (ALT 636 FOR OP/ED)

## 2025-01-10 PROCEDURE — 99291 CRITICAL CARE FIRST HOUR: CPT | Performed by: SURGERY

## 2025-01-10 PROCEDURE — 2500000001 HC RX 250 WO HCPCS SELF ADMINISTERED DRUGS (ALT 637 FOR MEDICARE OP)

## 2025-01-10 PROCEDURE — 83735 ASSAY OF MAGNESIUM: CPT

## 2025-01-10 PROCEDURE — 2020000001 HC ICU ROOM DAILY

## 2025-01-10 PROCEDURE — 74177 CT ABD & PELVIS W/CONTRAST: CPT | Performed by: RADIOLOGY

## 2025-01-10 PROCEDURE — 8010000001 HC DIALYSIS - HEMODIALYSIS PER DAY

## 2025-01-10 PROCEDURE — 36573 INSJ PICC RS&I 5 YR+: CPT

## 2025-01-10 PROCEDURE — 2500000004 HC RX 250 GENERAL PHARMACY W/ HCPCS (ALT 636 FOR OP/ED): Performed by: STUDENT IN AN ORGANIZED HEALTH CARE EDUCATION/TRAINING PROGRAM

## 2025-01-10 PROCEDURE — 99223 1ST HOSP IP/OBS HIGH 75: CPT | Performed by: STUDENT IN AN ORGANIZED HEALTH CARE EDUCATION/TRAINING PROGRAM

## 2025-01-10 PROCEDURE — 37799 UNLISTED PX VASCULAR SURGERY: CPT

## 2025-01-10 PROCEDURE — 82248 BILIRUBIN DIRECT: CPT

## 2025-01-10 PROCEDURE — 2780000003 HC OR 278 NO HCPCS

## 2025-01-10 PROCEDURE — 93010 ELECTROCARDIOGRAM REPORT: CPT | Performed by: INTERNAL MEDICINE

## 2025-01-10 PROCEDURE — 71045 X-RAY EXAM CHEST 1 VIEW: CPT | Performed by: RADIOLOGY

## 2025-01-10 PROCEDURE — 84100 ASSAY OF PHOSPHORUS: CPT

## 2025-01-10 PROCEDURE — 2500000002 HC RX 250 W HCPCS SELF ADMINISTERED DRUGS (ALT 637 FOR MEDICARE OP, ALT 636 FOR OP/ED): Performed by: STUDENT IN AN ORGANIZED HEALTH CARE EDUCATION/TRAINING PROGRAM

## 2025-01-10 PROCEDURE — A9698 NON-RAD CONTRAST MATERIALNOC: HCPCS

## 2025-01-10 PROCEDURE — 85025 COMPLETE CBC W/AUTO DIFF WBC: CPT

## 2025-01-10 PROCEDURE — 99231 SBSQ HOSP IP/OBS SF/LOW 25: CPT | Performed by: STUDENT IN AN ORGANIZED HEALTH CARE EDUCATION/TRAINING PROGRAM

## 2025-01-10 PROCEDURE — C1751 CATH, INF, PER/CENT/MIDLINE: HCPCS

## 2025-01-10 PROCEDURE — 2550000001 HC RX 255 CONTRASTS: Performed by: SURGERY

## 2025-01-10 PROCEDURE — 71045 X-RAY EXAM CHEST 1 VIEW: CPT

## 2025-01-10 PROCEDURE — 99232 SBSQ HOSP IP/OBS MODERATE 35: CPT

## 2025-01-10 RX ORDER — PANTOPRAZOLE SODIUM 40 MG/10ML
40 INJECTION, POWDER, LYOPHILIZED, FOR SOLUTION INTRAVENOUS DAILY
Status: DISCONTINUED | OUTPATIENT
Start: 2025-01-11 | End: 2025-01-18 | Stop reason: HOSPADM

## 2025-01-10 RX ORDER — ROSUVASTATIN CALCIUM 10 MG/1
5 TABLET, COATED ORAL NIGHTLY
Status: DISCONTINUED | OUTPATIENT
Start: 2025-01-10 | End: 2025-01-17

## 2025-01-10 RX ORDER — DEXTROSE, SODIUM CHLORIDE, SODIUM LACTATE, POTASSIUM CHLORIDE, AND CALCIUM CHLORIDE 5; .6; .31; .03; .02 G/100ML; G/100ML; G/100ML; G/100ML; G/100ML
75 INJECTION, SOLUTION INTRAVENOUS CONTINUOUS
Status: DISPENSED | OUTPATIENT
Start: 2025-01-10 | End: 2025-01-11

## 2025-01-10 RX ORDER — LIDOCAINE HYDROCHLORIDE 10 MG/ML
5 INJECTION, SOLUTION INFILTRATION; PERINEURAL ONCE
Status: DISCONTINUED | OUTPATIENT
Start: 2025-01-10 | End: 2025-01-12

## 2025-01-10 RX ADMIN — PIPERACILLIN SODIUM AND TAZOBACTAM SODIUM 2.25 G: 2; .25 INJECTION, SOLUTION INTRAVENOUS at 20:47

## 2025-01-10 RX ADMIN — LOPERAMIDE HYDROCHLORIDE 2 MG: 2 CAPSULE ORAL at 07:48

## 2025-01-10 RX ADMIN — IOHEXOL 90 ML: 350 INJECTION, SOLUTION INTRAVENOUS at 14:40

## 2025-01-10 RX ADMIN — LOPERAMIDE HYDROCHLORIDE 2 MG: 2 CAPSULE ORAL at 17:24

## 2025-01-10 RX ADMIN — IOHEXOL 90 ML: 350 INJECTION, SOLUTION INTRAVENOUS at 14:31

## 2025-01-10 RX ADMIN — PIPERACILLIN SODIUM AND TAZOBACTAM SODIUM 2.25 G: 2; .25 INJECTION, SOLUTION INTRAVENOUS at 13:10

## 2025-01-10 RX ADMIN — ROSUVASTATIN 5 MG: 10 TABLET, FILM COATED ORAL at 20:47

## 2025-01-10 RX ADMIN — IOHEXOL 100 ML: 12 SOLUTION ORAL at 12:43

## 2025-01-10 RX ADMIN — OXYBUTYNIN CHLORIDE 5 MG: 5 SYRUP ORAL at 20:47

## 2025-01-10 RX ADMIN — PANTOPRAZOLE SODIUM 40 MG: 40 INJECTION, POWDER, FOR SOLUTION INTRAVENOUS at 08:50

## 2025-01-10 RX ADMIN — LOPERAMIDE HYDROCHLORIDE 2 MG: 2 CAPSULE ORAL at 12:43

## 2025-01-10 RX ADMIN — HEPARIN SODIUM 5000 UNITS: 5000 INJECTION INTRAVENOUS; SUBCUTANEOUS at 12:43

## 2025-01-10 RX ADMIN — PIPERACILLIN SODIUM AND TAZOBACTAM SODIUM 2.25 G: 2; .25 INJECTION, SOLUTION INTRAVENOUS at 04:55

## 2025-01-10 RX ADMIN — FLUCONAZOLE 400 MG: 2 INJECTION, SOLUTION INTRAVENOUS at 14:59

## 2025-01-10 RX ADMIN — SODIUM CHLORIDE, SODIUM LACTATE, POTASSIUM CHLORIDE, CALCIUM CHLORIDE AND DEXTROSE MONOHYDRATE 75 ML/HR: 5; 600; 310; 30; 20 INJECTION, SOLUTION INTRAVENOUS at 10:41

## 2025-01-10 RX ADMIN — HEPARIN SODIUM 5000 UNITS: 5000 INJECTION INTRAVENOUS; SUBCUTANEOUS at 20:47

## 2025-01-10 RX ADMIN — OXYBUTYNIN CHLORIDE 5 MG: 5 SYRUP ORAL at 08:50

## 2025-01-10 RX ADMIN — LOPERAMIDE HYDROCHLORIDE 2 MG: 2 CAPSULE ORAL at 20:47

## 2025-01-10 ASSESSMENT — COGNITIVE AND FUNCTIONAL STATUS - GENERAL
MOVING TO AND FROM BED TO CHAIR: TOTAL
HELP NEEDED FOR BATHING: A LOT
DRESSING REGULAR UPPER BODY CLOTHING: A LOT
DRESSING REGULAR LOWER BODY CLOTHING: TOTAL
MOVING FROM LYING ON BACK TO SITTING ON SIDE OF FLAT BED WITH BEDRAILS: TOTAL
PERSONAL GROOMING: A LOT
TOILETING: TOTAL
MOBILITY SCORE: 6
TURNING FROM BACK TO SIDE WHILE IN FLAT BAD: TOTAL
CLIMB 3 TO 5 STEPS WITH RAILING: TOTAL
WALKING IN HOSPITAL ROOM: TOTAL
EATING MEALS: TOTAL
STANDING UP FROM CHAIR USING ARMS: TOTAL
DAILY ACTIVITIY SCORE: 9

## 2025-01-10 ASSESSMENT — PAIN - FUNCTIONAL ASSESSMENT
PAIN_FUNCTIONAL_ASSESSMENT: 0-10

## 2025-01-10 ASSESSMENT — PAIN SCALES - GENERAL
PAINLEVEL_OUTOF10: 0 - NO PAIN
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
## 2025-01-10 NOTE — PROGRESS NOTES
Physical Therapy    Physical Therapy Treatment    Patient Name: Ike Gar  MRN: [id number]  Department:   Room: [address] [place] [address]  Today's Date: 1/10/2025  Time Calculation  Start Time: 1202  Stop Time: 1230  Time Calculation (min): 28 min         Assessment/Plan   PT Assessment  End of Session Communication: Bedside nurse  Assessment Comment: Pt fatigued after sitting up in chair ~3 hours, required depA x 3 for respositioning in chair 2/2 hips sliding forward prior to transfer from chair to bed with maxA x 2. Pt continues to benefit from skilled PT to address deficits  End of Session Patient Position: Bed, 3 rail up, Alarm off, caregiver present     PT Plan  Treatment/Interventions: Bed mobility, Transfer training, Gait training, Balance training, Neuromuscular re-education, Strengthening, Endurance training, Range of motion, Therapeutic exercise, Home exercise program, Therapeutic activity, Positioning, Postural re-education, Orthotic fitting/training  PT Plan: Ongoing PT  PT Frequency: 3 times per week  PT Discharge Recommendations: Moderate intensity level of continued care  PT Recommended Transfer Status: Total assist  PT - OK to Discharge: Yes      General Visit Information:   PT  Visit  PT Received On: 01/10/25  General  Prior to Session Communication: Bedside nurse  Patient Position Received: Alarm off, not on at start of session, Up in chair  General Comment: Pt seated in chair, sliding forward, trach collar.    Subjective   Precautions:  Precautions  Medical Precautions: Fall precautions, Spinal precautions, Oxygen therapy device and L/min  Post-Surgical Precautions: Abdominal surgery precautions  Braces Applied: TLSO needed for mobility - ABD binder on to protect skin/incisions due to multiple drains and ostomy  Precautions Comment: MITT precautions, SBP >90, SpO2 >92%            Objective   Pain:  Pain Assessment  Pain Assessment:  (denies pain)  Cognition:  Cognition  Overall Cognitive Status:  Impaired  Orientation Level:  (oriented to all but year)  Following Commands: Follows one step commands with increased time  Processing Speed: Delayed    Activity Tolerance:  Activity Tolerance  Endurance: Tolerates 10 - 20 min exercise with multiple rests  Early Mobility/Exercise Safety Screen: Proceed with mobilization - No exclusion criteria met  Treatments:       Therapeutic Activity  Therapeutic Activity Performed: Yes  Therapeutic Activity 1: x2 dependent scoots backwards in chair with depA x 3 for positioning and safety         Bed Mobility  Bed Mobility: Yes  Bed Mobility 1  Bed Mobility 1: Sitting to supine  Level of Assistance 1: Maximum assistance (x2)  Bed Mobility Comments 1: draw sheet, HOB elevated       Transfers  Transfer: Yes  Transfer 1  Transfer From 1: Chair with drop arm to  Transfer to 1: Bed  Technique 1: Sit pivot  Transfer Level of Assistance 1: Maximum assistance, +2  Trials/Comments 1: x2 lateral scoots, draw sheet at hips         Outcome Measures:  [facility] Basic Mobility  Turning from your back to your side while in a flat bed without using bedrails: Total  Moving from lying on your back to sitting on the side of a flat bed without using bedrails: Total  Moving to and from bed to chair (including a wheelchair): Total  Standing up from a chair using your arms (e.g. wheelchair or bedside chair): Total  To walk in hospital room: Total  Climbing 3-5 steps with railing: Total  Basic Mobility - Total Score: 6    FSS-ICU  Ambulation: Unable to attempt due to weakness  Rolling: Maximal assistance (performs 25% - 49% of task)  Sitting: Minimal assistance (performs 75% or more of task)  Transfer Sit-to-Stand: Total assistance (performs 25% or requires another person)  Transfer Supine-to-Sit: Total assistance (performs 25% or requires another person)  Total Score: 8      Early Mobility/Exercise Safety Screen: Proceed with mobilization - No exclusion criteria met  ICU Mobility Scale: Transferring bed  to chair [5]    Education Documentation  Precautions, taught by Laura Gallagher PT at 1/10/2025  2:51 PM.  Learner: Patient  Readiness: Acceptance  Method: Explanation  Response: Verbalizes Understanding, Needs Reinforcement  Comment: PT POC, precautions    Body Mechanics, taught by Laura Gallagher PT at 1/10/2025  2:51 PM.  Learner: Patient  Readiness: Acceptance  Method: Explanation  Response: Verbalizes Understanding, Needs Reinforcement  Comment: PT POC, precautions    Mobility Training, taught by Laura Gallagher PT at 1/10/2025  2:51 PM.  Learner: Patient  Readiness: Acceptance  Method: Explanation  Response: Verbalizes Understanding, Needs Reinforcement  Comment: PT POC, precautions    Education Comments  No comments found.        OP EDUCATION:       Encounter Problems       Encounter Problems (Active)       Balance       STG - Maintains dynamic standing balance with upper extremity support on LRAD with Mod A x1  (Progressing)       Start:  12/30/24    Expected End:  01/20/25            STG - Maintains dynamic sitting balance without upper extremity support with Min A  (Progressing)       Start:  12/30/24    Expected End:  01/20/25               Mobility       STG - Patient will ambulate x10 steps to assist with transfers with Max A x1 using LRAD (Not Progressing)       Start:  12/30/24    Expected End:  01/20/25               PT Transfers       STG - Patient will perform bed mobility with Mod A  (Progressing)       Start:  12/30/24    Expected End:  01/20/25            STG - Patient will transfer sit to and from stand with Mod A using LRAD (Progressing)       Start:  12/30/24    Expected End:  01/20/25            Bilat LE strength grossly >/= 3+/5 (Progressing)       Start:  12/30/24    Expected End:  01/20/25               Pain - Adult                    Laura Gallagher PT
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
## 2025-01-10 NOTE — PROGRESS NOTES
Occupational Therapy    Occupational Therapy Treatment    Name: Ike Gar  MRN: [id number]  : 1945  Date: 01/10/25  Room:       Time Calculation  Start Time: 906  Stop Time: 938  Time Calculation (min): 32 min    Assessment:  Barriers to Discharge Home: Caregiver assistance, Physical needs, Cognition needs  Caregiver Assistance: Caregiver assistance needed per identified barriers - however, level of patient's required assistance exceeds assistance available at home  Cognition Needs: 24hr supervision for safety awareness needed  Physical Needs: 24hr mobility assistance needed, 24hr ADL assistance needed  Evaluation/Treatment Tolerance: Patient tolerated treatment well  End of Session Communication: Bedside nurse  End of Session Patient Position: Up in chair, Alarm off, not on at start of session    Plan:  Treatment Interventions: ADL retraining, Functional transfer training, UE strengthening/ROM, Cognitive reorientation, Patient/family training, Equipment evaluation/education, Neuromuscular reeducation, Compensatory technique education  OT Frequency: 3 times per week  OT Discharge Recommendations: Moderate intensity level of continued care  Equipment Recommended upon Discharge:  (TBD)  OT Recommended Transfer Status: Dependent  OT - OK to Discharge: Yes    Subjective   General:  OT Last Visit  OT Received On: 01/10/25  Co-Treatment:  (rehab aide assisted with session)  Prior to Session Communication: Bedside nurse  Patient Position Received: Bed, 3 rail up, Alarm off, not on at start of session  Family/Caregiver Present: No  General Comment: Pt supine in bed on arrival, eager to participate and get OOB. Tracheostomy CPAP 40% FiO2, PEEP/PS 5. Since last session, pt s/p EGD for bloody ostomy output.     Precautions:  Medical Precautions: Fall precautions, Spinal precautions, Oxygen therapy device and L/min  Post-Surgical Precautions: Abdominal surgery precautions  Braces Applied: TLSO needed for  mobility - ABD binder on to protect skin/incisions due to multiple drains and ostomy  Precautions Comment: MITT precautions, SBP >90, SpO2 >92%    Vitals:   01/10/25 0906 01/10/25 0938   Vital Signs   Vitals Session Pre OT Post OT   Heart Rate 104 105   Resp 24 (!) 27   SpO2 99 % 98 %   /81 149/68   MAP (mmHg) 104 90   Vital Signs Comment  --   seated EOB; SpO2 briefly 87% seated EOB with quick recovery to >/=90%.     Lines/Tubes/Drains:  CVC 01/02/25 Triple lumen Non-tunneled Left Subclavian (Active)   Number of days: 7       Surgical Airway Shiley Cuffed 6 (Active)   Number of days: 3       Closed/Suction Drain 1 Left LUQ Bulb 19 Fr. (Active)   Number of days: 17       Closed/Suction Drain Lateral LUQ Accordion 10 Fr. (Active)   Number of days: 5       Open Drain Left LLQ (Active)   Number of days: 1       Gastrostomy/Enterostomy Percutaneous endoscopic gastrostomy (PEG) 1 20 Fr. LUQ (Active)   Number of days: 3       Ileostomy Other (Comment) RUQ (Active)   Number of days: 17       Hemodialysis Cath 12/31/24 Triple lumen Left Non-tunneled catheter Jugular (Active)   Number of days: 10       Cognition:  Overall Cognitive Status: Impaired  Orientation Level:  (oriented to all but year)  Following Commands: Follows one step commands with increased time  Processing Speed: Delayed    Pain Assessment:  Pain Assessment  Pain Assessment:  (0/10 at rest, reported some abd pain after bed to chair transfer)     Objective   Bed Mobility/Transfers:   Bed Mobility  Bed Mobility: Yes  Bed Mobility 1  Bed Mobility 1: Supine to sitting  Level of Assistance 1: Maximum assistance, +2  Bed Mobility Comments 1: draw sheet, HOB elevated, log roll  Transfers  Transfer: Yes  Transfer 1  Transfer From 1: Bed to  Transfer to 1: Chair with drop arm  Technique 1: Sit pivot  Transfer Level of Assistance 1: Maximum assistance, +2  Trials/Comments 1: x2 lateral scoots, bilat arm in arm assist and draw sheet under  hips    Therapy/Activity:   Therapeutic Exercise  Therapeutic Exercise Performed: Yes  Therapeutic Exercise Activity 1: While seated in chair, pt performed x10 repetitions elbow flexion/extension, finger flexion/extension, and knee extension with min cueing for pacing.  Therapeutic Activity  Therapeutic Activity Performed: Yes  Therapeutic Activity 1: Pt tolerated EOB sitting 10 minutes with min-CGA prior to progressing to chair.     Outcome Measures:  [facility] Daily Activity  Putting on and taking off regular lower body clothing: Total  Bathing (including washing, rinsing, drying): A lot  Putting on and taking off regular upper body clothing: A lot  Toileting, which includes using toilet, bedpan or urinal: Total  Taking care of personal grooming such as brushing teeth: A lot  Eating Meals: Total  Daily Activity - Total Score: 9  ICU Mobility Screen  ICU Mobility Scale: Transferring bed to chair     Education Documentation  Body Mechanics, taught by Radha Foster OT at 1/10/2025 10:42 AM.  Learner: Patient  Readiness: Acceptance  Method: Explanation, Demonstration  Response: Needs Reinforcement    Precautions, taught by Radha Foster OT at 1/10/2025 10:42 AM.  Learner: Patient  Readiness: Acceptance  Method: Explanation, Demonstration  Response: Needs Reinforcement    ADL Training, taught by Radha Foster OT at 1/10/2025 10:42 AM.  Learner: Patient  Readiness: Acceptance  Method: Explanation, Demonstration  Response: Needs Reinforcement    Education Comments  No comments found.        Goals:  Encounter Problems       Encounter Problems (Active)       ADLs       Patient will complete daily grooming tasks  with set-up level of assistance and PRN adaptive equipment while supported sitting. (Progressing)       Start:  12/30/24    Expected End:  01/20/25               BALANCE       Pt will maintain dynamic sitting  balance during ADL task with moderate assist level of assistance in order  to demonstrate decreased risk of falling and improved postural control. (Progressing)       Start:  12/30/24    Expected End:  01/20/25               COGNITION/SAFETY       Patient will recall and adhere to spinal, MITT and abdominal precautions during all functional mobility/ADL tasks in order to demonstrate improved understanding and promote healing post op (Progressing)       Start:  12/30/24    Expected End:  01/20/25            Patient will score WFL on standardized cognitive assessment with visual cues and within reasonable time frame (Progressing)       Start:  12/30/24    Expected End:  01/20/25            Patient will follow >75% Simple commands to allow improved ADL performance. (Progressing)       Start:  12/30/24    Expected End:  01/20/25               TRANSFERS       Patient will perform bed mobility moderate assist level of assistance and bed rails and draw sheet in order to improve safety and independence with mobility (Progressing)       Start:  12/30/24    Expected End:  01/20/25            Patient will complete functional transfer to chair/BSC with least restrictive device with moderate assist level of assistance. (Progressing)       Start:  12/30/24    Expected End:  01/20/25                 01/10/25 at 10:42 AM   Radha Foster, OT   [phone]

## 2025-01-11 ENCOUNTER — APPOINTMENT (OUTPATIENT)
Dept: RADIOLOGY | Facility: HOSPITAL | Age: 80
End: 2025-01-11
Payer: MEDICARE

## 2025-01-11 LAB
ALBUMIN SERPL BCP-MCNC: 1.9 G/DL (ref 3.4–5)
ALP SERPL-CCNC: 225 U/L (ref 33–136)
ALT SERPL W P-5'-P-CCNC: 77 U/L (ref 10–52)
ANION GAP BLDV CALCULATED.4IONS-SCNC: 7 MMOL/L (ref 10–25)
ANION GAP SERPL CALC-SCNC: 12 MMOL/L (ref 10–20)
APTT PPP: 26 SECONDS (ref 27–38)
AST SERPL W P-5'-P-CCNC: 119 U/L (ref 9–39)
BASE EXCESS BLDV CALC-SCNC: 1.2 MMOL/L (ref -2–3)
BASOPHILS # BLD AUTO: 0.07 X10*3/UL (ref 0–0.1)
BASOPHILS NFR BLD AUTO: 0.4 %
BILIRUB DIRECT SERPL-MCNC: 1.7 MG/DL (ref 0–0.3)
BILIRUB SERPL-MCNC: 3 MG/DL (ref 0–1.2)
BLOOD EXPIRATION DATE: NORMAL
BODY TEMPERATURE: 37 DEGREES CELSIUS
BUN SERPL-MCNC: 30 MG/DL (ref 6–23)
CA-I BLD-SCNC: 1.1 MMOL/L (ref 1.1–1.33)
CA-I BLDV-SCNC: 1.16 MMOL/L (ref 1.1–1.33)
CALCIUM SERPL-MCNC: 7.4 MG/DL (ref 8.6–10.6)
CHLORIDE BLDV-SCNC: 103 MMOL/L (ref 98–107)
CHLORIDE SERPL-SCNC: 100 MMOL/L (ref 98–107)
CO2 SERPL-SCNC: 27 MMOL/L (ref 21–32)
CREAT SERPL-MCNC: 3.33 MG/DL (ref 0.5–1.3)
DISPENSE STATUS: NORMAL
EGFRCR SERPLBLD CKD-EPI 2021: 18 ML/MIN/1.73M*2
EOSINOPHIL # BLD AUTO: 0.23 X10*3/UL (ref 0–0.4)
EOSINOPHIL NFR BLD AUTO: 1.2 %
ERYTHROCYTE [DISTWIDTH] IN BLOOD BY AUTOMATED COUNT: 16.1 % (ref 11.5–14.5)
ERYTHROCYTE [DISTWIDTH] IN BLOOD BY AUTOMATED COUNT: 16.1 % (ref 11.5–14.5)
GLUCOSE BLD MANUAL STRIP-MCNC: 101 MG/DL (ref 74–99)
GLUCOSE BLD MANUAL STRIP-MCNC: 112 MG/DL (ref 74–99)
GLUCOSE BLD MANUAL STRIP-MCNC: 92 MG/DL (ref 74–99)
GLUCOSE BLDV-MCNC: 81 MG/DL (ref 74–99)
GLUCOSE SERPL-MCNC: 84 MG/DL (ref 74–99)
HCO3 BLDV-SCNC: 26 MMOL/L (ref 22–26)
HCT VFR BLD AUTO: 19.2 % (ref 41–52)
HCT VFR BLD AUTO: 20.8 % (ref 41–52)
HCT VFR BLD EST: 22 % (ref 41–52)
HGB BLD-MCNC: 6.9 G/DL (ref 13.5–17.5)
HGB BLD-MCNC: 7.1 G/DL (ref 13.5–17.5)
HGB BLDV-MCNC: 7.4 G/DL (ref 13.5–17.5)
IMM GRANULOCYTES # BLD AUTO: 0.31 X10*3/UL (ref 0–0.5)
IMM GRANULOCYTES NFR BLD AUTO: 1.7 % (ref 0–0.9)
INHALED O2 CONCENTRATION: 50 %
INR PPP: 1.5 (ref 0.9–1.1)
LACTATE BLDV-SCNC: 0.8 MMOL/L (ref 0.4–2)
LYMPHOCYTES # BLD AUTO: 1.65 X10*3/UL (ref 0.8–3)
LYMPHOCYTES NFR BLD AUTO: 8.8 %
MAGNESIUM SERPL-MCNC: 1.95 MG/DL (ref 1.6–2.4)
MCH RBC QN AUTO: 29 PG (ref 26–34)
MCH RBC QN AUTO: 29.6 PG (ref 26–34)
MCHC RBC AUTO-ENTMCNC: 34.1 G/DL (ref 32–36)
MCHC RBC AUTO-ENTMCNC: 35.9 G/DL (ref 32–36)
MCV RBC AUTO: 82 FL (ref 80–100)
MCV RBC AUTO: 85 FL (ref 80–100)
MONOCYTES # BLD AUTO: 1.48 X10*3/UL (ref 0.05–0.8)
MONOCYTES NFR BLD AUTO: 7.9 %
NEUTROPHILS # BLD AUTO: 15.04 X10*3/UL (ref 1.6–5.5)
NEUTROPHILS NFR BLD AUTO: 80 %
NRBC BLD-RTO: 0 /100 WBCS (ref 0–0)
NRBC BLD-RTO: 0 /100 WBCS (ref 0–0)
OXYHGB MFR BLDV: 68.7 % (ref 45–75)
PCO2 BLDV: 41 MM HG (ref 41–51)
PH BLDV: 7.41 PH (ref 7.33–7.43)
PHOSPHATE SERPL-MCNC: 3.4 MG/DL (ref 2.5–4.9)
PLATELET # BLD AUTO: 539 X10*3/UL (ref 150–450)
PLATELET # BLD AUTO: 562 X10*3/UL (ref 150–450)
PO2 BLDV: 40 MM HG (ref 35–45)
POTASSIUM BLDV-SCNC: 4.3 MMOL/L (ref 3.5–5.3)
POTASSIUM SERPL-SCNC: 4.3 MMOL/L (ref 3.5–5.3)
PRODUCT BLOOD TYPE: 5100
PRODUCT CODE: NORMAL
PROT SERPL-MCNC: 4.8 G/DL (ref 6.4–8.2)
PROTHROMBIN TIME: 16.6 SECONDS (ref 9.8–12.8)
RBC # BLD AUTO: 2.33 X10*6/UL (ref 4.5–5.9)
RBC # BLD AUTO: 2.45 X10*6/UL (ref 4.5–5.9)
SAO2 % BLDV: 70 % (ref 45–75)
SODIUM BLDV-SCNC: 132 MMOL/L (ref 136–145)
SODIUM SERPL-SCNC: 135 MMOL/L (ref 136–145)
UNIT ABO: NORMAL
UNIT NUMBER: NORMAL
UNIT RH: NORMAL
UNIT VOLUME: 277
UNIT VOLUME: 350
WBC # BLD AUTO: 17.9 X10*3/UL (ref 4.4–11.3)
WBC # BLD AUTO: 18.8 X10*3/UL (ref 4.4–11.3)
XM INTEP: NORMAL

## 2025-01-11 PROCEDURE — 99232 SBSQ HOSP IP/OBS MODERATE 35: CPT | Performed by: STUDENT IN AN ORGANIZED HEALTH CARE EDUCATION/TRAINING PROGRAM

## 2025-01-11 PROCEDURE — 2580000001 HC RX 258 IV SOLUTIONS

## 2025-01-11 PROCEDURE — 99291 CRITICAL CARE FIRST HOUR: CPT | Performed by: SURGERY

## 2025-01-11 PROCEDURE — 85027 COMPLETE CBC AUTOMATED: CPT

## 2025-01-11 PROCEDURE — 82330 ASSAY OF CALCIUM: CPT

## 2025-01-11 PROCEDURE — 71045 X-RAY EXAM CHEST 1 VIEW: CPT

## 2025-01-11 PROCEDURE — 82248 BILIRUBIN DIRECT: CPT

## 2025-01-11 PROCEDURE — 80053 COMPREHEN METABOLIC PANEL: CPT | Performed by: STUDENT IN AN ORGANIZED HEALTH CARE EDUCATION/TRAINING PROGRAM

## 2025-01-11 PROCEDURE — 84100 ASSAY OF PHOSPHORUS: CPT

## 2025-01-11 PROCEDURE — 2020000001 HC ICU ROOM DAILY

## 2025-01-11 PROCEDURE — 2500000002 HC RX 250 W HCPCS SELF ADMINISTERED DRUGS (ALT 637 FOR MEDICARE OP, ALT 636 FOR OP/ED): Performed by: STUDENT IN AN ORGANIZED HEALTH CARE EDUCATION/TRAINING PROGRAM

## 2025-01-11 PROCEDURE — 83735 ASSAY OF MAGNESIUM: CPT

## 2025-01-11 PROCEDURE — 85025 COMPLETE CBC W/AUTO DIFF WBC: CPT

## 2025-01-11 PROCEDURE — 2500000004 HC RX 250 GENERAL PHARMACY W/ HCPCS (ALT 636 FOR OP/ED): Performed by: STUDENT IN AN ORGANIZED HEALTH CARE EDUCATION/TRAINING PROGRAM

## 2025-01-11 PROCEDURE — 85610 PROTHROMBIN TIME: CPT

## 2025-01-11 PROCEDURE — 71045 X-RAY EXAM CHEST 1 VIEW: CPT | Performed by: RADIOLOGY

## 2025-01-11 PROCEDURE — 82947 ASSAY GLUCOSE BLOOD QUANT: CPT

## 2025-01-11 PROCEDURE — 2500000004 HC RX 250 GENERAL PHARMACY W/ HCPCS (ALT 636 FOR OP/ED)

## 2025-01-11 PROCEDURE — 2500000005 HC RX 250 GENERAL PHARMACY W/O HCPCS

## 2025-01-11 PROCEDURE — 36430 TRANSFUSION BLD/BLD COMPNT: CPT

## 2025-01-11 PROCEDURE — 82435 ASSAY OF BLOOD CHLORIDE: CPT

## 2025-01-11 PROCEDURE — P9016 RBC LEUKOCYTES REDUCED: HCPCS

## 2025-01-11 PROCEDURE — 2500000001 HC RX 250 WO HCPCS SELF ADMINISTERED DRUGS (ALT 637 FOR MEDICARE OP): Performed by: STUDENT IN AN ORGANIZED HEALTH CARE EDUCATION/TRAINING PROGRAM

## 2025-01-11 PROCEDURE — 37799 UNLISTED PX VASCULAR SURGERY: CPT

## 2025-01-11 PROCEDURE — 2500000001 HC RX 250 WO HCPCS SELF ADMINISTERED DRUGS (ALT 637 FOR MEDICARE OP)

## 2025-01-11 PROCEDURE — 99232 SBSQ HOSP IP/OBS MODERATE 35: CPT | Performed by: INTERNAL MEDICINE

## 2025-01-11 PROCEDURE — 37799 UNLISTED PX VASCULAR SURGERY: CPT | Performed by: STUDENT IN AN ORGANIZED HEALTH CARE EDUCATION/TRAINING PROGRAM

## 2025-01-11 RX ORDER — LABETALOL HYDROCHLORIDE 5 MG/ML
20 INJECTION, SOLUTION INTRAVENOUS ONCE AS NEEDED
Status: DISCONTINUED | OUTPATIENT
Start: 2025-01-11 | End: 2025-01-15

## 2025-01-11 RX ORDER — FLUCONAZOLE 2 MG/ML
200 INJECTION, SOLUTION INTRAVENOUS EVERY 24 HOURS
Status: DISCONTINUED | OUTPATIENT
Start: 2025-01-11 | End: 2025-01-18 | Stop reason: HOSPADM

## 2025-01-11 RX ORDER — DEXTROSE, SODIUM CHLORIDE, SODIUM LACTATE, POTASSIUM CHLORIDE, AND CALCIUM CHLORIDE 5; .6; .31; .03; .02 G/100ML; G/100ML; G/100ML; G/100ML; G/100ML
75 INJECTION, SOLUTION INTRAVENOUS CONTINUOUS
Status: DISCONTINUED | OUTPATIENT
Start: 2025-01-11 | End: 2025-01-12

## 2025-01-11 RX ADMIN — LOPERAMIDE HYDROCHLORIDE 2 MG: 2 CAPSULE ORAL at 14:28

## 2025-01-11 RX ADMIN — FLUCONAZOLE 200 MG: 2 INJECTION, SOLUTION INTRAVENOUS at 20:17

## 2025-01-11 RX ADMIN — SMOFLIPID 50 G: 6; 6; 5; 3 INJECTION, EMULSION INTRAVENOUS at 20:17

## 2025-01-11 RX ADMIN — HEPARIN SODIUM 5000 UNITS: 5000 INJECTION INTRAVENOUS; SUBCUTANEOUS at 20:18

## 2025-01-11 RX ADMIN — HEPARIN SODIUM 5000 UNITS: 5000 INJECTION INTRAVENOUS; SUBCUTANEOUS at 14:28

## 2025-01-11 RX ADMIN — SODIUM CHLORIDE, SODIUM LACTATE, POTASSIUM CHLORIDE, CALCIUM CHLORIDE AND DEXTROSE MONOHYDRATE 75 ML/HR: 5; 600; 310; 30; 20 INJECTION, SOLUTION INTRAVENOUS at 00:25

## 2025-01-11 RX ADMIN — PIPERACILLIN SODIUM AND TAZOBACTAM SODIUM 2.25 G: 2; .25 INJECTION, SOLUTION INTRAVENOUS at 05:21

## 2025-01-11 RX ADMIN — ROSUVASTATIN 5 MG: 10 TABLET, FILM COATED ORAL at 20:18

## 2025-01-11 RX ADMIN — LOPERAMIDE HYDROCHLORIDE 2 MG: 2 CAPSULE ORAL at 18:21

## 2025-01-11 RX ADMIN — ASCORBIC ACID, VITAMIN A PALMITATE, CHOLECALCIFEROL, THIAMINE HYDROCHLORIDE, RIBOFLAVIN-5 PHOSPHATE SODIUM, PYRIDOXINE HYDROCHLORIDE, NIACINAMIDE, DEXPANTHENOL, ALPHA-TOCOPHEROL ACETATE, VITAMIN K1, FOLIC ACID, BIOTIN, CYANOCOBALAMIN: 200; 3300; 200; 6; 3.6; 6; 40; 15; 10; 150; 600; 60; 5 INJECTION, SOLUTION INTRAVENOUS at 20:17

## 2025-01-11 RX ADMIN — OXYBUTYNIN CHLORIDE 5 MG: 5 SYRUP ORAL at 20:35

## 2025-01-11 RX ADMIN — DEXTROSE, SODIUM CHLORIDE, SODIUM LACTATE, POTASSIUM CHLORIDE, AND CALCIUM CHLORIDE 75 ML/HR: 5; .6; .31; .03; .02 INJECTION, SOLUTION INTRAVENOUS at 14:46

## 2025-01-11 RX ADMIN — PIPERACILLIN SODIUM AND TAZOBACTAM SODIUM 2.25 G: 2; .25 INJECTION, SOLUTION INTRAVENOUS at 14:28

## 2025-01-11 RX ADMIN — LOPERAMIDE HYDROCHLORIDE 2 MG: 2 CAPSULE ORAL at 08:40

## 2025-01-11 RX ADMIN — PANTOPRAZOLE SODIUM 40 MG: 40 INJECTION, POWDER, FOR SOLUTION INTRAVENOUS at 08:40

## 2025-01-11 RX ADMIN — LOPERAMIDE HYDROCHLORIDE 2 MG: 2 CAPSULE ORAL at 20:18

## 2025-01-11 RX ADMIN — OXYBUTYNIN CHLORIDE 5 MG: 5 SYRUP ORAL at 08:40

## 2025-01-11 RX ADMIN — HEPARIN SODIUM 5000 UNITS: 5000 INJECTION INTRAVENOUS; SUBCUTANEOUS at 05:21

## 2025-01-11 RX ADMIN — PIPERACILLIN SODIUM AND TAZOBACTAM SODIUM 2.25 G: 2; .25 INJECTION, SOLUTION INTRAVENOUS at 21:56

## 2025-01-11 ASSESSMENT — PAIN - FUNCTIONAL ASSESSMENT
PAIN_FUNCTIONAL_ASSESSMENT: 0-10

## 2025-01-11 ASSESSMENT — PAIN SCALES - GENERAL
PAINLEVEL_OUTOF10: 0 - NO PAIN
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
## 2025-01-11 NOTE — NURSING NOTE
PICC:    DL PICC order received by PICC team for TPN/IL and to remove patient's current CVC. Upon arrival to bedside, patient has left sided CVC in left chest/left axilla. Patient has a device on his chest that cannot be removed, per bedside nurse, so that I can place 3CG for PICC tip confirmation. If placement is successful patient will need chest x-ray confirmation of PICC tip. Patient right arm is very stiff and difficult to position. Right arm is very edematous so there is poor visualization of veins in right upper arm. I am able to visualize one vein, a right basilic vein. Right basilic vein attempted x3. First two attempts unsuccessful due to unable to advance guidewire. Successful access to right basilic vein on third attempt. PICC catheter advanced. Obstruction met at 15cm harpreet. Unable to fully advance catheter. Procedure aborted. Pressure held to site. 2x2 gauze and Tegaderm placed over site. No bleeding at this time. Report given to bedside nurse.
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
## 2025-01-12 ENCOUNTER — DOCUMENTATION (OUTPATIENT)
Dept: SURGICAL ICU | Facility: HOSPITAL | Age: 80
End: 2025-01-12

## 2025-01-12 ENCOUNTER — APPOINTMENT (OUTPATIENT)
Dept: RADIOLOGY | Facility: HOSPITAL | Age: 80
End: 2025-01-12
Payer: MEDICARE

## 2025-01-12 VITALS
WEIGHT: 249.12 LBS | TEMPERATURE: 95.9 F | RESPIRATION RATE: 16 BRPM | HEIGHT: 76 IN | DIASTOLIC BLOOD PRESSURE: 59 MMHG | OXYGEN SATURATION: 99 % | SYSTOLIC BLOOD PRESSURE: 119 MMHG | BODY MASS INDEX: 30.34 KG/M2 | HEART RATE: 72 BPM

## 2025-01-12 LAB
ALBUMIN SERPL BCP-MCNC: 2 G/DL (ref 3.4–5)
ALP SERPL-CCNC: 234 U/L (ref 33–136)
ALT SERPL W P-5'-P-CCNC: 80 U/L (ref 10–52)
ANION GAP BLDV CALCULATED.4IONS-SCNC: 9 MMOL/L (ref 10–25)
ANION GAP SERPL CALC-SCNC: 13 MMOL/L (ref 10–20)
APTT PPP: 26 SECONDS (ref 27–38)
AST SERPL W P-5'-P-CCNC: 122 U/L (ref 9–39)
BASE EXCESS BLDV CALC-SCNC: 0.9 MMOL/L (ref -2–3)
BASOPHILS # BLD AUTO: 0.1 X10*3/UL (ref 0–0.1)
BASOPHILS NFR BLD AUTO: 0.6 %
BILIRUB DIRECT SERPL-MCNC: 1.4 MG/DL (ref 0–0.3)
BILIRUB SERPL-MCNC: 2.7 MG/DL (ref 0–1.2)
BLOOD EXPIRATION DATE: NORMAL
BODY TEMPERATURE: 37 DEGREES CELSIUS
BUN SERPL-MCNC: 34 MG/DL (ref 6–23)
CA-I BLD-SCNC: 1.12 MMOL/L (ref 1.1–1.33)
CA-I BLDV-SCNC: 1.15 MMOL/L (ref 1.1–1.33)
CALCIUM SERPL-MCNC: 7.4 MG/DL (ref 8.6–10.6)
CHLORIDE BLDV-SCNC: 103 MMOL/L (ref 98–107)
CHLORIDE SERPL-SCNC: 101 MMOL/L (ref 98–107)
CO2 SERPL-SCNC: 26 MMOL/L (ref 21–32)
CREAT SERPL-MCNC: 4.49 MG/DL (ref 0.5–1.3)
DISPENSE STATUS: NORMAL
EGFRCR SERPLBLD CKD-EPI 2021: 13 ML/MIN/1.73M*2
EOSINOPHIL # BLD AUTO: 0.42 X10*3/UL (ref 0–0.4)
EOSINOPHIL NFR BLD AUTO: 2.4 %
ERYTHROCYTE [DISTWIDTH] IN BLOOD BY AUTOMATED COUNT: 16.2 % (ref 11.5–14.5)
GLUCOSE BLD MANUAL STRIP-MCNC: 109 MG/DL (ref 74–99)
GLUCOSE BLD MANUAL STRIP-MCNC: 116 MG/DL (ref 74–99)
GLUCOSE BLD MANUAL STRIP-MCNC: 120 MG/DL (ref 74–99)
GLUCOSE BLDV-MCNC: 110 MG/DL (ref 74–99)
GLUCOSE SERPL-MCNC: 111 MG/DL (ref 74–99)
HCO3 BLDV-SCNC: 26 MMOL/L (ref 22–26)
HCT VFR BLD AUTO: 20.3 % (ref 41–52)
HCT VFR BLD EST: 22 % (ref 41–52)
HGB BLD-MCNC: 7 G/DL (ref 13.5–17.5)
HGB BLDV-MCNC: 7.4 G/DL (ref 13.5–17.5)
IMM GRANULOCYTES # BLD AUTO: 0.27 X10*3/UL (ref 0–0.5)
IMM GRANULOCYTES NFR BLD AUTO: 1.5 % (ref 0–0.9)
INHALED O2 CONCENTRATION: 40 %
INR PPP: 1.4 (ref 0.9–1.1)
LACTATE BLDV-SCNC: 0.7 MMOL/L (ref 0.4–2)
LYMPHOCYTES # BLD AUTO: 1.53 X10*3/UL (ref 0.8–3)
LYMPHOCYTES NFR BLD AUTO: 8.6 %
MAGNESIUM SERPL-MCNC: 1.97 MG/DL (ref 1.6–2.4)
MCH RBC QN AUTO: 29.4 PG (ref 26–34)
MCHC RBC AUTO-ENTMCNC: 34.5 G/DL (ref 32–36)
MCV RBC AUTO: 85 FL (ref 80–100)
MONOCYTES # BLD AUTO: 1.38 X10*3/UL (ref 0.05–0.8)
MONOCYTES NFR BLD AUTO: 7.8 %
NEUTROPHILS # BLD AUTO: 14.05 X10*3/UL (ref 1.6–5.5)
NEUTROPHILS NFR BLD AUTO: 79.1 %
NRBC BLD-RTO: 0 /100 WBCS (ref 0–0)
OXYHGB MFR BLDV: 61.3 % (ref 45–75)
PCO2 BLDV: 43 MM HG (ref 41–51)
PH BLDV: 7.39 PH (ref 7.33–7.43)
PHOSPHATE SERPL-MCNC: 4.5 MG/DL (ref 2.5–4.9)
PLATELET # BLD AUTO: 547 X10*3/UL (ref 150–450)
PO2 BLDV: 37 MM HG (ref 35–45)
POTASSIUM BLDV-SCNC: 4.6 MMOL/L (ref 3.5–5.3)
POTASSIUM SERPL-SCNC: 4.6 MMOL/L (ref 3.5–5.3)
PRODUCT BLOOD TYPE: 5100
PRODUCT CODE: NORMAL
PROT SERPL-MCNC: 5 G/DL (ref 6.4–8.2)
PROTHROMBIN TIME: 16.3 SECONDS (ref 9.8–12.8)
RBC # BLD AUTO: 2.38 X10*6/UL (ref 4.5–5.9)
SAO2 % BLDV: 63 % (ref 45–75)
SODIUM BLDV-SCNC: 133 MMOL/L (ref 136–145)
SODIUM SERPL-SCNC: 135 MMOL/L (ref 136–145)
UNIT ABO: NORMAL
UNIT NUMBER: NORMAL
UNIT RH: NORMAL
UNIT VOLUME: 272
WBC # BLD AUTO: 17.8 X10*3/UL (ref 4.4–11.3)
XM INTEP: NORMAL

## 2025-01-12 PROCEDURE — 82330 ASSAY OF CALCIUM: CPT

## 2025-01-12 PROCEDURE — 85730 THROMBOPLASTIN TIME PARTIAL: CPT

## 2025-01-12 PROCEDURE — 2500000001 HC RX 250 WO HCPCS SELF ADMINISTERED DRUGS (ALT 637 FOR MEDICARE OP): Performed by: STUDENT IN AN ORGANIZED HEALTH CARE EDUCATION/TRAINING PROGRAM

## 2025-01-12 PROCEDURE — 2500000004 HC RX 250 GENERAL PHARMACY W/ HCPCS (ALT 636 FOR OP/ED): Performed by: STUDENT IN AN ORGANIZED HEALTH CARE EDUCATION/TRAINING PROGRAM

## 2025-01-12 PROCEDURE — 2580000001 HC RX 258 IV SOLUTIONS

## 2025-01-12 PROCEDURE — 2020000001 HC ICU ROOM DAILY

## 2025-01-12 PROCEDURE — 37799 UNLISTED PX VASCULAR SURGERY: CPT | Performed by: STUDENT IN AN ORGANIZED HEALTH CARE EDUCATION/TRAINING PROGRAM

## 2025-01-12 PROCEDURE — 82248 BILIRUBIN DIRECT: CPT

## 2025-01-12 PROCEDURE — 84295 ASSAY OF SERUM SODIUM: CPT

## 2025-01-12 PROCEDURE — 82947 ASSAY GLUCOSE BLOOD QUANT: CPT

## 2025-01-12 PROCEDURE — 83735 ASSAY OF MAGNESIUM: CPT

## 2025-01-12 PROCEDURE — 2500000005 HC RX 250 GENERAL PHARMACY W/O HCPCS

## 2025-01-12 PROCEDURE — 2500000001 HC RX 250 WO HCPCS SELF ADMINISTERED DRUGS (ALT 637 FOR MEDICARE OP)

## 2025-01-12 PROCEDURE — 2500000004 HC RX 250 GENERAL PHARMACY W/ HCPCS (ALT 636 FOR OP/ED)

## 2025-01-12 PROCEDURE — 84100 ASSAY OF PHOSPHORUS: CPT

## 2025-01-12 PROCEDURE — 82330 ASSAY OF CALCIUM: CPT | Performed by: STUDENT IN AN ORGANIZED HEALTH CARE EDUCATION/TRAINING PROGRAM

## 2025-01-12 PROCEDURE — 71045 X-RAY EXAM CHEST 1 VIEW: CPT | Performed by: RADIOLOGY

## 2025-01-12 PROCEDURE — 71045 X-RAY EXAM CHEST 1 VIEW: CPT

## 2025-01-12 PROCEDURE — 85025 COMPLETE CBC W/AUTO DIFF WBC: CPT

## 2025-01-12 PROCEDURE — 94003 VENT MGMT INPAT SUBQ DAY: CPT

## 2025-01-12 PROCEDURE — 2500000002 HC RX 250 W HCPCS SELF ADMINISTERED DRUGS (ALT 637 FOR MEDICARE OP, ALT 636 FOR OP/ED): Performed by: STUDENT IN AN ORGANIZED HEALTH CARE EDUCATION/TRAINING PROGRAM

## 2025-01-12 PROCEDURE — 94762 N-INVAS EAR/PLS OXIMTRY CONT: CPT

## 2025-01-12 PROCEDURE — 99291 CRITICAL CARE FIRST HOUR: CPT | Performed by: NURSE PRACTITIONER

## 2025-01-12 RX ORDER — ACETAMINOPHEN 500 MG
5 TABLET ORAL NIGHTLY
Status: DISCONTINUED | OUTPATIENT
Start: 2025-01-12 | End: 2025-01-17

## 2025-01-12 RX ADMIN — OXYBUTYNIN CHLORIDE 5 MG: 5 SYRUP ORAL at 20:05

## 2025-01-12 RX ADMIN — LOPERAMIDE HYDROCHLORIDE 2 MG: 2 CAPSULE ORAL at 18:32

## 2025-01-12 RX ADMIN — HEPARIN SODIUM 5000 UNITS: 5000 INJECTION INTRAVENOUS; SUBCUTANEOUS at 20:04

## 2025-01-12 RX ADMIN — LOPERAMIDE HYDROCHLORIDE 2 MG: 2 CAPSULE ORAL at 20:04

## 2025-01-12 RX ADMIN — OXYCODONE HYDROCHLORIDE 5 MG: 5 TABLET ORAL at 03:39

## 2025-01-12 RX ADMIN — DEXTROSE, SODIUM CHLORIDE, SODIUM LACTATE, POTASSIUM CHLORIDE, AND CALCIUM CHLORIDE 75 ML/HR: 5; .6; .31; .03; .02 INJECTION, SOLUTION INTRAVENOUS at 04:43

## 2025-01-12 RX ADMIN — LOPERAMIDE HYDROCHLORIDE 2 MG: 2 CAPSULE ORAL at 14:46

## 2025-01-12 RX ADMIN — FLUCONAZOLE 200 MG: 2 INJECTION, SOLUTION INTRAVENOUS at 20:04

## 2025-01-12 RX ADMIN — PANTOPRAZOLE SODIUM 40 MG: 40 INJECTION, POWDER, FOR SOLUTION INTRAVENOUS at 08:07

## 2025-01-12 RX ADMIN — LOPERAMIDE HYDROCHLORIDE 2 MG: 2 CAPSULE ORAL at 06:13

## 2025-01-12 RX ADMIN — ASCORBIC ACID, VITAMIN A PALMITATE, CHOLECALCIFEROL, THIAMINE HYDROCHLORIDE, RIBOFLAVIN-5 PHOSPHATE SODIUM, PYRIDOXINE HYDROCHLORIDE, NIACINAMIDE, DEXPANTHENOL, ALPHA-TOCOPHEROL ACETATE, VITAMIN K1, FOLIC ACID, BIOTIN, CYANOCOBALAMIN: 200; 3300; 200; 6; 3.6; 6; 40; 15; 10; 150; 600; 60; 5 INJECTION, SOLUTION INTRAVENOUS at 19:52

## 2025-01-12 RX ADMIN — HEPARIN SODIUM 5000 UNITS: 5000 INJECTION INTRAVENOUS; SUBCUTANEOUS at 04:42

## 2025-01-12 RX ADMIN — PIPERACILLIN SODIUM AND TAZOBACTAM SODIUM 2.25 G: 2; .25 INJECTION, SOLUTION INTRAVENOUS at 04:45

## 2025-01-12 RX ADMIN — LEUCINE, PHENYLALANINE, LYSINE HYDROCHLORIDE, METHIONINE, ISOLEUCINE, VALINE, HISTIDINE, THREONINE, TRYPTOPHAN, ALANINE, GLYCINE, ARGININE, PROLINE, TYROSINE, SERINE 25 G: 730; 560; 580; 400; 600; 580; 480; 420; 180; 2.07; 1.03; 1.15; 680; 40; 5 INJECTION INTRAVENOUS at 08:07

## 2025-01-12 RX ADMIN — OXYCODONE HYDROCHLORIDE 5 MG: 5 TABLET ORAL at 20:04

## 2025-01-12 RX ADMIN — HEPARIN SODIUM 5000 UNITS: 5000 INJECTION INTRAVENOUS; SUBCUTANEOUS at 14:49

## 2025-01-12 RX ADMIN — PIPERACILLIN SODIUM AND TAZOBACTAM SODIUM 2.25 G: 2; .25 INJECTION, SOLUTION INTRAVENOUS at 21:56

## 2025-01-12 RX ADMIN — PIPERACILLIN SODIUM AND TAZOBACTAM SODIUM 2.25 G: 2; .25 INJECTION, SOLUTION INTRAVENOUS at 14:46

## 2025-01-12 RX ADMIN — SMOFLIPID 50 G: 6; 6; 5; 3 INJECTION, EMULSION INTRAVENOUS at 19:52

## 2025-01-12 RX ADMIN — ROSUVASTATIN 5 MG: 10 TABLET, FILM COATED ORAL at 20:04

## 2025-01-12 RX ADMIN — OXYBUTYNIN CHLORIDE 5 MG: 5 SYRUP ORAL at 08:08

## 2025-01-12 ASSESSMENT — PAIN SCALES - GENERAL
PAINLEVEL_OUTOF10: 7
PAINLEVEL_OUTOF10: 0 - NO PAIN
PAINLEVEL_OUTOF10: 0 - NO PAIN
PAINLEVEL_OUTOF10: 6
PAINLEVEL_OUTOF10: 0 - NO PAIN
PAINLEVEL_OUTOF10: 6

## 2025-01-12 ASSESSMENT — PAIN - FUNCTIONAL ASSESSMENT
PAIN_FUNCTIONAL_ASSESSMENT: 0-10
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
## 2025-01-13 ENCOUNTER — APPOINTMENT (OUTPATIENT)
Dept: RADIOLOGY | Facility: HOSPITAL | Age: 80
End: 2025-01-13
Payer: MEDICARE

## 2025-01-13 ENCOUNTER — APPOINTMENT (OUTPATIENT)
Dept: DIALYSIS | Facility: HOSPITAL | Age: 80
End: 2025-01-13
Payer: MEDICARE

## 2025-01-13 ENCOUNTER — APPOINTMENT (OUTPATIENT)
Dept: CARDIOLOGY | Facility: HOSPITAL | Age: 80
End: 2025-01-13
Payer: MEDICARE

## 2025-01-13 LAB
ABO GROUP (TYPE) IN BLOOD: NORMAL
ALBUMIN SERPL BCP-MCNC: 2 G/DL (ref 3.4–5)
ALBUMIN SERPL BCP-MCNC: 2.1 G/DL (ref 3.4–5)
ALP SERPL-CCNC: 198 U/L (ref 33–136)
ALP SERPL-CCNC: 208 U/L (ref 33–136)
ALT SERPL W P-5'-P-CCNC: 70 U/L (ref 10–52)
ALT SERPL W P-5'-P-CCNC: 71 U/L (ref 10–52)
ANION GAP BLDV CALCULATED.4IONS-SCNC: 9 MMOL/L (ref 10–25)
ANION GAP SERPL CALC-SCNC: 14 MMOL/L (ref 10–20)
ANION GAP SERPL CALC-SCNC: 15 MMOL/L (ref 10–20)
ANTIBODY SCREEN: NORMAL
APTT PPP: 27 SECONDS (ref 27–38)
AST SERPL W P-5'-P-CCNC: 80 U/L (ref 9–39)
AST SERPL W P-5'-P-CCNC: 94 U/L (ref 9–39)
BASE EXCESS BLDV CALC-SCNC: -1.2 MMOL/L (ref -2–3)
BILIRUB DIRECT SERPL-MCNC: 1.1 MG/DL (ref 0–0.3)
BILIRUB DIRECT SERPL-MCNC: 1.4 MG/DL (ref 0–0.3)
BILIRUB SERPL-MCNC: 2.5 MG/DL (ref 0–1.2)
BILIRUB SERPL-MCNC: 2.6 MG/DL (ref 0–1.2)
BODY TEMPERATURE: 37 DEGREES CELSIUS
BUN SERPL-MCNC: 24 MG/DL (ref 6–23)
BUN SERPL-MCNC: 43 MG/DL (ref 6–23)
CA-I BLD-SCNC: 1.12 MMOL/L (ref 1.1–1.33)
CA-I BLDV-SCNC: 1.14 MMOL/L (ref 1.1–1.33)
CALCIUM SERPL-MCNC: 7.4 MG/DL (ref 8.6–10.6)
CALCIUM SERPL-MCNC: 7.5 MG/DL (ref 8.6–10.6)
CARDIAC TROPONIN I PNL SERPL HS: 10 NG/L (ref 0–53)
CHLORIDE BLDV-SCNC: 102 MMOL/L (ref 98–107)
CHLORIDE SERPL-SCNC: 99 MMOL/L (ref 98–107)
CHLORIDE SERPL-SCNC: 99 MMOL/L (ref 98–107)
CO2 SERPL-SCNC: 25 MMOL/L (ref 21–32)
CO2 SERPL-SCNC: 26 MMOL/L (ref 21–32)
CREAT SERPL-MCNC: 3 MG/DL (ref 0.5–1.3)
CREAT SERPL-MCNC: 4.95 MG/DL (ref 0.5–1.3)
EGFRCR SERPLBLD CKD-EPI 2021: 11 ML/MIN/1.73M*2
EGFRCR SERPLBLD CKD-EPI 2021: 21 ML/MIN/1.73M*2
ERYTHROCYTE [DISTWIDTH] IN BLOOD BY AUTOMATED COUNT: 15.2 % (ref 11.5–14.5)
ERYTHROCYTE [DISTWIDTH] IN BLOOD BY AUTOMATED COUNT: 15.8 % (ref 11.5–14.5)
GLUCOSE BLD MANUAL STRIP-MCNC: 101 MG/DL (ref 74–99)
GLUCOSE BLD MANUAL STRIP-MCNC: 101 MG/DL (ref 74–99)
GLUCOSE BLD MANUAL STRIP-MCNC: 114 MG/DL (ref 74–99)
GLUCOSE BLD MANUAL STRIP-MCNC: 91 MG/DL (ref 74–99)
GLUCOSE BLDV-MCNC: 99 MG/DL (ref 74–99)
GLUCOSE SERPL-MCNC: 95 MG/DL (ref 74–99)
GLUCOSE SERPL-MCNC: 97 MG/DL (ref 74–99)
HCO3 BLDV-SCNC: 24.3 MMOL/L (ref 22–26)
HCT VFR BLD AUTO: 19.5 % (ref 41–52)
HCT VFR BLD AUTO: 25.1 % (ref 41–52)
HCT VFR BLD EST: 20 % (ref 41–52)
HGB BLD-MCNC: 6.7 G/DL (ref 13.5–17.5)
HGB BLD-MCNC: 8.2 G/DL (ref 13.5–17.5)
HGB BLDV-MCNC: 6.7 G/DL (ref 13.5–17.5)
INHALED O2 CONCENTRATION: 50 %
INR PPP: 1.2 (ref 0.9–1.1)
LACTATE BLDV-SCNC: 0.7 MMOL/L (ref 0.4–2)
MAGNESIUM SERPL-MCNC: 1.89 MG/DL (ref 1.6–2.4)
MAGNESIUM SERPL-MCNC: 1.95 MG/DL (ref 1.6–2.4)
MCH RBC QN AUTO: 28.6 PG (ref 26–34)
MCH RBC QN AUTO: 29.6 PG (ref 26–34)
MCHC RBC AUTO-ENTMCNC: 32.7 G/DL (ref 32–36)
MCHC RBC AUTO-ENTMCNC: 34.4 G/DL (ref 32–36)
MCV RBC AUTO: 83 FL (ref 80–100)
MCV RBC AUTO: 91 FL (ref 80–100)
NRBC BLD-RTO: 0 /100 WBCS (ref 0–0)
NRBC BLD-RTO: 0 /100 WBCS (ref 0–0)
OXYHGB MFR BLDV: 73.1 % (ref 45–75)
PCO2 BLDV: 44 MM HG (ref 41–51)
PH BLDV: 7.35 PH (ref 7.33–7.43)
PHOSPHATE SERPL-MCNC: 3.3 MG/DL (ref 2.5–4.9)
PHOSPHATE SERPL-MCNC: 5.1 MG/DL (ref 2.5–4.9)
PLATELET # BLD AUTO: 498 X10*3/UL (ref 150–450)
PLATELET # BLD AUTO: 525 X10*3/UL (ref 150–450)
PO2 BLDV: 42 MM HG (ref 35–45)
POTASSIUM BLDV-SCNC: 4.6 MMOL/L (ref 3.5–5.3)
POTASSIUM SERPL-SCNC: 3.7 MMOL/L (ref 3.5–5.3)
POTASSIUM SERPL-SCNC: 4.5 MMOL/L (ref 3.5–5.3)
PROT SERPL-MCNC: 5.2 G/DL (ref 6.4–8.2)
PROT SERPL-MCNC: 5.5 G/DL (ref 6.4–8.2)
PROTHROMBIN TIME: 13.5 SECONDS (ref 9.8–12.8)
RBC # BLD AUTO: 2.34 X10*6/UL (ref 4.5–5.9)
RBC # BLD AUTO: 2.77 X10*6/UL (ref 4.5–5.9)
RH FACTOR (ANTIGEN D): NORMAL
SAO2 % BLDV: 74 % (ref 45–75)
SODIUM BLDV-SCNC: 131 MMOL/L (ref 136–145)
SODIUM SERPL-SCNC: 134 MMOL/L (ref 136–145)
SODIUM SERPL-SCNC: 135 MMOL/L (ref 136–145)
WBC # BLD AUTO: 18 X10*3/UL (ref 4.4–11.3)
WBC # BLD AUTO: 21.9 X10*3/UL (ref 4.4–11.3)

## 2025-01-13 PROCEDURE — 86900 BLOOD TYPING SEROLOGIC ABO: CPT

## 2025-01-13 PROCEDURE — 71045 X-RAY EXAM CHEST 1 VIEW: CPT | Performed by: RADIOLOGY

## 2025-01-13 PROCEDURE — 80053 COMPREHEN METABOLIC PANEL: CPT

## 2025-01-13 PROCEDURE — 94003 VENT MGMT INPAT SUBQ DAY: CPT

## 2025-01-13 PROCEDURE — 2500000001 HC RX 250 WO HCPCS SELF ADMINISTERED DRUGS (ALT 637 FOR MEDICARE OP)

## 2025-01-13 PROCEDURE — 2500000004 HC RX 250 GENERAL PHARMACY W/ HCPCS (ALT 636 FOR OP/ED)

## 2025-01-13 PROCEDURE — 36415 COLL VENOUS BLD VENIPUNCTURE: CPT

## 2025-01-13 PROCEDURE — 85610 PROTHROMBIN TIME: CPT

## 2025-01-13 PROCEDURE — 82248 BILIRUBIN DIRECT: CPT

## 2025-01-13 PROCEDURE — 71045 X-RAY EXAM CHEST 1 VIEW: CPT

## 2025-01-13 PROCEDURE — 90935 HEMODIALYSIS ONE EVALUATION: CPT | Performed by: INTERNAL MEDICINE

## 2025-01-13 PROCEDURE — 86923 COMPATIBILITY TEST ELECTRIC: CPT

## 2025-01-13 PROCEDURE — 84484 ASSAY OF TROPONIN QUANT: CPT

## 2025-01-13 PROCEDURE — 82947 ASSAY GLUCOSE BLOOD QUANT: CPT

## 2025-01-13 PROCEDURE — 2500000002 HC RX 250 W HCPCS SELF ADMINISTERED DRUGS (ALT 637 FOR MEDICARE OP, ALT 636 FOR OP/ED): Performed by: STUDENT IN AN ORGANIZED HEALTH CARE EDUCATION/TRAINING PROGRAM

## 2025-01-13 PROCEDURE — 2500000001 HC RX 250 WO HCPCS SELF ADMINISTERED DRUGS (ALT 637 FOR MEDICARE OP): Performed by: STUDENT IN AN ORGANIZED HEALTH CARE EDUCATION/TRAINING PROGRAM

## 2025-01-13 PROCEDURE — 2580000001 HC RX 258 IV SOLUTIONS

## 2025-01-13 PROCEDURE — 97530 THERAPEUTIC ACTIVITIES: CPT | Mod: GP

## 2025-01-13 PROCEDURE — 80053 COMPREHEN METABOLIC PANEL: CPT | Performed by: STUDENT IN AN ORGANIZED HEALTH CARE EDUCATION/TRAINING PROGRAM

## 2025-01-13 PROCEDURE — 93005 ELECTROCARDIOGRAM TRACING: CPT

## 2025-01-13 PROCEDURE — P9040 RBC LEUKOREDUCED IRRADIATED: HCPCS

## 2025-01-13 PROCEDURE — 82330 ASSAY OF CALCIUM: CPT

## 2025-01-13 PROCEDURE — 37799 UNLISTED PX VASCULAR SURGERY: CPT | Performed by: STUDENT IN AN ORGANIZED HEALTH CARE EDUCATION/TRAINING PROGRAM

## 2025-01-13 PROCEDURE — 99291 CRITICAL CARE FIRST HOUR: CPT | Performed by: STUDENT IN AN ORGANIZED HEALTH CARE EDUCATION/TRAINING PROGRAM

## 2025-01-13 PROCEDURE — 99232 SBSQ HOSP IP/OBS MODERATE 35: CPT | Performed by: STUDENT IN AN ORGANIZED HEALTH CARE EDUCATION/TRAINING PROGRAM

## 2025-01-13 PROCEDURE — 84100 ASSAY OF PHOSPHORUS: CPT

## 2025-01-13 PROCEDURE — 2020000001 HC ICU ROOM DAILY

## 2025-01-13 PROCEDURE — 8010000001 HC DIALYSIS - HEMODIALYSIS PER DAY

## 2025-01-13 PROCEDURE — 2500000005 HC RX 250 GENERAL PHARMACY W/O HCPCS

## 2025-01-13 PROCEDURE — 93010 ELECTROCARDIOGRAM REPORT: CPT | Performed by: INTERNAL MEDICINE

## 2025-01-13 PROCEDURE — 2500000004 HC RX 250 GENERAL PHARMACY W/ HCPCS (ALT 636 FOR OP/ED): Performed by: STUDENT IN AN ORGANIZED HEALTH CARE EDUCATION/TRAINING PROGRAM

## 2025-01-13 PROCEDURE — 83735 ASSAY OF MAGNESIUM: CPT

## 2025-01-13 PROCEDURE — 85027 COMPLETE CBC AUTOMATED: CPT

## 2025-01-13 PROCEDURE — 36430 TRANSFUSION BLD/BLD COMPNT: CPT

## 2025-01-13 RX ORDER — METOPROLOL TARTRATE 1 MG/ML
INJECTION, SOLUTION INTRAVENOUS
Status: COMPLETED
Start: 2025-01-13 | End: 2025-01-13

## 2025-01-13 RX ORDER — LOPERAMIDE HCL 1MG/7.5ML
2 LIQUID (ML) ORAL 2 TIMES DAILY
Status: DISCONTINUED | OUTPATIENT
Start: 2025-01-13 | End: 2025-01-18 | Stop reason: HOSPADM

## 2025-01-13 RX ORDER — MAGNESIUM SULFATE 1 G/100ML
1 INJECTION INTRAVENOUS ONCE
Status: COMPLETED | OUTPATIENT
Start: 2025-01-13 | End: 2025-01-13

## 2025-01-13 RX ORDER — METOPROLOL TARTRATE 1 MG/ML
5 INJECTION, SOLUTION INTRAVENOUS ONCE
Status: COMPLETED | OUTPATIENT
Start: 2025-01-13 | End: 2025-01-13

## 2025-01-13 RX ORDER — LOPERAMIDE HYDROCHLORIDE 2 MG/1
2 CAPSULE ORAL 2 TIMES DAILY
Status: DISCONTINUED | OUTPATIENT
Start: 2025-01-13 | End: 2025-01-13

## 2025-01-13 RX ADMIN — HEPARIN SODIUM 5000 UNITS: 5000 INJECTION INTRAVENOUS; SUBCUTANEOUS at 05:16

## 2025-01-13 RX ADMIN — HEPARIN SODIUM 5000 UNITS: 5000 INJECTION INTRAVENOUS; SUBCUTANEOUS at 20:29

## 2025-01-13 RX ADMIN — METOPROLOL TARTRATE 5 MG: 1 INJECTION, SOLUTION INTRAVENOUS at 14:12

## 2025-01-13 RX ADMIN — HEPARIN SODIUM 5000 UNITS: 5000 INJECTION INTRAVENOUS; SUBCUTANEOUS at 12:48

## 2025-01-13 RX ADMIN — HEPARIN SODIUM 1000 UNITS: 1000 INJECTION INTRAVENOUS; SUBCUTANEOUS at 15:44

## 2025-01-13 RX ADMIN — METOPROLOL TARTRATE 5 MG: 1 INJECTION, SOLUTION INTRAVENOUS at 14:01

## 2025-01-13 RX ADMIN — AMIODARONE HYDROCHLORIDE 1 MG/MIN: 1.8 INJECTION, SOLUTION INTRAVENOUS at 18:40

## 2025-01-13 RX ADMIN — LOPERAMIDE HYDROCHLORIDE 2 MG: 2 CAPSULE ORAL at 08:00

## 2025-01-13 RX ADMIN — PIPERACILLIN SODIUM AND TAZOBACTAM SODIUM 2.25 G: 2; .25 INJECTION, SOLUTION INTRAVENOUS at 20:53

## 2025-01-13 RX ADMIN — AMIODARONE HYDROCHLORIDE 150 MG: 1.5 INJECTION, SOLUTION INTRAVENOUS at 15:50

## 2025-01-13 RX ADMIN — HYDROMORPHONE HYDROCHLORIDE 0.4 MG: 0.5 INJECTION, SOLUTION INTRAMUSCULAR; INTRAVENOUS; SUBCUTANEOUS at 18:07

## 2025-01-13 RX ADMIN — PIPERACILLIN SODIUM AND TAZOBACTAM SODIUM 2.25 G: 2; .25 INJECTION, SOLUTION INTRAVENOUS at 15:37

## 2025-01-13 RX ADMIN — FLUCONAZOLE 200 MG: 2 INJECTION, SOLUTION INTRAVENOUS at 20:30

## 2025-01-13 RX ADMIN — Medication 50 PERCENT: at 08:00

## 2025-01-13 RX ADMIN — MAGNESIUM SULFATE HEPTAHYDRATE 1 G: 1 INJECTION, SOLUTION INTRAVENOUS at 20:30

## 2025-01-13 RX ADMIN — AMIODARONE HYDROCHLORIDE 1 MG/MIN: 1.8 INJECTION, SOLUTION INTRAVENOUS at 16:00

## 2025-01-13 RX ADMIN — OXYBUTYNIN CHLORIDE 5 MG: 5 SYRUP ORAL at 20:31

## 2025-01-13 RX ADMIN — LOPERAMIDE HYDROCHLORIDE 2 MG: 2 SOLUTION ORAL at 20:28

## 2025-01-13 RX ADMIN — LEUCINE, PHENYLALANINE, LYSINE HYDROCHLORIDE, METHIONINE, ISOLEUCINE, VALINE, HISTIDINE, THREONINE, TRYPTOPHAN, ALANINE, GLYCINE, ARGININE, PROLINE, TYROSINE, SERINE 25 G: 730; 560; 580; 400; 600; 580; 480; 420; 180; 2.07; 1.03; 1.15; 680; 40; 5 INJECTION INTRAVENOUS at 08:06

## 2025-01-13 RX ADMIN — ROSUVASTATIN 5 MG: 10 TABLET, FILM COATED ORAL at 20:29

## 2025-01-13 RX ADMIN — Medication 5 MG: at 20:29

## 2025-01-13 RX ADMIN — OXYBUTYNIN CHLORIDE 5 MG: 5 SYRUP ORAL at 08:06

## 2025-01-13 RX ADMIN — PANTOPRAZOLE SODIUM 40 MG: 40 INJECTION, POWDER, FOR SOLUTION INTRAVENOUS at 08:05

## 2025-01-13 RX ADMIN — ASCORBIC ACID, VITAMIN A PALMITATE, CHOLECALCIFEROL, THIAMINE HYDROCHLORIDE, RIBOFLAVIN-5 PHOSPHATE SODIUM, PYRIDOXINE HYDROCHLORIDE, NIACINAMIDE, DEXPANTHENOL, ALPHA-TOCOPHEROL ACETATE, VITAMIN K1, FOLIC ACID, BIOTIN, CYANOCOBALAMIN: 200; 3300; 200; 6; 3.6; 6; 40; 15; 10; 150; 600; 60; 5 INJECTION, SOLUTION INTRAVENOUS at 20:27

## 2025-01-13 RX ADMIN — SMOFLIPID 50 G: 6; 6; 5; 3 INJECTION, EMULSION INTRAVENOUS at 20:27

## 2025-01-13 RX ADMIN — Medication 50 PERCENT: at 20:00

## 2025-01-13 RX ADMIN — PIPERACILLIN SODIUM AND TAZOBACTAM SODIUM 2.25 G: 2; .25 INJECTION, SOLUTION INTRAVENOUS at 05:17

## 2025-01-13 ASSESSMENT — COGNITIVE AND FUNCTIONAL STATUS - GENERAL
CLIMB 3 TO 5 STEPS WITH RAILING: TOTAL
MOVING FROM LYING ON BACK TO SITTING ON SIDE OF FLAT BED WITH BEDRAILS: TOTAL
MOVING TO AND FROM BED TO CHAIR: TOTAL
WALKING IN HOSPITAL ROOM: TOTAL
MOBILITY SCORE: 6
STANDING UP FROM CHAIR USING ARMS: TOTAL
TURNING FROM BACK TO SIDE WHILE IN FLAT BAD: TOTAL

## 2025-01-13 ASSESSMENT — PAIN SCALES - GENERAL
PAINLEVEL_OUTOF10: 7
PAINLEVEL_OUTOF10: 0 - NO PAIN

## 2025-01-13 ASSESSMENT — PAIN DESCRIPTION - LOCATION: LOCATION: ABDOMEN

## 2025-01-13 ASSESSMENT — PAIN DESCRIPTION - ORIENTATION: ORIENTATION: RIGHT;LEFT;MID

## 2025-01-13 ASSESSMENT — PAIN - FUNCTIONAL ASSESSMENT
PAIN_FUNCTIONAL_ASSESSMENT: 0-10

## 2025-01-13 NOTE — PROGRESS NOTES
Physical Therapy    Physical Therapy Treatment    Patient Name: Ike Gar  MRN: [id number]  Department: [facility] TSICU  Room: 16/16-A  Today's Date: 1/13/2025  Time Calculation  Start Time: 8763-8020   Stop Time: 7597-5112  Time Calculation (min): split session, 64 min total       Assessment/Plan   PT Assessment  End of Session Communication: Bedside nurse  Assessment Comment: Pt required maxA x 2 for transfers bed <> chair; pt with copious oral and trach secretions this session requiring frequent suctioning and rest breaks between activities. Pt with overall improved tolerance to sitting in chair this session, returned for split session for return to bed prior to dialysis. Pt continues to benefit from skilled PT  End of Session Patient Position: Bed, 3 rail up, Alarm off, not on at start of session     PT Plan  Treatment/Interventions: Bed mobility, Transfer training, Gait training, Balance training, Neuromuscular re-education, Strengthening, Endurance training, Range of motion, Therapeutic exercise, Home exercise program, Therapeutic activity, Positioning, Postural re-education, Orthotic fitting/training  PT Plan: Ongoing PT  PT Frequency: 3 times per week  PT Discharge Recommendations: Moderate intensity level of continued care  PT Recommended Transfer Status: Total assist  PT - OK to Discharge: Yes      General Visit Information:   PT  Visit  PT Received On: 01/13/25  General  Prior to Session Communication: Bedside nurse  Patient Position Received: Alarm off, not on at start of session, Up in chair  General Comment: Pt supine in bed, pleasant and agreeable, motivated for PT. trach to vent, CPAP 50% FiO2,  Seen for split session 8 PEEP, 8 Psupp    Subjective   Precautions:  Precautions  Medical Precautions: Fall precautions, Spinal precautions, Oxygen therapy device and L/min  Post-Surgical Precautions: Abdominal surgery precautions  Braces Applied: TLSO needed for mobility - ABD binder on to protect skin/incisions  due to multiple drains and ostomy  Precautions Comment: MITT precautions, SBP >90, SpO2 >92%       01/13/25 1005 01/13/25 1100 01/13/25 1130   Vital Signs   Vitals Session Pre PT  --   --    Heart Rate 93 100 85   Heart Rate Source  --   --   --    Resp 22 24 22   SpO2 94 % 99 % 96 %   /85 (!) 150/118  --    MAP (mmHg) 98 128  --    BP Location  --   --   --    BP Method  --   --   --       01/13/25 1200 01/13/25 1208   Vital Signs   Vitals Session  --  Post PT   Heart Rate (!) 121 93   Heart Rate Source Monitor  --    Resp 17 21   SpO2 93 % 98 %   BP (!) 161/95 (!) 153/125   MAP (mmHg) 115 133   BP Location Right arm  --    BP Method Automatic  --        Objective   Pain:  Pain Assessment  Pain Assessment: 0-10  0-10 (Numeric) Pain Score: 0 - No pain  Cognition:  Cognition  Overall Cognitive Status: Within Functional Limits  Orientation Level: Oriented X4  Following Commands: Follows one step commands without difficulty    Postural Control:  Postural Control  Postural Control: Impaired  Head Control: WFL  Trunk Control: CGA-Siri  Static Sitting Balance  Static Sitting-Balance Support: Feet supported  Static Sitting-Level of Assistance: Contact guard  Static Sitting-Comment/Number of Minutes: 10 min    Activity Tolerance:  Activity Tolerance  Endurance: Tolerates 10 - 20 min exercise with multiple rests  Early Mobility/Exercise Safety Screen: Proceed with mobilization - No exclusion criteria met  Treatments:       Therapeutic Activity  Therapeutic Activity Performed: Yes  Therapeutic Activity 1: increased time for oral and trach suctioning 2/2 copious secretions  Therapeutic Activity 2: increased time to ICU line management for mobilization bed <> chair while ventilated  Therapeutic Activity 3: tolerated sitting EOB ~10 minutes prior to transfer with Siri-CGA for sitting balance         Bed Mobility  Bed Mobility: Yes  Bed Mobility 1  Bed Mobility 1: Supine to sitting, Sitting to supine  Level of Assistance 1:  Maximum assistance (x2)  Bed Mobility Comments 1: draw sheet, HOB elevated       Transfers  Transfer: Yes  Transfer 1  Transfer From 1: Bed to, Chair with drop arm to  Transfer to 1: Chair with drop arm, Bed  Technique 1: Sit pivot  Transfer Level of Assistance 1: Maximum assistance (x2)  Trials/Comments 1: arm in arm assist         Outcome Measures:  [facility] Basic Mobility  Turning from your back to your side while in a flat bed without using bedrails: Total  Moving from lying on your back to sitting on the side of a flat bed without using bedrails: Total  Moving to and from bed to chair (including a wheelchair): Total  Standing up from a chair using your arms (e.g. wheelchair or bedside chair): Total  To walk in hospital room: Total  Climbing 3-5 steps with railing: Total  Basic Mobility - Total Score: 6    FSS-ICU  Ambulation: Unable to attempt due to weakness  Rolling: Maximal assistance (performs 25% - 49% of task)  Sitting: Minimal assistance (performs 75% or more of task)  Transfer Sit-to-Stand: Total assistance (performs 25% or requires another person)  Transfer Supine-to-Sit: Total assistance (performs 25% or requires another person)  Total Score: 8      Early Mobility/Exercise Safety Screen: Proceed with mobilization - No exclusion criteria met  ICU Mobility Scale: Transferring bed to chair [5]    Education Documentation  Precautions, taught by Laura Gallagher PT at 1/13/2025  2:27 PM.  Learner: Patient  Readiness: Acceptance  Method: Explanation  Response: Verbalizes Understanding  Comment: PT POC, pulmonary toileting    Body Mechanics, taught by Laura Gallagher PT at 1/13/2025  2:27 PM.  Learner: Patient  Readiness: Acceptance  Method: Explanation  Response: Verbalizes Understanding  Comment: PT POC, pulmonary toileting    Mobility Training, taught by Laura Gallagher PT at 1/13/2025  2:27 PM.  Learner: Patient  Readiness: Acceptance  Method: Explanation  Response: Verbalizes Understanding  Comment: PT  POC, pulmonary toileting    Education Comments  No comments found.        OP EDUCATION:       Encounter Problems       Encounter Problems (Active)       Balance       STG - Maintains dynamic standing balance with upper extremity support on LRAD with Mod A x1  (Progressing)       Start:  12/30/24    Expected End:  01/20/25            STG - Maintains dynamic sitting balance without upper extremity support with Min A  (Progressing)       Start:  12/30/24    Expected End:  01/20/25               Mobility       STG - Patient will ambulate x10 steps to assist with transfers with Max A x1 using LRAD (Not Progressing)       Start:  12/30/24    Expected End:  01/20/25               PT Transfers       STG - Patient will perform bed mobility with Mod A  (Progressing)       Start:  12/30/24    Expected End:  01/20/25            STG - Patient will transfer sit to and from stand with Mod A using LRAD (Progressing)       Start:  12/30/24    Expected End:  01/20/25            Bilat LE strength grossly >/= 3+/5 (Progressing)       Start:  12/30/24    Expected End:  01/20/25               Pain - Adult          Safety       LTG - Patient will adhere to hip precautions during ADL's and transfers       Start:  12/17/24            LTG - Patient will demonstrate safety requirements appropriate to situation/environment       Start:  12/17/24            LTG - Patient will utilize safety techniques       Start:  12/17/24            STG - Patient locks brakes on wheelchair       Start:  12/17/24            STG - Patient uses call light consistently to request assistance with transfers       Start:  12/17/24            STG - Patient uses gait belt during all transfers       Start:  12/17/24            Goal 1       Start:  12/17/24            Goal 2       Start:  12/17/24            Goal 3 (Progressing)       Start:  12/17/24
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
## 2025-01-13 NOTE — PROCEDURES
"DIALYSIS NOTE:    No complaints today    Seen and examined during hemodialysis, undergoing treatment per submitted orders: 2 K, 2.5 Ca, 3.5  hours. Fluid removal 1.5-2 liters,  as tolerated (keep SBP> 90mmHg).     /78   Pulse (!) 124   Temp 36.9 °C (98.4 °F) (Temporal)   Resp 16   Ht 1.93 m (6' 3.98\")   Wt 113 kg (249 lb 1.9 oz)   SpO2 97%   BMI 30.34 kg/m²     Additional Recommendations:  RBC transfusion per primary team    Current Facility-Administered Medications:     acetaminophen (Tylenol) oral liquid 650 mg, 650 mg, oral, q6h PRN, Jay Fleming MD    Adult Clinimix Parenteral Nutrition Continuous, 35 mL/hr, intravenous, Daily PN, Jay Fleming MD, Last Rate: 35 mL/hr at 01/13/25 1700, Rate Verify at 01/13/25 1700    alteplase (Cathflo Activase) injection 1 mg, 1 mg, intra-catheter, PRN, Jay Fleming MD, 1 mg at 01/08/25 1051    alteplase (Cathflo Activase) injection 2 mg, 2 mg, intra-catheter, PRN, Jay Fleming MD    [COMPLETED] amiodarone (Nexterone) 150 mg in dextrose (iso)  mL, 150 mg, intravenous, Once, Stopped at 01/13/25 1600 **FOLLOWED BY** amiodarone (Nexterone) 360 mg in dextrose,iso-osm 200 mL (1.8 mg/mL) infusion (premix), 1 mg/min, intravenous, Continuous, Jasmina Mccray MD, Last Rate: 33.3 mL/hr at 01/13/25 1700, 1 mg/min at 01/13/25 1700    dextrose 50 % injection 12.5 g, 12.5 g, intravenous, q15 min PRN, Jay Fleming MD, 12.5 g at 12/21/24 0850    dextrose 50 % injection 25 g, 25 g, intravenous, q15 min PRN, Jay Fleming MD    fat emulsion fish oil/plant based (SMOFlipid) 20 % IV infusion 50 g, 250 mL, intravenous, Daily Lipids, Jay Fleming MD, Stopped at 01/13/25 0743    fluconazole (Diflucan) 200 mg in sodium chloride (iso)  mL, 200 mg, intravenous, q24h, Jay Fleming MD, Stopped at 01/12/25 2106    glucagon (Glucagen) injection 1 mg, 1 mg, intramuscular, q15 min PRN, Jay Fleming, " MD    glucagon (Glucagen) injection 1 mg, 1 mg, intramuscular, q15 min PRN, Jay Fleming MD    heparin (porcine) injection 5,000 Units, 5,000 Units, subcutaneous, q8h, Elvie De Leon MD, 5,000 Units at 01/13/25 1248    heparin 1,000 unit/mL injection 1,000 Units, 1,000 Units, intra-catheter, PRN, Jay Fleming MD    heparin 1,000 unit/mL injection 1,000 Units, 1,000 Units, intra-catheter, PRN, Jay Fleming MD    heparin 1,000 unit/mL injection 1,000 Units, 1,000 Units, intra-catheter, After Dialysis, Jorge Alberto Farah MD, 1,000 Units at 01/13/25 1544    heparin 1,000 unit/mL injection 1,000 Units, 1,000 Units, intra-catheter, After Dialysis, Jorge Alberto Farah MD, 1,000 Units at 01/13/25 1544    heparin flush 100 unit/mL syringe 500 Units, 5 mL, intra-catheter, PRN, Jay Fleming MD    HYDROmorphone (Dilaudid) injection 0.4 mg, 0.4 mg, intravenous, q3h PRN, Elvie De Leon MD    insulin lispro injection 0-5 Units, 0-5 Units, subcutaneous, q6h, Jay Fleming MD    labetaloL (Normodyne,Trandate) injection 20 mg, 20 mg, intravenous, Once PRN, Kori August DO    loperamide (Imodium A-D) liquid 2 mg, 2 mg, g-tube, BID, Mackenzie A Simerlink, MD    lubricating eye drops ophthalmic solution 1 drop, 1 drop, Both Eyes, PRN, Jay Fleming MD    melatonin tablet 5 mg, 5 mg, oral, Nightly, FORTINO Pereyra-CNP    naloxone (Narcan) injection 0.2 mg, 0.2 mg, intravenous, q5 min PRN, Jay Fleming MD, 0.2 mg at 01/01/25 1942    oxybutynin (Ditropan) syrup 5 mg, 5 mg, g-tube, BID, Jay Fleming MD, 5 mg at 01/13/25 0806    oxyCODONE (Roxicodone) immediate release tablet 5 mg, 5 mg, oral, q4h PRN, Elvie De Leon MD, 5 mg at 01/12/25 2004    oxygen (O2) therapy, , inhalation, Continuous PRN - O2/gases, Jay Fleming MD, 40 percent at 01/03/25 0044    oxygen (O2) therapy, , inhalation, Continuous - Inhalation, Jay Fleming MD,  50 percent at 01/13/25 0800    pantoprazole (ProtoNix) injection 40 mg, 40 mg, intravenous, Daily, Jay Fleming MD, 40 mg at 01/13/25 0805    piperacillin-tazobactam (Zosyn) 2.25 g in dextrose (iso) IV 50 mL, 2.25 g, intravenous, q8h, Jay Fleming MD, Stopped at 01/13/25 1607    rosuvastatin (Crestor) tablet 5 mg, 5 mg, g-tube, Nightly, Elvie De Leon MD, 5 mg at 01/12/25 2004    sulfur hexafluoride microsphr (Lumason) injection 24.28 mg, 2 mL, intravenous, Once in imaging, Jay Fleming MD    Travasol 10 % infusion 25 g, 25 g, intravenous, Daily Amino Acids, Jay Fleming MD, Last Rate: 0 mL/hr at 01/12/25 1950, Rate Verify at 01/13/25 1600    Recent Results (from the past 18 hours)   POCT GLUCOSE    Collection Time: 01/12/25 11:39 PM   Result Value Ref Range    POCT Glucose 116 (H) 74 - 99 mg/dL   Blood Gas Venous Full Panel    Collection Time: 01/13/25  1:30 AM   Result Value Ref Range    POCT pH, Venous 7.35 7.33 - 7.43 pH    POCT pCO2, Venous 44 41 - 51 mm Hg    POCT pO2, Venous 42 35 - 45 mm Hg    POCT SO2, Venous 74 45 - 75 %    POCT Oxy Hemoglobin, Venous 73.1 45.0 - 75.0 %    POCT Hematocrit Calculated, Venous 20.0 (L) 41.0 - 52.0 %    POCT Sodium, Venous 131 (L) 136 - 145 mmol/L    POCT Potassium, Venous 4.6 3.5 - 5.3 mmol/L    POCT Chloride, Venous 102 98 - 107 mmol/L    POCT Ionized Calicum, Venous 1.14 1.10 - 1.33 mmol/L    POCT Glucose, Venous 99 74 - 99 mg/dL    POCT Lactate, Venous 0.7 0.4 - 2.0 mmol/L    POCT Base Excess, Venous -1.2 -2.0 - 3.0 mmol/L    POCT HCO3 Calculated, Venous 24.3 22.0 - 26.0 mmol/L    POCT Hemoglobin, Venous 6.7 (L) 13.5 - 17.5 g/dL    POCT Anion Gap, Venous 9.0 (L) 10.0 - 25.0 mmol/L    Patient Temperature 37.0 degrees Celsius    FiO2 50 %   Calcium, ionized    Collection Time: 01/13/25  1:30 AM   Result Value Ref Range    POCT Calcium, Ionized 1.12 1.1 - 1.33 mmol/L   CBC    Collection Time: 01/13/25  1:30 AM   Result Value Ref Range     WBC 18.0 (H) 4.4 - 11.3 x10*3/uL    nRBC 0.0 0.0 - 0.0 /100 WBCs    RBC 2.34 (L) 4.50 - 5.90 x10*6/uL    Hemoglobin 6.7 (L) 13.5 - 17.5 g/dL    Hematocrit 19.5 (L) 41.0 - 52.0 %    MCV 83 80 - 100 fL    MCH 28.6 26.0 - 34.0 pg    MCHC 34.4 32.0 - 36.0 g/dL    RDW 15.8 (H) 11.5 - 14.5 %    Platelets 525 (H) 150 - 450 x10*3/uL   Coagulation Screen    Collection Time: 01/13/25  1:30 AM   Result Value Ref Range    Protime 13.5 (H) 9.8 - 12.8 seconds    INR 1.2 (H) 0.9 - 1.1    aPTT 27 27 - 38 seconds   Hepatic function panel    Collection Time: 01/13/25  1:30 AM   Result Value Ref Range    Albumin 2.0 (L) 3.4 - 5.0 g/dL    Bilirubin, Total 2.5 (H) 0.0 - 1.2 mg/dL    Bilirubin, Direct 1.4 (H) 0.0 - 0.3 mg/dL    Alkaline Phosphatase 208 (H) 33 - 136 U/L    ALT 71 (H) 10 - 52 U/L    AST 94 (H) 9 - 39 U/L    Total Protein 5.2 (L) 6.4 - 8.2 g/dL   Magnesium    Collection Time: 01/13/25  1:30 AM   Result Value Ref Range    Magnesium 1.95 1.60 - 2.40 mg/dL   Type and Screen    Collection Time: 01/13/25  1:30 AM   Result Value Ref Range    ABO TYPE O     Rh TYPE POS     ANTIBODY SCREEN NEG    Basic Metabolic Panel    Collection Time: 01/13/25  1:30 AM   Result Value Ref Range    Glucose 97 74 - 99 mg/dL    Sodium 134 (L) 136 - 145 mmol/L    Potassium 4.5 3.5 - 5.3 mmol/L    Chloride 99 98 - 107 mmol/L    Bicarbonate 25 21 - 32 mmol/L    Anion Gap 15 10 - 20 mmol/L    Urea Nitrogen 43 (H) 6 - 23 mg/dL    Creatinine 4.95 (H) 0.50 - 1.30 mg/dL    eGFR 11 (L) >60 mL/min/1.73m*2    Calcium 7.4 (L) 8.6 - 10.6 mg/dL   Phosphorus    Collection Time: 01/13/25  1:30 AM   Result Value Ref Range    Phosphorus 5.1 (H) 2.5 - 4.9 mg/dL   POCT GLUCOSE    Collection Time: 01/13/25  5:20 AM   Result Value Ref Range    POCT Glucose 101 (H) 74 - 99 mg/dL   POCT GLUCOSE    Collection Time: 01/13/25 11:40 AM   Result Value Ref Range    POCT Glucose 101 (H) 74 - 99 mg/dL   Prepare RBC: 1 Units    Collection Time: 01/13/25  3:13 PM   Result Value  Ref Range    PRODUCT CODE A2409J43     Unit Number [id number]     Unit ABO O     Unit RH POS     XM INTEP COMP     Dispense Status IS     Blood Expiration Date 1/13/2025 11:59:00 PM EST     PRODUCT BLOOD TYPE 5100     UNIT VOLUME 288    Troponin I, High Sensitivity    Collection Time: 01/13/25  3:18 PM   Result Value Ref Range    Troponin I, High Sensitivity (CMC) 10 0 - 53 ng/L   POCT GLUCOSE    Collection Time: 01/13/25  3:41 PM   Result Value Ref Range    POCT Glucose 91 74 - 99 mg/dL
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
## 2025-01-14 ENCOUNTER — ANESTHESIA EVENT (OUTPATIENT)
Dept: OPERATING ROOM | Facility: HOSPITAL | Age: 80
End: 2025-01-14
Payer: MEDICARE

## 2025-01-14 LAB
ALBUMIN SERPL BCP-MCNC: 2 G/DL (ref 3.4–5)
ALP SERPL-CCNC: 188 U/L (ref 33–136)
ALT SERPL W P-5'-P-CCNC: 57 U/L (ref 10–52)
ANION GAP SERPL CALC-SCNC: 14 MMOL/L (ref 10–20)
APTT PPP: 72 SECONDS (ref 27–38)
AST SERPL W P-5'-P-CCNC: 62 U/L (ref 9–39)
BILIRUB DIRECT SERPL-MCNC: 1.2 MG/DL (ref 0–0.3)
BILIRUB SERPL-MCNC: 2.3 MG/DL (ref 0–1.2)
BLOOD EXPIRATION DATE: NORMAL
BUN SERPL-MCNC: 30 MG/DL (ref 6–23)
CALCIUM SERPL-MCNC: 7.3 MG/DL (ref 8.6–10.6)
CHLORIDE SERPL-SCNC: 99 MMOL/L (ref 98–107)
CO2 SERPL-SCNC: 25 MMOL/L (ref 21–32)
CREAT SERPL-MCNC: 3.82 MG/DL (ref 0.5–1.3)
DISPENSE STATUS: NORMAL
EGFRCR SERPLBLD CKD-EPI 2021: 16 ML/MIN/1.73M*2
ERYTHROCYTE [DISTWIDTH] IN BLOOD BY AUTOMATED COUNT: 15.4 % (ref 11.5–14.5)
GLUCOSE BLD MANUAL STRIP-MCNC: 114 MG/DL (ref 74–99)
GLUCOSE BLD MANUAL STRIP-MCNC: 120 MG/DL (ref 74–99)
GLUCOSE BLD MANUAL STRIP-MCNC: 128 MG/DL (ref 74–99)
GLUCOSE BLD MANUAL STRIP-MCNC: 133 MG/DL (ref 74–99)
GLUCOSE BLD MANUAL STRIP-MCNC: 133 MG/DL (ref 74–99)
GLUCOSE SERPL-MCNC: 141 MG/DL (ref 74–99)
HCT VFR BLD AUTO: 22.3 % (ref 41–52)
HGB BLD-MCNC: 7.7 G/DL (ref 13.5–17.5)
INR PPP: 1.3 (ref 0.9–1.1)
MAGNESIUM SERPL-MCNC: 1.93 MG/DL (ref 1.6–2.4)
MCH RBC QN AUTO: 30.3 PG (ref 26–34)
MCHC RBC AUTO-ENTMCNC: 34.5 G/DL (ref 32–36)
MCV RBC AUTO: 88 FL (ref 80–100)
NRBC BLD-RTO: 0 /100 WBCS (ref 0–0)
PHOSPHATE SERPL-MCNC: 4.3 MG/DL (ref 2.5–4.9)
PLATELET # BLD AUTO: 503 X10*3/UL (ref 150–450)
POTASSIUM SERPL-SCNC: 3.8 MMOL/L (ref 3.5–5.3)
PRODUCT BLOOD TYPE: 5100
PRODUCT CODE: NORMAL
PROT SERPL-MCNC: 5.3 G/DL (ref 6.4–8.2)
PROTHROMBIN TIME: 14.5 SECONDS (ref 9.8–12.8)
RBC # BLD AUTO: 2.54 X10*6/UL (ref 4.5–5.9)
SODIUM SERPL-SCNC: 134 MMOL/L (ref 136–145)
UNIT ABO: NORMAL
UNIT NUMBER: NORMAL
UNIT RH: NORMAL
UNIT VOLUME: 288
WBC # BLD AUTO: 19.2 X10*3/UL (ref 4.4–11.3)
XM INTEP: NORMAL

## 2025-01-14 PROCEDURE — 37799 UNLISTED PX VASCULAR SURGERY: CPT

## 2025-01-14 PROCEDURE — 82947 ASSAY GLUCOSE BLOOD QUANT: CPT

## 2025-01-14 PROCEDURE — 2500000004 HC RX 250 GENERAL PHARMACY W/ HCPCS (ALT 636 FOR OP/ED): Performed by: STUDENT IN AN ORGANIZED HEALTH CARE EDUCATION/TRAINING PROGRAM

## 2025-01-14 PROCEDURE — 99291 CRITICAL CARE FIRST HOUR: CPT | Performed by: STUDENT IN AN ORGANIZED HEALTH CARE EDUCATION/TRAINING PROGRAM

## 2025-01-14 PROCEDURE — 2580000001 HC RX 258 IV SOLUTIONS

## 2025-01-14 PROCEDURE — 97535 SELF CARE MNGMENT TRAINING: CPT | Mod: GO

## 2025-01-14 PROCEDURE — 2020000001 HC ICU ROOM DAILY

## 2025-01-14 PROCEDURE — 82248 BILIRUBIN DIRECT: CPT

## 2025-01-14 PROCEDURE — 2500000004 HC RX 250 GENERAL PHARMACY W/ HCPCS (ALT 636 FOR OP/ED)

## 2025-01-14 PROCEDURE — 84100 ASSAY OF PHOSPHORUS: CPT

## 2025-01-14 PROCEDURE — 2500000001 HC RX 250 WO HCPCS SELF ADMINISTERED DRUGS (ALT 637 FOR MEDICARE OP)

## 2025-01-14 PROCEDURE — 99231 SBSQ HOSP IP/OBS SF/LOW 25: CPT | Performed by: STUDENT IN AN ORGANIZED HEALTH CARE EDUCATION/TRAINING PROGRAM

## 2025-01-14 PROCEDURE — 85027 COMPLETE CBC AUTOMATED: CPT

## 2025-01-14 PROCEDURE — 2500000005 HC RX 250 GENERAL PHARMACY W/O HCPCS

## 2025-01-14 PROCEDURE — 85610 PROTHROMBIN TIME: CPT

## 2025-01-14 PROCEDURE — 97530 THERAPEUTIC ACTIVITIES: CPT | Mod: GO

## 2025-01-14 PROCEDURE — 2500000001 HC RX 250 WO HCPCS SELF ADMINISTERED DRUGS (ALT 637 FOR MEDICARE OP): Performed by: STUDENT IN AN ORGANIZED HEALTH CARE EDUCATION/TRAINING PROGRAM

## 2025-01-14 PROCEDURE — 94003 VENT MGMT INPAT SUBQ DAY: CPT

## 2025-01-14 PROCEDURE — 97110 THERAPEUTIC EXERCISES: CPT | Mod: GO

## 2025-01-14 PROCEDURE — 83735 ASSAY OF MAGNESIUM: CPT

## 2025-01-14 RX ORDER — HEPARIN SODIUM 1000 [USP'U]/ML
1000 INJECTION, SOLUTION INTRAVENOUS; SUBCUTANEOUS
Status: DISCONTINUED | OUTPATIENT
Start: 2025-01-15 | End: 2025-01-18 | Stop reason: HOSPADM

## 2025-01-14 RX ADMIN — Medication 30 PERCENT: at 09:16

## 2025-01-14 RX ADMIN — LEUCINE, PHENYLALANINE, LYSINE HYDROCHLORIDE, METHIONINE, ISOLEUCINE, VALINE, HISTIDINE, THREONINE, TRYPTOPHAN, ALANINE, GLYCINE, ARGININE, PROLINE, TYROSINE, SERINE 25 G: 730; 560; 580; 400; 600; 580; 480; 420; 180; 2.07; 1.03; 1.15; 680; 40; 5 INJECTION INTRAVENOUS at 09:38

## 2025-01-14 RX ADMIN — FLUCONAZOLE 200 MG: 2 INJECTION, SOLUTION INTRAVENOUS at 21:00

## 2025-01-14 RX ADMIN — PANTOPRAZOLE SODIUM 40 MG: 40 INJECTION, POWDER, FOR SOLUTION INTRAVENOUS at 08:22

## 2025-01-14 RX ADMIN — HEPARIN SODIUM 5000 UNITS: 5000 INJECTION INTRAVENOUS; SUBCUTANEOUS at 22:00

## 2025-01-14 RX ADMIN — HYDROMORPHONE HYDROCHLORIDE 0.4 MG: 0.5 INJECTION, SOLUTION INTRAMUSCULAR; INTRAVENOUS; SUBCUTANEOUS at 23:06

## 2025-01-14 RX ADMIN — Medication 50 PERCENT: at 09:35

## 2025-01-14 RX ADMIN — AMIODARONE HYDROCHLORIDE 0.5 MG/MIN: 1.8 INJECTION, SOLUTION INTRAVENOUS at 01:07

## 2025-01-14 RX ADMIN — SMOFLIPID 50 G: 6; 6; 5; 3 INJECTION, EMULSION INTRAVENOUS at 21:00

## 2025-01-14 RX ADMIN — HEPARIN SODIUM 5000 UNITS: 5000 INJECTION INTRAVENOUS; SUBCUTANEOUS at 12:25

## 2025-01-14 RX ADMIN — PIPERACILLIN SODIUM AND TAZOBACTAM SODIUM 2.25 G: 2; .25 INJECTION, SOLUTION INTRAVENOUS at 12:25

## 2025-01-14 RX ADMIN — LOPERAMIDE HYDROCHLORIDE 2 MG: 2 SOLUTION ORAL at 08:21

## 2025-01-14 RX ADMIN — PIPERACILLIN SODIUM AND TAZOBACTAM SODIUM 2.25 G: 2; .25 INJECTION, SOLUTION INTRAVENOUS at 22:01

## 2025-01-14 RX ADMIN — PIPERACILLIN SODIUM AND TAZOBACTAM SODIUM 2.25 G: 2; .25 INJECTION, SOLUTION INTRAVENOUS at 05:10

## 2025-01-14 RX ADMIN — OXYBUTYNIN CHLORIDE 5 MG: 5 SYRUP ORAL at 08:23

## 2025-01-14 RX ADMIN — HEPARIN SODIUM 5000 UNITS: 5000 INJECTION INTRAVENOUS; SUBCUTANEOUS at 05:10

## 2025-01-14 RX ADMIN — ASCORBIC ACID, VITAMIN A PALMITATE, CHOLECALCIFEROL, THIAMINE HYDROCHLORIDE, RIBOFLAVIN-5 PHOSPHATE SODIUM, PYRIDOXINE HYDROCHLORIDE, NIACINAMIDE, DEXPANTHENOL, ALPHA-TOCOPHEROL ACETATE, VITAMIN K1, FOLIC ACID, BIOTIN, CYANOCOBALAMIN: 200; 3300; 200; 6; 3.6; 6; 40; 15; 10; 150; 600; 60; 5 INJECTION, SOLUTION INTRAVENOUS at 22:01

## 2025-01-14 ASSESSMENT — ACTIVITIES OF DAILY LIVING (ADL): HOME_MANAGEMENT_TIME_ENTRY: 10

## 2025-01-14 ASSESSMENT — PAIN - FUNCTIONAL ASSESSMENT
PAIN_FUNCTIONAL_ASSESSMENT: 0-10

## 2025-01-14 ASSESSMENT — COGNITIVE AND FUNCTIONAL STATUS - GENERAL
TOILETING: TOTAL
DAILY ACTIVITIY SCORE: 9
PERSONAL GROOMING: A LOT
DRESSING REGULAR UPPER BODY CLOTHING: A LOT
HELP NEEDED FOR BATHING: A LOT
DRESSING REGULAR LOWER BODY CLOTHING: TOTAL
EATING MEALS: TOTAL

## 2025-01-14 ASSESSMENT — PAIN SCALES - GENERAL
PAINLEVEL_OUTOF10: 0 - NO PAIN
PAINLEVEL_OUTOF10: 7
PAINLEVEL_OUTOF10: 0 - NO PAIN

## 2025-01-14 ASSESSMENT — PAIN DESCRIPTION - LOCATION: LOCATION: ABDOMEN

## 2025-01-14 NOTE — CARE PLAN
Problem: Pain - Adult  Goal: Verbalizes/displays adequate comfort level or baseline comfort level  Outcome: Progressing     Problem: Safety - Adult  Goal: Free from fall injury  Outcome: Progressing     Problem: Discharge Planning  Goal: Discharge to home or other facility with appropriate resources  Outcome: Progressing     Problem: Chronic Conditions and Co-morbidities  Goal: Patient's chronic conditions and co-morbidity symptoms are monitored and maintained or improved  Outcome: Progressing     Problem: Skin  Goal: Decreased wound size/increased tissue granulation at next dressing change  Outcome: Progressing  Goal: Participates in plan/prevention/treatment measures  Outcome: Progressing  Goal: Prevent/manage excess moisture  Outcome: Progressing  Goal: Prevent/minimize sheer/friction injuries  Outcome: Progressing  Goal: Promote/optimize nutrition  Outcome: Progressing  Goal: Promote skin healing  Outcome: Progressing     Problem: Fall/Injury  Goal: Not fall by end of shift  Outcome: Progressing  Goal: Be free from injury by end of the shift  Outcome: Progressing  Goal: Verbalize understanding of personal risk factors for fall in the hospital  Outcome: Progressing  Goal: Verbalize understanding of risk factor reduction measures to prevent injury from fall in the home  Outcome: Progressing  Goal: Use assistive devices by end of the shift  Outcome: Progressing  Goal: Pace activities to prevent fatigue by end of the shift  Outcome: Progressing     Problem: Pain  Goal: Takes deep breaths with improved pain control throughout the shift  Outcome: Progressing  Goal: Turns in bed with improved pain control throughout the shift  Outcome: Progressing  Goal: Walks with improved pain control throughout the shift  Outcome: Progressing  Goal: Performs ADL's with improved pain control throughout shift  Outcome: Progressing  Goal: Participates in PT with improved pain control throughout the shift  Outcome: Progressing  Goal:  Free from opioid side effects throughout the shift  Outcome: Progressing  Goal: Free from acute confusion related to pain meds throughout the shift  Outcome: Progressing     Problem: Respiratory  Goal: Clear secretions with interventions this shift  Outcome: Progressing  Goal: Minimize anxiety/maximize coping throughout shift  Outcome: Progressing  Goal: Minimal/no exertional discomfort or dyspnea this shift  Outcome: Progressing  Goal: No signs of respiratory distress (eg. Use of accessory muscles. Peds grunting)  Outcome: Progressing  Goal: Patent airway maintained this shift  Outcome: Progressing  Goal: Tolerate mechanical ventilation evidenced by VS/agitation level this shift  Outcome: Progressing  Goal: Tolerate pulmonary toileting this shift  Outcome: Progressing  Goal: Verbalize decreased shortness of breath this shift  Outcome: Progressing  Goal: Wean oxygen to maintain O2 saturation per order/standard this shift  Outcome: Progressing  Goal: Increase self care and/or family involvement in next 24 hours  Outcome: Progressing     Problem: Swallowing  Goal: LTG - Patient will demonstrate safe swallowing Intervention/techniques  Outcome: Progressing
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
## 2025-01-14 NOTE — PROGRESS NOTES
25 1150   Ileostomy Other (Comment) RUQ   Placement Date/Time: 24 1122   Placed by: hilary ZULUAGA  Hand Hygiene Completed: Yes  Ileostomy Type: (c) Other (Comment)  Location: RUQ   Stomal Appliance 2 piece;Changed   Site/Stoma Assessment Clean;Intact;Red   Stoma Size (cm)   (1 1/2 round)   Peristomal Assessment Clean;Intact   Treatment Pouch change;Site care     Ostomy type: jejunostomy and mucus fistula  size: 1 1/2 round   Color: red and moist    Protruding: budded   Ricky: none   Functioning: loose green effluent   Mucocutaneous junction: intact   Peristomal skin: fragile, clean, dry and intact   Pouchin piece Donalds flat wafer and high output pouch + barrier ring
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
## 2025-01-14 NOTE — PROGRESS NOTES
Occupational Therapy    OT Treatment    Patient Name: Ike Gar  MRN: [id number]  Department: [facility]  Room: 16/16A  Today's Date: 1/14/2025  Time Calculation  Start Time: 1057  Stop Time: 1155  Time Calculation (min): 58 min      Assessment:  Barriers to Discharge Home: Caregiver assistance, Physical needs, Cognition needs  Caregiver Assistance: Caregiver assistance needed per identified barriers - however, level of patient's required assistance exceeds assistance available at home  Cognition Needs: 24hr supervision for safety awareness needed  Physical Needs: 24hr mobility assistance needed, 24hr ADL assistance needed  End of Session Communication: Bedside nurse  End of Session Patient Position: Bed, 3 rail up, Alarm off, not on at start of session     Plan:  Treatment Interventions: ADL retraining, Functional transfer training, UE strengthening/ROM, Cognitive reorientation, Patient/family training, Equipment evaluation/education, Neuromuscular reeducation, Compensatory technique education  OT Frequency: 3 times per week  OT Discharge Recommendations: Moderate intensity level of continued care  Equipment Recommended upon Discharge:  (TBD)  OT Recommended Transfer Status: Dependent  OT - OK to Discharge: Yes  Treatment Interventions: ADL retraining, Functional transfer training, UE strengthening/ROM, Cognitive reorientation, Patient/family training, Equipment evaluation/education, Neuromuscular reeducation, Compensatory technique education    Subjective   Previous Visit Info:  OT Last Visit  OT Received On: 01/14/25    General:  General  Family/Caregiver Present: Yes  Caregiver Feedback: Son at bedside, supportive throughout.  Co-Treatment:  (Rehab aide assisted with session.)  Prior to Session Communication: Bedside nurse  Patient Position Received: Bed, 3 rail up, Alarm off, not on at start of session  General Comment: Pt supine in bed on arrival, agreeable to participate but reporting dizziness at rest and  with activity. CPAP 50% FiO2, PEEP 5, PS 8.    Precautions:  Medical Precautions: Fall precautions, Spinal precautions, Oxygen therapy device and L/min  Post-Surgical Precautions: Abdominal surgery precautions  Braces Applied: TLSO needed for mobility - ABD binder on to protect skin/incisions due to multiple drains and ostomy  Precautions Comment: MITT precautions, SBP >90, SpO2 >92%    Vitals    01/14/25 1057 01/14/25 1100 01/14/25 1155   Vital Signs   Vitals Session Pre OT  --  Post OT   Heart Rate 81 93 89   Resp 19 16 24   SpO2 96 % 96 % 97 %   /76 141/72 (!) 154/107   MAP (mmHg) 94 92 123   Vital Signs Comment  --   --  During: VSS throughout session however pt did report persistent dizziness. RN aware.       Vital Signs Comment: During: VSS throughout session however pt did report persistent dizziness. RN aware.     Pain:  Pain Assessment  Pain Assessment:  (pain from brace, not quantified)    Objective    Cognition:  Cognition  Overall Cognitive Status: Impaired  Orientation Level:  (Oriented x3)  Following Commands: Follows one step commands without difficulty  Cognition Comments: Some frustration with mouthing words to communicate     Activities of Daily Living:   UE Dressing  UE Dressing Level of Assistance: Maximum assistance  UE Dressing Where Assessed: Bed level  UE Dressing Comments: Doff old gown and don new gown under TLSO     Bed Mobility/Transfers: Bed Mobility  Bed Mobility: Yes  Bed Mobility 1  Bed Mobility 1: Supine to sitting  Level of Assistance 1: Maximum assistance, +2  Bed Mobility Comments 1: draw sheet, HOB elevated, log roll  Bed Mobility 2  Bed Mobility  2: Sitting to supine  Level of Assistance 2: Dependent, +2  Bed Mobility Comments 2: draw sheet, log roll    Transfers  Transfer: Yes  Transfer 1  Transfer From 1: Sit to  Transfer to 1: Stand  Transfer Level of Assistance 1: Maximum assistance, +2  Trials/Comments 1: bilat arm in arm assist, draw sheet under  hips    Therapy/Activity: Therapeutic Exercise  Therapeutic Exercise Performed: Yes  Therapeutic Exercise Activity 1: Seated EOB: 1 set 7 repetitions AAROM shoulder flexion to 90 degrees, ankle pumps, and LAQs    Therapeutic Activity  Therapeutic Activity Performed: Yes  Therapeutic Activity 1: pt tolerated 30 minutes seated EOB with CGA for safety. Flexed posture  Therapeutic Activity 2: Increased time adjusting TLSO and donning new gown at start of session  Therapeutic Activity 3: Tolerated x2 static standing trials, 5 seconds trial 1 and 12 seconds trial 2 with mod A x2.  Therapeutic Activity 4: Educated pt on importance of self advocating for needs. Gestured understanding.    Outcome Measures:[facility] Daily Activity  Putting on and taking off regular lower body clothing: Total  Bathing (including washing, rinsing, drying): A lot  Putting on and taking off regular upper body clothing: A lot  Toileting, which includes using toilet, bedpan or urinal: Total  Taking care of personal grooming such as brushing teeth: A lot  Eating Meals: Total  Daily Activity - Total Score: 9    Education Documentation  Body Mechanics, taught by Radha Foster OT at 1/14/2025  3:56 PM.  Learner: Patient  Readiness: Acceptance  Method: Explanation, Demonstration  Response: Verbalizes Understanding    Precautions, taught by Radha Foster OT at 1/14/2025  3:56 PM.  Learner: Patient  Readiness: Acceptance  Method: Explanation, Demonstration  Response: Verbalizes Understanding    ADL Training, taught by Radha Foster OT at 1/14/2025  3:56 PM.  Learner: Patient  Readiness: Acceptance  Method: Explanation, Demonstration  Response: Verbalizes Understanding    Education Comments  No comments found.      OP EDUCATION:     Goals:  Encounter Problems       Encounter Problems (Active)       ADLs       Patient will complete daily grooming tasks  with set-up level of assistance and PRN adaptive equipment while  supported sitting. (Progressing)       Start:  12/30/24    Expected End:  01/20/25               BALANCE       Pt will maintain dynamic sitting  balance during ADL task with moderate assist level of assistance in order to demonstrate decreased risk of falling and improved postural control. (Progressing)       Start:  12/30/24    Expected End:  01/20/25               COGNITION/SAFETY       Patient will recall and adhere to spinal, MITT and abdominal precautions during all functional mobility/ADL tasks in order to demonstrate improved understanding and promote healing post op (Progressing)       Start:  12/30/24    Expected End:  01/20/25            Patient will score WFL on standardized cognitive assessment with visual cues and within reasonable time frame (Progressing)       Start:  12/30/24    Expected End:  01/20/25            Patient will follow >75% Simple commands to allow improved ADL performance. (Progressing)       Start:  12/30/24    Expected End:  01/20/25               TRANSFERS       Patient will perform bed mobility moderate assist level of assistance and bed rails and draw sheet in order to improve safety and independence with mobility (Progressing)       Start:  12/30/24    Expected End:  01/20/25            Patient will complete functional transfer to chair/BSC with least restrictive device with moderate assist level of assistance. (Progressing)       Start:  12/30/24    Expected End:  01/20/25                 Radha Foster OTR/L   1/14/2025

## 2025-01-15 ENCOUNTER — APPOINTMENT (OUTPATIENT)
Dept: RADIOLOGY | Facility: HOSPITAL | Age: 80
End: 2025-01-15
Payer: MEDICARE

## 2025-01-15 ENCOUNTER — ANESTHESIA (OUTPATIENT)
Dept: OPERATING ROOM | Facility: HOSPITAL | Age: 80
End: 2025-01-15
Payer: MEDICARE

## 2025-01-15 LAB
ABO GROUP (TYPE) IN BLOOD: NORMAL
ALBUMIN SERPL BCP-MCNC: 1.9 G/DL (ref 3.4–5)
ANION GAP SERPL CALC-SCNC: 16 MMOL/L (ref 10–20)
ANTIBODY SCREEN: NORMAL
APTT PPP: 27 SECONDS (ref 27–38)
ATRIAL RATE: 98 BPM
BUN SERPL-MCNC: 37 MG/DL (ref 6–23)
CALCIUM SERPL-MCNC: 7.4 MG/DL (ref 8.6–10.6)
CHLORIDE SERPL-SCNC: 98 MMOL/L (ref 98–107)
CLARITY FLD: ABNORMAL
CO2 SERPL-SCNC: 24 MMOL/L (ref 21–32)
COLOR FLD: ABNORMAL
CREAT SERPL-MCNC: 4.63 MG/DL (ref 0.5–1.3)
EGFRCR SERPLBLD CKD-EPI 2021: 12 ML/MIN/1.73M*2
ERYTHROCYTE [DISTWIDTH] IN BLOOD BY AUTOMATED COUNT: 15.7 % (ref 11.5–14.5)
GLUCOSE BLD MANUAL STRIP-MCNC: 100 MG/DL (ref 74–99)
GLUCOSE BLD MANUAL STRIP-MCNC: 102 MG/DL (ref 74–99)
GLUCOSE BLD MANUAL STRIP-MCNC: 91 MG/DL (ref 74–99)
GLUCOSE BLD MANUAL STRIP-MCNC: 97 MG/DL (ref 74–99)
GLUCOSE BLD MANUAL STRIP-MCNC: 98 MG/DL (ref 74–99)
GLUCOSE BLD MANUAL STRIP-MCNC: 98 MG/DL (ref 74–99)
GLUCOSE SERPL-MCNC: 94 MG/DL (ref 74–99)
HCT VFR BLD AUTO: 22.5 % (ref 41–52)
HGB BLD-MCNC: 7.3 G/DL (ref 13.5–17.5)
INR PPP: 1.2 (ref 0.9–1.1)
MAGNESIUM SERPL-MCNC: 2.01 MG/DL (ref 1.6–2.4)
MCH RBC QN AUTO: 30 PG (ref 26–34)
MCHC RBC AUTO-ENTMCNC: 32.4 G/DL (ref 32–36)
MCV RBC AUTO: 93 FL (ref 80–100)
NRBC BLD-RTO: 0 /100 WBCS (ref 0–0)
P AXIS: 66 DEGREES
P OFFSET: 205 MS
P ONSET: 143 MS
PHOSPHATE SERPL-MCNC: 4.7 MG/DL (ref 2.5–4.9)
PLATELET # BLD AUTO: 462 X10*3/UL (ref 150–450)
POTASSIUM SERPL-SCNC: 3.8 MMOL/L (ref 3.5–5.3)
PR INTERVAL: 158 MS
PROTHROMBIN TIME: 13.6 SECONDS (ref 9.8–12.8)
Q ONSET: 222 MS
QRS COUNT: 16 BEATS
QRS DURATION: 74 MS
QT INTERVAL: 298 MS
QTC CALCULATION(BAZETT): 380 MS
QTC FREDERICIA: 351 MS
R AXIS: 16 DEGREES
RBC # BLD AUTO: 2.43 X10*6/UL (ref 4.5–5.9)
RBC # FLD MANUAL: ABNORMAL /UL
RH FACTOR (ANTIGEN D): NORMAL
SODIUM SERPL-SCNC: 134 MMOL/L (ref 136–145)
T AXIS: 51 DEGREES
T OFFSET: 371 MS
TOTAL CELLS COUNTED FLD: 0
VENTRICULAR RATE: 98 BPM
WBC # BLD AUTO: 16.7 X10*3/UL (ref 4.4–11.3)
WBC # FLD MANUAL: 1030 /UL

## 2025-01-15 PROCEDURE — 2500000004 HC RX 250 GENERAL PHARMACY W/ HCPCS (ALT 636 FOR OP/ED): Performed by: NURSE PRACTITIONER

## 2025-01-15 PROCEDURE — 89051 BODY FLUID CELL COUNT: CPT

## 2025-01-15 PROCEDURE — 2500000004 HC RX 250 GENERAL PHARMACY W/ HCPCS (ALT 636 FOR OP/ED): Performed by: STUDENT IN AN ORGANIZED HEALTH CARE EDUCATION/TRAINING PROGRAM

## 2025-01-15 PROCEDURE — 92597 ORAL SPEECH DEVICE EVAL: CPT | Mod: GN | Performed by: SPEECH-LANGUAGE PATHOLOGIST

## 2025-01-15 PROCEDURE — C1769 GUIDE WIRE: HCPCS

## 2025-01-15 PROCEDURE — 80069 RENAL FUNCTION PANEL: CPT

## 2025-01-15 PROCEDURE — 99152 MOD SED SAME PHYS/QHP 5/>YRS: CPT

## 2025-01-15 PROCEDURE — 2580000001 HC RX 258 IV SOLUTIONS

## 2025-01-15 PROCEDURE — 71045 X-RAY EXAM CHEST 1 VIEW: CPT | Performed by: RADIOLOGY

## 2025-01-15 PROCEDURE — L8501 TRACHEOSTOMY SPEAKING VALVE: HCPCS | Performed by: SPEECH-LANGUAGE PATHOLOGIST

## 2025-01-15 PROCEDURE — 99291 CRITICAL CARE FIRST HOUR: CPT | Performed by: NURSE PRACTITIONER

## 2025-01-15 PROCEDURE — 49406 IMAGE CATH FLUID PERI/RETRO: CPT

## 2025-01-15 PROCEDURE — 2020000001 HC ICU ROOM DAILY

## 2025-01-15 PROCEDURE — 87186 SC STD MICRODIL/AGAR DIL: CPT

## 2025-01-15 PROCEDURE — 2500000004 HC RX 250 GENERAL PHARMACY W/ HCPCS (ALT 636 FOR OP/ED)

## 2025-01-15 PROCEDURE — 97530 THERAPEUTIC ACTIVITIES: CPT | Mod: GP

## 2025-01-15 PROCEDURE — 99232 SBSQ HOSP IP/OBS MODERATE 35: CPT | Performed by: STUDENT IN AN ORGANIZED HEALTH CARE EDUCATION/TRAINING PROGRAM

## 2025-01-15 PROCEDURE — C1729 CATH, DRAINAGE: HCPCS

## 2025-01-15 PROCEDURE — 82947 ASSAY GLUCOSE BLOOD QUANT: CPT

## 2025-01-15 PROCEDURE — 2500000005 HC RX 250 GENERAL PHARMACY W/O HCPCS

## 2025-01-15 PROCEDURE — 86901 BLOOD TYPING SEROLOGIC RH(D): CPT

## 2025-01-15 PROCEDURE — 2500000004 HC RX 250 GENERAL PHARMACY W/ HCPCS (ALT 636 FOR OP/ED): Mod: TB | Performed by: STUDENT IN AN ORGANIZED HEALTH CARE EDUCATION/TRAINING PROGRAM

## 2025-01-15 PROCEDURE — 85027 COMPLETE CBC AUTOMATED: CPT

## 2025-01-15 PROCEDURE — 71045 X-RAY EXAM CHEST 1 VIEW: CPT

## 2025-01-15 PROCEDURE — 37799 UNLISTED PX VASCULAR SURGERY: CPT

## 2025-01-15 PROCEDURE — 83735 ASSAY OF MAGNESIUM: CPT

## 2025-01-15 PROCEDURE — 85610 PROTHROMBIN TIME: CPT

## 2025-01-15 PROCEDURE — 2500000004 HC RX 250 GENERAL PHARMACY W/ HCPCS (ALT 636 FOR OP/ED): Performed by: RADIOLOGY

## 2025-01-15 PROCEDURE — 94003 VENT MGMT INPAT SUBQ DAY: CPT

## 2025-01-15 PROCEDURE — 2720000007 HC OR 272 NO HCPCS

## 2025-01-15 PROCEDURE — 0W9F3ZZ DRAINAGE OF ABDOMINAL WALL, PERCUTANEOUS APPROACH: ICD-10-PCS | Performed by: RADIOLOGY

## 2025-01-15 PROCEDURE — 99233 SBSQ HOSP IP/OBS HIGH 50: CPT

## 2025-01-15 RX ORDER — HYDRALAZINE HYDROCHLORIDE 20 MG/ML
5 INJECTION INTRAMUSCULAR; INTRAVENOUS EVERY 6 HOURS PRN
Status: DISCONTINUED | OUTPATIENT
Start: 2025-01-15 | End: 2025-01-18 | Stop reason: HOSPADM

## 2025-01-15 RX ORDER — FENTANYL CITRATE 50 UG/ML
INJECTION, SOLUTION INTRAMUSCULAR; INTRAVENOUS
Status: COMPLETED | OUTPATIENT
Start: 2025-01-15 | End: 2025-01-15

## 2025-01-15 RX ORDER — MIDAZOLAM HYDROCHLORIDE 1 MG/ML
INJECTION INTRAMUSCULAR; INTRAVENOUS
Status: COMPLETED | OUTPATIENT
Start: 2025-01-15 | End: 2025-01-15

## 2025-01-15 RX ORDER — LABETALOL HYDROCHLORIDE 5 MG/ML
10 INJECTION, SOLUTION INTRAVENOUS ONCE
Status: COMPLETED | OUTPATIENT
Start: 2025-01-15 | End: 2025-01-15

## 2025-01-15 RX ORDER — HYDRALAZINE HYDROCHLORIDE 20 MG/ML
10 INJECTION INTRAMUSCULAR; INTRAVENOUS ONCE
Status: COMPLETED | OUTPATIENT
Start: 2025-01-15 | End: 2025-01-15

## 2025-01-15 RX ADMIN — HYDRALAZINE HYDROCHLORIDE 5 MG: 20 INJECTION INTRAMUSCULAR; INTRAVENOUS at 18:50

## 2025-01-15 RX ADMIN — MIDAZOLAM HYDROCHLORIDE 1 MG: 1 INJECTION, SOLUTION INTRAMUSCULAR; INTRAVENOUS at 12:51

## 2025-01-15 RX ADMIN — LEUCINE, PHENYLALANINE, LYSINE HYDROCHLORIDE, METHIONINE, ISOLEUCINE, VALINE, HISTIDINE, THREONINE, TRYPTOPHAN, ALANINE, GLYCINE, ARGININE, PROLINE, TYROSINE, SERINE 25 G: 730; 560; 580; 400; 600; 580; 480; 420; 180; 2.07; 1.03; 1.15; 680; 40; 5 INJECTION INTRAVENOUS at 09:00

## 2025-01-15 RX ADMIN — PIPERACILLIN SODIUM AND TAZOBACTAM SODIUM 2.25 G: 2; .25 INJECTION, SOLUTION INTRAVENOUS at 13:43

## 2025-01-15 RX ADMIN — HEPARIN SODIUM 5000 UNITS: 5000 INJECTION INTRAVENOUS; SUBCUTANEOUS at 04:52

## 2025-01-15 RX ADMIN — FENTANYL CITRATE 50 MCG: 50 INJECTION, SOLUTION INTRAMUSCULAR; INTRAVENOUS at 12:44

## 2025-01-15 RX ADMIN — HYDROMORPHONE HYDROCHLORIDE 0.4 MG: 0.5 INJECTION, SOLUTION INTRAMUSCULAR; INTRAVENOUS; SUBCUTANEOUS at 16:00

## 2025-01-15 RX ADMIN — FENTANYL CITRATE 50 MCG: 50 INJECTION, SOLUTION INTRAMUSCULAR; INTRAVENOUS at 12:51

## 2025-01-15 RX ADMIN — PIPERACILLIN SODIUM AND TAZOBACTAM SODIUM 2.25 G: 2; .25 INJECTION, SOLUTION INTRAVENOUS at 21:23

## 2025-01-15 RX ADMIN — SMOFLIPID 50 G: 6; 6; 5; 3 INJECTION, EMULSION INTRAVENOUS at 20:27

## 2025-01-15 RX ADMIN — HEPARIN SODIUM 5000 UNITS: 5000 INJECTION INTRAVENOUS; SUBCUTANEOUS at 13:43

## 2025-01-15 RX ADMIN — LABETALOL HYDROCHLORIDE 10 MG: 5 INJECTION, SOLUTION INTRAVENOUS at 20:37

## 2025-01-15 RX ADMIN — MIDAZOLAM HYDROCHLORIDE 1 MG: 1 INJECTION, SOLUTION INTRAMUSCULAR; INTRAVENOUS at 12:45

## 2025-01-15 RX ADMIN — PANTOPRAZOLE SODIUM 40 MG: 40 INJECTION, POWDER, FOR SOLUTION INTRAVENOUS at 08:16

## 2025-01-15 RX ADMIN — PIPERACILLIN SODIUM AND TAZOBACTAM SODIUM 2.25 G: 2; .25 INJECTION, SOLUTION INTRAVENOUS at 06:38

## 2025-01-15 RX ADMIN — HYDRALAZINE HYDROCHLORIDE 10 MG: 20 INJECTION INTRAMUSCULAR; INTRAVENOUS at 02:48

## 2025-01-15 RX ADMIN — HYDROMORPHONE HYDROCHLORIDE 0.4 MG: 0.5 INJECTION, SOLUTION INTRAMUSCULAR; INTRAVENOUS; SUBCUTANEOUS at 04:52

## 2025-01-15 RX ADMIN — FLUCONAZOLE 200 MG: 2 INJECTION, SOLUTION INTRAVENOUS at 20:26

## 2025-01-15 RX ADMIN — HYDROMORPHONE HYDROCHLORIDE 0.4 MG: 0.5 INJECTION, SOLUTION INTRAMUSCULAR; INTRAVENOUS; SUBCUTANEOUS at 09:58

## 2025-01-15 RX ADMIN — HEPARIN SODIUM 5000 UNITS: 5000 INJECTION INTRAVENOUS; SUBCUTANEOUS at 20:26

## 2025-01-15 RX ADMIN — ASCORBIC ACID, VITAMIN A PALMITATE, CHOLECALCIFEROL, THIAMINE HYDROCHLORIDE, RIBOFLAVIN-5 PHOSPHATE SODIUM, PYRIDOXINE HYDROCHLORIDE, NIACINAMIDE, DEXPANTHENOL, ALPHA-TOCOPHEROL ACETATE, VITAMIN K1, FOLIC ACID, BIOTIN, CYANOCOBALAMIN: 200; 3300; 200; 6; 3.6; 6; 40; 15; 10; 150; 600; 60; 5 INJECTION, SOLUTION INTRAVENOUS at 20:26

## 2025-01-15 ASSESSMENT — COGNITIVE AND FUNCTIONAL STATUS - GENERAL
WALKING IN HOSPITAL ROOM: TOTAL
STANDING UP FROM CHAIR USING ARMS: TOTAL
MOVING FROM LYING ON BACK TO SITTING ON SIDE OF FLAT BED WITH BEDRAILS: A LOT
MOBILITY SCORE: 7
TURNING FROM BACK TO SIDE WHILE IN FLAT BAD: TOTAL
MOVING TO AND FROM BED TO CHAIR: TOTAL
CLIMB 3 TO 5 STEPS WITH RAILING: TOTAL

## 2025-01-15 ASSESSMENT — PAIN SCALES - GENERAL
PAINLEVEL_OUTOF10: 2
PAINLEVEL_OUTOF10: 2
PAINLEVEL_OUTOF10: 0 - NO PAIN
PAINLEVEL_OUTOF10: 0 - NO PAIN
PAINLEVEL_OUTOF10: 2
PAINLEVEL_OUTOF10: 0 - NO PAIN
PAINLEVEL_OUTOF10: 0 - NO PAIN
PAINLEVEL_OUTOF10: 7
PAINLEVEL_OUTOF10: 3
PAINLEVEL_OUTOF10: 2
PAINLEVEL_OUTOF10: 0 - NO PAIN
PAINLEVEL_OUTOF10: 7
PAINLEVEL_OUTOF10: 0 - NO PAIN
PAINLEVEL_OUTOF10: 7
PAINLEVEL_OUTOF10: 2
PAINLEVEL_OUTOF10: 0 - NO PAIN

## 2025-01-15 ASSESSMENT — PAIN DESCRIPTION - LOCATION: LOCATION: ABDOMEN

## 2025-01-15 ASSESSMENT — PAIN - FUNCTIONAL ASSESSMENT
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
## 2025-01-15 NOTE — PROGRESS NOTES
Speech-Language Pathology    SLP Adult Inpatient Speech-Language Cognition    Patient Name: Ike Gar  MRN: [id number]  Today's Date: 1/15/2025   Time Calculation  Start Time: 1040  Stop Time: 1106  Time Calculation (min): 26 min        Assessment:  #aphonia  - Pt demonstrated toleration of cuff deflation while on CPAP with Vent. Achieving excellent phonation with 'leak' speech without PMV, while sitting EOB with physical therapy. Communicating wants/needs with staff with no discomfort.   - will progress to donning PMV as pt can tolerate. PMV with SLP or RT only at this time.       SLP Plan:  Plan  SLP TX Plan:  (cognitive evaluation as able; swallow evaluation as resp status improves)  SLP Plan: Skilled SLP  SLP Frequency: 2x per week  Duration: 2 weeks  SLP Discharge Recommendations: Continue skilled SLP services at the next level of care  SLP - OK to Discharge: Yes      Subjective   Pt working with PT during session. Setting EOB with support. Pleasant and motivated during visit.   TF via PEG    Most Recent Visit:  SLP Most Recent Visit  SLP Received On: 01/15/25      General Visit Information:  General Information  Chart Reviewed: Yes  Arrival: Independent  Reason for Referral:  (PMV evaluation)  Patient Seen During This Visit: Yes  Co-Treatment Reason:  (PT present setting pt EOB to faciliate communcation of wants/needs with staff.)  Prior to Session Communication: Bedside nurse      Objective       Tracheostomy:  Tracheostomy  Tracheostomy: Yes  Tracheostomy Type: Won  Tracheostomy Size (mm): 6 mm  Speaking Valve Type: PMV-007 (Aqua) inline  Cuffed or Uncuffed: Cuffed  Tracheostomy Cuffed: Inflated      Passy-Gaby Speaking Valve:  Passy-Gaby Speaking Valve  Passy-Gaby Speaking Valve Type: PMV-007 (Aqua) Inline  PMV Placement Recommendations:  (speech and RT only)  Passy-Lemmon Valve Comment: Leak speech only at this time post cuff deflate. In-line suction was provided retrieving small volume of secretions.  Suctioning also provided from thick mucous around trach site from PT. Phonation and breath support were sufficient during session for communication with staff. expressing wants and needs and showing understanding of time, situation, and place. Noted strong cough when prompted. no changes in O2 status though some discomfort when cuff was re-inflated. RT was notified of session results.      Ventilator:  Ventilator  Vent Mode: CPAP  Vent Settings: 5 PEEP/ 5 PS       Motor Speech Production:  Motor Speech Production  Oral Motor : WFL      Encounter Problems       Encounter Problems (Active)       Swallowing       LTG - Patient will demonstrate safe swallowing Intervention/techniques (Progressing)       Start:  12/31/24    Expected End:  01/06/25            SLP Goal 1       Start:  12/31/24    Expected End:  01/06/25       STG - Patient will tolerate therapeutic trials of recommended consistency without clinical signs and symptoms of aspiration on 100% of trials                    Education:  Individual(s) Educated: Patient  Verbal Education : yes  Risk and Benefits Discussed with Patient/Caregiver/Other: yes  Patient/Caregiver Demonstrated Understanding: yes  Plan of Care Discussed and Agreed Upon: yes  Patient Response to Education: Patient/Caregiver Verbalized Understanding of Information, Patient/Caregiver Asked Appropriate Questions    - Guidelines poster regarding PMV use with cuff deflation.

## 2025-01-15 NOTE — CARE PLAN
Problem: Skin  Goal: Decreased wound size/increased tissue granulation at next dressing change  Outcome: Progressing  Flowsheets (Taken 1/2/2025 2329 by Bia Meehan, RN)  Decreased wound size/increased tissue granulation at next dressing change:   Promote sleep for wound healing   Protective dressings over bony prominences   Utilize specialty bed per algorithm  Goal: Participates in plan/prevention/treatment measures  Outcome: Progressing  Flowsheets (Taken 1/2/2025 2329 by Bia Meehan, RN)  Participates in plan/prevention/treatment measures:   Discuss with provider PT/OT consult   Elevate heels   Increase activity/out of bed for meals  Goal: Prevent/manage excess moisture  Outcome: Progressing  Flowsheets (Taken 1/2/2025 2329 by Bia Meehan, RN)  Prevent/manage excess moisture:   Cleanse incontinence/protect with barrier cream   Monitor for/manage infection if present   Follow provider orders for dressing changes   Moisturize dry skin  Goal: Prevent/minimize sheer/friction injuries  Outcome: Progressing  Flowsheets (Taken 1/2/2025 2329 by Bia Meehan, RN)  Prevent/minimize sheer/friction injuries:   Complete micro-shifts as needed if patient unable. Adjust patient position to relieve pressure points, not a full turn   Increase activity/out of bed for meals   Use pull sheet   HOB 30 degrees or less   Turn/reposition every 2 hours/use positioning/transfer devices   Utilize specialty bed per algorithm  Goal: Promote/optimize nutrition  Outcome: Progressing  Flowsheets (Taken 1/2/2025 2329 by Bia Meehan RN)  Promote/optimize nutrition:   Discuss with provider if NPO > 2 days   Reassess MST if dietician not consulted  Goal: Promote skin healing  Outcome: Progressing  Flowsheets (Taken 1/9/2025 0927 by Saira Mcintyre RN)  Promote skin healing: Turn/reposition every 2 hours/use positioning/transfer devices   The patient's goals for the shift include  comfort    The clinical goals for the shift include safety

## 2025-01-15 NOTE — PROGRESS NOTES
Physical Therapy    Physical Therapy Treatment    Patient Name: Ike Gar  MRN: [id number]  Department:   Room: [address] [place] [address]  Today's Date: 1/15/2025  Time Calculation  Start Time: 1005  Stop Time: 1114  Time Calculation (min): 69 min         Assessment/Plan   PT Assessment  End of Session Communication: Bedside nurse  Assessment Comment: Pt tolerated sitting EOB ~30 minutes this session with CGA, pt with copious secretions t/o session requiring frequent deep and oral suctionng. While sitting EOB, SLP present to trial speaking valve. Pt tolerated session well, returned to bed at end of session for transportation to IR.  End of Session Patient Position: Bed, 3 rail up, Alarm off, not on at start of session     PT Plan  Treatment/Interventions: Bed mobility, Transfer training, Gait training, Balance training, Neuromuscular re-education, Strengthening, Endurance training, Range of motion, Therapeutic exercise, Home exercise program, Therapeutic activity, Positioning, Postural re-education, Orthotic fitting/training  PT Plan: Ongoing PT  PT Frequency: 3 times per week  PT Discharge Recommendations: Moderate intensity level of continued care  PT Recommended Transfer Status: Total assist  PT - OK to Discharge: Yes      General Visit Information:   PT  Visit  PT Received On: 01/15/25  General  Co-Treatment: SLP  Co-Treatment Reason: partial co-treat with SLP while pt sitting EOB for donning speaking valve  Prior to Session Communication: Bedside nurse  Patient Position Received: Bed, 3 rail up, Alarm off, not on at start of session  General Comment: Pt supine in bed, agreeable to PT. TLSO doffed, trach to vent CPAP, 50% FiO2, 5 PEEP, 5 Psupp    Subjective   Precautions:  Precautions  Medical Precautions: Fall precautions, Spinal precautions, Oxygen therapy device and L/min  Post-Surgical Precautions: Abdominal surgery precautions  Braces Applied: TLSO needed for mobility - ABD binder on to protect skin/incisions due to multiple  drains and ostomy  Precautions Comment: MITT precautions, SBP >90, SpO2 >92%       01/15/25 1005 01/15/25 1100 01/15/25 1114   Vital Signs   Vitals Session Pre PT  --  Post PT   Heart Rate (!) 116 109 98   Resp 22 24 18   SpO2 95 % 94 % 96 %   BP (!) 145/120 (!) 140/126 (!) 185/101   MAP (mmHg) 130 133 126             Objective   Pain:  Pain Assessment  Pain Assessment: 0-10  0-10 (Numeric) Pain Score:  (unrated, reporting abdominal pain)  Cognition:  Cognition  Overall Cognitive Status: Impaired  Orientation Level: Oriented X4  Following Commands: Follows multistep commands without difficulty  Processing Speed: Delayed    Activity Tolerance:  Activity Tolerance  Endurance: Tolerates 30 min exercise with multiple rests  Early Mobility/Exercise Safety Screen: Proceed with mobilization - No exclusion criteria met  Treatments:       Therapeutic Activity  Therapeutic Activity Performed: Yes  Therapeutic Activity 1: doffing soiled gown and abdominal binder, donning new gown, binder, and TLSO via rolling prior to mobilization to EOB. Pt required rest breaks between rolling bouts to manage copious secretions via deep suctioning 2/2 reported SOB and and desats to ~92%  Therapeutic Activity 2: Pt tolerated sitting EOB ~30 minutes with CGA, intermittent assist to manage secretions, SLP present for speaking valve trial         Bed Mobility  Bed Mobility: Yes  Bed Mobility 1  Bed Mobility 1: Supine to sitting, Sitting to supine  Level of Assistance 1: Maximum assistance (x2)  Bed Mobility Comments 1: draw sheet, HOB elevated       Transfers  Transfer: No         Outcome Measures:  [facility] Basic Mobility  Turning from your back to your side while in a flat bed without using bedrails: A lot  Moving from lying on your back to sitting on the side of a flat bed without using bedrails: Total  Moving to and from bed to chair (including a wheelchair): Total  Standing up from a chair using your arms (e.g. wheelchair or bedside chair):  Total  To walk in hospital room: Total  Climbing 3-5 steps with railing: Total  Basic Mobility - Total Score: 7    FSS-ICU  Ambulation: Unable to attempt due to weakness  Rolling: Maximal assistance (performs 25% - 49% of task)  Sitting: Minimal assistance (performs 75% or more of task)  Transfer Sit-to-Stand: Total assistance (performs 25% or requires another person)  Transfer Supine-to-Sit: Total assistance (performs 25% or requires another person)  Total Score: 8      Early Mobility/Exercise Safety Screen: Proceed with mobilization - No exclusion criteria met  ICU Mobility Scale: Sitting over edge of bed [3]    Education Documentation  Precautions, taught by Laura Gallagher PT at 1/15/2025 12:33 PM.  Learner: Patient  Readiness: Acceptance  Method: Explanation  Response: Verbalizes Understanding  Comment: PT POC, pulmonary toileting, activity tolerance    Body Mechanics, taught by Laura Gallagher PT at 1/15/2025 12:33 PM.  Learner: Patient  Readiness: Acceptance  Method: Explanation  Response: Verbalizes Understanding  Comment: PT POC, pulmonary toileting, activity tolerance    Mobility Training, taught by Laura Gallagher PT at 1/15/2025 12:33 PM.  Learner: Patient  Readiness: Acceptance  Method: Explanation  Response: Verbalizes Understanding  Comment: PT POC, pulmonary toileting, activity tolerance    Education Comments  No comments found.        OP EDUCATION:       Encounter Problems       Encounter Problems (Active)       Balance       STG - Maintains dynamic standing balance with upper extremity support on LRAD with Mod A x1  (Progressing)       Start:  12/30/24    Expected End:  01/20/25            STG - Maintains dynamic sitting balance without upper extremity support with Min A  (Progressing)       Start:  12/30/24    Expected End:  01/20/25               Mobility       STG - Patient will ambulate x10 steps to assist with transfers with Max A x1 using LRAD (Not Progressing)       Start:  12/30/24     Expected End:  01/20/25               PT Transfers       STG - Patient will perform bed mobility with Mod A  (Progressing)       Start:  12/30/24    Expected End:  01/20/25            STG - Patient will transfer sit to and from stand with Mod A using LRAD (Progressing)       Start:  12/30/24    Expected End:  01/20/25            Bilat LE strength grossly >/= 3+/5 (Progressing)       Start:  12/30/24    Expected End:  01/20/25               Pain - Adult                    Laura Gallagher, PT
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
## 2025-01-15 NOTE — PROGRESS NOTES
LSW consulted with medical and the patient is medically ready for discharge. TESSYW sent [facility] the message and they will initiate the pre-cert in the morning.

## 2025-01-16 ENCOUNTER — APPOINTMENT (OUTPATIENT)
Dept: RADIOLOGY | Facility: HOSPITAL | Age: 80
End: 2025-01-16
Payer: MEDICARE

## 2025-01-16 LAB
ALBUMIN SERPL BCP-MCNC: 1.9 G/DL (ref 3.4–5)
ANION GAP SERPL CALC-SCNC: 14 MMOL/L (ref 10–20)
APTT PPP: 26 SECONDS (ref 27–38)
ATRIAL RATE: 340 BPM
BUN SERPL-MCNC: 47 MG/DL (ref 6–23)
CALCIUM SERPL-MCNC: 7.7 MG/DL (ref 8.6–10.6)
CHLORIDE SERPL-SCNC: 98 MMOL/L (ref 98–107)
CO2 SERPL-SCNC: 25 MMOL/L (ref 21–32)
CREAT SERPL-MCNC: 5.09 MG/DL (ref 0.5–1.3)
EGFRCR SERPLBLD CKD-EPI 2021: 11 ML/MIN/1.73M*2
ERYTHROCYTE [DISTWIDTH] IN BLOOD BY AUTOMATED COUNT: 15.9 % (ref 11.5–14.5)
GLUCOSE BLD MANUAL STRIP-MCNC: 105 MG/DL (ref 74–99)
GLUCOSE BLD MANUAL STRIP-MCNC: 108 MG/DL (ref 74–99)
GLUCOSE BLD MANUAL STRIP-MCNC: 109 MG/DL (ref 74–99)
GLUCOSE BLD MANUAL STRIP-MCNC: 114 MG/DL (ref 74–99)
GLUCOSE BLD MANUAL STRIP-MCNC: 114 MG/DL (ref 74–99)
GLUCOSE SERPL-MCNC: 102 MG/DL (ref 74–99)
HCT VFR BLD AUTO: 22.9 % (ref 41–52)
HGB BLD-MCNC: 7.8 G/DL (ref 13.5–17.5)
INR PPP: 1.2 (ref 0.9–1.1)
MAGNESIUM SERPL-MCNC: 1.96 MG/DL (ref 1.6–2.4)
MCH RBC QN AUTO: 29.4 PG (ref 26–34)
MCHC RBC AUTO-ENTMCNC: 34.1 G/DL (ref 32–36)
MCV RBC AUTO: 86 FL (ref 80–100)
NRBC BLD-RTO: 0 /100 WBCS (ref 0–0)
PHOSPHATE SERPL-MCNC: 5.6 MG/DL (ref 2.5–4.9)
PLATELET # BLD AUTO: 260 X10*3/UL (ref 150–450)
POTASSIUM SERPL-SCNC: 4.1 MMOL/L (ref 3.5–5.3)
PROTHROMBIN TIME: 13.1 SECONDS (ref 9.8–12.8)
Q ONSET: 222 MS
QRS COUNT: 20 BEATS
QRS DURATION: 70 MS
QT INTERVAL: 276 MS
QTC CALCULATION(BAZETT): 396 MS
QTC FREDERICIA: 351 MS
R AXIS: 4 DEGREES
RBC # BLD AUTO: 2.65 X10*6/UL (ref 4.5–5.9)
SODIUM SERPL-SCNC: 133 MMOL/L (ref 136–145)
T AXIS: -51 DEGREES
T OFFSET: 360 MS
VENTRICULAR RATE: 124 BPM
WBC # BLD AUTO: 14.7 X10*3/UL (ref 4.4–11.3)

## 2025-01-16 PROCEDURE — 37799 UNLISTED PX VASCULAR SURGERY: CPT

## 2025-01-16 PROCEDURE — 94003 VENT MGMT INPAT SUBQ DAY: CPT

## 2025-01-16 PROCEDURE — 2580000001 HC RX 258 IV SOLUTIONS

## 2025-01-16 PROCEDURE — 2500000005 HC RX 250 GENERAL PHARMACY W/O HCPCS: Performed by: NURSE PRACTITIONER

## 2025-01-16 PROCEDURE — 85610 PROTHROMBIN TIME: CPT

## 2025-01-16 PROCEDURE — 71045 X-RAY EXAM CHEST 1 VIEW: CPT

## 2025-01-16 PROCEDURE — 2500000004 HC RX 250 GENERAL PHARMACY W/ HCPCS (ALT 636 FOR OP/ED): Performed by: NURSE PRACTITIONER

## 2025-01-16 PROCEDURE — 2020000001 HC ICU ROOM DAILY

## 2025-01-16 PROCEDURE — 99291 CRITICAL CARE FIRST HOUR: CPT | Performed by: NURSE PRACTITIONER

## 2025-01-16 PROCEDURE — 80069 RENAL FUNCTION PANEL: CPT

## 2025-01-16 PROCEDURE — 99232 SBSQ HOSP IP/OBS MODERATE 35: CPT | Performed by: STUDENT IN AN ORGANIZED HEALTH CARE EDUCATION/TRAINING PROGRAM

## 2025-01-16 PROCEDURE — 83735 ASSAY OF MAGNESIUM: CPT

## 2025-01-16 PROCEDURE — 92526 ORAL FUNCTION THERAPY: CPT | Mod: GN | Performed by: SPEECH-LANGUAGE PATHOLOGIST

## 2025-01-16 PROCEDURE — 97530 THERAPEUTIC ACTIVITIES: CPT | Mod: GO

## 2025-01-16 PROCEDURE — 2500000001 HC RX 250 WO HCPCS SELF ADMINISTERED DRUGS (ALT 637 FOR MEDICARE OP): Performed by: STUDENT IN AN ORGANIZED HEALTH CARE EDUCATION/TRAINING PROGRAM

## 2025-01-16 PROCEDURE — 94762 N-INVAS EAR/PLS OXIMTRY CONT: CPT

## 2025-01-16 PROCEDURE — 2500000004 HC RX 250 GENERAL PHARMACY W/ HCPCS (ALT 636 FOR OP/ED)

## 2025-01-16 PROCEDURE — 85027 COMPLETE CBC AUTOMATED: CPT

## 2025-01-16 PROCEDURE — 71045 X-RAY EXAM CHEST 1 VIEW: CPT | Performed by: RADIOLOGY

## 2025-01-16 PROCEDURE — 92507 TX SP LANG VOICE COMM INDIV: CPT | Mod: GN | Performed by: SPEECH-LANGUAGE PATHOLOGIST

## 2025-01-16 PROCEDURE — 2500000004 HC RX 250 GENERAL PHARMACY W/ HCPCS (ALT 636 FOR OP/ED): Performed by: STUDENT IN AN ORGANIZED HEALTH CARE EDUCATION/TRAINING PROGRAM

## 2025-01-16 PROCEDURE — 82947 ASSAY GLUCOSE BLOOD QUANT: CPT

## 2025-01-16 PROCEDURE — 99232 SBSQ HOSP IP/OBS MODERATE 35: CPT | Performed by: INTERNAL MEDICINE

## 2025-01-16 PROCEDURE — 2500000004 HC RX 250 GENERAL PHARMACY W/ HCPCS (ALT 636 FOR OP/ED): Mod: TB | Performed by: STUDENT IN AN ORGANIZED HEALTH CARE EDUCATION/TRAINING PROGRAM

## 2025-01-16 RX ORDER — HEPARIN SODIUM 1000 [USP'U]/ML
1000 INJECTION, SOLUTION INTRAVENOUS; SUBCUTANEOUS ONCE
Status: COMPLETED | OUTPATIENT
Start: 2025-01-16 | End: 2025-01-16

## 2025-01-16 RX ORDER — LABETALOL HYDROCHLORIDE 5 MG/ML
10 INJECTION, SOLUTION INTRAVENOUS EVERY 4 HOURS PRN
Status: DISCONTINUED | OUTPATIENT
Start: 2025-01-16 | End: 2025-01-18 | Stop reason: HOSPADM

## 2025-01-16 RX ADMIN — HYDRALAZINE HYDROCHLORIDE 5 MG: 20 INJECTION INTRAMUSCULAR; INTRAVENOUS at 05:38

## 2025-01-16 RX ADMIN — PIPERACILLIN SODIUM AND TAZOBACTAM SODIUM 2.25 G: 2; .25 INJECTION, SOLUTION INTRAVENOUS at 13:56

## 2025-01-16 RX ADMIN — LEUCINE, PHENYLALANINE, LYSINE HYDROCHLORIDE, METHIONINE, ISOLEUCINE, VALINE, HISTIDINE, THREONINE, TRYPTOPHAN, ALANINE, GLYCINE, ARGININE, PROLINE, TYROSINE, SERINE 25 G: 730; 560; 580; 400; 600; 580; 480; 420; 180; 2.07; 1.03; 1.15; 680; 40; 5 INJECTION INTRAVENOUS at 09:00

## 2025-01-16 RX ADMIN — HEPARIN SODIUM 1000 UNITS: 1000 INJECTION INTRAVENOUS; SUBCUTANEOUS at 13:57

## 2025-01-16 RX ADMIN — LABETALOL HYDROCHLORIDE 10 MG: 5 INJECTION, SOLUTION INTRAVENOUS at 04:06

## 2025-01-16 RX ADMIN — HEPARIN SODIUM 5000 UNITS: 5000 INJECTION INTRAVENOUS; SUBCUTANEOUS at 04:06

## 2025-01-16 RX ADMIN — HYDROMORPHONE HYDROCHLORIDE 0.4 MG: 0.5 INJECTION, SOLUTION INTRAMUSCULAR; INTRAVENOUS; SUBCUTANEOUS at 01:08

## 2025-01-16 RX ADMIN — HYDROMORPHONE HYDROCHLORIDE 0.4 MG: 0.5 INJECTION, SOLUTION INTRAMUSCULAR; INTRAVENOUS; SUBCUTANEOUS at 19:55

## 2025-01-16 RX ADMIN — HEPARIN SODIUM 5000 UNITS: 5000 INJECTION INTRAVENOUS; SUBCUTANEOUS at 20:18

## 2025-01-16 RX ADMIN — FLUCONAZOLE 200 MG: 2 INJECTION, SOLUTION INTRAVENOUS at 20:16

## 2025-01-16 RX ADMIN — ASCORBIC ACID, VITAMIN A PALMITATE, CHOLECALCIFEROL, THIAMINE HYDROCHLORIDE, RIBOFLAVIN-5 PHOSPHATE SODIUM, PYRIDOXINE HYDROCHLORIDE, NIACINAMIDE, DEXPANTHENOL, ALPHA-TOCOPHEROL ACETATE, VITAMIN K1, FOLIC ACID, BIOTIN, CYANOCOBALAMIN: 200; 3300; 200; 6; 3.6; 6; 40; 15; 10; 150; 600; 60; 5 INJECTION, SOLUTION INTRAVENOUS at 20:18

## 2025-01-16 RX ADMIN — SMOFLIPID 50 G: 6; 6; 5; 3 INJECTION, EMULSION INTRAVENOUS at 20:47

## 2025-01-16 RX ADMIN — HEPARIN SODIUM 5000 UNITS: 5000 INJECTION INTRAVENOUS; SUBCUTANEOUS at 13:56

## 2025-01-16 RX ADMIN — PIPERACILLIN SODIUM AND TAZOBACTAM SODIUM 2.25 G: 2; .25 INJECTION, SOLUTION INTRAVENOUS at 05:39

## 2025-01-16 RX ADMIN — PANTOPRAZOLE SODIUM 40 MG: 40 INJECTION, POWDER, FOR SOLUTION INTRAVENOUS at 09:04

## 2025-01-16 RX ADMIN — HEPARIN SODIUM 1000 UNITS: 1000 INJECTION INTRAVENOUS; SUBCUTANEOUS at 13:56

## 2025-01-16 RX ADMIN — LOPERAMIDE HYDROCHLORIDE 2 MG: 2 SOLUTION ORAL at 10:11

## 2025-01-16 RX ADMIN — PIPERACILLIN SODIUM AND TAZOBACTAM SODIUM 2.25 G: 2; .25 INJECTION, SOLUTION INTRAVENOUS at 21:17

## 2025-01-16 ASSESSMENT — COGNITIVE AND FUNCTIONAL STATUS - GENERAL
DAILY ACTIVITIY SCORE: 9
DRESSING REGULAR LOWER BODY CLOTHING: TOTAL
DRESSING REGULAR UPPER BODY CLOTHING: A LOT
EATING MEALS: TOTAL
PERSONAL GROOMING: A LOT
HELP NEEDED FOR BATHING: A LOT
TOILETING: TOTAL

## 2025-01-16 ASSESSMENT — PAIN - FUNCTIONAL ASSESSMENT
PAIN_FUNCTIONAL_ASSESSMENT: 0-10

## 2025-01-16 ASSESSMENT — PAIN SCALES - GENERAL
PAINLEVEL_OUTOF10: 3
PAINLEVEL_OUTOF10: 0 - NO PAIN
PAINLEVEL_OUTOF10: 7
PAINLEVEL_OUTOF10: 3
PAINLEVEL_OUTOF10: 7
PAINLEVEL_OUTOF10: 3

## 2025-01-16 ASSESSMENT — PAIN DESCRIPTION - LOCATION: LOCATION: ABDOMEN
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
## 2025-01-16 NOTE — PROGRESS NOTES
Speech-Language Pathology    SLP Adult Inpatient  Speech-Language Pathology Treatment  Dysphagia Treatment     Patient Name: Ike Gar  MRN: [id number]  Today's Date: 1/16/2025  Time Calculation  Start Time: 1307  Stop Time: 1343  Time Calculation (min): 36 min         Assessment/Plan:  #dysphagia  - Suspected swallow impairments considering lengthy intubation, trach/vent, and frailty in setting of illness in the ICU.   - Tolerating small presentations of ice chips following oral care. Did not progress to additional PO trials due to heightened risk of silent aspiration and related complications. Will need FEES vs MBS prior to determining oral diet recs.     #Aphonia  - Excellent toleration of PMV with T-piece, for > 30 minutes  - Removed at end of session and updated RN/RT.  - PMV with SLP or RT at this time. Will increase PMV frequency as pt continues to tolerate trach collar.     Recommendations:  NPO  Frequent, aggressive oral care as tolerated to improve infection control, as well as to reduce dental plaque and bacteria on oropharyngeal surfaces which may increase the risk nosocomial infections, including pneumonia.   OK for small amounts of ice chips (one at a time, 3x/hour) for oral comfort and to prevent swallow disuse atrophy, but only after aggressive oral care to avoid colonization of bacteria within the oral cavity.        Plan:  SLP TX Plan:  (cognitive evaluation as able; swallow evaluation as resp status improves)  SLP Plan: Skilled SLP  SLP Frequency: 2x per week  Duration: 2 weeks  SLP Discharge Recommendations: Continue skilled SLP services at the next level of care  SLP - OK to Discharge: Yes      Subjective   Pt received resting in bed. Pleasant during session. OT present for their session    Most Recent Visit:  SLP Received On: 01/16/25    General Visit Information:   Past Medical History Relevant to Rehab: history of multiple abdominal surgeries (open cooper, open sigmoidectomy, adhesions),  HTN  Patient Seen During This Visit: Yes  Co-Treatment: OT  Prior to Session Communication: Bedside nurse    Baseline Assessment:  Respiratory Status: Trach cuff (T-piece- 10 liters O 2)  Behavior/Cognition: Alert, Cooperative, Pleasant mood (A/O x 4)  Patient Positioning: Upright in Chair       Objective       Therapeutic Swallow:  Therapeutic Swallow Intervention : PO Trials (Ice chips x 3)  Swallow Comments: Pt accepted x 3 ice chips showing excellent mastication and anterior containment. Swallow onset appreciated across all challenges without overt s/s of aspiration    Voice:  Voice Interventions: Passy Gaby Speaking Valve Trials/Training  Voice Comments: PMV placed in line with T-piece following cuff deflation. deep trach suction provided x 1 - min secretions. Cuff deflation following PMV donned. Acheiving excellent volume and quality in upright positioning. Conversing with staff appropriately. Pt spoke to spouse over the phone for several minutes. No changes in resp status or discomfort. PMV removed at end of session however, cuff maintained deflated and updated RT Kyrie      Encounter Problems       Encounter Problems (Active)       MOTOR SPEECH PRODUCTION       LTG - Patient will utilize compensatory intervention for intelligibility       Start:  01/16/25    Expected End:  01/23/25            SLP Goal 1       Start:  01/16/25    Expected End:  01/23/25       Pt will tolerate PMV for up 30 minutes on 3 consecutive sessions.             Swallowing       LTG - Patient will demonstrate safe swallowing Intervention/techniques (Progressing)       Start:  12/31/24    Expected End:  01/23/25            SLP Goal 1       Start:  12/31/24    Expected End:  01/23/25       STG - Patient will tolerate therapeutic trials of recommended consistency without clinical signs and symptoms of aspiration on 100% of trials                    Inpatient:     01/15/25 1230    Education   Individual(s) Educated Patient   Verbal  Education  yes   Risk and Benefits Discussed with Patient/Caregiver/Other yes   Patient/Caregiver Demonstrated Understanding yes   Plan of Care Discussed and Agreed Upon yes   Patient Response to Education Patient/Caregiver Verbalized Understanding of Information;Patient/Caregiver Asked Appropriate Questions

## 2025-01-16 NOTE — PROGRESS NOTES
Occupational Therapy    Occupational Therapy Treatment    Name: Ike Gar  MRN: [id number]  : 1945  Date: 25  Room:     Time Calculation  Start Time: 1301 (split session [id number])  Stop Time: 1527 (split session [id number])  Time Calculation (min): 83 min    Assessment:  Barriers to Discharge Home: Caregiver assistance, Physical needs, Cognition needs  Caregiver Assistance: Caregiver assistance needed per identified barriers - however, level of patient's required assistance exceeds assistance available at home  Cognition Needs: 24hr supervision for safety awareness needed  Physical Needs: 24hr mobility assistance needed, 24hr ADL assistance needed  End of Session Communication: Bedside nurse  End of Session Patient Position: Bed, 3 rail up, Alarm off, not on at start of session    Plan:  Treatment Interventions: ADL retraining, Functional transfer training, UE strengthening/ROM, Cognitive reorientation, Patient/family training, Equipment evaluation/education, Neuromuscular reeducation, Compensatory technique education  OT Frequency: 3 times per week  OT Discharge Recommendations: Moderate intensity level of continued care  Equipment Recommended upon Discharge:  (TBD)  OT Recommended Transfer Status: Dependent  OT - OK to Discharge: Yes    Subjective   General:  OT Last Visit  OT Received On: 25  Co-Treatment: SLP (Co tx with SLP session 1; Rehab aide assisted with both sessions)  Prior to Session Communication: Bedside nurse  Patient Position Received: Bed, 3 rail up, Alarm off, not on at start of session  Family/Caregiver Present: No  General Comment: Pt supine in bed on arrival, agreeable to participate. On trach collar 50% FiO2. PMV donned by SLP. Split session [id number] and [id number] to return pt to bed.     Precautions:  Medical Precautions: Fall precautions, Spinal precautions, Oxygen therapy device and L/min  Post-Surgical Precautions: Abdominal surgery precautions  Braces Applied:  TLSO needed for mobility - ABD binder on to protect skin/incisions due to multiple drains and ostomy  Precautions Comment: MITT precautions, SBP >90, SpO2 >92%    Vitals:   01/16/25 1301 01/16/25 1340   Vital Signs   Vitals Session Pre OT Post OT   Heart Rate 102 103   Resp 24 (!) 27   SpO2 97 % 95 %   /82 162/56   MAP (mmHg) 103 77       Lines/Tubes/Drains:  CVC 01/02/25 Triple lumen Non-tunneled Left Subclavian (Active)   Number of days: 13       Surgical Airway Shiley Cuffed 6 (Active)   Number of days: 9       Closed/Suction Drain 1 Left LUQ Bulb 19 Fr. (Active)   Number of days: 24       Closed/Suction Drain Lateral LUQ Accordion 10 Fr. (Active)   Number of days: 12       Closed/Suction Drain Midline RUQ 10 Fr. (Active)   Number of days: 1       Open Drain Left LLQ (Active)   Number of days: 8       Gastrostomy/Enterostomy Percutaneous endoscopic gastrostomy (PEG) 1 20 Fr. LUQ (Active)   Number of days: 9       Ileostomy Other (Comment) RUQ (Active)   Number of days: 24       Hemodialysis Cath 12/31/24 Triple lumen Left Non-tunneled catheter Jugular (Active)   Number of days: 16       Cognition:  Overall Cognitive Status: Within Functional Limits  Orientation Level: Oriented X4  Cognition Comments: Continued difficulty self directing care. Facilitated self advocacy and autonomy throughout session by providing choices and encouraging pt to express wants and needs.    Pain Assessment:  Pain Assessment  Pain Assessment:  (discomfort from TLSO)     Objective   Activities of Daily Living:     Toileting  Toileting Level of Assistance: Maximum assistance  Where Assessed: Bed level  Toileting Comments: urinal    Bed Mobility/Transfers:   Bed Mobility  Bed Mobility: Yes  Bed Mobility 1  Bed Mobility 1: Supine to sitting  Level of Assistance 1: Moderate assistance, +2  Bed Mobility Comments 1: draw sheet, HOB elevated, log roll  Bed Mobility 2  Bed Mobility  2: Sitting to supine  Level of Assistance 2: Maximum  assistance, +2  Bed Mobility Comments 2: HOB elevated, draw sheet  Transfers  Transfer: Yes  Transfer 1  Transfer From 1: Sit to  Transfer to 1: Stand  Transfer Level of Assistance 1: Maximum assistance, +2  Trials/Comments 1: x3 trials total; draw sheet under hips  Transfers 2  Transfer From 2: Bed to  Transfer to 2: Chair with drop arm  Technique 2:  (x2 side steps)  Transfer Level of Assistance 2: Maximum assistance, +2  Trials/Comments 2: bilat arm in arm assist, draw sheet  Transfers 3  Transfer From 3: Chair with drop arm to  Transfer to 3: Bed  Technique 3: Stand pivot  Transfer Level of Assistance 3: Maximum assistance, +2  Trials/Comments 3: bilat arm in arm assist, draw sheet under hips    Therapy/Activity:      Therapeutic Activity  Therapeutic Activity Performed: Yes  Therapeutic Activity 1: Pt tolerated ~15 minutes seated EOB with CGA for safety for donning of PMV and communication. Engaged in phone call with wife while PMV on.  Therapeutic Activity 2: Increased time at end of session engaging in empathetic discussion re: current functional progress and goals. Provided details regarding need for decreased assist for ADLs and transfers and progress that has been made since admission.  Therapeutic Activity 3: Increased time repositioning pt for comfort     Outcome Measures:  [facility] Daily Activity  Putting on and taking off regular lower body clothing: Total  Bathing (including washing, rinsing, drying): A lot  Putting on and taking off regular upper body clothing: A lot  Toileting, which includes using toilet, bedpan or urinal: Total  Taking care of personal grooming such as brushing teeth: A lot  Eating Meals: Total  Daily Activity - Total Score: 9  ICU Mobility Screen  ICU Mobility Scale: Transferring bed to chair     Education Documentation  Body Mechanics, taught by Radha Foster OT at 1/16/2025  3:56 PM.  Learner: Patient  Readiness: Acceptance  Method: Demonstration,  Explanation  Response: Verbalizes Understanding    Precautions, taught by Radha Foster OT at 1/16/2025  3:56 PM.  Learner: Patient  Readiness: Acceptance  Method: Demonstration, Explanation  Response: Verbalizes Understanding    ADL Training, taught by Radha Foster OT at 1/16/2025  3:56 PM.  Learner: Patient  Readiness: Acceptance  Method: Demonstration, Explanation  Response: Verbalizes Understanding    Education Comments  No comments found.        Goals:  Encounter Problems       Encounter Problems (Active)       ADLs       Patient will complete daily grooming tasks  with set-up level of assistance and PRN adaptive equipment while supported sitting. (Progressing)       Start:  12/30/24    Expected End:  01/20/25               BALANCE       Pt will maintain dynamic sitting  balance during ADL task with moderate assist level of assistance in order to demonstrate decreased risk of falling and improved postural control. (Progressing)       Start:  12/30/24    Expected End:  01/20/25               COGNITION/SAFETY       Patient will recall and adhere to spinal, MITT and abdominal precautions during all functional mobility/ADL tasks in order to demonstrate improved understanding and promote healing post op (Progressing)       Start:  12/30/24    Expected End:  01/20/25            Patient will score WFL on standardized cognitive assessment with visual cues and within reasonable time frame (Progressing)       Start:  12/30/24    Expected End:  01/20/25            Patient will follow >75% Simple commands to allow improved ADL performance. (Progressing)       Start:  12/30/24    Expected End:  01/20/25               TRANSFERS       Patient will perform bed mobility moderate assist level of assistance and bed rails and draw sheet in order to improve safety and independence with mobility (Progressing)       Start:  12/30/24    Expected End:  01/20/25            Patient will complete functional  transfer to chair/BSC with least restrictive device with moderate assist level of assistance. (Progressing)       Start:  12/30/24    Expected End:  01/20/25 01/16/25 at 4:00 PM   Radha Foster, OT   [phone]

## 2025-01-17 ENCOUNTER — APPOINTMENT (OUTPATIENT)
Dept: RADIOLOGY | Facility: HOSPITAL | Age: 80
End: 2025-01-17
Payer: MEDICARE

## 2025-01-17 LAB
ALBUMIN SERPL BCP-MCNC: 1.8 G/DL (ref 3.4–5)
ALP SERPL-CCNC: 147 U/L (ref 33–136)
ALT SERPL W P-5'-P-CCNC: 36 U/L (ref 10–52)
ANION GAP SERPL CALC-SCNC: 13 MMOL/L (ref 10–20)
APTT PPP: 28 SECONDS (ref 27–38)
AST SERPL W P-5'-P-CCNC: 33 U/L (ref 9–39)
BILIRUB DIRECT SERPL-MCNC: 0.8 MG/DL (ref 0–0.3)
BILIRUB SERPL-MCNC: 1.8 MG/DL (ref 0–1.2)
BUN SERPL-MCNC: 58 MG/DL (ref 6–23)
CALCIUM SERPL-MCNC: 7.5 MG/DL (ref 8.6–10.6)
CHLORIDE SERPL-SCNC: 100 MMOL/L (ref 98–107)
CO2 SERPL-SCNC: 25 MMOL/L (ref 21–32)
CREAT SERPL-MCNC: 5.15 MG/DL (ref 0.5–1.3)
EGFRCR SERPLBLD CKD-EPI 2021: 11 ML/MIN/1.73M*2
ERYTHROCYTE [DISTWIDTH] IN BLOOD BY AUTOMATED COUNT: 15.9 % (ref 11.5–14.5)
GLUCOSE BLD MANUAL STRIP-MCNC: 102 MG/DL (ref 74–99)
GLUCOSE BLD MANUAL STRIP-MCNC: 106 MG/DL (ref 74–99)
GLUCOSE BLD MANUAL STRIP-MCNC: 117 MG/DL (ref 74–99)
GLUCOSE BLD MANUAL STRIP-MCNC: 125 MG/DL (ref 74–99)
GLUCOSE SERPL-MCNC: 115 MG/DL (ref 74–99)
HCT VFR BLD AUTO: 21.3 % (ref 41–52)
HGB BLD-MCNC: 7.4 G/DL (ref 13.5–17.5)
INR PPP: 1.2 (ref 0.9–1.1)
MAGNESIUM SERPL-MCNC: 1.92 MG/DL (ref 1.6–2.4)
MCH RBC QN AUTO: 30 PG (ref 26–34)
MCHC RBC AUTO-ENTMCNC: 34.7 G/DL (ref 32–36)
MCV RBC AUTO: 86 FL (ref 80–100)
NRBC BLD-RTO: 0 /100 WBCS (ref 0–0)
PHOSPHATE SERPL-MCNC: 5.8 MG/DL (ref 2.5–4.9)
PLATELET # BLD AUTO: 289 X10*3/UL (ref 150–450)
POTASSIUM SERPL-SCNC: 4.3 MMOL/L (ref 3.5–5.3)
PROT SERPL-MCNC: 5.2 G/DL (ref 6.4–8.2)
PROTHROMBIN TIME: 14 SECONDS (ref 9.8–12.8)
RBC # BLD AUTO: 2.47 X10*6/UL (ref 4.5–5.9)
SODIUM SERPL-SCNC: 134 MMOL/L (ref 136–145)
TRIGL SERPL-MCNC: 157 MG/DL (ref 0–149)
WBC # BLD AUTO: 13 X10*3/UL (ref 4.4–11.3)

## 2025-01-17 PROCEDURE — 82248 BILIRUBIN DIRECT: CPT

## 2025-01-17 PROCEDURE — 99233 SBSQ HOSP IP/OBS HIGH 50: CPT | Performed by: STUDENT IN AN ORGANIZED HEALTH CARE EDUCATION/TRAINING PROGRAM

## 2025-01-17 PROCEDURE — 83735 ASSAY OF MAGNESIUM: CPT

## 2025-01-17 PROCEDURE — 2500000005 HC RX 250 GENERAL PHARMACY W/O HCPCS: Performed by: STUDENT IN AN ORGANIZED HEALTH CARE EDUCATION/TRAINING PROGRAM

## 2025-01-17 PROCEDURE — 2500000002 HC RX 250 W HCPCS SELF ADMINISTERED DRUGS (ALT 637 FOR MEDICARE OP, ALT 636 FOR OP/ED)

## 2025-01-17 PROCEDURE — 2500000004 HC RX 250 GENERAL PHARMACY W/ HCPCS (ALT 636 FOR OP/ED): Performed by: STUDENT IN AN ORGANIZED HEALTH CARE EDUCATION/TRAINING PROGRAM

## 2025-01-17 PROCEDURE — 94640 AIRWAY INHALATION TREATMENT: CPT

## 2025-01-17 PROCEDURE — 97530 THERAPEUTIC ACTIVITIES: CPT | Mod: GP

## 2025-01-17 PROCEDURE — 71045 X-RAY EXAM CHEST 1 VIEW: CPT | Performed by: RADIOLOGY

## 2025-01-17 PROCEDURE — 2500000001 HC RX 250 WO HCPCS SELF ADMINISTERED DRUGS (ALT 637 FOR MEDICARE OP): Performed by: STUDENT IN AN ORGANIZED HEALTH CARE EDUCATION/TRAINING PROGRAM

## 2025-01-17 PROCEDURE — 2500000004 HC RX 250 GENERAL PHARMACY W/ HCPCS (ALT 636 FOR OP/ED): Mod: JZ,TB | Performed by: NURSE PRACTITIONER

## 2025-01-17 PROCEDURE — 37799 UNLISTED PX VASCULAR SURGERY: CPT

## 2025-01-17 PROCEDURE — 2020000001 HC ICU ROOM DAILY

## 2025-01-17 PROCEDURE — 85730 THROMBOPLASTIN TIME PARTIAL: CPT

## 2025-01-17 PROCEDURE — 2500000004 HC RX 250 GENERAL PHARMACY W/ HCPCS (ALT 636 FOR OP/ED): Mod: TB | Performed by: STUDENT IN AN ORGANIZED HEALTH CARE EDUCATION/TRAINING PROGRAM

## 2025-01-17 PROCEDURE — 82947 ASSAY GLUCOSE BLOOD QUANT: CPT

## 2025-01-17 PROCEDURE — 84478 ASSAY OF TRIGLYCERIDES: CPT | Performed by: NURSE PRACTITIONER

## 2025-01-17 PROCEDURE — 2500000004 HC RX 250 GENERAL PHARMACY W/ HCPCS (ALT 636 FOR OP/ED)

## 2025-01-17 PROCEDURE — 80048 BASIC METABOLIC PNL TOTAL CA: CPT

## 2025-01-17 PROCEDURE — 99232 SBSQ HOSP IP/OBS MODERATE 35: CPT | Performed by: STUDENT IN AN ORGANIZED HEALTH CARE EDUCATION/TRAINING PROGRAM

## 2025-01-17 PROCEDURE — 85027 COMPLETE CBC AUTOMATED: CPT

## 2025-01-17 PROCEDURE — 97530 THERAPEUTIC ACTIVITIES: CPT | Mod: GO

## 2025-01-17 PROCEDURE — 71045 X-RAY EXAM CHEST 1 VIEW: CPT

## 2025-01-17 PROCEDURE — 2580000001 HC RX 258 IV SOLUTIONS

## 2025-01-17 PROCEDURE — 2500000005 HC RX 250 GENERAL PHARMACY W/O HCPCS: Performed by: NURSE PRACTITIONER

## 2025-01-17 PROCEDURE — 84100 ASSAY OF PHOSPHORUS: CPT

## 2025-01-17 PROCEDURE — 94003 VENT MGMT INPAT SUBQ DAY: CPT

## 2025-01-17 PROCEDURE — 2500000005 HC RX 250 GENERAL PHARMACY W/O HCPCS: Performed by: PHARMACIST

## 2025-01-17 PROCEDURE — 99291 CRITICAL CARE FIRST HOUR: CPT | Performed by: NURSE PRACTITIONER

## 2025-01-17 RX ORDER — OXYCODONE HYDROCHLORIDE 5 MG/1
5 TABLET ORAL ONCE
Status: COMPLETED | OUTPATIENT
Start: 2025-01-17 | End: 2025-01-17

## 2025-01-17 RX ORDER — ALBUTEROL SULFATE 0.83 MG/ML
SOLUTION RESPIRATORY (INHALATION)
Status: COMPLETED
Start: 2025-01-17 | End: 2025-01-17

## 2025-01-17 RX ORDER — MEROPENEM AND SODIUM CHLORIDE 1 G/50ML
1000 INJECTION, SOLUTION INTRAVENOUS DAILY
Status: DISCONTINUED | OUTPATIENT
Start: 2025-01-17 | End: 2025-01-18 | Stop reason: HOSPADM

## 2025-01-17 RX ORDER — MAGNESIUM SULFATE HEPTAHYDRATE 40 MG/ML
2 INJECTION, SOLUTION INTRAVENOUS ONCE
Status: COMPLETED | OUTPATIENT
Start: 2025-01-17 | End: 2025-01-17

## 2025-01-17 RX ORDER — ACETAMINOPHEN 10 MG/ML
1000 INJECTION, SOLUTION INTRAVENOUS ONCE
Status: COMPLETED | OUTPATIENT
Start: 2025-01-17 | End: 2025-01-17

## 2025-01-17 RX ORDER — ALBUTEROL SULFATE 90 UG/1
2 INHALANT RESPIRATORY (INHALATION) EVERY 6 HOURS PRN
Status: DISCONTINUED | OUTPATIENT
Start: 2025-01-17 | End: 2025-01-17

## 2025-01-17 RX ORDER — ALBUTEROL SULFATE 0.83 MG/ML
2.5 SOLUTION RESPIRATORY (INHALATION) EVERY 6 HOURS PRN
Status: DISCONTINUED | OUTPATIENT
Start: 2025-01-17 | End: 2025-01-18 | Stop reason: HOSPADM

## 2025-01-17 RX ADMIN — SULBACTAM AND DURLOBACTAM: KIT at 17:48

## 2025-01-17 RX ADMIN — HYDROMORPHONE HYDROCHLORIDE 0.4 MG: 0.5 INJECTION, SOLUTION INTRAMUSCULAR; INTRAVENOUS; SUBCUTANEOUS at 01:43

## 2025-01-17 RX ADMIN — HEPARIN SODIUM 5000 UNITS: 5000 INJECTION INTRAVENOUS; SUBCUTANEOUS at 20:17

## 2025-01-17 RX ADMIN — FLUCONAZOLE 200 MG: 2 INJECTION, SOLUTION INTRAVENOUS at 22:00

## 2025-01-17 RX ADMIN — PANTOPRAZOLE SODIUM 40 MG: 40 INJECTION, POWDER, FOR SOLUTION INTRAVENOUS at 09:24

## 2025-01-17 RX ADMIN — LABETALOL HYDROCHLORIDE 10 MG: 5 INJECTION, SOLUTION INTRAVENOUS at 11:29

## 2025-01-17 RX ADMIN — HEPARIN SODIUM 5000 UNITS: 5000 INJECTION INTRAVENOUS; SUBCUTANEOUS at 05:45

## 2025-01-17 RX ADMIN — LOPERAMIDE HYDROCHLORIDE 2 MG: 2 SOLUTION ORAL at 20:17

## 2025-01-17 RX ADMIN — ALBUTEROL SULFATE 2.5 MG: 2.5 SOLUTION RESPIRATORY (INHALATION) at 23:40

## 2025-01-17 RX ADMIN — OXYCODONE HYDROCHLORIDE 5 MG: 5 TABLET ORAL at 23:52

## 2025-01-17 RX ADMIN — ASCORBIC ACID, VITAMIN A PALMITATE, CHOLECALCIFEROL, THIAMINE HYDROCHLORIDE, RIBOFLAVIN-5 PHOSPHATE SODIUM, PYRIDOXINE HYDROCHLORIDE, NIACINAMIDE, DEXPANTHENOL, ALPHA-TOCOPHEROL ACETATE, VITAMIN K1, FOLIC ACID, BIOTIN, CYANOCOBALAMIN: 200; 3300; 200; 6; 3.6; 6; 40; 15; 10; 150; 600; 60; 5 INJECTION, SOLUTION INTRAVENOUS at 20:14

## 2025-01-17 RX ADMIN — ALBUTEROL SULFATE 2.5 MG: 0.83 SOLUTION RESPIRATORY (INHALATION) at 23:40

## 2025-01-17 RX ADMIN — HEPARIN SODIUM 5000 UNITS: 5000 INJECTION INTRAVENOUS; SUBCUTANEOUS at 13:12

## 2025-01-17 RX ADMIN — ACETAMINOPHEN 1000 MG: 10 INJECTION INTRAVENOUS at 20:08

## 2025-01-17 RX ADMIN — MAGNESIUM SULFATE HEPTAHYDRATE 2 G: 40 INJECTION, SOLUTION INTRAVENOUS at 11:21

## 2025-01-17 RX ADMIN — LOPERAMIDE HYDROCHLORIDE 2 MG: 2 SOLUTION ORAL at 09:24

## 2025-01-17 RX ADMIN — PIPERACILLIN SODIUM AND TAZOBACTAM SODIUM 2.25 G: 2; .25 INJECTION, SOLUTION INTRAVENOUS at 05:45

## 2025-01-17 RX ADMIN — MEROPENEM AND SODIUM CHLORIDE 1000 MG: 1 INJECTION, SOLUTION INTRAVENOUS at 17:49

## 2025-01-17 RX ADMIN — ASCORBIC ACID, VITAMIN A PALMITATE, CHOLECALCIFEROL, THIAMINE HYDROCHLORIDE, RIBOFLAVIN-5 PHOSPHATE SODIUM, PYRIDOXINE HYDROCHLORIDE, NIACINAMIDE, DEXPANTHENOL, ALPHA-TOCOPHEROL ACETATE, VITAMIN K1, FOLIC ACID, BIOTIN, CYANOCOBALAMIN: 200; 3300; 200; 6; 3.6; 6; 40; 15; 10; 150; 600; 60; 5 INJECTION, SOLUTION INTRAVENOUS at 09:24

## 2025-01-17 RX ADMIN — SMOFLIPID 50 G: 6; 6; 5; 3 INJECTION, EMULSION INTRAVENOUS at 20:08

## 2025-01-17 RX ADMIN — PIPERACILLIN SODIUM AND TAZOBACTAM SODIUM 2.25 G: 2; .25 INJECTION, SOLUTION INTRAVENOUS at 13:12

## 2025-01-17 ASSESSMENT — PAIN - FUNCTIONAL ASSESSMENT
PAIN_FUNCTIONAL_ASSESSMENT: 0-10

## 2025-01-17 ASSESSMENT — PAIN SCALES - GENERAL
PAINLEVEL_OUTOF10: 0 - NO PAIN
PAINLEVEL_OUTOF10: 5 - MODERATE PAIN
PAINLEVEL_OUTOF10: 0 - NO PAIN
PAINLEVEL_OUTOF10: 7
PAINLEVEL_OUTOF10: 6
PAINLEVEL_OUTOF10: 0 - NO PAIN
PAINLEVEL_OUTOF10: 4

## 2025-01-17 ASSESSMENT — COGNITIVE AND FUNCTIONAL STATUS - GENERAL
EATING MEALS: TOTAL
TOILETING: TOTAL
DRESSING REGULAR UPPER BODY CLOTHING: A LOT
MOVING TO AND FROM BED TO CHAIR: TOTAL
HELP NEEDED FOR BATHING: A LOT
CLIMB 3 TO 5 STEPS WITH RAILING: TOTAL
PERSONAL GROOMING: A LOT
TURNING FROM BACK TO SIDE WHILE IN FLAT BAD: TOTAL
STANDING UP FROM CHAIR USING ARMS: TOTAL
MOVING FROM LYING ON BACK TO SITTING ON SIDE OF FLAT BED WITH BEDRAILS: A LOT
WALKING IN HOSPITAL ROOM: TOTAL
MOBILITY SCORE: 7
DRESSING REGULAR LOWER BODY CLOTHING: TOTAL
DAILY ACTIVITIY SCORE: 9
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
## 2025-01-17 NOTE — PROGRESS NOTES
Physical Therapy    Physical Therapy Treatment    Patient Name: Ike Gar  MRN: [id number]  [place] [facility]  Room: 16/16-A  Today's Date: 1/17/2025  Time Calculation  Start Time: 1114  Stop Time: 1142  Time Calculation (min): 28 min         Assessment/Plan   PT Assessment  End of Session Communication: Bedside nurse  Assessment Comment: Pt initially willing to trial ambulation this session, once pt sitting EOB experienced oxygen desaturation into low 80s, requiring RT intervention. Pt expressing frustration with breathing status and overall progress, required emotional encouragement and support.  End of Session Patient Position: Bed, 3 rail up, Alarm off, not on at start of session     PT Plan  Treatment/Interventions: Bed mobility, Transfer training, Gait training, Balance training, Neuromuscular re-education, Strengthening, Endurance training, Range of motion, Therapeutic exercise, Home exercise program, Therapeutic activity, Positioning, Postural re-education, Orthotic fitting/training  PT Plan: Ongoing PT  PT Frequency: 3 times per week  PT Discharge Recommendations: Moderate intensity level of continued care  PT Recommended Transfer Status: Total assist  PT - OK to Discharge: Yes      General Visit Information:   PT  Visit  PT Received On: 01/17/25  General  Co-Treatment: OT  Co-Treatment Reason: intent to mobilize OOB, AMPAC <10  Prior to Session Communication: Bedside nurse  Patient Position Received: Bed, 3 rail up, Alarm off, not on at start of session  General Comment: Pt supine in bed, on trach collar 70% FiO2 initially with PMV donned. RT switched pt from trach collar to trach to vent (CPAP 60%) 2/2 desaturation during upright mobility. Pt reporting fatigued from not sleeping well last night and difficulty breathing this morning.    Subjective   Precautions:  Precautions  Medical Precautions: Fall precautions, Spinal precautions, Oxygen therapy device and L/min  Post-Surgical Precautions: Abdominal  surgery precautions  Braces Applied: TLSO needed for mobility - ABD binder on to protect skin/incisions due to multiple drains and ostomy  Precautions Comment: MITT precautions, SBP >90, SpO2 >92%       01/17/25 1114 01/17/25 1142   Vital Signs   Vitals Session Pre PT Post PT   Heart Rate 101 83   Resp (!) 39 22   SpO2 93 % 97 %   /85 129/79   MAP (mmHg) 106  --        Objective   Pain:  Pain Assessment  Pain Assessment: 0-10  0-10 (Numeric) Pain Score: 0 - No pain  Pain Type: Acute pain  Pain Location: Abdomen  Pain Orientation: Right, Left, Mid  Response to Interventions: Decrease in pain  Cognition:  Cognition  Overall Cognitive Status: Within Functional Limits  Orientation Level: Oriented X4  Following Commands: Follows all commands and directions without difficulty  Cognition Comments: Pt expressing frustration with breathing status    Activity Tolerance:  Activity Tolerance  Endurance: Tolerates less than 10 min exercise with changes in vital signs  Early Mobility/Exercise Safety Screen: Proceed with mobilization - No exclusion criteria met  Treatments:  Therapeutic Activity  Therapeutic Activity Performed: Yes  Therapeutic Activity 1: Pt tolerated sitting EOB ~2 minutes, pt with oxygen desaturation into low 80s (lowest 79%), no improvement with cues for breathing. Returned to supine and RT at bedside to switch from trach collar to vent.         Bed Mobility  Bed Mobility: Yes  Bed Mobility 1  Bed Mobility 1: Supine to sitting  Level of Assistance 1: Moderate assistance, +2  Bed Mobility Comments 1: draw sheet, HOB elevated  Bed Mobility 2  Bed Mobility  2: Sitting to supine  Level of Assistance 2: Dependent (x2)  Bed Mobility Comments 2: draw sheet            Outcome Measures:  [facility] Basic Mobility  Turning from your back to your side while in a flat bed without using bedrails: A lot  Moving from lying on your back to sitting on the side of a flat bed without using bedrails: Total  Moving to and from  bed to chair (including a wheelchair): Total  Standing up from a chair using your arms (e.g. wheelchair or bedside chair): Total  To walk in hospital room: Total  Climbing 3-5 steps with railing: Total  Basic Mobility - Total Score: 7    FSS-ICU  Ambulation: Unable to attempt due to weakness  Rolling: Maximal assistance (performs 25% - 49% of task)  Sitting: Supervision or set-up only  Transfer Sit-to-Stand: Unable to perform  Transfer Supine-to-Sit: Total assistance (performs 25% or requires another person)  Total Score: 8      Early Mobility/Exercise Safety Screen: Proceed with mobilization - No exclusion criteria met  ICU Mobility Scale: Sitting over edge of bed [3]    Education Documentation  Precautions, taught by Laura Gallagher PT at 1/17/2025  2:33 PM.  Learner: Patient  Readiness: Acceptance  Method: Explanation  Response: Verbalizes Understanding  Comment: PT POC, mobility progression    Body Mechanics, taught by Laura Gallagher PT at 1/17/2025  2:33 PM.  Learner: Patient  Readiness: Acceptance  Method: Explanation  Response: Verbalizes Understanding  Comment: PT POC, mobility progression    Mobility Training, taught by Laura Gallagher PT at 1/17/2025  2:33 PM.  Learner: Patient  Readiness: Acceptance  Method: Explanation  Response: Verbalizes Understanding  Comment: PT POC, mobility progression    Education Comments  No comments found.        OP EDUCATION:       Encounter Problems       Encounter Problems (Active)       Balance       STG - Maintains dynamic standing balance with upper extremity support on LRAD with Mod A x1  (Progressing)       Start:  12/30/24    Expected End:  01/20/25            STG - Maintains dynamic sitting balance without upper extremity support with Min A  (Progressing)       Start:  12/30/24    Expected End:  01/20/25               Mobility       STG - Patient will ambulate x10 steps to assist with transfers with Max A x1 using LRAD (Not Progressing)       Start:  12/30/24     Expected End:  01/20/25               PT Transfers       STG - Patient will perform bed mobility with Mod A  (Progressing)       Start:  12/30/24    Expected End:  01/20/25            STG - Patient will transfer sit to and from stand with Mod A using LRAD (Progressing)       Start:  12/30/24    Expected End:  01/20/25            Bilat LE strength grossly >/= 3+/5 (Progressing)       Start:  12/30/24    Expected End:  01/20/25               Pain - Adult                    Laura Gallagher, PT

## 2025-01-17 NOTE — PROGRESS NOTES
AMANDA received a message from the liaison of [place] who reported that the MD at the insurance company is offering a P2P prior to making a determination.This must be completed by today at 1:00pm EST. The phone number to call in is [phone] opt [phone]. They will need the members name,  and policy #[id number]. This information was passed on to medical.     Addendum:   The P2P was overturned and the patient can discharge to [facility] in [place]; however, the liaison reported that they are trying to re-arrange beds and they will contact this  when they have completed their rearranging.       AMANDA spoke with Tom the facility's liaison who apologized that they do not have any open beds today, but he believes there will be some availability tomorrow morning.

## 2025-01-17 NOTE — CARE PLAN
Problem: Pain - Adult  Goal: Verbalizes/displays adequate comfort level or baseline comfort level  Outcome: Progressing     Problem: Safety - Adult  Goal: Free from fall injury  Outcome: Progressing     Problem: Discharge Planning  Goal: Discharge to home or other facility with appropriate resources  Outcome: Progressing     Problem: Chronic Conditions and Co-morbidities  Goal: Patient's chronic conditions and co-morbidity symptoms are monitored and maintained or improved  Outcome: Progressing     Problem: Skin  Goal: Decreased wound size/increased tissue granulation at next dressing change  Outcome: Progressing  Flowsheets (Taken 1/17/2025 1552)  Decreased wound size/increased tissue granulation at next dressing change:   Promote sleep for wound healing   Protective dressings over bony prominences  Goal: Participates in plan/prevention/treatment measures  Outcome: Progressing  Flowsheets (Taken 1/17/2025 1552)  Participates in plan/prevention/treatment measures:   Elevate heels   Discuss with provider PT/OT consult   Increase activity/out of bed for meals  Goal: Prevent/manage excess moisture  Outcome: Progressing  Flowsheets (Taken 1/17/2025 1552)  Prevent/manage excess moisture:   Cleanse incontinence/protect with barrier cream   Follow provider orders for dressing changes   Monitor for/manage infection if present   Moisturize dry skin  Goal: Prevent/minimize sheer/friction injuries  Outcome: Progressing  Flowsheets (Taken 1/17/2025 1552)  Prevent/minimize sheer/friction injuries:   Complete micro-shifts as needed if patient unable. Adjust patient position to relieve pressure points, not a full turn   HOB 30 degrees or less   Increase activity/out of bed for meals   Turn/reposition every 2 hours/use positioning/transfer devices   Use pull sheet  Goal: Promote/optimize nutrition  Outcome: Progressing  Flowsheets (Taken 1/17/2025 1552)  Promote/optimize nutrition:   Monitor/record intake including meals   Discuss  with provider if NPO > 2 days  Goal: Promote skin healing  Outcome: Progressing  Flowsheets (Taken 1/17/2025 5089)  Promote skin healing:   Assess skin/pad under line(s)/device(s)   Ensure correct size (line/device) and apply per  instructions   Protective dressings over bony prominences   Rotate device position/do not position patient on device   Turn/reposition every 2 hours/use positioning/transfer devices     Problem: Fall/Injury  Goal: Not fall by end of shift  Outcome: Progressing  Goal: Be free from injury by end of the shift  Outcome: Progressing  Goal: Verbalize understanding of personal risk factors for fall in the hospital  Outcome: Progressing  Goal: Verbalize understanding of risk factor reduction measures to prevent injury from fall in the home  Outcome: Progressing  Goal: Use assistive devices by end of the shift  Outcome: Progressing  Goal: Pace activities to prevent fatigue by end of the shift  Outcome: Progressing     Problem: Pain  Goal: Takes deep breaths with improved pain control throughout the shift  Outcome: Progressing  Goal: Turns in bed with improved pain control throughout the shift  Outcome: Progressing  Goal: Walks with improved pain control throughout the shift  Outcome: Progressing  Goal: Performs ADL's with improved pain control throughout shift  Outcome: Progressing  Goal: Participates in PT with improved pain control throughout the shift  Outcome: Progressing  Goal: Free from opioid side effects throughout the shift  Outcome: Progressing  Goal: Free from acute confusion related to pain meds throughout the shift  Outcome: Progressing     Problem: Respiratory  Goal: Clear secretions with interventions this shift  Outcome: Progressing  Goal: Minimize anxiety/maximize coping throughout shift  Outcome: Progressing  Goal: Minimal/no exertional discomfort or dyspnea this shift  Outcome: Progressing  Goal: No signs of respiratory distress (eg. Use of accessory muscles. Peds  grunting)  Outcome: Progressing  Goal: Patent airway maintained this shift  Outcome: Progressing  Goal: Tolerate mechanical ventilation evidenced by VS/agitation level this shift  Outcome: Progressing  Goal: Tolerate pulmonary toileting this shift  Outcome: Progressing  Goal: Verbalize decreased shortness of breath this shift  Outcome: Progressing  Goal: Wean oxygen to maintain O2 saturation per order/standard this shift  Outcome: Progressing  Goal: Increase self care and/or family involvement in next 24 hours  Outcome: Progressing     Problem: Swallowing  Goal: LTG - Patient will demonstrate safe swallowing Intervention/techniques  Outcome: Progressing   The patient's goals for the shift include      The clinical goals for the shift include Patient will remain HDS throughout shift.
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
## 2025-01-17 NOTE — PROGRESS NOTES
Occupational Therapy    Occupational Therapy Treatment    Name: Ike Gar  MRN: [id number]  : 1945  Date: 25  Room:       Time Calculation  Start Time: 1115  Stop Time: 1142  Time Calculation (min): 27 min    Assessment:  Barriers to Discharge Home: Caregiver assistance, Physical needs, Cognition needs  Caregiver Assistance: Caregiver assistance needed per identified barriers - however, level of patient's required assistance exceeds assistance available at home  Cognition Needs: 24hr supervision for safety awareness needed  Physical Needs: 24hr mobility assistance needed, 24hr ADL assistance needed  End of Session Communication: Bedside nurse  End of Session Patient Position: Bed, 3 rail up, Alarm off, not on at start of session    Plan:  Treatment Interventions: ADL retraining, Functional transfer training, UE strengthening/ROM, Cognitive reorientation, Patient/family training, Equipment evaluation/education, Neuromuscular reeducation, Compensatory technique education  OT Frequency: 3 times per week  OT Discharge Recommendations: Moderate intensity level of continued care  Equipment Recommended upon Discharge:  (TBD)  OT Recommended Transfer Status: Dependent  OT - OK to Discharge: Yes    Subjective   General:  OT Last Visit  OT Received On: 25  Co-Treatment: PT  Co-Treatment Reason: intent to mobilize OOB, AMPAC <10  Prior to Session Communication: Bedside nurse  Patient Position Received: Bed, 3 rail up, Alarm off, not on at start of session  General Comment: Pt supine in bed on arrival. On trach collar 70% FiO2 initially with PMV in place. Desaturated with supine to sit transfer requiring return to supine. RT switched pt to trach to vent at this time (CPAP 60% FiO2, PEEP 5, PS 10). Overall, pt more fatigued this date, reports having a difficult night.     Precautions:  Medical Precautions: Fall precautions, Spinal precautions, Oxygen therapy device and L/min  Post-Surgical  Precautions: Abdominal surgery precautions  Braces Applied: TLSO needed for mobility - ABD binder on to protect skin/incisions due to multiple drains and ostomy  Precautions Comment: MITT precautions, SBP >90, SpO2 >92%    Vitals:   01/17/25 1114 01/17/25 1142   Vital Signs   Vitals Session Pre PT Post PT   Heart Rate 101 83   Resp (!) 39 22   SpO2 93 % 97 %   /85 129/79   MAP (mmHg) 106  --    During: Pt with abrupt desaturation from 93% to lowest 78% following supine to sit transfer, requiring return to supine and O2 titration by RN.     Lines/Tubes/Drains:  CVC 01/02/25 Triple lumen Non-tunneled Left Subclavian (Active)   Number of days: 14       Surgical Airway Shiley Cuffed 6 (Active)   Number of days: 10       Closed/Suction Drain 1 Left LUQ Bulb 19 Fr. (Active)   Number of days: 25       Closed/Suction Drain Lateral LUQ Accordion 10 Fr. (Active)   Number of days: 12       Closed/Suction Drain Midline RUQ 10 Fr. (Active)   Number of days: 2       Open Drain Left LLQ (Active)   Number of days: 9       Gastrostomy/Enterostomy Percutaneous endoscopic gastrostomy (PEG) 1 20 Fr. LUQ (Active)   Number of days: 10       Ileostomy Other (Comment) RUQ (Active)   Number of days: 25       Hemodialysis Cath 12/31/24 Triple lumen Left Non-tunneled catheter Jugular (Active)   Number of days: 17       Cognition:  Overall Cognitive Status: Within Functional Limits  Orientation Level: Oriented X4  Following Commands: Follows all commands and directions without difficulty  Cognition Comments: Frustrated with current situation and functional deficits.    Pain Assessment:  Pain Assessment  Pain Assessment:  (intermittent pain from TLSO, not quantified)     Objective   Bed Mobility/Transfers:   Bed Mobility  Bed Mobility: Yes  Bed Mobility 1  Bed Mobility 1: Supine to sitting  Level of Assistance 1: Moderate assistance, +2  Bed Mobility Comments 1: Draw sheet, HOB elevated, log roll  Bed Mobility 2  Bed Mobility  2: Sitting  to supine  Level of Assistance 2: Dependent, +2  Bed Mobility Comments 2: draw sheet, log roll     Therapy/Activity:      Therapeutic Activity  Therapeutic Activity Performed: Yes  Therapeutic Activity 1: Pt tolerated EOB sitting <1 minute 2/2 abrupt desaturation from 93% to lowest 78% requiring return to supine. RT notified immediately.  Therapeutic Activity 2: Increased time repositioning pt in bed at end of session 2/2 discomfort with TLSO     Outcome Measures:  [facility] Daily Activity  Putting on and taking off regular lower body clothing: Total  Bathing (including washing, rinsing, drying): A lot  Putting on and taking off regular upper body clothing: A lot  Toileting, which includes using toilet, bedpan or urinal: Total  Taking care of personal grooming such as brushing teeth: A lot  Eating Meals: Total  Daily Activity - Total Score: 9  ICU Mobility Screen  ICU Mobility Scale: Sitting over edge of bed     Education Documentation  Body Mechanics, taught by Radha Foster OT at 1/17/2025  2:54 PM.  Learner: Patient  Readiness: Acceptance  Method: Explanation  Response: Verbalizes Understanding, Demonstrated Understanding    Precautions, taught by Radha Foster OT at 1/17/2025  2:54 PM.  Learner: Patient  Readiness: Acceptance  Method: Explanation  Response: Verbalizes Understanding, Demonstrated Understanding    Body Mechanics, taught by Radha Foster OT at 1/16/2025  3:56 PM.  Learner: Patient  Readiness: Acceptance  Method: Demonstration, Explanation  Response: Verbalizes Understanding    Precautions, taught by Radha Foster OT at 1/16/2025  3:56 PM.  Learner: Patient  Readiness: Acceptance  Method: Demonstration, Explanation  Response: Verbalizes Understanding    ADL Training, taught by Radha Foster OT at 1/16/2025  3:56 PM.  Learner: Patient  Readiness: Acceptance  Method: Demonstration, Explanation  Response: Verbalizes Understanding    Education  Comments  No comments found.      Goals:  Encounter Problems       Encounter Problems (Active)       ADLs       Patient will complete daily grooming tasks  with set-up level of assistance and PRN adaptive equipment while supported sitting. (Progressing)       Start:  12/30/24    Expected End:  01/20/25               BALANCE       Pt will maintain dynamic sitting  balance during ADL task with moderate assist level of assistance in order to demonstrate decreased risk of falling and improved postural control. (Progressing)       Start:  12/30/24    Expected End:  01/20/25               COGNITION/SAFETY       Patient will recall and adhere to spinal, MITT and abdominal precautions during all functional mobility/ADL tasks in order to demonstrate improved understanding and promote healing post op (Progressing)       Start:  12/30/24    Expected End:  01/20/25            Patient will score WFL on standardized cognitive assessment with visual cues and within reasonable time frame (Progressing)       Start:  12/30/24    Expected End:  01/20/25            Patient will follow >75% Simple commands to allow improved ADL performance. (Progressing)       Start:  12/30/24    Expected End:  01/20/25               TRANSFERS       Patient will perform bed mobility moderate assist level of assistance and bed rails and draw sheet in order to improve safety and independence with mobility (Progressing)       Start:  12/30/24    Expected End:  01/20/25            Patient will complete functional transfer to chair/BSC with least restrictive device with moderate assist level of assistance. (Progressing)       Start:  12/30/24    Expected End:  01/20/25 01/17/25 at 2:54 PM   Radha Foster, OT   [phone]
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
## 2025-01-18 ENCOUNTER — APPOINTMENT (OUTPATIENT)
Dept: RADIOLOGY | Facility: HOSPITAL | Age: 80
End: 2025-01-18
Payer: MEDICARE

## 2025-01-18 VITALS
HEIGHT: 76 IN | DIASTOLIC BLOOD PRESSURE: 92 MMHG | OXYGEN SATURATION: 84 % | HEART RATE: 91 BPM | TEMPERATURE: 98.1 F | WEIGHT: 239.42 LBS | SYSTOLIC BLOOD PRESSURE: 164 MMHG | RESPIRATION RATE: 32 BRPM | BODY MASS INDEX: 29.16 KG/M2

## 2025-01-18 LAB
ABO GROUP (TYPE) IN BLOOD: NORMAL
ALBUMIN SERPL BCP-MCNC: 2.1 G/DL (ref 3.4–5)
ALP SERPL-CCNC: 144 U/L (ref 33–136)
ALT SERPL W P-5'-P-CCNC: 36 U/L (ref 10–52)
ANION GAP SERPL CALC-SCNC: 15 MMOL/L (ref 10–20)
ANTIBODY SCREEN: NORMAL
APTT PPP: 27 SECONDS (ref 27–38)
AST SERPL W P-5'-P-CCNC: 33 U/L (ref 9–39)
BACTERIA SPEC CULT: ABNORMAL
BACTERIA SPEC CULT: ABNORMAL
BILIRUB DIRECT SERPL-MCNC: 0.9 MG/DL (ref 0–0.3)
BILIRUB SERPL-MCNC: 1.9 MG/DL (ref 0–1.2)
BUN SERPL-MCNC: 66 MG/DL (ref 6–23)
CALCIUM SERPL-MCNC: 7.7 MG/DL (ref 8.6–10.6)
CHLORIDE SERPL-SCNC: 100 MMOL/L (ref 98–107)
CO2 SERPL-SCNC: 24 MMOL/L (ref 21–32)
CREAT SERPL-MCNC: 5.58 MG/DL (ref 0.5–1.3)
EGFRCR SERPLBLD CKD-EPI 2021: 10 ML/MIN/1.73M*2
ERYTHROCYTE [DISTWIDTH] IN BLOOD BY AUTOMATED COUNT: 16.4 % (ref 11.5–14.5)
GLUCOSE BLD MANUAL STRIP-MCNC: 120 MG/DL (ref 74–99)
GLUCOSE BLD MANUAL STRIP-MCNC: 123 MG/DL (ref 74–99)
GLUCOSE BLD MANUAL STRIP-MCNC: 126 MG/DL (ref 74–99)
GLUCOSE SERPL-MCNC: 139 MG/DL (ref 74–99)
GRAM STN SPEC: ABNORMAL
HCT VFR BLD AUTO: 22.5 % (ref 41–52)
HGB BLD-MCNC: 7.4 G/DL (ref 13.5–17.5)
INR PPP: 1.2 (ref 0.9–1.1)
MAGNESIUM SERPL-MCNC: 2.25 MG/DL (ref 1.6–2.4)
MCH RBC QN AUTO: 28.8 PG (ref 26–34)
MCHC RBC AUTO-ENTMCNC: 32.9 G/DL (ref 32–36)
MCV RBC AUTO: 88 FL (ref 80–100)
NRBC BLD-RTO: 0 /100 WBCS (ref 0–0)
PHOSPHATE SERPL-MCNC: 6.5 MG/DL (ref 2.5–4.9)
PLATELET # BLD AUTO: 222 X10*3/UL (ref 150–450)
POTASSIUM SERPL-SCNC: 4.4 MMOL/L (ref 3.5–5.3)
PROT SERPL-MCNC: 5.8 G/DL (ref 6.4–8.2)
PROTHROMBIN TIME: 13.1 SECONDS (ref 9.8–12.8)
RBC # BLD AUTO: 2.57 X10*6/UL (ref 4.5–5.9)
RH FACTOR (ANTIGEN D): NORMAL
SODIUM SERPL-SCNC: 135 MMOL/L (ref 136–145)
WBC # BLD AUTO: 14.1 X10*3/UL (ref 4.4–11.3)

## 2025-01-18 PROCEDURE — 37799 UNLISTED PX VASCULAR SURGERY: CPT

## 2025-01-18 PROCEDURE — 83735 ASSAY OF MAGNESIUM: CPT

## 2025-01-18 PROCEDURE — 85027 COMPLETE CBC AUTOMATED: CPT

## 2025-01-18 PROCEDURE — 99232 SBSQ HOSP IP/OBS MODERATE 35: CPT | Performed by: STUDENT IN AN ORGANIZED HEALTH CARE EDUCATION/TRAINING PROGRAM

## 2025-01-18 PROCEDURE — 94003 VENT MGMT INPAT SUBQ DAY: CPT

## 2025-01-18 PROCEDURE — 99233 SBSQ HOSP IP/OBS HIGH 50: CPT | Performed by: STUDENT IN AN ORGANIZED HEALTH CARE EDUCATION/TRAINING PROGRAM

## 2025-01-18 PROCEDURE — 2500000005 HC RX 250 GENERAL PHARMACY W/O HCPCS: Performed by: STUDENT IN AN ORGANIZED HEALTH CARE EDUCATION/TRAINING PROGRAM

## 2025-01-18 PROCEDURE — 99239 HOSP IP/OBS DSCHRG MGMT >30: CPT | Performed by: NURSE PRACTITIONER

## 2025-01-18 PROCEDURE — 71045 X-RAY EXAM CHEST 1 VIEW: CPT

## 2025-01-18 PROCEDURE — 86901 BLOOD TYPING SEROLOGIC RH(D): CPT

## 2025-01-18 PROCEDURE — 84100 ASSAY OF PHOSPHORUS: CPT

## 2025-01-18 PROCEDURE — 2500000004 HC RX 250 GENERAL PHARMACY W/ HCPCS (ALT 636 FOR OP/ED)

## 2025-01-18 PROCEDURE — 85730 THROMBOPLASTIN TIME PARTIAL: CPT

## 2025-01-18 PROCEDURE — 82248 BILIRUBIN DIRECT: CPT

## 2025-01-18 PROCEDURE — 2500000004 HC RX 250 GENERAL PHARMACY W/ HCPCS (ALT 636 FOR OP/ED): Mod: TB | Performed by: STUDENT IN AN ORGANIZED HEALTH CARE EDUCATION/TRAINING PROGRAM

## 2025-01-18 PROCEDURE — 71045 X-RAY EXAM CHEST 1 VIEW: CPT | Performed by: RADIOLOGY

## 2025-01-18 PROCEDURE — 80053 COMPREHEN METABOLIC PANEL: CPT

## 2025-01-18 PROCEDURE — 82947 ASSAY GLUCOSE BLOOD QUANT: CPT

## 2025-01-18 PROCEDURE — 2500000001 HC RX 250 WO HCPCS SELF ADMINISTERED DRUGS (ALT 637 FOR MEDICARE OP): Performed by: STUDENT IN AN ORGANIZED HEALTH CARE EDUCATION/TRAINING PROGRAM

## 2025-01-18 PROCEDURE — 2500000004 HC RX 250 GENERAL PHARMACY W/ HCPCS (ALT 636 FOR OP/ED): Performed by: STUDENT IN AN ORGANIZED HEALTH CARE EDUCATION/TRAINING PROGRAM

## 2025-01-18 PROCEDURE — 99232 SBSQ HOSP IP/OBS MODERATE 35: CPT | Performed by: INTERNAL MEDICINE

## 2025-01-18 RX ORDER — TALC
6 POWDER (GRAM) TOPICAL NIGHTLY
Start: 2025-01-18

## 2025-01-18 RX ORDER — MEROPENEM AND SODIUM CHLORIDE 1 G/50ML
1 INJECTION, SOLUTION INTRAVENOUS DAILY
Start: 2025-01-18

## 2025-01-18 RX ORDER — HYDRALAZINE HYDROCHLORIDE 20 MG/ML
5 INJECTION INTRAMUSCULAR; INTRAVENOUS EVERY 6 HOURS PRN
Start: 2025-01-18

## 2025-01-18 RX ORDER — FLUCONAZOLE 2 MG/ML
200 INJECTION, SOLUTION INTRAVENOUS EVERY 24 HOURS
Start: 2025-01-18

## 2025-01-18 RX ORDER — HEPARIN SODIUM 5000 [USP'U]/ML
5000 INJECTION, SOLUTION INTRAVENOUS; SUBCUTANEOUS EVERY 8 HOURS
Start: 2025-01-18

## 2025-01-18 RX ORDER — ALBUTEROL SULFATE 0.83 MG/ML
2.5 SOLUTION RESPIRATORY (INHALATION) EVERY 6 HOURS PRN
Start: 2025-01-18

## 2025-01-18 RX ORDER — LOPERAMIDE HCL 1MG/7.5ML
2 LIQUID (ML) ORAL 2 TIMES DAILY
Start: 2025-01-18

## 2025-01-18 RX ORDER — PANTOPRAZOLE SODIUM 40 MG/10ML
40 INJECTION, POWDER, LYOPHILIZED, FOR SOLUTION INTRAVENOUS DAILY
Start: 2025-01-19

## 2025-01-18 RX ORDER — INSULIN LISPRO 100 [IU]/ML
0-5 INJECTION, SOLUTION INTRAVENOUS; SUBCUTANEOUS EVERY 6 HOURS
Start: 2025-01-18

## 2025-01-18 RX ORDER — LABETALOL HYDROCHLORIDE 5 MG/ML
10 INJECTION, SOLUTION INTRAVENOUS EVERY 4 HOURS PRN
Start: 2025-01-18

## 2025-01-18 RX ORDER — TALC
6 POWDER (GRAM) TOPICAL NIGHTLY
Status: DISCONTINUED | OUTPATIENT
Start: 2025-01-18 | End: 2025-01-18 | Stop reason: HOSPADM

## 2025-01-18 RX ADMIN — LABETALOL HYDROCHLORIDE 10 MG: 5 INJECTION, SOLUTION INTRAVENOUS at 13:21

## 2025-01-18 RX ADMIN — LABETALOL HYDROCHLORIDE 10 MG: 5 INJECTION, SOLUTION INTRAVENOUS at 06:54

## 2025-01-18 RX ADMIN — HEPARIN SODIUM 5000 UNITS: 5000 INJECTION INTRAVENOUS; SUBCUTANEOUS at 12:51

## 2025-01-18 RX ADMIN — HEPARIN SODIUM 5000 UNITS: 5000 INJECTION INTRAVENOUS; SUBCUTANEOUS at 05:54

## 2025-01-18 RX ADMIN — SULBACTAM AND DURLOBACTAM: KIT at 15:28

## 2025-01-18 RX ADMIN — SULBACTAM AND DURLOBACTAM: KIT at 04:00

## 2025-01-18 RX ADMIN — PANTOPRAZOLE SODIUM 40 MG: 40 INJECTION, POWDER, FOR SOLUTION INTRAVENOUS at 08:31

## 2025-01-18 RX ADMIN — MEROPENEM AND SODIUM CHLORIDE 1000 MG: 1 INJECTION, SOLUTION INTRAVENOUS at 15:28

## 2025-01-18 RX ADMIN — LOPERAMIDE HYDROCHLORIDE 2 MG: 2 SOLUTION ORAL at 08:31

## 2025-01-18 ASSESSMENT — PAIN SCALES - GENERAL
PAINLEVEL_OUTOF10: 0 - NO PAIN

## 2025-01-18 ASSESSMENT — PAIN - FUNCTIONAL ASSESSMENT
PAIN_FUNCTIONAL_ASSESSMENT: 0-10

## 2025-01-18 NOTE — CONSULTS
Nutrition Note:     Consult received for updated TPN recommendations d/t product shortage. Pt requires TPN d/t high output jejunostomy. RD recommended Clinimix E 5/15 @ 83ml/hr.     This service discussed shortage with on-call pharmacy admit. Per her report, this product is critically low, however should have enough for this patient to continue on for the weekend.    He currently has an order placed for Clinimix E 5/20 @ 83ml/hr + 250ml SMOF lipids @ 20.8ml/hr x 12 hours. TPN is running 5/20 @ 83ml/hr at this time. He is also receiving Pivot 1.5 @ 10ml/hr.       Labs reviewed.     Can continue current TPN order. Clinimix E 5/20 @ 83ml/hr + 250ml SMOF lipids provides 2250ml, 2260kcal, 100gm protein, 400gm carbohydrates.

## 2025-01-18 NOTE — CARE PLAN
Problem: Pain - Adult  Goal: Verbalizes/displays adequate comfort level or baseline comfort level  Outcome: Progressing     Problem: Safety - Adult  Goal: Free from fall injury  Outcome: Progressing     Problem: Discharge Planning  Goal: Discharge to home or other facility with appropriate resources  Outcome: Progressing     Problem: Chronic Conditions and Co-morbidities  Goal: Patient's chronic conditions and co-morbidity symptoms are monitored and maintained or improved  Outcome: Progressing     Problem: Skin  Goal: Decreased wound size/increased tissue granulation at next dressing change  Outcome: Progressing  Flowsheets (Taken 1/18/2025 1228)  Decreased wound size/increased tissue granulation at next dressing change:   Promote sleep for wound healing   Protective dressings over bony prominences  Goal: Participates in plan/prevention/treatment measures  Outcome: Progressing  Flowsheets (Taken 1/18/2025 1228)  Participates in plan/prevention/treatment measures:   Discuss with provider PT/OT consult   Elevate heels   Increase activity/out of bed for meals  Goal: Prevent/manage excess moisture  Outcome: Progressing  Flowsheets (Taken 1/18/2025 1228)  Prevent/manage excess moisture:   Cleanse incontinence/protect with barrier cream   Follow provider orders for dressing changes   Monitor for/manage infection if present   Moisturize dry skin  Goal: Prevent/minimize sheer/friction injuries  Outcome: Progressing  Flowsheets (Taken 1/18/2025 1228)  Prevent/minimize sheer/friction injuries:   Complete micro-shifts as needed if patient unable. Adjust patient position to relieve pressure points, not a full turn   HOB 30 degrees or less   Increase activity/out of bed for meals   Turn/reposition every 2 hours/use positioning/transfer devices   Use pull sheet  Goal: Promote/optimize nutrition  Outcome: Progressing  Flowsheets (Taken 1/18/2025 1228)  Promote/optimize nutrition: Monitor/record intake including meals  Goal:  Promote skin healing  Outcome: Progressing  Flowsheets (Taken 1/18/2025 9551)  Promote skin healing:   Assess skin/pad under line(s)/device(s)   Ensure correct size (line/device) and apply per  instructions   Protective dressings over bony prominences   Rotate device position/do not position patient on device   Turn/reposition every 2 hours/use positioning/transfer devices     Problem: Fall/Injury  Goal: Not fall by end of shift  Outcome: Progressing  Goal: Be free from injury by end of the shift  Outcome: Progressing  Goal: Verbalize understanding of personal risk factors for fall in the hospital  Outcome: Progressing  Goal: Verbalize understanding of risk factor reduction measures to prevent injury from fall in the home  Outcome: Progressing  Goal: Use assistive devices by end of the shift  Outcome: Progressing  Goal: Pace activities to prevent fatigue by end of the shift  Outcome: Progressing     Problem: Pain  Goal: Takes deep breaths with improved pain control throughout the shift  Outcome: Progressing  Goal: Turns in bed with improved pain control throughout the shift  Outcome: Progressing  Goal: Walks with improved pain control throughout the shift  Outcome: Progressing  Goal: Performs ADL's with improved pain control throughout shift  Outcome: Progressing  Goal: Participates in PT with improved pain control throughout the shift  Outcome: Progressing  Goal: Free from opioid side effects throughout the shift  Outcome: Progressing  Goal: Free from acute confusion related to pain meds throughout the shift  Outcome: Progressing     Problem: Respiratory  Goal: Clear secretions with interventions this shift  Outcome: Progressing  Goal: Minimize anxiety/maximize coping throughout shift  Outcome: Progressing  Goal: Minimal/no exertional discomfort or dyspnea this shift  Outcome: Progressing  Goal: No signs of respiratory distress (eg. Use of accessory muscles. Peds grunting)  Outcome: Progressing  Goal:  Patent airway maintained this shift  Outcome: Progressing  Goal: Tolerate mechanical ventilation evidenced by VS/agitation level this shift  Outcome: Progressing  Goal: Tolerate pulmonary toileting this shift  Outcome: Progressing  Goal: Verbalize decreased shortness of breath this shift  Outcome: Progressing  Goal: Wean oxygen to maintain O2 saturation per order/standard this shift  Outcome: Progressing  Goal: Increase self care and/or family involvement in next 24 hours  Outcome: Progressing     Problem: Swallowing  Goal: LTG - Patient will demonstrate safe swallowing Intervention/techniques  Outcome: Progressing   The patient's goals for the shift include      The clinical goals for the shift include Patient will remain HDS throughout shift.
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
## 2025-01-18 NOTE — SIGNIFICANT EVENT
Patient began feeling dyspneic after day of trach collar, RT noted that pt satting high 80s with vent alarm indicating leak. Balloon inflated without resolution, pt still able to speak through PMV. Likely leak/rupture in balloon, exchanged at bedside with identical 6 timothy. Original one (placed on 1/7) noted to have rupture in balloon. Vent alarm resolved with appropriate ventilation pressures, dyspnea improved. Pending CXR.     Seen and discussed with Dr. Finch
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
## 2025-01-18 NOTE — SIGNIFICANT EVENT
Pt started shift on trach collar.  Around 21:30, pt started feeling more dyspneic and RN/MD were ok with pt going on vent for the night.  I removed pt PMV, inflated his cuff and put him on the vent.  Pt was able to phonate around the cuff and was expressing difficulty breathing.  Checked cuff pressure and it was 30 but vent also indicating a large leak.  I notified MD immediately who contacted attending.  I felt the 6shiley trach was too small for his airway as the balloon didn't deflate significantly during this episode.  MD attempted to replace with 8.0 but stoma too small.  Replaced with 6.0 and cuff inflated.  Pt back on vent and cuff showed a good seal.  Pt still complaining of dyspnea but vent delivering good volumes, sats good in 90s and no leak observed.  Pt may be experiencing anxiety from this process and we will continue to monitor.  MD ordered CXR to make sure lung inflated/position of airway.

## 2025-01-18 NOTE — PROGRESS NOTES
The patient is now being prescribed Diflucan, Meropenem and antibiotic is Xacduro. AMANDA spoke with Sonya at [facility] and she is checking to see if they can accommodate the noted medications.
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
## 2025-01-28 LAB
BACTERIA SPEC CULT: ABNORMAL
BACTERIA SPEC CULT: ABNORMAL
GRAM STN SPEC: ABNORMAL
LABORATORY COMMENT REPORT: ABNORMAL

## 2025-01-29 ENCOUNTER — APPOINTMENT (OUTPATIENT)
Dept: SURGERY | Facility: CLINIC | Age: 80
End: 2025-01-29
Payer: MEDICARE

## 2025-02-02 ENCOUNTER — APPOINTMENT (OUTPATIENT)
Dept: RADIOLOGY | Facility: HOSPITAL | Age: 80
End: 2025-02-02
Payer: MEDICARE

## 2025-02-02 ENCOUNTER — HOSPITAL ENCOUNTER (INPATIENT)
Facility: HOSPITAL | Age: 80
End: 2025-02-02
Attending: SURGERY | Admitting: SURGERY
Payer: MEDICARE

## 2025-02-02 ENCOUNTER — APPOINTMENT (OUTPATIENT)
Dept: RADIOLOGY | Facility: HOSPITAL | Age: 80
DRG: 394 | End: 2025-02-02
Payer: MEDICARE

## 2025-02-02 VITALS
RESPIRATION RATE: 18 BRPM | TEMPERATURE: 97.2 F | SYSTOLIC BLOOD PRESSURE: 161 MMHG | OXYGEN SATURATION: 100 % | DIASTOLIC BLOOD PRESSURE: 81 MMHG | HEART RATE: 89 BPM

## 2025-02-02 DIAGNOSIS — S32.040S COMPRESSION FRACTURE OF L4 VERTEBRA, SEQUELA: ICD-10-CM

## 2025-02-02 DIAGNOSIS — E86.0 DEHYDRATION: ICD-10-CM

## 2025-02-02 DIAGNOSIS — D64.9 ANEMIA, UNSPECIFIED TYPE: ICD-10-CM

## 2025-02-02 DIAGNOSIS — R53.1 WEAKNESS GENERALIZED: ICD-10-CM

## 2025-02-02 DIAGNOSIS — S32.051A: ICD-10-CM

## 2025-02-02 DIAGNOSIS — E46 MALNUTRITION, UNSPECIFIED TYPE (MULTI): ICD-10-CM
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
## 2025-02-02 DIAGNOSIS — K31.6 HIGH-OUTPUT EXTERNAL GASTROINTESTINAL FISTULA: Primary | ICD-10-CM

## 2025-02-02 DIAGNOSIS — L90.0 LICHEN SCLEROSUS: ICD-10-CM

## 2025-02-02 LAB
25(OH)D3 SERPL-MCNC: 31 NG/ML (ref 30–100)
ABO GROUP (TYPE) IN BLOOD: NORMAL
ALBUMIN SERPL BCP-MCNC: 2.1 G/DL (ref 3.4–5)
ALBUMIN SERPL BCP-MCNC: 2.4 G/DL (ref 3.4–5)
ALBUMIN SERPL BCP-MCNC: 2.5 G/DL (ref 3.4–5)
ALP SERPL-CCNC: 175 U/L (ref 33–136)
ALT SERPL W P-5'-P-CCNC: 115 U/L (ref 10–52)
ANION GAP SERPL CALC-SCNC: 10 MMOL/L (ref 10–20)
ANION GAP SERPL CALC-SCNC: 11 MMOL/L (ref 10–20)
ANTIBODY SCREEN: NORMAL
APTT PPP: 24 SECONDS (ref 27–38)
AST SERPL W P-5'-P-CCNC: 60 U/L (ref 9–39)
BILIRUB DIRECT SERPL-MCNC: 0.8 MG/DL (ref 0–0.3)
BILIRUB SERPL-MCNC: 2.3 MG/DL (ref 0–1.2)
BUN SERPL-MCNC: 40 MG/DL (ref 6–23)
BUN SERPL-MCNC: 44 MG/DL (ref 6–23)
CALCIUM SERPL-MCNC: 7.9 MG/DL (ref 8.6–10.6)
CALCIUM SERPL-MCNC: 8.2 MG/DL (ref 8.6–10.6)
CHLORIDE SERPL-SCNC: 106 MMOL/L (ref 98–107)
CHLORIDE SERPL-SCNC: 112 MMOL/L (ref 98–107)
CO2 SERPL-SCNC: 23 MMOL/L (ref 21–32)
CO2 SERPL-SCNC: 27 MMOL/L (ref 21–32)
CREAT SERPL-MCNC: 1.37 MG/DL (ref 0.5–1.3)
CREAT SERPL-MCNC: 1.43 MG/DL (ref 0.5–1.3)
EGFRCR SERPLBLD CKD-EPI 2021: 50 ML/MIN/1.73M*2
EGFRCR SERPLBLD CKD-EPI 2021: 53 ML/MIN/1.73M*2
ERYTHROCYTE [DISTWIDTH] IN BLOOD BY AUTOMATED COUNT: 17.4 % (ref 11.5–14.5)
FOLATE SERPL-MCNC: 9.6 NG/ML
GLUCOSE BLD MANUAL STRIP-MCNC: 116 MG/DL (ref 74–99)
GLUCOSE SERPL-MCNC: 1045 MG/DL (ref 74–99)
GLUCOSE SERPL-MCNC: 116 MG/DL (ref 74–99)
HCT VFR BLD AUTO: 24.1 % (ref 41–52)
HGB BLD-MCNC: 7.7 G/DL (ref 13.5–17.5)
INR PPP: 1.1 (ref 0.9–1.1)
MAGNESIUM SERPL-MCNC: 2.03 MG/DL (ref 1.6–2.4)
MCH RBC QN AUTO: 34.8 PG (ref 26–34)
MCHC RBC AUTO-ENTMCNC: 32 G/DL (ref 32–36)
MCV RBC AUTO: 109 FL (ref 80–100)
NRBC BLD-RTO: 0 /100 WBCS (ref 0–0)
PHOSPHATE SERPL-MCNC: 1.7 MG/DL (ref 2.5–4.9)
PHOSPHATE SERPL-MCNC: 1.9 MG/DL (ref 2.5–4.9)
PLATELET # BLD AUTO: 123 X10*3/UL (ref 150–450)
POTASSIUM SERPL-SCNC: 4.7 MMOL/L (ref 3.5–5.3)
POTASSIUM SERPL-SCNC: 8.7 MMOL/L (ref 3.5–5.3)
PROT SERPL-MCNC: 6.6 G/DL (ref 6.4–8.2)
PROTHROMBIN TIME: 12.3 SECONDS (ref 9.8–12.8)
RBC # BLD AUTO: 2.21 X10*6/UL (ref 4.5–5.9)
RH FACTOR (ANTIGEN D): NORMAL
SODIUM SERPL-SCNC: 131 MMOL/L (ref 136–145)
SODIUM SERPL-SCNC: 144 MMOL/L (ref 136–145)
T4 FREE SERPL-MCNC: 1.41 NG/DL (ref 0.78–1.48)
TSH SERPL-ACNC: 14.18 MIU/L (ref 0.44–3.98)
VIT B12 SERPL-MCNC: 706 PG/ML (ref 211–911)
WBC # BLD AUTO: 8.4 X10*3/UL (ref 4.4–11.3)

## 2025-02-02 PROCEDURE — 82248 BILIRUBIN DIRECT: CPT | Performed by: STUDENT IN AN ORGANIZED HEALTH CARE EDUCATION/TRAINING PROGRAM

## 2025-02-02 PROCEDURE — 71250 CT THORAX DX C-: CPT

## 2025-02-02 PROCEDURE — 2580000001 HC RX 258 IV SOLUTIONS

## 2025-02-02 PROCEDURE — 3E0436Z INTRODUCTION OF NUTRITIONAL SUBSTANCE INTO CENTRAL VEIN, PERCUTANEOUS APPROACH: ICD-10-PCS | Performed by: SURGERY

## 2025-02-02 PROCEDURE — 82947 ASSAY GLUCOSE BLOOD QUANT: CPT

## 2025-02-02 PROCEDURE — 86850 RBC ANTIBODY SCREEN: CPT

## 2025-02-02 PROCEDURE — 82306 VITAMIN D 25 HYDROXY: CPT | Performed by: STUDENT IN AN ORGANIZED HEALTH CARE EDUCATION/TRAINING PROGRAM

## 2025-02-02 PROCEDURE — 83735 ASSAY OF MAGNESIUM: CPT

## 2025-02-02 PROCEDURE — 99222 1ST HOSP IP/OBS MODERATE 55: CPT

## 2025-02-02 PROCEDURE — 82746 ASSAY OF FOLIC ACID SERUM: CPT | Performed by: STUDENT IN AN ORGANIZED HEALTH CARE EDUCATION/TRAINING PROGRAM

## 2025-02-02 PROCEDURE — 85730 THROMBOPLASTIN TIME PARTIAL: CPT

## 2025-02-02 PROCEDURE — 82607 VITAMIN B-12: CPT | Performed by: STUDENT IN AN ORGANIZED HEALTH CARE EDUCATION/TRAINING PROGRAM

## 2025-02-02 PROCEDURE — 2500000004 HC RX 250 GENERAL PHARMACY W/ HCPCS (ALT 636 FOR OP/ED)

## 2025-02-02 PROCEDURE — 2550000001 HC RX 255 CONTRASTS: Performed by: STUDENT IN AN ORGANIZED HEALTH CARE EDUCATION/TRAINING PROGRAM

## 2025-02-02 PROCEDURE — 74176 CT ABD & PELVIS W/O CONTRAST: CPT | Performed by: RADIOLOGY

## 2025-02-02 PROCEDURE — 85027 COMPLETE CBC AUTOMATED: CPT

## 2025-02-02 PROCEDURE — 80053 COMPREHEN METABOLIC PANEL: CPT

## 2025-02-02 PROCEDURE — 71250 CT THORAX DX C-: CPT | Performed by: RADIOLOGY

## 2025-02-02 PROCEDURE — 74176 CT ABD & PELVIS W/O CONTRAST: CPT

## 2025-02-02 PROCEDURE — 84439 ASSAY OF FREE THYROXINE: CPT | Performed by: STUDENT IN AN ORGANIZED HEALTH CARE EDUCATION/TRAINING PROGRAM

## 2025-02-02 PROCEDURE — 1200000002 HC GENERAL ROOM WITH TELEMETRY DAILY

## 2025-02-02 PROCEDURE — 84443 ASSAY THYROID STIM HORMONE: CPT | Performed by: STUDENT IN AN ORGANIZED HEALTH CARE EDUCATION/TRAINING PROGRAM

## 2025-02-02 RX ORDER — ACETAMINOPHEN 325 MG/1
975 TABLET ORAL EVERY 8 HOURS SCHEDULED
Status: DISCONTINUED | OUTPATIENT
Start: 2025-02-02 | End: 2025-02-02

## 2025-02-02 RX ORDER — ACETAMINOPHEN 325 MG/1
975 TABLET ORAL EVERY 8 HOURS PRN
Status: DISCONTINUED | OUTPATIENT
Start: 2025-02-02 | End: 2025-02-02

## 2025-02-02 RX ORDER — ACETAMINOPHEN 10 MG/ML
1000 INJECTION, SOLUTION INTRAVENOUS EVERY 8 HOURS SCHEDULED
Status: COMPLETED | OUTPATIENT
Start: 2025-02-03 | End: 2025-02-03

## 2025-02-02 RX ORDER — TALC
6 POWDER (GRAM) TOPICAL NIGHTLY
Status: DISCONTINUED | OUTPATIENT
Start: 2025-02-02 | End: 2025-03-02 | Stop reason: HOSPADM

## 2025-02-02 RX ORDER — ACETAMINOPHEN 10 MG/ML
1000 INJECTION, SOLUTION INTRAVENOUS EVERY 8 HOURS SCHEDULED
Status: DISCONTINUED | OUTPATIENT
Start: 2025-02-03 | End: 2025-02-02

## 2025-02-02 RX ORDER — DIATRIZOATE MEGLUMINE AND DIATRIZOATE SODIUM 660; 100 MG/ML; MG/ML
30 SOLUTION ORAL; RECTAL ONCE
Status: COMPLETED | OUTPATIENT
Start: 2025-02-02 | End: 2025-02-02

## 2025-02-02 RX ORDER — HYDROMORPHONE HYDROCHLORIDE 0.2 MG/ML
0.2 INJECTION INTRAMUSCULAR; INTRAVENOUS; SUBCUTANEOUS
Status: DISCONTINUED | OUTPATIENT
Start: 2025-02-02 | End: 2025-02-04

## 2025-02-02 RX ORDER — ALBUTEROL SULFATE 0.83 MG/ML
2.5 SOLUTION RESPIRATORY (INHALATION) EVERY 6 HOURS PRN
Status: DISCONTINUED | OUTPATIENT
Start: 2025-02-02 | End: 2025-03-02 | Stop reason: HOSPADM

## 2025-02-02 RX ORDER — INSULIN LISPRO 100 [IU]/ML
0-5 INJECTION, SOLUTION INTRAVENOUS; SUBCUTANEOUS
Status: DISCONTINUED | OUTPATIENT
Start: 2025-02-03 | End: 2025-02-06

## 2025-02-02 RX ORDER — HEPARIN SODIUM 5000 [USP'U]/ML
5000 INJECTION, SOLUTION INTRAVENOUS; SUBCUTANEOUS EVERY 8 HOURS SCHEDULED
Status: DISCONTINUED | OUTPATIENT
Start: 2025-02-02 | End: 2025-03-01

## 2025-02-02 RX ORDER — DEXTROSE MONOHYDRATE 50 MG/ML
75 INJECTION, SOLUTION INTRAVENOUS CONTINUOUS
Status: DISPENSED | OUTPATIENT
Start: 2025-02-02 | End: 2025-02-03

## 2025-02-02 RX ORDER — TALC
3 POWDER (GRAM) TOPICAL NIGHTLY PRN
Status: DISCONTINUED | OUTPATIENT
Start: 2025-02-02 | End: 2025-02-02

## 2025-02-02 RX ORDER — PANTOPRAZOLE SODIUM 40 MG/10ML
40 INJECTION, POWDER, LYOPHILIZED, FOR SOLUTION INTRAVENOUS DAILY
Status: DISCONTINUED | OUTPATIENT
Start: 2025-02-02 | End: 2025-03-02 | Stop reason: HOSPADM

## 2025-02-02 RX ORDER — INSULIN LISPRO 100 [IU]/ML
0-5 INJECTION, SOLUTION INTRAVENOUS; SUBCUTANEOUS EVERY 6 HOURS
Status: DISCONTINUED | OUTPATIENT
Start: 2025-02-02 | End: 2025-02-02

## 2025-02-02 RX ADMIN — HEPARIN SODIUM 5000 UNITS: 5000 INJECTION, SOLUTION INTRAVENOUS; SUBCUTANEOUS at 23:26

## 2025-02-02 RX ADMIN — HYDROMORPHONE HYDROCHLORIDE 0.4 MG: 0.5 INJECTION, SOLUTION INTRAMUSCULAR; INTRAVENOUS; SUBCUTANEOUS at 21:15

## 2025-02-02 RX ADMIN — DIATRIZOATE MEGLUMINE AND DIATRIZOATE SODIUM 30 ML: 660; 100 LIQUID ORAL; RECTAL at 14:49

## 2025-02-02 RX ADMIN — PANTOPRAZOLE SODIUM 40 MG: 40 INJECTION, POWDER, FOR SOLUTION INTRAVENOUS at 08:55

## 2025-02-02 RX ADMIN — HEPARIN SODIUM 5000 UNITS: 5000 INJECTION, SOLUTION INTRAVENOUS; SUBCUTANEOUS at 15:18

## 2025-02-02 RX ADMIN — HEPARIN SODIUM 5000 UNITS: 5000 INJECTION, SOLUTION INTRAVENOUS; SUBCUTANEOUS at 06:04

## 2025-02-02 RX ADMIN — DEXTROSE MONOHYDRATE 75 ML/HR: 50 INJECTION, SOLUTION INTRAVENOUS at 23:18

## 2025-02-02 RX ADMIN — SMOFLIPID 50 G: 6; 6; 5; 3 INJECTION, EMULSION INTRAVENOUS at 21:12

## 2025-02-02 SDOH — SOCIAL STABILITY: SOCIAL INSECURITY: HAS ANYONE EVER THREATENED TO HURT YOUR FAMILY OR YOUR PETS?: NO

## 2025-02-02 SDOH — ECONOMIC STABILITY: HOUSING INSECURITY: IN THE LAST 12 MONTHS, WAS THERE A TIME WHEN YOU WERE NOT ABLE TO PAY THE MORTGAGE OR RENT ON TIME?: NO

## 2025-02-02 SDOH — HEALTH STABILITY: PHYSICAL HEALTH: ON AVERAGE, HOW MANY DAYS PER WEEK DO YOU ENGAGE IN MODERATE TO STRENUOUS EXERCISE (LIKE A BRISK WALK)?: 0 DAYS

## 2025-02-02 SDOH — SOCIAL STABILITY: SOCIAL INSECURITY: HAVE YOU HAD THOUGHTS OF HARMING ANYONE ELSE?: NO

## 2025-02-02 SDOH — SOCIAL STABILITY: SOCIAL INSECURITY: POSSIBLE ABUSE REPORTED TO:: OTHER (COMMENT)

## 2025-02-02 SDOH — SOCIAL STABILITY: SOCIAL INSECURITY: ARE THERE ANY APPARENT SIGNS OF INJURIES/BEHAVIORS THAT COULD BE RELATED TO ABUSE/NEGLECT?: NO

## 2025-02-02 SDOH — HEALTH STABILITY: PHYSICAL HEALTH: ON AVERAGE, HOW MANY MINUTES DO YOU ENGAGE IN EXERCISE AT THIS LEVEL?: 0 MIN

## 2025-02-02 SDOH — SOCIAL STABILITY: SOCIAL INSECURITY: HAVE YOU HAD ANY THOUGHTS OF HARMING ANYONE ELSE?: NO

## 2025-02-02 SDOH — SOCIAL STABILITY: SOCIAL INSECURITY: DO YOU FEEL ANYONE HAS EXPLOITED OR TAKEN ADVANTAGE OF YOU FINANCIALLY OR OF YOUR PERSONAL PROPERTY?: NO

## 2025-02-02 SDOH — SOCIAL STABILITY: SOCIAL INSECURITY: WERE YOU ABLE TO COMPLETE ALL THE BEHAVIORAL HEALTH SCREENINGS?: YES

## 2025-02-02 SDOH — SOCIAL STABILITY: SOCIAL INSECURITY: ARE YOU OR HAVE YOU BEEN THREATENED OR ABUSED PHYSICALLY, EMOTIONALLY, OR SEXUALLY BY ANYONE?: NO

## 2025-02-02 SDOH — SOCIAL STABILITY: SOCIAL INSECURITY: ABUSE: ADULT

## 2025-02-02 SDOH — ECONOMIC STABILITY: FOOD INSECURITY: HOW HARD IS IT FOR YOU TO PAY FOR THE VERY BASICS LIKE FOOD, HOUSING, MEDICAL CARE, AND HEATING?: VERY HARD

## 2025-02-02 SDOH — SOCIAL STABILITY: SOCIAL INSECURITY: DOES ANYONE TRY TO KEEP YOU FROM HAVING/CONTACTING OTHER FRIENDS OR DOING THINGS OUTSIDE YOUR HOME?: NO

## 2025-02-02 SDOH — SOCIAL STABILITY: SOCIAL INSECURITY: DO YOU FEEL UNSAFE GOING BACK TO THE PLACE WHERE YOU ARE LIVING?: NO

## 2025-02-02 ASSESSMENT — COGNITIVE AND FUNCTIONAL STATUS - GENERAL
MOVING FROM LYING ON BACK TO SITTING ON SIDE OF FLAT BED WITH BEDRAILS: TOTAL
STANDING UP FROM CHAIR USING ARMS: A LOT
MOVING TO AND FROM BED TO CHAIR: A LOT
MOVING FROM LYING ON BACK TO SITTING ON SIDE OF FLAT BED WITH BEDRAILS: TOTAL
TURNING FROM BACK TO SIDE WHILE IN FLAT BAD: A LOT
HELP NEEDED FOR BATHING: TOTAL
TURNING FROM BACK TO SIDE WHILE IN FLAT BAD: A LOT
MOBILITY SCORE: 11
STANDING UP FROM CHAIR USING ARMS: A LOT
DRESSING REGULAR LOWER BODY CLOTHING: TOTAL
EATING MEALS: A LITTLE
CLIMB 3 TO 5 STEPS WITH RAILING: A LOT
HELP NEEDED FOR BATHING: TOTAL
CLIMB 3 TO 5 STEPS WITH RAILING: A LOT
WALKING IN HOSPITAL ROOM: A LOT
DRESSING REGULAR LOWER BODY CLOTHING: TOTAL
MOBILITY SCORE: 11
TOILETING: A LOT
PERSONAL GROOMING: A LITTLE
DAILY ACTIVITIY SCORE: 11
PERSONAL GROOMING: A LITTLE
WALKING IN HOSPITAL ROOM: A LOT
DRESSING REGULAR UPPER BODY CLOTHING: TOTAL
DRESSING REGULAR UPPER BODY CLOTHING: TOTAL
MOVING TO AND FROM BED TO CHAIR: A LOT
EATING MEALS: A LITTLE
DAILY ACTIVITIY SCORE: 13

## 2025-02-02 ASSESSMENT — ENCOUNTER SYMPTOMS
DIAPHORESIS: 0
CONFUSION: 0
SHORTNESS OF BREATH: 0
AGITATION: 0
BACK PAIN: 1
ABDOMINAL PAIN: 1
RHINORRHEA: 0
ABDOMINAL DISTENTION: 0
HEMATURIA: 0
HEADACHES: 0

## 2025-02-02 ASSESSMENT — LIFESTYLE VARIABLES
HOW MANY STANDARD DRINKS CONTAINING ALCOHOL DO YOU HAVE ON A TYPICAL DAY: PATIENT DOES NOT DRINK
HOW OFTEN DO YOU HAVE 6 OR MORE DRINKS ON ONE OCCASION: NEVER
SKIP TO QUESTIONS 9-10: 1
HOW OFTEN DO YOU HAVE A DRINK CONTAINING ALCOHOL: NEVER
AUDIT-C TOTAL SCORE: 0
AUDIT-C TOTAL SCORE: 0
SUBSTANCE_ABUSE_PAST_12_MONTHS: NO
PRESCIPTION_ABUSE_PAST_12_MONTHS: NO

## 2025-02-02 ASSESSMENT — PATIENT HEALTH QUESTIONNAIRE - PHQ9
SUM OF ALL RESPONSES TO PHQ9 QUESTIONS 1 & 2: 0
2. FEELING DOWN, DEPRESSED OR HOPELESS: NOT AT ALL
1. LITTLE INTEREST OR PLEASURE IN DOING THINGS: NOT AT ALL

## 2025-02-02 ASSESSMENT — COLUMBIA-SUICIDE SEVERITY RATING SCALE - C-SSRS
2. HAVE YOU ACTUALLY HAD ANY THOUGHTS OF KILLING YOURSELF?: NO
1. IN THE PAST MONTH, HAVE YOU WISHED YOU WERE DEAD OR WISHED YOU COULD GO TO SLEEP AND NOT WAKE UP?: NO
6. HAVE YOU EVER DONE ANYTHING, STARTED TO DO ANYTHING, OR PREPARED TO DO ANYTHING TO END YOUR LIFE?: NO

## 2025-02-02 ASSESSMENT — PAIN - FUNCTIONAL ASSESSMENT
PAIN_FUNCTIONAL_ASSESSMENT: 0-10
PAIN_FUNCTIONAL_ASSESSMENT: 0-10

## 2025-02-02 ASSESSMENT — PAIN DESCRIPTION - DESCRIPTORS: DESCRIPTORS: ACHING;DISCOMFORT;SORE

## 2025-02-02 ASSESSMENT — PAIN SCALES - GENERAL
PAINLEVEL_OUTOF10: 6
PAINLEVEL_OUTOF10: 0 - NO PAIN

## 2025-02-02 ASSESSMENT — ACTIVITIES OF DAILY LIVING (ADL): LACK_OF_TRANSPORTATION: NO

## 2025-02-02 NOTE — PROGRESS NOTES
Pharmacy Medication History Review    Ike Gar is a 79 y.o. male admitted for High-output external gastrointestinal fistula. Pharmacy reviewed the patient's [id number] medications and allergies for accuracy.    Medications ADDED:  N/A  Medications CHANGED:  N/A  Medications REMOVED:   N/A     The list below reflects the updated PTA list.   Prior to Admission Medications   Prescriptions Last Dose Informant   Adult Clinimix Parenteral Nutrition Continuous     Sig: Infuse 83 mL/hr at 83 mL/hr over 24 hours into a venous catheter (central line) continuously.   HYDROmorphone (Dilaudid) 0.5 mg/0.5 mL solution injection     Sig: Infuse 0.4 mL (0.4 mg) into a venous catheter every 3 hours if needed (CPOT >3).   albuterol 2.5 mg /3 mL (0.083 %) nebulizer solution     Sig: Take 3 mL (2.5 mg) by nebulization every 6 hours if needed for wheezing.   fat emulsion fish oil/plant based (SMOFlipid) 20 % emulsion     Sig: Infuse 250 mL (50 g) at 20.8 mL/hr over 12 hours into a venous catheter Daily Lipids.   fluconazole (Diflucan) 200 mg/100 mL IVPB     Sig: Infuse 100 mL (200 mg) at 100 mL/hr over 60 minutes into a venous catheter once every 24 hours.   heparin sodium,porcine (heparin, porcine,) 5,000 unit/mL injection     Sig: Inject 1 mL (5,000 Units) under the skin every 8 hours. DVT prophylaxis   hydrALAZINE (Apresoline) 20 mg/mL injection     Sig: Infuse 0.25 mL (5 mg) into a venous catheter every 6 hours if needed (SBP >160).   insulin lispro 100 unit/mL injection     Sig: Inject 0-5 Units under the skin every 6 hours. Take as directed per insulin instructions.   labetaloL (Normodyne,Trandate) 5 mg/mL injection     Sig: Infuse 2 mL (10 mg) into a venous catheter every 4 hours if needed (SBP >160).   loperamide (Imodium A-D) 1 mg/7.5 mL liquid     Sig: 15 mL (2 mg) by g-tube route 2 times a day.   lubricating eye drops ophthalmic solution     Sig: Administer 1 drop into both eyes if needed for dry eyes.   melatonin 3  "mg tablet     Sig: Take 2 tablets (6 mg) by mouth once daily at bedtime.   meropenem (Merrem) 1 gram/50 mL piggyback IV     Sig: Infuse 50 mL (1 g) at 100 mL/hr over 0.5 hours into a venous catheter once daily.   oxygen (O2) gas therapy     Sig: Inhale 1 each once every 24 hours.   pantoprazole (ProtoNix) 40 mg injection     Sig: Infuse 40 mg into a venous catheter once daily.      Facility-Administered Medications: None        The list below reflects the updated allergy list. Please review each documented allergy for additional clarification and justification.  Allergies  Reviewed by Fred Billings on 2/2/2025        Severity Reactions Comments    Meperidine Not Specified Nausea/vomiting     Prochlorperazine Not Specified Nausea/vomiting             Patient accepts M2B at discharge.     Sources:   [facility]  Pharmacy dispense history  Child, Good historian     Additional Comments:  I interviewed the patients son over the phone, and he reports the only medication the patient was taking at home was the lisinopril 5 mg tablet 90 day supply filled on 11/19/24, but the patient inconsistent when it came to taking the medication.      FRED BILLINGS  Pharmacy Technician  02/02/25     Secure Chat preferred   If no response call [phone] or [employer] \"Med Rec\"   "

## 2025-02-02 NOTE — LETTER
February 7, 2025     Patient: Ike Gar   YOB: 1945   Date of Visit: 1/31/2025       To Whom It May Concern:    It is my medical opinion that Ike Gar {Work release (duty restriction):04566}.    If you have any questions or concerns, please don't hesitate to call.         Sincerely,        No name on file    CC: No Recipients

## 2025-02-02 NOTE — PROGRESS NOTES
02/02/25 7597   Discharge Planning   Living Arrangements Spouse/significant other   Support Systems Spouse/significant other   Type of Residence Private residence   Home or Post Acute Services Post acute facilities (Rehab/SNF/etc)   Type of Post Acute Facility Services Long term care   Expected Discharge Disposition Inter   Does the patient need discharge transport arranged? Yes   Financial Resource Strain   How hard is it for you to pay for the very basics like food, housing, medical care, and heating? Very Hard   Housing Stability   In the last 12 months, was there a time when you were not able to pay the mortgage or rent on time? N   In the past 12 months, how many times have you moved where you were living? 1   At any time in the past 12 months, were you homeless or living in a shelter (including now)? N   Transportation Needs   In the past 12 months, has lack of transportation kept you from medical appointments or from getting medications? no   In the past 12 months, has lack of transportation kept you from meetings, work, or from getting things needed for daily living? No     Pt's son Ike contacted by cell phone. He states he is HCPOA and will be making decisions for his father this admission and requesting all communication through him  [phone]. TCC introduced self and role during conversation. Ike states patient was previously at the [facility] before readmission to the hospital. Pt currently has trach, PEG and receiving ABX and TPN. Pt's son would like to explore discharge options once patient has been thoroughly evaluated. He states his father wasn't close to returning home while at the [facility] and he and his mother did not have opportunities to learn how to care for some of his physical needs while admitted there.      PCP is Dr Juan Ramon Tran and preferred pharmacy is [facility].    TCC will continue to follow for safe discharge this admission.    Sonya Morales RN  (Weekend  Transitional Care Coordinator-TCC, send Epic message)
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
## 2025-02-03 LAB
ALBUMIN SERPL BCP-MCNC: 2.2 G/DL (ref 3.4–5)
ANION GAP SERPL CALC-SCNC: 11 MMOL/L (ref 10–20)
BUN SERPL-MCNC: 40 MG/DL (ref 6–23)
CALCIUM SERPL-MCNC: 8 MG/DL (ref 8.6–10.6)
CHLORIDE SERPL-SCNC: 110 MMOL/L (ref 98–107)
CO2 SERPL-SCNC: 27 MMOL/L (ref 21–32)
CREAT SERPL-MCNC: 1.37 MG/DL (ref 0.5–1.3)
EGFRCR SERPLBLD CKD-EPI 2021: 53 ML/MIN/1.73M*2
ERYTHROCYTE [DISTWIDTH] IN BLOOD BY AUTOMATED COUNT: 16.1 % (ref 11.5–14.5)
GLUCOSE BLD MANUAL STRIP-MCNC: 122 MG/DL (ref 74–99)
GLUCOSE BLD MANUAL STRIP-MCNC: 89 MG/DL (ref 74–99)
GLUCOSE BLD MANUAL STRIP-MCNC: 90 MG/DL (ref 74–99)
GLUCOSE SERPL-MCNC: 85 MG/DL (ref 74–99)
HCT VFR BLD AUTO: 23.1 % (ref 41–52)
HGB BLD-MCNC: 7.2 G/DL (ref 13.5–17.5)
MAGNESIUM SERPL-MCNC: 1.52 MG/DL (ref 1.6–2.4)
MCH RBC QN AUTO: 30.6 PG (ref 26–34)
MCHC RBC AUTO-ENTMCNC: 31.2 G/DL (ref 32–36)
MCV RBC AUTO: 98 FL (ref 80–100)
NRBC BLD-RTO: 0 /100 WBCS (ref 0–0)
PHOSPHATE SERPL-MCNC: 2.6 MG/DL (ref 2.5–4.9)
PLATELET # BLD AUTO: 145 X10*3/UL (ref 150–450)
POTASSIUM SERPL-SCNC: 4.6 MMOL/L (ref 3.5–5.3)
RBC # BLD AUTO: 2.35 X10*6/UL (ref 4.5–5.9)
SODIUM SERPL-SCNC: 143 MMOL/L (ref 136–145)
WBC # BLD AUTO: 7.5 X10*3/UL (ref 4.4–11.3)

## 2025-02-03 PROCEDURE — 83735 ASSAY OF MAGNESIUM: CPT | Performed by: STUDENT IN AN ORGANIZED HEALTH CARE EDUCATION/TRAINING PROGRAM

## 2025-02-03 PROCEDURE — 99232 SBSQ HOSP IP/OBS MODERATE 35: CPT

## 2025-02-03 PROCEDURE — 1200000002 HC GENERAL ROOM WITH TELEMETRY DAILY

## 2025-02-03 PROCEDURE — 2580000001 HC RX 258 IV SOLUTIONS

## 2025-02-03 PROCEDURE — 2500000004 HC RX 250 GENERAL PHARMACY W/ HCPCS (ALT 636 FOR OP/ED)

## 2025-02-03 PROCEDURE — 2500000005 HC RX 250 GENERAL PHARMACY W/O HCPCS

## 2025-02-03 PROCEDURE — 97161 PT EVAL LOW COMPLEX 20 MIN: CPT | Mod: GP | Performed by: STUDENT IN AN ORGANIZED HEALTH CARE EDUCATION/TRAINING PROGRAM

## 2025-02-03 PROCEDURE — 80069 RENAL FUNCTION PANEL: CPT | Performed by: STUDENT IN AN ORGANIZED HEALTH CARE EDUCATION/TRAINING PROGRAM

## 2025-02-03 PROCEDURE — 85027 COMPLETE CBC AUTOMATED: CPT | Performed by: STUDENT IN AN ORGANIZED HEALTH CARE EDUCATION/TRAINING PROGRAM

## 2025-02-03 PROCEDURE — 92610 EVALUATE SWALLOWING FUNCTION: CPT | Mod: GN | Performed by: SPEECH-LANGUAGE PATHOLOGIST

## 2025-02-03 PROCEDURE — 97165 OT EVAL LOW COMPLEX 30 MIN: CPT | Mod: GO

## 2025-02-03 PROCEDURE — 97530 THERAPEUTIC ACTIVITIES: CPT | Mod: GO

## 2025-02-03 PROCEDURE — 82947 ASSAY GLUCOSE BLOOD QUANT: CPT

## 2025-02-03 PROCEDURE — 92597 ORAL SPEECH DEVICE EVAL: CPT | Mod: GN | Performed by: SPEECH-LANGUAGE PATHOLOGIST

## 2025-02-03 RX ORDER — SODIUM CHLORIDE, SODIUM LACTATE, POTASSIUM CHLORIDE, CALCIUM CHLORIDE 600; 310; 30; 20 MG/100ML; MG/100ML; MG/100ML; MG/100ML
75 INJECTION, SOLUTION INTRAVENOUS CONTINUOUS
Status: DISCONTINUED | OUTPATIENT
Start: 2025-02-03 | End: 2025-02-04

## 2025-02-03 RX ORDER — MAGNESIUM SULFATE HEPTAHYDRATE 40 MG/ML
2 INJECTION, SOLUTION INTRAVENOUS ONCE
Status: COMPLETED | OUTPATIENT
Start: 2025-02-03 | End: 2025-02-03

## 2025-02-03 RX ADMIN — SMOFLIPID 50 G: 6; 6; 5; 3 INJECTION, EMULSION INTRAVENOUS at 22:01

## 2025-02-03 RX ADMIN — SODIUM CHLORIDE, POTASSIUM CHLORIDE, SODIUM LACTATE AND CALCIUM CHLORIDE 75 ML/HR: 600; 310; 30; 20 INJECTION, SOLUTION INTRAVENOUS at 17:29

## 2025-02-03 RX ADMIN — HEPARIN SODIUM 5000 UNITS: 5000 INJECTION, SOLUTION INTRAVENOUS; SUBCUTANEOUS at 14:22

## 2025-02-03 RX ADMIN — ACETAMINOPHEN 1000 MG: 10 INJECTION INTRAVENOUS at 01:13

## 2025-02-03 RX ADMIN — PANTOPRAZOLE SODIUM 40 MG: 40 INJECTION, POWDER, FOR SOLUTION INTRAVENOUS at 08:08

## 2025-02-03 RX ADMIN — DEXTROSE MONOHYDRATE 75 ML/HR: 50 INJECTION, SOLUTION INTRAVENOUS at 08:04

## 2025-02-03 RX ADMIN — DEXTROSE MONOHYDRATE 75 ML/HR: 50 INJECTION, SOLUTION INTRAVENOUS at 17:31

## 2025-02-03 RX ADMIN — MAGNESIUM SULFATE HEPTAHYDRATE 2 G: 40 INJECTION, SOLUTION INTRAVENOUS at 08:29

## 2025-02-03 RX ADMIN — ASCORBIC ACID, VITAMIN A PALMITATE, CHOLECALCIFEROL, THIAMINE HYDROCHLORIDE, RIBOFLAVIN-5 PHOSPHATE SODIUM, PYRIDOXINE HYDROCHLORIDE, NIACINAMIDE, DEXPANTHENOL, ALPHA-TOCOPHEROL ACETATE, VITAMIN K1, FOLIC ACID, BIOTIN, CYANOCOBALAMIN: 200; 3300; 200; 6; 3.6; 6; 40; 15; 10; 150; 600; 60; 5 INJECTION, SOLUTION INTRAVENOUS at 22:01

## 2025-02-03 RX ADMIN — ACETAMINOPHEN 1000 MG: 10 INJECTION INTRAVENOUS at 05:13

## 2025-02-03 RX ADMIN — ACETAMINOPHEN 1000 MG: 10 INJECTION INTRAVENOUS at 14:22

## 2025-02-03 RX ADMIN — Medication 6 MG: at 22:06

## 2025-02-03 RX ADMIN — HEPARIN SODIUM 5000 UNITS: 5000 INJECTION, SOLUTION INTRAVENOUS; SUBCUTANEOUS at 22:01

## 2025-02-03 ASSESSMENT — COGNITIVE AND FUNCTIONAL STATUS - GENERAL
EATING MEALS: A LITTLE
MOBILITY SCORE: 11
TOILETING: A LOT
STANDING UP FROM CHAIR USING ARMS: A LOT
CLIMB 3 TO 5 STEPS WITH RAILING: A LOT
MOVING TO AND FROM BED TO CHAIR: A LOT
TURNING FROM BACK TO SIDE WHILE IN FLAT BAD: A LITTLE
CLIMB 3 TO 5 STEPS WITH RAILING: TOTAL
STANDING UP FROM CHAIR USING ARMS: A LITTLE
DRESSING REGULAR LOWER BODY CLOTHING: TOTAL
TURNING FROM BACK TO SIDE WHILE IN FLAT BAD: A LOT
DRESSING REGULAR UPPER BODY CLOTHING: TOTAL
DRESSING REGULAR UPPER BODY CLOTHING: A LITTLE
TURNING FROM BACK TO SIDE WHILE IN FLAT BAD: A LOT
DAILY ACTIVITIY SCORE: 11
MOVING FROM LYING ON BACK TO SITTING ON SIDE OF FLAT BED WITH BEDRAILS: A LITTLE
DRESSING REGULAR UPPER BODY CLOTHING: TOTAL
MOBILITY SCORE: 11
MOVING FROM LYING ON BACK TO SITTING ON SIDE OF FLAT BED WITH BEDRAILS: TOTAL
DAILY ACTIVITIY SCORE: 14
WALKING IN HOSPITAL ROOM: A LOT
TOILETING: A LOT
CLIMB 3 TO 5 STEPS WITH RAILING: A LOT
WALKING IN HOSPITAL ROOM: A LOT
EATING MEALS: A LOT
CLIMB 3 TO 5 STEPS WITH RAILING: A LOT
MOBILITY SCORE: 11
WALKING IN HOSPITAL ROOM: A LOT
EATING MEALS: A LITTLE
PERSONAL GROOMING: A LITTLE
MOBILITY SCORE: 15
PERSONAL GROOMING: A LITTLE
TOILETING: A LOT
MOVING TO AND FROM BED TO CHAIR: A LOT
EATING MEALS: A LITTLE
MOVING TO AND FROM BED TO CHAIR: A LOT
TOILETING: A LOT
DAILY ACTIVITIY SCORE: 11
HELP NEEDED FOR BATHING: TOTAL
STANDING UP FROM CHAIR USING ARMS: A LOT
PERSONAL GROOMING: A LITTLE
HELP NEEDED FOR BATHING: TOTAL
HELP NEEDED FOR BATHING: A LOT
MOVING FROM LYING ON BACK TO SITTING ON SIDE OF FLAT BED WITH BEDRAILS: TOTAL
STANDING UP FROM CHAIR USING ARMS: A LOT
PERSONAL GROOMING: A LITTLE
DAILY ACTIVITIY SCORE: 11
MOVING TO AND FROM BED TO CHAIR: A LITTLE
MOVING FROM LYING ON BACK TO SITTING ON SIDE OF FLAT BED WITH BEDRAILS: TOTAL
DRESSING REGULAR LOWER BODY CLOTHING: TOTAL
HELP NEEDED FOR BATHING: TOTAL
WALKING IN HOSPITAL ROOM: A LOT
TURNING FROM BACK TO SIDE WHILE IN FLAT BAD: A LOT
DRESSING REGULAR LOWER BODY CLOTHING: TOTAL
DRESSING REGULAR UPPER BODY CLOTHING: TOTAL
DRESSING REGULAR LOWER BODY CLOTHING: A LOT

## 2025-02-03 ASSESSMENT — ACTIVITIES OF DAILY LIVING (ADL)
ADL_ASSISTANCE: INDEPENDENT
ADL_ASSISTANCE: INDEPENDENT
BATHING_ASSISTANCE: MODERATE

## 2025-02-03 ASSESSMENT — PAIN - FUNCTIONAL ASSESSMENT
PAIN_FUNCTIONAL_ASSESSMENT: 0-10

## 2025-02-03 ASSESSMENT — PAIN SCALES - GENERAL
PAINLEVEL_OUTOF10: 0 - NO PAIN
PAINLEVEL_OUTOF10: 7
PAINLEVEL_OUTOF10: 2
PAINLEVEL_OUTOF10: 8
PAINLEVEL_OUTOF10: 0 - NO PAIN

## 2025-02-03 ASSESSMENT — PAIN DESCRIPTION - DESCRIPTORS
DESCRIPTORS: ACHING;DISCOMFORT;SORE
DESCRIPTORS: ACHING;DISCOMFORT;SORE
DESCRIPTORS: PRESSURE

## 2025-02-03 ASSESSMENT — PAIN SCALES - WONG BAKER: WONGBAKER_NUMERICALRESPONSE: HURTS LITTLE MORE

## 2025-02-03 NOTE — SIGNIFICANT EVENT
TRAUMA SURGERY UPDATED PLAN OF CARE    Patient seen at bedside with family present. I spent over 30 minutes answering questions and discussing care, as patient has complex medical and surgical history.     Plan:  - SLP evaluation given prolonged NPO/trach  - Wound care consultation for assistance pouching stoma and ECF  - Follow up B12/folate/LFTs/TSH/Vitamin D  - Continue TPN until SLP evaluation  - CT scan with PO contrast via PEG to evaluate intraabdominal fluid collection, fistula(s), and GRACIELA drain placement    Patient seen and evaluated with attending, Dr. Julian Shabazz MD  Trauma Surgery

## 2025-02-03 NOTE — PROGRESS NOTES
Transitional Care Coordinator Note: Patient discussed in morning rounds, per medical team (trauma) patient is not medically ready. Discharge dispo: Patient evaluated by therapy team rec moderate intensity by PT, OT pending. TCC placed call to patient's son Ike Gar [phone] to discuss discharge planning. TCC informed and educated Ike on therapy recs. Ike expressed that he does not feel patient is able to discharge home at this time. TCC discussed patient discharging to SNF vs return to LTAC. Ike (son) stated he does not feel he can make a decision regarding discharge planning until he speaks with trauma team regarding medical plan as he does not feel patient is medically ready for discharge. TCC informed and educated patient's son on discharge planning process. Ike (son) stated he is not sure if he wants patient to return to LTAC due to blood transfusion issue at facility. TCC expressed understanding and stated plan to update trauma team to contact Ike (carlos alberto) and discharge planning team to follow up with Ike in 1-2 days to discuss discharge planning. Trauma SW updated.        Elpidio Wood RN BSN   Transitional Care Coordinator
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
## 2025-02-03 NOTE — CONSULTS
Wound Care Consult     Visit Date: 2/3/2025      Patient Name: Ike Gar         MRN: [id number]           YOB: 1945     Reason for Consult: pouch change        Wound History: patient with RLQ ileostomy and EC fistula     Wound Assessment:     02/03/25 1045   Ileostomy Other (Comment) RUQ   Placement Date/Time: 12/23/24 1122   Placed by: hilary ZULUAGA  Hand Hygiene Completed: Yes  Ileostomy Type: (c) Other (Comment)  Location: RUQ   Stomal Appliance 2 piece;Changed  (High output bag)   Site/Stoma Assessment Budded;Pink   Peristomal Assessment Maceration  (mild)   Treatment Pouch change;Topical treatment  (crusting with Cavilon and ostomy powder)   Output Description Brown;Liquid     Wound Team Summary Assessment:   Ostomy type: ileostomy    size: 1 1/12    color: pink, moist stoma      protruding: budded  Ricky: none  Functioning: brown liquid output  Mucocutaneous junction: intact  Peristomal skin: maceration mild but tender to touch, crusted area with Cavilon and ostomy powder  Pouching: Crystal 2-piece 57mm size with high output ileostomy pouch attached to bedside bag  Ostomy Education:   This nurse discussed potential lifestyle modifications patient is to anticipate with ileostomy including diet changes and some activity modifications. Pt mainly concerned with diet changes and support at home. Offered to coordinate ostomy teaching with wife and son at bedside prior to discharge. Pt agreeable but unsure when family will be at bedside.     Plan: assess stoma/pouching       Wound Team Plan: Encourage patient participation in ostomy care and trouble shooting to increase comfort with appliance. Education information and materials left at bedside.      LUISITO MCGOWAN RN  2/3/2025  5:46 PM
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
## 2025-02-03 NOTE — CARE PLAN
Problem: Skin  Goal: Decreased wound size/increased tissue granulation at next dressing change  Outcome: Progressing  Flowsheets (Taken 2/3/2025 0552)  Decreased wound size/increased tissue granulation at next dressing change:   Promote sleep for wound healing   Utilize specialty bed per algorithm  Goal: Participates in plan/prevention/treatment measures  Outcome: Progressing  Flowsheets (Taken 2/3/2025 0552)  Participates in plan/prevention/treatment measures:   Discuss with provider PT/OT consult   Elevate heels  Goal: Prevent/manage excess moisture  Outcome: Progressing  Flowsheets (Taken 2/3/2025 0552)  Prevent/manage excess moisture:   Cleanse incontinence/protect with barrier cream   Moisturize dry skin   Follow provider orders for dressing changes   Monitor for/manage infection if present  Goal: Prevent/minimize sheer/friction injuries  Outcome: Progressing  Flowsheets (Taken 2/3/2025 0552)  Prevent/minimize sheer/friction injuries:   HOB 30 degrees or less   Use pull sheet   Utilize specialty bed per algorithm  Goal: Promote/optimize nutrition  Outcome: Progressing  Flowsheets (Taken 2/3/2025 0552)  Promote/optimize nutrition:   Offer water/supplements/favorite foods   Consume > 50% meals/supplements  Goal: Promote skin healing  Outcome: Progressing  Flowsheets (Taken 2/3/2025 0552)  Promote skin healing:   Assess skin/pad under line(s)/device(s)   Protective dressings over bony prominences   Rotate device position/do not position patient on device     Problem: Pain - Adult  Goal: Verbalizes/displays adequate comfort level or baseline comfort level  Outcome: Progressing  Flowsheets (Taken 2/3/2025 0552)  Verbalizes/displays adequate comfort level or baseline comfort level:   Encourage patient to monitor pain and request assistance   Assess pain using appropriate pain scale   Implement non-pharmacological measures as appropriate and evaluate response     Problem: Safety - Adult  Goal: Free from fall  injury  Outcome: Progressing  Flowsheets (Taken 2/3/2025 0552)  Free from fall injury:   Instruct family/caregiver on patient safety   Based on caregiver fall risk screen, instruct family/caregiver to ask for assistance with transferring infant if caregiver noted to have fall risk factors     Problem: Discharge Planning  Goal: Discharge to home or other facility with appropriate resources  Outcome: Progressing     Problem: Chronic Conditions and Co-morbidities  Goal: Patient's chronic conditions and co-morbidity symptoms are monitored and maintained or improved  Outcome: Progressing     Problem: Nutrition  Goal: Nutrient intake appropriate for maintaining nutritional needs  Outcome: Progressing     Problem: Respiratory  Goal: Clear secretions with interventions this shift  Outcome: Progressing  Flowsheets (Taken 2/3/2025 0552)  Clear secretions with interventions this shift: Suctioning  Goal: Minimize anxiety/maximize coping throughout shift  Outcome: Progressing  Flowsheets (Taken 2/3/2025 0552)  Minimize anxiety/maximize coping throughout shift: Monitor pain/anxiety level  Goal: Minimal/no exertional discomfort or dyspnea this shift  Outcome: Progressing  Goal: No signs of respiratory distress (eg. Use of accessory muscles. Peds grunting)  Outcome: Progressing  Goal: Patent airway maintained this shift  Outcome: Progressing  Goal: Tolerate mechanical ventilation evidenced by VS/agitation level this shift  Outcome: Progressing  Goal: Tolerate pulmonary toileting this shift  Outcome: Progressing  Goal: Verbalize decreased shortness of breath this shift  Outcome: Progressing  Flowsheets (Taken 2/3/2025 0552)  Verbalize decreased shortness of breath this shift:   Suctioning   Encourage/provide pulmonary hygiene/secretion clearance  Goal: Wean oxygen to maintain O2 saturation per order/standard this shift  Outcome: Progressing  Goal: Increase self care and/or family involvement in next 24 hours  Outcome: Progressing    The patient's goals for the shift include to sleep    The clinical goals for the shift include Patient to remain HDS    Over the shift, the patient did not make progress toward the following goals.   Goal: Minimize anxiety/maximize coping throughout shift  Outcome: Progressing  Flowsheets (Taken 2/3/2025 4059)  Minimize anxiety/maximize coping throughout shift: Monitor pain/anxiety level     Barriers to progression include Patient's refusal to accept any form of anxiety medication. Through education patient agreed to take pain medication. Provider was notified and orders were placed. Recommendations to address these barriers include continued education when patient shows signs of anxiety, and family support.
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
## 2025-02-03 NOTE — PROGRESS NOTES
Occupational Therapy      Evaluation and Treatment    Patient Name: Ike Gar  MRN: [id number]  Today's Date: 2/3/2025  Room: [address] [place] [address]A  Time Calculation  Start Time: 1316  Stop Time: 1349  Time Calculation (min): 33 min    Assessment  IP OT Assessment  OT Assessment: Pt will benefit from cont skilled OT to address endurance, strength, balance and act tolerance for increased independence with ADLs.  Prognosis: Good  Barriers to Discharge Home: Caregiver assistance, Physical needs  Caregiver Assistance: Caregiver assistance needed per identified barriers - however, level of patient's required assistance exceeds assistance available at home  Physical Needs: Intermittent mobility assistance needed, Intermittent ADL assistance needed  End of Session Communication: Bedside nurse  End of Session Patient Position: Bed, 3 rail up, Alarm on  Plan:  Inpatient Plan  Treatment Interventions: ADL retraining, Compensatory technique education, Equipment evaluation/education, Endurance training, UE strengthening/ROM  OT Frequency: 3 times per week  OT Discharge Recommendations: Moderate intensity level of continued care  OT Recommended Transfer Status: Assist of 1  OT - OK to Discharge: Yes  OT Assessment  OT Assessment Results: Decreased ADL status, Decreased IADLs, Decreased functional mobility, Decreased upper extremity strength  Prognosis: Good    Subjective   Current Problem:  1. High-output external gastrointestinal fistula          General:  Reason for Referral: high output EC fistula and malnutrition.  Past Medical History Relevant to Rehab: 1. Increased EC fistula output  2. Malnutrition s/p TPN  3. Recent T/L spine fxs  4. Anemia  5. Hx tracheostomy (1/7/2025)  6. Hx HTN  Prior to Session Communication: Bedside nurse  Patient Position Received: Bed, 3 rail up, Alarm on  General Comment: pt in supine on arrival, willing to particiapte in OT with min encouragement   Precautions:  Medical Precautions: Fall precautions,  Spinal precautions, Oxygen therapy device and L/min  Post-Surgical Precautions: Abdominal surgery precautions  Precautions Comment: TLSO on but unfastened on approach, required assistance repositioning and fastening prior to mobilziing OOB  Vital Signs:    Pain:  Pain Assessment  Pain Assessment: 0-10  0-10 (Numeric) Pain Score:  (unrated)  Pain Interventions: Repositioned  Lines/Tubes/Drains:  PICC - Adult Double lumen Left Brachial vein (Active)   Number of days: 1       Surgical Airway Shiley Cuffed 6 (Active)   Number of days: 27       Closed/Suction Drain Midline RUQ 10 Fr. (Active)   Number of days: 19       Closed/Suction Drain Right;Anterior Hip Bulb (Active)   Number of days: 1       Open Drain Left LLQ (Active)   Number of days: 26       Gastrostomy/Enterostomy Percutaneous endoscopic gastrostomy (PEG) 1 20 Fr. LUQ (Active)   Number of days: 27       Ileostomy Other (Comment) RUQ (Active)   Number of days: 42         Objective   Cognition:  Overall Cognitive Status: Within Functional Limits  Orientation Level: Oriented X4 (required re orientation on correct date)           Home Living:  Type of Home: House  Lives With: Spouse  Home Adaptive Equipment: None  Home Layout: One level  Home Access: No concerns  Home Living Comments: pt admitted from [facility]   Prior Function:  Level of [place]: Independent with ADLs and functional transfers  ADL Assistance: Independent  Homemaking Assistance: Independent  Ambulatory Assistance: Independent  Vocational: Retired  Prior Function Comments: per pt at [facility] he was using WW and was completing simple ADLs       ADL:  Eating Assistance: Unable to assess  Eating Deficit: NPO  Grooming Assistance: Stand by  Grooming Deficit: Setup  Bathing Assistance: Moderate  UE Dressing Assistance: Minimal  LE Dressing Assistance: Moderate  Toileting Assistance with Device: Minimal  Activity Tolerance:  Endurance: Decreased tolerance for upright activites    Bed Mobility/Transfers:  Bed Mobility/Transfers: Bed Mobility  Bed Mobility: Yes  Bed Mobility 1  Bed Mobility 1: Supine to sitting  Level of Assistance 1: Moderate assistance  Bed Mobility Comments 1: cues on log roll technique, pt declined d/t pain, tolerated HOB up and assistnace at trunk from OT   and Transfers  Transfer: Yes  Transfer 1  Transfer From 1: Sit to  Transfer to 1: Stand  Technique 1: Sit to stand  Transfer Device 1: Walker  Transfer Level of Assistance 1: Minimum assistance, Moderate verbal cues  Transfers 2  Transfer From 2: Stand to  Transfer to 2: Sit  Technique 2: Stand to sit  Transfer Device 2: Walker  Transfer Level of Assistance 2: Minimum assistance, Moderate verbal cues  Transfers 3  Transfer Device 3: Walker  Transfer Level of Assistance 3: Minimum assistance  Trials/Comments 3: took side steps to get closer to HOB             Hand Function:  Hand Function  Gross Grasp: Functional  Coordination: Functional  Extremities:   RUE   RUE : Exceptions to WFL  RUE Strength  RUE Overall Strength: Greater than or equal to 3/5 as evidenced by functional mobility, LUE   LUE: Exceptions to WFL  LUE Strength  LUE Overall Strength: Greater than or equal to 3/5 as evidenced by functional mobility, RLE   RLE : Within Functional Limits, and LLE   LLE : Within Functional Limits      Outcome Measures: [facility] Daily Activity  Putting on and taking off regular lower body clothing: A lot  Bathing (including washing, rinsing, drying): A lot  Putting on and taking off regular upper body clothing: A little  Toileting, which includes using toilet, bedpan or urinal: A lot  Taking care of personal grooming such as brushing teeth: A little  Eating Meals: A lot  Daily Activity - Total Score: 14         ,     OT Adult Other Outcome Measures  5x Sit to Stand: negative    Education Documentation  Body Mechanics, taught by Martha Cloud OT at 2/3/2025  3:25 PM.  Learner: Patient  Readiness: Acceptance  Method: Explanation  Response: Needs  Reinforcement    Precautions, taught by Martha Cloud OT at 2/3/2025  3:25 PM.  Learner: Patient  Readiness: Acceptance  Method: Explanation  Response: Needs Reinforcement    ADL Training, taught by Martha Cloud OT at 2/3/2025  3:25 PM.  Learner: Patient  Readiness: Acceptance  Method: Explanation  Response: Needs Reinforcement    Education Comments  No comments found.        Goals:   Encounter Problems       Encounter Problems (Active)       ADLs       Patient with complete upper body dressing with independent level of assistance donning and doffing all UE clothes with no adaptive equipment while edge of bed  (Progressing)       Start:  02/03/25    Expected End:  02/17/25            Patient with complete lower body dressing with stand by assist level of assistance donning and doffing all LE clothes  with PRN adaptive equipment while edge of bed  (Progressing)       Start:  02/03/25    Expected End:  02/17/25            Patient will complete toileting including hygiene clothing management/hygiene with stand by assist level of assistance and raised toilet seat. (Progressing)       Start:  02/03/25    Expected End:  02/17/25               EXERCISE/STRENGTHENING       Patient with increase BUE strength to WFL for ADLs. (Progressing)       Start:  02/03/25    Expected End:  02/17/25               MOBILITY       Patient will perform Functional mobility min Household distances with contact guard assist level of assistance and least restrictive device in order to improve safety and functional mobility. (Progressing)       Start:  02/03/25    Expected End:  02/17/25               TRANSFERS       Patient will perform bed mobility stand by assist level of assistance and bed rails in order to improve safety and independence with mobility (Progressing)       Start:  02/03/25    Expected End:  02/17/25            Patient will complete sit to stand transfer with CGA level of assistance and least restrictive device in  order to improve safety and prepare for out of bed mobility. (Progressing)       Start:  02/03/25    Expected End:  02/17/25                   Treatment Completed on Evaluation    Activities of Daily Living:          Toileting  Toileting Level of Assistance: Contact guard, Setup  Where Assessed: Bed level  Toileting Comments: using urinal      Therapy/Activity:     Therapeutic Activity  Therapeutic Activity Performed: Yes  Therapeutic Activity 1: Pt participated in bed mobility with assistance. Completed one sit to stand transfer and side steps with Min A using WW. Pt fatigues easily and requires frequent rest breaks.         02/03/25 at 3:26 PM   Martha Cloud OT   Rehab Office: [phone]

## 2025-02-03 NOTE — PROGRESS NOTES
Occupational Therapy    Communication Note    Patient Name: Ike Gar  MRN: [id number]  Today's Date: 2/3/2025   Room: [address] [place] [address]A    Discipline: Occupational Therapy      Missed Visit Reason: Other (Comment) (conflict of service, pt just got back to bed and SLP in room)      02/03/25 at 11:37 AM   Martha Cloud OT   Rehab Office: [phone]

## 2025-02-04 ENCOUNTER — APPOINTMENT (OUTPATIENT)
Dept: RADIOLOGY | Facility: HOSPITAL | Age: 80
DRG: 394 | End: 2025-02-04
Payer: MEDICARE

## 2025-02-04 ENCOUNTER — APPOINTMENT (OUTPATIENT)
Dept: RADIOLOGY | Facility: HOSPITAL | Age: 80
End: 2025-02-04
Payer: MEDICARE

## 2025-02-04 LAB
ALBUMIN SERPL BCP-MCNC: 2.5 G/DL (ref 3.4–5)
ALP SERPL-CCNC: 189 U/L (ref 33–136)
ALT SERPL W P-5'-P-CCNC: 99 U/L (ref 10–52)
ANION GAP SERPL CALC-SCNC: 10 MMOL/L (ref 10–20)
AST SERPL W P-5'-P-CCNC: 53 U/L (ref 9–39)
BILIRUB DIRECT SERPL-MCNC: 0.7 MG/DL (ref 0–0.3)
BILIRUB SERPL-MCNC: 1.9 MG/DL (ref 0–1.2)
BUN SERPL-MCNC: 35 MG/DL (ref 6–23)
CALCIUM SERPL-MCNC: 8.3 MG/DL (ref 8.6–10.6)
CHLORIDE SERPL-SCNC: 105 MMOL/L (ref 98–107)
CO2 SERPL-SCNC: 27 MMOL/L (ref 21–32)
CREAT SERPL-MCNC: 1.4 MG/DL (ref 0.5–1.3)
EGFRCR SERPLBLD CKD-EPI 2021: 52 ML/MIN/1.73M*2
ERYTHROCYTE [DISTWIDTH] IN BLOOD BY AUTOMATED COUNT: 15.4 % (ref 11.5–14.5)
GLUCOSE BLD MANUAL STRIP-MCNC: 107 MG/DL (ref 74–99)
GLUCOSE SERPL-MCNC: 100 MG/DL (ref 74–99)
HCT VFR BLD AUTO: 25.5 % (ref 41–52)
HGB BLD-MCNC: 8.3 G/DL (ref 13.5–17.5)
MAGNESIUM SERPL-MCNC: 1.65 MG/DL (ref 1.6–2.4)
MCH RBC QN AUTO: 31.6 PG (ref 26–34)
MCHC RBC AUTO-ENTMCNC: 32.5 G/DL (ref 32–36)
MCV RBC AUTO: 97 FL (ref 80–100)
NRBC BLD-RTO: 0 /100 WBCS (ref 0–0)
PHOSPHATE SERPL-MCNC: 3.1 MG/DL (ref 2.5–4.9)
PLATELET # BLD AUTO: 96 X10*3/UL (ref 150–450)
POTASSIUM SERPL-SCNC: 4.1 MMOL/L (ref 3.5–5.3)
PROT SERPL-MCNC: 6.4 G/DL (ref 6.4–8.2)
RBC # BLD AUTO: 2.63 X10*6/UL (ref 4.5–5.9)
SODIUM SERPL-SCNC: 138 MMOL/L (ref 136–145)
WBC # BLD AUTO: 8.2 X10*3/UL (ref 4.4–11.3)

## 2025-02-04 PROCEDURE — 72072 X-RAY EXAM THORAC SPINE 3VWS: CPT | Performed by: RADIOLOGY

## 2025-02-04 PROCEDURE — 97530 THERAPEUTIC ACTIVITIES: CPT | Mod: GP,CQ

## 2025-02-04 PROCEDURE — 2500000004 HC RX 250 GENERAL PHARMACY W/ HCPCS (ALT 636 FOR OP/ED)

## 2025-02-04 PROCEDURE — 80048 BASIC METABOLIC PNL TOTAL CA: CPT | Performed by: NURSE PRACTITIONER

## 2025-02-04 PROCEDURE — 74230 X-RAY XM SWLNG FUNCJ C+: CPT | Performed by: RADIOLOGY

## 2025-02-04 PROCEDURE — 99232 SBSQ HOSP IP/OBS MODERATE 35: CPT | Performed by: NURSE PRACTITIONER

## 2025-02-04 PROCEDURE — 74230 X-RAY XM SWLNG FUNCJ C+: CPT

## 2025-02-04 PROCEDURE — 84075 ASSAY ALKALINE PHOSPHATASE: CPT | Performed by: NURSE PRACTITIONER

## 2025-02-04 PROCEDURE — 2580000001 HC RX 258 IV SOLUTIONS

## 2025-02-04 PROCEDURE — 85027 COMPLETE CBC AUTOMATED: CPT | Performed by: NURSE PRACTITIONER

## 2025-02-04 PROCEDURE — 83735 ASSAY OF MAGNESIUM: CPT | Performed by: NURSE PRACTITIONER

## 2025-02-04 PROCEDURE — 72100 X-RAY EXAM L-S SPINE 2/3 VWS: CPT

## 2025-02-04 PROCEDURE — 92611 MOTION FLUOROSCOPY/SWALLOW: CPT | Mod: GN | Performed by: SPEECH-LANGUAGE PATHOLOGIST

## 2025-02-04 PROCEDURE — 2500000005 HC RX 250 GENERAL PHARMACY W/O HCPCS

## 2025-02-04 PROCEDURE — 2500000005 HC RX 250 GENERAL PHARMACY W/O HCPCS: Performed by: SURGERY

## 2025-02-04 PROCEDURE — 2500000001 HC RX 250 WO HCPCS SELF ADMINISTERED DRUGS (ALT 637 FOR MEDICARE OP): Performed by: NURSE PRACTITIONER

## 2025-02-04 PROCEDURE — 84100 ASSAY OF PHOSPHORUS: CPT | Performed by: NURSE PRACTITIONER

## 2025-02-04 PROCEDURE — 82947 ASSAY GLUCOSE BLOOD QUANT: CPT

## 2025-02-04 PROCEDURE — 1200000002 HC GENERAL ROOM WITH TELEMETRY DAILY

## 2025-02-04 PROCEDURE — 72072 X-RAY EXAM THORAC SPINE 3VWS: CPT

## 2025-02-04 PROCEDURE — 72100 X-RAY EXAM L-S SPINE 2/3 VWS: CPT | Performed by: RADIOLOGY

## 2025-02-04 RX ORDER — PETROLATUM 420 MG/G
OINTMENT TOPICAL 3 TIMES DAILY PRN
Status: DISCONTINUED | OUTPATIENT
Start: 2025-02-04 | End: 2025-03-02 | Stop reason: HOSPADM

## 2025-02-04 RX ORDER — OLANZAPINE 5 MG/1
5 TABLET, ORALLY DISINTEGRATING ORAL NIGHTLY PRN
Status: DISCONTINUED | OUTPATIENT
Start: 2025-02-04 | End: 2025-02-06

## 2025-02-04 RX ORDER — ACETAMINOPHEN 10 MG/ML
1000 INJECTION, SOLUTION INTRAVENOUS EVERY 6 HOURS PRN
Status: DISCONTINUED | OUTPATIENT
Start: 2025-02-04 | End: 2025-02-06

## 2025-02-04 RX ORDER — MULTIVIT-MIN/IRON FUM/FOLIC AC 7.5 MG-4
1 TABLET ORAL DAILY
Status: DISCONTINUED | OUTPATIENT
Start: 2025-02-04 | End: 2025-03-02 | Stop reason: HOSPADM

## 2025-02-04 RX ADMIN — BARIUM SULFATE 50 ML: 400 SUSPENSION ORAL at 10:06

## 2025-02-04 RX ADMIN — HEPARIN SODIUM 5000 UNITS: 5000 INJECTION, SOLUTION INTRAVENOUS; SUBCUTANEOUS at 06:56

## 2025-02-04 RX ADMIN — SMOFLIPID 50 G: 6; 6; 5; 3 INJECTION, EMULSION INTRAVENOUS at 20:28

## 2025-02-04 RX ADMIN — PANTOPRAZOLE SODIUM 40 MG: 40 INJECTION, POWDER, FOR SOLUTION INTRAVENOUS at 10:00

## 2025-02-04 RX ADMIN — HEPARIN SODIUM 5000 UNITS: 5000 INJECTION, SOLUTION INTRAVENOUS; SUBCUTANEOUS at 13:59

## 2025-02-04 RX ADMIN — BARIUM SULFATE 10 ML: 400 PASTE ORAL at 10:07

## 2025-02-04 RX ADMIN — Medication 1 TABLET: at 13:55

## 2025-02-04 RX ADMIN — ASCORBIC ACID, VITAMIN A PALMITATE, CHOLECALCIFEROL, THIAMINE HYDROCHLORIDE, RIBOFLAVIN-5 PHOSPHATE SODIUM, PYRIDOXINE HYDROCHLORIDE, NIACINAMIDE, DEXPANTHENOL, ALPHA-TOCOPHEROL ACETATE, VITAMIN K1, FOLIC ACID, BIOTIN, CYANOCOBALAMIN: 200; 3300; 200; 6; 3.6; 6; 40; 15; 10; 150; 600; 60; 5 INJECTION, SOLUTION INTRAVENOUS at 22:41

## 2025-02-04 RX ADMIN — BARIUM SULFATE 50 ML: 0.81 POWDER, FOR SUSPENSION ORAL at 10:06

## 2025-02-04 RX ADMIN — HEPARIN SODIUM 5000 UNITS: 5000 INJECTION, SOLUTION INTRAVENOUS; SUBCUTANEOUS at 20:28

## 2025-02-04 ASSESSMENT — COGNITIVE AND FUNCTIONAL STATUS - GENERAL
WALKING IN HOSPITAL ROOM: A LOT
CLIMB 3 TO 5 STEPS WITH RAILING: A LOT
EATING MEALS: A LITTLE
DRESSING REGULAR UPPER BODY CLOTHING: TOTAL
CLIMB 3 TO 5 STEPS WITH RAILING: A LOT
CLIMB 3 TO 5 STEPS WITH RAILING: TOTAL
DRESSING REGULAR UPPER BODY CLOTHING: TOTAL
MOVING TO AND FROM BED TO CHAIR: A LITTLE
MOVING FROM LYING ON BACK TO SITTING ON SIDE OF FLAT BED WITH BEDRAILS: A LITTLE
MOBILITY SCORE: 11
DAILY ACTIVITIY SCORE: 11
PERSONAL GROOMING: A LITTLE
TURNING FROM BACK TO SIDE WHILE IN FLAT BAD: A LITTLE
WALKING IN HOSPITAL ROOM: A LOT
STANDING UP FROM CHAIR USING ARMS: A LOT
PERSONAL GROOMING: A LITTLE
DAILY ACTIVITIY SCORE: 11
MOVING TO AND FROM BED TO CHAIR: A LOT
EATING MEALS: A LITTLE
DRESSING REGULAR LOWER BODY CLOTHING: TOTAL
TOILETING: A LOT
MOVING TO AND FROM BED TO CHAIR: A LOT
TURNING FROM BACK TO SIDE WHILE IN FLAT BAD: A LOT
HELP NEEDED FOR BATHING: TOTAL
HELP NEEDED FOR BATHING: TOTAL
MOVING FROM LYING ON BACK TO SITTING ON SIDE OF FLAT BED WITH BEDRAILS: TOTAL
MOBILITY SCORE: 11
TURNING FROM BACK TO SIDE WHILE IN FLAT BAD: A LOT
TOILETING: A LOT
MOBILITY SCORE: 15
STANDING UP FROM CHAIR USING ARMS: A LOT
MOVING FROM LYING ON BACK TO SITTING ON SIDE OF FLAT BED WITH BEDRAILS: TOTAL
WALKING IN HOSPITAL ROOM: A LOT
DRESSING REGULAR LOWER BODY CLOTHING: TOTAL
STANDING UP FROM CHAIR USING ARMS: A LITTLE

## 2025-02-04 ASSESSMENT — PAIN SCALES - GENERAL
PAINLEVEL_OUTOF10: 4
PAINLEVEL_OUTOF10: 3

## 2025-02-04 ASSESSMENT — PAIN - FUNCTIONAL ASSESSMENT: PAIN_FUNCTIONAL_ASSESSMENT: 0-10

## 2025-02-04 NOTE — PROCEDURES
Speech-Language Pathology    Inpatient Modified Barium Swallow Study    Patient Name: Ike Gar  MRN: [id number]  : 1945  Today's Date: 25  Time Calculation  Start Time: 1129  Stop Time: 1142  Time Calculation (min): 13 min        Modified Barium Swallow Study completed. Informed verbal consent obtained prior to completion of exam. Trials of thin, nectar/mildly thick liquid, puree, and regular solids were given.       SLP: KARINA VILLALOBOS S-SLP   Contact info: [employer] secure chat      Reason for Referral: c/f aspiration/oropharyngeal dysphagia   Patient Hx: s/p tracheostomy and high-output external gastrointestinal fistula.   Respiratory Status: Room air   Current diet: NPO pending MBS      DIET RECOMMENDATIONS:   - Regular (IDDSI Level 7)  - Thin liquids (IDDSI Level 0)      STRATEGIES:  - Upright for all PO intake  - Reflux Precautions  - Complete oral care frequently throughout the day  - Full supervision with meals; One to one assist with meals  -Whole medications with thin liquids   -Wear PMV while eating       SLP PLAN:  Skilled SLP Services: Skilled SLP intervention for dysphagia is warranted.  Skilled SLP intervention is recommended for speech and/or voice eval & treat.   SLP Frequency: 2x per week  Duration: 2 weeks  Treatment/Interventions:   - Oropharyngeal exercises  - Bolus trials  - Compensatory strategy training  - Diet tolerance/advancement  - Patient/caregiver education  - Follow up on toleration with PMV    Discussed POC: Patient  Discussed Risks/Benefits: Yes  Patient/Caregiver Agreeable: Yes      Education Provided: Results and recommendations per MBSS, with video review; recommendations and POC at this time. Verbal understanding and agreement given on all accounts.     Repeat Study: Per MD or treating SLP       Mechanics of the Swallow Summary:  ORAL PHASE:  Lip Closure - No labial escape/anterior loss of bolus   Tongue Control During Bolus Hold - Cohesive bolus between tongue to  palatal seal   Bolus prep/mastication - Timely and efficient mastication skills   Bolus transport/lingual motion - Brisk tongue motion for A-P movement of the bolus   Oral residue - Complete oral clearance     PHARYNGEAL PHASE:  Initiation of pharyngeal swallow - Bolus head at vallecular pit   Soft palate elevation - No bolus between soft palate/pharyngeal wall   Laryngeal elevation - Complete superior movement of thyroid cartilage with contact of arytenoids to epiglottic petiole   Anterior hyoid excursion - Partial anterior movement   Epiglottic movement - Complete inversion    Laryngeal vestibule closure - Complete - no air/contrast in laryngeal vestibule   Pharyngeal stripping wave - Complete  Pharyngeal contraction (A/P view) - Not tested       Pharyngoesophageal segment opening - Partial distension/partial duration with partial obstruction of flow of bolus   Tongue base retraction - Narrow column of contrast or air between tongue base and pharyngeal wall   Pharyngeal residue - Trace residue within or on the pharyngeal structures     ESOPHAGEAL PHASE:  Esophageal clearance - Esophageal retention       SLP Impressions with Severity Rating:   Pt presents with mild oropharyngeal dysphagia upon completion of modified barium swallow study this date. Swallowing physiology is detailed above. Impairments most impacting swallowing safety and efficiency include mild stasis in vallecula and pyriform sinuses following the swallow, tongue base weakness/reduced posterior movement to contact posterior pharyngeal wall, and diminished anterior hyoid movement Patient demonstrated trace stasis on tongue base following consumption of all challenges. No penetration or aspiration was observed for any consistency during study. Noted timely bolus transit through esophagus. However, pt presented with trace retention at proximal esophagus and diminished duration at the cervical esophagus with solid textures.       OUTCOME  MEASURES:  Functional Oral Intake Scale  Functional Oral Intake Scale: Level 7        total oral diet with no restrictions       Eating Assessment Tool (EAT-10)   0=No problem, 1=Mild problem, 2=Mild to moderate problem, 3=Moderate problem, 4=Severe problem         A total score of 3 or above may indicate difficulty with swallowing safely and/or efficiently      Rosenbek's Penetration Aspiration Scale  Thin Liquids: 1. NO ASPIRATION & NO PENETRATION - no aspiration, contrast does not enter airway  [place] Thick Liquids: 1. NO ASPIRATION & NO PENETRATION - no aspiration, contrast does not enter airway  Puree: 1. NO ASPIRATION & NO PENETRATION - no aspiration, contrast does not enter airway  Soft Solid: 1. NO ASPIRATION & NO PENETRATION - no aspiration, contrast does not enter airway  Solids: 1. NO ASPIRATION & NO PENETRATION - no aspiration, contrast does not enter airway
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
## 2025-02-04 NOTE — PROGRESS NOTES
Physical Therapy Treatment    Patient Name: Ike Gar  MRN: [id number]  Today's Date: 2/4/2025  Room: [address] [place], [place] [address]  Time Calculation  Start Time: 1250  Stop Time: 1328  Time Calculation (min): 38 min       Assessment/Plan   PT Assessment  PT Assessment Results: Decreased strength, Decreased endurance, Impaired balance, Decreased mobility, Pain  Rehab Prognosis: Good  Barriers to Discharge Home: Physical needs  Physical Needs: Returning to long-term care/other facility, Ambulating household distances limited by function/safety, Intermittent mobility assistance needed, Intermittent ADL assistance needed, High falls risk due to function or environment  Evaluation/Treatment Tolerance: Patient limited by fatigue  Strengths: Attitude of self  Barriers to Participation: Comorbidities, Coping skills  End of Session Communication: Bedside nurse  End of Session Patient Position: Up in chair     PT Plan  Treatment/Interventions: Bed mobility, Transfer training, Gait training, Balance training, Neuromuscular re-education, Endurance training, Strengthening, Therapeutic exercise, Therapeutic activity  PT Plan: Ongoing PT  PT Frequency: 5 times per week  PT Discharge Recommendations: Moderate intensity level of continued care  Equipment Recommended upon Discharge: Wheeled walker  PT Recommended Transfer Status: Assist x1, Assistive device  PT - OK to Discharge: Yes  Assessment: Patient is progressing Fairly with therapy this date. Pt down and depressed/anxious today, able to transfer to chair @ [facility]. Would continue to benefit from continued skilled PT to address all mobility deficits; Patient remains appropriate for MOD intensity therapy when medically ready for discharge from acute stay.  Will continue to follow.     General Visit Information:   PT  Visit  PT Received On: 02/04/25  Prior to Session Communication: Bedside nurse  Patient Position Received: Bed, 3 rail up     Subjective   Subjective: Pt depressed and upset about  medical complications  Precautions:  Precautions  Medical Precautions: Fall precautions, Spinal precautions, Oxygen therapy device and L/min  Post-Surgical Precautions: Abdominal surgery precautions  Braces Applied: TLSO needed for mobility - ABD binder on to protect skin/incisions due to multiple drains and ostomy  Vital Signs:   Date/Time Vitals Session Patient Position Pulse Resp SpO2 BP MAP (mmHg)    02/04/25 1250 During PT  --  122  --  97 %  --  --     02/04/25 1300 --  --  116  18  94 %  134/79  97             Objective   Pain:  Pain Assessment  Pain Assessment: 0-10  0-10 (Numeric) Pain Score: 4  Pain Type: Acute pain  Pain Location: Incision  Cognition:  Cognition  Overall Cognitive Status: Within Functional Limits  Orientation Level: Oriented X4  Memory: Within Funtional Limits  Insight: Mild  Impulsive: Within functional limits    Lines/Tubes/Drains:  PICC - Adult Double lumen Left Brachial vein (Active)   Number of days: 2       Surgical Airway Shiley Cuffed 6 (Active)   Number of days: 28       Closed/Suction Drain Midline RUQ 10 Fr. (Active)   Number of days: 20       Closed/Suction Drain Right;Anterior Hip Bulb (Active)   Number of days: 2       Open Drain Left LLQ (Active)   Number of days: 27       Gastrostomy/Enterostomy Percutaneous endoscopic gastrostomy (PEG) 1 20 Fr. LUQ (Active)   Number of days: 28       Ileostomy Other (Comment) RUQ (Active)   Number of days: 43     Continuous Medications/Drips:  Adult Clinimix Parenteral Nutrition Continuous, 83 mL/hr, Last Rate: 83 mL/hr (02/03/25 2201)        PT Treatments:     Therapeutic Activity  Therapeutic Activity 1: Extended education on therapy goals, expectations and rehab course. Pt had many concerns with therapy, medical etc., demos increased anxiety and stress. Reports poor sleep           Transfer 1  Transfer From 1: Sit to  Transfer to 1: Stand  Technique 1: Sit to stand, Stand to sit  Transfer Device 1: Walker  Transfer Level of Assistance  1: Minimum assistance, Moderate verbal cues  Trials/Comments 1: increased time and effort to stand, vc for handplacement  Transfers 2  Transfer From 2: Stand to  Transfer to 2: Chair with arms  Technique 2: Stand pivot  Transfer Device 2: Walker  Transfer Level of Assistance 2: Minimum assistance, Moderate verbal cues  Trials/Comments 2: Siri for balance             Activity tolerance:  Activity Tolerance  Endurance: Decreased tolerance for upright activites    Outcome Measures:  [facility] Basic Mobility  Turning from your back to your side while in a flat bed without using bedrails: A little  Moving from lying on your back to sitting on the side of a flat bed without using bedrails: A little  Moving to and from bed to chair (including a wheelchair): A little  Standing up from a chair using your arms (e.g. wheelchair or bedside chair): A little  To walk in hospital room: A lot  Climbing 3-5 steps with railing: Total  Basic Mobility - Total Score: 15     Education Documentation  Modified Diet Training, taught by Alexa Davila PTA at 2/4/2025  1:48 PM.  Learner: Patient  Readiness: Acceptance  Method: Explanation  Response: Verbalizes Understanding, Needs Reinforcement    Precautions, taught by Alexa Davila PTA at 2/4/2025  1:48 PM.  Learner: Patient  Readiness: Acceptance  Method: Explanation  Response: Verbalizes Understanding, Needs Reinforcement    Body Mechanics, taught by Alexa Davila PTA at 2/4/2025  1:48 PM.  Learner: Patient  Readiness: Acceptance  Method: Explanation  Response: Verbalizes Understanding, Needs Reinforcement    Mobility Training, taught by Alexa Davila PTA at 2/4/2025  1:48 PM.  Learner: Patient  Readiness: Acceptance  Method: Explanation  Response: Verbalizes Understanding, Needs Reinforcement    Body Mechanics, taught by Alexa Davila PTA at 2/4/2025  1:48 PM.  Learner: Patient  Readiness: Acceptance  Method: Explanation  Response: Verbalizes Understanding, Needs Reinforcement    Precautions,  taught by Alexa Davila PTA at 2/4/2025  1:48 PM.  Learner: Patient  Readiness: Acceptance  Method: Explanation  Response: Verbalizes Understanding, Needs Reinforcement    ADL Training, taught by Alexa Davila PTA at 2/4/2025  1:48 PM.  Learner: Patient  Readiness: Acceptance  Method: Explanation  Response: Verbalizes Understanding, Needs Reinforcement    Education Comments  No comments found.          OP EDUCATION:       Encounter Problems       Encounter Problems (Active)       PT Problem       Patient will complete supine to sit and sit to supine Supervision while maintaining spinal precautions (Progressing)       Start:  02/03/25    Expected End:  02/17/25            Patient will perform sit<>stand transfer with LRAD, and Supervision while maintaining spinal precautions  (Progressing)       Start:  02/03/25    Expected End:  02/17/25            Patient will ambulate >100' with LRAD and Supervision  (Progressing)       Start:  02/03/25    Expected End:  02/17/25

## 2025-02-04 NOTE — CARE PLAN
The patient's goals for the shift include    The clinical goals for the shift include pt will remain HDS
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
## 2025-02-05 ENCOUNTER — APPOINTMENT (OUTPATIENT)
Dept: NEUROSURGERY | Facility: CLINIC | Age: 80
End: 2025-02-05
Payer: MEDICARE

## 2025-02-05 ENCOUNTER — APPOINTMENT (OUTPATIENT)
Dept: RADIOLOGY | Facility: HOSPITAL | Age: 80
End: 2025-02-05
Payer: MEDICARE

## 2025-02-05 LAB
ALBUMIN SERPL BCP-MCNC: 2.4 G/DL (ref 3.4–5)
ALP SERPL-CCNC: 166 U/L (ref 33–136)
ALT SERPL W P-5'-P-CCNC: 77 U/L (ref 10–52)
ANION GAP SERPL CALC-SCNC: 8 MMOL/L (ref 10–20)
AST SERPL W P-5'-P-CCNC: 33 U/L (ref 9–39)
BILIRUB DIRECT SERPL-MCNC: 0.6 MG/DL (ref 0–0.3)
BILIRUB SERPL-MCNC: 1.5 MG/DL (ref 0–1.2)
BUN SERPL-MCNC: 35 MG/DL (ref 6–23)
CALCIUM SERPL-MCNC: 7.9 MG/DL (ref 8.6–10.6)
CHLORIDE SERPL-SCNC: 108 MMOL/L (ref 98–107)
CO2 SERPL-SCNC: 27 MMOL/L (ref 21–32)
CREAT SERPL-MCNC: 1.29 MG/DL (ref 0.5–1.3)
EGFRCR SERPLBLD CKD-EPI 2021: 57 ML/MIN/1.73M*2
GLUCOSE BLD MANUAL STRIP-MCNC: 122 MG/DL (ref 74–99)
GLUCOSE SERPL-MCNC: 117 MG/DL (ref 74–99)
MAGNESIUM SERPL-MCNC: 1.57 MG/DL (ref 1.6–2.4)
PHOSPHATE SERPL-MCNC: 3.8 MG/DL (ref 2.5–4.9)
POTASSIUM SERPL-SCNC: 4 MMOL/L (ref 3.5–5.3)
PROT SERPL-MCNC: 6.2 G/DL (ref 6.4–8.2)
SODIUM SERPL-SCNC: 139 MMOL/L (ref 136–145)

## 2025-02-05 PROCEDURE — 2500000004 HC RX 250 GENERAL PHARMACY W/ HCPCS (ALT 636 FOR OP/ED)

## 2025-02-05 PROCEDURE — 70371 SPEECH EVALUATION COMPLEX: CPT | Performed by: SPEECH-LANGUAGE PATHOLOGIST

## 2025-02-05 PROCEDURE — 2500000001 HC RX 250 WO HCPCS SELF ADMINISTERED DRUGS (ALT 637 FOR MEDICARE OP): Performed by: NURSE PRACTITIONER

## 2025-02-05 PROCEDURE — 83735 ASSAY OF MAGNESIUM: CPT | Performed by: NURSE PRACTITIONER

## 2025-02-05 PROCEDURE — 2500000005 HC RX 250 GENERAL PHARMACY W/O HCPCS

## 2025-02-05 PROCEDURE — 82947 ASSAY GLUCOSE BLOOD QUANT: CPT

## 2025-02-05 PROCEDURE — 84100 ASSAY OF PHOSPHORUS: CPT | Performed by: NURSE PRACTITIONER

## 2025-02-05 PROCEDURE — 2580000001 HC RX 258 IV SOLUTIONS

## 2025-02-05 PROCEDURE — 2500000004 HC RX 250 GENERAL PHARMACY W/ HCPCS (ALT 636 FOR OP/ED): Performed by: NURSE PRACTITIONER

## 2025-02-05 PROCEDURE — 71045 X-RAY EXAM CHEST 1 VIEW: CPT | Performed by: RADIOLOGY

## 2025-02-05 PROCEDURE — 1100000001 HC PRIVATE ROOM DAILY

## 2025-02-05 PROCEDURE — 36415 COLL VENOUS BLD VENIPUNCTURE: CPT | Performed by: NURSE PRACTITIONER

## 2025-02-05 PROCEDURE — 99232 SBSQ HOSP IP/OBS MODERATE 35: CPT | Performed by: NURSE PRACTITIONER

## 2025-02-05 PROCEDURE — 97530 THERAPEUTIC ACTIVITIES: CPT | Mod: GP,CQ

## 2025-02-05 PROCEDURE — 71045 X-RAY EXAM CHEST 1 VIEW: CPT

## 2025-02-05 PROCEDURE — 80076 HEPATIC FUNCTION PANEL: CPT | Performed by: NURSE PRACTITIONER

## 2025-02-05 RX ORDER — MAGNESIUM SULFATE HEPTAHYDRATE 40 MG/ML
2 INJECTION, SOLUTION INTRAVENOUS ONCE
Status: COMPLETED | OUTPATIENT
Start: 2025-02-05 | End: 2025-02-05

## 2025-02-05 RX ADMIN — HEPARIN SODIUM 5000 UNITS: 5000 INJECTION, SOLUTION INTRAVENOUS; SUBCUTANEOUS at 22:02

## 2025-02-05 RX ADMIN — HEPARIN SODIUM 5000 UNITS: 5000 INJECTION, SOLUTION INTRAVENOUS; SUBCUTANEOUS at 05:18

## 2025-02-05 RX ADMIN — SMOFLIPID 50 G: 6; 6; 5; 3 INJECTION, EMULSION INTRAVENOUS at 22:01

## 2025-02-05 RX ADMIN — HEPARIN SODIUM 5000 UNITS: 5000 INJECTION, SOLUTION INTRAVENOUS; SUBCUTANEOUS at 16:11

## 2025-02-05 RX ADMIN — Medication 1 TABLET: at 10:42

## 2025-02-05 RX ADMIN — PANTOPRAZOLE SODIUM 40 MG: 40 INJECTION, POWDER, FOR SOLUTION INTRAVENOUS at 10:42

## 2025-02-05 RX ADMIN — Medication 6 MG: at 22:02

## 2025-02-05 RX ADMIN — MAGNESIUM SULFATE HEPTAHYDRATE 2 G: 40 INJECTION, SOLUTION INTRAVENOUS at 12:46

## 2025-02-05 RX ADMIN — ASCORBIC ACID, VITAMIN A PALMITATE, CHOLECALCIFEROL, THIAMINE HYDROCHLORIDE, RIBOFLAVIN-5 PHOSPHATE SODIUM, PYRIDOXINE HYDROCHLORIDE, NIACINAMIDE, DEXPANTHENOL, ALPHA-TOCOPHEROL ACETATE, VITAMIN K1, FOLIC ACID, BIOTIN, CYANOCOBALAMIN: 200; 3300; 200; 6; 3.6; 6; 40; 15; 10; 150; 600; 60; 5 INJECTION, SOLUTION INTRAVENOUS at 22:01

## 2025-02-05 ASSESSMENT — SLU MENTAL STATUS EXAMINATION (SLUMS)
PROVIDE NAMES OF ANIMALS: 3
WHAT DAY OF THE WEEK IS TODAY: 1
BACKWARD DIGIT SPAN: 1
PATIENT HAS COMPLETED HIGH SCHOOL OR ABOVE: YES
WHAT YEAR IS THIS: 1
TOTAL SCORE: 24
QUESTIONS ABOUT STORY: 6
CALCULATE MONEY SPENT AND MONEY LEFT: 1
WHAT STATE ARE WE IN: 1
REMEMBER AND REPEAT FIVE WORDS: 4
DRAW A CLOCK: 4
PICK OUT TRIANGLE: 2

## 2025-02-05 ASSESSMENT — COGNITIVE AND FUNCTIONAL STATUS - GENERAL
MOVING TO AND FROM BED TO CHAIR: A LITTLE
CLIMB 3 TO 5 STEPS WITH RAILING: TOTAL
MOBILITY SCORE: 14
TURNING FROM BACK TO SIDE WHILE IN FLAT BAD: A LOT
STANDING UP FROM CHAIR USING ARMS: A LITTLE
MOVING FROM LYING ON BACK TO SITTING ON SIDE OF FLAT BED WITH BEDRAILS: A LITTLE
WALKING IN HOSPITAL ROOM: A LOT

## 2025-02-05 ASSESSMENT — PAIN - FUNCTIONAL ASSESSMENT: PAIN_FUNCTIONAL_ASSESSMENT: 0-10

## 2025-02-05 ASSESSMENT — PAIN SCALES - GENERAL: PAINLEVEL_OUTOF10: 4
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
## 2025-02-05 NOTE — PROGRESS NOTES
Physical Therapy Treatment    Patient Name: Ike Gar  MRN: [id number]  Today's Date: 2/5/2025  Room: [address] [place] [address]  Time Calculation  Start Time: 0922  Stop Time: 1000  Time Calculation (min): 38 min       Assessment/Plan   PT Assessment  PT Assessment Results: Decreased strength, Decreased endurance, Impaired balance, Decreased mobility, Pain  Rehab Prognosis: Good  Barriers to Discharge Home: Physical needs  Physical Needs: Returning to long-term care/other facility, Ambulating household distances limited by function/safety, Intermittent mobility assistance needed, Intermittent ADL assistance needed, High falls risk due to function or environment  Evaluation/Treatment Tolerance: Patient limited by fatigue  Medical Staff Made Aware: Yes  Barriers to Participation: Comorbidities  End of Session Communication: PCT/NA/CTA  End of Session Patient Position: Up in chair     PT Plan  Treatment/Interventions: Bed mobility, Transfer training, Gait training, Balance training, Neuromuscular re-education, Endurance training, Strengthening, Therapeutic exercise, Therapeutic activity  PT Plan: Ongoing PT  PT Frequency: 5 times per week  PT Discharge Recommendations: Moderate intensity level of continued care  Equipment Recommended upon Discharge: Wheeled walker  PT Recommended Transfer Status: Assist x1, Assistive device  PT - OK to Discharge: Yes  Assessment: Patient is progressing Fairly with therapy this date. Pt reports feeling OOB often despite spO2 WNL, appears very anxious during therapy. Educated pt on managing expectations and goals for therapy while in hospital, quickly fatigues with extended rest breaks needed. Would continue to benefit from continued skilled PT to address all mobility deficits; Patient remains appropriate for MOD intensity therapy when medically ready for discharge from acute stay.  Will continue to follow.     General Visit Information:   PT  Visit  PT Received On: 02/05/25  Prior to Session  Communication: Bedside nurse  Patient Position Received: Bed, 3 rail up     Subjective   Subjective: Pt reports he slept a little last night and feels better today  Precautions:  Precautions  Medical Precautions: Fall precautions, Spinal precautions, Oxygen therapy device and L/min  Post-Surgical Precautions: Abdominal surgery precautions  Braces Applied: TLSO needed for mobility - ABD binder on to protect skin/incisions due to multiple drains and ostomy  Vital Signs:   Date/Time Vitals Session Patient Position Pulse Resp SpO2 BP MAP (mmHg)    02/05/25 0922 During PT  --  122  --  96 %  --  --             Objective   Pain:  Pain Assessment  Pain Assessment: 0-10  0-10 (Numeric) Pain Score: 4  Pain Type: Acute pain  Pain Location: Neck  Cognition:  Cognition  Overall Cognitive Status: Within Functional Limits  Orientation Level: Oriented X4  Cognition Comments: anxious especially with breathing, needs cues for proper breathing/relaxation strategies     Static Sitting balance:  Static Sitting Balance  Static Sitting-Balance Support: Feet supported, Bilateral upper extremity supported  Static Sitting-Level of Assistance: Close supervision  Static Sitting-Comment/Number of Minutes: up to 15min  Static Standing Balance:  Static Standing Balance  Static Standing-Balance Support: Bilateral upper extremity supported  Static Standing-Level of Assistance: Contact guard  Static Standing-Comment/Number of Minutes: up to 30s x2  Dynamic Sitting Balance:  Dynamic Sitting Balance  Dynamic Sitting-Balance Support: Feet supported  Dynamic Sitting-Level of Assistance: Close supervision  Dynamic Sitting-Balance: Reaching across midline, Reaching for objects  Dynamic Sitting-Comments: trunk hygiene    Lines/Tubes/Drains:  PICC - Adult Double lumen Left Brachial vein (Active)   Number of days: 3       Surgical Airway Shiley Cuffed 6 (Active)   Number of days: 29       Closed/Suction Drain Midline RUQ 10 Fr. (Active)   Number of days: 21        Closed/Suction Drain Right;Anterior Hip Bulb (Active)   Number of days: 3       Open Drain Left LLQ (Active)   Number of days: 27       Gastrostomy/Enterostomy Percutaneous endoscopic gastrostomy (PEG) 1 20 Fr. LUQ (Active)   Number of days: 29       Ileostomy Other (Comment) RUQ (Active)   Number of days: 43     Continuous Medications/Drips:  Adult Clinimix Parenteral Nutrition Continuous, 83 mL/hr, Last Rate: 83 mL/hr (02/05/25 0044)        PT Treatments:     Therapeutic Activity  Therapeutic Activity 1: Increased time for hygiene care d/t pt soiled from multiple drains leaking     Bed Mobility 1  Bed Mobility 1: Supine to sitting  Level of Assistance 1: Moderate assistance  Bed Mobility Comments 1: HOB elevated, vc for log roll and trunk assist  Ambulation/Gait Training  Ambulation/Gait Training Performed: Yes  Ambulation/Gait Training 1  Surface 1: Level tile  Device 1: Rolling walker  Assistance 1: Contact guard  Quality of Gait 1: Wide base of support, Soft knee(s), Forward flexed posture, Shuffling gait  Comments/Distance (ft) 1: 4' to chair, vc for upright posture  Transfer 1  Transfer From 1: Sit to  Transfer to 1: Stand  Technique 1: Sit to stand, Stand to sit  Transfer Device 1: Walker  Transfer Level of Assistance 1: Minimum assistance, Minimal verbal cues  Trials/Comments 1: x3 increased time and effort to stand, vc for handplacement and eccentric control  Transfers 2  Transfer From 2: Stand to  Transfer to 2: Chair with arms  Technique 2: Stand pivot  Transfer Device 2: Walker  Transfer Level of Assistance 2: Contact guard  Trials/Comments 2: vc for safety backing up to chair             Activity tolerance:  Activity Tolerance  Endurance: Decreased tolerance for upright activites    Outcome Measures:  [facility] Basic Mobility  Turning from your back to your side while in a flat bed without using bedrails: A little  Moving from lying on your back to sitting on the side of a flat bed without using  bedrails: A lot  Moving to and from bed to chair (including a wheelchair): A little  Standing up from a chair using your arms (e.g. wheelchair or bedside chair): A little  To walk in hospital room: A lot  Climbing 3-5 steps with railing: Total  Basic Mobility - Total Score: 14       Education Documentation  Modified Diet Training, taught by Alexa Davila PTA at 2/5/2025 10:26 AM.  Learner: Patient  Readiness: Acceptance  Method: Explanation  Response: Verbalizes Understanding    Precautions, taught by Alexa Davila PTA at 2/5/2025 10:26 AM.  Learner: Patient  Readiness: Acceptance  Method: Explanation  Response: Verbalizes Understanding    Body Mechanics, taught by Alexa Davila PTA at 2/5/2025 10:26 AM.  Learner: Patient  Readiness: Acceptance  Method: Explanation  Response: Verbalizes Understanding    Mobility Training, taught by Alexa Davila PTA at 2/5/2025 10:26 AM.  Learner: Patient  Readiness: Acceptance  Method: Explanation  Response: Verbalizes Understanding    Body Mechanics, taught by Alexa Davila PTA at 2/5/2025 10:26 AM.  Learner: Patient  Readiness: Acceptance  Method: Explanation  Response: Verbalizes Understanding    Precautions, taught by Alexa Davila PTA at 2/5/2025 10:26 AM.  Learner: Patient  Readiness: Acceptance  Method: Explanation  Response: Verbalizes Understanding    ADL Training, taught by Alexa Davila PTA at 2/5/2025 10:26 AM.  Learner: Patient  Readiness: Acceptance  Method: Explanation  Response: Verbalizes Understanding    Education Comments  No comments found.          OP EDUCATION:       Encounter Problems       Encounter Problems (Active)       PT Problem       Patient will complete supine to sit and sit to supine Supervision while maintaining spinal precautions (Progressing)       Start:  02/03/25    Expected End:  02/17/25            Patient will perform sit<>stand transfer with LRAD, and Supervision while maintaining spinal precautions  (Progressing)       Start:  02/03/25     Expected End:  02/17/25            Patient will ambulate >100' with LRAD and Supervision  (Progressing)       Start:  02/03/25    Expected End:  02/17/25
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
## 2025-02-05 NOTE — CARE PLAN
The patient's goals for the shift include     The clinical goals for the shift include pt pain will be controlled

## 2025-02-06 LAB
ALBUMIN SERPL BCP-MCNC: 2.4 G/DL (ref 3.4–5)
ANION GAP SERPL CALC-SCNC: 10 MMOL/L (ref 10–20)
BUN SERPL-MCNC: 35 MG/DL (ref 6–23)
CALCIUM SERPL-MCNC: 7.9 MG/DL (ref 8.6–10.6)
CHLORIDE SERPL-SCNC: 109 MMOL/L (ref 98–107)
CO2 SERPL-SCNC: 26 MMOL/L (ref 21–32)
CREAT SERPL-MCNC: 1.24 MG/DL (ref 0.5–1.3)
EGFRCR SERPLBLD CKD-EPI 2021: 60 ML/MIN/1.73M*2
ERYTHROCYTE [DISTWIDTH] IN BLOOD BY AUTOMATED COUNT: 16.2 % (ref 11.5–14.5)
GLUCOSE BLD MANUAL STRIP-MCNC: 108 MG/DL (ref 74–99)
GLUCOSE BLD MANUAL STRIP-MCNC: 136 MG/DL (ref 74–99)
GLUCOSE BLD MANUAL STRIP-MCNC: 140 MG/DL (ref 74–99)
GLUCOSE BLD MANUAL STRIP-MCNC: 91 MG/DL (ref 74–99)
GLUCOSE SERPL-MCNC: 83 MG/DL (ref 74–99)
HCT VFR BLD AUTO: 23.7 % (ref 41–52)
HGB BLD-MCNC: 7.3 G/DL (ref 13.5–17.5)
MAGNESIUM SERPL-MCNC: 1.66 MG/DL (ref 1.6–2.4)
MCH RBC QN AUTO: 32 PG (ref 26–34)
MCHC RBC AUTO-ENTMCNC: 30.8 G/DL (ref 32–36)
MCV RBC AUTO: 104 FL (ref 80–100)
NRBC BLD-RTO: 0 /100 WBCS (ref 0–0)
PHOSPHATE SERPL-MCNC: 3.7 MG/DL (ref 2.5–4.9)
PLATELET # BLD AUTO: 102 X10*3/UL (ref 150–450)
POTASSIUM SERPL-SCNC: 4.3 MMOL/L (ref 3.5–5.3)
RBC # BLD AUTO: 2.28 X10*6/UL (ref 4.5–5.9)
SODIUM SERPL-SCNC: 141 MMOL/L (ref 136–145)
WBC # BLD AUTO: 6 X10*3/UL (ref 4.4–11.3)

## 2025-02-06 PROCEDURE — 1100000001 HC PRIVATE ROOM DAILY

## 2025-02-06 PROCEDURE — 2500000004 HC RX 250 GENERAL PHARMACY W/ HCPCS (ALT 636 FOR OP/ED): Performed by: PHYSICIAN ASSISTANT

## 2025-02-06 PROCEDURE — 97530 THERAPEUTIC ACTIVITIES: CPT | Mod: GO

## 2025-02-06 PROCEDURE — 97116 GAIT TRAINING THERAPY: CPT | Mod: GP,CQ

## 2025-02-06 PROCEDURE — 82947 ASSAY GLUCOSE BLOOD QUANT: CPT

## 2025-02-06 PROCEDURE — 2500000005 HC RX 250 GENERAL PHARMACY W/O HCPCS: Performed by: PHYSICIAN ASSISTANT

## 2025-02-06 PROCEDURE — 80069 RENAL FUNCTION PANEL: CPT | Performed by: NURSE PRACTITIONER

## 2025-02-06 PROCEDURE — 83735 ASSAY OF MAGNESIUM: CPT | Performed by: NURSE PRACTITIONER

## 2025-02-06 PROCEDURE — 2580000001 HC RX 258 IV SOLUTIONS

## 2025-02-06 PROCEDURE — 2500000004 HC RX 250 GENERAL PHARMACY W/ HCPCS (ALT 636 FOR OP/ED)

## 2025-02-06 PROCEDURE — 97535 SELF CARE MNGMENT TRAINING: CPT | Mod: GO

## 2025-02-06 PROCEDURE — 85027 COMPLETE CBC AUTOMATED: CPT | Performed by: NURSE PRACTITIONER

## 2025-02-06 PROCEDURE — 99231 SBSQ HOSP IP/OBS SF/LOW 25: CPT | Performed by: PHYSICIAN ASSISTANT

## 2025-02-06 PROCEDURE — 97530 THERAPEUTIC ACTIVITIES: CPT | Mod: GP,CQ

## 2025-02-06 PROCEDURE — 2500000001 HC RX 250 WO HCPCS SELF ADMINISTERED DRUGS (ALT 637 FOR MEDICARE OP): Performed by: NURSE PRACTITIONER

## 2025-02-06 RX ORDER — ACETAMINOPHEN 160 MG/5ML
975 SOLUTION ORAL EVERY 8 HOURS PRN
Status: DISCONTINUED | OUTPATIENT
Start: 2025-02-06 | End: 2025-03-02 | Stop reason: HOSPADM

## 2025-02-06 RX ORDER — INSULIN LISPRO 100 [IU]/ML
0-5 INJECTION, SOLUTION INTRAVENOUS; SUBCUTANEOUS EVERY 6 HOURS
Status: DISCONTINUED | OUTPATIENT
Start: 2025-02-06 | End: 2025-03-02 | Stop reason: HOSPADM

## 2025-02-06 RX ORDER — MAGNESIUM SULFATE HEPTAHYDRATE 40 MG/ML
4 INJECTION, SOLUTION INTRAVENOUS ONCE
Status: COMPLETED | OUTPATIENT
Start: 2025-02-06 | End: 2025-02-06

## 2025-02-06 RX ADMIN — ASCORBIC ACID, VITAMIN A PALMITATE, CHOLECALCIFEROL, THIAMINE HYDROCHLORIDE, RIBOFLAVIN-5 PHOSPHATE SODIUM, PYRIDOXINE HYDROCHLORIDE, NIACINAMIDE, DEXPANTHENOL, ALPHA-TOCOPHEROL ACETATE, VITAMIN K1, FOLIC ACID, BIOTIN, CYANOCOBALAMIN: 200; 3300; 200; 6; 3.6; 6; 40; 15; 10; 150; 600; 60; 5 INJECTION, SOLUTION INTRAVENOUS at 20:06

## 2025-02-06 RX ADMIN — MAGNESIUM SULFATE HEPTAHYDRATE 4 G: 40 INJECTION, SOLUTION INTRAVENOUS at 12:16

## 2025-02-06 RX ADMIN — PANTOPRAZOLE SODIUM 40 MG: 40 INJECTION, POWDER, FOR SOLUTION INTRAVENOUS at 08:55

## 2025-02-06 RX ADMIN — HEPARIN SODIUM 5000 UNITS: 5000 INJECTION, SOLUTION INTRAVENOUS; SUBCUTANEOUS at 15:30

## 2025-02-06 RX ADMIN — Medication 1 TABLET: at 08:55

## 2025-02-06 RX ADMIN — HEPARIN SODIUM 5000 UNITS: 5000 INJECTION, SOLUTION INTRAVENOUS; SUBCUTANEOUS at 20:06

## 2025-02-06 RX ADMIN — SMOFLIPID 50 G: 6; 6; 5; 3 INJECTION, EMULSION INTRAVENOUS at 20:06

## 2025-02-06 RX ADMIN — HEPARIN SODIUM 5000 UNITS: 5000 INJECTION, SOLUTION INTRAVENOUS; SUBCUTANEOUS at 05:15

## 2025-02-06 ASSESSMENT — COGNITIVE AND FUNCTIONAL STATUS - GENERAL
STANDING UP FROM CHAIR USING ARMS: A LOT
PERSONAL GROOMING: A LITTLE
DRESSING REGULAR LOWER BODY CLOTHING: TOTAL
EATING MEALS: A LOT
DRESSING REGULAR LOWER BODY CLOTHING: A LOT
HELP NEEDED FOR BATHING: A LOT
DRESSING REGULAR UPPER BODY CLOTHING: TOTAL
WALKING IN HOSPITAL ROOM: A LOT
TURNING FROM BACK TO SIDE WHILE IN FLAT BAD: TOTAL
MOBILITY SCORE: 15
HELP NEEDED FOR BATHING: TOTAL
STANDING UP FROM CHAIR USING ARMS: A LITTLE
MOBILITY SCORE: 10
PERSONAL GROOMING: A LITTLE
DRESSING REGULAR UPPER BODY CLOTHING: A LITTLE
DAILY ACTIVITIY SCORE: 14
CLIMB 3 TO 5 STEPS WITH RAILING: TOTAL
TOILETING: A LOT
DAILY ACTIVITIY SCORE: 11
MOVING TO AND FROM BED TO CHAIR: A LITTLE
TOILETING: A LOT
MOVING FROM LYING ON BACK TO SITTING ON SIDE OF FLAT BED WITH BEDRAILS: TOTAL
TURNING FROM BACK TO SIDE WHILE IN FLAT BAD: A LOT
MOVING FROM LYING ON BACK TO SITTING ON SIDE OF FLAT BED WITH BEDRAILS: A LITTLE
EATING MEALS: A LITTLE
MOVING TO AND FROM BED TO CHAIR: A LOT
CLIMB 3 TO 5 STEPS WITH RAILING: A LOT
WALKING IN HOSPITAL ROOM: A LITTLE

## 2025-02-06 ASSESSMENT — PAIN SCALES - GENERAL
PAINLEVEL_OUTOF10: 3
PAINLEVEL_OUTOF10: 5 - MODERATE PAIN
PAINLEVEL_OUTOF10: 0 - NO PAIN
PAINLEVEL_OUTOF10: 0 - NO PAIN

## 2025-02-06 ASSESSMENT — ACTIVITIES OF DAILY LIVING (ADL): HOME_MANAGEMENT_TIME_ENTRY: 10

## 2025-02-06 ASSESSMENT — PAIN - FUNCTIONAL ASSESSMENT: PAIN_FUNCTIONAL_ASSESSMENT: 0-10

## 2025-02-06 NOTE — CONSULTS
Wound Care Consult     Visit Date: 2/6/2025      Patient Name: Ike Gar         MRN: [id number]           YOB: 1945     Reason for Consult: ostomy care        Wound Team Summary Assessment:   Spoke at length with patient regarding ostomy care and how to care for his ostomy once he is at home. He asked if ostomy RN could call his son Ike Barton and arrange a time for ostomy education to take place.  Spoke with his son and he will be in on Sunday for hands on lesson.   Will provide additional ostomy education as needed while in the hospital. Pouch not removed today per patient request.        Michelle Kruger RN  2/6/2025  4:58 PM

## 2025-02-06 NOTE — CARE PLAN
The patient's goals for the shift include to sleep    The clinical goals for the shift include Pt will be able to get some sleep throughout shift

## 2025-02-06 NOTE — PROGRESS NOTES
Nutrition Note:   Nutrition Assessment    Reason for Assessment: Parenteral assessment/recommendation (TPN/PPN)  Contacted by team for cycled TPN recs. Pt passed MBSS earlier this week; however, due to fistula remains on a limited clear liquid diet.     Nutrition Significant Labs:  BG POCT trend:   Results from last 7 days   Lab Units 02/06/25  0857 02/05/25  1726 02/04/25  1035 02/03/25  2319 02/03/25  1745   POCT GLUCOSE mg/dL 140* 122* 107* 122* 89    , Renal Lab Trend:   Results from last 7 days   Lab Units 02/05/25  0907 02/04/25  0926 02/03/25  0505 02/02/25  0529   POTASSIUM mmol/L 4.0 4.1 4.6 4.7   PHOSPHORUS mg/dL 3.8 3.1 2.6 1.9*   SODIUM mmol/L 139 138 143 144   MAGNESIUM mg/dL 1.57* 1.65 1.52*  --    EGFR mL/min/1.73m*2 57* 52* 53* 53*   BUN mg/dL 35* 35* 40* 44*   CREATININE mg/dL 1.29 1.40* 1.37* 1.37*    , TPN/PPN Labs:   Results from last 7 days   Lab Units 02/05/25  0907 02/04/25  0926 02/03/25  0505 02/02/25  1539 02/02/25  0529   GLUCOSE mg/dL 117* 100* 85  --  116*   POTASSIUM mmol/L 4.0 4.1 4.6  --  4.7   PHOSPHORUS mg/dL 3.8 3.1 2.6  --  1.9*   MAGNESIUM mg/dL 1.57* 1.65 1.52*  --   --    SODIUM mmol/L 139 138 143  --  144   CHLORIDE mmol/L 108* 105 110*  --  112*   ALT U/L 77* 99*  --    < >  --    AST U/L 33 53*  --    < >  --    ALK PHOS U/L 166* 189*  --    < >  --    BILIRUBIN TOTAL mg/dL 1.5* 1.9*  --    < >  --     < > = values in this interval not displayed.        Nutrition Specific Medications:  Scheduled medications  fat emulsion fish oil/plant based, 250 mL, intravenous, Daily Lipids  insulin lispro, 0-5 Units, subcutaneous, q6h  multivitamin with minerals, 1 tablet, oral, Daily  pantoprazole, 40 mg, intravenous, Daily    Continuous medications  Adult Clinimix Parenteral Nutrition Continuous, 83 mL/hr, Last Rate: 83 mL/hr (02/05/25 2201)        I/O:   Last BM Date: 02/05/25; Stool Appearance: Liquid, Mucous (02/06/25 0516)    Dietary Orders (From admission, onward)       Start      Ordered    02/06/25 0905  NPO Diet Except: Sips of clear liquids; Effective now  Diet effective now        Question:  Except:  Answer:  Sips of clear liquids    02/06/25 0905                     Estimated Needs:   Total Energy Estimated Needs in 24 hours (kCal):  (3775-5366)  Method for Estimating Needs: 25 -30  kcal/kg current wt  Total Protein Estimated Needs in 24 Hours (g): 115 g  Method for Estimating 24 Hour Protein Needs: 1.5+ g/kg current wt  Total Fluid Estimated Needs in 24 Hours (mL):  (per trauma)        Nutrition Diagnosis   Malnutrition Diagnosis  Patient has Malnutrition Diagnosis: Yes  Diagnosis Status: New  Malnutrition Diagnosis: Moderate malnutrition related to acute disease or injury  As Evidenced by: mild muscle loss and mild subcutaneous fat loss.  Additional Assessment Information: During previous admission was unable to be diagnosed due to significant fluid retention            Nutrition Interventions/Recommendations   Nutrition prescription for parenteral nutrition    Nutrition Recommendations:  Individualized Nutrition Prescription Provided for :   Continue with standard AA 5% and dextrose 20% -- for cycled TPN total volume is 1980 ml over 12 hrs + 250 ml SMOF Lipids  run @ 90 ml/hr x1 hr, 180 ml/hr x10 hr, then 90 ml/hr x1hr   Provides: 2242 kcal, 99 g protein, 2230 ml total volume  Monitor RFP + mag daily, I/O, POC glucose q6h, LFTs + triglycerides weekly    Nutrition Interventions/Goals:   Interventions: Parenteral nutrition  Parenteral Nutrition: Management of schedule of parenteral nutrition, Management of delivery rate of parenteral nutrition          Nutrition Monitoring and Evaluation   Food/Nutrient Related History Monitoring  Monitoring and Evaluation Plan: Enteral and parenteral nutrition intake determination  Enteral and Parenteral Nutrition Intake Determination: Parenteral nutrition formula/solution, Parenteral nutrition intake - Titrate to goal without metabolic  complications                   Goal Status: New goal(s) identified    Time Spent (min): 30 minutes

## 2025-02-06 NOTE — CARE PLAN
The patient's goals for the shift include to remain HDS throughout shift.     The clinical goals for the shift include will ambulate throughout room this shift.     Over the shift, the patient did not make progress toward the following goals. Barriers to progression include none. Recommendations to address these barriers include none.
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
## 2025-02-06 NOTE — PROGRESS NOTES
Physical Therapy Treatment    Patient Name: Ike Gar  MRN: [id number]  Today's Date: 2/6/2025  Room: [address] [place] [address]  Time Calculation  Start Time: 1105  Stop Time: 1135  Time Calculation (min): 30 min       Assessment/Plan   PT Assessment  PT Assessment Results: Decreased strength, Decreased endurance, Impaired balance, Decreased mobility, Pain  Rehab Prognosis: Good  Barriers to Discharge Home: Physical needs  Physical Needs: Returning to long-term care/other facility, Ambulating household distances limited by function/safety, Intermittent mobility assistance needed, Intermittent ADL assistance needed, High falls risk due to function or environment  Evaluation/Treatment Tolerance: Patient limited by fatigue  Medical Staff Made Aware: Yes  Strengths: Attitude of self  Barriers to Participation: Comorbidities, Coping skills  End of Session Communication: Bedside nurse  End of Session Patient Position: Bed, 3 rail up     PT Plan  Treatment/Interventions: Bed mobility, Transfer training, Gait training, Balance training, Neuromuscular re-education, Endurance training, Strengthening, Therapeutic exercise, Therapeutic activity  PT Plan: Ongoing PT  PT Frequency: 5 times per week  PT Discharge Recommendations: Moderate intensity level of continued care  Equipment Recommended upon Discharge: Wheeled walker  PT Recommended Transfer Status: Assist x1, Assistive device  PT - OK to Discharge: Yes  Assessment: Patient is progressing Well with therapy this date. Improved activity tolerance being able to complete gait post OT session. Would continue to benefit from continued skilled PT to address all mobility deficits; Patient remains appropriate for MOD intensity therapy when medically ready for discharge from acute stay.  Will continue to follow.     General Visit Information:   PT  Visit  PT Received On: 02/06/25  Prior to Session Communication: Bedside nurse  Patient Position Received: Up in chair     Subjective   Subjective:  Reports fatigue, wants to get back in bed post PT  Precautions:  Precautions  Medical Precautions: Fall precautions, Spinal precautions, Oxygen therapy device and L/min  Post-Surgical Precautions: Abdominal surgery precautions  Braces Applied: TLSO needed for mobility - ABD binder on to protect skin/incisions due to multiple drains and ostomy    Objective   Pain:  Pain Assessment  Pain Assessment: 0-10  0-10 (Numeric) Pain Score: 5 - Moderate pain  Pain Type: Acute pain  Pain Location: Back  Cognition:  Cognition  Overall Cognitive Status: Within Functional Limits  Orientation Level: Oriented X4  Cognition Comments: anxious especially with breathing, needs cues for proper breathing/relaxation strategies. Appears in better spirits today d/t pt reporting trach possibly being removed  Insight: Mild  Impulsive: Within functional limits    Lines/Tubes/Drains:  PICC - Adult Double lumen Left Brachial vein (Active)   Number of days: 4       Surgical Airway Shiley Cuffed 6 (Active)   Number of days: 30       Closed/Suction Drain Midline RUQ 10 Fr. (Active)   Number of days: 22       Closed/Suction Drain Right;Anterior Hip Bulb (Active)   Number of days: 4       Open Drain Left LLQ (Active)   Number of days: 28       Gastrostomy/Enterostomy Percutaneous endoscopic gastrostomy (PEG) 1 20 Fr. LUQ (Active)   Number of days: 30       Ileostomy Other (Comment) RUQ (Active)   Number of days: 45     Continuous Medications/Drips:  Adult Clinimix TPN Cyclic,         PT Treatments:           Bed Mobility 1  Bed Mobility 1: Sitting to supine  Level of Assistance 1: Moderate assistance  Bed Mobility Comments 1: for BLE  Ambulation/Gait Training 1  Surface 1: Level tile  Device 1: Rolling walker  Assistance 1: Contact guard  Quality of Gait 1: Wide base of support, Soft knee(s), Forward flexed posture, Shuffling gait  Comments/Distance (ft) 1: 6'x2 fwd/retro. Extended rest in between bouts. Fatigues quickly, vc to stay within  walker  Transfer 1  Transfer From 1: Sit to  Transfer to 1: Stand  Technique 1: Sit to stand, Stand to sit  Transfer Device 1: Walker  Transfer Level of Assistance 1: Contact guard, Moderate verbal cues  Trials/Comments 1: vc for handplacement and eccentric control, multiple attempts to come to stand but able to from recliner.  Transfers 2  Transfer From 2: Stand to  Transfer to 2: Bed  Technique 2: Stand pivot  Transfer Device 2: Walker  Transfer Level of Assistance 2: Contact guard  Trials/Comments 2: vc for safety backing up to bed             Activity tolerance:  Activity Tolerance  Endurance: Decreased tolerance for upright activites    Outcome Measures:  [facility] Basic Mobility  Turning from your back to your side while in a flat bed without using bedrails: A little  Moving from lying on your back to sitting on the side of a flat bed without using bedrails: A lot  Moving to and from bed to chair (including a wheelchair): A little  Standing up from a chair using your arms (e.g. wheelchair or bedside chair): A little  To walk in hospital room: A little  Climbing 3-5 steps with railing: Total  Basic Mobility - Total Score: 15      Education Documentation  Cognition, taught by Alexa Davila PTA at 2/6/2025 11:49 AM.  Learner: Patient  Readiness: Acceptance  Method: Explanation  Response: Verbalizes Understanding    Modified Diet Training, taught by Alexa Davila PTA at 2/6/2025 11:49 AM.  Learner: Patient  Readiness: Acceptance  Method: Explanation  Response: Verbalizes Understanding    Precautions, taught by Alexa Davila PTA at 2/6/2025 11:49 AM.  Learner: Patient  Readiness: Acceptance  Method: Explanation  Response: Verbalizes Understanding    Body Mechanics, taught by Alexa Davila PTA at 2/6/2025 11:49 AM.  Learner: Patient  Readiness: Acceptance  Method: Explanation  Response: Verbalizes Understanding    Mobility Training, taught by Alexa Davila PTA at 2/6/2025 11:49 AM.  Learner: Patient  Readiness:  Acceptance  Method: Explanation  Response: Verbalizes Understanding    Body Mechanics, taught by Alexa Davila PTA at 2/6/2025 11:49 AM.  Learner: Patient  Readiness: Acceptance  Method: Explanation  Response: Verbalizes Understanding    Precautions, taught by Alexa Davila PTA at 2/6/2025 11:49 AM.  Learner: Patient  Readiness: Acceptance  Method: Explanation  Response: Verbalizes Understanding    ADL Training, taught by Alexa Davila PTA at 2/6/2025 11:49 AM.  Learner: Patient  Readiness: Acceptance  Method: Explanation  Response: Verbalizes Understanding    Education Comments  No comments found.          OP EDUCATION:       Encounter Problems       Encounter Problems (Active)       PT Problem       Patient will complete supine to sit and sit to supine Supervision while maintaining spinal precautions (Progressing)       Start:  02/03/25    Expected End:  02/17/25            Patient will perform sit<>stand transfer with LRAD, and Supervision while maintaining spinal precautions  (Progressing)       Start:  02/03/25    Expected End:  02/17/25            Patient will ambulate >100' with LRAD and Supervision  (Progressing)       Start:  02/03/25    Expected End:  02/17/25

## 2025-02-06 NOTE — PROGRESS NOTES
Occupational Therapy      Occupational Therapy Treatment    Name: Ike Gar  MRN: [id number]  : 1945  Date: 25  Room: [address] [place], [place] [address]      Time Calculation  Start Time: 947  Stop Time: 3  Time Calculation (min): 26 min    Assessment:  Prognosis: Good  Barriers to Discharge Home: Caregiver assistance, Physical needs  Caregiver Assistance: Caregiver assistance needed per identified barriers - however, level of patient's required assistance exceeds assistance available at home  Physical Needs: Intermittent mobility assistance needed, Intermittent ADL assistance needed  End of Session Communication: PCT/NA/CTA  End of Session Patient Position: Up in chair  Plan:  Treatment Interventions: ADL retraining, Compensatory technique education, Equipment evaluation/education, Endurance training, UE strengthening/ROM  OT Frequency: 3 times per week  OT Discharge Recommendations: Moderate intensity level of continued care  Equipment Recommended upon Discharge: Wheeled walker  OT Recommended Transfer Status: Assist of 1  OT - OK to Discharge: Yes    Subjective   General:  OT Last Visit  OT Received On: 25  Prior to Session Communication: Bedside nurse  Patient Position Received: Bed, 3 rail up  General Comment: Pt in supine on arrival, willing to participate in OT. Required assistance fastening TLSO prior mobilizing OOB.   Precautions:  Medical Precautions: Fall precautions, Spinal precautions, Oxygen therapy device and L/min  Post-Surgical Precautions: Abdominal surgery precautions  Precautions Comment: TLSO on but unfastened on approach, required assistance repositioning and fastening prior to mobilziing OOB  Vitals:    Lines/Tubes/Drains:  PICC - Adult Double lumen Left Brachial vein (Active)   Number of days: 4       Surgical Airway Shiley Cuffed 6 (Active)   Number of days: 30       Closed/Suction Drain Midline RUQ 10 Fr. (Active)   Number of days: 22       Closed/Suction Drain Right;Anterior Hip Bulb  (Active)   Number of days: 4       Open Drain Left LLQ (Active)   Number of days: 29       Gastrostomy/Enterostomy Percutaneous endoscopic gastrostomy (PEG) 1 20 Fr. LUQ (Active)   Number of days: 30       Ileostomy Other (Comment) RUQ (Active)   Number of days: 45       Cognition:  Overall Cognitive Status: Within Functional Limits  Orientation Level: Oriented X4    Pain Assessment:  Pain Assessment  0-10 (Numeric) Pain Score:  (unrated)     Objective   Activities of Daily Living:      Grooming  Grooming Level of Assistance: Setup  Grooming Where Assessed: Recliner  Grooming Comments: completed face hygiene using washcloth          Bed Mobility/Transfers:   Bed Mobility  Bed Mobility: Yes  Bed Mobility 1  Bed Mobility 1: Supine to sitting  Level of Assistance 1: Moderate assistance  Bed Mobility Comments 1: HOB up, modified log roll technique as pt unable to fully roll to L side  Transfers  Transfer: Yes  Transfer 1  Transfer From 1: Sit to  Transfer to 1: Stand  Technique 1: Sit to stand, Stand to sit  Transfer Device 1: Walker  Transfer Level of Assistance 1: Moderate assistance  Trials/Comments 1: veral cues for safe hand placement  Transfers 2  Transfer From 2: Stand to  Transfer to 2: Sit  Technique 2: Stand to sit  Transfer Device 2: Walker  Transfer Level of Assistance 2: Minimum assistance  Trials/Comments 2: veral cues for safe hand placement  Transfers 3  Transfer From 3: Bed to  Transfer to 3: Chair with arms  Transfer Device 3: Walker  Transfer Level of Assistance 3: Minimum assistance  Trials/Comments 3: pt took few steps from bed to chair using WW        Outcome Measures:  [facility] Daily Activity  Putting on and taking off regular lower body clothing: A lot  Bathing (including washing, rinsing, drying): A lot  Putting on and taking off regular upper body clothing: A little  Toileting, which includes using toilet, bedpan or urinal: A lot  Taking care of personal grooming such as brushing teeth: A  little  Eating Meals: A lot  Daily Activity - Total Score: 14     Education Documentation  No documentation found.  Education Comments  No comments found.        Goals:  Encounter Problems       Encounter Problems (Active)       ADLs       Patient with complete upper body dressing with independent level of assistance donning and doffing all UE clothes with no adaptive equipment while edge of bed  (Progressing)       Start:  02/03/25    Expected End:  02/17/25            Patient with complete lower body dressing with stand by assist level of assistance donning and doffing all LE clothes  with PRN adaptive equipment while edge of bed  (Progressing)       Start:  02/03/25    Expected End:  02/17/25            Patient will complete toileting including hygiene clothing management/hygiene with stand by assist level of assistance and raised toilet seat. (Progressing)       Start:  02/03/25    Expected End:  02/17/25               EXERCISE/STRENGTHENING       Patient with increase BUE strength to WFL for ADLs. (Progressing)       Start:  02/03/25    Expected End:  02/17/25               MOBILITY       Patient will perform Functional mobility min Household distances with contact guard assist level of assistance and least restrictive device in order to improve safety and functional mobility. (Progressing)       Start:  02/03/25    Expected End:  02/17/25               TRANSFERS       Patient will perform bed mobility stand by assist level of assistance and bed rails in order to improve safety and independence with mobility (Progressing)       Start:  02/03/25    Expected End:  02/17/25            Patient will complete sit to stand transfer with CGA level of assistance and least restrictive device in order to improve safety and prepare for out of bed mobility. (Progressing)       Start:  02/03/25    Expected End:  02/17/25 02/06/25 at 1:23 PM   Martha Cloud, OT   [phone]

## 2025-02-07 LAB
ALBUMIN SERPL BCP-MCNC: 2.4 G/DL (ref 3.4–5)
ANION GAP SERPL CALC-SCNC: 13 MMOL/L (ref 10–20)
BUN SERPL-MCNC: 44 MG/DL (ref 6–23)
CALCIUM SERPL-MCNC: 8.2 MG/DL (ref 8.6–10.6)
CHLORIDE SERPL-SCNC: 107 MMOL/L (ref 98–107)
CO2 SERPL-SCNC: 25 MMOL/L (ref 21–32)
CREAT SERPL-MCNC: 1.24 MG/DL (ref 0.5–1.3)
EGFRCR SERPLBLD CKD-EPI 2021: 60 ML/MIN/1.73M*2
GLUCOSE BLD MANUAL STRIP-MCNC: 105 MG/DL (ref 74–99)
GLUCOSE BLD MANUAL STRIP-MCNC: 121 MG/DL (ref 74–99)
GLUCOSE BLD MANUAL STRIP-MCNC: 99 MG/DL (ref 74–99)
GLUCOSE SERPL-MCNC: 109 MG/DL (ref 74–99)
MAGNESIUM SERPL-MCNC: 2.1 MG/DL (ref 1.6–2.4)
PHOSPHATE SERPL-MCNC: 3.7 MG/DL (ref 2.5–4.9)
POTASSIUM SERPL-SCNC: 4.5 MMOL/L (ref 3.5–5.3)
SODIUM SERPL-SCNC: 140 MMOL/L (ref 136–145)

## 2025-02-07 PROCEDURE — 97530 THERAPEUTIC ACTIVITIES: CPT | Mod: GP,CQ

## 2025-02-07 PROCEDURE — 97116 GAIT TRAINING THERAPY: CPT | Mod: GP,CQ

## 2025-02-07 PROCEDURE — 2500000004 HC RX 250 GENERAL PHARMACY W/ HCPCS (ALT 636 FOR OP/ED)

## 2025-02-07 PROCEDURE — 2500000005 HC RX 250 GENERAL PHARMACY W/O HCPCS: Performed by: PHYSICIAN ASSISTANT

## 2025-02-07 PROCEDURE — 2500000005 HC RX 250 GENERAL PHARMACY W/O HCPCS

## 2025-02-07 PROCEDURE — 1100000001 HC PRIVATE ROOM DAILY

## 2025-02-07 PROCEDURE — 99232 SBSQ HOSP IP/OBS MODERATE 35: CPT

## 2025-02-07 PROCEDURE — 36416 COLLJ CAPILLARY BLOOD SPEC: CPT | Performed by: PHYSICIAN ASSISTANT

## 2025-02-07 PROCEDURE — 80069 RENAL FUNCTION PANEL: CPT | Performed by: PHYSICIAN ASSISTANT

## 2025-02-07 PROCEDURE — 2580000001 HC RX 258 IV SOLUTIONS

## 2025-02-07 PROCEDURE — 83735 ASSAY OF MAGNESIUM: CPT | Performed by: PHYSICIAN ASSISTANT

## 2025-02-07 PROCEDURE — 82947 ASSAY GLUCOSE BLOOD QUANT: CPT

## 2025-02-07 PROCEDURE — 2500000001 HC RX 250 WO HCPCS SELF ADMINISTERED DRUGS (ALT 637 FOR MEDICARE OP): Performed by: NURSE PRACTITIONER

## 2025-02-07 RX ADMIN — HEPARIN SODIUM 5000 UNITS: 5000 INJECTION, SOLUTION INTRAVENOUS; SUBCUTANEOUS at 22:31

## 2025-02-07 RX ADMIN — HEPARIN SODIUM 5000 UNITS: 5000 INJECTION, SOLUTION INTRAVENOUS; SUBCUTANEOUS at 06:16

## 2025-02-07 RX ADMIN — Medication 6 MG: at 00:24

## 2025-02-07 RX ADMIN — HEPARIN SODIUM 5000 UNITS: 5000 INJECTION, SOLUTION INTRAVENOUS; SUBCUTANEOUS at 14:26

## 2025-02-07 RX ADMIN — ASCORBIC ACID, VITAMIN A PALMITATE, CHOLECALCIFEROL, THIAMINE HYDROCHLORIDE, RIBOFLAVIN-5 PHOSPHATE SODIUM, PYRIDOXINE HYDROCHLORIDE, NIACINAMIDE, DEXPANTHENOL, ALPHA-TOCOPHEROL ACETATE, VITAMIN K1, FOLIC ACID, BIOTIN, CYANOCOBALAMIN: 200; 3300; 200; 6; 3.6; 6; 40; 15; 10; 150; 600; 60; 5 INJECTION, SOLUTION INTRAVENOUS at 22:26

## 2025-02-07 RX ADMIN — SMOFLIPID 50 G: 6; 6; 5; 3 INJECTION, EMULSION INTRAVENOUS at 22:26

## 2025-02-07 RX ADMIN — PANTOPRAZOLE SODIUM 40 MG: 40 INJECTION, POWDER, FOR SOLUTION INTRAVENOUS at 08:01

## 2025-02-07 ASSESSMENT — COGNITIVE AND FUNCTIONAL STATUS - GENERAL
TURNING FROM BACK TO SIDE WHILE IN FLAT BAD: A LOT
CLIMB 3 TO 5 STEPS WITH RAILING: TOTAL
CLIMB 3 TO 5 STEPS WITH RAILING: TOTAL
STANDING UP FROM CHAIR USING ARMS: A LOT
TOILETING: A LOT
MOVING FROM LYING ON BACK TO SITTING ON SIDE OF FLAT BED WITH BEDRAILS: A LITTLE
MOBILITY SCORE: 11
WALKING IN HOSPITAL ROOM: A LOT
DRESSING REGULAR UPPER BODY CLOTHING: A LOT
TOILETING: A LITTLE
STANDING UP FROM CHAIR USING ARMS: A LITTLE
TURNING FROM BACK TO SIDE WHILE IN FLAT BAD: A LITTLE
EATING MEALS: A LITTLE
PERSONAL GROOMING: A LITTLE
WALKING IN HOSPITAL ROOM: A LITTLE
HELP NEEDED FOR BATHING: A LOT
EATING MEALS: A LITTLE
DAILY ACTIVITIY SCORE: 14
MOVING FROM LYING ON BACK TO SITTING ON SIDE OF FLAT BED WITH BEDRAILS: A LOT
DRESSING REGULAR UPPER BODY CLOTHING: TOTAL
DRESSING REGULAR LOWER BODY CLOTHING: TOTAL
WALKING IN HOSPITAL ROOM: A LOT
MOVING TO AND FROM BED TO CHAIR: A LITTLE
HELP NEEDED FOR BATHING: TOTAL
TURNING FROM BACK TO SIDE WHILE IN FLAT BAD: TOTAL
STANDING UP FROM CHAIR USING ARMS: A LOT
DAILY ACTIVITIY SCORE: 12
MOVING FROM LYING ON BACK TO SITTING ON SIDE OF FLAT BED WITH BEDRAILS: A LOT
DRESSING REGULAR LOWER BODY CLOTHING: A LOT
MOBILITY SCORE: 10
PERSONAL GROOMING: A LITTLE
MOVING TO AND FROM BED TO CHAIR: A LOT
MOVING TO AND FROM BED TO CHAIR: A LOT
MOBILITY SCORE: 16
CLIMB 3 TO 5 STEPS WITH RAILING: TOTAL

## 2025-02-07 ASSESSMENT — PAIN SCALES - GENERAL
PAINLEVEL_OUTOF10: 0 - NO PAIN
PAINLEVEL_OUTOF10: 3
PAINLEVEL_OUTOF10: 3
PAINLEVEL_OUTOF10: 0 - NO PAIN

## 2025-02-07 ASSESSMENT — PAIN - FUNCTIONAL ASSESSMENT
PAIN_FUNCTIONAL_ASSESSMENT: 0-10

## 2025-02-07 NOTE — CARE PLAN
The patient's goals for the shift include to sleep    The clinical goals for the shift include Pt will remain HDS this shift

## 2025-02-07 NOTE — PROGRESS NOTES
Physical Therapy Treatment    Patient Name: Ike aGr  MRN: [id number]  Today's Date: 2/7/2025  Room: [address] [place] [address]  Time Calculation  Start Time: 1130  Stop Time: 1203  Time Calculation (min): 33 min       Assessment/Plan   PT Assessment  PT Assessment Results: Decreased strength, Decreased endurance, Impaired balance, Decreased mobility, Pain  Rehab Prognosis: Good  Barriers to Discharge Home: Physical needs  Physical Needs: Returning to long-term care/other facility, Ambulating household distances limited by function/safety, Intermittent mobility assistance needed, Intermittent ADL assistance needed, High falls risk due to function or environment  Evaluation/Treatment Tolerance: Patient limited by fatigue, Patient tolerated treatment well  Strengths: Attitude of self, Ability to acquire knowledge  Barriers to Participation: Comorbidities, Coping skills  End of Session Communication: Bedside nurse  End of Session Patient Position: Bed, 3 rail up     PT Plan  Treatment/Interventions: Bed mobility, Transfer training, Gait training, Balance training, Neuromuscular re-education, Endurance training, Strengthening, Therapeutic exercise, Therapeutic activity  PT Plan: Ongoing PT  PT Frequency: 5 times per week  PT Discharge Recommendations: Moderate intensity level of continued care  Equipment Recommended upon Discharge: Wheeled walker  PT Recommended Transfer Status: Assist x1, Assistive device  PT - OK to Discharge: Yes  Assessment: Patient is progressing Well with therapy this date. Increased gait distance x2 trials today-highly anxious about breathing d/t new trach removal yesterday. Educated pt on managing therapy progress and expectations. Informed nursing to ask team about getting anxiety medication to improve therapeutic potential. Would continue to benefit from continued skilled PT to address all mobility deficits; Patient remains appropriate for MOD intensity therapy when medically ready for discharge from  acute stay.  Will continue to follow.     General Visit Information:   PT  Visit  PT Received On: 02/07/25  Prior to Session Communication: Bedside nurse  Patient Position Received: Bed, 3 rail up     Subjective   Subjective: Pt fatigued but willing to participate-reports poor sleep  Precautions:  Precautions  Medical Precautions: Fall precautions, Spinal precautions, Oxygen therapy device and L/min  Post-Surgical Precautions: Abdominal surgery precautions  Precautions Comment: TSLO for comfort    Objective   Pain:  Pain Assessment  Pain Assessment: 0-10  0-10 (Numeric) Pain Score: 3  Pain Type: Acute pain  Pain Location: Neck  Cognition:  Cognition  Overall Cognitive Status: Within Functional Limits  Orientation Level: Oriented X4  Cognition Comments: more anxious especially with breathing d/t new trach removal, needs cues for proper breathing/relaxation strategies. Asked nurse to relay to team if pt would benefit from anxiety medication  Insight: Mild  Impulsive: Within functional limits     Lines/Tubes/Drains:  PICC - Adult Double lumen Left Brachial vein (Active)   Number of days: 5       Closed/Suction Drain Midline RUQ 10 Fr. (Active)   Number of days: 23       Closed/Suction Drain Right;Anterior Hip Bulb (Active)   Number of days: 5       Open Drain Left LLQ (Active)   Number of days: 30       Gastrostomy/Enterostomy Percutaneous endoscopic gastrostomy (PEG) 1 20 Fr. LUQ (Active)   Number of days: 31       Ileostomy Other (Comment) RUQ (Active)   Number of days: 46     Continuous Medications/Drips:  Adult Clinimix TPN Cyclic, , Last Rate: Stopped (02/07/25 0749)        PT Treatments:           Bed Mobility 1  Bed Mobility 1: Supine to sitting  Level of Assistance 1: Minimum assistance  Bed Mobility Comments 1: for roll/trunk  Bed Mobility 2  Bed Mobility  2: Sitting to supine  Level of Assistance 2: Contact guard  Bed Mobility Comments 2: HOB elevated, good log roll  Ambulation/Gait Training 1  Surface 1:  Level tile  Device 1: Rolling walker  Assistance 1: Contact guard  Quality of Gait 1: Wide base of support, Soft knee(s), Forward flexed posture, Shuffling gait  Comments/Distance (ft) 1: 15'x2, slow nate standing breaks d/t SOB  Transfer 1  Transfer From 1: Sit to  Transfer to 1: Stand  Technique 1: Sit to stand, Stand to sit  Transfer Device 1: Walker  Transfer Level of Assistance 1: Contact guard  Trials/Comments 1: vc for eccentric control and handplacement. increased effort to stand  Transfers 2  Transfer From 2: Stand to  Transfer to 2: Bed  Technique 2: Stand pivot  Transfer Device 2: Walker  Transfer Level of Assistance 2: Contact guard  Trials/Comments 2: x2             Activity tolerance:  Activity Tolerance  Endurance: Decreased tolerance for upright activites    Outcome Measures:  [facility] Basic Mobility  Turning from your back to your side while in a flat bed without using bedrails: A little  Moving from lying on your back to sitting on the side of a flat bed without using bedrails: A little  Moving to and from bed to chair (including a wheelchair): A little  Standing up from a chair using your arms (e.g. wheelchair or bedside chair): A little  To walk in hospital room: A little  Climbing 3-5 steps with railing: Total  Basic Mobility - Total Score: 16     Education Documentation  Cognition, taught by Alexa Davila PTA at 2/7/2025 12:56 PM.  Learner: Patient  Readiness: Acceptance  Method: Explanation  Response: Verbalizes Understanding    Modified Diet Training, taught by Alexa Davila PTA at 2/7/2025 12:56 PM.  Learner: Patient  Readiness: Acceptance  Method: Explanation  Response: Verbalizes Understanding    Precautions, taught by Alexa Davila PTA at 2/7/2025 12:56 PM.  Learner: Patient  Readiness: Acceptance  Method: Explanation  Response: Verbalizes Understanding    Body Mechanics, taught by Alexa Davila PTA at 2/7/2025 12:56 PM.  Learner: Patient  Readiness: Acceptance  Method: Explanation  Response:  Verbalizes Understanding    Mobility Training, taught by Alexa Davila PTA at 2/7/2025 12:56 PM.  Learner: Patient  Readiness: Acceptance  Method: Explanation  Response: Verbalizes Understanding    Body Mechanics, taught by Alexa Davila PTA at 2/7/2025 12:56 PM.  Learner: Patient  Readiness: Acceptance  Method: Explanation  Response: Verbalizes Understanding    Precautions, taught by Alexa Davila PTA at 2/7/2025 12:56 PM.  Learner: Patient  Readiness: Acceptance  Method: Explanation  Response: Verbalizes Understanding    ADL Training, taught by Alexa Davila PTA at 2/7/2025 12:56 PM.  Learner: Patient  Readiness: Acceptance  Method: Explanation  Response: Verbalizes Understanding    Education Comments  No comments found.          OP EDUCATION:       Encounter Problems       Encounter Problems (Active)       PT Problem       Patient will complete supine to sit and sit to supine Supervision while maintaining spinal precautions (Progressing)       Start:  02/03/25    Expected End:  02/17/25            Patient will perform sit<>stand transfer with LRAD, and Supervision while maintaining spinal precautions  (Progressing)       Start:  02/03/25    Expected End:  02/17/25            Patient will ambulate >100' with LRAD and Supervision  (Progressing)       Start:  02/03/25    Expected End:  02/17/25

## 2025-02-07 NOTE — CONSULTS
Caller: Richa Dolan    Relationship: Self    Best call back number: [phone]    Medication needed:   Requested Prescriptions     Pending Prescriptions Disp Refills   • atorvastatin (LIPITOR) 20 MG tablet 90 tablet 3     Sig: Take 1 tablet by mouth every night at bedtime.       When do you need the refill by: ASAP      Does the patient have less than a 3 day supply:  [x] Yes  [] No    What is the patient's preferred pharmacy: [id number] - [place], [place] - [address] AT [facility] - [phone] [facility] [phone]                 Ostomy Care Consult     Visit Date: 2/7/2025      Patient Name: Ike Gar         MRN: [id number]           YOB: 1945     Reason for Consult: ostomy care and education        Wound Assessment:  Ostomy type:  ileostomy    size:  1 3/8 inches      color: red      protruding: budded  Ricky: none  Functioning:  loose brown stool  Mucocutaneous junction: intact  Peristomal skin: slight excoriation noted at 9 oclock; stoma powder and cavilon applied  Pouching: pouch changed today; 2 piece flat wafer, barrier ring and high output pouch applied.  Ostomy Education: Patient repositioned in bed and was able to observe pouch change today. He asked appropriate questions but did not participate in hands on demonstration. His son Ike Barton will visit this weekend and participate in ostomy education (Sunday at 11am).  Additional supplies at the bedside.        Michelle Kruger RN  2/7/2025  2:57 PM
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
## 2025-02-07 NOTE — NURSING NOTE
Patient refused blood sugar, told me he doesn't want to be poked anymore because his fingers hurt.

## 2025-02-07 NOTE — CARE PLAN
The patient's goals for the shift include     The clinical goals for the shift include Pt will remain HDS throughout shift.

## 2025-02-08 LAB
ALBUMIN SERPL BCP-MCNC: 2.4 G/DL (ref 3.4–5)
ANION GAP SERPL CALC-SCNC: 10 MMOL/L (ref 10–20)
BUN SERPL-MCNC: 42 MG/DL (ref 6–23)
CALCIUM SERPL-MCNC: 8 MG/DL (ref 8.6–10.6)
CHLORIDE SERPL-SCNC: 109 MMOL/L (ref 98–107)
CO2 SERPL-SCNC: 27 MMOL/L (ref 21–32)
CREAT SERPL-MCNC: 1.39 MG/DL (ref 0.5–1.3)
EGFRCR SERPLBLD CKD-EPI 2021: 52 ML/MIN/1.73M*2
ERYTHROCYTE [DISTWIDTH] IN BLOOD BY AUTOMATED COUNT: 16 % (ref 11.5–14.5)
GLUCOSE BLD MANUAL STRIP-MCNC: 104 MG/DL (ref 74–99)
GLUCOSE BLD MANUAL STRIP-MCNC: 162 MG/DL (ref 74–99)
GLUCOSE BLD MANUAL STRIP-MCNC: 85 MG/DL (ref 74–99)
GLUCOSE SERPL-MCNC: 116 MG/DL (ref 74–99)
HCT VFR BLD AUTO: 23 % (ref 41–52)
HGB BLD-MCNC: 7.4 G/DL (ref 13.5–17.5)
MAGNESIUM SERPL-MCNC: 1.83 MG/DL (ref 1.6–2.4)
MCH RBC QN AUTO: 31.8 PG (ref 26–34)
MCHC RBC AUTO-ENTMCNC: 32.2 G/DL (ref 32–36)
MCV RBC AUTO: 99 FL (ref 80–100)
NRBC BLD-RTO: 0 /100 WBCS (ref 0–0)
PHOSPHATE SERPL-MCNC: 4.3 MG/DL (ref 2.5–4.9)
PLATELET # BLD AUTO: 167 X10*3/UL (ref 150–450)
POTASSIUM SERPL-SCNC: 4 MMOL/L (ref 3.5–5.3)
RBC # BLD AUTO: 2.33 X10*6/UL (ref 4.5–5.9)
SODIUM SERPL-SCNC: 142 MMOL/L (ref 136–145)
WBC # BLD AUTO: 6.9 X10*3/UL (ref 4.4–11.3)

## 2025-02-08 PROCEDURE — 2500000002 HC RX 250 W HCPCS SELF ADMINISTERED DRUGS (ALT 637 FOR MEDICARE OP, ALT 636 FOR OP/ED): Performed by: PHYSICIAN ASSISTANT

## 2025-02-08 PROCEDURE — 80069 RENAL FUNCTION PANEL: CPT

## 2025-02-08 PROCEDURE — 2500000005 HC RX 250 GENERAL PHARMACY W/O HCPCS: Performed by: PHYSICIAN ASSISTANT

## 2025-02-08 PROCEDURE — 2580000001 HC RX 258 IV SOLUTIONS

## 2025-02-08 PROCEDURE — 85027 COMPLETE CBC AUTOMATED: CPT

## 2025-02-08 PROCEDURE — 2500000004 HC RX 250 GENERAL PHARMACY W/ HCPCS (ALT 636 FOR OP/ED)

## 2025-02-08 PROCEDURE — 2500000001 HC RX 250 WO HCPCS SELF ADMINISTERED DRUGS (ALT 637 FOR MEDICARE OP): Performed by: NURSE PRACTITIONER

## 2025-02-08 PROCEDURE — 82947 ASSAY GLUCOSE BLOOD QUANT: CPT

## 2025-02-08 PROCEDURE — 1100000001 HC PRIVATE ROOM DAILY

## 2025-02-08 PROCEDURE — 83735 ASSAY OF MAGNESIUM: CPT

## 2025-02-08 PROCEDURE — 99232 SBSQ HOSP IP/OBS MODERATE 35: CPT

## 2025-02-08 RX ADMIN — HEPARIN SODIUM 5000 UNITS: 5000 INJECTION, SOLUTION INTRAVENOUS; SUBCUTANEOUS at 05:31

## 2025-02-08 RX ADMIN — HEPARIN SODIUM 5000 UNITS: 5000 INJECTION, SOLUTION INTRAVENOUS; SUBCUTANEOUS at 14:36

## 2025-02-08 RX ADMIN — HEPARIN SODIUM 5000 UNITS: 5000 INJECTION, SOLUTION INTRAVENOUS; SUBCUTANEOUS at 21:33

## 2025-02-08 RX ADMIN — PANTOPRAZOLE SODIUM 40 MG: 40 INJECTION, POWDER, FOR SOLUTION INTRAVENOUS at 08:01

## 2025-02-08 RX ADMIN — SMOFLIPID 50 G: 6; 6; 5; 3 INJECTION, EMULSION INTRAVENOUS at 20:16

## 2025-02-08 RX ADMIN — INSULIN LISPRO 1 UNITS: 100 INJECTION, SOLUTION INTRAVENOUS; SUBCUTANEOUS at 09:10

## 2025-02-08 RX ADMIN — Medication 1 TABLET: at 08:01

## 2025-02-08 RX ADMIN — ASCORBIC ACID, VITAMIN A PALMITATE, CHOLECALCIFEROL, THIAMINE HYDROCHLORIDE, RIBOFLAVIN-5 PHOSPHATE SODIUM, PYRIDOXINE HYDROCHLORIDE, NIACINAMIDE, DEXPANTHENOL, ALPHA-TOCOPHEROL ACETATE, VITAMIN K1, FOLIC ACID, BIOTIN, CYANOCOBALAMIN: 200; 3300; 200; 6; 3.6; 6; 40; 15; 10; 150; 600; 60; 5 INJECTION, SOLUTION INTRAVENOUS at 20:16

## 2025-02-08 ASSESSMENT — COGNITIVE AND FUNCTIONAL STATUS - GENERAL
HELP NEEDED FOR BATHING: A LOT
EATING MEALS: A LITTLE
DRESSING REGULAR UPPER BODY CLOTHING: A LOT
DRESSING REGULAR UPPER BODY CLOTHING: A LOT
WALKING IN HOSPITAL ROOM: A LOT
MOBILITY SCORE: 11
TOILETING: A LITTLE
MOVING FROM LYING ON BACK TO SITTING ON SIDE OF FLAT BED WITH BEDRAILS: A LOT
PERSONAL GROOMING: A LITTLE
WALKING IN HOSPITAL ROOM: A LOT
DRESSING REGULAR LOWER BODY CLOTHING: A LOT
PERSONAL GROOMING: A LITTLE
STANDING UP FROM CHAIR USING ARMS: A LOT
STANDING UP FROM CHAIR USING ARMS: A LOT
MOVING FROM LYING ON BACK TO SITTING ON SIDE OF FLAT BED WITH BEDRAILS: A LITTLE
CLIMB 3 TO 5 STEPS WITH RAILING: TOTAL
HELP NEEDED FOR BATHING: A LOT
DAILY ACTIVITIY SCORE: 15
DAILY ACTIVITIY SCORE: 15
CLIMB 3 TO 5 STEPS WITH RAILING: A LOT
MOVING TO AND FROM BED TO CHAIR: A LOT
DRESSING REGULAR LOWER BODY CLOTHING: A LOT
MOVING TO AND FROM BED TO CHAIR: A LOT
TURNING FROM BACK TO SIDE WHILE IN FLAT BAD: A LITTLE
EATING MEALS: A LITTLE
TURNING FROM BACK TO SIDE WHILE IN FLAT BAD: A LOT
TOILETING: A LITTLE
MOBILITY SCORE: 14

## 2025-02-08 ASSESSMENT — PAIN SCALES - GENERAL
PAINLEVEL_OUTOF10: 3
PAINLEVEL_OUTOF10: 0 - NO PAIN
PAINLEVEL_OUTOF10: 0 - NO PAIN
PAINLEVEL_OUTOF10: 3

## 2025-02-08 ASSESSMENT — PAIN - FUNCTIONAL ASSESSMENT
PAIN_FUNCTIONAL_ASSESSMENT: 0-10

## 2025-02-08 NOTE — CARE PLAN
The patient's goals for the shift include safety    The clinical goals for the shift include Pt will remain HDS this shift      Problem: Skin  Goal: Decreased wound size/increased tissue granulation at next dressing change  Outcome: Progressing  Goal: Participates in plan/prevention/treatment measures  Outcome: Progressing  Goal: Prevent/manage excess moisture  Outcome: Progressing  Goal: Prevent/minimize sheer/friction injuries  Outcome: Progressing  Goal: Promote/optimize nutrition  Outcome: Progressing  Goal: Promote skin healing  Outcome: Progressing     Problem: Safety - Adult  Goal: Free from fall injury  Outcome: Progressing     Problem: Discharge Planning  Goal: Discharge to home or other facility with appropriate resources  Outcome: Progressing     Problem: Chronic Conditions and Co-morbidities  Goal: Patient's chronic conditions and co-morbidity symptoms are monitored and maintained or improved  Outcome: Progressing     Problem: Nutrition  Goal: Nutrient intake appropriate for maintaining nutritional needs  Outcome: Progressing     Problem: Respiratory  Goal: Clear secretions with interventions this shift  Outcome: Progressing  Goal: Minimize anxiety/maximize coping throughout shift  Outcome: Progressing  Goal: Minimal/no exertional discomfort or dyspnea this shift  Outcome: Progressing  Goal: No signs of respiratory distress (eg. Use of accessory muscles. Peds grunting)  Outcome: Progressing  Goal: Patent airway maintained this shift  Outcome: Progressing  Goal: Tolerate mechanical ventilation evidenced by VS/agitation level this shift  Outcome: Progressing  Goal: Tolerate pulmonary toileting this shift  Outcome: Progressing  Goal: Verbalize decreased shortness of breath this shift  Outcome: Progressing  Goal: Wean oxygen to maintain O2 saturation per order/standard this shift  Outcome: Progressing  Goal: Increase self care and/or family involvement in next 24 hours  Outcome: Progressing     Problem:  Safety - Adult  Goal: Free from fall injury  Outcome: Progressing
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
## 2025-02-09 VITALS
WEIGHT: 169.31 LBS | RESPIRATION RATE: 16 BRPM | HEART RATE: 100 BPM | DIASTOLIC BLOOD PRESSURE: 73 MMHG | BODY MASS INDEX: 20.62 KG/M2 | TEMPERATURE: 97.5 F | HEIGHT: 76 IN | OXYGEN SATURATION: 100 % | SYSTOLIC BLOOD PRESSURE: 119 MMHG

## 2025-02-09 LAB
GLUCOSE BLD MANUAL STRIP-MCNC: 102 MG/DL (ref 74–99)
GLUCOSE BLD MANUAL STRIP-MCNC: 112 MG/DL (ref 74–99)
GLUCOSE BLD MANUAL STRIP-MCNC: 95 MG/DL (ref 74–99)

## 2025-02-09 PROCEDURE — 2500000004 HC RX 250 GENERAL PHARMACY W/ HCPCS (ALT 636 FOR OP/ED)

## 2025-02-09 PROCEDURE — 99232 SBSQ HOSP IP/OBS MODERATE 35: CPT

## 2025-02-09 PROCEDURE — 1100000001 HC PRIVATE ROOM DAILY

## 2025-02-09 PROCEDURE — 2500000001 HC RX 250 WO HCPCS SELF ADMINISTERED DRUGS (ALT 637 FOR MEDICARE OP): Performed by: NURSE PRACTITIONER

## 2025-02-09 PROCEDURE — 2580000001 HC RX 258 IV SOLUTIONS

## 2025-02-09 PROCEDURE — 82947 ASSAY GLUCOSE BLOOD QUANT: CPT

## 2025-02-09 PROCEDURE — 2500000005 HC RX 250 GENERAL PHARMACY W/O HCPCS: Performed by: PHYSICIAN ASSISTANT

## 2025-02-09 PROCEDURE — 2500000005 HC RX 250 GENERAL PHARMACY W/O HCPCS

## 2025-02-09 RX ADMIN — Medication 1 TABLET: at 09:21

## 2025-02-09 RX ADMIN — HEPARIN SODIUM 5000 UNITS: 5000 INJECTION, SOLUTION INTRAVENOUS; SUBCUTANEOUS at 21:58

## 2025-02-09 RX ADMIN — SMOFLIPID 50 G: 6; 6; 5; 3 INJECTION, EMULSION INTRAVENOUS at 20:30

## 2025-02-09 RX ADMIN — ASCORBIC ACID, VITAMIN A PALMITATE, CHOLECALCIFEROL, THIAMINE HYDROCHLORIDE, RIBOFLAVIN-5 PHOSPHATE SODIUM, PYRIDOXINE HYDROCHLORIDE, NIACINAMIDE, DEXPANTHENOL, ALPHA-TOCOPHEROL ACETATE, VITAMIN K1, FOLIC ACID, BIOTIN, CYANOCOBALAMIN: 200; 3300; 200; 6; 3.6; 6; 40; 15; 10; 150; 600; 60; 5 INJECTION, SOLUTION INTRAVENOUS at 20:30

## 2025-02-09 RX ADMIN — HEPARIN SODIUM 5000 UNITS: 5000 INJECTION, SOLUTION INTRAVENOUS; SUBCUTANEOUS at 06:40

## 2025-02-09 RX ADMIN — Medication 6 MG: at 20:38

## 2025-02-09 RX ADMIN — HEPARIN SODIUM 5000 UNITS: 5000 INJECTION, SOLUTION INTRAVENOUS; SUBCUTANEOUS at 14:08

## 2025-02-09 RX ADMIN — PANTOPRAZOLE SODIUM 40 MG: 40 INJECTION, POWDER, FOR SOLUTION INTRAVENOUS at 09:12

## 2025-02-09 ASSESSMENT — PAIN - FUNCTIONAL ASSESSMENT: PAIN_FUNCTIONAL_ASSESSMENT: 0-10

## 2025-02-09 ASSESSMENT — COGNITIVE AND FUNCTIONAL STATUS - GENERAL
DAILY ACTIVITIY SCORE: 15
DRESSING REGULAR LOWER BODY CLOTHING: A LOT
STANDING UP FROM CHAIR USING ARMS: A LOT
WALKING IN HOSPITAL ROOM: A LOT
PERSONAL GROOMING: A LITTLE
HELP NEEDED FOR BATHING: A LOT
DRESSING REGULAR UPPER BODY CLOTHING: A LOT
TURNING FROM BACK TO SIDE WHILE IN FLAT BAD: A LITTLE
EATING MEALS: A LITTLE
CLIMB 3 TO 5 STEPS WITH RAILING: A LOT
MOVING TO AND FROM BED TO CHAIR: A LITTLE
MOVING FROM LYING ON BACK TO SITTING ON SIDE OF FLAT BED WITH BEDRAILS: A LITTLE
TOILETING: A LITTLE
MOBILITY SCORE: 15

## 2025-02-09 ASSESSMENT — PAIN SCALES - GENERAL: PAINLEVEL_OUTOF10: 0 - NO PAIN
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
## 2025-02-09 NOTE — CARE PLAN
The patient's goals for the shift include to sleep    The clinical goals for the shift include Pt will remain HDS this shift      Problem: Skin  Goal: Decreased wound size/increased tissue granulation at next dressing change  Outcome: Progressing  Goal: Participates in plan/prevention/treatment measures  Outcome: Progressing  Goal: Prevent/manage excess moisture  Outcome: Progressing  Goal: Prevent/minimize sheer/friction injuries  Outcome: Progressing  Goal: Promote/optimize nutrition  Outcome: Progressing  Goal: Promote skin healing  Outcome: Progressing     Problem: Safety - Adult  Goal: Free from fall injury  Outcome: Progressing     Problem: Discharge Planning  Goal: Discharge to home or other facility with appropriate resources  Outcome: Progressing     Problem: Chronic Conditions and Co-morbidities  Goal: Patient's chronic conditions and co-morbidity symptoms are monitored and maintained or improved  Outcome: Progressing     Problem: Nutrition  Goal: Nutrient intake appropriate for maintaining nutritional needs  Outcome: Progressing     Problem: Respiratory  Goal: Clear secretions with interventions this shift  Outcome: Progressing  Goal: Minimize anxiety/maximize coping throughout shift  Outcome: Progressing  Goal: Minimal/no exertional discomfort or dyspnea this shift  Outcome: Progressing  Goal: No signs of respiratory distress (eg. Use of accessory muscles. Peds grunting)  Outcome: Progressing  Goal: Patent airway maintained this shift  Outcome: Progressing  Goal: Tolerate mechanical ventilation evidenced by VS/agitation level this shift  Outcome: Progressing  Goal: Tolerate pulmonary toileting this shift  Outcome: Progressing  Goal: Verbalize decreased shortness of breath this shift  Outcome: Progressing  Goal: Wean oxygen to maintain O2 saturation per order/standard this shift  Outcome: Progressing  Goal: Increase self care and/or family involvement in next 24 hours  Outcome: Progressing
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
## 2025-02-10 ENCOUNTER — APPOINTMENT (OUTPATIENT)
Dept: RADIOLOGY | Facility: HOSPITAL | Age: 80
End: 2025-02-10
Payer: MEDICARE

## 2025-02-10 LAB
GLUCOSE BLD MANUAL STRIP-MCNC: 102 MG/DL (ref 74–99)
GLUCOSE BLD MANUAL STRIP-MCNC: 120 MG/DL (ref 74–99)
GLUCOSE BLD MANUAL STRIP-MCNC: 166 MG/DL (ref 74–99)
GLUCOSE BLD MANUAL STRIP-MCNC: 170 MG/DL (ref 74–99)
GLUCOSE BLD MANUAL STRIP-MCNC: 73 MG/DL (ref 74–99)

## 2025-02-10 PROCEDURE — 2550000001 HC RX 255 CONTRASTS: Performed by: STUDENT IN AN ORGANIZED HEALTH CARE EDUCATION/TRAINING PROGRAM

## 2025-02-10 PROCEDURE — 2580000001 HC RX 258 IV SOLUTIONS

## 2025-02-10 PROCEDURE — 2500000004 HC RX 250 GENERAL PHARMACY W/ HCPCS (ALT 636 FOR OP/ED)

## 2025-02-10 PROCEDURE — 71260 CT THORAX DX C+: CPT | Performed by: RADIOLOGY

## 2025-02-10 PROCEDURE — 1100000001 HC PRIVATE ROOM DAILY

## 2025-02-10 PROCEDURE — 2500000005 HC RX 250 GENERAL PHARMACY W/O HCPCS: Performed by: PHYSICIAN ASSISTANT

## 2025-02-10 PROCEDURE — 2500000001 HC RX 250 WO HCPCS SELF ADMINISTERED DRUGS (ALT 637 FOR MEDICARE OP): Performed by: NURSE PRACTITIONER

## 2025-02-10 PROCEDURE — 97530 THERAPEUTIC ACTIVITIES: CPT | Mod: GP,CQ

## 2025-02-10 PROCEDURE — 2500000005 HC RX 250 GENERAL PHARMACY W/O HCPCS

## 2025-02-10 PROCEDURE — 82947 ASSAY GLUCOSE BLOOD QUANT: CPT

## 2025-02-10 PROCEDURE — 97116 GAIT TRAINING THERAPY: CPT | Mod: GP,CQ

## 2025-02-10 PROCEDURE — 71260 CT THORAX DX C+: CPT

## 2025-02-10 PROCEDURE — 74177 CT ABD & PELVIS W/CONTRAST: CPT | Performed by: RADIOLOGY

## 2025-02-10 PROCEDURE — 2500000002 HC RX 250 W HCPCS SELF ADMINISTERED DRUGS (ALT 637 FOR MEDICARE OP, ALT 636 FOR OP/ED): Performed by: PHYSICIAN ASSISTANT

## 2025-02-10 PROCEDURE — 2550000001 HC RX 255 CONTRASTS: Performed by: PHYSICIAN ASSISTANT

## 2025-02-10 PROCEDURE — A9698 NON-RAD CONTRAST MATERIALNOC: HCPCS | Performed by: PHYSICIAN ASSISTANT

## 2025-02-10 PROCEDURE — 99231 SBSQ HOSP IP/OBS SF/LOW 25: CPT | Performed by: PHYSICIAN ASSISTANT

## 2025-02-10 PROCEDURE — 97530 THERAPEUTIC ACTIVITIES: CPT | Mod: GO

## 2025-02-10 RX ADMIN — INSULIN LISPRO 1 UNITS: 100 INJECTION, SOLUTION INTRAVENOUS; SUBCUTANEOUS at 01:08

## 2025-02-10 RX ADMIN — SMOFLIPID 50 G: 6; 6; 5; 3 INJECTION, EMULSION INTRAVENOUS at 22:08

## 2025-02-10 RX ADMIN — INSULIN LISPRO 1 UNITS: 100 INJECTION, SOLUTION INTRAVENOUS; SUBCUTANEOUS at 09:00

## 2025-02-10 RX ADMIN — PANTOPRAZOLE SODIUM 40 MG: 40 INJECTION, POWDER, FOR SOLUTION INTRAVENOUS at 09:00

## 2025-02-10 RX ADMIN — HEPARIN SODIUM 5000 UNITS: 5000 INJECTION, SOLUTION INTRAVENOUS; SUBCUTANEOUS at 22:17

## 2025-02-10 RX ADMIN — IOHEXOL 500 ML: 12 SOLUTION ORAL at 15:44

## 2025-02-10 RX ADMIN — ASCORBIC ACID, VITAMIN A PALMITATE, CHOLECALCIFEROL, THIAMINE HYDROCHLORIDE, RIBOFLAVIN-5 PHOSPHATE SODIUM, PYRIDOXINE HYDROCHLORIDE, NIACINAMIDE, DEXPANTHENOL, ALPHA-TOCOPHEROL ACETATE, VITAMIN K1, FOLIC ACID, BIOTIN, CYANOCOBALAMIN: 200; 3300; 200; 6; 3.6; 6; 40; 15; 10; 150; 600; 60; 5 INJECTION, SOLUTION INTRAVENOUS at 22:07

## 2025-02-10 RX ADMIN — IOHEXOL 90 ML: 350 INJECTION, SOLUTION INTRAVENOUS at 18:50

## 2025-02-10 RX ADMIN — Medication 1 TABLET: at 09:00

## 2025-02-10 RX ADMIN — HEPARIN SODIUM 5000 UNITS: 5000 INJECTION, SOLUTION INTRAVENOUS; SUBCUTANEOUS at 06:41

## 2025-02-10 ASSESSMENT — COGNITIVE AND FUNCTIONAL STATUS - GENERAL
PERSONAL GROOMING: A LITTLE
EATING MEALS: A LITTLE
DRESSING REGULAR LOWER BODY CLOTHING: A LOT
DRESSING REGULAR UPPER BODY CLOTHING: A LITTLE
DRESSING REGULAR LOWER BODY CLOTHING: A LOT
STANDING UP FROM CHAIR USING ARMS: A LITTLE
EATING MEALS: A LITTLE
TOILETING: A LITTLE
HELP NEEDED FOR BATHING: A LOT
MOBILITY SCORE: 16
WALKING IN HOSPITAL ROOM: A LOT
CLIMB 3 TO 5 STEPS WITH RAILING: A LOT
MOVING TO AND FROM BED TO CHAIR: A LITTLE
DAILY ACTIVITIY SCORE: 15
DRESSING REGULAR UPPER BODY CLOTHING: A LOT
DRESSING REGULAR LOWER BODY CLOTHING: A LOT
MOVING TO AND FROM BED TO CHAIR: A LITTLE
CLIMB 3 TO 5 STEPS WITH RAILING: TOTAL
CLIMB 3 TO 5 STEPS WITH RAILING: A LOT
DRESSING REGULAR LOWER BODY CLOTHING: A LOT
HELP NEEDED FOR BATHING: A LOT
MOVING FROM LYING ON BACK TO SITTING ON SIDE OF FLAT BED WITH BEDRAILS: A LITTLE
DAILY ACTIVITIY SCORE: 15
MOVING TO AND FROM BED TO CHAIR: A LITTLE
DRESSING REGULAR UPPER BODY CLOTHING: A LOT
TURNING FROM BACK TO SIDE WHILE IN FLAT BAD: A LITTLE
HELP NEEDED FOR BATHING: A LOT
MOVING FROM LYING ON BACK TO SITTING ON SIDE OF FLAT BED WITH BEDRAILS: A LITTLE
PERSONAL GROOMING: A LITTLE
MOBILITY SCORE: 15
CLIMB 3 TO 5 STEPS WITH RAILING: A LOT
DRESSING REGULAR UPPER BODY CLOTHING: A LOT
PERSONAL GROOMING: A LITTLE
HELP NEEDED FOR BATHING: A LOT
STANDING UP FROM CHAIR USING ARMS: A LOT
EATING MEALS: A LITTLE
PERSONAL GROOMING: A LITTLE
TURNING FROM BACK TO SIDE WHILE IN FLAT BAD: A LITTLE
EATING MEALS: A LOT
WALKING IN HOSPITAL ROOM: A LOT
MOVING FROM LYING ON BACK TO SITTING ON SIDE OF FLAT BED WITH BEDRAILS: A LITTLE
MOBILITY SCORE: 15
TURNING FROM BACK TO SIDE WHILE IN FLAT BAD: A LITTLE
STANDING UP FROM CHAIR USING ARMS: A LOT
TOILETING: A LITTLE
DAILY ACTIVITIY SCORE: 15
TURNING FROM BACK TO SIDE WHILE IN FLAT BAD: A LITTLE
TOILETING: A LOT
MOVING FROM LYING ON BACK TO SITTING ON SIDE OF FLAT BED WITH BEDRAILS: A LITTLE
TOILETING: A LITTLE
MOBILITY SCORE: 15
MOVING TO AND FROM BED TO CHAIR: A LITTLE
WALKING IN HOSPITAL ROOM: A LOT
STANDING UP FROM CHAIR USING ARMS: A LOT
DAILY ACTIVITIY SCORE: 14
WALKING IN HOSPITAL ROOM: A LITTLE

## 2025-02-10 ASSESSMENT — PAIN SCALES - GENERAL
PAINLEVEL_OUTOF10: 0 - NO PAIN

## 2025-02-10 ASSESSMENT — PAIN - FUNCTIONAL ASSESSMENT
PAIN_FUNCTIONAL_ASSESSMENT: 0-10
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
## 2025-02-10 NOTE — PROGRESS NOTES
Physical Therapy Treatment    Patient Name: Ike Gar  MRN: [id number]  Today's Date: 2/10/2025  Room: [address] [place] [address]  Time Calculation  Start Time: 1430  Stop Time: 1509  Time Calculation (min): 39 min       Assessment/Plan   PT Assessment  PT Assessment Results: Decreased strength, Decreased endurance, Impaired balance, Decreased mobility, Pain  Rehab Prognosis: Good  Barriers to Discharge Home: Physical needs  Physical Needs: Returning to long-term care/other facility, Ambulating household distances limited by function/safety, Intermittent mobility assistance needed, Intermittent ADL assistance needed, High falls risk due to function or environment  Evaluation/Treatment Tolerance: Patient limited by fatigue  Medical Staff Made Aware: Yes  Strengths: Attitude of self  Barriers to Participation: Comorbidities, Coping skills  End of Session Communication: Bedside nurse  End of Session Patient Position: Bed, 3 rail up     PT Plan  Treatment/Interventions: Bed mobility, Transfer training, Gait training, Balance training, Neuromuscular re-education, Endurance training, Strengthening, Therapeutic exercise, Therapeutic activity  PT Plan: Ongoing PT  PT Frequency: 5 times per week  PT Discharge Recommendations: Moderate intensity level of continued care  Equipment Recommended upon Discharge: Wheeled walker  PT Recommended Transfer Status: Assist x1, Assistive device  PT - OK to Discharge: Yes  Assessment: Patient is progressing Well with therapy this date. Able to gait train farther today, in good spirits. Able to ambulate w/o O2 with spO2 >95%, however is very anxious and requests O2 for comfort. Would continue to benefit from continued skilled PT to address all mobility deficits; Patient remains appropriate for MOD intensity therapy when medically ready for discharge from acute stay.  Will continue to follow.     General Visit Information:   PT  Visit  PT Received On: 02/10/25  Prior to Session Communication: Bedside  "nurse  Patient Position Received: Bed, 3 rail up     Subjective   Subjective: Pt pleasant and agreeable to therapy upon approach    Precautions:  Precautions  Medical Precautions: Fall precautions, Spinal precautions, Oxygen therapy device and L/min  Post-Surgical Precautions: Abdominal surgery precautions  Precautions Comment: TSLO for comfort  Vital Signs:   Date/Time Vitals Session Patient Position Pulse Resp SpO2 BP MAP (mmHg)    02/10/25 1430 During PT  --  --  --  97 %  --  --             Objective   Pain:  Pain Assessment  Pain Assessment: 0-10  0-10 (Numeric) Pain Score: 0 - No pain  Cognition:  Cognition  Overall Cognitive Status: Within Functional Limits  Orientation Level: Oriented X4  Cognition Comments: anxious especially with breathing despite stating WNL, needs cues for proper breathing/relaxation strategies  Insight: Mild  Impulsive: Within functional limits    Lines/Tubes/Drains:  PICC - Adult Double lumen Left Brachial vein (Active)   Number of days: 8       Closed/Suction Drain Midline RUQ 10 Fr. (Active)   Number of days: 26       Closed/Suction Drain Right;Anterior Hip Bulb (Active)   Number of days: 8       Open Drain Left LLQ (Active)   Number of days: 33       Gastrostomy/Enterostomy Percutaneous endoscopic gastrostomy (PEG) 1 20 Fr. LUQ (Active)   Number of days: 34       Ileostomy Other (Comment) RUQ (Active)   Number of days: 49     Continuous Medications/Drips:  Adult Clinimix TPN Cyclic, , Last Rate: Stopped (02/10/25 0848)        PT Treatments:  Therapeutic Exercise  Therapeutic Exercise Performed: Yes  Therapeutic Exercise Activity 1: 8x IS up to 500ml- needs vc for proper technique  Therapeutic Activity  Therapeutic Activity 1: Educated pt on SNF DC rec vs. home. Discussed current activity tolerance and therapy goals. Pt is easily fatigued needing frequent rest breaks and \"needs\" O2 despite stating WNL. Highly anxious at times.     Bed Mobility 1  Bed Mobility 1: Supine to sitting, " Sitting to supine  Level of Assistance 1: Close supervision  Bed Mobility Comments 1: use of bed rail, can log roll  Ambulation/Gait Training 1  Surface 1: Level tile  Device 1: Rolling walker  Assistance 1: Contact guard  Quality of Gait 1: Wide base of support, Soft knee(s), Forward flexed posture, Shuffling gait  Comments/Distance (ft) 1: 25', 35', vc for upright posture and deep breathing  Transfer 1  Transfer From 1: Sit to  Transfer to 1: Stand  Technique 1: Sit to stand, Stand to sit  Transfer Device 1: Walker  Transfer Level of Assistance 1: Contact guard  Trials/Comments 1: x2, vc for anterior lean and body mechanics  Transfers 2  Transfer From 2: Stand to  Transfer to 2: Bed  Technique 2: Stand pivot  Transfer Device 2: Walker  Transfer Level of Assistance 2: Contact guard  Trials/Comments 2: x2             Activity tolerance:  Activity Tolerance  Endurance: Decreased tolerance for upright activites    Outcome Measures:  [facility] Basic Mobility  Turning from your back to your side while in a flat bed without using bedrails: A little  Moving from lying on your back to sitting on the side of a flat bed without using bedrails: A little  Moving to and from bed to chair (including a wheelchair): A little  Standing up from a chair using your arms (e.g. wheelchair or bedside chair): A little  To walk in hospital room: A little  Climbing 3-5 steps with railing: Total  Basic Mobility - Total Score: 16      Education Documentation  Cognition, taught by Alexa Davila PTA at 2/10/2025  3:28 PM.  Learner: Patient  Readiness: Acceptance  Method: Explanation  Response: Verbalizes Understanding    Modified Diet Training, taught by Alexa Davila PTA at 2/10/2025  3:28 PM.  Learner: Patient  Readiness: Acceptance  Method: Explanation  Response: Verbalizes Understanding    Precautions, taught by Alexa Davila PTA at 2/10/2025  3:28 PM.  Learner: Patient  Readiness: Acceptance  Method: Explanation  Response: Verbalizes  Understanding    Body Mechanics, taught by Alexa Davila PTA at 2/10/2025  3:28 PM.  Learner: Patient  Readiness: Acceptance  Method: Explanation  Response: Verbalizes Understanding    Mobility Training, taught by Alexa Davila PTA at 2/10/2025  3:28 PM.  Learner: Patient  Readiness: Acceptance  Method: Explanation  Response: Verbalizes Understanding    Body Mechanics, taught by Alexa Davila PTA at 2/10/2025  3:28 PM.  Learner: Patient  Readiness: Acceptance  Method: Explanation  Response: Verbalizes Understanding    Precautions, taught by Alexa Davila PTA at 2/10/2025  3:28 PM.  Learner: Patient  Readiness: Acceptance  Method: Explanation  Response: Verbalizes Understanding    ADL Training, taught by Alexa Davila PTA at 2/10/2025  3:28 PM.  Learner: Patient  Readiness: Acceptance  Method: Explanation  Response: Verbalizes Understanding    Education Comments  No comments found.          OP EDUCATION:       Encounter Problems       Encounter Problems (Active)       PT Problem       Patient will complete supine to sit and sit to supine Supervision while maintaining spinal precautions (Progressing)       Start:  02/03/25    Expected End:  02/17/25            Patient will perform sit<>stand transfer with LRAD, and Supervision while maintaining spinal precautions  (Progressing)       Start:  02/03/25    Expected End:  02/17/25            Patient will ambulate >100' with LRAD and Supervision  (Progressing)       Start:  02/03/25    Expected End:  02/17/25

## 2025-02-10 NOTE — PROGRESS NOTES
Occupational Therapy      Occupational Therapy Treatment    Name: Ike Gar  MRN: [id number]  : 1945  Date: 02/10/25  Room: [address] [place], [place] [address]      Time Calculation  Start Time: 1030  Stop Time: 1053  Time Calculation (min): 23 min    Assessment:  Prognosis: Good  Barriers to Discharge Home: Caregiver assistance, Physical needs  Caregiver Assistance: Caregiver assistance needed per identified barriers - however, level of patient's required assistance exceeds assistance available at home  Physical Needs: Intermittent mobility assistance needed, Intermittent ADL assistance needed  End of Session Communication: Bedside nurse  End of Session Patient Position: Bed, 3 rail up  Plan:  Treatment Interventions: ADL retraining, Compensatory technique education, Equipment evaluation/education, Endurance training, UE strengthening/ROM  OT Frequency: 3 times per week  OT Discharge Recommendations: Moderate intensity level of continued care  Equipment Recommended upon Discharge: Wheeled walker  OT Recommended Transfer Status: Assist of 1  OT - OK to Discharge: Yes    Subjective   General:  OT Last Visit  OT Received On: 02/10/25  Prior to Session Communication: Bedside nurse  Patient Position Received: Bed, 3 rail up  General Comment: Pt in supine on arrival, willing to participate in OT. Pt requires frequent restbreaks d/t fatigue. Pt declined to wear TLSO as its recommended for comfort only. Followed spine precautions with cues.   Precautions:  Medical Precautions: Fall precautions, Spinal precautions, Oxygen therapy device and L/min  Post-Surgical Precautions: Abdominal surgery precautions  Precautions Comment: TLSO on but unfastened on approach, required assistance repositioning and fastening prior to mobilziing OOB  Vitals:    Lines/Tubes/Drains:  PICC - Adult Double lumen Left Brachial vein (Active)   Number of days: 8       Closed/Suction Drain Midline RUQ 10 Fr. (Active)   Number of days: 26       Closed/Suction Drain  Right;Anterior Hip Bulb (Active)   Number of days: 8       Open Drain Left LLQ (Active)   Number of days: 33       Gastrostomy/Enterostomy Percutaneous endoscopic gastrostomy (PEG) 1 20 Fr. LUQ (Active)   Number of days: 34       Ileostomy Other (Comment) RUQ (Active)   Number of days: 49       Cognition:  Orientation Level: Oriented X4  Processing Speed: Delayed    Pain Assessment:  Pain Assessment  0-10 (Numeric) Pain Score:  (unrated)     Objective   Activities of Daily Living:      Grooming  Grooming Level of Assistance: Setup  Grooming Where Assessed: Recliner        Functional Standing Tolerance:  Functional Mobility  Functional Mobility Performed: Yes  Functional Mobility 1  Surface 1: Level tile  Device 1: Rolling walker  Assistance 1: Minimum assistance  Comments 1: pt took steps from bed to chair, continues to be unsteady and fatigues easily  Bed Mobility/Transfers:   Bed Mobility  Bed Mobility: Yes  Bed Mobility 1  Bed Mobility 1: Supine to sitting  Level of Assistance 1: Moderate assistance  Bed Mobility Comments 1: assistance at trunk and verbal cues to follow log roll technique  Transfers  Transfer: Yes  Transfer 1  Transfer From 1: Sit to  Transfer to 1: Stand  Technique 1: Sit to stand, Stand to sit  Transfer Device 1: Walker  Transfer Level of Assistance 1: Minimum assistance, Minimal verbal cues  Transfers 2  Transfer From 2: Bed to  Transfer to 2: Chair with arms  Technique 2: Sit to stand, Stand to sit  Transfer Device 2: Walker  Transfer Level of Assistance 2: Minimum assistance        Outcome Measures:  [facility] Daily Activity  Putting on and taking off regular lower body clothing: A lot  Bathing (including washing, rinsing, drying): A lot  Putting on and taking off regular upper body clothing: A little  Toileting, which includes using toilet, bedpan or urinal: A lot  Taking care of personal grooming such as brushing teeth: A little  Eating Meals: A lot  Daily Activity - Total Score: 14      Education Documentation  Body Mechanics, taught by Martha Cloud OT at 2/10/2025 12:53 PM.  Learner: Patient  Readiness: Acceptance  Method: Explanation  Response: Verbalizes Understanding    Precautions, taught by Martha Cloud OT at 2/10/2025 12:53 PM.  Learner: Patient  Readiness: Acceptance  Method: Explanation  Response: Verbalizes Understanding    ADL Training, taught by Martha Cloud OT at 2/10/2025 12:53 PM.  Learner: Patient  Readiness: Acceptance  Method: Explanation  Response: Verbalizes Understanding    Education Comments  No comments found.        Goals:  Encounter Problems       Encounter Problems (Active)       ADLs       Patient with complete upper body dressing with independent level of assistance donning and doffing all UE clothes with no adaptive equipment while edge of bed  (Progressing)       Start:  02/03/25    Expected End:  02/17/25            Patient with complete lower body dressing with stand by assist level of assistance donning and doffing all LE clothes  with PRN adaptive equipment while edge of bed  (Progressing)       Start:  02/03/25    Expected End:  02/17/25            Patient will complete toileting including hygiene clothing management/hygiene with stand by assist level of assistance and raised toilet seat. (Progressing)       Start:  02/03/25    Expected End:  02/17/25               EXERCISE/STRENGTHENING       Patient with increase BUE strength to WFL for ADLs. (Progressing)       Start:  02/03/25    Expected End:  02/17/25               MOBILITY       Patient will perform Functional mobility min Household distances with contact guard assist level of assistance and least restrictive device in order to improve safety and functional mobility. (Progressing)       Start:  02/03/25    Expected End:  02/17/25               TRANSFERS       Patient will perform bed mobility stand by assist level of assistance and bed rails in order to improve safety and independence  with mobility (Progressing)       Start:  02/03/25    Expected End:  02/17/25            Patient will complete sit to stand transfer with CGA level of assistance and least restrictive device in order to improve safety and prepare for out of bed mobility. (Progressing)       Start:  02/03/25    Expected End:  02/17/25                     02/10/25 at 12:53 PM   Martha Cloud, OT   [phone]
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
## 2025-02-10 NOTE — CARE PLAN
The patient's goals for the shift include to sleep    The clinical goals for the shift include patient will remain HDS throughout the shift

## 2025-02-10 NOTE — CARE PLAN
The patient's goals for the shift include Up to chair for 2 hours    The clinical goals for the shift include patient to remain safe of falls      Problem: Skin  Goal: Decreased wound size/increased tissue granulation at next dressing change  Outcome: Progressing  Goal: Participates in plan/prevention/treatment measures  Outcome: Progressing  Goal: Prevent/manage excess moisture  Outcome: Progressing  Goal: Prevent/minimize sheer/friction injuries  Outcome: Progressing  Goal: Promote/optimize nutrition  Outcome: Progressing  Goal: Promote skin healing  Outcome: Progressing     Problem: Safety - Adult  Goal: Free from fall injury  Outcome: Progressing     Problem: Discharge Planning  Goal: Discharge to home or other facility with appropriate resources  Outcome: Progressing     Problem: Chronic Conditions and Co-morbidities  Goal: Patient's chronic conditions and co-morbidity symptoms are monitored and maintained or improved  Outcome: Progressing     Problem: Nutrition  Goal: Nutrient intake appropriate for maintaining nutritional needs  Outcome: Progressing     Problem: Respiratory  Goal: Clear secretions with interventions this shift  Outcome: Progressing  Goal: Minimize anxiety/maximize coping throughout shift  Outcome: Progressing  Goal: Minimal/no exertional discomfort or dyspnea this shift  Outcome: Progressing  Goal: No signs of respiratory distress (eg. Use of accessory muscles. Peds grunting)  Outcome: Progressing  Goal: Patent airway maintained this shift  Outcome: Progressing  Goal: Tolerate mechanical ventilation evidenced by VS/agitation level this shift  Outcome: Progressing  Goal: Tolerate pulmonary toileting this shift  Outcome: Progressing  Goal: Verbalize decreased shortness of breath this shift  Outcome: Progressing  Goal: Wean oxygen to maintain O2 saturation per order/standard this shift  Outcome: Progressing  Goal: Increase self care and/or family involvement in next 24 hours  Outcome:  Progressing

## 2025-02-11 LAB
ABO GROUP (TYPE) IN BLOOD: NORMAL
ALBUMIN SERPL BCP-MCNC: 2.6 G/DL (ref 3.4–5)
ALP SERPL-CCNC: 190 U/L (ref 33–136)
ALT SERPL W P-5'-P-CCNC: 58 U/L (ref 10–52)
ANTIBODY SCREEN: NORMAL
AST SERPL W P-5'-P-CCNC: 30 U/L (ref 9–39)
BASOPHILS # BLD AUTO: 0.04 X10*3/UL (ref 0–0.1)
BASOPHILS NFR BLD AUTO: 0.6 %
BILIRUB DIRECT SERPL-MCNC: 0.6 MG/DL (ref 0–0.3)
BILIRUB SERPL-MCNC: 2.2 MG/DL (ref 0–1.2)
BLOOD EXPIRATION DATE: NORMAL
DISPENSE STATUS: NORMAL
EOSINOPHIL # BLD AUTO: 0.15 X10*3/UL (ref 0–0.4)
EOSINOPHIL NFR BLD AUTO: 2.3 %
ERYTHROCYTE [DISTWIDTH] IN BLOOD BY AUTOMATED COUNT: 17.3 % (ref 11.5–14.5)
ERYTHROCYTE [DISTWIDTH] IN BLOOD BY AUTOMATED COUNT: 27.4 % (ref 11.5–14.5)
GLUCOSE BLD MANUAL STRIP-MCNC: 152 MG/DL (ref 74–99)
GLUCOSE BLD MANUAL STRIP-MCNC: 91 MG/DL (ref 74–99)
GLUCOSE BLD MANUAL STRIP-MCNC: 92 MG/DL (ref 74–99)
HCT VFR BLD AUTO: 24.5 % (ref 41–52)
HCT VFR BLD AUTO: 29.2 % (ref 41–52)
HGB BLD-MCNC: 6.2 G/DL (ref 13.5–17.5)
HGB BLD-MCNC: 9.1 G/DL (ref 13.5–17.5)
HYPOCHROMIA BLD QL SMEAR: NORMAL
IMM GRANULOCYTES # BLD AUTO: 0.03 X10*3/UL (ref 0–0.5)
IMM GRANULOCYTES NFR BLD AUTO: 0.5 % (ref 0–0.9)
LYMPHOCYTES # BLD AUTO: 2.27 X10*3/UL (ref 0.8–3)
LYMPHOCYTES NFR BLD AUTO: 34.3 %
MAGNESIUM SERPL-MCNC: 1.67 MG/DL (ref 1.6–2.4)
MCH RBC QN AUTO: 30.2 PG (ref 26–34)
MCH RBC QN AUTO: 36.1 PG (ref 26–34)
MCHC RBC AUTO-ENTMCNC: 21.2 G/DL (ref 32–36)
MCHC RBC AUTO-ENTMCNC: 37.1 G/DL (ref 32–36)
MCV RBC AUTO: 142 FL (ref 80–100)
MCV RBC AUTO: 97 FL (ref 80–100)
MONOCYTES # BLD AUTO: 0.58 X10*3/UL (ref 0.05–0.8)
MONOCYTES NFR BLD AUTO: 8.8 %
NEUTROPHILS # BLD AUTO: 3.55 X10*3/UL (ref 1.6–5.5)
NEUTROPHILS NFR BLD AUTO: 53.5 %
NRBC BLD-RTO: 0 /100 WBCS (ref 0–0)
NRBC BLD-RTO: 0 /100 WBCS (ref 0–0)
PLATELET # BLD AUTO: 174 X10*3/UL (ref 150–450)
PLATELET # BLD AUTO: ABNORMAL 10*3/UL
PRODUCT BLOOD TYPE: 5100
PRODUCT CODE: NORMAL
PROT SERPL-MCNC: 7.1 G/DL (ref 6.4–8.2)
RBC # BLD AUTO: 2.05 X10*6/UL (ref 4.5–5.9)
RBC # BLD AUTO: 2.52 X10*6/UL (ref 4.5–5.9)
RBC MORPH BLD: NORMAL
RH FACTOR (ANTIGEN D): NORMAL
ROULEAUX BLD QL SMEAR: PRESENT
TRIGL SERPL-MCNC: 141 MG/DL (ref 0–149)
UNIT ABO: NORMAL
UNIT NUMBER: NORMAL
UNIT RH: NORMAL
UNIT VOLUME: 277
WBC # BLD AUTO: 6.6 X10*3/UL (ref 4.4–11.3)
WBC # BLD AUTO: 8.6 X10*3/UL (ref 4.4–11.3)
XM INTEP: NORMAL

## 2025-02-11 PROCEDURE — 2500000004 HC RX 250 GENERAL PHARMACY W/ HCPCS (ALT 636 FOR OP/ED): Performed by: PHYSICIAN ASSISTANT

## 2025-02-11 PROCEDURE — P9016 RBC LEUKOCYTES REDUCED: HCPCS

## 2025-02-11 PROCEDURE — 86923 COMPATIBILITY TEST ELECTRIC: CPT

## 2025-02-11 PROCEDURE — 2500000005 HC RX 250 GENERAL PHARMACY W/O HCPCS: Performed by: PHYSICIAN ASSISTANT

## 2025-02-11 PROCEDURE — 85027 COMPLETE CBC AUTOMATED: CPT | Performed by: PHYSICIAN ASSISTANT

## 2025-02-11 PROCEDURE — 83735 ASSAY OF MAGNESIUM: CPT | Performed by: PHYSICIAN ASSISTANT

## 2025-02-11 PROCEDURE — 82947 ASSAY GLUCOSE BLOOD QUANT: CPT

## 2025-02-11 PROCEDURE — 84478 ASSAY OF TRIGLYCERIDES: CPT | Performed by: PHYSICIAN ASSISTANT

## 2025-02-11 PROCEDURE — 2500000002 HC RX 250 W HCPCS SELF ADMINISTERED DRUGS (ALT 637 FOR MEDICARE OP, ALT 636 FOR OP/ED): Performed by: PHYSICIAN ASSISTANT

## 2025-02-11 PROCEDURE — 2580000001 HC RX 258 IV SOLUTIONS

## 2025-02-11 PROCEDURE — 99231 SBSQ HOSP IP/OBS SF/LOW 25: CPT | Performed by: PHYSICIAN ASSISTANT

## 2025-02-11 PROCEDURE — 36430 TRANSFUSION BLD/BLD COMPNT: CPT

## 2025-02-11 PROCEDURE — 1100000001 HC PRIVATE ROOM DAILY

## 2025-02-11 PROCEDURE — 86850 RBC ANTIBODY SCREEN: CPT | Performed by: PHYSICIAN ASSISTANT

## 2025-02-11 PROCEDURE — 80076 HEPATIC FUNCTION PANEL: CPT | Performed by: PHYSICIAN ASSISTANT

## 2025-02-11 PROCEDURE — 85025 COMPLETE CBC W/AUTO DIFF WBC: CPT | Performed by: PHYSICIAN ASSISTANT

## 2025-02-11 PROCEDURE — 2500000004 HC RX 250 GENERAL PHARMACY W/ HCPCS (ALT 636 FOR OP/ED)

## 2025-02-11 PROCEDURE — 2500000001 HC RX 250 WO HCPCS SELF ADMINISTERED DRUGS (ALT 637 FOR MEDICARE OP): Performed by: NURSE PRACTITIONER

## 2025-02-11 RX ADMIN — PANTOPRAZOLE SODIUM 40 MG: 40 INJECTION, POWDER, FOR SOLUTION INTRAVENOUS at 09:18

## 2025-02-11 RX ADMIN — Medication 1 TABLET: at 09:18

## 2025-02-11 RX ADMIN — HEPARIN SODIUM 5000 UNITS: 5000 INJECTION, SOLUTION INTRAVENOUS; SUBCUTANEOUS at 05:35

## 2025-02-11 RX ADMIN — SMOFLIPID 50 G: 6; 6; 5; 3 INJECTION, EMULSION INTRAVENOUS at 20:27

## 2025-02-11 RX ADMIN — IRON SUCROSE 100 MG: 20 INJECTION, SOLUTION INTRAVENOUS at 18:16

## 2025-02-11 RX ADMIN — HEPARIN SODIUM 5000 UNITS: 5000 INJECTION, SOLUTION INTRAVENOUS; SUBCUTANEOUS at 14:42

## 2025-02-11 RX ADMIN — ASCORBIC ACID, VITAMIN A PALMITATE, CHOLECALCIFEROL, THIAMINE HYDROCHLORIDE, RIBOFLAVIN-5 PHOSPHATE SODIUM, PYRIDOXINE HYDROCHLORIDE, NIACINAMIDE, DEXPANTHENOL, ALPHA-TOCOPHEROL ACETATE, VITAMIN K1, FOLIC ACID, BIOTIN, CYANOCOBALAMIN: 200; 3300; 200; 6; 3.6; 6; 40; 15; 10; 150; 600; 60; 5 INJECTION, SOLUTION INTRAVENOUS at 20:26

## 2025-02-11 RX ADMIN — HEPARIN SODIUM 5000 UNITS: 5000 INJECTION, SOLUTION INTRAVENOUS; SUBCUTANEOUS at 22:19

## 2025-02-11 RX ADMIN — INSULIN LISPRO 1 UNITS: 100 INJECTION, SOLUTION INTRAVENOUS; SUBCUTANEOUS at 09:18

## 2025-02-11 ASSESSMENT — COGNITIVE AND FUNCTIONAL STATUS - GENERAL
PERSONAL GROOMING: A LITTLE
MOVING FROM LYING ON BACK TO SITTING ON SIDE OF FLAT BED WITH BEDRAILS: A LITTLE
TOILETING: A LITTLE
DRESSING REGULAR UPPER BODY CLOTHING: A LOT
MOVING FROM LYING ON BACK TO SITTING ON SIDE OF FLAT BED WITH BEDRAILS: A LITTLE
DRESSING REGULAR UPPER BODY CLOTHING: A LOT
PERSONAL GROOMING: A LITTLE
MOVING TO AND FROM BED TO CHAIR: A LITTLE
STANDING UP FROM CHAIR USING ARMS: A LOT
TURNING FROM BACK TO SIDE WHILE IN FLAT BAD: A LITTLE
TOILETING: A LITTLE
WALKING IN HOSPITAL ROOM: A LOT
WALKING IN HOSPITAL ROOM: A LOT
DRESSING REGULAR LOWER BODY CLOTHING: A LOT
CLIMB 3 TO 5 STEPS WITH RAILING: A LOT
DAILY ACTIVITIY SCORE: 15
EATING MEALS: A LITTLE
EATING MEALS: A LITTLE
MOBILITY SCORE: 16
STANDING UP FROM CHAIR USING ARMS: A LITTLE
MOBILITY SCORE: 15
TURNING FROM BACK TO SIDE WHILE IN FLAT BAD: A LITTLE
HELP NEEDED FOR BATHING: A LOT
MOVING TO AND FROM BED TO CHAIR: A LITTLE
DAILY ACTIVITIY SCORE: 15
HELP NEEDED FOR BATHING: A LOT
CLIMB 3 TO 5 STEPS WITH RAILING: A LOT
DRESSING REGULAR LOWER BODY CLOTHING: A LOT

## 2025-02-11 ASSESSMENT — PAIN SCALES - GENERAL
PAINLEVEL_OUTOF10: 0 - NO PAIN
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
## 2025-02-11 NOTE — CONSULTS
Wound Care Consult     Visit Date: 2/11/2025      Patient Name: Ike Gar         MRN: [id number]           YOB: 1945     Reason for Consult: ostomy care/lesson; midline wound care     Wound Team Summary Assessment:   Ostomy type: ileostomy     size: 1 3/8 inches      color: red      protruding: budded  Ricky: none  Functioning: loose brown stool  Mucocutaneous junction: intact  Peristomal skin: slightly denuded; stoma powder and cavilon applied  Pouching: pouch changed today; 2 piece flat wafer, barrier ring, and high output pouch applied  Ostomy Education: Patient observed pouch change and participated in hands on demonstration; son at bedside, also watched and asked questions.      Wound Team Plan: Change pouch twice a week or as needed for education. Patient's son took additional supplies home.     Midline abdominal wound also changed today. Cleansed with vashe wash and dried with gauze. Previous dressing adherent to wound bed (aquacel ag), but removed gently with NS irrigation. Replaced today and covered with 4x4 gauze. Upper portion of wound is dried and almost healed.   Dressing can be changed every other day by bedside RN.     Michelle Kruger RN  2/11/2025  5:24 PM

## 2025-02-11 NOTE — PROGRESS NOTES
Therapy Communication Note    Patient Name: Ike Gar  MRN: [id number]  Department: [facility] 8  Room: [address] [place] [address]  Today's Date: 2/11/2025     Discipline: Physical Therapy    Missed Visit Reason: Missed Visit Reason: Patient placed on medical hold (6.2 Hgb)    Missed Time: Attempt    Comment:

## 2025-02-11 NOTE — CARE PLAN
The patient's goals for the shift include to sleep    The clinical goals for the shift include free from falls

## 2025-02-11 NOTE — NURSING NOTE
Patient's O2 saturation is 96% on Room air. However patient prefers to wear 2LNC for comfort he feels it makes him sleep better.

## 2025-02-11 NOTE — CARE PLAN
The patient's goals for the shift include to sleep    The clinical goals for the shift include pain control    Problem: Skin  Goal: Decreased wound size/increased tissue granulation at next dressing change  Outcome: Progressing  Goal: Participates in plan/prevention/treatment measures  Outcome: Progressing  Goal: Prevent/manage excess moisture  Outcome: Progressing  Goal: Prevent/minimize sheer/friction injuries  Outcome: Progressing  Goal: Promote/optimize nutrition  Outcome: Progressing  Goal: Promote skin healing  Outcome: Progressing

## 2025-02-12 LAB
GLUCOSE BLD MANUAL STRIP-MCNC: 111 MG/DL (ref 74–99)
GLUCOSE BLD MANUAL STRIP-MCNC: 160 MG/DL (ref 74–99)
GLUCOSE BLD MANUAL STRIP-MCNC: 90 MG/DL (ref 74–99)
PATH REVIEW-CBC DIFFERENTIAL: NORMAL

## 2025-02-12 PROCEDURE — 2500000004 HC RX 250 GENERAL PHARMACY W/ HCPCS (ALT 636 FOR OP/ED): Performed by: PHYSICIAN ASSISTANT

## 2025-02-12 PROCEDURE — 2500000004 HC RX 250 GENERAL PHARMACY W/ HCPCS (ALT 636 FOR OP/ED)

## 2025-02-12 PROCEDURE — 1100000001 HC PRIVATE ROOM DAILY

## 2025-02-12 PROCEDURE — 2500000005 HC RX 250 GENERAL PHARMACY W/O HCPCS: Performed by: PHYSICIAN ASSISTANT

## 2025-02-12 PROCEDURE — 82947 ASSAY GLUCOSE BLOOD QUANT: CPT

## 2025-02-12 PROCEDURE — 99231 SBSQ HOSP IP/OBS SF/LOW 25: CPT | Performed by: PHYSICIAN ASSISTANT

## 2025-02-12 PROCEDURE — 2580000001 HC RX 258 IV SOLUTIONS

## 2025-02-12 PROCEDURE — 92507 TX SP LANG VOICE COMM INDIV: CPT | Mod: GN | Performed by: SPEECH-LANGUAGE PATHOLOGIST

## 2025-02-12 PROCEDURE — 2500000002 HC RX 250 W HCPCS SELF ADMINISTERED DRUGS (ALT 637 FOR MEDICARE OP, ALT 636 FOR OP/ED): Performed by: PHYSICIAN ASSISTANT

## 2025-02-12 PROCEDURE — 2500000001 HC RX 250 WO HCPCS SELF ADMINISTERED DRUGS (ALT 637 FOR MEDICARE OP): Performed by: NURSE PRACTITIONER

## 2025-02-12 RX ORDER — MAGNESIUM SULFATE HEPTAHYDRATE 40 MG/ML
2 INJECTION, SOLUTION INTRAVENOUS ONCE
Status: COMPLETED | OUTPATIENT
Start: 2025-02-12 | End: 2025-02-12

## 2025-02-12 RX ADMIN — PANTOPRAZOLE SODIUM 40 MG: 40 INJECTION, POWDER, FOR SOLUTION INTRAVENOUS at 09:01

## 2025-02-12 RX ADMIN — HEPARIN SODIUM 5000 UNITS: 5000 INJECTION, SOLUTION INTRAVENOUS; SUBCUTANEOUS at 21:48

## 2025-02-12 RX ADMIN — MAGNESIUM SULFATE HEPTAHYDRATE 2 G: 40 INJECTION, SOLUTION INTRAVENOUS at 09:01

## 2025-02-12 RX ADMIN — INSULIN LISPRO 1 UNITS: 100 INJECTION, SOLUTION INTRAVENOUS; SUBCUTANEOUS at 01:25

## 2025-02-12 RX ADMIN — ASCORBIC ACID, VITAMIN A PALMITATE, CHOLECALCIFEROL, THIAMINE HYDROCHLORIDE, RIBOFLAVIN-5 PHOSPHATE SODIUM, PYRIDOXINE HYDROCHLORIDE, NIACINAMIDE, DEXPANTHENOL, ALPHA-TOCOPHEROL ACETATE, VITAMIN K1, FOLIC ACID, BIOTIN, CYANOCOBALAMIN: 200; 3300; 200; 6; 3.6; 6; 40; 15; 10; 150; 600; 60; 5 INJECTION, SOLUTION INTRAVENOUS at 21:48

## 2025-02-12 RX ADMIN — SMOFLIPID 50 G: 6; 6; 5; 3 INJECTION, EMULSION INTRAVENOUS at 21:48

## 2025-02-12 RX ADMIN — Medication 1 TABLET: at 09:01

## 2025-02-12 RX ADMIN — HEPARIN SODIUM 5000 UNITS: 5000 INJECTION, SOLUTION INTRAVENOUS; SUBCUTANEOUS at 14:14

## 2025-02-12 RX ADMIN — IRON SUCROSE 100 MG: 20 INJECTION, SOLUTION INTRAVENOUS at 18:15

## 2025-02-12 ASSESSMENT — PAIN - FUNCTIONAL ASSESSMENT: PAIN_FUNCTIONAL_ASSESSMENT: 0-10

## 2025-02-12 ASSESSMENT — PAIN SCALES - GENERAL
PAINLEVEL_OUTOF10: 0 - NO PAIN

## 2025-02-12 NOTE — CARE PLAN
The patient's goals for the shift include to sleep    The clinical goals for the shift include pt will get oob to chair during shift

## 2025-02-12 NOTE — PROGRESS NOTES
Inpatient Speech-Language Pathology Treatment     Patient Name: Ike Gar  MRN: [id number]  Today's Date: 2/12/2025  Time Calculation  Start Time: 1402  Stop Time: 1435  Time Calculation (min): 33 min       Assessment/Plan:  #cognition   SLP addressed mild cognitive impairments with problem solving, reasoning, and recall tasks. Pt preformed with high accuracy across all tasks. SLP provided pt with daily planner to complete in future. SLP will continue to address cognitive function.     Plan:  Inpatient/Swing Bed or Outpatient: Inpatient  SLP TX Plan: Continue Plan of Care  SLP Plan: Skilled SLP  SLP Frequency: 2x per week  Duration: 2 weeks  SLP Discharge Recommendations: Continue skilled SLP services at the next level of care  SLP - OK to Discharge: Yes      Subjective   Pt received resting in bed. Pt pleasant and cooperative throughout session. A&O x 4 (date, month, year, location). Noted pt receiving supplemental O2 via nasal cannula.    Patient Seen During This Visit: Yes  Arrival: Independent  Prior to Session Communication: Bedside nurse    Objective   Pt completed biographical information form with 100% accuracy with minimal verbal cues.     Pt independently completed worksheet of counting coins and dollar bills to determine correct money value with 100% accuracy.    Checked with nurse practioner for pt to consume ice chips.   Pt accepted and tolerated ice chips via teaspoon (x2).     Oral phase: Pt presented with anterior labial seal WNL. Intact bolus extraction and consistent oral clearance.      Pharyngeal Phase: Appreciated subjectively timely swallow onset with all presentations. No overt s/sx of aspiration.         Inpatient:  Education Documentation  Cognition, taught by JONATHAN Watkins-SLP at 2/12/2025  2:53 PM.  Learner: Patient  Readiness: Acceptance  Method: Explanation  Response: Verbalizes Understanding    Education Comments  No comments found.
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
## 2025-02-12 NOTE — CARE PLAN
The patient's goals for the shift include to sleep    The clinical goals for the shift include Patient to rest during shift      Problem: Skin  Goal: Decreased wound size/increased tissue granulation at next dressing change  2/12/2025 0007 by Kamiyah Reyes, RN  Flowsheets (Taken 2/12/2025 0007)  Decreased wound size/increased tissue granulation at next dressing change: Promote sleep for wound healing  2/12/2025 0007 by Kamiyah Reyes, RN  Outcome: Progressing  Flowsheets (Taken 2/12/2025 0007)  Decreased wound size/increased tissue granulation at next dressing change: Promote sleep for wound healing  Goal: Participates in plan/prevention/treatment measures  2/12/2025 0007 by Kamiyah Reyes, RN  Flowsheets (Taken 2/12/2025 0007)  Participates in plan/prevention/treatment measures: Elevate heels  2/12/2025 0007 by Kamiyah Reyes, RN  Outcome: Progressing  Flowsheets (Taken 2/12/2025 0007)  Participates in plan/prevention/treatment measures: Elevate heels  Goal: Prevent/manage excess moisture  2/12/2025 0007 by Kamiyah Reyes, RN  Flowsheets (Taken 2/12/2025 0007)  Prevent/manage excess moisture: Monitor for/manage infection if present  2/12/2025 0007 by Kamiyah Reyes, RN  Outcome: Progressing  Flowsheets (Taken 2/12/2025 0007)  Prevent/manage excess moisture: Monitor for/manage infection if present  Goal: Prevent/minimize sheer/friction injuries  2/12/2025 0007 by Kamiyah Reyes, RN  Flowsheets (Taken 2/12/2025 0007)  Prevent/minimize sheer/friction injuries: Increase activity/out of bed for meals  2/12/2025 0007 by Kamiyah Reyes, RN  Outcome: Progressing  Flowsheets (Taken 2/12/2025 0007)  Prevent/minimize sheer/friction injuries: Increase activity/out of bed for meals  Goal: Promote/optimize nutrition  2/12/2025 0007 by Kamiyah Reyes, RN  Flowsheets (Taken 2/12/2025 0007)  Promote/optimize nutrition: Monitor/record intake including meals  2/12/2025 0007 by Kamiyah Reyes, RN  Outcome: Progressing  Flowsheets (Taken  2/12/2025 0007)  Promote/optimize nutrition: Monitor/record intake including meals  Goal: Promote skin healing  2/12/2025 0007 by Kamiyah Reyes, RN  Flowsheets (Taken 2/12/2025 0007)  Promote skin healing:   Assess skin/pad under line(s)/device(s)   Turn/reposition every 2 hours/use positioning/transfer devices  2/12/2025 0007 by Kamiyah Reyes, RN  Outcome: Progressing  Flowsheets (Taken 2/12/2025 0007)  Promote skin healing:   Assess skin/pad under line(s)/device(s)   Turn/reposition every 2 hours/use positioning/transfer devices     Problem: Safety - Adult  Goal: Free from fall injury  Outcome: Progressing     Problem: Discharge Planning  Goal: Discharge to home or other facility with appropriate resources  Outcome: Progressing     Problem: Chronic Conditions and Co-morbidities  Goal: Patient's chronic conditions and co-morbidity symptoms are monitored and maintained or improved  Outcome: Progressing     Problem: Nutrition  Goal: Nutrient intake appropriate for maintaining nutritional needs  Outcome: Progressing     Problem: Respiratory  Goal: Clear secretions with interventions this shift  Outcome: Progressing  Goal: Minimize anxiety/maximize coping throughout shift  Outcome: Progressing  Goal: Minimal/no exertional discomfort or dyspnea this shift  Outcome: Progressing  Goal: No signs of respiratory distress (eg. Use of accessory muscles. Peds grunting)  Outcome: Progressing  Goal: Patent airway maintained this shift  Outcome: Progressing  Goal: Tolerate mechanical ventilation evidenced by VS/agitation level this shift  Outcome: Progressing  Goal: Tolerate pulmonary toileting this shift  Outcome: Progressing  Goal: Verbalize decreased shortness of breath this shift  Outcome: Progressing  Goal: Wean oxygen to maintain O2 saturation per order/standard this shift  Outcome: Progressing  Goal: Increase self care and/or family involvement in next 24 hours  Outcome: Progressing

## 2025-02-12 NOTE — PROGRESS NOTES
LSW spoke with the son who requested a list of skilled facilities. LSW email the list as requested by the son. [email or web address]

## 2025-02-13 LAB
ALBUMIN SERPL BCP-MCNC: 2.8 G/DL (ref 3.4–5)
ALP SERPL-CCNC: 175 U/L (ref 33–136)
ALT SERPL W P-5'-P-CCNC: 54 U/L (ref 10–52)
ANION GAP SERPL CALC-SCNC: 12 MMOL/L (ref 10–20)
AST SERPL W P-5'-P-CCNC: 29 U/L (ref 9–39)
BILIRUB SERPL-MCNC: 1.7 MG/DL (ref 0–1.2)
BUN SERPL-MCNC: 49 MG/DL (ref 6–23)
CALCIUM SERPL-MCNC: 9 MG/DL (ref 8.6–10.6)
CHLORIDE SERPL-SCNC: 105 MMOL/L (ref 98–107)
CO2 SERPL-SCNC: 24 MMOL/L (ref 21–32)
CREAT SERPL-MCNC: 1.36 MG/DL (ref 0.5–1.3)
EGFRCR SERPLBLD CKD-EPI 2021: 54 ML/MIN/1.73M*2
ERYTHROCYTE [DISTWIDTH] IN BLOOD BY AUTOMATED COUNT: 16.1 % (ref 11.5–14.5)
GLUCOSE BLD MANUAL STRIP-MCNC: 106 MG/DL (ref 74–99)
GLUCOSE BLD MANUAL STRIP-MCNC: 107 MG/DL (ref 74–99)
GLUCOSE BLD MANUAL STRIP-MCNC: 152 MG/DL (ref 74–99)
GLUCOSE BLD MANUAL STRIP-MCNC: 163 MG/DL (ref 74–99)
GLUCOSE SERPL-MCNC: 114 MG/DL (ref 74–99)
HCT VFR BLD AUTO: 28 % (ref 41–52)
HGB BLD-MCNC: 9.3 G/DL (ref 13.5–17.5)
MCH RBC QN AUTO: 31.7 PG (ref 26–34)
MCHC RBC AUTO-ENTMCNC: 33.2 G/DL (ref 32–36)
MCV RBC AUTO: 96 FL (ref 80–100)
NRBC BLD-RTO: 0 /100 WBCS (ref 0–0)
PHOSPHATE SERPL-MCNC: 4 MG/DL (ref 2.5–4.9)
PLATELET # BLD AUTO: ABNORMAL 10*3/UL
POTASSIUM SERPL-SCNC: 4.9 MMOL/L (ref 3.5–5.3)
PROT SERPL-MCNC: 7.3 G/DL (ref 6.4–8.2)
RBC # BLD AUTO: 2.93 X10*6/UL (ref 4.5–5.9)
SODIUM SERPL-SCNC: 136 MMOL/L (ref 136–145)
WBC # BLD AUTO: 7 X10*3/UL (ref 4.4–11.3)

## 2025-02-13 PROCEDURE — 82947 ASSAY GLUCOSE BLOOD QUANT: CPT

## 2025-02-13 PROCEDURE — 2500000001 HC RX 250 WO HCPCS SELF ADMINISTERED DRUGS (ALT 637 FOR MEDICARE OP): Performed by: NURSE PRACTITIONER

## 2025-02-13 PROCEDURE — 2580000001 HC RX 258 IV SOLUTIONS

## 2025-02-13 PROCEDURE — 2500000004 HC RX 250 GENERAL PHARMACY W/ HCPCS (ALT 636 FOR OP/ED)

## 2025-02-13 PROCEDURE — 2500000005 HC RX 250 GENERAL PHARMACY W/O HCPCS: Performed by: PHYSICIAN ASSISTANT

## 2025-02-13 PROCEDURE — 1100000001 HC PRIVATE ROOM DAILY

## 2025-02-13 PROCEDURE — 80053 COMPREHEN METABOLIC PANEL: CPT | Performed by: PHYSICIAN ASSISTANT

## 2025-02-13 PROCEDURE — 85027 COMPLETE CBC AUTOMATED: CPT | Performed by: PHYSICIAN ASSISTANT

## 2025-02-13 PROCEDURE — 2500000004 HC RX 250 GENERAL PHARMACY W/ HCPCS (ALT 636 FOR OP/ED): Performed by: PHYSICIAN ASSISTANT

## 2025-02-13 PROCEDURE — 99231 SBSQ HOSP IP/OBS SF/LOW 25: CPT

## 2025-02-13 PROCEDURE — 84100 ASSAY OF PHOSPHORUS: CPT | Performed by: PHYSICIAN ASSISTANT

## 2025-02-13 PROCEDURE — 97116 GAIT TRAINING THERAPY: CPT | Mod: GP | Performed by: STUDENT IN AN ORGANIZED HEALTH CARE EDUCATION/TRAINING PROGRAM

## 2025-02-13 PROCEDURE — 97110 THERAPEUTIC EXERCISES: CPT | Mod: GP | Performed by: STUDENT IN AN ORGANIZED HEALTH CARE EDUCATION/TRAINING PROGRAM

## 2025-02-13 PROCEDURE — 2500000002 HC RX 250 W HCPCS SELF ADMINISTERED DRUGS (ALT 637 FOR MEDICARE OP, ALT 636 FOR OP/ED): Performed by: PHYSICIAN ASSISTANT

## 2025-02-13 RX ADMIN — HEPARIN SODIUM 5000 UNITS: 5000 INJECTION, SOLUTION INTRAVENOUS; SUBCUTANEOUS at 06:24

## 2025-02-13 RX ADMIN — HEPARIN SODIUM 5000 UNITS: 5000 INJECTION, SOLUTION INTRAVENOUS; SUBCUTANEOUS at 21:06

## 2025-02-13 RX ADMIN — ASCORBIC ACID, VITAMIN A PALMITATE, CHOLECALCIFEROL, THIAMINE HYDROCHLORIDE, RIBOFLAVIN-5 PHOSPHATE SODIUM, PYRIDOXINE HYDROCHLORIDE, NIACINAMIDE, DEXPANTHENOL, ALPHA-TOCOPHEROL ACETATE, VITAMIN K1, FOLIC ACID, BIOTIN, CYANOCOBALAMIN: 200; 3300; 200; 6; 3.6; 6; 40; 15; 10; 150; 600; 60; 5 INJECTION, SOLUTION INTRAVENOUS at 21:52

## 2025-02-13 RX ADMIN — SMOFLIPID 50 G: 6; 6; 5; 3 INJECTION, EMULSION INTRAVENOUS at 21:52

## 2025-02-13 RX ADMIN — INSULIN LISPRO 1 UNITS: 100 INJECTION, SOLUTION INTRAVENOUS; SUBCUTANEOUS at 02:22

## 2025-02-13 RX ADMIN — Medication 1 TABLET: at 09:30

## 2025-02-13 RX ADMIN — PANTOPRAZOLE SODIUM 40 MG: 40 INJECTION, POWDER, FOR SOLUTION INTRAVENOUS at 09:30

## 2025-02-13 RX ADMIN — IRON SUCROSE 100 MG: 20 INJECTION, SOLUTION INTRAVENOUS at 21:06

## 2025-02-13 RX ADMIN — INSULIN LISPRO 1 UNITS: 100 INJECTION, SOLUTION INTRAVENOUS; SUBCUTANEOUS at 06:24

## 2025-02-13 ASSESSMENT — COGNITIVE AND FUNCTIONAL STATUS - GENERAL
STANDING UP FROM CHAIR USING ARMS: A LITTLE
MOVING FROM LYING ON BACK TO SITTING ON SIDE OF FLAT BED WITH BEDRAILS: A LITTLE
CLIMB 3 TO 5 STEPS WITH RAILING: TOTAL
TURNING FROM BACK TO SIDE WHILE IN FLAT BAD: A LITTLE
WALKING IN HOSPITAL ROOM: A LITTLE
MOBILITY SCORE: 16
MOVING TO AND FROM BED TO CHAIR: A LITTLE

## 2025-02-13 ASSESSMENT — PAIN - FUNCTIONAL ASSESSMENT: PAIN_FUNCTIONAL_ASSESSMENT: 0-10

## 2025-02-13 ASSESSMENT — PAIN SCALES - GENERAL
PAINLEVEL_OUTOF10: 0 - NO PAIN
PAINLEVEL_OUTOF10: 0 - NO PAIN

## 2025-02-13 NOTE — PROGRESS NOTES
Physical Therapy    Physical Therapy Treatment    Patient Name: Ike Gar  MRN: [id number]  Today's Date: 2/13/2025  Room: [address] [place] [address]-A  Time Calculation  Start Time: 1502  Stop Time: 1533  Time Calculation (min): 31 min       Assessment/Plan   PT Plan  Treatment/Interventions: Bed mobility, Transfer training, Gait training, Balance training, Neuromuscular re-education, Endurance training, Strengthening, Therapeutic exercise, Therapeutic activity  PT Plan: Ongoing PT  PT Frequency: 5 times per week  PT Discharge Recommendations: Moderate intensity level of continued care  Equipment Recommended upon Discharge: Wheeled walker  PT Recommended Transfer Status: Assistive device, Contact guard  PT - OK to Discharge: Yes    General Visit Information:   Reason for Referral: high output EC fistula and malnutrition.  Past Medical History Relevant to Rehab: 1. Increased EC fistula output  2. Malnutrition s/p TPN  3. Recent T/L spine fxs  4. Anemia  5. Hx tracheostomy (1/7/2025)  6. Hx HTN  Prior to Session Communication: Bedside nurse  Patient Position Received: Bed, 3 rail up, Alarm off, not on at start of session   Subjective: Pt alert and agreeable to PT. Reports that it feels cold in the room. Very anxious about his O2 levels, feels that he needs to have O2 when OOB.   Precautions:  Precautions  Medical Precautions: Fall precautions, Spinal precautions  Post-Surgical Precautions: Abdominal surgery precautions  Vital Signs:      Objective   Pain:  Pain Assessment  0-10 (Numeric) Pain Score: 0 - No pain (post 0)  Cognition:  Cognition  Orientation Level: Oriented X4  Lines/Tubes/Drains:  PICC - Adult Double lumen Left Brachial vein (Active)   Number of days: 11       Closed/Suction Drain Midline RUQ 10 Fr. (Active)   Number of days: 29       Closed/Suction Drain Right;Anterior Hip Bulb (Active)   Number of days: 11       Open Drain Left LLQ (Active)   Number of days: 36       Gastrostomy/Enterostomy Percutaneous endoscopic  gastrostomy (PEG) 1 20 Fr. LUQ (Active)   Number of days: 37       Ileostomy Other (Comment) RUQ (Active)   Number of days: 52        Continuous Medications/Drips:  Adult Clinimix TPN Cyclic, , Last Rate: Stopped (02/13/25 1006)        PT Treatments:  Therapeutic Exercise  Therapeutic Exercise Activity 1: BLE supine heel slides x8., SLR x8  Therapeutic Exercise Activity 2: BLE EOB marches x8, LAQ x8 (without UE support)  Therapeutic Exercise Activity 3: IS x8 (1500 mL)  Therapeutic Activity  Therapeutic Activity 1: EOB x5 min supervision  Therapeutic Activity 2: Educated pt on O2 levels, importance of weaning off and continued IS. Educated on discharge recommendations     Bed Mobility 1  Bed Mobility 1: Supine to sitting  Level of Assistance 1: Close supervision  Bed Mobility Comments 1: x1 HOB 30  Ambulation/Gait Training 1  Surface 1: Level tile  Device 1: Rolling walker  Assistance 1: Contact guard  Quality of Gait 1: Wide base of support, Decreased step length, Inconsistent stride length  Comments/Distance (ft) 1: x20 feet (two standing rest breaks)  Transfer 1  Transfer From 1: Sit to  Transfer to 1: Stand  Transfer Device 1: Walker  Transfer Level of Assistance 1: Contact guard  Trials/Comments 1: x1  Transfers 2  Transfer From 2: Stand to  Transfer to 2: Sit  Transfer Device 2: Walker  Transfer Level of Assistance 2: Contact guard  Trials/Comments 2: x1  Transfers 3  Transfer From 3: Bed to  Transfer to 3: Chair with arms  Transfer Device 3: Walker  Transfer Level of Assistance 3: Contact guard  Trials/Comments 3: x1             Outcome Measures:  [facility] Basic Mobility  Turning from your back to your side while in a flat bed without using bedrails: A little  Moving from lying on your back to sitting on the side of a flat bed without using bedrails: A little  Moving to and from bed to chair (including a wheelchair): A little  Standing up from a chair using your arms (e.g. wheelchair or bedside chair): A  little  To walk in hospital room: A little  Climbing 3-5 steps with railing: Total  Basic Mobility - Total Score: 16                            Education Documentation  Precautions, taught by JANET Atkins at 2/13/2025  4:23 PM.  Learner: Patient  Readiness: Acceptance  Method: Explanation  Response: Verbalizes Understanding  Comment: POC    Body Mechanics, taught by JANET Atkins at 2/13/2025  4:23 PM.  Learner: Patient  Readiness: Acceptance  Method: Explanation  Response: Verbalizes Understanding  Comment: POC    Mobility Training, taught by JANET Atkins at 2/13/2025  4:23 PM.  Learner: Patient  Readiness: Acceptance  Method: Explanation  Response: Verbalizes Understanding  Comment: POC    Education Comments  No comments found.          OP EDUCATION:       Encounter Problems       Encounter Problems (Active)       PT Problem       Patient will complete supine to sit and sit to supine Supervision while maintaining spinal precautions (Progressing)       Start:  02/03/25    Expected End:  02/17/25            Patient will perform sit<>stand transfer with LRAD, and Supervision while maintaining spinal precautions  (Progressing)       Start:  02/03/25    Expected End:  02/17/25            Patient will ambulate >100' with LRAD and Supervision  (Progressing)       Start:  02/03/25    Expected End:  02/17/25                   Assessment: Patient is progressing Fairly with therapy this date.  Pt progressed bed mobility, EOB there ex, seated postural control, transfers, and gait on todays date. Gait trial limited by decreased pt endurance - pt required reinforcement that O2 levels were stable throughout session. IS improved from 1300mL to 1600mL with repeated repetition. Will continue to progress bed mobility, transfers, gait, and stair training while still in hospital. Patient remains appropriate for MOD intensity therapy when medically appropriate for discharge from acute stay.  Will continue to  follow.      02/13/25 at 4:24 PM   JANET BRAND   Rehab Office: [phone]

## 2025-02-13 NOTE — CARE PLAN
The patient's goals for the shift include to sleep    The clinical goals for the shift include pt will rest throughout the night      Problem: Skin  Goal: Decreased wound size/increased tissue granulation at next dressing change  2/13/2025 0143 by Cedric Jarvis RN  Outcome: Progressing  Flowsheets (Taken 2/13/2025 0143)  Decreased wound size/increased tissue granulation at next dressing change:   Promote sleep for wound healing   Protective dressings over bony prominences  2/13/2025 0143 by Cedric Jarvis RN  Outcome: Progressing  Flowsheets (Taken 2/13/2025 0143)  Decreased wound size/increased tissue granulation at next dressing change:   Promote sleep for wound healing   Protective dressings over bony prominences  Goal: Participates in plan/prevention/treatment measures  2/13/2025 0143 by Cedric Jarvis RN  Outcome: Progressing  Flowsheets (Taken 2/13/2025 0143)  Participates in plan/prevention/treatment measures:   Discuss with provider PT/OT consult   Elevate heels  2/13/2025 0143 by Cedric Jarvis RN  Outcome: Progressing  Flowsheets (Taken 2/13/2025 0143)  Participates in plan/prevention/treatment measures:   Discuss with provider PT/OT consult   Elevate heels  Goal: Prevent/manage excess moisture  2/13/2025 0143 by Cedric Jarvis RN  Outcome: Progressing  Flowsheets (Taken 2/13/2025 0143)  Prevent/manage excess moisture:   Monitor for/manage infection if present   Follow provider orders for dressing changes  2/13/2025 0143 by Cedric Jarvis RN  Outcome: Progressing  Flowsheets (Taken 2/13/2025 0143)  Prevent/manage excess moisture:   Monitor for/manage infection if present   Follow provider orders for dressing changes  Goal: Prevent/minimize sheer/friction injuries  2/13/2025 0143 by Cedric Jarvis RN  Outcome: Progressing  Flowsheets (Taken 2/13/2025 0143)  Prevent/minimize sheer/friction injuries:   Use pull sheet   Increase activity/out of bed for meals   Turn/reposition every 2 hours/use positioning/transfer devices   HOB 30 degrees or  less  2/13/2025 0143 by Cedric Jarvis RN  Outcome: Progressing  Flowsheets (Taken 2/13/2025 0143)  Prevent/minimize sheer/friction injuries:   Use pull sheet   Increase activity/out of bed for meals   Turn/reposition every 2 hours/use positioning/transfer devices   HOB 30 degrees or less  Goal: Promote/optimize nutrition  2/13/2025 0143 by Cedric Jarvis RN  Outcome: Progressing  Flowsheets (Taken 2/13/2025 0143)  Promote/optimize nutrition:   Monitor/record intake including meals   Discuss with provider if NPO > 2 days  2/13/2025 0143 by Cedric Jarvis RN  Outcome: Progressing  Flowsheets (Taken 2/13/2025 0143)  Promote/optimize nutrition:   Monitor/record intake including meals   Discuss with provider if NPO > 2 days  Goal: Promote skin healing  2/13/2025 0143 by Cedric Jarvis RN  Outcome: Progressing  Flowsheets (Taken 2/13/2025 0143)  Promote skin healing:   Turn/reposition every 2 hours/use positioning/transfer devices   Protective dressings over bony prominences   Ensure correct size (line/device) and apply per  instructions  2/13/2025 0143 by Cedric Jarvis RN  Outcome: Progressing  Flowsheets (Taken 2/13/2025 0143)  Promote skin healing:   Turn/reposition every 2 hours/use positioning/transfer devices   Protective dressings over bony prominences   Ensure correct size (line/device) and apply per  instructions     Problem: Safety - Adult  Goal: Free from fall injury  2/13/2025 0143 by Cedric Jarvis RN  Outcome: Progressing  2/13/2025 0143 by Cedric Jarvis RN  Outcome: Progressing     Problem: Discharge Planning  Goal: Discharge to home or other facility with appropriate resources  2/13/2025 0143 by Cedric Jarvis RN  Outcome: Progressing  2/13/2025 0143 by Cedric Jarvis RN  Outcome: Progressing     Problem: Chronic Conditions and Co-morbidities  Goal: Patient's chronic conditions and co-morbidity symptoms are monitored and maintained or improved  2/13/2025 0143 by Cedric Jarvis RN  Outcome: Progressing  2/13/2025 0143 by Cedric  Matheus, TAM  Outcome: Progressing     Problem: Nutrition  Goal: Nutrient intake appropriate for maintaining nutritional needs  2/13/2025 0143 by Cedric Jarvis RN  Outcome: Progressing  2/13/2025 0143 by Cedric Jarvis RN  Outcome: Progressing     Problem: Respiratory  Goal: Clear secretions with interventions this shift  2/13/2025 0143 by Cedric Jarvis, RN  Outcome: Progressing  2/13/2025 0143 by Cedric Jarvis, TAM  Outcome: Progressing  Goal: Minimize anxiety/maximize coping throughout shift  2/13/2025 0143 by Cedric Jarvis, TAM  Outcome: Progressing  2/13/2025 0143 by Cedric Jarvis RN  Outcome: Progressing  Goal: Minimal/no exertional discomfort or dyspnea this shift  2/13/2025 0143 by Cedric Jarvis, TAM  Outcome: Progressing  2/13/2025 0143 by Cedric Jarvis RN  Outcome: Progressing  Goal: No signs of respiratory distress (eg. Use of accessory muscles. Peds grunting)  2/13/2025 0143 by Cedric Jarvis, TAM  Outcome: Progressing  2/13/2025 0143 by Cedric Jarvis RN  Outcome: Progressing  Goal: Patent airway maintained this shift  2/13/2025 0143 by Cedric Jarvis, TAM  Outcome: Progressing  2/13/2025 0143 by Cedric Jarvis RN  Outcome: Progressing  Goal: Tolerate mechanical ventilation evidenced by VS/agitation level this shift  2/13/2025 0143 by Cedric Jarvis, TAM  Outcome: Progressing  2/13/2025 0143 by Cedric Jarvis, TAM  Outcome: Progressing  Goal: Tolerate pulmonary toileting this shift  2/13/2025 0143 by Cedric Jarvis, TAM  Outcome: Progressing  2/13/2025 0143 by Cedric Jarvis RN  Outcome: Progressing  Goal: Verbalize decreased shortness of breath this shift  2/13/2025 0143 by Cedric Jarvis, TAM  Outcome: Progressing  2/13/2025 0143 by Cedric Jarvis RN  Outcome: Progressing  Goal: Wean oxygen to maintain O2 saturation per order/standard this shift  2/13/2025 0143 by Cedric Jarvis RN  Outcome: Progressing  2/13/2025 0143 by Cedric Jarvis RN  Outcome: Progressing  Goal: Increase self care and/or family involvement in next 24 hours  2/13/2025 0143 by Cedric Jarvis RN  Outcome: Progressing  2/13/2025 0143 by Cedric Jarvis,  RN  Outcome: Progressing

## 2025-02-13 NOTE — PROGRESS NOTES
Transitional Care Coordinator Note: Patient discussed in morning rounds, per medical team (trauma) patient is medically ready. Discharge dispo: Plan for patient to discharge to SNF, pending accepting facility. Trauma SW sent SNF choices, see note.     Elpidio Wood RN BSN   Transitional Care Coordinator

## 2025-02-13 NOTE — PROGRESS NOTES
NEELAM spoke with patient son, Ike [phone], regarding discharge planning. Ike confirmed that he received the SNF list. He stated that he would love for his father too go to [facility], but they are unable to accommodate the TPN. He is requesting that SW send a referral to [facility], to see if they can accommodate. If not, 2nd choice is [facility]. Ike also expressed interest in sending a referral to [facility], even though it is a different level of care. Patient is medically ready for discharge. NEELAM will continue to follow to facilitate discharge plan.     Update @ 11:00: No beds at [facility]. Referral sent to [facility].       AMANDA Browne
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
## 2025-02-13 NOTE — PROGRESS NOTES
Nutrition Follow Up Assessment:   Nutrition Assessment    Remains on TPN-- currently cycled. Medically ready for discharge and awaiting SNF placement. Diet changed back to NPO on 2/6.       Anthropometrics:  Weight History: last wt >7 days ago    Nutrition Focused Physical Exam Findings:  Edema:  Edema Location: R LE  Physical Findings:  Digestive System Findings:  (sacral area, surgical incision abdomen, multiple traumatic wounds)    Nutrition Significant Labs:  BG POCT trend:   Results from last 7 days   Lab Units 02/13/25  0619 02/13/25  0218 02/12/25  1829 02/12/25  1300 02/12/25  0110   POCT GLUCOSE mg/dL 152* 163* 90 111* 160*    , Renal Lab Trend:   Results from last 7 days   Lab Units 02/13/25  0915 02/11/25  1941 02/08/25  0436 02/07/25  0755   POTASSIUM mmol/L 4.9  --  4.0 4.5   PHOSPHORUS mg/dL 4.0  --  4.3 3.7   SODIUM mmol/L 136  --  142 140   MAGNESIUM mg/dL  --  1.67 1.83 2.10   EGFR mL/min/1.73m*2 54*  --  52* 60*   BUN mg/dL 49*  --  42* 44*   CREATININE mg/dL 1.36*  --  1.39* 1.24    , TPN/PPN Labs:   Results from last 7 days   Lab Units 02/13/25  0915 02/11/25  1941 02/08/25  0436 02/08/25  0436 02/07/25  0755   GLUCOSE mg/dL 114*  --   --  116* 109*   POTASSIUM mmol/L 4.9  --   --  4.0 4.5   PHOSPHORUS mg/dL 4.0  --   --  4.3 3.7   MAGNESIUM mg/dL  --  1.67  --  1.83 2.10   SODIUM mmol/L 136  --   --  142 140   CHLORIDE mmol/L 105  --   --  109* 107   ALT U/L 54* 58*   < >  --   --    AST U/L 29 30   < >  --   --    ALK PHOS U/L 175* 190*   < >  --   --    BILIRUBIN TOTAL mg/dL 1.7* 2.2*   < >  --   --    TRIGLYCERIDES mg/dL  --  141  --   --   --     < > = values in this interval not displayed.        Nutrition Specific Medications:  Scheduled medications  fat emulsion fish oil/plant based, 250 mL, intravenous, Daily Lipids  insulin lispro, 0-5 Units, subcutaneous, q6h  iron sucrose, 100 mg, intravenous, Daily  multivitamin with minerals, 1 tablet, oral, Daily  pantoprazole, 40 mg, intravenous,  Daily    Continuous medications  Adult Clinimix TPN Cyclic, , Last Rate: Stopped (02/13/25 1006)        I/O:   Last BM Date: 02/11/25; Stool Appearance: Liquid, Mucous (02/12/25 0930)    Dietary Orders (From admission, onward)       Start     Ordered    02/06/25 0905  NPO Diet Except: Sips of clear liquids; Effective now  Diet effective now        Question:  Except:  Answer:  Sips of clear liquids    02/06/25 0905                     Estimated Needs:   Total Energy Estimated Needs in 24 hours (kCal):  (3263-9391)  Method for Estimating Needs: 25 -30  kcal/kg current wt  Total Protein Estimated Needs in 24 Hours (g): 115 g  Method for Estimating 24 Hour Protein Needs: 1.5+ g/kg current wt  Total Fluid Estimated Needs in 24 Hours (mL):  (per trauma)        Nutrition Diagnosis   Malnutrition Diagnosis  Patient has Malnutrition Diagnosis: Yes  Diagnosis Status: Active  Malnutrition Diagnosis: Moderate malnutrition related to acute disease or injury  As Evidenced by: mild muscle loss and mild subcutaneous fat loss.  Additional Assessment Information: During previous admission was unable to be diagnosed due to significant fluid retention            Nutrition Interventions/Recommendations   Nutrition prescription for parenteral nutrition    Nutrition Recommendations:  Individualized Nutrition Prescription Provided for :   Continue with cycled AA 5% and dextrose 20% --> run at 90 ml/hr x1 hr, 180 ml/hr x10 hr, 90 ml/hr x1 hr + 250 ml SMOF lipids daily = 2242 kcal, 99 g protein, 2230 ml total volume  Monitor RFP + mag daily, LFTs + triglycerides weekly, daily wt, strict I/Os, POC glucose q6h    Nutrition Interventions/Goals:   Interventions: Parenteral nutrition  Parenteral Nutrition: Management of schedule of parenteral nutrition, Management of delivery rate of parenteral nutrition        Nutrition Monitoring and Evaluation   Food/Nutrient Related History Monitoring  Monitoring and Evaluation Plan: Enteral and parenteral  nutrition intake determination  Enteral and Parenteral Nutrition Intake Determination: Parenteral nutrition intake - To meet > 75% estimated energy needs, Parenteral nutrition intake - Tolerate TPN at goal rate         Biochemical Data, Medical Tests and Procedures  Monitoring and Evaluation Plan: Electrolyte/renal panel, Glucose/endocrine profile  Electrolyte and Renal Panel: Electrolytes within normal limits  Glucose/Endocrine Profile: Glucose within normal limits ( mg/dL)         Goal Status: Goal(s) achieved    Time Spent (min): 30 minutes

## 2025-02-14 LAB
GLUCOSE BLD MANUAL STRIP-MCNC: 117 MG/DL (ref 74–99)
GLUCOSE BLD MANUAL STRIP-MCNC: 152 MG/DL (ref 74–99)
GLUCOSE BLD MANUAL STRIP-MCNC: 152 MG/DL (ref 74–99)
GLUCOSE BLD MANUAL STRIP-MCNC: 162 MG/DL (ref 74–99)

## 2025-02-14 PROCEDURE — 2500000005 HC RX 250 GENERAL PHARMACY W/O HCPCS

## 2025-02-14 PROCEDURE — 2500000005 HC RX 250 GENERAL PHARMACY W/O HCPCS: Performed by: PHYSICIAN ASSISTANT

## 2025-02-14 PROCEDURE — 1100000001 HC PRIVATE ROOM DAILY

## 2025-02-14 PROCEDURE — 2500000002 HC RX 250 W HCPCS SELF ADMINISTERED DRUGS (ALT 637 FOR MEDICARE OP, ALT 636 FOR OP/ED): Performed by: PHYSICIAN ASSISTANT

## 2025-02-14 PROCEDURE — 99232 SBSQ HOSP IP/OBS MODERATE 35: CPT | Performed by: STUDENT IN AN ORGANIZED HEALTH CARE EDUCATION/TRAINING PROGRAM

## 2025-02-14 PROCEDURE — 2580000001 HC RX 258 IV SOLUTIONS

## 2025-02-14 PROCEDURE — 2500000004 HC RX 250 GENERAL PHARMACY W/ HCPCS (ALT 636 FOR OP/ED)

## 2025-02-14 PROCEDURE — 82947 ASSAY GLUCOSE BLOOD QUANT: CPT

## 2025-02-14 PROCEDURE — 97116 GAIT TRAINING THERAPY: CPT | Mod: GP,CQ

## 2025-02-14 PROCEDURE — 2500000001 HC RX 250 WO HCPCS SELF ADMINISTERED DRUGS (ALT 637 FOR MEDICARE OP): Performed by: NURSE PRACTITIONER

## 2025-02-14 PROCEDURE — 2500000004 HC RX 250 GENERAL PHARMACY W/ HCPCS (ALT 636 FOR OP/ED): Performed by: PHYSICIAN ASSISTANT

## 2025-02-14 RX ADMIN — SMOFLIPID 50 G: 6; 6; 5; 3 INJECTION, EMULSION INTRAVENOUS at 20:34

## 2025-02-14 RX ADMIN — HEPARIN SODIUM 5000 UNITS: 5000 INJECTION, SOLUTION INTRAVENOUS; SUBCUTANEOUS at 06:22

## 2025-02-14 RX ADMIN — INSULIN LISPRO 1 UNITS: 100 INJECTION, SOLUTION INTRAVENOUS; SUBCUTANEOUS at 06:31

## 2025-02-14 RX ADMIN — HEPARIN SODIUM 5000 UNITS: 5000 INJECTION, SOLUTION INTRAVENOUS; SUBCUTANEOUS at 20:33

## 2025-02-14 RX ADMIN — INSULIN LISPRO 1 UNITS: 100 INJECTION, SOLUTION INTRAVENOUS; SUBCUTANEOUS at 01:00

## 2025-02-14 RX ADMIN — Medication 1 TABLET: at 09:24

## 2025-02-14 RX ADMIN — Medication 6 MG: at 20:34

## 2025-02-14 RX ADMIN — ASCORBIC ACID, VITAMIN A PALMITATE, CHOLECALCIFEROL, THIAMINE HYDROCHLORIDE, RIBOFLAVIN-5 PHOSPHATE SODIUM, PYRIDOXINE HYDROCHLORIDE, NIACINAMIDE, DEXPANTHENOL, ALPHA-TOCOPHEROL ACETATE, VITAMIN K1, FOLIC ACID, BIOTIN, CYANOCOBALAMIN: 200; 3300; 200; 6; 3.6; 6; 40; 15; 10; 150; 600; 60; 5 INJECTION, SOLUTION INTRAVENOUS at 20:33

## 2025-02-14 RX ADMIN — PANTOPRAZOLE SODIUM 40 MG: 40 INJECTION, POWDER, FOR SOLUTION INTRAVENOUS at 09:24

## 2025-02-14 RX ADMIN — HEPARIN SODIUM 5000 UNITS: 5000 INJECTION, SOLUTION INTRAVENOUS; SUBCUTANEOUS at 13:42

## 2025-02-14 RX ADMIN — IRON SUCROSE 100 MG: 20 INJECTION, SOLUTION INTRAVENOUS at 18:47

## 2025-02-14 ASSESSMENT — PAIN - FUNCTIONAL ASSESSMENT
PAIN_FUNCTIONAL_ASSESSMENT: 0-10
PAIN_FUNCTIONAL_ASSESSMENT: 0-10

## 2025-02-14 ASSESSMENT — COGNITIVE AND FUNCTIONAL STATUS - GENERAL
WALKING IN HOSPITAL ROOM: A LITTLE
HELP NEEDED FOR BATHING: A LITTLE
TURNING FROM BACK TO SIDE WHILE IN FLAT BAD: A LITTLE
MOVING FROM LYING ON BACK TO SITTING ON SIDE OF FLAT BED WITH BEDRAILS: A LITTLE
MOBILITY SCORE: 16
EATING MEALS: A LOT
WALKING IN HOSPITAL ROOM: A LITTLE
DRESSING REGULAR LOWER BODY CLOTHING: A LITTLE
TURNING FROM BACK TO SIDE WHILE IN FLAT BAD: A LITTLE
MOVING TO AND FROM BED TO CHAIR: A LITTLE
DRESSING REGULAR UPPER BODY CLOTHING: A LITTLE
TOILETING: A LITTLE
CLIMB 3 TO 5 STEPS WITH RAILING: TOTAL
STANDING UP FROM CHAIR USING ARMS: A LITTLE
CLIMB 3 TO 5 STEPS WITH RAILING: A LOT
STANDING UP FROM CHAIR USING ARMS: A LITTLE
MOVING TO AND FROM BED TO CHAIR: A LITTLE
DAILY ACTIVITIY SCORE: 18
MOVING FROM LYING ON BACK TO SITTING ON SIDE OF FLAT BED WITH BEDRAILS: A LITTLE
MOBILITY SCORE: 17

## 2025-02-14 ASSESSMENT — PAIN SCALES - GENERAL: PAINLEVEL_OUTOF10: 0 - NO PAIN

## 2025-02-14 NOTE — PROGRESS NOTES
Physical Therapy Treatment    Patient Name: Ike Gar  MRN: [id number]  Today's Date: 2/14/2025  Room: [address] [place], [place] [address]  Time Calculation  Start Time: 1240  Stop Time: 1308  Time Calculation (min): 28 min       Assessment/Plan   PT Assessment  PT Assessment Results: Decreased strength, Decreased endurance, Impaired balance, Decreased mobility  Rehab Prognosis: Good  Barriers to Discharge Home: Physical needs  Physical Needs: Returning to long-term care/other facility, Intermittent mobility assistance needed, Intermittent ADL assistance needed, High falls risk due to function or environment  Evaluation/Treatment Tolerance: Patient tolerated treatment well, Patient limited by fatigue  Strengths: Ability to acquire knowledge, Attitude of self  Barriers to Participation: Comorbidities  End of Session Communication: Bedside nurse  End of Session Patient Position: Up in chair, Alarm off, not on at start of session     PT Plan  Treatment/Interventions: Bed mobility, Transfer training, Gait training, Balance training, Neuromuscular re-education, Endurance training, Strengthening, Therapeutic exercise, Therapeutic activity  PT Plan: Ongoing PT  PT Frequency: 5 times per week  PT Discharge Recommendations: Moderate intensity level of continued care  Equipment Recommended upon Discharge: Wheeled walker  PT Recommended Transfer Status: Assistive device, Contact guard  PT - OK to Discharge: Yes  Assessment: Patient is progressing Well with therapy this date. Appears less anxious with breathing, although still unable to take a full breath. Improved gait distance today, nauseous and end of tx, although did not throw anything up -pt requesting to sit in chair. Nausea subsiding upon leaving room. Would continue to benefit from continued skilled PT to address all mobility deficits; Patient remains appropriate for MOD intensity therapy when medically ready for discharge from acute stay.  Will continue to follow.     General Visit  Information:   PT  Visit  PT Received On: 02/14/25  Prior to Session Communication: Bedside nurse  Patient Position Received: Bed, 3 rail up, Alarm off, not on at start of session     Subjective   Subjective: Pt pleasant and agreeable to therapy upon approach    Precautions:  Precautions  Medical Precautions: Fall precautions, Spinal precautions  Post-Surgical Precautions: Abdominal surgery precautions  Precautions Comment: TSLO for comfort      Objective   Pain:  Pain Assessment  Pain Assessment: 0-10  0-10 (Numeric) Pain Score: 0 - No pain  Cognition:  Cognition  Overall Cognitive Status: Within Functional Limits  Orientation Level: Oriented X4  Cognition Comments: anxious especially with breathing, needs cues for proper breathing/relaxation strategies    Lines/Tubes/Drains:  PICC - Adult Double lumen Left Brachial vein (Active)   Number of days: 12       Closed/Suction Drain Midline RUQ 10 Fr. (Active)   Number of days: 30       Closed/Suction Drain Right;Anterior Hip Bulb (Active)   Number of days: 12       Open Drain Left LLQ (Active)   Number of days: 37       Gastrostomy/Enterostomy Percutaneous endoscopic gastrostomy (PEG) 1 20 Fr. LUQ (Active)   Number of days: 38       Ileostomy Other (Comment) RUQ (Active)   Number of days: 53     Continuous Medications/Drips:  Adult Clinimix TPN Cyclic, , Last Rate: Stopped (02/14/25 1144)        PT Treatments:           Bed Mobility 1  Bed Mobility 1: Supine to sitting  Level of Assistance 1: Close supervision  Bed Mobility Comments 1: able to log roll  Ambulation/Gait Training 1  Surface 1: Level tile  Device 1: Rolling walker  Assistance 1: Contact guard, Close supervision  Quality of Gait 1: Wide base of support, Decreased step length, Inconsistent stride length (slightly unsteady)  Comments/Distance (ft) 1: 40'x3, extended rest breaks in between, vc for walker management and safe negotiation around objects  Transfer 1  Transfer From 1: Sit to  Transfer to 1:  Stand  Technique 1: Sit to stand  Transfer Device 1: Walker  Transfer Level of Assistance 1: Close supervision  Trials/Comments 1: vc for handplacement, vc for anterior lean  Transfers 2  Transfer From 2: Stand to  Transfer to 2: Sit  Technique 2: Stand to sit  Transfer Device 2: Walker  Transfer Level of Assistance 2: Contact guard, Minimum assistance  Trials/Comments 2: needs cues for eccentric control/descent  Transfers 3  Transfer From 3: Stand to  Transfer to 3: Chair with arms  Technique 3: Stand pivot  Transfer Device 3: Walker  Transfer Level of Assistance 3: Close supervision  Trials/Comments 3: x3             Activity tolerance:  Activity Tolerance  Endurance: Tolerates 10 - 20 min exercise with multiple rests    Outcome Measures:  [facility] Basic Mobility  Turning from your back to your side while in a flat bed without using bedrails: A little  Moving from lying on your back to sitting on the side of a flat bed without using bedrails: A little  Moving to and from bed to chair (including a wheelchair): A little  Standing up from a chair using your arms (e.g. wheelchair or bedside chair): A little  To walk in hospital room: A little  Climbing 3-5 steps with railing: Total  Basic Mobility - Total Score: 16     Education Documentation  Cognition, taught by Alexa Davila PTA at 2/14/2025  3:19 PM.  Learner: Patient  Readiness: Acceptance  Method: Explanation  Response: Verbalizes Understanding    Modified Diet Training, taught by Alexa Davila PTA at 2/14/2025  3:19 PM.  Learner: Patient  Readiness: Acceptance  Method: Explanation  Response: Verbalizes Understanding    Precautions, taught by Alexa Davila PTA at 2/14/2025  3:19 PM.  Learner: Patient  Readiness: Acceptance  Method: Explanation  Response: Verbalizes Understanding    Body Mechanics, taught by Alexa Davila PTA at 2/14/2025  3:19 PM.  Learner: Patient  Readiness: Acceptance  Method: Explanation  Response: Verbalizes Understanding    Mobility Training,  taught by Alexa Davila PTA at 2/14/2025  3:19 PM.  Learner: Patient  Readiness: Acceptance  Method: Explanation  Response: Verbalizes Understanding    Body Mechanics, taught by Alexa Davila PTA at 2/14/2025  3:19 PM.  Learner: Patient  Readiness: Acceptance  Method: Explanation  Response: Verbalizes Understanding    Precautions, taught by Aelxa Davila PTA at 2/14/2025  3:19 PM.  Learner: Patient  Readiness: Acceptance  Method: Explanation  Response: Verbalizes Understanding    ADL Training, taught by Alexa Davila PTA at 2/14/2025  3:19 PM.  Learner: Patient  Readiness: Acceptance  Method: Explanation  Response: Verbalizes Understanding    Education Comments  No comments found.          OP EDUCATION:       Encounter Problems       Encounter Problems (Active)       PT Problem       Patient will complete supine to sit and sit to supine Supervision while maintaining spinal precautions (Progressing)       Start:  02/03/25    Expected End:  02/17/25            Patient will perform sit<>stand transfer with LRAD, and Supervision while maintaining spinal precautions  (Progressing)       Start:  02/03/25    Expected End:  02/17/25            Patient will ambulate >100' with LRAD and Supervision  (Progressing)       Start:  02/03/25    Expected End:  02/17/25

## 2025-02-14 NOTE — CARE PLAN
The patient's goals for the shift include to sleep    The clinical goals for the shift include pt will remain hemodyamically stable throughout shift      Problem: Skin  Goal: Decreased wound size/increased tissue granulation at next dressing change  Outcome: Progressing     Problem: Skin  Goal: Participates in plan/prevention/treatment measures  Outcome: Progressing     Problem: Safety - Adult  Goal: Free from fall injury  Outcome: Progressing     Problem: Nutrition  Goal: Nutrient intake appropriate for maintaining nutritional needs  Outcome: Progressing     Problem: Respiratory  Goal: Clear secretions with interventions this shift  Outcome: Progressing

## 2025-02-14 NOTE — CARE PLAN
The patient's goals for the shift include to sleep    The clinical goals for the shift include pt will remain hemodyamically stable throughout shift      Problem: Skin  Goal: Decreased wound size/increased tissue granulation at next dressing change  Outcome: Progressing  Flowsheets (Taken 2/13/2025 1935)  Decreased wound size/increased tissue granulation at next dressing change:   Promote sleep for wound healing   Protective dressings over bony prominences  Goal: Participates in plan/prevention/treatment measures  Outcome: Progressing  Flowsheets (Taken 2/13/2025 1935)  Participates in plan/prevention/treatment measures:   Discuss with provider PT/OT consult   Elevate heels  Goal: Prevent/manage excess moisture  Outcome: Progressing  Flowsheets (Taken 2/13/2025 1935)  Prevent/manage excess moisture:   Monitor for/manage infection if present   Follow provider orders for dressing changes  Goal: Prevent/minimize sheer/friction injuries  Outcome: Progressing  Flowsheets (Taken 2/13/2025 1935)  Prevent/minimize sheer/friction injuries:   Use pull sheet   Increase activity/out of bed for meals   Turn/reposition every 2 hours/use positioning/transfer devices   HOB 30 degrees or less  Goal: Promote/optimize nutrition  Outcome: Progressing  Flowsheets (Taken 2/13/2025 1935)  Promote/optimize nutrition:   Monitor/record intake including meals   Discuss with provider if NPO > 2 days  Goal: Promote skin healing  Outcome: Progressing  Flowsheets (Taken 2/13/2025 1935)  Promote skin healing:   Turn/reposition every 2 hours/use positioning/transfer devices   Protective dressings over bony prominences   Assess skin/pad under line(s)/device(s)   Ensure correct size (line/device) and apply per  instructions     Problem: Safety - Adult  Goal: Free from fall injury  Outcome: Progressing     Problem: Discharge Planning  Goal: Discharge to home or other facility with appropriate resources  Outcome: Progressing     Problem:  Chronic Conditions and Co-morbidities  Goal: Patient's chronic conditions and co-morbidity symptoms are monitored and maintained or improved  Outcome: Progressing     Problem: Nutrition  Goal: Nutrient intake appropriate for maintaining nutritional needs  Outcome: Progressing     Problem: Respiratory  Goal: Clear secretions with interventions this shift  Outcome: Progressing  Goal: Minimize anxiety/maximize coping throughout shift  Outcome: Progressing  Goal: Minimal/no exertional discomfort or dyspnea this shift  Outcome: Progressing  Goal: No signs of respiratory distress (eg. Use of accessory muscles. Peds grunting)  Outcome: Progressing  Goal: Patent airway maintained this shift  Outcome: Progressing  Goal: Tolerate mechanical ventilation evidenced by VS/agitation level this shift  Outcome: Progressing  Goal: Tolerate pulmonary toileting this shift  Outcome: Progressing  Goal: Verbalize decreased shortness of breath this shift  Outcome: Progressing  Goal: Wean oxygen to maintain O2 saturation per order/standard this shift  Outcome: Progressing  Goal: Increase self care and/or family involvement in next 24 hours  Outcome: Progressing

## 2025-02-14 NOTE — PROGRESS NOTES
Patient is under review at [facility]. Facility made SW aware that they will not have an available bed until next week. Patient is medically ready for discharge. SW will continue to follow to facilitate discharge plan.       AMANDA Browne

## 2025-02-15 LAB
GLUCOSE BLD MANUAL STRIP-MCNC: 115 MG/DL (ref 74–99)
GLUCOSE BLD MANUAL STRIP-MCNC: 166 MG/DL (ref 74–99)
GLUCOSE BLD MANUAL STRIP-MCNC: 182 MG/DL (ref 74–99)
GLUCOSE BLD MANUAL STRIP-MCNC: 79 MG/DL (ref 74–99)

## 2025-02-15 PROCEDURE — 1100000001 HC PRIVATE ROOM DAILY

## 2025-02-15 PROCEDURE — 2500000004 HC RX 250 GENERAL PHARMACY W/ HCPCS (ALT 636 FOR OP/ED): Performed by: PHYSICIAN ASSISTANT

## 2025-02-15 PROCEDURE — 82947 ASSAY GLUCOSE BLOOD QUANT: CPT

## 2025-02-15 PROCEDURE — 2500000002 HC RX 250 W HCPCS SELF ADMINISTERED DRUGS (ALT 637 FOR MEDICARE OP, ALT 636 FOR OP/ED): Performed by: PHYSICIAN ASSISTANT

## 2025-02-15 PROCEDURE — 2500000001 HC RX 250 WO HCPCS SELF ADMINISTERED DRUGS (ALT 637 FOR MEDICARE OP): Performed by: NURSE PRACTITIONER

## 2025-02-15 PROCEDURE — 2500000004 HC RX 250 GENERAL PHARMACY W/ HCPCS (ALT 636 FOR OP/ED)

## 2025-02-15 PROCEDURE — 99231 SBSQ HOSP IP/OBS SF/LOW 25: CPT | Performed by: STUDENT IN AN ORGANIZED HEALTH CARE EDUCATION/TRAINING PROGRAM

## 2025-02-15 PROCEDURE — 2580000001 HC RX 258 IV SOLUTIONS

## 2025-02-15 PROCEDURE — 2500000005 HC RX 250 GENERAL PHARMACY W/O HCPCS: Performed by: PHYSICIAN ASSISTANT

## 2025-02-15 RX ORDER — POLYETHYLENE GLYCOL 3350 17 G/17G
17 POWDER, FOR SOLUTION ORAL DAILY PRN
Status: DISCONTINUED | OUTPATIENT
Start: 2025-02-15 | End: 2025-03-02 | Stop reason: HOSPADM

## 2025-02-15 RX ADMIN — Medication 1 TABLET: at 10:18

## 2025-02-15 RX ADMIN — INSULIN LISPRO 1 UNITS: 100 INJECTION, SOLUTION INTRAVENOUS; SUBCUTANEOUS at 07:15

## 2025-02-15 RX ADMIN — PANTOPRAZOLE SODIUM 40 MG: 40 INJECTION, POWDER, FOR SOLUTION INTRAVENOUS at 10:18

## 2025-02-15 RX ADMIN — SMOFLIPID 50 G: 6; 6; 5; 3 INJECTION, EMULSION INTRAVENOUS at 21:51

## 2025-02-15 RX ADMIN — HEPARIN SODIUM 5000 UNITS: 5000 INJECTION, SOLUTION INTRAVENOUS; SUBCUTANEOUS at 13:42

## 2025-02-15 RX ADMIN — HEPARIN SODIUM 5000 UNITS: 5000 INJECTION, SOLUTION INTRAVENOUS; SUBCUTANEOUS at 21:50

## 2025-02-15 RX ADMIN — ASCORBIC ACID, VITAMIN A PALMITATE, CHOLECALCIFEROL, THIAMINE HYDROCHLORIDE, RIBOFLAVIN-5 PHOSPHATE SODIUM, PYRIDOXINE HYDROCHLORIDE, NIACINAMIDE, DEXPANTHENOL, ALPHA-TOCOPHEROL ACETATE, VITAMIN K1, FOLIC ACID, BIOTIN, CYANOCOBALAMIN: 200; 3300; 200; 6; 3.6; 6; 40; 15; 10; 150; 600; 60; 5 INJECTION, SOLUTION INTRAVENOUS at 21:51

## 2025-02-15 RX ADMIN — INSULIN LISPRO 1 UNITS: 100 INJECTION, SOLUTION INTRAVENOUS; SUBCUTANEOUS at 01:09

## 2025-02-15 RX ADMIN — HEPARIN SODIUM 5000 UNITS: 5000 INJECTION, SOLUTION INTRAVENOUS; SUBCUTANEOUS at 07:15

## 2025-02-15 RX ADMIN — IRON SUCROSE 100 MG: 20 INJECTION, SOLUTION INTRAVENOUS at 18:18

## 2025-02-15 ASSESSMENT — COGNITIVE AND FUNCTIONAL STATUS - GENERAL
MOBILITY SCORE: 20
HELP NEEDED FOR BATHING: A LITTLE
DAILY ACTIVITIY SCORE: 21
WALKING IN HOSPITAL ROOM: A LITTLE
DRESSING REGULAR LOWER BODY CLOTHING: A LITTLE
MOVING TO AND FROM BED TO CHAIR: A LITTLE
DRESSING REGULAR UPPER BODY CLOTHING: A LITTLE
CLIMB 3 TO 5 STEPS WITH RAILING: A LITTLE
STANDING UP FROM CHAIR USING ARMS: A LITTLE

## 2025-02-15 ASSESSMENT — PAIN SCALES - GENERAL
PAINLEVEL_OUTOF10: 0 - NO PAIN
PAINLEVEL_OUTOF10: 0 - NO PAIN

## 2025-02-15 ASSESSMENT — PAIN - FUNCTIONAL ASSESSMENT
PAIN_FUNCTIONAL_ASSESSMENT: 0-10
PAIN_FUNCTIONAL_ASSESSMENT: 0-10
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
## 2025-02-15 NOTE — CARE PLAN
The patient's goals for the shift include to sleep    The clinical goals for the shift include Pt will remain HDS throughout shift
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
## 2025-02-15 NOTE — CARE PLAN
The patient's goals for the shift include to sleep    The clinical goals for the shift include Pt will remain HDS throughout shift      Problem: Skin  Goal: Decreased wound size/increased tissue granulation at next dressing change  Outcome: Progressing     Problem: Safety - Adult  Goal: Free from fall injury  Outcome: Progressing     Problem: Nutrition  Goal: Nutrient intake appropriate for maintaining nutritional needs  Outcome: Progressing     Problem: Respiratory  Goal: Clear secretions with interventions this shift  Outcome: Progressing

## 2025-02-15 NOTE — CONSULTS
Ostomy Care Consult     Visit Date: 2/15/2025      Patient Name: Ike Gar         MRN: [id number]           YOB: 1945     Reason for Consult: ostomy care        Wound Team Summary Assessment:   Ostomy type: s/p ileostomy    size:  1 3/8 inches      color: red      protruding: budded  Ricky: none  Functioning: loose brown stool  Mucocutaneous junction: intact  Peristomal skin: slightly denuded, powder and cavilon applied  Pouching: pouch changed today; 2 piece flat wafer, barrier ring and high output pouch applied (connected to gravity drainage)  Ostomy Education: patient observed pouch change, asked appropriate questions, but did not participate in hands on care.     Wound Team Plan: Change pouch twice a week or as needed for education.     Michelle Kruger RN  2/15/2025  3:06 PM

## 2025-02-16 VITALS
OXYGEN SATURATION: 96 % | SYSTOLIC BLOOD PRESSURE: 126 MMHG | RESPIRATION RATE: 18 BRPM | BODY MASS INDEX: 20.62 KG/M2 | TEMPERATURE: 98.2 F | DIASTOLIC BLOOD PRESSURE: 72 MMHG | HEART RATE: 103 BPM | HEIGHT: 76 IN | WEIGHT: 169.31 LBS

## 2025-02-16 LAB
GLUCOSE BLD MANUAL STRIP-MCNC: 160 MG/DL (ref 74–99)
GLUCOSE BLD MANUAL STRIP-MCNC: 79 MG/DL (ref 74–99)
GLUCOSE BLD MANUAL STRIP-MCNC: 96 MG/DL (ref 74–99)

## 2025-02-16 PROCEDURE — 2500000004 HC RX 250 GENERAL PHARMACY W/ HCPCS (ALT 636 FOR OP/ED): Performed by: PHYSICIAN ASSISTANT

## 2025-02-16 PROCEDURE — 1100000001 HC PRIVATE ROOM DAILY

## 2025-02-16 PROCEDURE — 82947 ASSAY GLUCOSE BLOOD QUANT: CPT

## 2025-02-16 PROCEDURE — 2500000001 HC RX 250 WO HCPCS SELF ADMINISTERED DRUGS (ALT 637 FOR MEDICARE OP): Performed by: NURSE PRACTITIONER

## 2025-02-16 PROCEDURE — 2500000004 HC RX 250 GENERAL PHARMACY W/ HCPCS (ALT 636 FOR OP/ED)

## 2025-02-16 PROCEDURE — 2500000005 HC RX 250 GENERAL PHARMACY W/O HCPCS: Performed by: PHYSICIAN ASSISTANT

## 2025-02-16 PROCEDURE — 2580000001 HC RX 258 IV SOLUTIONS

## 2025-02-16 PROCEDURE — 2500000005 HC RX 250 GENERAL PHARMACY W/O HCPCS

## 2025-02-16 RX ADMIN — SMOFLIPID 50 G: 6; 6; 5; 3 INJECTION, EMULSION INTRAVENOUS at 20:55

## 2025-02-16 RX ADMIN — PANTOPRAZOLE SODIUM 40 MG: 40 INJECTION, POWDER, FOR SOLUTION INTRAVENOUS at 10:16

## 2025-02-16 RX ADMIN — Medication 6 MG: at 20:55

## 2025-02-16 RX ADMIN — HEPARIN SODIUM 5000 UNITS: 5000 INJECTION, SOLUTION INTRAVENOUS; SUBCUTANEOUS at 22:22

## 2025-02-16 RX ADMIN — HEPARIN SODIUM 5000 UNITS: 5000 INJECTION, SOLUTION INTRAVENOUS; SUBCUTANEOUS at 06:47

## 2025-02-16 RX ADMIN — HEPARIN SODIUM 5000 UNITS: 5000 INJECTION, SOLUTION INTRAVENOUS; SUBCUTANEOUS at 14:37

## 2025-02-16 RX ADMIN — IRON SUCROSE 100 MG: 20 INJECTION, SOLUTION INTRAVENOUS at 18:51

## 2025-02-16 RX ADMIN — ASCORBIC ACID, VITAMIN A PALMITATE, CHOLECALCIFEROL, THIAMINE HYDROCHLORIDE, RIBOFLAVIN-5 PHOSPHATE SODIUM, PYRIDOXINE HYDROCHLORIDE, NIACINAMIDE, DEXPANTHENOL, ALPHA-TOCOPHEROL ACETATE, VITAMIN K1, FOLIC ACID, BIOTIN, CYANOCOBALAMIN: 200; 3300; 200; 6; 3.6; 6; 40; 15; 10; 150; 600; 60; 5 INJECTION, SOLUTION INTRAVENOUS at 20:55

## 2025-02-16 RX ADMIN — Medication 1 TABLET: at 10:17

## 2025-02-16 ASSESSMENT — COGNITIVE AND FUNCTIONAL STATUS - GENERAL
TOILETING: A LITTLE
DRESSING REGULAR LOWER BODY CLOTHING: A LITTLE
MOBILITY SCORE: 18
MOVING TO AND FROM BED TO CHAIR: A LITTLE
HELP NEEDED FOR BATHING: A LITTLE
MOVING FROM LYING ON BACK TO SITTING ON SIDE OF FLAT BED WITH BEDRAILS: A LITTLE
STANDING UP FROM CHAIR USING ARMS: A LITTLE
DRESSING REGULAR UPPER BODY CLOTHING: A LITTLE
CLIMB 3 TO 5 STEPS WITH RAILING: A LITTLE
TURNING FROM BACK TO SIDE WHILE IN FLAT BAD: A LITTLE
DAILY ACTIVITIY SCORE: 20
WALKING IN HOSPITAL ROOM: A LITTLE

## 2025-02-16 ASSESSMENT — PAIN - FUNCTIONAL ASSESSMENT: PAIN_FUNCTIONAL_ASSESSMENT: 0-10

## 2025-02-16 ASSESSMENT — PAIN SCALES - GENERAL: PAINLEVEL_OUTOF10: 0 - NO PAIN

## 2025-02-16 NOTE — CARE PLAN
The patient's goals for the shift include to sleep    The clinical goals for the shift include pt will remain hemodynamically stable throughout shift      Problem: Skin  Goal: Decreased wound size/increased tissue granulation at next dressing change  Outcome: Progressing  Flowsheets (Taken 2/16/2025 0150)  Decreased wound size/increased tissue granulation at next dressing change:   Promote sleep for wound healing   Protective dressings over bony prominences  Goal: Participates in plan/prevention/treatment measures  Outcome: Progressing  Flowsheets (Taken 2/16/2025 0150)  Participates in plan/prevention/treatment measures:   Discuss with provider PT/OT consult   Elevate heels  Goal: Prevent/manage excess moisture  Outcome: Progressing  Flowsheets (Taken 2/16/2025 0150)  Prevent/manage excess moisture:   Monitor for/manage infection if present   Follow provider orders for dressing changes  Goal: Prevent/minimize sheer/friction injuries  Outcome: Progressing  Flowsheets (Taken 2/16/2025 0150)  Prevent/minimize sheer/friction injuries:   Increase activity/out of bed for meals   Use pull sheet   HOB 30 degrees or less  Goal: Promote/optimize nutrition  Outcome: Progressing  Flowsheets (Taken 2/16/2025 0150)  Promote/optimize nutrition:   Monitor/record intake including meals   Discuss with provider if NPO > 2 days  Goal: Promote skin healing  Outcome: Progressing  Flowsheets (Taken 2/16/2025 0150)  Promote skin healing:   Protective dressings over bony prominences   Assess skin/pad under line(s)/device(s)   Ensure correct size (line/device) and apply per  instructions     Problem: Safety - Adult  Goal: Free from fall injury  Outcome: Progressing     Problem: Discharge Planning  Goal: Discharge to home or other facility with appropriate resources  Outcome: Progressing     Problem: Chronic Conditions and Co-morbidities  Goal: Patient's chronic conditions and co-morbidity symptoms are monitored and maintained or  improved  Outcome: Progressing     Problem: Nutrition  Goal: Nutrient intake appropriate for maintaining nutritional needs  Outcome: Progressing     Problem: Respiratory  Goal: Clear secretions with interventions this shift  Outcome: Progressing  Goal: Minimize anxiety/maximize coping throughout shift  Outcome: Progressing  Goal: Minimal/no exertional discomfort or dyspnea this shift  Outcome: Progressing  Goal: No signs of respiratory distress (eg. Use of accessory muscles. Peds grunting)  Outcome: Progressing  Goal: Patent airway maintained this shift  Outcome: Progressing  Goal: Tolerate mechanical ventilation evidenced by VS/agitation level this shift  Outcome: Progressing  Goal: Tolerate pulmonary toileting this shift  Outcome: Progressing  Goal: Verbalize decreased shortness of breath this shift  Outcome: Progressing  Goal: Wean oxygen to maintain O2 saturation per order/standard this shift  Outcome: Progressing  Goal: Increase self care and/or family involvement in next 24 hours  Outcome: Progressing

## 2025-02-16 NOTE — PROGRESS NOTES
"Subjective:      Patient ID: Phani Finch is a 16 y.o. female.    Vitals:  height is 5' 1" (1.549 m) and weight is 57.6 kg (127 lb). Her oral temperature is 102.1 °F (38.9 °C) (abnormal). Her blood pressure is 112/72 and her pulse is 61. Her respiration is 20 and oxygen saturation is 98%.     Chief Complaint: Cough    Teenage female in the last 24 hours after Royal holiday having acute fever cough cold congestion and body aches transported by mother to Urgent Care for initial evaluation not improved with Tylenol and OTC medication    URI  This is a new problem. The current episode started yesterday. The problem has been gradually worsening. Associated symptoms include congestion, coughing, fatigue, a fever, headaches and myalgias. Pertinent negatives include no chills, neck pain or sore throat.     Constitution: Positive for fatigue and fever. Negative for chills.   HENT:  Positive for congestion and sinus pressure. Negative for ear pain, sinus pain, sore throat, trouble swallowing and voice change.    Neck: Negative for neck pain and neck swelling.   Respiratory:  Positive for cough. Negative for shortness of breath, stridor and wheezing.    Gastrointestinal: Negative.    Musculoskeletal:  Positive for muscle ache.   Skin: Negative.    Allergic/Immunologic: Negative.    Neurological:  Positive for headaches.      Objective:     Physical Exam   Constitutional: She is oriented to person, place, and time. She appears well-developed. She is cooperative.  Non-toxic appearance. She appears ill.      Comments:Awake alert ambulatory nasally congested fatigue female attended by mother     HENT:   Head: Normocephalic.   Ears:   Right Ear: Hearing, tympanic membrane, external ear and ear canal normal. Tympanic membrane is not erythematous and not bulging.   Left Ear: Hearing, tympanic membrane, external ear and ear canal normal. Tympanic membrane is not erythematous and not bulging.   Nose: Mucosal edema and congestion " Ike Gar is a 79 y.o. male on day 14 of admission presenting with High-output external gastrointestinal fistula.    Angelita Tanner will update on beds Monday. SNF is requesting update PT/OT. SW placed request in therapy column.     present. No rhinorrhea, purulent discharge or nasal deformity. No epistaxis. Right sinus exhibits no maxillary sinus tenderness and no frontal sinus tenderness. Left sinus exhibits no maxillary sinus tenderness and no frontal sinus tenderness.   Mouth/Throat: Uvula is midline, oropharynx is clear and moist and mucous membranes are normal. Mucous membranes are moist. No trismus in the jaw. Normal dentition. No uvula swelling. No oropharyngeal exudate, posterior oropharyngeal edema or posterior oropharyngeal erythema.   Eyes: Conjunctivae and lids are normal. Pupils are equal, round, and reactive to light. No scleral icterus.   Neck: Trachea normal and phonation normal. Neck supple. No edema present. No erythema present.   Cardiovascular: Normal rate, regular rhythm, normal heart sounds and normal pulses.   No murmur heard.Exam reveals no gallop.   Pulmonary/Chest: Effort normal and breath sounds normal. No stridor. No respiratory distress. She has no decreased breath sounds. She has no wheezes. She has no rhonchi. She has no rales.   Musculoskeletal: Normal range of motion.         General: No swelling. Normal range of motion.      Cervical back: She exhibits no tenderness.   Lymphadenopathy:     She has no cervical adenopathy.   Neurological: no focal deficit. She is alert and oriented to person, place, and time. She exhibits normal muscle tone. Coordination normal.   Skin: Skin is warm, dry, intact, not diaphoretic, not pale and no rash.   Psychiatric: Her speech is normal and behavior is normal. Judgment and thought content normal.   Nursing note and vitals reviewed.         Previous History      Review of patient's allergies indicates:  No Known Allergies    Past Medical History:   Diagnosis Date    ADHD (attention deficit hyperactivity disorder)     Anxiety     Bilateral patent pressure equalization (PE) tubes     Depression      Current Outpatient Medications   Medication Instructions    EScitalopram oxalate  "(LEXAPRO) 20 MG tablet 1 tablet, Every morning    EScitalopram oxalate (LEXAPRO) 10 mg, Daily    hydrOXYzine HCl (ATARAX) 25 MG tablet No dose, route, or frequency recorded.    LO LOESTRIN FE 1 mg-10 mcg (24)/10 mcg (2) Tab 1 tablet    loratadine (CLARITIN) 10 mg, Daily    oseltamivir (TAMIFLU) 75 mg, Oral, 2 times daily    promethazine-dextromethorphan (PROMETHAZINE-DM) 6.25-15 mg/5 mL Syrp 5 mLs, Oral, Every 8 hours PRN    triamcinolone (NASACORT) 55 mcg nasal inhaler 2 sprays, Daily    VYVANSE 10 mg Cap No dose, route, or frequency recorded.    VYVANSE 20 mg capsule No dose, route, or frequency recorded.     Past Surgical History:   Procedure Laterality Date    TYMPANOPLASTY Right 07/23/2019    Procedure: TYMPANOPLASTY;  Surgeon: Saud Hernandez MD;  Location: [address];  Service: ENT;  Laterality: Right;    TYMPANOSTOMY TUBE PLACEMENT       Family History   Problem Relation Name Age of Onset    Hypertension Father      Prostate cancer Father      Epilepsy Brother         Social History     Tobacco Use    Smoking status: Never    Smokeless tobacco: Never   Substance Use Topics    Alcohol use: Never    Drug use: Never        Physical Exam      Vital Signs Reviewed   /72 (Patient Position: Sitting)   Pulse 61   Temp (!) 102.1 °F (38.9 °C) (Oral)   Resp 20   Ht 5' 1" (1.549 m)   Wt 57.6 kg (127 lb)   LMP 12/15/2024 (Exact Date)   SpO2 98%   BMI 24.00 kg/m²        Procedures    Procedures     Labs     Results for orders placed or performed in visit on 12/27/24   POCT Influenza A/B MOLECULAR    Collection Time: 12/27/24  3:59 PM   Result Value Ref Range    POC Molecular Influenza A Ag Positive (A) Negative    POC Molecular Influenza B Ag Negative Negative     Acceptable Yes    SARS Coronavirus 2 Antigen, POCT Manual Read    Collection Time: 12/27/24  4:08 PM   Result Value Ref Range    SARS Coronavirus 2 Antigen Negative Negative     Acceptable Yes  "       Assessment:     1. Fever, unspecified fever cause    2. Cough, unspecified type    3. Influenza A        Plan:     Positive influenza viral testing today    May start Tamiflu today to help reduce viral sick time.  Alternate Tylenol and ibuprofen every 4-6 hours if needed for aches pains fever chills.  Recommend daily non sedating antihistamine Claritin Zyrtec or loratadine if needed for nasal allergies.  Recommend Sudafed or Coricidin decongestant over the next week if needed for nasal congestion sinus pressure and inflammation.  Phenergan DM sparingly lowest dose if needed for severe cough cold congestion body aches and rest.  Slow to rise and stand to avoid lightheadedness or dizziness or syncope.  Encouraged plenty of water and noncarbonated fluids hydration rest over the next 5-7 days.  Cover cough at all times washing sanitized hands and surfaces regularly to help avoid the spread of viral influenza    Recommend follow-up with primary care physician in 1-2 weeks for re-evaluation if not improving.  Recommended emergency department evaluation immediately if respiratory symptoms worsen  Fever, unspecified fever cause  -     SARS Coronavirus 2 Antigen, POCT Manual Read  -     POCT Influenza A/B MOLECULAR    Cough, unspecified type  -     SARS Coronavirus 2 Antigen, POCT Manual Read  -     POCT Influenza A/B MOLECULAR    Influenza A    Other orders  -     oseltamivir (TAMIFLU) 75 MG capsule; Take 1 capsule (75 mg total) by mouth 2 (two) times daily. for 5 days  Dispense: 10 capsule; Refill: 0  -     promethazine-dextromethorphan (PROMETHAZINE-DM) 6.25-15 mg/5 mL Syrp; Take 5 mLs by mouth every 8 (eight) hours as needed (cough).  Dispense: 118 mL; Refill: 0
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
## 2025-02-17 LAB
ALBUMIN SERPL BCP-MCNC: 2.9 G/DL (ref 3.4–5)
ALP SERPL-CCNC: 187 U/L (ref 33–136)
ALT SERPL W P-5'-P-CCNC: 51 U/L (ref 10–52)
ANION GAP SERPL CALC-SCNC: 12 MMOL/L (ref 10–20)
AST SERPL W P-5'-P-CCNC: 25 U/L (ref 9–39)
BILIRUB SERPL-MCNC: 1.7 MG/DL (ref 0–1.2)
BUN SERPL-MCNC: 60 MG/DL (ref 6–23)
CALCIUM SERPL-MCNC: 9 MG/DL (ref 8.6–10.6)
CHLORIDE SERPL-SCNC: 104 MMOL/L (ref 98–107)
CO2 SERPL-SCNC: 25 MMOL/L (ref 21–32)
CREAT SERPL-MCNC: 1.55 MG/DL (ref 0.5–1.3)
EGFRCR SERPLBLD CKD-EPI 2021: 46 ML/MIN/1.73M*2
ERYTHROCYTE [DISTWIDTH] IN BLOOD BY AUTOMATED COUNT: 15.2 % (ref 11.5–14.5)
GLUCOSE BLD MANUAL STRIP-MCNC: 108 MG/DL (ref 74–99)
GLUCOSE BLD MANUAL STRIP-MCNC: 114 MG/DL (ref 74–99)
GLUCOSE SERPL-MCNC: 95 MG/DL (ref 74–99)
HCT VFR BLD AUTO: 31.6 % (ref 41–52)
HGB BLD-MCNC: 9.8 G/DL (ref 13.5–17.5)
MAGNESIUM SERPL-MCNC: 1.8 MG/DL (ref 1.6–2.4)
MCH RBC QN AUTO: 30.2 PG (ref 26–34)
MCHC RBC AUTO-ENTMCNC: 31 G/DL (ref 32–36)
MCV RBC AUTO: 97 FL (ref 80–100)
NRBC BLD-RTO: 0 /100 WBCS (ref 0–0)
PLATELET # BLD AUTO: 199 X10*3/UL (ref 150–450)
POTASSIUM SERPL-SCNC: 5.5 MMOL/L (ref 3.5–5.3)
PROT SERPL-MCNC: 7.4 G/DL (ref 6.4–8.2)
RBC # BLD AUTO: 3.25 X10*6/UL (ref 4.5–5.9)
SODIUM SERPL-SCNC: 135 MMOL/L (ref 136–145)
WBC # BLD AUTO: 10.8 X10*3/UL (ref 4.4–11.3)

## 2025-02-17 PROCEDURE — 84075 ASSAY ALKALINE PHOSPHATASE: CPT

## 2025-02-17 PROCEDURE — 97110 THERAPEUTIC EXERCISES: CPT | Mod: GP,CQ

## 2025-02-17 PROCEDURE — 2500000005 HC RX 250 GENERAL PHARMACY W/O HCPCS: Performed by: PHYSICIAN ASSISTANT

## 2025-02-17 PROCEDURE — 2500000004 HC RX 250 GENERAL PHARMACY W/ HCPCS (ALT 636 FOR OP/ED): Performed by: PHYSICIAN ASSISTANT

## 2025-02-17 PROCEDURE — 2500000004 HC RX 250 GENERAL PHARMACY W/ HCPCS (ALT 636 FOR OP/ED)

## 2025-02-17 PROCEDURE — 2500000005 HC RX 250 GENERAL PHARMACY W/O HCPCS

## 2025-02-17 PROCEDURE — 82947 ASSAY GLUCOSE BLOOD QUANT: CPT

## 2025-02-17 PROCEDURE — 2580000001 HC RX 258 IV SOLUTIONS

## 2025-02-17 PROCEDURE — 97116 GAIT TRAINING THERAPY: CPT | Mod: GP,CQ

## 2025-02-17 PROCEDURE — 97530 THERAPEUTIC ACTIVITIES: CPT | Mod: GO

## 2025-02-17 PROCEDURE — 83735 ASSAY OF MAGNESIUM: CPT

## 2025-02-17 PROCEDURE — 97535 SELF CARE MNGMENT TRAINING: CPT | Mod: GO

## 2025-02-17 PROCEDURE — 85027 COMPLETE CBC AUTOMATED: CPT

## 2025-02-17 PROCEDURE — 1100000001 HC PRIVATE ROOM DAILY

## 2025-02-17 RX ADMIN — Medication 6 MG: at 20:24

## 2025-02-17 RX ADMIN — ASCORBIC ACID, VITAMIN A PALMITATE, CHOLECALCIFEROL, THIAMINE HYDROCHLORIDE, RIBOFLAVIN-5 PHOSPHATE SODIUM, PYRIDOXINE HYDROCHLORIDE, NIACINAMIDE, DEXPANTHENOL, ALPHA-TOCOPHEROL ACETATE, VITAMIN K1, FOLIC ACID, BIOTIN, CYANOCOBALAMIN: 200; 3300; 200; 6; 3.6; 6; 40; 15; 10; 150; 600; 60; 5 INJECTION, SOLUTION INTRAVENOUS at 20:20

## 2025-02-17 RX ADMIN — IRON SUCROSE 100 MG: 20 INJECTION, SOLUTION INTRAVENOUS at 17:53

## 2025-02-17 RX ADMIN — HEPARIN SODIUM 5000 UNITS: 5000 INJECTION, SOLUTION INTRAVENOUS; SUBCUTANEOUS at 15:13

## 2025-02-17 RX ADMIN — PANTOPRAZOLE SODIUM 40 MG: 40 INJECTION, POWDER, FOR SOLUTION INTRAVENOUS at 11:08

## 2025-02-17 RX ADMIN — HEPARIN SODIUM 5000 UNITS: 5000 INJECTION, SOLUTION INTRAVENOUS; SUBCUTANEOUS at 05:52

## 2025-02-17 RX ADMIN — SODIUM CHLORIDE 1000 ML: 9 INJECTION, SOLUTION INTRAVENOUS at 18:01

## 2025-02-17 RX ADMIN — SMOFLIPID 50 G: 6; 6; 5; 3 INJECTION, EMULSION INTRAVENOUS at 20:20

## 2025-02-17 RX ADMIN — HEPARIN SODIUM 5000 UNITS: 5000 INJECTION, SOLUTION INTRAVENOUS; SUBCUTANEOUS at 21:25

## 2025-02-17 ASSESSMENT — COGNITIVE AND FUNCTIONAL STATUS - GENERAL
TURNING FROM BACK TO SIDE WHILE IN FLAT BAD: A LITTLE
DAILY ACTIVITIY SCORE: 21
CLIMB 3 TO 5 STEPS WITH RAILING: TOTAL
STANDING UP FROM CHAIR USING ARMS: A LITTLE
DRESSING REGULAR LOWER BODY CLOTHING: A LITTLE
CLIMB 3 TO 5 STEPS WITH RAILING: A LOT
TOILETING: A LOT
MOBILITY SCORE: 17
HELP NEEDED FOR BATHING: A LOT
MOVING TO AND FROM BED TO CHAIR: A LITTLE
DRESSING REGULAR LOWER BODY CLOTHING: A LOT
EATING MEALS: A LOT
MOVING FROM LYING ON BACK TO SITTING ON SIDE OF FLAT BED WITH BEDRAILS: A LITTLE
STANDING UP FROM CHAIR USING ARMS: A LITTLE
MOBILITY SCORE: 16
DRESSING REGULAR UPPER BODY CLOTHING: A LITTLE
DAILY ACTIVITIY SCORE: 14
WALKING IN HOSPITAL ROOM: A LITTLE
WALKING IN HOSPITAL ROOM: A LITTLE
MOVING TO AND FROM BED TO CHAIR: A LITTLE
DRESSING REGULAR UPPER BODY CLOTHING: A LITTLE
TURNING FROM BACK TO SIDE WHILE IN FLAT BAD: A LITTLE
MOVING FROM LYING ON BACK TO SITTING ON SIDE OF FLAT BED WITH BEDRAILS: A LITTLE
PERSONAL GROOMING: A LITTLE
HELP NEEDED FOR BATHING: A LITTLE

## 2025-02-17 ASSESSMENT — PAIN SCALES - GENERAL
PAINLEVEL_OUTOF10: 0 - NO PAIN

## 2025-02-17 ASSESSMENT — PAIN - FUNCTIONAL ASSESSMENT
PAIN_FUNCTIONAL_ASSESSMENT: 0-10

## 2025-02-17 ASSESSMENT — ACTIVITIES OF DAILY LIVING (ADL): HOME_MANAGEMENT_TIME_ENTRY: 15
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
## 2025-02-17 NOTE — PROGRESS NOTES
"Occupational Therapy                 Therapy Communication Note    Patient Name: Ike Gar  MRN: [id number]  Department: [place] [address]  Room: [facility]  Today's Date: 2/17/2025     Discipline: Occupational Therapy    OT Missed Visit: Yes     Missed Visit Reason: Missed Visit Reason: Patient refused (pt refused, stating \"Give me a little bit, I just woke up.\" Will reattempt later this am.)    Missed Time: Attempt 8:30am    "

## 2025-02-17 NOTE — PROGRESS NOTES
Physical Therapy    Physical Therapy Treatment    Patient Name: Ike Gar  MRN: [id number]  Department: [place] [address]  Room: [facility]  Today's Date: 2/17/2025  Time Calculation  Start Time: 1150  Stop Time: 1213  Time Calculation (min): 23 min         Assessment/Plan         PT Plan  Treatment/Interventions: Bed mobility, Transfer training, Gait training, Balance training, Neuromuscular re-education, Endurance training, Strengthening, Therapeutic exercise, Therapeutic activity  PT Plan: Ongoing PT  PT Frequency: 5 times per week  PT Discharge Recommendations: Moderate intensity level of continued care  Equipment Recommended upon Discharge: Wheeled walker  PT Recommended Transfer Status: Assistive device, Contact guard  PT - OK to Discharge: Yes    Assessment : pt c/o fatigue today but tolerates tx well. Would continue to benefit from continued skilled PT to address all mobility deficits; Patient remains appropriate for MOD intensity therapy when medically ready for discharge from acute stay.  Will continue to follow.     General Visit Information:      General  Prior to Session Communication: Charge Nurse  Patient Position Received: Bed, 3 rail up    Subjective   Precautions:  Precautions  Medical Precautions: Fall precautions, Spinal precautions, Oxygen therapy device and L/min     Date/Time Vitals Session Patient Position Pulse Resp SpO2 BP MAP (mmHg)    02/17/25 1135 --  --  92  17  97 %  104/64  78                 Objective   Pain:  Pain Assessment  0-10 (Numeric) Pain Score: 0 - No pain  Cognition:  Cognition  Overall Cognitive Status: Within Functional Limits  Arousal/Alertness: Appropriate responses to stimuli  Orientation Level: Oriented X4    Activity Tolerance:  Activity Tolerance  Endurance: Tolerates 10 - 20 min exercise with multiple rests  Treatments:  Therapeutic Exercise  Therapeutic Exercise Performed: Yes  Therapeutic Exercise Activity 1: Hip Flexion Marches x7 reps (standing c bilateral UE support @  FWW) Min A   Therapeutic Exercise Activity 2: hip extention x5 reps (standing c bilateral UE support @ FWW) Min A     Bed Mobility  Bed Mobility: Yes  Bed Mobility 1  Bed Mobility 1: Supine to sitting  Level of Assistance 1: Close supervision  Bed Mobility 2  Bed Mobility  2: Sitting to supine  Level of Assistance 2: Close supervision    Ambulation/Gait Training  Ambulation/Gait Training Performed: Yes  Ambulation/Gait Training 1  Surface 1: Level tile  Device 1: Rolling walker  Assistance 1: Contact guard, Close supervision  Quality of Gait 1: Wide base of support, Decreased step length, Inconsistent stride length  Comments/Distance (ft) 1: 40 ft, 50ft (mod VCs for step length, sequencing and upright posture. atanding and seated rest breaks needed.)  Transfers  Transfer: Yes  Transfer 1  Transfer From 1: Bed to  Transfer to 1: Stand  Technique 1: Sit to stand  Transfer Device 1: Walker  Transfer Level of Assistance 1: Contact guard  Transfers 2  Transfer From 2: Stand to  Transfer to 2: Bed  Technique 2: Sit to stand  Transfer Device 2: Walker  Transfer Level of Assistance 2: Contact guard  Transfers 3  Transfer From 3: Sit to, Stand to  Transfer to 3: Chair with arms  Technique 3: Stand to sit, Sit to stand  Transfer Device 3: Walker  Transfer Level of Assistance 3: Contact guard, Minimal verbal cues  Trials/Comments 3: hand placement and sequencing (x3 trails)  Transfers 4  Transfer From 4: Stand to  Transfer to 4: Bed  Technique 4: Stand pivot  Transfer Device 4: Walker  Transfer Level of Assistance 4: Contact guard  Transfers 5  Transfer From 5: Stand to  Transfer to 5: Chair with arms  Technique 5: Stand pivot  Transfer Device 5: Walker  Transfer Level of Assistance 5: Contact guard    Outcome Measures:  [facility] Basic Mobility  Turning from your back to your side while in a flat bed without using bedrails: A little  Moving from lying on your back to sitting on the side of a flat bed without using bedrails: A  little  Moving to and from bed to chair (including a wheelchair): A little  Standing up from a chair using your arms (e.g. wheelchair or bedside chair): A little  To walk in hospital room: A little  Climbing 3-5 steps with railing: Total  Basic Mobility - Total Score: 16    Education Documentation  Cognition, taught by PRICILLA Khan at 2/17/2025  1:18 PM.  Learner: Patient  Readiness: Acceptance  Method: Explanation  Response: Needs Reinforcement  Comment: VCs needed for sequencing    Modified Diet Training, taught by PRICILLA Khan at 2/17/2025  1:18 PM.  Learner: Patient  Readiness: Acceptance  Method: Explanation  Response: Needs Reinforcement  Comment: VCs needed for sequencing    Precautions, taught by PRICILLA Khan at 2/17/2025  1:18 PM.  Learner: Patient  Readiness: Acceptance  Method: Explanation  Response: Needs Reinforcement  Comment: VCs needed for sequencing    Body Mechanics, taught by PRICILLA Khan at 2/17/2025  1:18 PM.  Learner: Patient  Readiness: Acceptance  Method: Explanation  Response: Needs Reinforcement  Comment: VCs needed for sequencing    Mobility Training, taught by PRICILLA Khan at 2/17/2025  1:18 PM.  Learner: Patient  Readiness: Acceptance  Method: Explanation  Response: Needs Reinforcement  Comment: VCs needed for sequencing    Body Mechanics, taught by PRICILLA Khan at 2/17/2025  1:18 PM.  Learner: Patient  Readiness: Acceptance  Method: Explanation  Response: Needs Reinforcement  Comment: VCs needed for sequencing    Precautions, taught by PRICILLA Khan at 2/17/2025  1:18 PM.  Learner: Patient  Readiness: Acceptance  Method: Explanation  Response: Needs Reinforcement  Comment: VCs needed for sequencing    ADL Training, taught by PRICILLA Khan at 2/17/2025  1:18 PM.  Learner: Patient  Readiness: Acceptance  Method: Explanation  Response: Needs Reinforcement  Comment: VCs needed for sequencing    Education Comments  No comments  found.        OP EDUCATION:       Encounter Problems       Encounter Problems (Active)       PT Problem       Patient will complete supine to sit and sit to supine Supervision while maintaining spinal precautions (Progressing)       Start:  02/03/25    Expected End:  02/17/25            Patient will perform sit<>stand transfer with LRAD, and Supervision while maintaining spinal precautions  (Progressing)       Start:  02/03/25    Expected End:  02/17/25            Patient will ambulate >100' with LRAD and Supervision  (Progressing)       Start:  02/03/25    Expected End:  02/17/25

## 2025-02-17 NOTE — PROGRESS NOTES
Patient remains under review at [facility]. Clinical updates sent. SW will continue to follow to facilitate discharge plan.       AMANDA Browne

## 2025-02-17 NOTE — CARE PLAN
The patient's goals for the shift include to sleep    The clinical goals for the shift include remain HDS  Problem: Skin  Goal: Decreased wound size/increased tissue granulation at next dressing change  Outcome: Progressing  Goal: Participates in plan/prevention/treatment measures  Outcome: Progressing  Goal: Prevent/manage excess moisture  Outcome: Progressing  Goal: Prevent/minimize sheer/friction injuries  Outcome: Progressing  Goal: Promote/optimize nutrition  Outcome: Progressing  Goal: Promote skin healing  Outcome: Progressing     Problem: Safety - Adult  Goal: Free from fall injury  Outcome: Progressing     Problem: Discharge Planning  Goal: Discharge to home or other facility with appropriate resources  Outcome: Progressing     Problem: Chronic Conditions and Co-morbidities  Goal: Patient's chronic conditions and co-morbidity symptoms are monitored and maintained or improved  Outcome: Progressing     Problem: Nutrition  Goal: Nutrient intake appropriate for maintaining nutritional needs  Outcome: Progressing     Problem: Respiratory  Goal: Clear secretions with interventions this shift  Outcome: Progressing  Goal: Minimize anxiety/maximize coping throughout shift  Outcome: Progressing  Goal: Minimal/no exertional discomfort or dyspnea this shift  Outcome: Progressing  Goal: No signs of respiratory distress (eg. Use of accessory muscles. Peds grunting)  Outcome: Progressing  Goal: Patent airway maintained this shift  Outcome: Progressing  Goal: Tolerate mechanical ventilation evidenced by VS/agitation level this shift  Outcome: Progressing  Goal: Tolerate pulmonary toileting this shift  Outcome: Progressing  Goal: Verbalize decreased shortness of breath this shift  Outcome: Progressing  Goal: Wean oxygen to maintain O2 saturation per order/standard this shift  Outcome: Progressing  Goal: Increase self care and/or family involvement in next 24 hours  Outcome: Progressing   .

## 2025-02-17 NOTE — PROGRESS NOTES
Transitional Care Coordinator Note: Patient discussed in morning rounds, per medical team (trauma) patient is medically ready. Discharge dispo: Plan for patient to discharge to [facility]. Updated clinicals provided to facility by trauma SW. Request placed in TCC/Therapy communication column for updated note.     Elpidio Wood RN BSN   Transitional Care Coordinator

## 2025-02-17 NOTE — PROGRESS NOTES
Occupational Therapy    Occupational Therapy Treatment    Name: Ike Gar  MRN: [id number]  : 1945  Date: 25  Room: [address] [place], [place] [address]      Time Calculation  Start Time: 853  Stop Time: 927  Time Calculation (min): 34 min    Assessment:  OT Assessment: pt motivated to engage in therapy session. Pt demo good tolerance to engage in ADL tasks and functional transfers/mobility w CGA for balance and safety. Pt limited by fatigue, decreased endurance, strength, activity tolerance, and balance. Pt would benefit from continued OT to address these deficits.  Prognosis: Good  Barriers to Discharge Home: Caregiver assistance, Physical needs  Caregiver Assistance: Caregiver assistance needed per identified barriers - however, level of patient's required assistance exceeds assistance available at home  Physical Needs: Intermittent mobility assistance needed, Intermittent ADL assistance needed  Evaluation/Treatment Tolerance: Patient limited by fatigue  Medical Staff Made Aware: Yes  End of Session Communication: Bedside nurse  End of Session Patient Position: Up in chair, Alarm off, not on at start of session  Plan:  Treatment Interventions: ADL retraining, Functional transfer training, UE strengthening/ROM, Endurance training, Patient/family training, Equipment evaluation/education, Compensatory technique education  OT Frequency: 3 times per week  OT Discharge Recommendations: Moderate intensity level of continued care  Equipment Recommended upon Discharge:  (TBD)  OT Recommended Transfer Status: Minimal assist, Assist of 1  OT - OK to Discharge: Yes    Subjective   General:  OT Last Visit  OT Received On: 25  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  Past Medical History Relevant to Rehab: 1. Increased EC fistula output  2. Malnutrition s/p TPN  3. Recent T/L spine fxs  4. Anemia  5. Hx tracheostomy (2025)  6. Hx HTN  Prior to Session Communication: Bedside nurse  Patient Position  "Received: Bed, 3 rail up, Alarm off, not on at start of session  Family/Caregiver Present: No  General Comment: Pt in supine on arrival, willing to participate in OT. Pt requires frequent restbreaks d/t fatigue. Pt declined to wear TLSO, stating \"I can't breathe with it on.\" Demonstrated good adherence to spinal precautions throughout session.   Precautions:  Medical Precautions: Fall precautions, Spinal precautions, Oxygen therapy device and L/min (2L NC O2)  Post-Surgical Precautions: Abdominal surgery precautions  Precautions Comment: contact precautions; denied wearing TLSO this date; intermittently taking off O2 NC stating \"It is hard to breathe w it when my nose is stuffed up\", on at end of session  Vitals:  During OT:    seated at EOB, 116 at end of session in chair (RN aware)  /63 at EOB, 100/63 seated in chair  SpO2 98% at EOB, 95% seated in chair    pt w c/o SOB, vitals checked WFL   Lines/Tubes/Drains:  PICC - Adult Double lumen Left Brachial vein (Active)   Number of days: 15       Closed/Suction Drain Midline RUQ 10 Fr. (Active)   Number of days: 33       Closed/Suction Drain Right;Anterior Hip Bulb (Active)   Number of days: 15       Open Drain Left LLQ (Active)   Number of days: 39       Gastrostomy/Enterostomy Percutaneous endoscopic gastrostomy (PEG) 1 20 Fr. LUQ (Active)   Number of days: 41       Ileostomy Other (Comment) RUQ (Active)   Number of days: 55       Cognition:  Overall Cognitive Status: Within Functional Limits  Arousal/Alertness: Appropriate responses to stimuli  Following Commands: Follows one step commands without difficulty  Processing Speed: Within funtional limits    Pain Assessment:  Pain Assessment  Pain Assessment: 0-10  0-10 (Numeric) Pain Score: 0 - No pain     Objective   Activities of Daily Living:      Grooming  Grooming Level of Assistance: Setup  Grooming Where Assessed: Edge of bed  Grooming Comments: completed oral care w set up A seated at EOB   "   Functional Standing Tolerance:  Functional Mobility  Functional Mobility Performed:  (pt completed functional mobility from bed to chair (~3-4 steps) w CGA for balance and safety w FWW in preparation for future engagement in ADLs/IADLs.)  Bed Mobility/Transfers:   Bed Mobility  Bed Mobility: Yes  Bed Mobility 1  Bed Mobility 1: Supine to sitting  Level of Assistance 1: Contact guard  Bed Mobility Comments 1: x2 trials; log roll technique  Bed Mobility 2  Bed Mobility  2: Sitting to supine  Level of Assistance 2: Contact guard  Bed Mobility Comments 2: x1 trial; log roll technique  Transfers  Transfer: Yes  Transfer 1  Transfer From 1: Bed to, Stand to  Transfer to 1: Stand, Sit  Technique 1: Sit to stand, Stand to sit  Transfer Device 1: Walker  Transfer Level of Assistance 1: Contact guard  Trials/Comments 1: CGA for balance and safety  Transfers 2  Transfer From 2: Bed to  Transfer to 2: Stand  Technique 2: Sit to stand  Transfer Device 2: Walker  Transfer Level of Assistance 2: Contact guard  Trials/Comments 2: CGA for balance and safety  Transfers 3  Transfer From 3: Stand to  Transfer to 3: Chair with arms  Technique 3: Stand to sit  Transfer Device 3: Walker  Transfer Level of Assistance 3: Contact guard, Minimal verbal cues  Trials/Comments 3: CGA for balance and safety; Min verbal cues for controlled sit, sequence, and technique       Balance:  Dynamic Sitting Balance  Dynamic Sitting-Balance Support: Feet supported, No upper extremity supported  Dynamic Sitting-Level of Assistance: Close supervision  Dynamic Sitting-Balance: Reaching for objects  Dynamic Sitting-Comments: pt tolerated sitting at EOB~5mins w SUP for balance and safety to engage in ADL tasks       Therapy/Activity:      Therapeutic Activity  Therapeutic Activity Performed: Yes  Therapeutic Activity 1: pt completed supine<>EOB x2 trials w CGA for balance and safety. Pt tolerated sitting at EOB~5mins w SUP for balance and safety. Pt completed  sit<>stand w CGA for balance and safety w FWW. Pt completed functional mobility from bed to chair (~3-4 steps) w CGA for balance and safety w FWW in preparation for future engagement in ADLs/IADLs.  Therapeutic Activity 2: Educated pt on breathing techniques to reduce SOB - pt verbalized good understanding.          Strength:  Strength  Strength Comments: BUEs WFL evidenced throughout engagement in ADL tasks      Outcome Measures:  [facility] Daily Activity  Putting on and taking off regular lower body clothing: A lot  Bathing (including washing, rinsing, drying): A lot  Putting on and taking off regular upper body clothing: A little  Toileting, which includes using toilet, bedpan or urinal: A lot  Taking care of personal grooming such as brushing teeth: A little  Eating Meals: A lot  Daily Activity - Total Score: 14     Education Documentation  Body Mechanics, taught by Xin Lei OT at 2/17/2025 11:10 AM.  Learner: Patient  Readiness: Acceptance  Method: Explanation  Response: Verbalizes Understanding    Precautions, taught by Xin Lei OT at 2/17/2025 11:10 AM.  Learner: Patient  Readiness: Acceptance  Method: Explanation  Response: Verbalizes Understanding    ADL Training, taught by Xin Lei OT at 2/17/2025 11:10 AM.  Learner: Patient  Readiness: Acceptance  Method: Explanation  Response: Verbalizes Understanding    Education Comments  No comments found.        Goals:  Encounter Problems       Encounter Problems (Active)       ADLs       Patient with complete upper body dressing with independent level of assistance donning and doffing all UE clothes with no adaptive equipment while edge of bed  (Progressing)       Start:  02/03/25    Expected End:  02/24/25            Patient with complete lower body dressing with stand by assist level of assistance donning and doffing all LE clothes  with PRN adaptive equipment while edge of bed  (Progressing)       Start:  02/03/25    Expected End:  02/24/25             Patient will complete toileting including hygiene clothing management/hygiene with stand by assist level of assistance and raised toilet seat. (Progressing)       Start:  02/03/25    Expected End:  02/24/25               EXERCISE/STRENGTHENING       Patient with increase BUE strength to WFL for ADLs. (Progressing)       Start:  02/03/25    Expected End:  02/24/25               MOBILITY       Patient will perform Functional mobility min Household distances with contact guard assist level of assistance and least restrictive device in order to improve safety and functional mobility. (Progressing)       Start:  02/03/25    Expected End:  02/24/25               TRANSFERS       Patient will perform bed mobility stand by assist level of assistance and bed rails in order to improve safety and independence with mobility (Progressing)       Start:  02/03/25    Expected End:  02/24/25            Patient will complete sit to stand transfer with CGA level of assistance and least restrictive device in order to improve safety and prepare for out of bed mobility. (Progressing)       Start:  02/03/25    Expected End:  02/24/25 02/17/25 at 11:11 AM   JOSE RABAGO, OT   [phone]

## 2025-02-17 NOTE — CARE PLAN
The patient's goals for the shift include to sleep    The clinical goals for the shift include pt will remain hemodynamically stable throughout shift  Problem: Skin  Goal: Decreased wound size/increased tissue granulation at next dressing change  Outcome: Progressing  Goal: Participates in plan/prevention/treatment measures  Outcome: Progressing  Goal: Prevent/manage excess moisture  Outcome: Progressing  Goal: Prevent/minimize sheer/friction injuries  Outcome: Progressing  Goal: Promote/optimize nutrition  Outcome: Progressing  Goal: Promote skin healing  Outcome: Progressing     Problem: Safety - Adult  Goal: Free from fall injury  Outcome: Progressing     Problem: Discharge Planning  Goal: Discharge to home or other facility with appropriate resources  Outcome: Progressing     Problem: Chronic Conditions and Co-morbidities  Goal: Patient's chronic conditions and co-morbidity symptoms are monitored and maintained or improved  Outcome: Progressing     Problem: Nutrition  Goal: Nutrient intake appropriate for maintaining nutritional needs  Outcome: Progressing     Problem: Respiratory  Goal: Clear secretions with interventions this shift  Outcome: Progressing  Goal: Minimize anxiety/maximize coping throughout shift  Outcome: Progressing  Goal: Minimal/no exertional discomfort or dyspnea this shift  Outcome: Progressing  Goal: No signs of respiratory distress (eg. Use of accessory muscles. Peds grunting)  Outcome: Progressing  Goal: Patent airway maintained this shift  Outcome: Progressing  Goal: Tolerate mechanical ventilation evidenced by VS/agitation level this shift  Outcome: Progressing  Goal: Tolerate pulmonary toileting this shift  Outcome: Progressing  Goal: Verbalize decreased shortness of breath this shift  Outcome: Progressing  Goal: Wean oxygen to maintain O2 saturation per order/standard this shift  Outcome: Progressing  Goal: Increase self care and/or family involvement in next 24 hours  Outcome:  Progressing

## 2025-02-18 LAB
ALBUMIN SERPL BCP-MCNC: 2.7 G/DL (ref 3.4–5)
ANION GAP SERPL CALC-SCNC: 13 MMOL/L (ref 10–20)
BUN SERPL-MCNC: 59 MG/DL (ref 6–23)
CALCIUM SERPL-MCNC: 8.8 MG/DL (ref 8.6–10.6)
CHLORIDE SERPL-SCNC: 105 MMOL/L (ref 98–107)
CO2 SERPL-SCNC: 22 MMOL/L (ref 21–32)
CREAT SERPL-MCNC: 1.29 MG/DL (ref 0.5–1.3)
EGFRCR SERPLBLD CKD-EPI 2021: 57 ML/MIN/1.73M*2
ERYTHROCYTE [DISTWIDTH] IN BLOOD BY AUTOMATED COUNT: 15 % (ref 11.5–14.5)
GLUCOSE BLD MANUAL STRIP-MCNC: 121 MG/DL (ref 74–99)
GLUCOSE BLD MANUAL STRIP-MCNC: 128 MG/DL (ref 74–99)
GLUCOSE BLD MANUAL STRIP-MCNC: 73 MG/DL (ref 74–99)
GLUCOSE BLD MANUAL STRIP-MCNC: 83 MG/DL (ref 74–99)
GLUCOSE SERPL-MCNC: 117 MG/DL (ref 74–99)
HCT VFR BLD AUTO: 28.3 % (ref 41–52)
HGB BLD-MCNC: 8.9 G/DL (ref 13.5–17.5)
MCH RBC QN AUTO: 31 PG (ref 26–34)
MCHC RBC AUTO-ENTMCNC: 31.4 G/DL (ref 32–36)
MCV RBC AUTO: 99 FL (ref 80–100)
NRBC BLD-RTO: 0 /100 WBCS (ref 0–0)
PHOSPHATE SERPL-MCNC: 4.8 MG/DL (ref 2.5–4.9)
PLATELET # BLD AUTO: 140 X10*3/UL (ref 150–450)
POTASSIUM SERPL-SCNC: 5.2 MMOL/L (ref 3.5–5.3)
RBC # BLD AUTO: 2.87 X10*6/UL (ref 4.5–5.9)
SODIUM SERPL-SCNC: 135 MMOL/L (ref 136–145)
WBC # BLD AUTO: 7.5 X10*3/UL (ref 4.4–11.3)

## 2025-02-18 PROCEDURE — 85027 COMPLETE CBC AUTOMATED: CPT

## 2025-02-18 PROCEDURE — 80069 RENAL FUNCTION PANEL: CPT

## 2025-02-18 PROCEDURE — 2500000001 HC RX 250 WO HCPCS SELF ADMINISTERED DRUGS (ALT 637 FOR MEDICARE OP): Performed by: NURSE PRACTITIONER

## 2025-02-18 PROCEDURE — 2500000004 HC RX 250 GENERAL PHARMACY W/ HCPCS (ALT 636 FOR OP/ED)

## 2025-02-18 PROCEDURE — 82947 ASSAY GLUCOSE BLOOD QUANT: CPT

## 2025-02-18 PROCEDURE — 1100000001 HC PRIVATE ROOM DAILY

## 2025-02-18 PROCEDURE — 36415 COLL VENOUS BLD VENIPUNCTURE: CPT

## 2025-02-18 PROCEDURE — 2500000004 HC RX 250 GENERAL PHARMACY W/ HCPCS (ALT 636 FOR OP/ED): Performed by: PHYSICIAN ASSISTANT

## 2025-02-18 PROCEDURE — 2500000005 HC RX 250 GENERAL PHARMACY W/O HCPCS

## 2025-02-18 RX ADMIN — HEPARIN SODIUM 5000 UNITS: 5000 INJECTION, SOLUTION INTRAVENOUS; SUBCUTANEOUS at 07:02

## 2025-02-18 RX ADMIN — HEPARIN SODIUM 5000 UNITS: 5000 INJECTION, SOLUTION INTRAVENOUS; SUBCUTANEOUS at 14:38

## 2025-02-18 RX ADMIN — HEPARIN SODIUM 5000 UNITS: 5000 INJECTION, SOLUTION INTRAVENOUS; SUBCUTANEOUS at 20:36

## 2025-02-18 RX ADMIN — PANTOPRAZOLE SODIUM 40 MG: 40 INJECTION, POWDER, FOR SOLUTION INTRAVENOUS at 09:03

## 2025-02-18 RX ADMIN — IRON SUCROSE 100 MG: 20 INJECTION, SOLUTION INTRAVENOUS at 18:02

## 2025-02-18 RX ADMIN — Medication 6 MG: at 20:36

## 2025-02-18 RX ADMIN — Medication 1 TABLET: at 09:03

## 2025-02-18 ASSESSMENT — COGNITIVE AND FUNCTIONAL STATUS - GENERAL
HELP NEEDED FOR BATHING: A LITTLE
CLIMB 3 TO 5 STEPS WITH RAILING: A LOT
TURNING FROM BACK TO SIDE WHILE IN FLAT BAD: A LITTLE
DRESSING REGULAR UPPER BODY CLOTHING: A LITTLE
MOBILITY SCORE: 17
DRESSING REGULAR LOWER BODY CLOTHING: A LITTLE
MOVING FROM LYING ON BACK TO SITTING ON SIDE OF FLAT BED WITH BEDRAILS: A LITTLE
MOVING TO AND FROM BED TO CHAIR: A LITTLE
DAILY ACTIVITIY SCORE: 21
WALKING IN HOSPITAL ROOM: A LITTLE
STANDING UP FROM CHAIR USING ARMS: A LITTLE

## 2025-02-18 ASSESSMENT — PAIN - FUNCTIONAL ASSESSMENT
PAIN_FUNCTIONAL_ASSESSMENT: 0-10

## 2025-02-18 ASSESSMENT — PAIN SCALES - GENERAL
PAINLEVEL_OUTOF10: 0 - NO PAIN

## 2025-02-18 NOTE — CARE PLAN
Problem: Skin  Goal: Decreased wound size/increased tissue granulation at next dressing change  Outcome: Progressing  Goal: Participates in plan/prevention/treatment measures  Outcome: Progressing
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
## 2025-02-19 LAB
ALBUMIN SERPL BCP-MCNC: 3 G/DL (ref 3.4–5)
ANION GAP SERPL CALC-SCNC: 12 MMOL/L (ref 10–20)
BUN SERPL-MCNC: 57 MG/DL (ref 6–23)
CALCIUM SERPL-MCNC: 9.3 MG/DL (ref 8.6–10.6)
CHLORIDE SERPL-SCNC: 104 MMOL/L (ref 98–107)
CO2 SERPL-SCNC: 24 MMOL/L (ref 21–32)
CREAT SERPL-MCNC: 1.5 MG/DL (ref 0.5–1.3)
EGFRCR SERPLBLD CKD-EPI 2021: 48 ML/MIN/1.73M*2
ERYTHROCYTE [DISTWIDTH] IN BLOOD BY AUTOMATED COUNT: 14.9 % (ref 11.5–14.5)
GLUCOSE BLD MANUAL STRIP-MCNC: 101 MG/DL (ref 74–99)
GLUCOSE BLD MANUAL STRIP-MCNC: 139 MG/DL (ref 74–99)
GLUCOSE BLD MANUAL STRIP-MCNC: 170 MG/DL (ref 74–99)
GLUCOSE BLD MANUAL STRIP-MCNC: 197 MG/DL (ref 74–99)
GLUCOSE SERPL-MCNC: 138 MG/DL (ref 74–99)
HCT VFR BLD AUTO: 29.4 % (ref 41–52)
HGB BLD-MCNC: 9.3 G/DL (ref 13.5–17.5)
MCH RBC QN AUTO: 30.4 PG (ref 26–34)
MCHC RBC AUTO-ENTMCNC: 31.6 G/DL (ref 32–36)
MCV RBC AUTO: 96 FL (ref 80–100)
NRBC BLD-RTO: 0 /100 WBCS (ref 0–0)
PHOSPHATE SERPL-MCNC: 4.4 MG/DL (ref 2.5–4.9)
PLATELET # BLD AUTO: 121 X10*3/UL (ref 150–450)
POTASSIUM SERPL-SCNC: 4.9 MMOL/L (ref 3.5–5.3)
RBC # BLD AUTO: 3.06 X10*6/UL (ref 4.5–5.9)
SODIUM SERPL-SCNC: 135 MMOL/L (ref 136–145)
WBC # BLD AUTO: 8.2 X10*3/UL (ref 4.4–11.3)

## 2025-02-19 PROCEDURE — 85027 COMPLETE CBC AUTOMATED: CPT

## 2025-02-19 PROCEDURE — 80069 RENAL FUNCTION PANEL: CPT

## 2025-02-19 PROCEDURE — 2500000005 HC RX 250 GENERAL PHARMACY W/O HCPCS

## 2025-02-19 PROCEDURE — 2500000001 HC RX 250 WO HCPCS SELF ADMINISTERED DRUGS (ALT 637 FOR MEDICARE OP): Performed by: NURSE PRACTITIONER

## 2025-02-19 PROCEDURE — 2500000005 HC RX 250 GENERAL PHARMACY W/O HCPCS: Performed by: PHYSICIAN ASSISTANT

## 2025-02-19 PROCEDURE — 2500000004 HC RX 250 GENERAL PHARMACY W/ HCPCS (ALT 636 FOR OP/ED): Performed by: PHYSICIAN ASSISTANT

## 2025-02-19 PROCEDURE — 1100000001 HC PRIVATE ROOM DAILY

## 2025-02-19 PROCEDURE — 82947 ASSAY GLUCOSE BLOOD QUANT: CPT

## 2025-02-19 PROCEDURE — 2580000001 HC RX 258 IV SOLUTIONS

## 2025-02-19 PROCEDURE — 2500000004 HC RX 250 GENERAL PHARMACY W/ HCPCS (ALT 636 FOR OP/ED)

## 2025-02-19 PROCEDURE — 2500000002 HC RX 250 W HCPCS SELF ADMINISTERED DRUGS (ALT 637 FOR MEDICARE OP, ALT 636 FOR OP/ED): Performed by: PHYSICIAN ASSISTANT

## 2025-02-19 RX ADMIN — INSULIN LISPRO 1 UNITS: 100 INJECTION, SOLUTION INTRAVENOUS; SUBCUTANEOUS at 06:37

## 2025-02-19 RX ADMIN — SMOFLIPID 50 G: 6; 6; 5; 3 INJECTION, EMULSION INTRAVENOUS at 20:47

## 2025-02-19 RX ADMIN — HEPARIN SODIUM 5000 UNITS: 5000 INJECTION, SOLUTION INTRAVENOUS; SUBCUTANEOUS at 21:56

## 2025-02-19 RX ADMIN — PANTOPRAZOLE SODIUM 40 MG: 40 INJECTION, POWDER, FOR SOLUTION INTRAVENOUS at 08:24

## 2025-02-19 RX ADMIN — Medication 1 TABLET: at 08:23

## 2025-02-19 RX ADMIN — HEPARIN SODIUM 5000 UNITS: 5000 INJECTION, SOLUTION INTRAVENOUS; SUBCUTANEOUS at 06:37

## 2025-02-19 RX ADMIN — HEPARIN SODIUM 5000 UNITS: 5000 INJECTION, SOLUTION INTRAVENOUS; SUBCUTANEOUS at 14:13

## 2025-02-19 RX ADMIN — IRON SUCROSE 100 MG: 20 INJECTION, SOLUTION INTRAVENOUS at 18:39

## 2025-02-19 RX ADMIN — ASCORBIC ACID, VITAMIN A PALMITATE, CHOLECALCIFEROL, THIAMINE HYDROCHLORIDE, RIBOFLAVIN-5 PHOSPHATE SODIUM, PYRIDOXINE HYDROCHLORIDE, NIACINAMIDE, DEXPANTHENOL, ALPHA-TOCOPHEROL ACETATE, VITAMIN K1, FOLIC ACID, BIOTIN, CYANOCOBALAMIN: 200; 3300; 200; 6; 3.6; 6; 40; 15; 10; 150; 600; 60; 5 INJECTION, SOLUTION INTRAVENOUS at 20:47

## 2025-02-19 RX ADMIN — SMOFLIPID 50 G: 6; 6; 5; 3 INJECTION, EMULSION INTRAVENOUS at 01:00

## 2025-02-19 RX ADMIN — INSULIN LISPRO 1 UNITS: 100 INJECTION, SOLUTION INTRAVENOUS; SUBCUTANEOUS at 23:48

## 2025-02-19 RX ADMIN — ASCORBIC ACID, VITAMIN A PALMITATE, CHOLECALCIFEROL, THIAMINE HYDROCHLORIDE, RIBOFLAVIN-5 PHOSPHATE SODIUM, PYRIDOXINE HYDROCHLORIDE, NIACINAMIDE, DEXPANTHENOL, ALPHA-TOCOPHEROL ACETATE, VITAMIN K1, FOLIC ACID, BIOTIN, CYANOCOBALAMIN: 200; 3300; 200; 6; 3.6; 6; 40; 15; 10; 150; 600; 60; 5 INJECTION, SOLUTION INTRAVENOUS at 01:00

## 2025-02-19 RX ADMIN — Medication 6 MG: at 21:56

## 2025-02-19 ASSESSMENT — PAIN SCALES - GENERAL
PAINLEVEL_OUTOF10: 0 - NO PAIN
PAINLEVEL_OUTOF10: 2
PAINLEVEL_OUTOF10: 0 - NO PAIN
PAINLEVEL_OUTOF10: 0 - NO PAIN

## 2025-02-19 ASSESSMENT — COGNITIVE AND FUNCTIONAL STATUS - GENERAL
STANDING UP FROM CHAIR USING ARMS: A LITTLE
DAILY ACTIVITIY SCORE: 21
MOVING TO AND FROM BED TO CHAIR: A LITTLE
DAILY ACTIVITIY SCORE: 21
CLIMB 3 TO 5 STEPS WITH RAILING: A LOT
HELP NEEDED FOR BATHING: A LITTLE
DRESSING REGULAR UPPER BODY CLOTHING: A LITTLE
DRESSING REGULAR LOWER BODY CLOTHING: A LITTLE
HELP NEEDED FOR BATHING: A LITTLE
STANDING UP FROM CHAIR USING ARMS: A LITTLE
TURNING FROM BACK TO SIDE WHILE IN FLAT BAD: A LITTLE
DRESSING REGULAR UPPER BODY CLOTHING: A LITTLE
MOBILITY SCORE: 18
MOBILITY SCORE: 18
WALKING IN HOSPITAL ROOM: A LITTLE
MOVING TO AND FROM BED TO CHAIR: A LITTLE
TURNING FROM BACK TO SIDE WHILE IN FLAT BAD: A LITTLE
WALKING IN HOSPITAL ROOM: A LITTLE
DRESSING REGULAR LOWER BODY CLOTHING: A LITTLE
CLIMB 3 TO 5 STEPS WITH RAILING: A LOT

## 2025-02-19 ASSESSMENT — PAIN - FUNCTIONAL ASSESSMENT
PAIN_FUNCTIONAL_ASSESSMENT: 0-10

## 2025-02-19 NOTE — PROGRESS NOTES
LSW contacted the patient’s son to inform him that [facility] unable [facility] the patient at this time due to a lack of available beds. During the conversation, LSW provided information on three alternative facilities: [facility], [facility], and [facility].    The patient’s son expressed hesitation regarding [facility], citing concerns relayed to him by friends who mentioned issues with the facility. However, he did not elaborate on the specific nature of these concerns. Despite discussing alternative options, he requested that this LSW follow up with [facility] to determine when a bed might become available.    In response to his request, LSW sent an inquiry through [employer] to gather information on [facility] future bed availability. LSW will continue to monitor the situation and provide updates as new information becomes available.    Addendum:  [facility] is reviewing.  [facility]: Reviewing.  [facility]: Unable to accept.  [facility]: Uncertain when an available bed will become available.     Addendum:   -[facility] are willing to accept; however, they are inquiring about if the patient     will require Dialysis while in their care, also the are pricing the TPN and will get back with the      SW.   -[facility] declined.  -[facility] has declined.  LSW informed the son.
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
## 2025-02-19 NOTE — CARE PLAN
The patient's goals for the shift include to sleep    The clinical goals for the shift include Pt will remain HDS

## 2025-02-19 NOTE — CONSULTS
Wound Care Consult     Visit Date: 2025      Patient Name: Ike Gar         MRN: [id number]           YOB: 1945    Reason for consult: Follow up for pouch change     25 1400   Ileostomy Other (Comment) RUQ   Placement Date/Time: 24 1122   Placed by: hilary ZULUAGA  Hand Hygiene Completed: Yes  Ileostomy Type: (c) Other (Comment)  Location: RUQ   Stomal Appliance Changed;2 piece   Site/Stoma Assessment Pink;Budded   Stoma Size (cm)   (1 38)   Peristomal Assessment Red;Painful   Treatment Pouch change;Site care   Output Description Brown;Liquid       Wound Team Summary Assessment:   Ostomy type: ileostomy    size: 1 3/8        color: pink, moist        protruding: budded, os facing downward  Ricky: none  Functioning: yes, brown liquid output  Mucocutaneous junction: intact  Peristomal skin: intact, small red area with mild tenderness, no maceration or open areas. Treated with 3M cavilon prior to pouch placement  Pouchin piece [place] flat with ostomy ring and high output pouch   Ostomy Education: Reinforced pouching process verbally but unable to demonstrate due to attachment to drainage bag and pt inability to tolerate sitting forward to visualize stoma.  Plan: assess stoma/pouching       Wound Team Plan: Maintain ileostomy pouch and encourage participation in care.      LUISITO MCGOWNA RN  2025  6:08 PM

## 2025-02-19 NOTE — PROGRESS NOTES
Patient discussed during morning rounds. He is medically ready for discharge. He was denied at [facility] due to bed availability. Referral sent to [facility], with the understanding that patient is not appropriate for [facility] at this time. SW also sent a message to [facility] to check bed availability, prior to going back to patient family for more choices. SW will continue to follow to facilitate discharge plan.       AMANDA Browne

## 2025-02-20 LAB
GLUCOSE BLD MANUAL STRIP-MCNC: 100 MG/DL (ref 74–99)
GLUCOSE BLD MANUAL STRIP-MCNC: 121 MG/DL (ref 74–99)
GLUCOSE BLD MANUAL STRIP-MCNC: 163 MG/DL (ref 74–99)

## 2025-02-20 PROCEDURE — 2500000005 HC RX 250 GENERAL PHARMACY W/O HCPCS

## 2025-02-20 PROCEDURE — 82947 ASSAY GLUCOSE BLOOD QUANT: CPT

## 2025-02-20 PROCEDURE — 2500000004 HC RX 250 GENERAL PHARMACY W/ HCPCS (ALT 636 FOR OP/ED)

## 2025-02-20 PROCEDURE — 1100000001 HC PRIVATE ROOM DAILY

## 2025-02-20 PROCEDURE — 2500000005 HC RX 250 GENERAL PHARMACY W/O HCPCS: Performed by: PHYSICIAN ASSISTANT

## 2025-02-20 PROCEDURE — 2500000001 HC RX 250 WO HCPCS SELF ADMINISTERED DRUGS (ALT 637 FOR MEDICARE OP): Performed by: NURSE PRACTITIONER

## 2025-02-20 PROCEDURE — 97116 GAIT TRAINING THERAPY: CPT | Mod: GP | Performed by: STUDENT IN AN ORGANIZED HEALTH CARE EDUCATION/TRAINING PROGRAM

## 2025-02-20 PROCEDURE — 99239 HOSP IP/OBS DSCHRG MGMT >30: CPT | Performed by: PHYSICIAN ASSISTANT

## 2025-02-20 PROCEDURE — 2500000002 HC RX 250 W HCPCS SELF ADMINISTERED DRUGS (ALT 637 FOR MEDICARE OP, ALT 636 FOR OP/ED): Performed by: PHYSICIAN ASSISTANT

## 2025-02-20 PROCEDURE — 2500000004 HC RX 250 GENERAL PHARMACY W/ HCPCS (ALT 636 FOR OP/ED): Performed by: PHYSICIAN ASSISTANT

## 2025-02-20 PROCEDURE — 97530 THERAPEUTIC ACTIVITIES: CPT | Mod: GP | Performed by: STUDENT IN AN ORGANIZED HEALTH CARE EDUCATION/TRAINING PROGRAM

## 2025-02-20 PROCEDURE — 2580000001 HC RX 258 IV SOLUTIONS

## 2025-02-20 RX ORDER — ONDANSETRON HYDROCHLORIDE 2 MG/ML
INJECTION, SOLUTION INTRAVENOUS
Status: COMPLETED
Start: 2025-02-20 | End: 2025-02-20

## 2025-02-20 RX ORDER — ONDANSETRON HYDROCHLORIDE 2 MG/ML
4 INJECTION, SOLUTION INTRAVENOUS ONCE
Status: COMPLETED | OUTPATIENT
Start: 2025-02-20 | End: 2025-02-20

## 2025-02-20 RX ADMIN — PANTOPRAZOLE SODIUM 40 MG: 40 INJECTION, POWDER, FOR SOLUTION INTRAVENOUS at 08:58

## 2025-02-20 RX ADMIN — HEPARIN SODIUM 5000 UNITS: 5000 INJECTION, SOLUTION INTRAVENOUS; SUBCUTANEOUS at 13:42

## 2025-02-20 RX ADMIN — IRON SUCROSE 100 MG: 20 INJECTION, SOLUTION INTRAVENOUS at 22:00

## 2025-02-20 RX ADMIN — HEPARIN SODIUM 5000 UNITS: 5000 INJECTION, SOLUTION INTRAVENOUS; SUBCUTANEOUS at 06:51

## 2025-02-20 RX ADMIN — ASCORBIC ACID, VITAMIN A PALMITATE, CHOLECALCIFEROL, THIAMINE HYDROCHLORIDE, RIBOFLAVIN-5 PHOSPHATE SODIUM, PYRIDOXINE HYDROCHLORIDE, NIACINAMIDE, DEXPANTHENOL, ALPHA-TOCOPHEROL ACETATE, VITAMIN K1, FOLIC ACID, BIOTIN, CYANOCOBALAMIN: 200; 3300; 200; 6; 3.6; 6; 40; 15; 10; 150; 600; 60; 5 INJECTION, SOLUTION INTRAVENOUS at 21:52

## 2025-02-20 RX ADMIN — Medication 6 MG: at 21:52

## 2025-02-20 RX ADMIN — SMOFLIPID 50 G: 6; 6; 5; 3 INJECTION, EMULSION INTRAVENOUS at 21:52

## 2025-02-20 RX ADMIN — Medication 1 TABLET: at 08:58

## 2025-02-20 RX ADMIN — ONDANSETRON HYDROCHLORIDE 4 MG: 2 INJECTION, SOLUTION INTRAVENOUS at 09:39

## 2025-02-20 RX ADMIN — ONDANSETRON 4 MG: 2 INJECTION INTRAMUSCULAR; INTRAVENOUS at 09:39

## 2025-02-20 RX ADMIN — INSULIN LISPRO 1 UNITS: 100 INJECTION, SOLUTION INTRAVENOUS; SUBCUTANEOUS at 06:51

## 2025-02-20 RX ADMIN — HEPARIN SODIUM 5000 UNITS: 5000 INJECTION, SOLUTION INTRAVENOUS; SUBCUTANEOUS at 21:53

## 2025-02-20 ASSESSMENT — PAIN - FUNCTIONAL ASSESSMENT
PAIN_FUNCTIONAL_ASSESSMENT: 0-10
PAIN_FUNCTIONAL_ASSESSMENT: 0-10

## 2025-02-20 ASSESSMENT — COGNITIVE AND FUNCTIONAL STATUS - GENERAL
TURNING FROM BACK TO SIDE WHILE IN FLAT BAD: A LITTLE
MOVING TO AND FROM BED TO CHAIR: A LITTLE
TURNING FROM BACK TO SIDE WHILE IN FLAT BAD: A LITTLE
CLIMB 3 TO 5 STEPS WITH RAILING: A LOT
CLIMB 3 TO 5 STEPS WITH RAILING: TOTAL
DRESSING REGULAR LOWER BODY CLOTHING: A LITTLE
MOBILITY SCORE: 16
HELP NEEDED FOR BATHING: A LITTLE
STANDING UP FROM CHAIR USING ARMS: A LITTLE
MOBILITY SCORE: 18
MOVING FROM LYING ON BACK TO SITTING ON SIDE OF FLAT BED WITH BEDRAILS: A LITTLE
WALKING IN HOSPITAL ROOM: A LITTLE
DRESSING REGULAR UPPER BODY CLOTHING: A LITTLE
MOVING TO AND FROM BED TO CHAIR: A LITTLE
WALKING IN HOSPITAL ROOM: A LITTLE
STANDING UP FROM CHAIR USING ARMS: A LITTLE
DAILY ACTIVITIY SCORE: 21

## 2025-02-20 ASSESSMENT — PAIN SCALES - GENERAL
PAINLEVEL_OUTOF10: 0 - NO PAIN
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
## 2025-02-20 NOTE — PROGRESS NOTES
Nutrition Follow Up Assessment:   Nutrition Assessment    Pt remains on cycled TPN. Plan to continue with TPN and for pt to remain inpatient until ECF with neglibile output x7 days then to advance to diet.       Anthropometrics:  Weight History: last wt >7 days ago      Nutrition Focused Physical Exam Findings:  Edema:  Edema Location: bilat LE  Physical Findings:  Digestive System Findings:  (pressure area buttocks, right heel pressure area, ECF/abdominal wound, traumatic wounds)    Nutrition Significant Labs:  BG POCT trend:   Results from last 7 days   Lab Units 02/20/25  1209 02/20/25  0651 02/19/25  2327 02/19/25  1714 02/19/25  1321   POCT GLUCOSE mg/dL 121* 163* 170* 101* 139*    , Renal Lab Trend:   Results from last 7 days   Lab Units 02/19/25  1211 02/18/25  0833 02/17/25  1559 02/17/25  1559   POTASSIUM mmol/L 4.9 5.2  --  5.5*   PHOSPHORUS mg/dL 4.4 4.8   < >  --    SODIUM mmol/L 135* 135*  --  135*   MAGNESIUM mg/dL  --   --   --  1.80   EGFR mL/min/1.73m*2 48* 57*  --  46*   BUN mg/dL 57* 59*  --  60*   CREATININE mg/dL 1.50* 1.29  --  1.55*    < > = values in this interval not displayed.    , TPN/PPN Labs:   Results from last 7 days   Lab Units 02/19/25  1211 02/18/25  0833 02/17/25  1559 02/17/25  1559   GLUCOSE mg/dL 138* 117*  --  95   POTASSIUM mmol/L 4.9 5.2  --  5.5*   PHOSPHORUS mg/dL 4.4 4.8   < >  --    MAGNESIUM mg/dL  --   --   --  1.80   SODIUM mmol/L 135* 135*  --  135*   CHLORIDE mmol/L 104 105  --  104   ALT U/L  --   --   --  51   AST U/L  --   --   --  25   ALK PHOS U/L  --   --   --  187*   BILIRUBIN TOTAL mg/dL  --   --   --  1.7*    < > = values in this interval not displayed.        Nutrition Specific Medications:  Scheduled medications  fat emulsion fish oil/plant based, 250 mL, intravenous, Daily Lipids  insulin lispro, 0-5 Units, subcutaneous, q6h  iron sucrose, 100 mg, intravenous, Daily  multivitamin with minerals, 1 tablet, oral, Daily  pantoprazole, 40 mg, intravenous,  Daily      Continuous medications  Adult Clinimix TPN Cyclic, , Last Rate: Stopped (02/20/25 0857)        I/O:   Last BM Date: 02/18/25; Stool Appearance: Liquid, Mucous (02/19/25 2047)    Dietary Orders (From admission, onward)       Start     Ordered    02/06/25 0905  NPO Diet Except: Sips of clear liquids; Effective now  Diet effective now        Question:  Except:  Answer:  Sips of clear liquids    02/06/25 0905                     Estimated Needs:   Total Energy Estimated Needs in 24 hours (kCal):  (8276-7085)  Method for Estimating Needs: 25 -30  kcal/kg current wt  Total Protein Estimated Needs in 24 Hours (g): 115 g  Method for Estimating 24 Hour Protein Needs: 1.5+ g/kg current wt  Total Fluid Estimated Needs in 24 Hours (mL):  (per trauma)      Nutrition Diagnosis   Malnutrition Diagnosis  Patient has Malnutrition Diagnosis: Yes  Diagnosis Status: Active  Malnutrition Diagnosis: Moderate malnutrition related to acute disease or injury  As Evidenced by: mild muscle loss and mild subcutaneous fat loss.  Additional Assessment Information: During previous admission was unable to be diagnosed due to significant fluid retention            Nutrition Interventions/Recommendations   Nutrition prescription for parenteral nutrition    Nutrition Recommendations:  Individualized Nutrition Prescription Provided for : Continue with cycled AA 5% and dextrose 20% --> run @ 90 ml/hr x1 hr, 180 ml/hr x10 hr, 90 ml/hr x1 hr + 250 ml SMOF lipids = 2242 kcal, 99g protein, 2230 ml total volume    Nutrition Interventions/Goals:   Interventions: Parenteral nutrition  Parenteral Nutrition: Management of composition of parenteral nutrition, Management of schedule of parenteral nutrition  Goal: continue cycled TPN, wean off once able to advance diet         Nutrition Monitoring and Evaluation   Food/Nutrient Related History Monitoring  Monitoring and Evaluation Plan: Enteral and parenteral nutrition intake determination  Enteral and  Parenteral Nutrition Intake Determination: Parenteral nutrition intake - Tolerate TPN at goal rate, Parenteral nutrition intake - To meet > 75% estimated energy needs         Biochemical Data, Medical Tests and Procedures  Monitoring and Evaluation Plan: Electrolyte/renal panel, Glucose/endocrine profile  Electrolyte and Renal Panel: Electrolytes within normal limits  Glucose/Endocrine Profile: Glucose within normal limits ( mg/dL)         Goal Status: Goal(s) achieved    Time Spent (min): 30 minutes

## 2025-02-20 NOTE — PROGRESS NOTES
Physical Therapy    Physical Therapy Treatment    Patient Name: Ike Gar  MRN: [id number]  Today's Date: 2/20/2025  Room: [address] [place] [address]A  Time Calculation  Start Time: 1403  Stop Time: 1448  Time Calculation (min): 45 min       Assessment/Plan   PT Plan  Treatment/Interventions: Bed mobility, Transfer training, Gait training, Balance training, Neuromuscular re-education, Endurance training, Strengthening, Therapeutic exercise, Therapeutic activity  PT Plan: Ongoing PT  PT Frequency: 5 times per week  PT Discharge Recommendations: Moderate intensity level of continued care  Equipment Recommended upon Discharge: Wheeled walker  PT Recommended Transfer Status: Assistive device, Contact guard  PT - OK to Discharge: Yes    General Visit Information:   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  Past Medical History Relevant to Rehab: 1. Increased EC fistula output  2. Malnutrition s/p TPN  3. Recent T/L spine fxs  4. Anemia  5. Hx tracheostomy (1/7/2025)  6. Hx HTN  Prior to Session Communication: Bedside nurse  Patient Position Received: Bed, 3 rail up, Alarm on   Subjective: Pt alert and agreeable to PT. Report that mouth has been dry. Does not like sittin gin chair for extended period of time.  Precautions:  Precautions  Medical Precautions: Fall precautions, Spinal precautions  Post-Surgical Precautions: Abdominal surgery precautions  Precautions Comment: contact precautions  Vital Signs:      Objective   Pain:  Pain Assessment  0-10 (Numeric) Pain Score: 0 - No pain (post 0)  Cognition:  Cognition  Orientation Level: Oriented X4  Lines/Tubes/Drains:  PICC - Adult Double lumen Left Brachial vein (Active)   Number of days: 18       Closed/Suction Drain Midline RUQ 10 Fr. (Active)   Number of days: 36       Closed/Suction Drain Right;Anterior Hip Bulb (Active)   Number of days: 18       Gastrostomy/Enterostomy Percutaneous endoscopic gastrostomy (PEG) 1 20 Fr. LUQ (Active)   Number of days: 44        Ileostomy Other (Comment) RUQ (Active)   Number of days: 59        Continuous Medications/Drips:  Adult Clinimix TPN Cyclic, , Last Rate: Stopped (02/20/25 0857)        PT Treatments:  Therapeutic Exercise  Therapeutic Exercise Activity 1: standing hip flexion march x10        Bed Mobility 1  Bed Mobility 1: Supine to sitting  Level of Assistance 1: Close supervision  Bed Mobility Comments 1: x1 HOB 30  Ambulation/Gait Training 1  Surface 1: Level tile  Device 1: Rolling walker  Assistance 1: Contact guard  Quality of Gait 1: Wide base of support, Decreased step length, Inconsistent stride length  Comments/Distance (ft) 1: x120 feet total (5 seated rest breaks with chair follow, bouts of 10-25 feet)  Transfer 1  Transfer From 1: Sit to  Transfer to 1: Stand  Transfer Device 1: Walker  Transfer Level of Assistance 1: Close supervision  Trials/Comments 1: x7  Transfers 2  Transfer From 2: Stand to  Transfer to 2: Sit  Transfer Device 2: Walker  Transfer Level of Assistance 2: Close supervision  Trials/Comments 2: x7  Transfers 3  Transfer From 3: Bed to  Transfer to 3: Chair with arms  Transfer Device 3: Walker  Transfer Level of Assistance 3: Close supervision  Trials/Comments 3: x1             Outcome Measures:  [facility] Basic Mobility  Turning from your back to your side while in a flat bed without using bedrails: A little  Moving from lying on your back to sitting on the side of a flat bed without using bedrails: A little  Moving to and from bed to chair (including a wheelchair): A little  Standing up from a chair using your arms (e.g. wheelchair or bedside chair): A little  To walk in hospital room: A little  Climbing 3-5 steps with railing: Total  Basic Mobility - Total Score: 16                            Education Documentation  Precautions, taught by JANET Atkins at 2/20/2025  3:45 PM.  Learner: Patient  Readiness: Acceptance  Method: Explanation  Response: Verbalizes Understanding  Comment: POC    Body  Mechanics, taught by JANET Brand at 2/20/2025  3:45 PM.  Learner: Patient  Readiness: Acceptance  Method: Explanation  Response: Verbalizes Understanding  Comment: POC    Mobility Training, taught by JANET Brand at 2/20/2025  3:45 PM.  Learner: Patient  Readiness: Acceptance  Method: Explanation  Response: Verbalizes Understanding  Comment: POC    Education Comments  No comments found.          OP EDUCATION:       Encounter Problems       Encounter Problems (Active)       PT Problem       Patient will complete supine to sit and sit to supine Supervision while maintaining spinal precautions (Progressing)       Start:  02/03/25    Expected End:  02/17/25            Patient will perform sit<>stand transfer with LRAD, and Supervision while maintaining spinal precautions  (Progressing)       Start:  02/03/25    Expected End:  02/17/25            Patient will ambulate >100' with LRAD and Supervision  (Progressing)       Start:  02/03/25    Expected End:  02/17/25                   Assessment: Patient is progressing Well with therapy this date.  Pt progressed transfers and gait endurance on todays date. Pt increased total distance ambulated, limited by SOB requiring therapeutic seated rest breaks. Will continue to progress tolerance to upright activity, gait endurance, and stairs while still in hospital. Patient remains appropriate for MOD intensity therapy when medically appropriate for discharge from acute stay.  Will continue to follow.      02/20/25 at 4:05 PM   JANET BRAND   Rehab Office: [phone]

## 2025-02-20 NOTE — PROGRESS NOTES
Patient discussed during morning rounds. Team discussed potentially keeping patient, inpatient until TPN is no longer required, as it is a barrier for discharge. Patient does have an accepting facility ([facility]), but facility has a lot of questions. Ideally, family would prefer for patient to discharge to [facility], where patient's wife was, but the facility can't accommodate patients on TPN. Attending to discuss this plan with patient and son. SW will continue to follow to facilitate discharge plan.       AMANDA Browne

## 2025-02-20 NOTE — CARE PLAN
The patient's goals for the shift include to sleep    The clinical goals for the shift include Pt to remain HDS and safe during shift      Problem: Skin  Goal: Decreased wound size/increased tissue granulation at next dressing change  Outcome: Progressing  Flowsheets (Taken 2/20/2025 0039)  Decreased wound size/increased tissue granulation at next dressing change:   Promote sleep for wound healing   Protective dressings over bony prominences  Goal: Participates in plan/prevention/treatment measures  Outcome: Progressing  Flowsheets (Taken 2/20/2025 0039)  Participates in plan/prevention/treatment measures:   Elevate heels   Increase activity/out of bed for meals  Goal: Prevent/manage excess moisture  Outcome: Progressing  Flowsheets (Taken 2/20/2025 0039)  Prevent/manage excess moisture:   Follow provider orders for dressing changes   Monitor for/manage infection if present   Moisturize dry skin  Goal: Prevent/minimize sheer/friction injuries  Outcome: Progressing  Flowsheets (Taken 2/20/2025 0039)  Prevent/minimize sheer/friction injuries:   Increase activity/out of bed for meals   Complete micro-shifts as needed if patient unable. Adjust patient position to relieve pressure points, not a full turn  Goal: Promote/optimize nutrition  Outcome: Progressing  Flowsheets (Taken 2/20/2025 0039)  Promote/optimize nutrition:   Monitor/record intake including meals   Discuss with provider if NPO > 2 days   Reassess MST if dietician not consulted  Goal: Promote skin healing  Outcome: Progressing  Flowsheets (Taken 2/20/2025 0039)  Promote skin healing:   Protective dressings over bony prominences   Assess skin/pad under line(s)/device(s)   Ensure correct size (line/device) and apply per  instructions   Rotate device position/do not position patient on device     Problem: Safety - Adult  Goal: Free from fall injury  Outcome: Progressing     Problem: Discharge Planning  Goal: Discharge to home or other facility with  appropriate resources  Outcome: Progressing     Problem: Chronic Conditions and Co-morbidities  Goal: Patient's chronic conditions and co-morbidity symptoms are monitored and maintained or improved  Outcome: Progressing     Problem: Nutrition  Goal: Nutrient intake appropriate for maintaining nutritional needs  Outcome: Progressing     Problem: Respiratory  Goal: Clear secretions with interventions this shift  Outcome: Progressing  Goal: Minimize anxiety/maximize coping throughout shift  Outcome: Progressing  Goal: Minimal/no exertional discomfort or dyspnea this shift  Outcome: Progressing  Goal: No signs of respiratory distress (eg. Use of accessory muscles. Peds grunting)  Outcome: Progressing  Goal: Patent airway maintained this shift  Outcome: Progressing  Goal: Tolerate mechanical ventilation evidenced by VS/agitation level this shift  Outcome: Progressing  Goal: Tolerate pulmonary toileting this shift  Outcome: Progressing  Goal: Verbalize decreased shortness of breath this shift  Outcome: Progressing  Goal: Wean oxygen to maintain O2 saturation per order/standard this shift  Outcome: Progressing  Goal: Increase self care and/or family involvement in next 24 hours  Outcome: Progressing

## 2025-02-20 NOTE — CARE PLAN
The patient's goals for the shift include to sleep    The clinical goals for the shift include Pt to remain HDS and safe during shift

## 2025-02-21 ENCOUNTER — APPOINTMENT (OUTPATIENT)
Dept: RADIOLOGY | Facility: HOSPITAL | Age: 80
End: 2025-02-21
Payer: MEDICARE

## 2025-02-21 LAB
ALBUMIN SERPL BCP-MCNC: 2.7 G/DL (ref 3.4–5)
ALP SERPL-CCNC: 175 U/L (ref 33–136)
ALT SERPL W P-5'-P-CCNC: 51 U/L (ref 10–52)
ANION GAP SERPL CALC-SCNC: 12 MMOL/L (ref 10–20)
AST SERPL W P-5'-P-CCNC: 33 U/L (ref 9–39)
BASOPHILS # BLD AUTO: 0.04 X10*3/UL (ref 0–0.1)
BASOPHILS NFR BLD AUTO: 0.6 %
BILIRUB SERPL-MCNC: 1.2 MG/DL (ref 0–1.2)
BUN SERPL-MCNC: 64 MG/DL (ref 6–23)
CALCIUM SERPL-MCNC: 8.9 MG/DL (ref 8.6–10.6)
CHLORIDE SERPL-SCNC: 104 MMOL/L (ref 98–107)
CO2 SERPL-SCNC: 22 MMOL/L (ref 21–32)
CREAT SERPL-MCNC: 1.62 MG/DL (ref 0.5–1.3)
EGFRCR SERPLBLD CKD-EPI 2021: 43 ML/MIN/1.73M*2
EOSINOPHIL # BLD AUTO: 0.02 X10*3/UL (ref 0–0.4)
EOSINOPHIL NFR BLD AUTO: 0.3 %
ERYTHROCYTE [DISTWIDTH] IN BLOOD BY AUTOMATED COUNT: 15.2 % (ref 11.5–14.5)
GLUCOSE BLD MANUAL STRIP-MCNC: 109 MG/DL (ref 74–99)
GLUCOSE BLD MANUAL STRIP-MCNC: 169 MG/DL (ref 74–99)
GLUCOSE BLD MANUAL STRIP-MCNC: 186 MG/DL (ref 74–99)
GLUCOSE SERPL-MCNC: 137 MG/DL (ref 74–99)
HCT VFR BLD AUTO: 29.1 % (ref 41–52)
HGB BLD-MCNC: 9.1 G/DL (ref 13.5–17.5)
IMM GRANULOCYTES # BLD AUTO: 0.05 X10*3/UL (ref 0–0.5)
IMM GRANULOCYTES NFR BLD AUTO: 0.8 % (ref 0–0.9)
LYMPHOCYTES # BLD AUTO: 1.51 X10*3/UL (ref 0.8–3)
LYMPHOCYTES NFR BLD AUTO: 22.9 %
MAGNESIUM SERPL-MCNC: 1.79 MG/DL (ref 1.6–2.4)
MCH RBC QN AUTO: 30.7 PG (ref 26–34)
MCHC RBC AUTO-ENTMCNC: 31.3 G/DL (ref 32–36)
MCV RBC AUTO: 98 FL (ref 80–100)
MONOCYTES # BLD AUTO: 0.65 X10*3/UL (ref 0.05–0.8)
MONOCYTES NFR BLD AUTO: 9.9 %
NEUTROPHILS # BLD AUTO: 4.32 X10*3/UL (ref 1.6–5.5)
NEUTROPHILS NFR BLD AUTO: 65.5 %
NRBC BLD-RTO: 0 /100 WBCS (ref 0–0)
PLATELET # BLD AUTO: 95 X10*3/UL (ref 150–450)
POTASSIUM SERPL-SCNC: 4.9 MMOL/L (ref 3.5–5.3)
PROT SERPL-MCNC: 6.7 G/DL (ref 6.4–8.2)
RBC # BLD AUTO: 2.96 X10*6/UL (ref 4.5–5.9)
SODIUM SERPL-SCNC: 133 MMOL/L (ref 136–145)
WBC # BLD AUTO: 6.6 X10*3/UL (ref 4.4–11.3)

## 2025-02-21 PROCEDURE — 85025 COMPLETE CBC W/AUTO DIFF WBC: CPT | Performed by: PHYSICIAN ASSISTANT

## 2025-02-21 PROCEDURE — 82947 ASSAY GLUCOSE BLOOD QUANT: CPT

## 2025-02-21 PROCEDURE — 71045 X-RAY EXAM CHEST 1 VIEW: CPT

## 2025-02-21 PROCEDURE — 83735 ASSAY OF MAGNESIUM: CPT | Performed by: PHYSICIAN ASSISTANT

## 2025-02-21 PROCEDURE — 2500000001 HC RX 250 WO HCPCS SELF ADMINISTERED DRUGS (ALT 637 FOR MEDICARE OP): Performed by: NURSE PRACTITIONER

## 2025-02-21 PROCEDURE — 1100000001 HC PRIVATE ROOM DAILY

## 2025-02-21 PROCEDURE — 97110 THERAPEUTIC EXERCISES: CPT | Mod: GP | Performed by: STUDENT IN AN ORGANIZED HEALTH CARE EDUCATION/TRAINING PROGRAM

## 2025-02-21 PROCEDURE — 97530 THERAPEUTIC ACTIVITIES: CPT | Mod: GP | Performed by: STUDENT IN AN ORGANIZED HEALTH CARE EDUCATION/TRAINING PROGRAM

## 2025-02-21 PROCEDURE — 2500000002 HC RX 250 W HCPCS SELF ADMINISTERED DRUGS (ALT 637 FOR MEDICARE OP, ALT 636 FOR OP/ED): Performed by: PHYSICIAN ASSISTANT

## 2025-02-21 PROCEDURE — 2500000004 HC RX 250 GENERAL PHARMACY W/ HCPCS (ALT 636 FOR OP/ED): Performed by: PHYSICIAN ASSISTANT

## 2025-02-21 PROCEDURE — 2500000004 HC RX 250 GENERAL PHARMACY W/ HCPCS (ALT 636 FOR OP/ED): Performed by: SURGERY

## 2025-02-21 PROCEDURE — 80053 COMPREHEN METABOLIC PANEL: CPT | Performed by: PHYSICIAN ASSISTANT

## 2025-02-21 PROCEDURE — 2580000001 HC RX 258 IV SOLUTIONS

## 2025-02-21 PROCEDURE — 2500000001 HC RX 250 WO HCPCS SELF ADMINISTERED DRUGS (ALT 637 FOR MEDICARE OP)

## 2025-02-21 PROCEDURE — 2500000005 HC RX 250 GENERAL PHARMACY W/O HCPCS

## 2025-02-21 PROCEDURE — 71045 X-RAY EXAM CHEST 1 VIEW: CPT | Performed by: RADIOLOGY

## 2025-02-21 PROCEDURE — 2500000004 HC RX 250 GENERAL PHARMACY W/ HCPCS (ALT 636 FOR OP/ED)

## 2025-02-21 RX ORDER — OXYMETAZOLINE HCL 0.05 %
2 SPRAY, NON-AEROSOL (ML) NASAL EVERY 12 HOURS PRN
Status: DISPENSED | OUTPATIENT
Start: 2025-02-21 | End: 2025-02-24

## 2025-02-21 RX ORDER — ONDANSETRON HYDROCHLORIDE 2 MG/ML
4 INJECTION, SOLUTION INTRAVENOUS EVERY 6 HOURS PRN
Status: DISCONTINUED | OUTPATIENT
Start: 2025-02-21 | End: 2025-03-02 | Stop reason: HOSPADM

## 2025-02-21 RX ADMIN — HEPARIN SODIUM 5000 UNITS: 5000 INJECTION, SOLUTION INTRAVENOUS; SUBCUTANEOUS at 06:38

## 2025-02-21 RX ADMIN — HEPARIN SODIUM 5000 UNITS: 5000 INJECTION, SOLUTION INTRAVENOUS; SUBCUTANEOUS at 21:55

## 2025-02-21 RX ADMIN — SODIUM CHLORIDE 1000 ML: 9 INJECTION, SOLUTION INTRAVENOUS at 17:35

## 2025-02-21 RX ADMIN — Medication 1 TABLET: at 10:09

## 2025-02-21 RX ADMIN — HEPARIN SODIUM 5000 UNITS: 5000 INJECTION, SOLUTION INTRAVENOUS; SUBCUTANEOUS at 13:04

## 2025-02-21 RX ADMIN — IRON SUCROSE 100 MG: 20 INJECTION, SOLUTION INTRAVENOUS at 17:34

## 2025-02-21 RX ADMIN — SMOFLIPID 50 G: 6; 6; 5; 3 INJECTION, EMULSION INTRAVENOUS at 20:17

## 2025-02-21 RX ADMIN — ONDANSETRON 4 MG: 2 INJECTION INTRAMUSCULAR; INTRAVENOUS at 17:34

## 2025-02-21 RX ADMIN — Medication 1 SPRAY: at 13:04

## 2025-02-21 RX ADMIN — PANTOPRAZOLE SODIUM 40 MG: 40 INJECTION, POWDER, FOR SOLUTION INTRAVENOUS at 10:09

## 2025-02-21 RX ADMIN — Medication 6 MG: at 20:17

## 2025-02-21 RX ADMIN — INSULIN LISPRO 1 UNITS: 100 INJECTION, SOLUTION INTRAVENOUS; SUBCUTANEOUS at 13:04

## 2025-02-21 RX ADMIN — INSULIN LISPRO 1 UNITS: 100 INJECTION, SOLUTION INTRAVENOUS; SUBCUTANEOUS at 06:38

## 2025-02-21 ASSESSMENT — COGNITIVE AND FUNCTIONAL STATUS - GENERAL
MOBILITY SCORE: 16
TURNING FROM BACK TO SIDE WHILE IN FLAT BAD: A LITTLE
CLIMB 3 TO 5 STEPS WITH RAILING: TOTAL
WALKING IN HOSPITAL ROOM: A LITTLE
MOVING TO AND FROM BED TO CHAIR: A LITTLE
HELP NEEDED FOR BATHING: A LITTLE
DRESSING REGULAR UPPER BODY CLOTHING: A LITTLE
STANDING UP FROM CHAIR USING ARMS: A LITTLE
TURNING FROM BACK TO SIDE WHILE IN FLAT BAD: A LITTLE
WALKING IN HOSPITAL ROOM: A LITTLE
MOVING TO AND FROM BED TO CHAIR: A LITTLE
DAILY ACTIVITIY SCORE: 21
MOBILITY SCORE: 18
CLIMB 3 TO 5 STEPS WITH RAILING: A LOT
MOVING FROM LYING ON BACK TO SITTING ON SIDE OF FLAT BED WITH BEDRAILS: A LITTLE
DRESSING REGULAR LOWER BODY CLOTHING: A LITTLE
STANDING UP FROM CHAIR USING ARMS: A LITTLE

## 2025-02-21 ASSESSMENT — PAIN SCALES - GENERAL
PAINLEVEL_OUTOF10: 0 - NO PAIN
PAINLEVEL_OUTOF10: 2
PAINLEVEL_OUTOF10: 4

## 2025-02-21 ASSESSMENT — PAIN - FUNCTIONAL ASSESSMENT
PAIN_FUNCTIONAL_ASSESSMENT: 0-10
PAIN_FUNCTIONAL_ASSESSMENT: 0-10

## 2025-02-21 ASSESSMENT — PAIN DESCRIPTION - DESCRIPTORS: DESCRIPTORS: SHARP

## 2025-02-21 NOTE — CARE PLAN
The patient's goals for the shift include to sleep    The clinical goals for the shift include Remain free from falls

## 2025-02-21 NOTE — PROGRESS NOTES
Transitional Care Coordinator Note: Patient discussed in morning rounds, per medical team (trauma) patient is not medically ready. Pending scans for possible drain removal. Per team plan to stop TPN once drain is removed. Discharge dispo: Plan for patient to discharge to SNF, pending accepting facility.     Elpidio Wood RN BSN   Transitional Care Coordinator

## 2025-02-21 NOTE — CARE PLAN
The patient's goals for the shift include to sleep    The clinical goals for the shift include Pt to remain HDS during shift      Problem: Skin  Goal: Decreased wound size/increased tissue granulation at next dressing change  Outcome: Progressing  Flowsheets (Taken 2/21/2025 0117)  Decreased wound size/increased tissue granulation at next dressing change:   Promote sleep for wound healing   Protective dressings over bony prominences  Goal: Participates in plan/prevention/treatment measures  Outcome: Progressing  Flowsheets (Taken 2/21/2025 0117)  Participates in plan/prevention/treatment measures: Increase activity/out of bed for meals  Goal: Prevent/manage excess moisture  Outcome: Progressing  Flowsheets (Taken 2/21/2025 0117)  Prevent/manage excess moisture:   Monitor for/manage infection if present   Follow provider orders for dressing changes  Goal: Prevent/minimize sheer/friction injuries  Outcome: Progressing  Flowsheets (Taken 2/21/2025 0117)  Prevent/minimize sheer/friction injuries: Increase activity/out of bed for meals  Goal: Promote/optimize nutrition  Outcome: Progressing  Flowsheets (Taken 2/21/2025 0117)  Promote/optimize nutrition:   Monitor/record intake including meals   Reassess MST if dietician not consulted   Discuss with provider if NPO > 2 days  Goal: Promote skin healing  Outcome: Progressing  Flowsheets (Taken 2/21/2025 0117)  Promote skin healing:   Protective dressings over bony prominences   Assess skin/pad under line(s)/device(s)   Ensure correct size (line/device) and apply per  instructions   Rotate device position/do not position patient on device     Problem: Safety - Adult  Goal: Free from fall injury  Outcome: Progressing     Problem: Discharge Planning  Goal: Discharge to home or other facility with appropriate resources  Outcome: Progressing     Problem: Chronic Conditions and Co-morbidities  Goal: Patient's chronic conditions and co-morbidity symptoms are monitored  and maintained or improved  Outcome: Progressing     Problem: Nutrition  Goal: Nutrient intake appropriate for maintaining nutritional needs  Outcome: Progressing     Problem: Respiratory  Goal: Clear secretions with interventions this shift  Outcome: Progressing  Goal: Minimize anxiety/maximize coping throughout shift  Outcome: Progressing  Goal: Minimal/no exertional discomfort or dyspnea this shift  Outcome: Progressing  Goal: No signs of respiratory distress (eg. Use of accessory muscles. Peds grunting)  Outcome: Progressing  Goal: Patent airway maintained this shift  Outcome: Progressing  Goal: Tolerate mechanical ventilation evidenced by VS/agitation level this shift  Outcome: Progressing  Goal: Tolerate pulmonary toileting this shift  Outcome: Progressing  Goal: Verbalize decreased shortness of breath this shift  Outcome: Progressing  Goal: Wean oxygen to maintain O2 saturation per order/standard this shift  Outcome: Progressing  Goal: Increase self care and/or family involvement in next 24 hours  Outcome: Progressing

## 2025-02-21 NOTE — PROGRESS NOTES
Physical Therapy    Physical Therapy Treatment    Patient Name: Ike Gar  MRN: [id number]  Today's Date: 2/21/2025  Room: [address] [place], [place] [address]  Time Calculation  Start Time: 1440  Stop Time: 1506  Time Calculation (min): 26 min       Assessment/Plan   PT Plan  Treatment/Interventions: Bed mobility, Transfer training, Gait training, Balance training, Neuromuscular re-education, Endurance training, Strengthening, Therapeutic exercise, Therapeutic activity  PT Plan: Ongoing PT  PT Frequency: 5 times per week  PT Discharge Recommendations: Moderate intensity level of continued care  Equipment Recommended upon Discharge: Wheeled walker  PT Recommended Transfer Status: Assistive device, Contact guard  PT - OK to Discharge: Yes    General Visit Information:   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  Past Medical History Relevant to Rehab: 1. Increased EC fistula output  2. Malnutrition s/p TPN  3. Recent T/L spine fxs  4. Anemia  5. Hx tracheostomy (1/7/2025)  6. Hx HTN  Prior to Session Communication: Bedside nurse  Patient Position Received: Bed, 3 rail up  Family/Caregiver Present: Yes  Caregiver Feedback: wife and son, supportive   Subjective: Pt alert, however needed encouragement to participate in PT. States that pain medication made him feel loopy and drowsy, has not felt like himself all day. Reports wanting to throw up earlier, currently nauseous and endorsing new abdominal pain. Woke up in a cold sweat last night, has had varying temperature fluctuations of being hot and cold throughout day.   Precautions:  Precautions  Medical Precautions: Fall precautions, Spinal precautions  Post-Surgical Precautions: Abdominal surgery precautions  Precautions Comment: contact precautions  Vital Signs:      Objective   Pain:  Pain Assessment  0-10 (Numeric) Pain Score: 4 (post 4)  Pain Type: Acute pain, Surgical pain  Pain Location: Abdomen  Pain Orientation: Anterior  Pain Descriptors: Sharp  Pain Frequency:  Constant/continuous  Clinical Progression: Not changed  Pain Interventions: Repositioned  Response to Interventions: Content/relaxed, No change in pain  Cognition:  Cognition  Orientation Level: Oriented X4  Lines/Tubes/Drains:  PICC - Adult Double lumen Left Brachial vein (Active)   Number of days: 19       Closed/Suction Drain Midline RUQ 10 Fr. (Active)   Number of days: 37       Closed/Suction Drain Right;Anterior Hip Bulb (Active)   Number of days: 19       Gastrostomy/Enterostomy Percutaneous endoscopic gastrostomy (PEG) 1 20 Fr. LUQ (Active)   Number of days: 45       Ileostomy Other (Comment) RUQ (Active)   Number of days: 60     Medical Gas Therapy: None (Room air)  Continuous Medications/Drips:  Adult Clinimix TPN Cyclic, , Last Rate: 182 mL/hr at 02/21/25 0939        PT Treatments:  Therapeutic Exercise  Therapeutic Exercise Activity 1: standing hip flexion with yellow TB x10, standing hip abd with yellow TB x10  Therapeutic Exercise Activity 2: seated hip abd 2x10 (yellow TB), seated bicep curl x10  Therapeutic Activity  Therapeutic Activity 1: EOB x20 min supervision     Bed Mobility 1  Bed Mobility 1: Supine to sitting  Level of Assistance 1: Close supervision  Bed Mobility Comments 1: x1 HOB 30  Bed Mobility 2  Bed Mobility  2: Sitting to supine  Level of Assistance 2: Close supervision  Bed Mobility Comments 2: x1 HOB 30     Transfer 1  Transfer From 1: Sit to  Transfer to 1: Stand  Transfer Device 1: Walker  Transfer Level of Assistance 1: Close supervision  Trials/Comments 1: x4  Transfers 2  Transfer From 2: Stand to  Transfer to 2: Sit  Transfer Device 2: Walker  Transfer Level of Assistance 2: Close supervision  Trials/Comments 2: x4  Transfers 3  Transfer From 3: Bed to  Transfer to 3: Bed  Transfer Device 3: Walker  Transfer Level of Assistance 3: Close supervision  Trials/Comments 3: x3             Outcome Measures:  [facility] Basic Mobility  Turning from your back to your side while in a flat  bed without using bedrails: A little  Moving from lying on your back to sitting on the side of a flat bed without using bedrails: A little  Moving to and from bed to chair (including a wheelchair): A little  Standing up from a chair using your arms (e.g. wheelchair or bedside chair): A little  To walk in hospital room: A little  Climbing 3-5 steps with railing: Total  Basic Mobility - Total Score: 16                            Education Documentation  Precautions, taught by JANET Atkins at 2/21/2025  3:18 PM.  Learner: Patient  Readiness: Acceptance  Method: Explanation  Response: Verbalizes Understanding  Comment: POC    Body Mechanics, taught by JANET Atkins at 2/21/2025  3:18 PM.  Learner: Patient  Readiness: Acceptance  Method: Explanation  Response: Verbalizes Understanding  Comment: POC    Mobility Training, taught by JANET Atkins at 2/21/2025  3:18 PM.  Learner: Patient  Readiness: Acceptance  Method: Explanation  Response: Verbalizes Understanding  Comment: POC    Education Comments  No comments found.          OP EDUCATION:       Encounter Problems       Encounter Problems (Active)       PT Problem       Patient will complete supine to sit and sit to supine Supervision while maintaining spinal precautions (Progressing)       Start:  02/03/25    Expected End:  02/17/25            Patient will perform sit<>stand transfer with LRAD, and Supervision while maintaining spinal precautions  (Progressing)       Start:  02/03/25    Expected End:  02/17/25            Patient will ambulate >100' with LRAD and Supervision  (Progressing)       Start:  02/03/25    Expected End:  02/17/25                   Assessment: Patient is progressing Fairly with therapy this date.  Pt progressed bed mobility, transfers, and tolerance to upright activity on todays date. Limited by decreased standing endurance, SOB with upright activity, and B LE weakness. Educated pt on importance of OOB mobility, and upright  activity with pt demonstrating understanding. Will continue progressing bed mobility, transfers, and gait while still in hospital. Patient remains appropriate for MOD intensity therapy when medically appropriate for discharge from acute stay.  Will continue to follow.      02/21/25 at 3:19 PM   JONATHAN BRAND-PT   Rehab Office: [phone]
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
## 2025-02-22 ENCOUNTER — APPOINTMENT (OUTPATIENT)
Dept: RADIOLOGY | Facility: HOSPITAL | Age: 80
End: 2025-02-22
Payer: MEDICARE

## 2025-02-22 LAB
GLUCOSE BLD MANUAL STRIP-MCNC: 104 MG/DL (ref 74–99)
GLUCOSE BLD MANUAL STRIP-MCNC: 127 MG/DL (ref 74–99)
GLUCOSE BLD MANUAL STRIP-MCNC: 139 MG/DL (ref 74–99)
GLUCOSE BLD MANUAL STRIP-MCNC: 153 MG/DL (ref 74–99)
GLUCOSE BLD MANUAL STRIP-MCNC: 84 MG/DL (ref 74–99)

## 2025-02-22 PROCEDURE — 2500000004 HC RX 250 GENERAL PHARMACY W/ HCPCS (ALT 636 FOR OP/ED)

## 2025-02-22 PROCEDURE — 82947 ASSAY GLUCOSE BLOOD QUANT: CPT

## 2025-02-22 PROCEDURE — 2500000005 HC RX 250 GENERAL PHARMACY W/O HCPCS

## 2025-02-22 PROCEDURE — 2550000001 HC RX 255 CONTRASTS

## 2025-02-22 PROCEDURE — A9698 NON-RAD CONTRAST MATERIALNOC: HCPCS

## 2025-02-22 PROCEDURE — 74176 CT ABD & PELVIS W/O CONTRAST: CPT | Performed by: STUDENT IN AN ORGANIZED HEALTH CARE EDUCATION/TRAINING PROGRAM

## 2025-02-22 PROCEDURE — 2580000001 HC RX 258 IV SOLUTIONS

## 2025-02-22 PROCEDURE — 1100000001 HC PRIVATE ROOM DAILY

## 2025-02-22 PROCEDURE — 2500000001 HC RX 250 WO HCPCS SELF ADMINISTERED DRUGS (ALT 637 FOR MEDICARE OP): Performed by: NURSE PRACTITIONER

## 2025-02-22 PROCEDURE — 2500000002 HC RX 250 W HCPCS SELF ADMINISTERED DRUGS (ALT 637 FOR MEDICARE OP, ALT 636 FOR OP/ED): Performed by: PHYSICIAN ASSISTANT

## 2025-02-22 PROCEDURE — 74176 CT ABD & PELVIS W/O CONTRAST: CPT

## 2025-02-22 RX ADMIN — Medication 1 TABLET: at 09:36

## 2025-02-22 RX ADMIN — INSULIN LISPRO 1 UNITS: 100 INJECTION, SOLUTION INTRAVENOUS; SUBCUTANEOUS at 06:02

## 2025-02-22 RX ADMIN — SMOFLIPID 50 G: 6; 6; 5; 3 INJECTION, EMULSION INTRAVENOUS at 19:51

## 2025-02-22 RX ADMIN — HEPARIN SODIUM 5000 UNITS: 5000 INJECTION, SOLUTION INTRAVENOUS; SUBCUTANEOUS at 05:52

## 2025-02-22 RX ADMIN — Medication 6 MG: at 20:54

## 2025-02-22 RX ADMIN — PANTOPRAZOLE SODIUM 40 MG: 40 INJECTION, POWDER, FOR SOLUTION INTRAVENOUS at 09:36

## 2025-02-22 RX ADMIN — HEPARIN SODIUM 5000 UNITS: 5000 INJECTION, SOLUTION INTRAVENOUS; SUBCUTANEOUS at 14:15

## 2025-02-22 RX ADMIN — HEPARIN SODIUM 5000 UNITS: 5000 INJECTION, SOLUTION INTRAVENOUS; SUBCUTANEOUS at 20:54

## 2025-02-22 RX ADMIN — ASCORBIC ACID, VITAMIN A PALMITATE, CHOLECALCIFEROL, THIAMINE HYDROCHLORIDE, RIBOFLAVIN-5 PHOSPHATE SODIUM, PYRIDOXINE HYDROCHLORIDE, NIACINAMIDE, DEXPANTHENOL, ALPHA-TOCOPHEROL ACETATE, VITAMIN K1, FOLIC ACID, BIOTIN, CYANOCOBALAMIN: 200; 3300; 200; 6; 3.6; 6; 40; 15; 10; 150; 600; 60; 5 INJECTION, SOLUTION INTRAVENOUS at 19:51

## 2025-02-22 RX ADMIN — ASCORBIC ACID, VITAMIN A PALMITATE, CHOLECALCIFEROL, THIAMINE HYDROCHLORIDE, RIBOFLAVIN-5 PHOSPHATE SODIUM, PYRIDOXINE HYDROCHLORIDE, NIACINAMIDE, DEXPANTHENOL, ALPHA-TOCOPHEROL ACETATE, VITAMIN K1, FOLIC ACID, BIOTIN, CYANOCOBALAMIN: 200; 3300; 200; 6; 3.6; 6; 40; 15; 10; 150; 600; 60; 5 INJECTION, SOLUTION INTRAVENOUS at 01:04

## 2025-02-22 RX ADMIN — IOHEXOL 500 ML: 12 SOLUTION ORAL at 12:43

## 2025-02-22 ASSESSMENT — COGNITIVE AND FUNCTIONAL STATUS - GENERAL
CLIMB 3 TO 5 STEPS WITH RAILING: A LOT
DRESSING REGULAR UPPER BODY CLOTHING: A LITTLE
DAILY ACTIVITIY SCORE: 21
MOVING TO AND FROM BED TO CHAIR: A LITTLE
MOBILITY SCORE: 18
DRESSING REGULAR LOWER BODY CLOTHING: A LITTLE
TURNING FROM BACK TO SIDE WHILE IN FLAT BAD: A LITTLE
HELP NEEDED FOR BATHING: A LITTLE
WALKING IN HOSPITAL ROOM: A LITTLE
STANDING UP FROM CHAIR USING ARMS: A LITTLE

## 2025-02-22 ASSESSMENT — PAIN SCALES - WONG BAKER: WONGBAKER_NUMERICALRESPONSE: NO HURT

## 2025-02-22 ASSESSMENT — PAIN SCALES - GENERAL: PAINLEVEL_OUTOF10: 0 - NO PAIN

## 2025-02-22 ASSESSMENT — PAIN - FUNCTIONAL ASSESSMENT: PAIN_FUNCTIONAL_ASSESSMENT: WONG-BAKER FACES

## 2025-02-22 NOTE — CONSULTS
Wound Care Consult     Visit Date: 2025      Patient Name: Ike Gar         MRN: [id number]           YOB: 1945     Reason for Consult: Assessment of s/p ileostomy and change of pouch        Wound History:  79 year old male presents as a direct admit to trauma service from [facility] for high output EC fistula and malnutrition.   Ileostomy     25 1200    Ileostomy Other (Comment) RUQ   Placement Date/Time: 24 1122   Placed by: hilary ZULUAGA  Hand Hygiene Completed: Yes  Ileostomy Type: (c) Other (Comment)  Location: RUQ   Stomal Appliance Changed;2 piece   Site/Stoma Assessment Pink;Budded   Stoma Size (cm)    (1 3/8)   Peristomal Assessment Red   Treatment Pouch change;Site care   Output Description Brown;Liquid         Wound Team Summary Assessment:   Ostomy type: ileostomy    size: 1 3/8        color: pink, moist        protruding: budded, os facing downward  Ricky: none  Functioning: yes, brown liquid output  Mucocutaneous junction: intact  Peristomal skin: intact, small red area, no maceration or open areas. Treated with 3M cavilon prior to pouch placement  Pouchin piece Renick flat with ostomy ring and high output pouch   Plan: assess stoma/pouching      Wound Team Plan: Wound team will continue to follow 2 times a week for pouch change.     SUZANNE GERHARDT, RN, BSN, CWOCN  2025  3:27 PM

## 2025-02-22 NOTE — CARE PLAN
The patient's goals for the shift include to sleep    The clinical goals for the shift include Remain free from falls    Over the shift, the patient did make progress toward the following goals. Barriers to progression include not requesting help when needed. Recommendations to address these barriers include calling staff for assistance.
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
## 2025-02-22 NOTE — NURSING NOTE
PT was scheduled for Cyclic TPN patient bin in fridge was checked and there were two bags of TPN neither was labeled for this patient. Pharmacy dose request was placed

## 2025-02-22 NOTE — NURSING NOTE
Patient tpn was periodically looked for in the patient bin in the fridge but it was not brought up after dose request so pharmacy was called and they stated that it was delivered to the floor at 1400 on 2/21 but it was not there when attempting to pull it for patient administration. Pharmacy states they are not able to create new dose overnight

## 2025-02-23 LAB
ANION GAP SERPL CALC-SCNC: 9 MMOL/L (ref 10–20)
BUN SERPL-MCNC: 59 MG/DL (ref 6–23)
CALCIUM SERPL-MCNC: 8.9 MG/DL (ref 8.6–10.6)
CHLORIDE SERPL-SCNC: 104 MMOL/L (ref 98–107)
CO2 SERPL-SCNC: 24 MMOL/L (ref 21–32)
CREAT SERPL-MCNC: 1.32 MG/DL (ref 0.5–1.3)
EGFRCR SERPLBLD CKD-EPI 2021: 56 ML/MIN/1.73M*2
GLUCOSE BLD MANUAL STRIP-MCNC: 110 MG/DL (ref 74–99)
GLUCOSE BLD MANUAL STRIP-MCNC: 111 MG/DL (ref 74–99)
GLUCOSE BLD MANUAL STRIP-MCNC: 114 MG/DL (ref 74–99)
GLUCOSE SERPL-MCNC: 107 MG/DL (ref 74–99)
POTASSIUM SERPL-SCNC: 5 MMOL/L (ref 3.5–5.3)
SODIUM SERPL-SCNC: 132 MMOL/L (ref 136–145)

## 2025-02-23 PROCEDURE — 1100000001 HC PRIVATE ROOM DAILY

## 2025-02-23 PROCEDURE — 2500000005 HC RX 250 GENERAL PHARMACY W/O HCPCS

## 2025-02-23 PROCEDURE — 2500000004 HC RX 250 GENERAL PHARMACY W/ HCPCS (ALT 636 FOR OP/ED)

## 2025-02-23 PROCEDURE — 2500000004 HC RX 250 GENERAL PHARMACY W/ HCPCS (ALT 636 FOR OP/ED): Performed by: PHYSICIAN ASSISTANT

## 2025-02-23 PROCEDURE — 2580000001 HC RX 258 IV SOLUTIONS

## 2025-02-23 PROCEDURE — 2500000001 HC RX 250 WO HCPCS SELF ADMINISTERED DRUGS (ALT 637 FOR MEDICARE OP): Performed by: NURSE PRACTITIONER

## 2025-02-23 PROCEDURE — 80048 BASIC METABOLIC PNL TOTAL CA: CPT | Performed by: SURGERY

## 2025-02-23 PROCEDURE — 82947 ASSAY GLUCOSE BLOOD QUANT: CPT

## 2025-02-23 RX ADMIN — HEPARIN SODIUM 5000 UNITS: 5000 INJECTION, SOLUTION INTRAVENOUS; SUBCUTANEOUS at 14:19

## 2025-02-23 RX ADMIN — SMOFLIPID 50 G: 6; 6; 5; 3 INJECTION, EMULSION INTRAVENOUS at 20:25

## 2025-02-23 RX ADMIN — HEPARIN SODIUM 5000 UNITS: 5000 INJECTION, SOLUTION INTRAVENOUS; SUBCUTANEOUS at 22:00

## 2025-02-23 RX ADMIN — PANTOPRAZOLE SODIUM 40 MG: 40 INJECTION, POWDER, FOR SOLUTION INTRAVENOUS at 08:34

## 2025-02-23 RX ADMIN — IRON SUCROSE 100 MG: 20 INJECTION, SOLUTION INTRAVENOUS at 17:29

## 2025-02-23 RX ADMIN — ASCORBIC ACID, VITAMIN A PALMITATE, CHOLECALCIFEROL, THIAMINE HYDROCHLORIDE, RIBOFLAVIN-5 PHOSPHATE SODIUM, PYRIDOXINE HYDROCHLORIDE, NIACINAMIDE, DEXPANTHENOL, ALPHA-TOCOPHEROL ACETATE, VITAMIN K1, FOLIC ACID, BIOTIN, CYANOCOBALAMIN: 200; 3300; 200; 6; 3.6; 6; 40; 15; 10; 150; 600; 60; 5 INJECTION, SOLUTION INTRAVENOUS at 20:25

## 2025-02-23 RX ADMIN — Medication 1 TABLET: at 08:35

## 2025-02-23 RX ADMIN — Medication 6 MG: at 22:00

## 2025-02-23 RX ADMIN — HEPARIN SODIUM 5000 UNITS: 5000 INJECTION, SOLUTION INTRAVENOUS; SUBCUTANEOUS at 06:36

## 2025-02-23 ASSESSMENT — COGNITIVE AND FUNCTIONAL STATUS - GENERAL
DAILY ACTIVITIY SCORE: 21
MOBILITY SCORE: 22
HELP NEEDED FOR BATHING: A LITTLE
DRESSING REGULAR LOWER BODY CLOTHING: A LITTLE
CLIMB 3 TO 5 STEPS WITH RAILING: A LITTLE
MOVING TO AND FROM BED TO CHAIR: A LITTLE
DRESSING REGULAR UPPER BODY CLOTHING: A LITTLE

## 2025-02-23 ASSESSMENT — PAIN SCALES - GENERAL
PAINLEVEL_OUTOF10: 0 - NO PAIN
PAINLEVEL_OUTOF10: 0 - NO PAIN

## 2025-02-23 ASSESSMENT — PAIN - FUNCTIONAL ASSESSMENT: PAIN_FUNCTIONAL_ASSESSMENT: 0-10

## 2025-02-23 NOTE — PROGRESS NOTES
Ike Gar is a 79 y.o. male on day 21 of admission presenting with High-output external gastrointestinal fistula.    Patient advanced to sips of clears today. Patient needs to be off TPN for placement. SW will continue to follow to assist with a safe discharge plan.    Addendum- notified by attending that family wishes to take patient home with home care. Will likely require TPN upon discharge.      Alyson Dutta, ELENA
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
## 2025-02-23 NOTE — CARE PLAN
The patient's goals for the shift include to sleep    The clinical goals for the shift include remain free from injury      Problem: Safety - Adult  Goal: Free from fall injury  Outcome: Progressing     Problem: Respiratory  Goal: Minimize anxiety/maximize coping throughout shift  Outcome: Progressing

## 2025-02-24 ENCOUNTER — HOME HEALTH ADMISSION (OUTPATIENT)
Dept: HOME HEALTH SERVICES | Facility: HOME HEALTH | Age: 80
End: 2025-02-24
Payer: MEDICARE

## 2025-02-24 LAB
ANION GAP SERPL CALC-SCNC: 11 MMOL/L (ref 10–20)
BUN SERPL-MCNC: 61 MG/DL (ref 6–23)
CALCIUM SERPL-MCNC: 9 MG/DL (ref 8.6–10.6)
CHLORIDE SERPL-SCNC: 103 MMOL/L (ref 98–107)
CO2 SERPL-SCNC: 23 MMOL/L (ref 21–32)
CREAT SERPL-MCNC: 1.33 MG/DL (ref 0.5–1.3)
EGFRCR SERPLBLD CKD-EPI 2021: 55 ML/MIN/1.73M*2
GLUCOSE BLD MANUAL STRIP-MCNC: 112 MG/DL (ref 74–99)
GLUCOSE BLD MANUAL STRIP-MCNC: 138 MG/DL (ref 74–99)
GLUCOSE BLD MANUAL STRIP-MCNC: 73 MG/DL (ref 74–99)
GLUCOSE SERPL-MCNC: 106 MG/DL (ref 74–99)
POTASSIUM SERPL-SCNC: 4.6 MMOL/L (ref 3.5–5.3)
SODIUM SERPL-SCNC: 132 MMOL/L (ref 136–145)

## 2025-02-24 PROCEDURE — 2500000004 HC RX 250 GENERAL PHARMACY W/ HCPCS (ALT 636 FOR OP/ED): Performed by: PHYSICIAN ASSISTANT

## 2025-02-24 PROCEDURE — 80048 BASIC METABOLIC PNL TOTAL CA: CPT | Performed by: SURGERY

## 2025-02-24 PROCEDURE — 2500000001 HC RX 250 WO HCPCS SELF ADMINISTERED DRUGS (ALT 637 FOR MEDICARE OP): Performed by: NURSE PRACTITIONER

## 2025-02-24 PROCEDURE — 2580000001 HC RX 258 IV SOLUTIONS

## 2025-02-24 PROCEDURE — 97530 THERAPEUTIC ACTIVITIES: CPT | Mod: GP | Performed by: STUDENT IN AN ORGANIZED HEALTH CARE EDUCATION/TRAINING PROGRAM

## 2025-02-24 PROCEDURE — 99024 POSTOP FOLLOW-UP VISIT: CPT | Performed by: SURGERY

## 2025-02-24 PROCEDURE — 97116 GAIT TRAINING THERAPY: CPT | Mod: GP | Performed by: STUDENT IN AN ORGANIZED HEALTH CARE EDUCATION/TRAINING PROGRAM

## 2025-02-24 PROCEDURE — 82947 ASSAY GLUCOSE BLOOD QUANT: CPT

## 2025-02-24 PROCEDURE — 2500000005 HC RX 250 GENERAL PHARMACY W/O HCPCS

## 2025-02-24 PROCEDURE — 2500000004 HC RX 250 GENERAL PHARMACY W/ HCPCS (ALT 636 FOR OP/ED)

## 2025-02-24 PROCEDURE — 1100000001 HC PRIVATE ROOM DAILY

## 2025-02-24 RX ADMIN — IRON SUCROSE 100 MG: 20 INJECTION, SOLUTION INTRAVENOUS at 17:56

## 2025-02-24 RX ADMIN — SMOFLIPID 50 G: 6; 6; 5; 3 INJECTION, EMULSION INTRAVENOUS at 21:46

## 2025-02-24 RX ADMIN — HEPARIN SODIUM 5000 UNITS: 5000 INJECTION, SOLUTION INTRAVENOUS; SUBCUTANEOUS at 13:28

## 2025-02-24 RX ADMIN — Medication 1 TABLET: at 09:14

## 2025-02-24 RX ADMIN — HEPARIN SODIUM 5000 UNITS: 5000 INJECTION, SOLUTION INTRAVENOUS; SUBCUTANEOUS at 21:46

## 2025-02-24 RX ADMIN — PANTOPRAZOLE SODIUM 40 MG: 40 INJECTION, POWDER, FOR SOLUTION INTRAVENOUS at 09:13

## 2025-02-24 RX ADMIN — Medication 6 MG: at 21:48

## 2025-02-24 RX ADMIN — HEPARIN SODIUM 5000 UNITS: 5000 INJECTION, SOLUTION INTRAVENOUS; SUBCUTANEOUS at 06:23

## 2025-02-24 RX ADMIN — ASCORBIC ACID, VITAMIN A PALMITATE, CHOLECALCIFEROL, THIAMINE HYDROCHLORIDE, RIBOFLAVIN-5 PHOSPHATE SODIUM, PYRIDOXINE HYDROCHLORIDE, NIACINAMIDE, DEXPANTHENOL, ALPHA-TOCOPHEROL ACETATE, VITAMIN K1, FOLIC ACID, BIOTIN, CYANOCOBALAMIN: 200; 3300; 200; 6; 3.6; 6; 40; 15; 10; 150; 600; 60; 5 INJECTION, SOLUTION INTRAVENOUS at 21:46

## 2025-02-24 ASSESSMENT — PAIN - FUNCTIONAL ASSESSMENT
PAIN_FUNCTIONAL_ASSESSMENT: 0-10
PAIN_FUNCTIONAL_ASSESSMENT: 0-10

## 2025-02-24 ASSESSMENT — PAIN SCALES - GENERAL
PAINLEVEL_OUTOF10: 0 - NO PAIN

## 2025-02-24 ASSESSMENT — COGNITIVE AND FUNCTIONAL STATUS - GENERAL
MOBILITY SCORE: 22
MOVING TO AND FROM BED TO CHAIR: A LITTLE
WALKING IN HOSPITAL ROOM: A LITTLE
DAILY ACTIVITIY SCORE: 21
DRESSING REGULAR LOWER BODY CLOTHING: A LITTLE
TURNING FROM BACK TO SIDE WHILE IN FLAT BAD: A LITTLE
DRESSING REGULAR UPPER BODY CLOTHING: A LITTLE
MOVING FROM LYING ON BACK TO SITTING ON SIDE OF FLAT BED WITH BEDRAILS: A LITTLE
MOVING TO AND FROM BED TO CHAIR: A LITTLE
MOBILITY SCORE: 16
STANDING UP FROM CHAIR USING ARMS: A LITTLE
HELP NEEDED FOR BATHING: A LITTLE
CLIMB 3 TO 5 STEPS WITH RAILING: A LITTLE
CLIMB 3 TO 5 STEPS WITH RAILING: TOTAL

## 2025-02-24 NOTE — CARE PLAN
The patient's goals for the shift include to sleep    The clinical goals for the shift include Pt will tolerate CLD

## 2025-02-24 NOTE — CARE PLAN
Problem: Safety - Adult  Goal: Free from fall injury  Outcome: Progressing     Problem: Chronic Conditions and Co-morbidities  Goal: Patient's chronic conditions and co-morbidity symptoms are monitored and maintained or improved  Outcome: Progressing

## 2025-02-24 NOTE — PROGRESS NOTES
Physical Therapy    Physical Therapy Treatment    Patient Name: Ike Gar  MRN: [id number]  Today's Date: 2/24/2025  Room: [address] [place], [place] [address]  Time Calculation  Start Time: 1154  Stop Time: 1232  Time Calculation (min): 38 min       Assessment/Plan   PT Plan  Treatment/Interventions: Bed mobility, Transfer training, Gait training, Balance training, Neuromuscular re-education, Endurance training, Strengthening, Therapeutic exercise, Therapeutic activity  PT Plan: Ongoing PT  PT Frequency: 5 times per week  PT Discharge Recommendations: Low intensity level of continued care  Equipment Recommended upon Discharge:  (owns walker)  PT Recommended Transfer Status: Stand by assist, Assistive device  PT - OK to Discharge: Yes    General Visit Information:   Reason for Referral: high output EC fistula and malnutrition.  Past Medical History Relevant to Rehab: 1. Increased EC fistula output  2. Malnutrition s/p TPN  3. Recent T/L spine fxs  4. Anemia  5. Hx tracheostomy (1/7/2025)  6. Hx HTN  Prior to Session Communication: Bedside nurse  Patient Position Received: Bed, 3 rail up, Alarm off, not on at start of session   Subjective: Pt alert and agreeable to PT. Reports feeling much better compared to previous session. Feels that lungs have opened up more.   Precautions:  Precautions  Medical Precautions: Fall precautions, Spinal precautions  Post-Surgical Precautions: Abdominal surgery precautions  Precautions Comment: contact precautions  Vital Signs:   Date/Time Vitals Session Patient Position Pulse Resp SpO2 BP MAP (mmHg)    02/24/25 1306 --  --  97  18  97 %  104/64  77             Objective   Pain:  Pain Assessment  0-10 (Numeric) Pain Score: 0 - No pain (post 0)  Cognition:  Cognition  Orientation Level: Oriented X4  Lines/Tubes/Drains:  PICC - Adult Double lumen Left Brachial vein (Active)   Number of days: 22       Closed/Suction Drain Midline RUQ 10 Fr. (Active)   Number of days: 40       Closed/Suction Drain Right;Anterior  Hip Bulb (Active)   Number of days: 22       Gastrostomy/Enterostomy Percutaneous endoscopic gastrostomy (PEG) 1 20 Fr. LUQ (Active)   Number of days: 48       Ileostomy Other (Comment) RUQ (Active)   Number of days: 63        Continuous Medications/Drips:  Adult Clinimix TPN Cyclic, , Last Rate: Stopped (02/24/25 0836)        PT Treatments:     Therapeutic Activity  Therapeutic Activity 1: EOB x10 min supervision  Therapeutic Activity 2: pt education on discharge recommendations, home set up, current functional mobility capabilities     Bed Mobility 1  Bed Mobility 1: Supine to sitting  Level of Assistance 1: Close supervision  Bed Mobility Comments 1: x1 HOB 30  Bed Mobility 2  Bed Mobility  2: Sitting to supine  Level of Assistance 2: Close supervision  Bed Mobility Comments 2: x1 HOB 30  Ambulation/Gait Training 1  Surface 1: Level tile  Device 1: Rolling walker  Assistance 1: Close supervision  Quality of Gait 1: Wide base of support, Decreased step length, Forward flexed posture  Comments/Distance (ft) 1: x160 feet total (x4 seated rest breaks with chair follow, bouts of 40-50 feet)  Transfer 1  Transfer From 1: Sit to  Transfer to 1: Stand  Transfer Device 1: Walker  Transfer Level of Assistance 1: Close supervision  Trials/Comments 1: x5  Transfers 2  Transfer From 2: Stand to  Transfer to 2: Sit  Transfer Device 2: Walker  Transfer Level of Assistance 2: Close supervision  Trials/Comments 2: x5  Transfers 3  Transfer From 3: Bed to  Transfer to 3: Chair with arms  Transfer Device 3: Walker  Transfer Level of Assistance 3: Close supervision  Trials/Comments 3: x1  Transfers 4  Transfer From 4: Chair with arms to  Transfer to 4: Bed  Transfer Device 4: Walker  Transfer Level of Assistance 4: Close supervision  Trials/Comments 4: x1             Outcome Measures:  [facility] Basic Mobility  Turning from your back to your side while in a flat bed without using bedrails: A little  Moving from lying on your back to  sitting on the side of a flat bed without using bedrails: A little  Moving to and from bed to chair (including a wheelchair): A little  Standing up from a chair using your arms (e.g. wheelchair or bedside chair): A little  To walk in hospital room: A little  Climbing 3-5 steps with railing: Total  Basic Mobility - Total Score: 16                            Education Documentation  Precautions, taught by JANET Atkins at 2/24/2025  1:23 PM.  Learner: Patient  Readiness: Acceptance  Method: Demonstration  Response: Verbalizes Understanding  Comment: POC    Body Mechanics, taught by JANET Atkins at 2/24/2025  1:23 PM.  Learner: Patient  Readiness: Acceptance  Method: Demonstration  Response: Verbalizes Understanding  Comment: POC    Mobility Training, taught by JANET Atkins at 2/24/2025  1:23 PM.  Learner: Patient  Readiness: Acceptance  Method: Demonstration  Response: Verbalizes Understanding  Comment: POC    Education Comments  No comments found.          OP EDUCATION:       Encounter Problems       Encounter Problems (Active)       PT Problem       Patient will complete supine to sit and sit to supine Supervision while maintaining spinal precautions (Progressing)       Start:  02/03/25    Expected End:  02/17/25            Patient will perform sit<>stand transfer with LRAD, and Supervision while maintaining spinal precautions  (Progressing)       Start:  02/03/25    Expected End:  02/17/25            Patient will ambulate >100' with LRAD and Supervision  (Progressing)       Start:  02/03/25    Expected End:  02/17/25                   Assessment: Patient is progressing Well with therapy this date. Pt progressed transfers and gait training on todays date. Able to ambulate household distance without LOB - improved gait endurance, total distance traveled, and gait speed. Will continue to progress transfers, and gait training while still in hospital. Patient discharge recommendation changed to LOW  intensity therapy when medically appropriate for discharge from acute stay.  Pt agreeable with plan. Will continue to follow.      02/24/25 at 1:24 PM   JONATHAN BRAND-PT   Rehab Office: [phone]
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
## 2025-02-25 LAB
ANION GAP SERPL CALC-SCNC: 13 MMOL/L (ref 10–20)
BUN SERPL-MCNC: 53 MG/DL (ref 6–23)
CALCIUM SERPL-MCNC: 9.2 MG/DL (ref 8.6–10.6)
CHLORIDE SERPL-SCNC: 102 MMOL/L (ref 98–107)
CO2 SERPL-SCNC: 22 MMOL/L (ref 21–32)
CREAT SERPL-MCNC: 1.35 MG/DL (ref 0.5–1.3)
EGFRCR SERPLBLD CKD-EPI 2021: 54 ML/MIN/1.73M*2
GLUCOSE BLD MANUAL STRIP-MCNC: 106 MG/DL (ref 74–99)
GLUCOSE BLD MANUAL STRIP-MCNC: 157 MG/DL (ref 74–99)
GLUCOSE BLD MANUAL STRIP-MCNC: 167 MG/DL (ref 74–99)
GLUCOSE BLD MANUAL STRIP-MCNC: 92 MG/DL (ref 74–99)
GLUCOSE SERPL-MCNC: 135 MG/DL (ref 74–99)
HOLD SPECIMEN: NORMAL
MAGNESIUM SERPL-MCNC: 1.76 MG/DL (ref 1.6–2.4)
POTASSIUM SERPL-SCNC: 4.8 MMOL/L (ref 3.5–5.3)
SODIUM SERPL-SCNC: 132 MMOL/L (ref 136–145)

## 2025-02-25 PROCEDURE — 2580000001 HC RX 258 IV SOLUTIONS

## 2025-02-25 PROCEDURE — 99232 SBSQ HOSP IP/OBS MODERATE 35: CPT | Performed by: SURGERY

## 2025-02-25 PROCEDURE — 2500000005 HC RX 250 GENERAL PHARMACY W/O HCPCS

## 2025-02-25 PROCEDURE — 97530 THERAPEUTIC ACTIVITIES: CPT | Mod: GP | Performed by: STUDENT IN AN ORGANIZED HEALTH CARE EDUCATION/TRAINING PROGRAM

## 2025-02-25 PROCEDURE — 2500000002 HC RX 250 W HCPCS SELF ADMINISTERED DRUGS (ALT 637 FOR MEDICARE OP, ALT 636 FOR OP/ED): Performed by: PHYSICIAN ASSISTANT

## 2025-02-25 PROCEDURE — 97535 SELF CARE MNGMENT TRAINING: CPT | Mod: GO

## 2025-02-25 PROCEDURE — 1100000001 HC PRIVATE ROOM DAILY

## 2025-02-25 PROCEDURE — 2500000004 HC RX 250 GENERAL PHARMACY W/ HCPCS (ALT 636 FOR OP/ED)

## 2025-02-25 PROCEDURE — 2500000001 HC RX 250 WO HCPCS SELF ADMINISTERED DRUGS (ALT 637 FOR MEDICARE OP): Performed by: NURSE PRACTITIONER

## 2025-02-25 PROCEDURE — 97110 THERAPEUTIC EXERCISES: CPT | Mod: GP | Performed by: STUDENT IN AN ORGANIZED HEALTH CARE EDUCATION/TRAINING PROGRAM

## 2025-02-25 PROCEDURE — 2500000004 HC RX 250 GENERAL PHARMACY W/ HCPCS (ALT 636 FOR OP/ED): Performed by: PHYSICIAN ASSISTANT

## 2025-02-25 PROCEDURE — 80048 BASIC METABOLIC PNL TOTAL CA: CPT | Performed by: SURGERY

## 2025-02-25 PROCEDURE — 82947 ASSAY GLUCOSE BLOOD QUANT: CPT

## 2025-02-25 PROCEDURE — 83735 ASSAY OF MAGNESIUM: CPT

## 2025-02-25 PROCEDURE — 97530 THERAPEUTIC ACTIVITIES: CPT | Mod: GO

## 2025-02-25 RX ORDER — MAGNESIUM SULFATE 1 G/100ML
1 INJECTION INTRAVENOUS ONCE
Status: COMPLETED | OUTPATIENT
Start: 2025-02-25 | End: 2025-02-25

## 2025-02-25 RX ADMIN — SMOFLIPID 50 G: 6; 6; 5; 3 INJECTION, EMULSION INTRAVENOUS at 22:25

## 2025-02-25 RX ADMIN — IRON SUCROSE 100 MG: 20 INJECTION, SOLUTION INTRAVENOUS at 18:03

## 2025-02-25 RX ADMIN — PANTOPRAZOLE SODIUM 40 MG: 40 INJECTION, POWDER, FOR SOLUTION INTRAVENOUS at 10:07

## 2025-02-25 RX ADMIN — HEPARIN SODIUM 5000 UNITS: 5000 INJECTION, SOLUTION INTRAVENOUS; SUBCUTANEOUS at 05:27

## 2025-02-25 RX ADMIN — Medication 1 TABLET: at 10:07

## 2025-02-25 RX ADMIN — HEPARIN SODIUM 5000 UNITS: 5000 INJECTION, SOLUTION INTRAVENOUS; SUBCUTANEOUS at 14:46

## 2025-02-25 RX ADMIN — MAGNESIUM SULFATE HEPTAHYDRATE 1 G: 1 INJECTION, SOLUTION INTRAVENOUS at 18:13

## 2025-02-25 RX ADMIN — INSULIN LISPRO 1 UNITS: 100 INJECTION, SOLUTION INTRAVENOUS; SUBCUTANEOUS at 05:32

## 2025-02-25 RX ADMIN — ASCORBIC ACID, VITAMIN A PALMITATE, CHOLECALCIFEROL, THIAMINE HYDROCHLORIDE, RIBOFLAVIN-5 PHOSPHATE SODIUM, PYRIDOXINE HYDROCHLORIDE, NIACINAMIDE, DEXPANTHENOL, ALPHA-TOCOPHEROL ACETATE, VITAMIN K1, FOLIC ACID, BIOTIN, CYANOCOBALAMIN: 200; 3300; 200; 6; 3.6; 6; 40; 15; 10; 150; 600; 60; 5 INJECTION, SOLUTION INTRAVENOUS at 22:25

## 2025-02-25 RX ADMIN — HEPARIN SODIUM 5000 UNITS: 5000 INJECTION, SOLUTION INTRAVENOUS; SUBCUTANEOUS at 22:25

## 2025-02-25 ASSESSMENT — COGNITIVE AND FUNCTIONAL STATUS - GENERAL
MOVING TO AND FROM BED TO CHAIR: A LITTLE
MOBILITY SCORE: 16
DAILY ACTIVITIY SCORE: 16
STANDING UP FROM CHAIR USING ARMS: A LITTLE
CLIMB 3 TO 5 STEPS WITH RAILING: TOTAL
PERSONAL GROOMING: A LITTLE
TOILETING: A LOT
MOVING FROM LYING ON BACK TO SITTING ON SIDE OF FLAT BED WITH BEDRAILS: A LITTLE
HELP NEEDED FOR BATHING: A LOT
TURNING FROM BACK TO SIDE WHILE IN FLAT BAD: A LITTLE
DRESSING REGULAR LOWER BODY CLOTHING: A LOT
DRESSING REGULAR UPPER BODY CLOTHING: A LITTLE
WALKING IN HOSPITAL ROOM: A LITTLE

## 2025-02-25 ASSESSMENT — ACTIVITIES OF DAILY LIVING (ADL): HOME_MANAGEMENT_TIME_ENTRY: 13

## 2025-02-25 ASSESSMENT — PAIN - FUNCTIONAL ASSESSMENT
PAIN_FUNCTIONAL_ASSESSMENT: 0-10
PAIN_FUNCTIONAL_ASSESSMENT: 0-10

## 2025-02-25 ASSESSMENT — PAIN SCALES - GENERAL
PAINLEVEL_OUTOF10: 0 - NO PAIN
PAINLEVEL_OUTOF10: 3
PAINLEVEL_OUTOF10: 0 - NO PAIN
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
## 2025-02-25 NOTE — PROGRESS NOTES
Occupational Therapy    Occupational Therapy Treatment    Name: Ike Gar  MRN: [id number]  : 1945  Date: 25  Room: [address] [place], [place] [address]      Time Calculation  Start Time: 1101  Stop Time: 1124  Time Calculation (min): 23 min    Assessment:  Prognosis: Good  Barriers to Discharge Home: Physical needs, Caregiver assistance  Caregiver Assistance: Caregiver assistance needed per identified barriers - however, level of patient's required assistance exceeds assistance available at home  Physical Needs: Intermittent ADL assistance needed, Intermittent mobility assistance needed  Evaluation/Treatment Tolerance:  (Limited by SOB/fatigue)  Medical Staff Made Aware: Yes  End of Session Communication: Bedside nurse  End of Session Patient Position: Alarm off, not on at start of session, Up in chair  Plan:  Treatment Interventions: ADL retraining, Functional transfer training, UE strengthening/ROM, Endurance training, Patient/family training, Equipment evaluation/education, Compensatory technique education  OT Frequency: 3 times per week  OT Discharge Recommendations: Moderate intensity level of continued care  Equipment Recommended upon Discharge:  (owns walker)  OT Recommended Transfer Status: Assist of 1  OT - OK to Discharge: Yes    Subjective   General:  OT Last Visit  OT Received On: 25  Prior to Session Communication: Bedside nurse  Patient Position Received: Bed, 3 rail up, Alarm off, not on at start of session  Family/Caregiver Present: No  General Comment: Pt supine in bed upon arrival. Pt pleasant and agreeable to OT session. Pt limited during session due to SOB.   Precautions:  Medical Precautions: Fall precautions, Spinal precautions  Post-Surgical Precautions: Abdominal surgery precautions  Precautions Comment: Contact precautions  Vitals:   Date/Time Vitals Session Patient Position Pulse Resp SpO2 BP MAP (mmHg)    25 1101 During OT  --  107  --  94 %  --  --           Lines/Tubes/Drains:  PICC -  Adult Double lumen Left Brachial vein (Active)   Number of days: 23       Closed/Suction Drain Midline RUQ 10 Fr. (Active)   Number of days: 41       Closed/Suction Drain Right;Anterior Hip Bulb (Active)   Number of days: 23       Gastrostomy/Enterostomy Percutaneous endoscopic gastrostomy (PEG) 1 20 Fr. LUQ (Active)   Number of days: 49       Ileostomy Other (Comment) RUQ (Active)   Number of days: 64     Cognition:  Overall Cognitive Status: Within Functional Limits  Orientation Level: Oriented X4  Following Commands: Follows one step commands without difficulty  Insight: Within function limits  Impulsive: Within functional limits  Processing Speed: Within funtional limits  Pain Assessment:  Pain Assessment  Pain Assessment: 0-10  0-10 (Numeric) Pain Score: 3  Pain Type: Acute pain  Pain Location: Abdomen  Pain Interventions: Repositioned  Response to Interventions: Resting quietly   Objective   Activities of Daily Living:   Feeding  Feeding Level of Assistance: Distant supervision  Feeding Where Assessed: Chair  Feeding Comments: Pt performs feeding task while sitting at chair with supervision. Pt able to manage utensils, food, and  bring food to mouth.  Grooming  Grooming Level of Assistance: Setup  Grooming Where Assessed: Chair  Grooming Comments: Pt performs grooming tasks to increase IND and progression towards ADL/IADL's. Pt performs tasks sitting in chair due to increase SOB while standing and pt requests to perform sitting instead of standing sinkside. Pt requires assistance to open toothpaste tube but able to brush teeth without cues. Pt then performs face hygiene with warm washcloth with set up asssitacne and cueing for thoroughness. Pt requires increased time to complete due to SOB.  Bed Mobility/Transfers:   Bed Mobility  Bed Mobility: Yes  Bed Mobility 1  Bed Mobility 1: Supine to sitting, Sitting to supine  Level of Assistance 1: Close supervision  Bed Mobility Comments 1: HOB  elevated  Transfers  Transfer: Yes  Transfer 1  Transfer From 1: Sit to, Stand to  Transfer to 1: Stand, Sit  Technique 1: Sit to stand, Stand to sit  Transfer Device 1: Walker  Transfer Level of Assistance 1: Close supervision  Trials/Comments 1: x1  Transfers 2  Transfer From 2: Bed to  Transfer to 2: Chair with arms  Technique 2: Stand pivot  Transfer Device 2: Walker  Transfer Level of Assistance 2: Close supervision  Trials/Comments 2: Cueing for hand placement on FWW  Balance:  Static Sitting Balance  Static Sitting-Balance Support: Feet supported, No upper extremity supported  Static Sitting-Level of Assistance: Close supervision  Static Standing Balance  Static Standing-Balance Support: Bilateral upper extremity supported  Static Standing-Level of Assistance: Close supervision  Static Standing-Comment/Number of Minutes: FWW  Therapy/Activity:   Therapeutic Activity  Therapeutic Activity Performed: Yes  Therapeutic Activity 1: Pt participates in functional mobility tasks throughout session including bed mobility, STS x1, and stand pivot to chair to increase endurance, strength, and tolerance for upright activities to progress towards PLOF in ADL/IADL's. Pt requires close supervision due to safety. Pt limited due to SOB and fatigue at this time. However, SpO2 levels are 94.  Outcome Measures:  [facility] Daily Activity  Putting on and taking off regular lower body clothing: A lot  Bathing (including washing, rinsing, drying): A lot  Putting on and taking off regular upper body clothing: A little  Toileting, which includes using toilet, bedpan or urinal: A lot  Taking care of personal grooming such as brushing teeth: A little  Eating Meals: None  Daily Activity - Total Score: 16     Education Documentation  Body Mechanics, taught by CHERIE Ovalles at 2/25/2025 12:04 PM.  Learner: Patient  Readiness: Acceptance  Method: Explanation  Response: Verbalizes Understanding, Demonstrated  Understanding    Precautions, taught by CHERIE Ovalles at 2/25/2025 12:04 PM.  Learner: Patient  Readiness: Acceptance  Method: Explanation  Response: Verbalizes Understanding, Demonstrated Understanding    ADL Training, taught by CHERIE Ovalles at 2/25/2025 12:04 PM.  Learner: Patient  Readiness: Acceptance  Method: Explanation  Response: Verbalizes Understanding, Demonstrated Understanding    Education Comments  No comments found.      Goals:  Encounter Problems       Encounter Problems (Active)       ADLs       Patient with complete upper body dressing with independent level of assistance donning and doffing all UE clothes with no adaptive equipment while edge of bed  (Progressing)       Start:  02/03/25    Expected End:  03/18/25            Patient with complete lower body dressing with stand by assist level of assistance donning and doffing all LE clothes  with PRN adaptive equipment while edge of bed  (Progressing)       Start:  02/03/25    Expected End:  03/18/25            Patient will complete toileting including hygiene clothing management/hygiene with stand by assist level of assistance and raised toilet seat. (Progressing)       Start:  02/03/25    Expected End:  03/18/25               EXERCISE/STRENGTHENING       Patient with increase BUE strength to WFL for ADLs. (Progressing)       Start:  02/03/25    Expected End:  03/18/25               MOBILITY       Patient will perform Functional mobility min Household distances with contact guard assist level of assistance and least restrictive device in order to improve safety and functional mobility. (Progressing)       Start:  02/03/25    Expected End:  03/18/25               TRANSFERS       Patient will perform bed mobility stand by assist level of assistance and bed rails in order to improve safety and independence with mobility (Progressing)       Start:  02/03/25    Expected End:  03/18/25            Patient will complete sit to stand  transfer with CGA level of assistance and least restrictive device in order to improve safety and prepare for out of bed mobility. (Progressing)       Start:  02/03/25    Expected End:  03/18/25 02/25/25 at 12:04 PM   CHERIE LEMOS   [phone]

## 2025-02-25 NOTE — SIGNIFICANT EVENT
TPN REQUIREMENT    Given patient's persistent EC fistula, and inability to take meaningful intake po, he is dependent on total parenteral nutrition for 100% of his nutritional needs. He is currently in a trial phase of pleasure sips of clears, but is not expected to progress past this point.     There is no planned or expected surgical intervention that would change his TPN requirement in the next 3-4 months.     He is currently on cycled TPN overnight (being managed by nutritionist):        cycled AA 5% and dextrose 20%        run at 90 ml/hr x1 hr, 180 ml/hr x10 hr, 90 ml/hr x1 hr        +250 ml SMOF lipids daily        = 2242 kcal, 99 g protein, 2230 ml [place]  [phone] / [facility] Chat

## 2025-02-25 NOTE — PROGRESS NOTES
Physical Therapy    Physical Therapy Treatment    Patient Name: Ike Gar  MRN: [id number]  Today's Date: 2/25/2025  Room: [address] [place] [address]-A  Time Calculation  Start Time: 1504  Stop Time: 1530  Time Calculation (min): 26 min       Assessment/Plan   PT Plan  Treatment/Interventions: Bed mobility, Transfer training, Gait training, Balance training, Neuromuscular re-education, Endurance training, Strengthening, Therapeutic exercise, Therapeutic activity  PT Plan: Ongoing PT  PT Frequency: 5 times per week  PT Discharge Recommendations: Low intensity level of continued care  Equipment Recommended upon Discharge:  (owns walker)  PT Recommended Transfer Status: Assistive device, Stand by assist  PT - OK to Discharge: Yes    General Visit Information:   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  Past Medical History Relevant to Rehab: 1. Increased EC fistula output  2. Malnutrition s/p TPN  3. Recent T/L spine fxs  4. Anemia  5. Hx tracheostomy (1/7/2025)  6. Hx HTN  Prior to Session Communication: Bedside nurse  Patient Position Received: Bed, 3 rail up, Alarm off, not on at start of session   Subjective: Pt alert and agreeable to PT. Reports that he does not not feel as strong as he felt yesterday, but open to performing functional exercise.  Precautions:  Precautions  Medical Precautions: Fall precautions, Spinal precautions  Post-Surgical Precautions: Abdominal surgery precautions  Precautions Comment: Contact precautions  Vital Signs:      Objective   Pain:  Pain Assessment  0-10 (Numeric) Pain Score: 0 - No pain (post 0)  Cognition:  Cognition  Orientation Level: Oriented X4  Lines/Tubes/Drains:  PICC - Adult Double lumen Left Brachial vein (Active)   Number of days: 23       Closed/Suction Drain Midline RUQ 10 Fr. (Active)   Number of days: 41       Closed/Suction Drain Right;Anterior Hip Bulb (Active)   Number of days: 23       Gastrostomy/Enterostomy Percutaneous endoscopic gastrostomy (PEG) 1 20  Fr. LUQ (Active)   Number of days: 49       Ileostomy Other (Comment) RUQ (Active)   Number of days: 64        Continuous Medications/Drips:  Adult Clinimix TPN Cyclic, , Last Rate: Stopped (02/25/25 0954)        PT Treatments:  Therapeutic Exercise  Therapeutic Exercise Activity 1: standing marches x20, standing hip ext x10  Therapeutic Exercise Activity 2: repeated STS x4  Therapeutic Activity  Therapeutic Activity 1: EOB x10 min supervision     Bed Mobility 1  Bed Mobility 1: Supine to sitting  Level of Assistance 1: Close supervision  Bed Mobility Comments 1: x1 HOB 30  Ambulation/Gait Training 1  Surface 1: Level tile  Device 1: Rolling walker  Assistance 1: Close supervision  Quality of Gait 1: Wide base of support, Decreased step length, Forward flexed posture  Comments/Distance (ft) 1: 2x30 feet  Transfer 1  Transfer From 1: Sit to  Transfer to 1: Stand  Transfer Device 1: Walker  Transfer Level of Assistance 1: Close supervision  Trials/Comments 1: x8  Transfers 2  Transfer From 2: Stand to  Transfer to 2: Sit  Transfer Device 2: Walker  Transfer Level of Assistance 2: Close supervision  Trials/Comments 2: x8  Transfers 3  Transfer From 3: Bed to  Transfer to 3: Chair with arms  Transfer Device 3: Walker  Transfer Level of Assistance 3: Close supervision  Trials/Comments 3: x1  Transfers 4  Transfer From 4: Chair with arms to  Transfer to 4: Bed  Transfer Device 4: Walker  Transfer Level of Assistance 4: Close supervision  Trials/Comments 4: x1             Outcome Measures:  [facility] Basic Mobility  Turning from your back to your side while in a flat bed without using bedrails: A little  Moving from lying on your back to sitting on the side of a flat bed without using bedrails: A little  Moving to and from bed to chair (including a wheelchair): A little  Standing up from a chair using your arms (e.g. wheelchair or bedside chair): A little  To walk in hospital room: A little  Climbing 3-5 steps with railing:  Total  Basic Mobility - Total Score: 16                            Education Documentation  Precautions, taught by JANET Brand at 2/25/2025  3:40 PM.  Learner: Patient  Readiness: Acceptance  Method: Explanation  Response: Verbalizes Understanding  Comment: POC    Body Mechanics, taught by JANET Brand at 2/25/2025  3:40 PM.  Learner: Patient  Readiness: Acceptance  Method: Explanation  Response: Verbalizes Understanding  Comment: POC    Mobility Training, taught by JANET Brand at 2/25/2025  3:40 PM.  Learner: Patient  Readiness: Acceptance  Method: Explanation  Response: Verbalizes Understanding  Comment: POC    Education Comments  No comments found.          OP EDUCATION:       Encounter Problems       Encounter Problems (Active)       PT Problem       Patient will complete supine to sit and sit to supine Supervision while maintaining spinal precautions (Progressing)       Start:  02/03/25    Expected End:  02/17/25            Patient will perform sit<>stand transfer with LRAD, and Supervision while maintaining spinal precautions  (Progressing)       Start:  02/03/25    Expected End:  02/17/25            Patient will ambulate >100' with LRAD and Supervision  (Progressing)       Start:  02/03/25    Expected End:  02/17/25                   Assessment: Patient is progressing Well with therapy this date.  Pt progressed activity to standing activities, transfers, and gait on todays date. Demonstrated good foot clearance and SL stability with marches/hip ext. Increased WOB following STS trial - pt able to perform four consecutive STS prior to fatigue. Will continue to progress transfers, activity tolerance, and gait training while still in hospital. Patient remains appropriate for LOW intensity therapy when medically appropriate for discharge from acute stay.  Will continue to follow.      02/25/25 at 3:41 PM   JANET BRAND   Rehab Office: [phone]        '
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
## 2025-02-26 ENCOUNTER — HOME INFUSION (OUTPATIENT)
Dept: INFUSION THERAPY | Age: 80
End: 2025-02-26
Payer: MEDICARE

## 2025-02-26 DIAGNOSIS — K31.6 HIGH-OUTPUT EXTERNAL GASTROINTESTINAL FISTULA: ICD-10-CM

## 2025-02-26 DIAGNOSIS — E46 MALNUTRITION, UNSPECIFIED TYPE (MULTI): ICD-10-CM

## 2025-02-26 LAB
ALBUMIN SERPL BCP-MCNC: 2.8 G/DL (ref 3.4–5)
ANION GAP SERPL CALC-SCNC: 12 MMOL/L (ref 10–20)
BUN SERPL-MCNC: 50 MG/DL (ref 6–23)
CALCIUM SERPL-MCNC: 9.3 MG/DL (ref 8.6–10.6)
CHLORIDE SERPL-SCNC: 102 MMOL/L (ref 98–107)
CO2 SERPL-SCNC: 21 MMOL/L (ref 21–32)
CREAT SERPL-MCNC: 1.26 MG/DL (ref 0.5–1.3)
EGFRCR SERPLBLD CKD-EPI 2021: 59 ML/MIN/1.73M*2
ERYTHROCYTE [DISTWIDTH] IN BLOOD BY AUTOMATED COUNT: 15.5 % (ref 11.5–14.5)
GLUCOSE BLD MANUAL STRIP-MCNC: 169 MG/DL (ref 74–99)
GLUCOSE BLD MANUAL STRIP-MCNC: 95 MG/DL (ref 74–99)
GLUCOSE BLD MANUAL STRIP-MCNC: 97 MG/DL (ref 74–99)
GLUCOSE SERPL-MCNC: 147 MG/DL (ref 74–99)
HCT VFR BLD AUTO: 26.3 % (ref 41–52)
HGB BLD-MCNC: 8.7 G/DL (ref 13.5–17.5)
MAGNESIUM SERPL-MCNC: 1.95 MG/DL (ref 1.6–2.4)
MCH RBC QN AUTO: 30.1 PG (ref 26–34)
MCHC RBC AUTO-ENTMCNC: 33.1 G/DL (ref 32–36)
MCV RBC AUTO: 91 FL (ref 80–100)
NRBC BLD-RTO: 0 /100 WBCS (ref 0–0)
PHOSPHATE SERPL-MCNC: 4.3 MG/DL (ref 2.5–4.9)
PLATELET # BLD AUTO: 92 X10*3/UL (ref 150–450)
POTASSIUM SERPL-SCNC: 4.8 MMOL/L (ref 3.5–5.3)
RBC # BLD AUTO: 2.89 X10*6/UL (ref 4.5–5.9)
SODIUM SERPL-SCNC: 130 MMOL/L (ref 136–145)
WBC # BLD AUTO: 6.9 X10*3/UL (ref 4.4–11.3)

## 2025-02-26 PROCEDURE — 99231 SBSQ HOSP IP/OBS SF/LOW 25: CPT | Performed by: SURGERY

## 2025-02-26 PROCEDURE — 82947 ASSAY GLUCOSE BLOOD QUANT: CPT

## 2025-02-26 PROCEDURE — 2500000005 HC RX 250 GENERAL PHARMACY W/O HCPCS

## 2025-02-26 PROCEDURE — 97116 GAIT TRAINING THERAPY: CPT | Mod: GP | Performed by: STUDENT IN AN ORGANIZED HEALTH CARE EDUCATION/TRAINING PROGRAM

## 2025-02-26 PROCEDURE — 2500000004 HC RX 250 GENERAL PHARMACY W/ HCPCS (ALT 636 FOR OP/ED)

## 2025-02-26 PROCEDURE — 2500000004 HC RX 250 GENERAL PHARMACY W/ HCPCS (ALT 636 FOR OP/ED): Performed by: PHYSICIAN ASSISTANT

## 2025-02-26 PROCEDURE — 83735 ASSAY OF MAGNESIUM: CPT

## 2025-02-26 PROCEDURE — 85027 COMPLETE CBC AUTOMATED: CPT

## 2025-02-26 PROCEDURE — 80069 RENAL FUNCTION PANEL: CPT

## 2025-02-26 PROCEDURE — 97530 THERAPEUTIC ACTIVITIES: CPT | Mod: GP | Performed by: STUDENT IN AN ORGANIZED HEALTH CARE EDUCATION/TRAINING PROGRAM

## 2025-02-26 PROCEDURE — 2500000002 HC RX 250 W HCPCS SELF ADMINISTERED DRUGS (ALT 637 FOR MEDICARE OP, ALT 636 FOR OP/ED): Performed by: PHYSICIAN ASSISTANT

## 2025-02-26 PROCEDURE — 1100000001 HC PRIVATE ROOM DAILY

## 2025-02-26 PROCEDURE — 2580000001 HC RX 258 IV SOLUTIONS

## 2025-02-26 PROCEDURE — 2500000001 HC RX 250 WO HCPCS SELF ADMINISTERED DRUGS (ALT 637 FOR MEDICARE OP): Performed by: NURSE PRACTITIONER

## 2025-02-26 RX ADMIN — SODIUM CHLORIDE 1000 ML: 9 INJECTION, SOLUTION INTRAVENOUS at 13:35

## 2025-02-26 RX ADMIN — INSULIN LISPRO 1 UNITS: 100 INJECTION, SOLUTION INTRAVENOUS; SUBCUTANEOUS at 05:39

## 2025-02-26 RX ADMIN — SMOFLIPID 50 G: 6; 6; 5; 3 INJECTION, EMULSION INTRAVENOUS at 20:58

## 2025-02-26 RX ADMIN — Medication 1 TABLET: at 10:03

## 2025-02-26 RX ADMIN — IRON SUCROSE 100 MG: 20 INJECTION, SOLUTION INTRAVENOUS at 20:57

## 2025-02-26 RX ADMIN — PANTOPRAZOLE SODIUM 40 MG: 40 INJECTION, POWDER, FOR SOLUTION INTRAVENOUS at 10:03

## 2025-02-26 RX ADMIN — HEPARIN SODIUM 5000 UNITS: 5000 INJECTION, SOLUTION INTRAVENOUS; SUBCUTANEOUS at 05:33

## 2025-02-26 RX ADMIN — HEPARIN SODIUM 5000 UNITS: 5000 INJECTION, SOLUTION INTRAVENOUS; SUBCUTANEOUS at 21:12

## 2025-02-26 RX ADMIN — HEPARIN SODIUM 5000 UNITS: 5000 INJECTION, SOLUTION INTRAVENOUS; SUBCUTANEOUS at 13:35

## 2025-02-26 RX ADMIN — ASCORBIC ACID, VITAMIN A PALMITATE, CHOLECALCIFEROL, THIAMINE HYDROCHLORIDE, RIBOFLAVIN-5 PHOSPHATE SODIUM, PYRIDOXINE HYDROCHLORIDE, NIACINAMIDE, DEXPANTHENOL, ALPHA-TOCOPHEROL ACETATE, VITAMIN K1, FOLIC ACID, BIOTIN, CYANOCOBALAMIN: 200; 3300; 200; 6; 3.6; 6; 40; 15; 10; 150; 600; 60; 5 INJECTION, SOLUTION INTRAVENOUS at 20:58

## 2025-02-26 ASSESSMENT — COGNITIVE AND FUNCTIONAL STATUS - GENERAL
TURNING FROM BACK TO SIDE WHILE IN FLAT BAD: A LITTLE
MOVING FROM LYING ON BACK TO SITTING ON SIDE OF FLAT BED WITH BEDRAILS: A LITTLE
HELP NEEDED FOR BATHING: A LITTLE
MOVING TO AND FROM BED TO CHAIR: A LITTLE
MOBILITY SCORE: 22
WALKING IN HOSPITAL ROOM: A LITTLE
DRESSING REGULAR LOWER BODY CLOTHING: A LITTLE
STANDING UP FROM CHAIR USING ARMS: A LITTLE
CLIMB 3 TO 5 STEPS WITH RAILING: TOTAL
DAILY ACTIVITIY SCORE: 21
MOBILITY SCORE: 16
CLIMB 3 TO 5 STEPS WITH RAILING: A LITTLE
MOVING TO AND FROM BED TO CHAIR: A LITTLE
DRESSING REGULAR UPPER BODY CLOTHING: A LITTLE

## 2025-02-26 ASSESSMENT — PAIN SCALES - GENERAL
PAINLEVEL_OUTOF10: 0 - NO PAIN

## 2025-02-26 ASSESSMENT — PAIN - FUNCTIONAL ASSESSMENT
PAIN_FUNCTIONAL_ASSESSMENT: 0-10
PAIN_FUNCTIONAL_ASSESSMENT: 0-10

## 2025-02-26 NOTE — CARE PLAN
The patient's goals for the shift include to sleep    The clinical goals for the shift include pt will remain hemodynamically stabloe throughout shift      Problem: Skin  Goal: Decreased wound size/increased tissue granulation at next dressing change  Outcome: Progressing  Goal: Participates in plan/prevention/treatment measures  Outcome: Progressing  Goal: Prevent/manage excess moisture  Outcome: Progressing  Goal: Prevent/minimize sheer/friction injuries  Outcome: Progressing  Goal: Promote/optimize nutrition  Outcome: Progressing  Goal: Promote skin healing  Outcome: Progressing     Problem: Safety - Adult  Goal: Free from fall injury  Outcome: Progressing     Problem: Discharge Planning  Goal: Discharge to home or other facility with appropriate resources  Outcome: Progressing     Problem: Chronic Conditions and Co-morbidities  Goal: Patient's chronic conditions and co-morbidity symptoms are monitored and maintained or improved  Outcome: Progressing     Problem: Nutrition  Goal: Nutrient intake appropriate for maintaining nutritional needs  Outcome: Progressing     Problem: Respiratory  Goal: Clear secretions with interventions this shift  Outcome: Progressing  Goal: Minimize anxiety/maximize coping throughout shift  Outcome: Progressing  Goal: Minimal/no exertional discomfort or dyspnea this shift  Outcome: Progressing  Goal: No signs of respiratory distress (eg. Use of accessory muscles. Peds grunting)  Outcome: Progressing  Goal: Patent airway maintained this shift  Outcome: Progressing  Goal: Tolerate mechanical ventilation evidenced by VS/agitation level this shift  Outcome: Progressing  Goal: Tolerate pulmonary toileting this shift  Outcome: Progressing  Goal: Verbalize decreased shortness of breath this shift  Outcome: Progressing  Goal: Wean oxygen to maintain O2 saturation per order/standard this shift  Outcome: Progressing  Goal: Increase self care and/or family involvement in next 24 hours  Outcome:  Progressing

## 2025-02-26 NOTE — CARE PLAN
The patient's goals for the shift include to sleep    The clinical goals for the shift include pt will remain hemodynamically stabloe throughout shift    Problem: Skin  Goal: Decreased wound size/increased tissue granulation at next dressing change  Outcome: Progressing  Goal: Participates in plan/prevention/treatment measures  Outcome: Progressing  Goal: Prevent/manage excess moisture  Outcome: Progressing  Goal: Prevent/minimize sheer/friction injuries  Outcome: Progressing  Goal: Promote/optimize nutrition  Outcome: Progressing  Goal: Promote skin healing  Outcome: Progressing     Problem: Safety - Adult  Goal: Free from fall injury  Outcome: Progressing     Problem: Chronic Conditions and Co-morbidities  Goal: Patient's chronic conditions and co-morbidity symptoms are monitored and maintained or improved  Outcome: Progressing     Problem: Nutrition  Goal: Nutrient intake appropriate for maintaining nutritional needs  Outcome: Progressing     Problem: Respiratory  Goal: Clear secretions with interventions this shift  Outcome: Progressing  Goal: Minimize anxiety/maximize coping throughout shift  Outcome: Progressing  Goal: Minimal/no exertional discomfort or dyspnea this shift  Outcome: Progressing  Goal: No signs of respiratory distress (eg. Use of accessory muscles. Peds grunting)  Outcome: Progressing  Goal: Patent airway maintained this shift  Outcome: Progressing  Goal: Tolerate mechanical ventilation evidenced by VS/agitation level this shift  Outcome: Progressing  Goal: Tolerate pulmonary toileting this shift  Outcome: Progressing  Goal: Verbalize decreased shortness of breath this shift  Outcome: Progressing  Goal: Wean oxygen to maintain O2 saturation per order/standard this shift  Outcome: Progressing  Goal: Increase self care and/or family involvement in next 24 hours  Outcome: Progressing

## 2025-02-26 NOTE — CONSULTS
Wound Care Consult     Visit Date: 2/26/2025      Patient Name: Ike Gar         MRN: [id number]           YOB: 1945     Reason for Consult: Assessment of s/p ileostomy and change of pouch           Wound History: 79 year old male presents as a direct admit to trauma service from [facility] for high output EC fistula and malnutrition.      Pertinent Labs:   Albumin   Date Value Ref Range Status   02/26/2025 2.8 (L) 3.4 - 5.0 g/dL Final       Wound Assessment:  Wound 12/17/24 Skin Tear Hip Left (Active)   Wound Image   02/25/25 0811   Site Assessment Dry;Clean 02/26/25 1000   Drainage Description None 02/23/25 0830   Drainage Amount None 02/26/25 1000   Dressing Foam 02/26/25 1000   Dressing Changed New 02/12/25 1500   Dressing Status Dry;Clean 02/26/25 1000       Wound 12/17/24 Traumatic Hip Right (Active)   Wound Image   02/25/25 0811   Site Assessment Dry;Clean 02/26/25 1000   Trish-Wound Assessment Clean;Dry 02/26/25 1000   Drainage Description None 02/23/25 0830   Drainage Amount None 02/26/25 1000   Dressing Foam 02/24/25 2100   Dressing Changed New 02/12/25 1500   Dressing Status Clean;Dry 02/26/25 1000       Wound 12/17/24 Traumatic Hand Dorsal;Left (Active)   Wound Image   02/25/25 0814   Site Assessment Dry;Clean 02/26/25 1000   Trish-Wound Assessment Clean;Dry 02/24/25 2100   Drainage Description None 02/24/25 0845   Drainage Amount None 02/26/25 1000   Dressing Open to air 02/26/25 1000       Wound 12/17/24 Soft Tissue Necrosis Finger D1, thumb Dorsal;Right (Active)   Wound Image   02/25/25 0813   Site Assessment Clean;Dry 02/26/25 1000   Drainage Description None 02/26/25 1000   Drainage Amount None 02/26/25 1000   Dressing Open to air 02/26/25 1000       Wound 12/21/24 Incision Abdomen Medial;Upper (Active)   Wound Image   02/25/25 0810   Site Assessment Dry;Clean 02/26/25 1000   Trish-Wound Assessment Clean;Dry 02/26/25 1000   Drainage Description None 02/24/25 0845   Drainage  Amount None 25 1000   Dressing Open to air 25 1000   Dressing Changed Other (Comment) 25 0900   Dressing Status Clean;Dry 25 1000       Wound 24 Traumatic Foot Dorsal foot;Left (Active)   Wound Image   25 0811   Site Assessment Dry;Clean 25 1000   Trish-Wound Assessment Clean;Dry 25 2100   Drainage Description None 25 0845   Drainage Amount None 25 1000   Dressing Open to air 25 1000       Wound 25 Pressure Injury Heel Right (Active)   Wound Image   25 0812   Site Assessment Clean;Dry 25 1000   Trish-Wound Assessment Clean;Dry 25 1000   Drainage Description None 25 0845   Drainage Amount None 25 1000   Dressing Foam 25 1000   Dressing Status Dry;Clean 25 1000       Wound 25 Buttock (Active)   Wound Image   25 0815   Shape irregular 25 2152   Drainage Description None 25 0845   Drainage Amount None 25 1000   Dressing Foam 25 1000   Dressing Changed Changed 25 0900   Dressing Status Dry;Clean 25 1000            25 1200     Ileostomy Other (Comment) RUQ   Placement Date/Time: 24 112   Placed by: hilary ZULUAGA  Hand Hygiene Completed: Yes  Ileostomy Type: (c) Other (Comment)  Location: RUQ   Stomal Appliance Changed;2 piece,high flow   Site/Stoma Assessment Pink;Budded   Stoma Size (cm)    (1 3/8)   Peristomal Assessment Red   Treatment Pouch change;Site care   Output Description Brown;Liquid         Wound Team Summary Assessment:   Ostomy type: ileostomy    size: 1 3/8        color: pink, moist        protruding: budded, os facing downward  Ricky: none  Functioning: yes, brown liquid output  Mucocutaneous junction: intact  Peristomal skin: intact, small red area, no maceration or open areas. Treated with 3M cavilon prior to pouch placement  Pouchin piece [place] flat with ostomy ring and high output pouch   Plan: assess stoma/pouching  Wound Team  Plan:  Wound team will continue to follow 2 times a week for pouch change.      SUZANNE GERHARDT, RN, BSN, CWOCN  2/26/2025  3:42 PM
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
## 2025-02-26 NOTE — PROGRESS NOTES
Physical Therapy    Physical Therapy Treatment    Patient Name: Ike Gar  MRN: [id number]  Today's Date: 2/26/2025  Room: [address] [place] [address]A  Time Calculation  Start Time: 0935  Stop Time: 1004  Time Calculation (min): 29 min       Assessment/Plan   PT Plan  Treatment/Interventions: Bed mobility, Transfer training, Gait training, Balance training, Neuromuscular re-education, Endurance training, Strengthening, Therapeutic exercise, Therapeutic activity  PT Plan: Ongoing PT  PT Frequency: 5 times per week  PT Discharge Recommendations: Low intensity level of continued care  Equipment Recommended upon Discharge:  (owns walker)  PT Recommended Transfer Status: Assistive device, Stand by assist  PT - OK to Discharge: Yes    General Visit Information:   Reason for Referral: high output EC fistula and malnutrition.  Past Medical History Relevant to Rehab: 1. Increased EC fistula output  2. Malnutrition s/p TPN  3. Recent T/L spine fxs  4. Anemia  5. Hx tracheostomy (1/7/2025)  6. Hx HTN  Prior to Session Communication: Bedside nurse  Patient Position Received: Alarm off, not on at start of session (seated EOB)   Subjective: Pt alert and agreeable to PT. Reports that he feels he slept funny last night., Neck pain present that has not been here previously. Also feels more SOB relative to previous days, increased nausea and dizziness. Attributes to changes in diet.   Precautions:  Precautions  Medical Precautions: Fall precautions, Spinal precautions  Post-Surgical Precautions: Abdominal surgery precautions  Precautions Comment: Contact precautions  Vital Signs:   Date/Time Vitals Session Patient Position Pulse Resp SpO2 BP MAP (mmHg)    02/26/25 0945 --  Sitting  110  --  96 %  --  --             Objective   Pain:  Pain Assessment  0-10 (Numeric) Pain Score: 0 - No pain (post 0)  Cognition:  Cognition  Orientation Level: Oriented X4  Lines/Tubes/Drains:  PICC - Adult Double lumen Left Brachial vein (Active)   Number of days: 24        Closed/Suction Drain Midline RUQ 10 Fr. (Active)   Number of days: 42       Closed/Suction Drain Right;Anterior Hip Bulb (Active)   Number of days: 24       Gastrostomy/Enterostomy Percutaneous endoscopic gastrostomy (PEG) 1 20 Fr. LUQ (Active)   Number of days: 50       Ileostomy Other (Comment) RUQ (Active)   Number of days: 64     Medical Gas Therapy: None (Room air)  Continuous Medications/Drips:  Adult Clinimix TPN Cyclic, , Last Rate: 91 mL/hr at 02/26/25 0930        PT Treatments:     Therapeutic Activity  Therapeutic Activity 1: EOB x10 min supervision     Bed Mobility 1  Bed Mobility 1: Supine to sitting  Level of Assistance 1: Independent  Bed Mobility Comments 1: x1 HOB 30  Ambulation/Gait Training 1  Surface 1: Level tile  Device 1: Rolling walker  Assistance 1: Close supervision  Quality of Gait 1: Wide base of support, Decreased step length, Forward flexed posture  Comments/Distance (ft) 1: 2x20 feet  Transfer 1  Transfer From 1: Sit to  Transfer to 1: Stand  Transfer Device 1: Walker  Transfer Level of Assistance 1: Close supervision  Trials/Comments 1: x4  Transfers 2  Transfer From 2: Stand to  Transfer to 2: Sit  Transfer Device 2: Walker  Transfer Level of Assistance 2: Close supervision  Trials/Comments 2: x4  Transfers 3  Transfer From 3: Bed to  Transfer to 3: Chair with arms  Transfer Device 3: Walker  Transfer Level of Assistance 3: Close supervision  Trials/Comments 3: x1  Transfers 4  Transfer From 4: Chair with arms to  Transfer to 4: Chair with arms  Transfer Device 4: Walker  Transfer Level of Assistance 4: Close supervision  Trials/Comments 4: x2             Outcome Measures:  [facility] Basic Mobility  Turning from your back to your side while in a flat bed without using bedrails: A little  Moving from lying on your back to sitting on the side of a flat bed without using bedrails: A little  Moving to and from bed to chair (including a wheelchair): A little  Standing up from a chair using  your arms (e.g. wheelchair or bedside chair): A little  To walk in hospital room: A little  Climbing 3-5 steps with railing: Total  Basic Mobility - Total Score: 16                            Education Documentation  Precautions, taught by JANET Atkins at 2/26/2025 10:32 AM.  Learner: Patient  Readiness: Acceptance  Method: Explanation  Response: Verbalizes Understanding  Comment: POC    Body Mechanics, taught by JANET Atkins at 2/26/2025 10:32 AM.  Learner: Patient  Readiness: Acceptance  Method: Explanation  Response: Verbalizes Understanding  Comment: POC    Mobility Training, taught by JANET Atkins at 2/26/2025 10:32 AM.  Learner: Patient  Readiness: Acceptance  Method: Explanation  Response: Verbalizes Understanding  Comment: POC    Education Comments  No comments found.          OP EDUCATION:       Encounter Problems       Encounter Problems (Active)       PT Problem       Patient will complete supine to sit and sit to supine Supervision while maintaining spinal precautions (Progressing)       Start:  02/03/25    Expected End:  02/17/25            Patient will perform sit<>stand transfer with LRAD, and Supervision while maintaining spinal precautions  (Progressing)       Start:  02/03/25    Expected End:  02/17/25            Patient will ambulate >100' with LRAD and Supervision  (Progressing)       Start:  02/03/25    Expected End:  02/17/25                   Assessment: Patient is progressing Fairly with therapy this date.  Pt progressed bed mobility, transfers, and gait on todays date. Increased SOB following ambulation trials, however SPO2 levels remained stable. Nausea reported following first ambulation trial - pt requested container to throw up, resolved with seated rest. Will continue to progress bed mobility, tolerance to upright activity, transfers, and gait while still in hospital. Patient remains appropriate for LOW intensity therapy when medically appropriate for discharge from  acute stay.  Will continue to follow.      02/26/25 at 10:35 AM   JANET BRAND   Rehab Office: [phone]

## 2025-02-27 LAB
ALBUMIN SERPL BCP-MCNC: 2.6 G/DL (ref 3.4–5)
ANION GAP SERPL CALC-SCNC: 13 MMOL/L (ref 10–20)
BUN SERPL-MCNC: 51 MG/DL (ref 6–23)
CALCIUM SERPL-MCNC: 9.2 MG/DL (ref 8.6–10.6)
CHLORIDE SERPL-SCNC: 103 MMOL/L (ref 98–107)
CO2 SERPL-SCNC: 20 MMOL/L (ref 21–32)
CREAT SERPL-MCNC: 1.29 MG/DL (ref 0.5–1.3)
EGFRCR SERPLBLD CKD-EPI 2021: 57 ML/MIN/1.73M*2
ERYTHROCYTE [DISTWIDTH] IN BLOOD BY AUTOMATED COUNT: 15.6 % (ref 11.5–14.5)
GLUCOSE BLD MANUAL STRIP-MCNC: 118 MG/DL (ref 74–99)
GLUCOSE BLD MANUAL STRIP-MCNC: 140 MG/DL (ref 74–99)
GLUCOSE BLD MANUAL STRIP-MCNC: 159 MG/DL (ref 74–99)
GLUCOSE BLD MANUAL STRIP-MCNC: 171 MG/DL (ref 74–99)
GLUCOSE BLD MANUAL STRIP-MCNC: 88 MG/DL (ref 74–99)
GLUCOSE SERPL-MCNC: 144 MG/DL (ref 74–99)
HCT VFR BLD AUTO: 27.8 % (ref 41–52)
HGB BLD-MCNC: 8.9 G/DL (ref 13.5–17.5)
MAGNESIUM SERPL-MCNC: 1.86 MG/DL (ref 1.6–2.4)
MCH RBC QN AUTO: 30.3 PG (ref 26–34)
MCHC RBC AUTO-ENTMCNC: 32 G/DL (ref 32–36)
MCV RBC AUTO: 95 FL (ref 80–100)
NRBC BLD-RTO: 0 /100 WBCS (ref 0–0)
PHOSPHATE SERPL-MCNC: 3.8 MG/DL (ref 2.5–4.9)
PLATELET # BLD AUTO: 99 X10*3/UL (ref 150–450)
POTASSIUM SERPL-SCNC: 4.9 MMOL/L (ref 3.5–5.3)
RBC # BLD AUTO: 2.94 X10*6/UL (ref 4.5–5.9)
SODIUM SERPL-SCNC: 131 MMOL/L (ref 136–145)
WBC # BLD AUTO: 8 X10*3/UL (ref 4.4–11.3)

## 2025-02-27 PROCEDURE — 82947 ASSAY GLUCOSE BLOOD QUANT: CPT

## 2025-02-27 PROCEDURE — 1100000001 HC PRIVATE ROOM DAILY

## 2025-02-27 PROCEDURE — 2500000004 HC RX 250 GENERAL PHARMACY W/ HCPCS (ALT 636 FOR OP/ED)

## 2025-02-27 PROCEDURE — 80069 RENAL FUNCTION PANEL: CPT

## 2025-02-27 PROCEDURE — 2500000005 HC RX 250 GENERAL PHARMACY W/O HCPCS

## 2025-02-27 PROCEDURE — 2500000004 HC RX 250 GENERAL PHARMACY W/ HCPCS (ALT 636 FOR OP/ED): Performed by: PHYSICIAN ASSISTANT

## 2025-02-27 PROCEDURE — 2580000001 HC RX 258 IV SOLUTIONS

## 2025-02-27 PROCEDURE — 97110 THERAPEUTIC EXERCISES: CPT | Mod: GP | Performed by: STUDENT IN AN ORGANIZED HEALTH CARE EDUCATION/TRAINING PROGRAM

## 2025-02-27 PROCEDURE — 2500000004 HC RX 250 GENERAL PHARMACY W/ HCPCS (ALT 636 FOR OP/ED): Performed by: SURGERY

## 2025-02-27 PROCEDURE — 97530 THERAPEUTIC ACTIVITIES: CPT | Mod: GP | Performed by: STUDENT IN AN ORGANIZED HEALTH CARE EDUCATION/TRAINING PROGRAM

## 2025-02-27 PROCEDURE — 2500000001 HC RX 250 WO HCPCS SELF ADMINISTERED DRUGS (ALT 637 FOR MEDICARE OP): Performed by: NURSE PRACTITIONER

## 2025-02-27 PROCEDURE — 85027 COMPLETE CBC AUTOMATED: CPT

## 2025-02-27 PROCEDURE — 2500000002 HC RX 250 W HCPCS SELF ADMINISTERED DRUGS (ALT 637 FOR MEDICARE OP, ALT 636 FOR OP/ED): Performed by: PHYSICIAN ASSISTANT

## 2025-02-27 PROCEDURE — 83735 ASSAY OF MAGNESIUM: CPT

## 2025-02-27 PROCEDURE — 99232 SBSQ HOSP IP/OBS MODERATE 35: CPT | Performed by: PHYSICIAN ASSISTANT

## 2025-02-27 RX ADMIN — IRON SUCROSE 100 MG: 20 INJECTION, SOLUTION INTRAVENOUS at 18:00

## 2025-02-27 RX ADMIN — HEPARIN SODIUM 5000 UNITS: 5000 INJECTION, SOLUTION INTRAVENOUS; SUBCUTANEOUS at 21:06

## 2025-02-27 RX ADMIN — SMOFLIPID 50 G: 6; 6; 5; 3 INJECTION, EMULSION INTRAVENOUS at 20:21

## 2025-02-27 RX ADMIN — HEPARIN SODIUM 5000 UNITS: 5000 INJECTION, SOLUTION INTRAVENOUS; SUBCUTANEOUS at 14:48

## 2025-02-27 RX ADMIN — INSULIN LISPRO 1 UNITS: 100 INJECTION, SOLUTION INTRAVENOUS; SUBCUTANEOUS at 00:56

## 2025-02-27 RX ADMIN — ASCORBIC ACID, VITAMIN A PALMITATE, CHOLECALCIFEROL, THIAMINE HYDROCHLORIDE, RIBOFLAVIN-5 PHOSPHATE SODIUM, PYRIDOXINE HYDROCHLORIDE, NIACINAMIDE, DEXPANTHENOL, ALPHA-TOCOPHEROL ACETATE, VITAMIN K1, FOLIC ACID, BIOTIN, CYANOCOBALAMIN: 200; 3300; 200; 6; 3.6; 6; 40; 15; 10; 150; 600; 60; 5 INJECTION, SOLUTION INTRAVENOUS at 20:21

## 2025-02-27 RX ADMIN — ONDANSETRON 4 MG: 2 INJECTION INTRAMUSCULAR; INTRAVENOUS at 12:51

## 2025-02-27 RX ADMIN — Medication 1 TABLET: at 08:43

## 2025-02-27 RX ADMIN — PANTOPRAZOLE SODIUM 40 MG: 40 INJECTION, POWDER, FOR SOLUTION INTRAVENOUS at 08:43

## 2025-02-27 RX ADMIN — Medication 6 MG: at 20:22

## 2025-02-27 RX ADMIN — HEPARIN SODIUM 5000 UNITS: 5000 INJECTION, SOLUTION INTRAVENOUS; SUBCUTANEOUS at 05:57

## 2025-02-27 ASSESSMENT — PAIN SCALES - GENERAL
PAINLEVEL_OUTOF10: 0 - NO PAIN

## 2025-02-27 ASSESSMENT — COGNITIVE AND FUNCTIONAL STATUS - GENERAL
WALKING IN HOSPITAL ROOM: A LITTLE
MOVING TO AND FROM BED TO CHAIR: A LITTLE
STANDING UP FROM CHAIR USING ARMS: A LITTLE
MOBILITY SCORE: 16
CLIMB 3 TO 5 STEPS WITH RAILING: TOTAL
MOVING FROM LYING ON BACK TO SITTING ON SIDE OF FLAT BED WITH BEDRAILS: A LITTLE
TURNING FROM BACK TO SIDE WHILE IN FLAT BAD: A LITTLE

## 2025-02-27 ASSESSMENT — PAIN SCALES - WONG BAKER: WONGBAKER_NUMERICALRESPONSE: NO HURT

## 2025-02-27 ASSESSMENT — PAIN - FUNCTIONAL ASSESSMENT: PAIN_FUNCTIONAL_ASSESSMENT: 0-10

## 2025-02-27 NOTE — PROGRESS NOTES
Physical Therapy    Physical Therapy Treatment    Patient Name: Ike Gar  MRN: [id number]  Today's Date: 2/27/2025  Room: [address] [place] [address]A  Time Calculation  Start Time: 1206  Stop Time: 1234  Time Calculation (min): 28 min       Assessment/Plan   PT Plan  Treatment/Interventions: Bed mobility, Transfer training, Gait training, Balance training, Neuromuscular re-education, Endurance training, Strengthening, Therapeutic exercise, Therapeutic activity  PT Plan: Ongoing PT  PT Frequency: 5 times per week  PT Discharge Recommendations: Low intensity level of continued care  Equipment Recommended upon Discharge:  (owns walker)  PT Recommended Transfer Status: Assistive device, Stand by assist  PT - OK to Discharge: Yes    General Visit Information:   Reason for Referral: high output EC fistula and malnutrition.  Past Medical History Relevant to Rehab: 1. Increased EC fistula output  2. Malnutrition s/p TPN  3. Recent T/L spine fxs  4. Anemia  5. Hx tracheostomy (1/7/2025)  6. Hx HTN  Prior to Session Communication: Bedside nurse  Patient Position Received: Bed, 3 rail up, Alarm off, not on at start of session   Subjective: Pt alert and agreeable to PT. Reports that he just did a lap in the room prior to PT entering and was winded. Feels better than he felt yesterday. Pt states that it is very difficult to eat while in bed, and will sit up in chair for all meals moving forward.   Precautions:  Precautions  Medical Precautions: Fall precautions, Spinal precautions  Post-Surgical Precautions: Abdominal surgery precautions  Precautions Comment: Contact precautions  Vital Signs:      Objective   Pain:  Pain Assessment  0-10 (Numeric) Pain Score: 0 - No pain (post 0)  Cognition:  Cognition  Orientation Level: Oriented X4  Lines/Tubes/Drains:  PICC - Adult Double lumen Left Brachial vein (Active)   Number of days: 25       Closed/Suction Drain Midline RUQ 10 Fr. (Active)   Number of days: 43       Closed/Suction Drain  Right;Anterior Hip Bulb (Active)   Number of days: 25       Gastrostomy/Enterostomy Percutaneous endoscopic gastrostomy (PEG) 1 20 Fr. LUQ (Active)   Number of days: 51       Ileostomy Other (Comment) RUQ (Active)   Number of days: 66        Continuous Medications/Drips:  Adult Clinimix TPN Cyclic, , Last Rate: Stopped (02/27/25 0838)        PT Treatments:  Therapeutic Exercise  Therapeutic Exercise Activity 1: standing resisted bicep curls 2x8 (second set performed without walker support)  Therapeutic Exercise Activity 2: supine SLR 2x10  Therapeutic Exercise Activity 3: IS x8 (1500 mL)  Therapeutic Activity  Therapeutic Activity 1: EOB x10 min supervision     Bed Mobility 1  Bed Mobility 1: Supine to sitting  Level of Assistance 1: Independent  Bed Mobility Comments 1: x1 HOB 30  Bed Mobility 2  Bed Mobility  2: Sitting to supine  Level of Assistance 2: Independent  Bed Mobility Comments 2: x1 HOB 30     Transfer 1  Transfer From 1: Sit to  Transfer to 1: Stand  Transfer Device 1: Walker  Transfer Level of Assistance 1: Close supervision  Trials/Comments 1: x4  Transfers 2  Transfer From 2: Stand to  Transfer to 2: Sit  Transfer Device 2: Walker  Transfer Level of Assistance 2: Close supervision  Trials/Comments 2: x4  Transfers 3  Transfer From 3: Bed to  Transfer to 3: Bed  Transfer Device 3: Walker  Transfer Level of Assistance 3: Close supervision  Trials/Comments 3: x1  Transfers 4  Transfer From 4: Bed to  Transfer to 4: Chair with arms  Transfer Device 4: Walker  Transfer Level of Assistance 4: Close supervision  Trials/Comments 4: x1             Outcome Measures:  [facility] Basic Mobility  Turning from your back to your side while in a flat bed without using bedrails: A little  Moving from lying on your back to sitting on the side of a flat bed without using bedrails: A little  Moving to and from bed to chair (including a wheelchair): A little  Standing up from a chair using your arms (e.g. wheelchair or  bedside chair): A little  To walk in hospital room: A little  Climbing 3-5 steps with railing: Total  Basic Mobility - Total Score: 16                            Education Documentation  Precautions, taught by JANET Atkins at 2/27/2025 12:41 PM.  Learner: Patient  Readiness: Acceptance  Method: Explanation  Response: Verbalizes Understanding  Comment: POC    Body Mechanics, taught by JANET Atkins at 2/27/2025 12:41 PM.  Learner: Patient  Readiness: Acceptance  Method: Explanation  Response: Verbalizes Understanding  Comment: POC    Mobility Training, taught by JANET Atkins at 2/27/2025 12:41 PM.  Learner: Patient  Readiness: Acceptance  Method: Explanation  Response: Verbalizes Understanding  Comment: POC    Education Comments  No comments found.          OP EDUCATION:       Encounter Problems       Encounter Problems (Active)       PT Problem       Patient will complete supine to sit and sit to supine Supervision while maintaining spinal precautions (Met)       Start:  02/03/25    Expected End:  02/17/25    Resolved:  02/27/25         Patient will perform sit<>stand transfer with LRAD, and Supervision while maintaining spinal precautions  (Met)       Start:  02/03/25    Expected End:  02/17/25    Resolved:  02/27/25         Patient will ambulate >100' with LRAD and Supervision  (Progressing)       Start:  02/03/25    Expected End:  03/13/25               PT Problem       Pt will score >18 on Tinetti, demonstrating improved static/dynamic stability and improved gait mechanics (Progressing)       Start:  02/27/25    Expected End:  03/13/25                   Assessment: Patient is progressing Fairly with therapy this date.  Pt progressed bed mobility, transfers, and dynamic balance on todays date. Able stand unsupported without walker and perform UE exercise without LOB. Pt reported increased core activation following set - no pain reported. Will continue to progress transfers, tolerance to upright  activity, and gait endurance while still in hospital Patient remains appropriate for LOW intensity therapy when medically appropriate for discharge from acute stay.  Will continue to follow.      02/27/25 at 3:52 PM   JANET BRAND   Rehab Office: [phone]
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
## 2025-02-27 NOTE — CARE PLAN
Problem: Skin  Goal: Promote/optimize nutrition  2/27/2025 1119 by Henna Tafoya RN  Outcome: Progressing  2/27/2025 1119 by Henna Tafoya RN  Outcome: Progressing  Goal: Promote skin healing  2/27/2025 1119 by Henna Tafoya RN  Outcome: Progressing  2/27/2025 1119 by Henna Tafoya RN  Outcome: Progressing     Problem: Safety - Adult  Goal: Free from fall injury  2/27/2025 1119 by Henna Tafoya RN  Outcome: Progressing  2/27/2025 1119 by Henna Tafoya RN  Outcome: Progressing     Problem: Discharge Planning  Goal: Discharge to home or other facility with appropriate resources  2/27/2025 1119 by Henna Tafoya RN  Outcome: Progressing  2/27/2025 1119 by Henna Tafoya RN  Outcome: Progressing     Problem: Chronic Conditions and Co-morbidities  Goal: Patient's chronic conditions and co-morbidity symptoms are monitored and maintained or improved  2/27/2025 1119 by Henna Tafoya RN  Outcome: Progressing  2/27/2025 1119 by Henna Tafoya RN  Outcome: Progressing     Problem: Nutrition  Goal: Nutrient intake appropriate for maintaining nutritional needs  2/27/2025 1119 by Henna Tafoya RN  Outcome: Progressing  2/27/2025 1119 by Henna Tafoya RN  Outcome: Progressing   The patient's goals for the shift include discharge planning     The clinical goals for the shift include pt will remain HDS

## 2025-02-28 ENCOUNTER — DOCUMENTATION (OUTPATIENT)
Dept: HOME HEALTH SERVICES | Facility: HOME HEALTH | Age: 80
End: 2025-02-28
Payer: MEDICARE

## 2025-02-28 ENCOUNTER — HOME INFUSION (OUTPATIENT)
Dept: INFUSION THERAPY | Age: 80
End: 2025-02-28
Payer: MEDICARE

## 2025-02-28 LAB
ALBUMIN SERPL BCP-MCNC: 2.8 G/DL (ref 3.4–5)
ANION GAP SERPL CALC-SCNC: 10 MMOL/L (ref 10–20)
BUN SERPL-MCNC: 55 MG/DL (ref 6–23)
CALCIUM SERPL-MCNC: 9.4 MG/DL (ref 8.6–10.6)
CHLORIDE SERPL-SCNC: 104 MMOL/L (ref 98–107)
CO2 SERPL-SCNC: 23 MMOL/L (ref 21–32)
CREAT SERPL-MCNC: 1.42 MG/DL (ref 0.5–1.3)
EGFRCR SERPLBLD CKD-EPI 2021: 51 ML/MIN/1.73M*2
ERYTHROCYTE [DISTWIDTH] IN BLOOD BY AUTOMATED COUNT: 15.6 % (ref 11.5–14.5)
GLUCOSE BLD MANUAL STRIP-MCNC: 100 MG/DL (ref 74–99)
GLUCOSE BLD MANUAL STRIP-MCNC: 108 MG/DL (ref 74–99)
GLUCOSE BLD MANUAL STRIP-MCNC: 146 MG/DL (ref 74–99)
GLUCOSE BLD MANUAL STRIP-MCNC: 77 MG/DL (ref 74–99)
GLUCOSE SERPL-MCNC: 90 MG/DL (ref 74–99)
HCT VFR BLD AUTO: 29.2 % (ref 41–52)
HGB BLD-MCNC: 9.2 G/DL (ref 13.5–17.5)
MAGNESIUM SERPL-MCNC: 1.98 MG/DL (ref 1.6–2.4)
MCH RBC QN AUTO: 30.3 PG (ref 26–34)
MCHC RBC AUTO-ENTMCNC: 31.5 G/DL (ref 32–36)
MCV RBC AUTO: 96 FL (ref 80–100)
NRBC BLD-RTO: 0 /100 WBCS (ref 0–0)
PHOSPHATE SERPL-MCNC: 3.9 MG/DL (ref 2.5–4.9)
PLATELET # BLD AUTO: 190 X10*3/UL (ref 150–450)
POTASSIUM SERPL-SCNC: 5.8 MMOL/L (ref 3.5–5.3)
RBC # BLD AUTO: 3.04 X10*6/UL (ref 4.5–5.9)
SODIUM SERPL-SCNC: 131 MMOL/L (ref 136–145)
WBC # BLD AUTO: 10.2 X10*3/UL (ref 4.4–11.3)

## 2025-02-28 PROCEDURE — 1100000001 HC PRIVATE ROOM DAILY

## 2025-02-28 PROCEDURE — 2580000001 HC RX 258 IV SOLUTIONS

## 2025-02-28 PROCEDURE — 85027 COMPLETE CBC AUTOMATED: CPT

## 2025-02-28 PROCEDURE — 2500000004 HC RX 250 GENERAL PHARMACY W/ HCPCS (ALT 636 FOR OP/ED)

## 2025-02-28 PROCEDURE — 2500000001 HC RX 250 WO HCPCS SELF ADMINISTERED DRUGS (ALT 637 FOR MEDICARE OP): Performed by: NURSE PRACTITIONER

## 2025-02-28 PROCEDURE — 83735 ASSAY OF MAGNESIUM: CPT

## 2025-02-28 PROCEDURE — 80069 RENAL FUNCTION PANEL: CPT

## 2025-02-28 PROCEDURE — 2500000002 HC RX 250 W HCPCS SELF ADMINISTERED DRUGS (ALT 637 FOR MEDICARE OP, ALT 636 FOR OP/ED): Performed by: PHYSICIAN ASSISTANT

## 2025-02-28 PROCEDURE — 2500000004 HC RX 250 GENERAL PHARMACY W/ HCPCS (ALT 636 FOR OP/ED): Performed by: PHYSICIAN ASSISTANT

## 2025-02-28 PROCEDURE — 82947 ASSAY GLUCOSE BLOOD QUANT: CPT

## 2025-02-28 PROCEDURE — 99232 SBSQ HOSP IP/OBS MODERATE 35: CPT | Performed by: SURGERY

## 2025-02-28 PROCEDURE — 2500000005 HC RX 250 GENERAL PHARMACY W/O HCPCS

## 2025-02-28 RX ORDER — WATER
500 LIQUID (ML) MISCELLANEOUS 3 TIMES DAILY
Status: DISCONTINUED | OUTPATIENT
Start: 2025-02-28 | End: 2025-03-02 | Stop reason: HOSPADM

## 2025-02-28 RX ORDER — ONDANSETRON 4 MG/1
4 TABLET, ORALLY DISINTEGRATING ORAL EVERY 8 HOURS PRN
Qty: 20 TABLET | Refills: 0 | Status: SHIPPED | OUTPATIENT
Start: 2025-02-28

## 2025-02-28 RX ORDER — MULTIVIT-MIN/IRON FUM/FOLIC AC 7.5 MG-4
1 TABLET ORAL DAILY
Qty: 30 TABLET | Refills: 0 | Status: SHIPPED | OUTPATIENT
Start: 2025-03-01 | End: 2025-03-31

## 2025-02-28 RX ADMIN — INSULIN LISPRO 1 UNITS: 100 INJECTION, SOLUTION INTRAVENOUS; SUBCUTANEOUS at 00:46

## 2025-02-28 RX ADMIN — ASCORBIC ACID, VITAMIN A PALMITATE, CHOLECALCIFEROL, THIAMINE HYDROCHLORIDE, RIBOFLAVIN-5 PHOSPHATE SODIUM, PYRIDOXINE HYDROCHLORIDE, NIACINAMIDE, DEXPANTHENOL, ALPHA-TOCOPHEROL ACETATE, VITAMIN K1, FOLIC ACID, BIOTIN, CYANOCOBALAMIN: 200; 3300; 200; 6; 3.6; 6; 40; 15; 10; 150; 600; 60; 5 INJECTION, SOLUTION INTRAVENOUS at 20:27

## 2025-02-28 RX ADMIN — Medication 1 TABLET: at 10:37

## 2025-02-28 RX ADMIN — HEPARIN SODIUM 5000 UNITS: 5000 INJECTION, SOLUTION INTRAVENOUS; SUBCUTANEOUS at 14:41

## 2025-02-28 RX ADMIN — SMOFLIPID 50 G: 6; 6; 5; 3 INJECTION, EMULSION INTRAVENOUS at 20:27

## 2025-02-28 RX ADMIN — PANTOPRAZOLE SODIUM 40 MG: 40 INJECTION, POWDER, FOR SOLUTION INTRAVENOUS at 10:37

## 2025-02-28 RX ADMIN — IRON SUCROSE 100 MG: 20 INJECTION, SOLUTION INTRAVENOUS at 17:56

## 2025-02-28 RX ADMIN — HEPARIN SODIUM 5000 UNITS: 5000 INJECTION, SOLUTION INTRAVENOUS; SUBCUTANEOUS at 21:21

## 2025-02-28 RX ADMIN — HEPARIN SODIUM 5000 UNITS: 5000 INJECTION, SOLUTION INTRAVENOUS; SUBCUTANEOUS at 06:26

## 2025-02-28 RX ADMIN — Medication 500 ML: at 23:32

## 2025-02-28 RX ADMIN — SODIUM CHLORIDE 1000 ML: 0.9 INJECTION, SOLUTION INTRAVENOUS at 14:56

## 2025-02-28 RX ADMIN — Medication 500 ML: at 17:55

## 2025-02-28 NOTE — PROGRESS NOTES
Nutrition Note:   Pt set to discharge home with home TPN on 3/1. Current regimen remains appropriate of cycled PN + SMOF lipids. Please refer pt to outpatient RDN for assistance with oral intake once able to be weaned off TPN. Reconsult prior to discharge as needed.     Nutrition Specific Medications:  Scheduled medications  fat emulsion fish oil/plant based, 250 mL, intravenous, Daily Lipids    Continuous medications  Adult Clinimix TPN Cyclic, , Last Rate: Stopped (02/28/25 0830)        I/O:   Last BM Date: 02/23/25; Stool Appearance: Liquid, Mucous (02/24/25 0845)    Dietary Orders (From admission, onward)       Start     Ordered    02/23/25 1123  May Not Participate in Room Service  Once        Question:  .  Answer:  Yes    02/23/25 1122    02/23/25 1122  Adult diet Clear Liquid  Diet effective now        Comments: Sips only (very slow as tolerated)   Question:  Diet type  Answer:  Clear Liquid    02/23/25 1121                     Estimated Needs:   Total Energy Estimated Needs in 24 hours (kCal):  (6872-0922)  Method for Estimating Needs: 25 -30  kcal/kg current wt  Total Protein Estimated Needs in 24 Hours (g): 115 g  Method for Estimating 24 Hour Protein Needs: 1.5+ g/kg current wt  Total Fluid Estimated Needs in 24 Hours (mL):  (per trauma)      Nutrition Diagnosis   Malnutrition Diagnosis  Patient has Malnutrition Diagnosis: Yes  Diagnosis Status: Active  Malnutrition Diagnosis: Moderate malnutrition related to acute disease or injury  As Evidenced by: mild muscle loss and mild subcutaneous fat loss.  Additional Assessment Information: During previous admission was unable to be diagnosed due to significant fluid retention      Time Spent (min): 15 minutes
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
## 2025-02-28 NOTE — PROGRESS NOTES
Ike Gar is a 79 y.o. patient discharged from hospital to [facility] for skilled nursing and pharmacy services.  Pt ordered daily TPN over 12 hrs SOC 3/1. Med ordered verified as accurate and appropriate for therapy.   Trauma Team following.    Reviewed pt info as correct  Allergies   Allergen Reactions    Meperidine Nausea/vomiting    Prochlorperazine Nausea/vomiting     No medication interactions  Pt has SL PICC placed 2/2 to flush per [facility] protocol  Care plan done today    Spoke at length with pt. Verified correct address: [address] [place] [id number] and phone number: [phone]. Reviewed [facility] services and answered all questions. RN to call pt with time of appt. Pt agreeable to delivery by 9 pm and confirmed will refrigerate med as appropriate.  to call when dropping off medication. [facility] offered to  - pt stated no questions at this time.    Pharmacy to mix 2/28 and deliver straight with supplies to match.  5x TPN  5x MVI  DOS: 3/1 - 3/5    Follow up 3/4 - check labs,  mix Wednesday

## 2025-02-28 NOTE — HH CARE COORDINATION
Home Care received a Referral for Infusion, Nursing, Physical Therapy, Occupational Therapy, and Speech Language Pathology. We have processed the referral for a Start of Care on 3/1/2025.     If you have any questions or concerns, please feel free to contact us at [phone]. Follow the prompts, enter your five digit zip code, and you will be directed to your care team on WEST 2.

## 2025-03-01 ENCOUNTER — HOME INFUSION (OUTPATIENT)
Dept: INFUSION THERAPY | Age: 80
End: 2025-03-01
Payer: MEDICARE

## 2025-03-01 LAB
ALBUMIN SERPL BCP-MCNC: 2.6 G/DL (ref 3.4–5)
ALBUMIN SERPL BCP-MCNC: 3 G/DL (ref 3.4–5)
ANION GAP SERPL CALC-SCNC: 14 MMOL/L
ANION GAP SERPL CALC-SCNC: 14 MMOL/L (ref 10–20)
BUN SERPL-MCNC: 53 MG/DL (ref 6–23)
BUN SERPL-MCNC: 55 MG/DL (ref 6–23)
CALCIUM SERPL-MCNC: 9 MG/DL (ref 8.6–10.6)
CALCIUM SERPL-MCNC: 9.6 MG/DL (ref 8.6–10.6)
CHLORIDE SERPL-SCNC: 103 MMOL/L (ref 98–107)
CHLORIDE SERPL-SCNC: 104 MMOL/L (ref 98–107)
CO2 SERPL-SCNC: 19 MMOL/L (ref 21–32)
CO2 SERPL-SCNC: 20 MMOL/L (ref 21–32)
CREAT SERPL-MCNC: 1.2 MG/DL (ref 0.5–1.3)
CREAT SERPL-MCNC: 1.37 MG/DL (ref 0.5–1.3)
EGFRCR SERPLBLD CKD-EPI 2021: 53 ML/MIN/1.73M*2
EGFRCR SERPLBLD CKD-EPI 2021: 62 ML/MIN/1.73M*2
GLUCOSE BLD MANUAL STRIP-MCNC: 106 MG/DL (ref 74–99)
GLUCOSE BLD MANUAL STRIP-MCNC: 147 MG/DL (ref 74–99)
GLUCOSE BLD MANUAL STRIP-MCNC: 83 MG/DL (ref 74–99)
GLUCOSE SERPL-MCNC: 138 MG/DL (ref 74–99)
GLUCOSE SERPL-MCNC: 55 MG/DL (ref 74–99)
PHOSPHATE SERPL-MCNC: 3.5 MG/DL (ref 2.5–4.9)
PHOSPHATE SERPL-MCNC: 4.3 MG/DL (ref 2.5–4.9)
POTASSIUM SERPL-SCNC: 5 MMOL/L (ref 3.5–5.3)
POTASSIUM SERPL-SCNC: 5.8 MMOL/L (ref 3.5–5.3)
SODIUM SERPL-SCNC: 131 MMOL/L (ref 136–145)
SODIUM SERPL-SCNC: 132 MMOL/L (ref 136–145)

## 2025-03-01 PROCEDURE — 1100000001 HC PRIVATE ROOM DAILY

## 2025-03-01 PROCEDURE — 82947 ASSAY GLUCOSE BLOOD QUANT: CPT

## 2025-03-01 PROCEDURE — 2580000001 HC RX 258 IV SOLUTIONS

## 2025-03-01 PROCEDURE — 99232 SBSQ HOSP IP/OBS MODERATE 35: CPT | Performed by: SURGERY

## 2025-03-01 PROCEDURE — 2500000004 HC RX 250 GENERAL PHARMACY W/ HCPCS (ALT 636 FOR OP/ED)

## 2025-03-01 PROCEDURE — 2500000004 HC RX 250 GENERAL PHARMACY W/ HCPCS (ALT 636 FOR OP/ED): Performed by: PHYSICIAN ASSISTANT

## 2025-03-01 PROCEDURE — 2500000004 HC RX 250 GENERAL PHARMACY W/ HCPCS (ALT 636 FOR OP/ED): Performed by: SURGERY

## 2025-03-01 PROCEDURE — 80069 RENAL FUNCTION PANEL: CPT

## 2025-03-01 PROCEDURE — 2500000005 HC RX 250 GENERAL PHARMACY W/O HCPCS

## 2025-03-01 PROCEDURE — 2500000001 HC RX 250 WO HCPCS SELF ADMINISTERED DRUGS (ALT 637 FOR MEDICARE OP): Performed by: NURSE PRACTITIONER

## 2025-03-01 RX ORDER — DEXTROSE MONOHYDRATE AND SODIUM CHLORIDE 5; .9 G/100ML; G/100ML
50 INJECTION, SOLUTION INTRAVENOUS CONTINUOUS
Status: DISPENSED | OUTPATIENT
Start: 2025-03-01 | End: 2025-03-02

## 2025-03-01 RX ORDER — DEXTROSE MONOHYDRATE AND SODIUM CHLORIDE 5; .9 G/100ML; G/100ML
50 INJECTION, SOLUTION INTRAVENOUS CONTINUOUS
Qty: 2000 ML | Refills: 0 | Status: SHIPPED | OUTPATIENT
Start: 2025-03-03 | End: 2025-03-04

## 2025-03-01 RX ORDER — DEXTROSE 50 % IN WATER (D50W) INTRAVENOUS SYRINGE
25 ONCE
Status: DISCONTINUED | OUTPATIENT
Start: 2025-03-01 | End: 2025-03-02 | Stop reason: HOSPADM

## 2025-03-01 RX ORDER — ENOXAPARIN SODIUM 100 MG/ML
30 INJECTION SUBCUTANEOUS EVERY 12 HOURS
Status: DISCONTINUED | OUTPATIENT
Start: 2025-03-01 | End: 2025-03-02 | Stop reason: HOSPADM

## 2025-03-01 RX ORDER — DEXTROSE MONOHYDRATE 100 MG/ML
50 INJECTION, SOLUTION INTRAVENOUS CONTINUOUS
Status: ACTIVE | OUTPATIENT
Start: 2025-03-01 | End: 2025-03-01

## 2025-03-01 RX ADMIN — Medication 500 ML: at 14:04

## 2025-03-01 RX ADMIN — ENOXAPARIN SODIUM 30 MG: 100 INJECTION SUBCUTANEOUS at 21:39

## 2025-03-01 RX ADMIN — IRON SUCROSE 100 MG: 20 INJECTION, SOLUTION INTRAVENOUS at 17:43

## 2025-03-01 RX ADMIN — DEXTROSE MONOHYDRATE AND SODIUM CHLORIDE 50 ML/HR: 5; .9 INJECTION, SOLUTION INTRAVENOUS at 11:46

## 2025-03-01 RX ADMIN — Medication 500 ML: at 09:10

## 2025-03-01 RX ADMIN — SMOFLIPID 50 G: 6; 6; 5; 3 INJECTION, EMULSION INTRAVENOUS at 20:12

## 2025-03-01 RX ADMIN — PANTOPRAZOLE SODIUM 40 MG: 40 INJECTION, POWDER, FOR SOLUTION INTRAVENOUS at 09:10

## 2025-03-01 RX ADMIN — Medication 1 TABLET: at 09:10

## 2025-03-01 RX ADMIN — HEPARIN SODIUM 5000 UNITS: 5000 INJECTION, SOLUTION INTRAVENOUS; SUBCUTANEOUS at 14:04

## 2025-03-01 RX ADMIN — ASCORBIC ACID, VITAMIN A PALMITATE, CHOLECALCIFEROL, THIAMINE HYDROCHLORIDE, RIBOFLAVIN-5 PHOSPHATE SODIUM, PYRIDOXINE HYDROCHLORIDE, NIACINAMIDE, DEXPANTHENOL, ALPHA-TOCOPHEROL ACETATE, VITAMIN K1, FOLIC ACID, BIOTIN, CYANOCOBALAMIN: 200; 3300; 200; 6; 3.6; 6; 40; 15; 10; 150; 600; 60; 5 INJECTION, SOLUTION INTRAVENOUS at 20:12

## 2025-03-01 ASSESSMENT — PAIN - FUNCTIONAL ASSESSMENT: PAIN_FUNCTIONAL_ASSESSMENT: 0-10

## 2025-03-01 ASSESSMENT — PAIN SCALES - GENERAL
PAINLEVEL_OUTOF10: 0 - NO PAIN
PAINLEVEL_OUTOF10: 0 - NO PAIN

## 2025-03-01 ASSESSMENT — PAIN SCALES - WONG BAKER: WONGBAKER_NUMERICALRESPONSE: NO HURT
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
## 2025-03-01 NOTE — CARE PLAN
Problem: Skin  Goal: Promote/optimize nutrition  Outcome: Progressing  Goal: Promote skin healing  Outcome: Progressing     Problem: Safety - Adult  Goal: Free from fall injury  Outcome: Progressing     Problem: Discharge Planning  Goal: Discharge to home or other facility with appropriate resources  Outcome: Progressing     Problem: Chronic Conditions and Co-morbidities  Goal: Patient's chronic conditions and co-morbidity symptoms are monitored and maintained or improved  Outcome: Progressing     Problem: Nutrition  Goal: Nutrient intake appropriate for maintaining nutritional needs  Outcome: Progressing   The patient's goals for the shift include discharge planning     The clinical goals for the shift include pt will remain free of falls

## 2025-03-01 NOTE — SIGNIFICANT EVENT
Rapid Response Nurse Note: RADAR alert: 7    Pager time:   Arrival time:   Event end time:   Location: T8-19  [x] Triage by phone or secure messaging    Rapid response initiated by:  [] Rapid response RN [] Family [] Nursing Supervisor [] Physician   [x] RADAR auto page [] Sepsis auto-page [] RN [] RT   [] NP/PA [] Other:     Primary reason for call:   [] BAT [] New CPAP/BiPAP [] Bleeding [] Change in mental status   [] Chest pain [] Code blue [] FiO2 >/= 50% [] HR </= 40 bpm   [] HR >/= 130 bpm [] Hyperglycemia [] Hypoglycemia [x] RADAR    [] RR </= 8 bpm [] RR >/= 30 bpm [] SBP </= 90 mmHg [] SpO2 < 90%   [] Seizure [] Sepsis [] Shortness of breath  [] Staff concern: see comments     Initial VS and/or RADAR VS: HR 75; RR 22;SPO2 91%.    Providers present at bedside (if applicable):     Name of ICU Provider contacted (if applicable):     Interventions:  [x] None [] ABG/VBG [] Assist w/ICU transfer [] BAT paged    [] Bag mask [] Blood [] Cardioversion [] Code Blue   [] Code blue for intubation [] Code status changed [] Chest x-ray [] EKG   [] IV fluid/bolus [] KUB x-ray [] Labs/cultures [] Medication   [] Nebulizer treatment [] NIPPV (CPAP/BiPAP) [] Oxygen [] Oral airway   [] Peripheral IV [] Palliative care consult [] CT/MRI [] Sepsis protocol    [] Suctioned [] Other:     Outcome:  [] Coded and  [] Code blue for intubation [] Coded and transferred to ICU []  on division   [x] Remained on division (no change) [] Remained on division + additional monitoring [] Remained in ED [] Transferred to ED   [] Transferred to ICU [] Transferred to inpatient status [] Transferred for interventions (procedure) [] Transferred to ICU stepdown    [] Transferred to surgery [] Transferred to telemetry [] Sepsis protocol [] STEMI protocol   [] Stroke protocol [x] Bedside nurse instructed to page rapid response for any concerns or acute change in condition/VS     Additional Comments: Reviewed above RADAR VS  with bedside RN.  Rn re-checked spo2 at 96%.  No interventions by rapid response team indicated at this time.  Staff to page rapid response for any concerns or acute change in condition/VS.

## 2025-03-02 ENCOUNTER — DOCUMENTATION (OUTPATIENT)
Dept: HOME HEALTH SERVICES | Facility: HOME HEALTH | Age: 80
End: 2025-03-02
Payer: MEDICARE

## 2025-03-02 ENCOUNTER — HOME CARE VISIT (OUTPATIENT)
Dept: HOME HEALTH SERVICES | Facility: HOME HEALTH | Age: 80
End: 2025-03-02
Payer: MEDICARE

## 2025-03-02 VITALS
HEART RATE: 94 BPM | SYSTOLIC BLOOD PRESSURE: 95 MMHG | OXYGEN SATURATION: 94 % | TEMPERATURE: 98.2 F | WEIGHT: 169.31 LBS | DIASTOLIC BLOOD PRESSURE: 57 MMHG | HEIGHT: 76 IN | RESPIRATION RATE: 18 BRPM | BODY MASS INDEX: 20.62 KG/M2

## 2025-03-02 LAB
ALBUMIN SERPL BCP-MCNC: 2.6 G/DL (ref 3.4–5)
ANION GAP SERPL CALC-SCNC: 10 MMOL/L (ref 10–20)
BUN SERPL-MCNC: 49 MG/DL (ref 6–23)
CALCIUM SERPL-MCNC: 9.1 MG/DL (ref 8.6–10.6)
CHLORIDE SERPL-SCNC: 105 MMOL/L (ref 98–107)
CO2 SERPL-SCNC: 22 MMOL/L (ref 21–32)
CREAT SERPL-MCNC: 1.16 MG/DL (ref 0.5–1.3)
EGFRCR SERPLBLD CKD-EPI 2021: 65 ML/MIN/1.73M*2
ERYTHROCYTE [DISTWIDTH] IN BLOOD BY AUTOMATED COUNT: 15.6 % (ref 11.5–14.5)
GLUCOSE BLD MANUAL STRIP-MCNC: 114 MG/DL (ref 74–99)
GLUCOSE BLD MANUAL STRIP-MCNC: 143 MG/DL (ref 74–99)
GLUCOSE BLD MANUAL STRIP-MCNC: 144 MG/DL (ref 74–99)
GLUCOSE BLD MANUAL STRIP-MCNC: 156 MG/DL (ref 74–99)
GLUCOSE SERPL-MCNC: 80 MG/DL (ref 74–99)
HCT VFR BLD AUTO: 26.3 % (ref 41–52)
HGB BLD-MCNC: 8.3 G/DL (ref 13.5–17.5)
MAGNESIUM SERPL-MCNC: 1.81 MG/DL (ref 1.6–2.4)
MCH RBC QN AUTO: 30 PG (ref 26–34)
MCHC RBC AUTO-ENTMCNC: 31.6 G/DL (ref 32–36)
MCV RBC AUTO: 95 FL (ref 80–100)
NRBC BLD-RTO: 0 /100 WBCS (ref 0–0)
PHOSPHATE SERPL-MCNC: 3.7 MG/DL (ref 2.5–4.9)
PLATELET # BLD AUTO: 225 X10*3/UL (ref 150–450)
POTASSIUM SERPL-SCNC: 5.3 MMOL/L (ref 3.5–5.3)
RBC # BLD AUTO: 2.77 X10*6/UL (ref 4.5–5.9)
SODIUM SERPL-SCNC: 132 MMOL/L (ref 136–145)
WBC # BLD AUTO: 9.3 X10*3/UL (ref 4.4–11.3)

## 2025-03-02 PROCEDURE — 2500000004 HC RX 250 GENERAL PHARMACY W/ HCPCS (ALT 636 FOR OP/ED): Performed by: PHYSICIAN ASSISTANT

## 2025-03-02 PROCEDURE — 2580000001 HC RX 258 IV SOLUTIONS

## 2025-03-02 PROCEDURE — 83735 ASSAY OF MAGNESIUM: CPT

## 2025-03-02 PROCEDURE — 2500000004 HC RX 250 GENERAL PHARMACY W/ HCPCS (ALT 636 FOR OP/ED)

## 2025-03-02 PROCEDURE — 2500000004 HC RX 250 GENERAL PHARMACY W/ HCPCS (ALT 636 FOR OP/ED): Performed by: SURGERY

## 2025-03-02 PROCEDURE — 85027 COMPLETE CBC AUTOMATED: CPT

## 2025-03-02 PROCEDURE — 82947 ASSAY GLUCOSE BLOOD QUANT: CPT

## 2025-03-02 PROCEDURE — 80069 RENAL FUNCTION PANEL: CPT

## 2025-03-02 PROCEDURE — 2500000001 HC RX 250 WO HCPCS SELF ADMINISTERED DRUGS (ALT 637 FOR MEDICARE OP): Performed by: NURSE PRACTITIONER

## 2025-03-02 PROCEDURE — 99238 HOSP IP/OBS DSCHRG MGMT 30/<: CPT | Performed by: SURGERY

## 2025-03-02 PROCEDURE — 2500000002 HC RX 250 W HCPCS SELF ADMINISTERED DRUGS (ALT 637 FOR MEDICARE OP, ALT 636 FOR OP/ED): Performed by: PHYSICIAN ASSISTANT

## 2025-03-02 PROCEDURE — G0299 HHS/HOSPICE OF RN EA 15 MIN: HCPCS

## 2025-03-02 RX ORDER — CLOBETASOL PROPIONATE 0.5 MG/G
OINTMENT TOPICAL 2 TIMES DAILY
Qty: 30 G | Refills: 0 | OUTPATIENT
Start: 2025-03-02 | End: 2025-03-16

## 2025-03-02 RX ORDER — CLOBETASOL PROPIONATE 0.5 MG/G
OINTMENT TOPICAL 2 TIMES DAILY
Qty: 30 G | Refills: 0 | Status: SHIPPED | OUTPATIENT
Start: 2025-03-02 | End: 2025-03-16

## 2025-03-02 RX ORDER — ENOXAPARIN SODIUM 100 MG/ML
30 INJECTION SUBCUTANEOUS EVERY 12 HOURS
Qty: 4.2 ML | Refills: 0 | Status: SHIPPED | OUTPATIENT
Start: 2025-03-02 | End: 2025-03-09

## 2025-03-02 RX ADMIN — IRON SUCROSE 100 MG: 20 INJECTION, SOLUTION INTRAVENOUS at 18:23

## 2025-03-02 RX ADMIN — ENOXAPARIN SODIUM 30 MG: 100 INJECTION SUBCUTANEOUS at 10:09

## 2025-03-02 RX ADMIN — ONDANSETRON 4 MG: 2 INJECTION INTRAMUSCULAR; INTRAVENOUS at 11:15

## 2025-03-02 RX ADMIN — Medication 500 ML: at 10:09

## 2025-03-02 RX ADMIN — DEXTROSE MONOHYDRATE AND SODIUM CHLORIDE 1000 ML: 5; .9 INJECTION, SOLUTION INTRAVENOUS at 16:06

## 2025-03-02 RX ADMIN — INSULIN LISPRO 1 UNITS: 100 INJECTION, SOLUTION INTRAVENOUS; SUBCUTANEOUS at 06:33

## 2025-03-02 RX ADMIN — DEXTROSE MONOHYDRATE AND SODIUM CHLORIDE 50 ML/HR: 5; .9 INJECTION, SOLUTION INTRAVENOUS at 06:37

## 2025-03-02 RX ADMIN — PANTOPRAZOLE SODIUM 40 MG: 40 INJECTION, POWDER, FOR SOLUTION INTRAVENOUS at 08:40

## 2025-03-02 ASSESSMENT — PAIN SCALES - GENERAL
PAINLEVEL_OUTOF10: 0 - NO PAIN
PAINLEVEL_OUTOF10: 0 - NO PAIN

## 2025-03-02 ASSESSMENT — PAIN SCALES - WONG BAKER: WONGBAKER_NUMERICALRESPONSE: NO HURT

## 2025-03-02 NOTE — PROGRESS NOTES
Plan to discharge home this afternoon ; per [facility] planc for SOC this evening for TPN.     Awaiting final from wheelchair, all information sent to [place], however no return messages, may not be open today. Will ask [facility] tomorrow to follow up and forward messaging, if there are any issues.

## 2025-03-02 NOTE — HH CARE COORDINATION
Home Care received a Referral for Infusion, Nursing, Physical Therapy, Occupational Therapy, and Speech Language Pathology. We have processed the referral for a Start of Care on 3/2 ON-CALL IN THE EVENING. FOLLOW UP SN VISIT ON 3/3 IN AM FOR IV FLUID TEACHING    If you have any questions or concerns, please feel free to contact us at [phone]. Follow the prompts, enter your five digit zip code, and you will be directed to your care team on WEST 2.

## 2025-03-02 NOTE — CARE PLAN
The patient's goals for the shift include to sleep    The clinical goals for the shift include pt will remain free of falls during shift      Problem: Skin  Goal: Decreased wound size/increased tissue granulation at next dressing change  Outcome: Progressing  Goal: Participates in plan/prevention/treatment measures  Outcome: Progressing  Goal: Prevent/manage excess moisture  Outcome: Progressing  Goal: Prevent/minimize sheer/friction injuries  Outcome: Progressing  Goal: Promote/optimize nutrition  Outcome: Progressing  Goal: Promote skin healing  Outcome: Progressing     Problem: Safety - Adult  Goal: Free from fall injury  Outcome: Progressing     Problem: Discharge Planning  Goal: Discharge to home or other facility with appropriate resources  Outcome: Progressing     Problem: Chronic Conditions and Co-morbidities  Goal: Patient's chronic conditions and co-morbidity symptoms are monitored and maintained or improved  Outcome: Progressing     Problem: Nutrition  Goal: Nutrient intake appropriate for maintaining nutritional needs  Outcome: Progressing     Problem: Respiratory  Goal: Clear secretions with interventions this shift  Outcome: Progressing  Goal: Minimize anxiety/maximize coping throughout shift  Outcome: Progressing  Goal: Minimal/no exertional discomfort or dyspnea this shift  Outcome: Progressing  Goal: No signs of respiratory distress (eg. Use of accessory muscles. Peds grunting)  Outcome: Progressing  Goal: Patent airway maintained this shift  Outcome: Progressing  Goal: Tolerate mechanical ventilation evidenced by VS/agitation level this shift  Outcome: Progressing  Goal: Tolerate pulmonary toileting this shift  Outcome: Progressing  Goal: Verbalize decreased shortness of breath this shift  Outcome: Progressing  Goal: Wean oxygen to maintain O2 saturation per order/standard this shift  Outcome: Progressing  Goal: Increase self care and/or family involvement in next 24 hours  Outcome: Progressing
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
## 2025-03-02 NOTE — CARE PLAN
The patient's goals for the shift include to sleep    The clinical goals for the shift include pt will remain free of falls
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
## 2025-03-03 ENCOUNTER — HOME CARE VISIT (OUTPATIENT)
Dept: HOME HEALTH SERVICES | Facility: HOME HEALTH | Age: 80
End: 2025-03-03
Payer: MEDICARE

## 2025-03-03 VITALS
TEMPERATURE: 97.4 F | OXYGEN SATURATION: 97 % | DIASTOLIC BLOOD PRESSURE: 57 MMHG | SYSTOLIC BLOOD PRESSURE: 114 MMHG | HEART RATE: 76 BPM

## 2025-03-03 VITALS
TEMPERATURE: 96.8 F | SYSTOLIC BLOOD PRESSURE: 106 MMHG | DIASTOLIC BLOOD PRESSURE: 64 MMHG | OXYGEN SATURATION: 96 % | RESPIRATION RATE: 18 BRPM | HEART RATE: 69 BPM

## 2025-03-03 VITALS
DIASTOLIC BLOOD PRESSURE: 60 MMHG | SYSTOLIC BLOOD PRESSURE: 100 MMHG | HEART RATE: 90 BPM | TEMPERATURE: 98.7 F | RESPIRATION RATE: 18 BRPM

## 2025-03-03 PROCEDURE — G0299 HHS/HOSPICE OF RN EA 15 MIN: HCPCS

## 2025-03-03 RX ADMIN — ENOXAPARIN SODIUM 30 MG: 100 INJECTION SUBCUTANEOUS at 20:30

## 2025-03-03 RX ADMIN — DEXTROSE MONOHYDRATE AND SODIUM CHLORIDE 50 ML/HR: 5; .9 INJECTION, SOLUTION INTRAVENOUS at 09:19

## 2025-03-03 ASSESSMENT — ENCOUNTER SYMPTOMS
PAIN LOCATION - PAIN FREQUENCY: INTERMITTENT
PAIN SEVERITY GOAL: 0/10
PAIN LOCATION: CHEST
PAIN LOCATION - PAIN DURATION: INTERMITTENT
SUBJECTIVE PAIN PROGRESSION: UNCHANGED
PAIN LOCATION - PAIN QUALITY: ACHE
PAIN LOCATION - EXACERBATING FACTORS: ACTIVITY
PAIN: 1
PAIN LOCATION: BACK
PAIN LOCATION - PAIN SEVERITY: 6/10
PAIN: 1
OCCASIONAL FEELINGS OF UNSTEADINESS: 1
FATIGUE: 1
PERSON REPORTING PAIN: PATIENT
MUSCLE WEAKNESS: 1
FATIGUES EASILY: 1
HIGHEST PAIN SEVERITY IN PAST 24 HOURS: 5/10
PAIN SEVERITY GOAL: 0/10
PAIN LOCATION - PAIN SEVERITY: 3/10
LOWEST PAIN SEVERITY IN PAST 24 HOURS: 0/10
LOWEST PAIN SEVERITY IN PAST 24 HOURS: 0/10
DEPRESSION: 0
HIGHEST PAIN SEVERITY IN PAST 24 HOURS: 6/10
LOSS OF SENSATION IN FEET: 0
PERSON REPORTING PAIN: PATIENT
MUSCLE WEAKNESS: 1
PAIN LOCATION - RELIEVING FACTORS: REST/RX
APPETITE LEVEL: POOR
ARTHRALGIAS: 1
HIGHEST PAIN SEVERITY IN PAST 24 HOURS: 0/10

## 2025-03-03 ASSESSMENT — PAIN SCALES - PAIN ASSESSMENT IN ADVANCED DEMENTIA (PAINAD)
TOTALSCORE: 0
NEGVOCALIZATION: 0 - NONE.
FACIALEXPRESSION: 0 - SMILING OR INEXPRESSIVE.
NEGVOCALIZATION: 0
CONSOLABILITY: 0 - NO NEED TO CONSOLE.
BREATHING: 0
CONSOLABILITY: 0
FACIALEXPRESSION: 0
BODYLANGUAGE: 0
BREATHING: 0
BODYLANGUAGE: 0 - RELAXED.

## 2025-03-03 ASSESSMENT — ACTIVITIES OF DAILY LIVING (ADL)
OASIS_M1830: 05
AMBULATION ASSISTANCE: STAND BY ASSIST
ENTERING_EXITING_HOME: MODERATE ASSIST
CURRENT_FUNCTION: STAND BY ASSIST

## 2025-03-03 NOTE — NURSING NOTE
This RN provided patient and family with discharge documentation. Instructions explained, patient and family educated, questions answered no further questions. Pt PICC remained per provider, pt alert at time of discharge

## 2025-03-04 ENCOUNTER — HOME CARE VISIT (OUTPATIENT)
Dept: HOME HEALTH SERVICES | Facility: HOME HEALTH | Age: 80
End: 2025-03-04
Payer: MEDICARE

## 2025-03-04 VITALS
HEART RATE: 98 BPM | DIASTOLIC BLOOD PRESSURE: 54 MMHG | TEMPERATURE: 98.3 F | RESPIRATION RATE: 17 BRPM | SYSTOLIC BLOOD PRESSURE: 94 MMHG | OXYGEN SATURATION: 98 %

## 2025-03-04 VITALS
DIASTOLIC BLOOD PRESSURE: 55 MMHG | HEART RATE: 102 BPM | SYSTOLIC BLOOD PRESSURE: 100 MMHG | TEMPERATURE: 98.2 F | OXYGEN SATURATION: 99 %

## 2025-03-04 VITALS
DIASTOLIC BLOOD PRESSURE: 71 MMHG | OXYGEN SATURATION: 93 % | SYSTOLIC BLOOD PRESSURE: 97 MMHG | TEMPERATURE: 98.3 F | HEART RATE: 104 BPM

## 2025-03-04 DIAGNOSIS — K31.6 HIGH-OUTPUT EXTERNAL GASTROINTESTINAL FISTULA: ICD-10-CM

## 2025-03-04 PROCEDURE — G0299 HHS/HOSPICE OF RN EA 15 MIN: HCPCS

## 2025-03-04 PROCEDURE — G0151 HHCP-SERV OF PT,EA 15 MIN: HCPCS

## 2025-03-04 PROCEDURE — G0152 HHCP-SERV OF OT,EA 15 MIN: HCPCS

## 2025-03-04 RX ADMIN — DEXTROSE MONOHYDRATE AND SODIUM CHLORIDE 50 ML/HR: 5; .9 INJECTION, SOLUTION INTRAVENOUS at 08:52

## 2025-03-04 SDOH — ECONOMIC STABILITY: HOUSING INSECURITY
HOME SAFETY: FISTULA AND TUBE FEEDS. PATIENT RECEIVED INCORRECT BLOOD TRANSFUSION AT LTAC AND PRESENTED TO SOUTHWEST GENERAL HOSPITAL FOR EVALUATION. PICC LINE WAS PLACED FOR TPN AT OUTSIDE HOSPITAL. PATIENT WILL BE DUE FOR TRAUMA CLINIC FOLLOW UP FOR EVALUATION

## 2025-03-04 SDOH — ECONOMIC STABILITY: HOUSING INSECURITY
HOME SAFETY: ALLY 2000-0800) , ALLOWED SMALL AMOUNTS OF LIQUIDS (BROTH/WATER), SMALL SIPS OF WATER  FAMILY INDEP WITH OSTOMY CARE    PATIENT PRESENTS WITH UE/LE WEAKNESS, PAIN, DECREASED FUNCTIONAL ACTIVITY TOLERANCE, DECREASED INDEP WITH ADLS, ADL TRANSFERS AND

## 2025-03-04 SDOH — ECONOMIC STABILITY: HOUSING INSECURITY
HOME SAFETY: FUNCTIONAL MOBILITY FOLLOWING EXTENSIVE HOSPITALIZATION. PATIENT WILL BENEFIT FROM CONTINUED OT SERVICES 2W4. PATIENT STATES HIS SHORT TERM GOAL IS TO WALK TO THE BATHROOM AND EMPTY HIS OWN OSTOMY BAG AND TO IMPROVE HIS OVERALL ENDURANCE.

## 2025-03-04 SDOH — ECONOMIC STABILITY: HOUSING INSECURITY
HOME SAFETY: OF FISTULA AND NEUROSURGERY CLINIC FOLLOW UP FOR FURTHER IMAGING AND EVALUATION OF T/L SPINE FRACTURES.   PATIENT HAS LUE PICC LINE, R LOWER ABDOMINAL JP DRAIN, UPPER R ABDOMINAL ILEOSTOMY AND L ABDOMINAL G TUBE.   TPN 12 HOURS ON/12 HOURS OFF (NORM

## 2025-03-04 SDOH — ECONOMIC STABILITY: HOUSING INSECURITY

## 2025-03-04 SDOH — ECONOMIC STABILITY: HOUSING INSECURITY
HOME SAFETY: 77 YO MALE REFERRED TO HH OT SERVICES DUE TO FUNCTIONAL DEFICITS IMPEDING INDEPENDENCE IN ALL AREAS OF ADLS, TRANSFERS AND MOBILITY RELATED TO THE FOLLOWING: PATIENT PRESENTED AS A DIRECT ADMIT TO TRAUMA SERVICE FROM SOUTHWEST GENERAL FOR HIGH OUTPUT

## 2025-03-04 SDOH — ECONOMIC STABILITY: HOUSING INSECURITY
HOME SAFETY: FRACTURES, AND MULTI-LEVEL T/L SPINE FRACTURES. PREVIOUS ADMISSION WAS COMPLICATED BY INTRAABDOMINAL ABSCESSES WITH CANDIDA ALBICANS, SEPTIC SHOCK, AKI WITH OLIGURIA REQUIRING HD, REINTUBATION S/P TRACHEOSTOMY. PATIENT WAS DISCHARGED TO LTAC WITH EC

## 2025-03-04 ASSESSMENT — BALANCE ASSESSMENTS
NUDGED: 0 - BEGINS TO FALL
ARISES: 0 - UNABLE WITHOUT HELP
SITTING DOWN: 1 - USES ARMS OR NOT SMOOTH MOTION
EYES CLOSED AT MAXIMUM POSITION NUDGED: 0 - UNSTEADY
ATTEMPTS TO ARISE: 0 - UNABLE WITHOUT HELP
TURNING 360 DEGREES STEPS: 0 - DISCONTINUOUS STEPS
BALANCE SCORE: 3
NUDGED SCORE: 0
IMMEDIATE STANDING BALANCE FIRST 5 SECONDS: 1 - STEADY BUT USES WALKER OR OTHER SUPPORT
SITTING BALANCE: 1 - STEADY, SAFE
ARISING SCORE: 0
STANDING BALANCE: 0 - UNSTEADY

## 2025-03-04 ASSESSMENT — ACTIVITIES OF DAILY LIVING (ADL)
GROOMING ASSESSED: 1
DRESSING_UB_CURRENT_FUNCTION: MODERATE ASSIST
CURRENT_FUNCTION: MINIMUM ASSIST
AMBULATION ASSISTANCE ON FLAT SURFACES: 1
TOILETING: 1
TOILETING: MODERATE ASSIST
BATHING ASSESSED: 1
BATHING_CURRENT_FUNCTION: MODERATE ASSIST
PHYSICAL TRANSFERS ASSESSED: 1
AMBULATION_DISTANCE/DURATION_TOLERATED: 15 FEET
BATHING EQUIPMENT USED: SPONGE BATHING
DRESSING_LB_CURRENT_FUNCTION: MAXIMUM ASSIST
AMBULATION ASSISTANCE: 1
GROOMING_CURRENT_FUNCTION: MINIMUM ASSIST
CURRENT_FUNCTION: ONE PERSON
AMBULATION ASSISTANCE: MINIMUM ASSIST
AMBULATION ASSISTANCE: ONE PERSON
CURRENT_FUNCTION: CONTACT GUARD ASSIST

## 2025-03-04 ASSESSMENT — ENCOUNTER SYMPTOMS
PAIN LOCATION - RELIEVING FACTORS: POSITIONING
PERSON REPORTING PAIN: PATIENT
PAIN LOCATION: BACK
PAIN LOCATION: BACK
PAIN LOCATION - PAIN SEVERITY: 4/10
PAIN LOCATION - EXACERBATING FACTORS: MOBILITY
PAIN LOCATION - PAIN SEVERITY: 3/10
PERSON REPORTING PAIN: PATIENT
PAIN LOCATION - PAIN QUALITY: ACHING
PAIN: 1
PAIN: 1
PAIN LOCATION - PAIN SEVERITY: 3/10
LOWEST PAIN SEVERITY IN PAST 24 HOURS: 0/10
HIGHEST PAIN SEVERITY IN PAST 24 HOURS: 4/10
PAIN LOCATION - PAIN QUALITY: `
PAIN: 1
HOARSE VOICE: 1
PERSON REPORTING PAIN: PATIENT
SUBJECTIVE PAIN PROGRESSION: GRADUALLY IMPROVING
PAIN LOCATION - PAIN SEVERITY: 4/10
LIMITED RANGE OF MOTION: 1
PAIN LOCATION: NECK
MUSCLE WEAKNESS: 1
PAIN LOCATION: BACK
PAIN LOCATION - PAIN FREQUENCY: INTERMITTENT
PAIN SEVERITY GOAL: 0/10

## 2025-03-04 ASSESSMENT — GAIT ASSESSMENTS
GAIT SCORE: 8
WALKING STANCE: 1 - HEELS ALMOST TOUCHING WHILE WALKING
TRUNK SCORE: 0
PATH: 1 - MILD/MODERATE DEVIATION OR USES WALKING AID
STEP CONTINUITY: 0 - STOPPING OR DISCONTINUITY BETWEEN STEPS
STEP SYMMETRY: 1 - RIGHT AND LEFT STEP LENGTH APPEAR EQUAL
INITIATION OF GAIT IMMEDIATELY AFTER GO: 1 - NO HESITANCY
TRUNK: 0 - MARKED SWAY OR USES WALKING AID
PATH SCORE: 1
BALANCE AND GAIT SCORE: 11

## 2025-03-05 ENCOUNTER — HOME INFUSION (OUTPATIENT)
Dept: INFUSION THERAPY | Age: 80
End: 2025-03-05
Payer: MEDICARE

## 2025-03-05 LAB
ALBUMIN SERPL-MCNC: 2.8 G/DL (ref 3.6–5.1)
BUN SERPL-MCNC: 43 MG/DL (ref 7–25)
BUN/CREAT SERPL: 39 (CALC) (ref 6–22)
CALCIUM SERPL-MCNC: 9.1 MG/DL (ref 8.6–10.3)
CHLORIDE SERPL-SCNC: 105 MMOL/L (ref 98–110)
CO2 SERPL-SCNC: 21 MMOL/L (ref 20–32)
CREAT SERPL-MCNC: 1.1 MG/DL (ref 0.7–1.28)
EGFRCR SERPLBLD CKD-EPI 2021: 69 ML/MIN/1.73M2
ERYTHROCYTE [DISTWIDTH] IN BLOOD BY AUTOMATED COUNT: 15.4 % (ref 11–15)
GLUCOSE SERPL-MCNC: 75 MG/DL (ref 65–99)
HCT VFR BLD AUTO: 27.5 % (ref 38.5–50)
HGB BLD-MCNC: 8.5 G/DL (ref 13.2–17.1)
MCH RBC QN AUTO: 29.6 PG (ref 27–33)
MCHC RBC AUTO-ENTMCNC: 30.9 G/DL (ref 32–36)
MCV RBC AUTO: 95.8 FL (ref 80–100)
PHOSPHATE SERPL-MCNC: 3.7 MG/DL (ref 2.1–4.3)
PLATELET # BLD AUTO: 274 THOUSAND/UL (ref 140–400)
PMV BLD REES-ECKER: 10.1 FL (ref 7.5–12.5)
POTASSIUM SERPL-SCNC: 5.5 MMOL/L (ref 3.5–5.3)
RBC # BLD AUTO: 2.87 MILLION/UL (ref 4.2–5.8)
SODIUM SERPL-SCNC: 132 MMOL/L (ref 135–146)
WBC # BLD AUTO: 9.6 THOUSAND/UL (ref 3.8–10.8)

## 2025-03-06 ENCOUNTER — HOME CARE VISIT (OUTPATIENT)
Dept: HOME HEALTH SERVICES | Facility: HOME HEALTH | Age: 80
End: 2025-03-06
Payer: MEDICARE

## 2025-03-06 ENCOUNTER — HOME INFUSION (OUTPATIENT)
Dept: INFUSION THERAPY | Age: 80
End: 2025-03-06
Payer: MEDICARE

## 2025-03-06 ENCOUNTER — LAB REQUISITION (OUTPATIENT)
Dept: LAB | Facility: HOSPITAL | Age: 80
End: 2025-03-06
Payer: MEDICARE

## 2025-03-06 VITALS
SYSTOLIC BLOOD PRESSURE: 104 MMHG | RESPIRATION RATE: 18 BRPM | TEMPERATURE: 96.4 F | OXYGEN SATURATION: 96 % | HEART RATE: 93 BPM | DIASTOLIC BLOOD PRESSURE: 53 MMHG

## 2025-03-06 VITALS — OXYGEN SATURATION: 99 % | HEART RATE: 102 BPM

## 2025-03-06 DIAGNOSIS — E46 UNSPECIFIED PROTEIN-CALORIE MALNUTRITION (MULTI): ICD-10-CM

## 2025-03-06 DIAGNOSIS — K56.601 COMPLETE INTESTINAL OBSTRUCTION, UNSPECIFIED CAUSE (MULTI): Primary | ICD-10-CM

## 2025-03-06 DIAGNOSIS — E64.0 SEQUELAE OF PROTEIN-CALORIE MALNUTRITION (MULTI): ICD-10-CM

## 2025-03-06 LAB
ALBUMIN SERPL BCP-MCNC: 2.9 G/DL (ref 3.4–5)
ALP SERPL-CCNC: 194 U/L (ref 33–136)
ALT SERPL W P-5'-P-CCNC: 44 U/L (ref 10–52)
ANION GAP SERPL CALC-SCNC: 14 MMOL/L (ref 10–20)
AST SERPL W P-5'-P-CCNC: 26 U/L (ref 9–39)
BASOPHILS # BLD AUTO: 0.05 X10*3/UL (ref 0–0.1)
BASOPHILS NFR BLD AUTO: 0.5 %
BILIRUB SERPL-MCNC: 1.2 MG/DL (ref 0–1.2)
BUN SERPL-MCNC: 47 MG/DL (ref 6–23)
CALCIUM SERPL-MCNC: 9.3 MG/DL (ref 8.6–10.3)
CHLORIDE SERPL-SCNC: 102 MMOL/L (ref 98–107)
CO2 SERPL-SCNC: 21 MMOL/L (ref 21–32)
CREAT SERPL-MCNC: 1.21 MG/DL (ref 0.5–1.3)
EGFRCR SERPLBLD CKD-EPI 2021: 62 ML/MIN/1.73M*2
EOSINOPHIL # BLD AUTO: 0.05 X10*3/UL (ref 0–0.4)
EOSINOPHIL NFR BLD AUTO: 0.5 %
ERYTHROCYTE [DISTWIDTH] IN BLOOD BY AUTOMATED COUNT: 15.7 % (ref 11.5–14.5)
GLUCOSE SERPL-MCNC: 96 MG/DL (ref 74–99)
HCT VFR BLD AUTO: 27.1 % (ref 41–52)
HGB BLD-MCNC: 8.4 G/DL (ref 13.5–17.5)
IMM GRANULOCYTES # BLD AUTO: 0.09 X10*3/UL (ref 0–0.5)
IMM GRANULOCYTES NFR BLD AUTO: 0.8 % (ref 0–0.9)
LYMPHOCYTES # BLD AUTO: 3.58 X10*3/UL (ref 0.8–3)
LYMPHOCYTES NFR BLD AUTO: 32.9 %
MAGNESIUM SERPL-MCNC: 1.89 MG/DL (ref 1.6–2.4)
MCH RBC QN AUTO: 29.3 PG (ref 26–34)
MCHC RBC AUTO-ENTMCNC: 31 G/DL (ref 32–36)
MCV RBC AUTO: 94 FL (ref 80–100)
MONOCYTES # BLD AUTO: 1.07 X10*3/UL (ref 0.05–0.8)
MONOCYTES NFR BLD AUTO: 9.8 %
NEUTROPHILS # BLD AUTO: 6.04 X10*3/UL (ref 1.6–5.5)
NEUTROPHILS NFR BLD AUTO: 55.5 %
NRBC BLD-RTO: 0 /100 WBCS (ref 0–0)
PHOSPHATE SERPL-MCNC: 3.5 MG/DL (ref 2.5–4.9)
PLATELET # BLD AUTO: 222 X10*3/UL (ref 150–450)
POTASSIUM SERPL-SCNC: 5.5 MMOL/L (ref 3.5–5.3)
PROT SERPL-MCNC: 7.1 G/DL (ref 6.4–8.2)
RBC # BLD AUTO: 2.87 X10*6/UL (ref 4.5–5.9)
SODIUM SERPL-SCNC: 131 MMOL/L (ref 136–145)
TRIGL SERPL-MCNC: 173 MG/DL (ref 0–149)
WBC # BLD AUTO: 10.9 X10*3/UL (ref 4.4–11.3)

## 2025-03-06 PROCEDURE — 80053 COMPREHEN METABOLIC PANEL: CPT

## 2025-03-06 PROCEDURE — G0153 HHCP-SVS OF S/L PATH,EA 15MN: HCPCS

## 2025-03-06 PROCEDURE — 83735 ASSAY OF MAGNESIUM: CPT

## 2025-03-06 PROCEDURE — 84100 ASSAY OF PHOSPHORUS: CPT

## 2025-03-06 PROCEDURE — 85025 COMPLETE CBC W/AUTO DIFF WBC: CPT

## 2025-03-06 PROCEDURE — G0299 HHS/HOSPICE OF RN EA 15 MIN: HCPCS

## 2025-03-06 PROCEDURE — 84478 ASSAY OF TRIGLYCERIDES: CPT

## 2025-03-06 PROCEDURE — 84134 ASSAY OF PREALBUMIN: CPT

## 2025-03-06 ASSESSMENT — ENCOUNTER SYMPTOMS
PERSON REPORTING PAIN: PATIENT
ANGER WITHIN DEFINED LIMITS: 1
LAST BOWEL MOVEMENT: 67269
PAIN LOCATION: NECK
AGGRESSION WITHIN DEFINED LIMITS: 1
HIGHEST PAIN SEVERITY IN PAST 24 HOURS: 4/10
PAIN: 1
PAIN LOCATION - PAIN QUALITY: ACHING
BOWEL PATTERN NORMAL: 1
DYSPNEA ON EXERTION: 1
PAIN LOCATION - PAIN SEVERITY: 2/10
FATIGUES EASILY: 1
PAIN LOCATION - PAIN SEVERITY: 3/10
LIMITED RANGE OF MOTION: 1
PAIN LOCATION: NECK
MUSCLE WEAKNESS: 1
PERSON REPORTING PAIN: PATIENT
PAIN: 1
STOOL FREQUENCY: DAILY
SUBJECTIVE PAIN PROGRESSION: UNCHANGED

## 2025-03-06 ASSESSMENT — PAIN SCALES - PAIN ASSESSMENT IN ADVANCED DEMENTIA (PAINAD)
BODYLANGUAGE: 0
TOTALSCORE: 0
BREATHING: 0
FACIALEXPRESSION: 0 - SMILING OR INEXPRESSIVE.
FACIALEXPRESSION: 0
CONSOLABILITY: 0 - NO NEED TO CONSOLE.
BODYLANGUAGE: 0 - RELAXED.
NEGVOCALIZATION: 0 - NONE.
CONSOLABILITY: 0
NEGVOCALIZATION: 0

## 2025-03-06 ASSESSMENT — ACTIVITIES OF DAILY LIVING (ADL)
CURRENT_FUNCTION: ONE PERSON
AMBULATION ASSISTANCE: ONE PERSON

## 2025-03-07 ENCOUNTER — HOME CARE VISIT (OUTPATIENT)
Dept: HOME HEALTH SERVICES | Facility: HOME HEALTH | Age: 80
End: 2025-03-07
Payer: MEDICARE

## 2025-03-07 ENCOUNTER — HOME INFUSION (OUTPATIENT)
Dept: INFUSION THERAPY | Age: 80
End: 2025-03-07
Payer: MEDICARE

## 2025-03-07 DIAGNOSIS — K56.601 COMPLETE INTESTINAL OBSTRUCTION, UNSPECIFIED CAUSE (MULTI): ICD-10-CM

## 2025-03-07 DIAGNOSIS — K31.6 HIGH-OUTPUT EXTERNAL GASTROINTESTINAL FISTULA: ICD-10-CM

## 2025-03-07 DIAGNOSIS — E46 MALNUTRITION, UNSPECIFIED TYPE (MULTI): ICD-10-CM

## 2025-03-07 DIAGNOSIS — E64.0 SEQUELAE OF PROTEIN-CALORIE MALNUTRITION (MULTI): ICD-10-CM

## 2025-03-07 DIAGNOSIS — E86.0 DEHYDRATION: Primary | ICD-10-CM

## 2025-03-07 LAB — PREALB SERPL-MCNC: 29.2 MG/DL (ref 18–40)

## 2025-03-07 PROCEDURE — G0152 HHCP-SERV OF OT,EA 15 MIN: HCPCS

## 2025-03-07 RX ORDER — DEXTROSE MONOHYDRATE AND SODIUM CHLORIDE 5; .9 G/100ML; G/100ML
INJECTION, SOLUTION INTRAVENOUS
Qty: 8000 ML | Refills: 11 | Status: SHIPPED
Start: 2025-03-10

## 2025-03-07 NOTE — PROGRESS NOTES
Reviewed chart and Ike Gar is a 79 y.o. patient recently discharged from hospital to [facility] for skilled nursing and pharmacy services.  Pt ordered daily TPN over 12 hrs SOC 3/1. Also received 2 bags of hydration earlier in week.   Trauma Team following (Shellie King and Leslie Harrison). Pat Johnson RD is following case as well.     TPN formulas sent to following team. Request made for extra volume due to hydration need this week. Received orders to increase total sodium to 95mEq/day and continue x 1 week at same volume. Rp also checked if a hydration bag was needed, and received orders to send 2 bags per week via gravity. Will treat as prn dehydration.  RD may recommend increasing protein once labs show trends.  After reviewing labs, [facility] will increase sodium acetate in this formulation.  Orders placed in EPIC.    Spoke with son at length. Answered all questions. Relayed orders for twice weekly hydration and he will monitor father and give as appropriate. Discussed potassium (slightly high at 5.5) and pharmacy will continue to monitor. Agreeable to      Pharmacy to mix 3/07 and deliver straight with supplies to match. One bag off ice.  6x TPN  6x MVI  DOS: 3/7 - 3/12  2 bags D5W/NS gravity  DOS 3/7-3/8     Follow up 3/11 - check labs get inventory of hydration, get orders including hydration, mix Wednesday

## 2025-03-08 VITALS
RESPIRATION RATE: 17 BRPM | HEART RATE: 98 BPM | OXYGEN SATURATION: 94 % | SYSTOLIC BLOOD PRESSURE: 100 MMHG | DIASTOLIC BLOOD PRESSURE: 58 MMHG | TEMPERATURE: 97.5 F

## 2025-03-08 ASSESSMENT — ENCOUNTER SYMPTOMS
PAIN LOCATION - EXACERBATING FACTORS: UPON AWAKENING
PAIN LOCATION - PAIN FREQUENCY: CONSTANT
PAIN LOCATION - PAIN QUALITY: ACHING, STABBING
PAIN SEVERITY GOAL: 0/10
PAIN: 1
PAIN LOCATION - RELIEVING FACTORS: POSITIONING
PAIN LOCATION - PAIN SEVERITY: 3/10
PAIN LOCATION: NECK
LOWEST PAIN SEVERITY IN PAST 24 HOURS: 0/10
HIGHEST PAIN SEVERITY IN PAST 24 HOURS: 3/10
SUBJECTIVE PAIN PROGRESSION: WAXING AND WANING
PERSON REPORTING PAIN: PATIENT

## 2025-03-08 ASSESSMENT — ACTIVITIES OF DAILY LIVING (ADL)
TOILETING: 1
TOILETING: MAXIMUM ASSIST

## 2025-03-10 ENCOUNTER — HOME CARE VISIT (OUTPATIENT)
Dept: HOME HEALTH SERVICES | Facility: HOME HEALTH | Age: 80
End: 2025-03-10
Payer: MEDICARE

## 2025-03-10 VITALS
DIASTOLIC BLOOD PRESSURE: 54 MMHG | RESPIRATION RATE: 18 BRPM | OXYGEN SATURATION: 100 % | SYSTOLIC BLOOD PRESSURE: 86 MMHG | HEART RATE: 92 BPM | TEMPERATURE: 97 F

## 2025-03-10 PROCEDURE — G0157 HHC PT ASSISTANT EA 15: HCPCS | Mod: CQ

## 2025-03-10 PROCEDURE — G0299 HHS/HOSPICE OF RN EA 15 MIN: HCPCS

## 2025-03-10 ASSESSMENT — ENCOUNTER SYMPTOMS
LIMITED RANGE OF MOTION: 1
PERSON REPORTING PAIN: PATIENT
HOARSE VOICE: 1
PAIN LOCATION - PAIN QUALITY: ACHING
PAIN: 1
DRY SKIN: 1
PAIN LOCATION: NECK
LOWEST PAIN SEVERITY IN PAST 24 HOURS: 3/10
PAIN LOCATION - PAIN SEVERITY: 3/10
PERSON REPORTING PAIN: PATIENT
HIGHEST PAIN SEVERITY IN PAST 24 HOURS: 3/10
FATIGUES EASILY: 1
MUSCLE WEAKNESS: 1
DYSPNEA ON EXERTION: 1
PAIN: 1
PAIN LOCATION: ABDOMEN
PAIN SEVERITY GOAL: 0/10
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
## 2025-03-11 ENCOUNTER — HOME CARE VISIT (OUTPATIENT)
Dept: HOME HEALTH SERVICES | Facility: HOME HEALTH | Age: 80
End: 2025-03-11
Payer: MEDICARE

## 2025-03-11 VITALS — HEART RATE: 99 BPM | OXYGEN SATURATION: 94 %

## 2025-03-11 VITALS
TEMPERATURE: 98.8 F | RESPIRATION RATE: 17 BRPM | HEART RATE: 108 BPM | OXYGEN SATURATION: 98 % | DIASTOLIC BLOOD PRESSURE: 58 MMHG | SYSTOLIC BLOOD PRESSURE: 110 MMHG

## 2025-03-11 PROCEDURE — G0152 HHCP-SERV OF OT,EA 15 MIN: HCPCS

## 2025-03-11 PROCEDURE — G0153 HHCP-SVS OF S/L PATH,EA 15MN: HCPCS

## 2025-03-11 ASSESSMENT — PAIN SCALES - PAIN ASSESSMENT IN ADVANCED DEMENTIA (PAINAD)
BREATHING: 0
CONSOLABILITY: 0 - NO NEED TO CONSOLE.
NEGVOCALIZATION: 0
NEGVOCALIZATION: 0 - NONE.
FACIALEXPRESSION: 0
FACIALEXPRESSION: 0 - SMILING OR INEXPRESSIVE.
CONSOLABILITY: 0
BODYLANGUAGE: 0
BODYLANGUAGE: 0 - RELAXED.
TOTALSCORE: 0

## 2025-03-11 ASSESSMENT — ENCOUNTER SYMPTOMS
PAIN LOCATION: ABDOMEN
PAIN LOCATION - PAIN SEVERITY: 3/10
PAIN LOCATION: BACK
PAIN LOCATION - PAIN SEVERITY: 2/10
PERSON REPORTING PAIN: PATIENT
PAIN: 1
PAIN LOCATION: ABDOMEN
PAIN: 1
PAIN LOCATION - PAIN SEVERITY: 2/10
PERSON REPORTING PAIN: PATIENT
PAIN LOCATION - PAIN SEVERITY: 3/10
PAIN LOCATION: BACK

## 2025-03-12 ENCOUNTER — DOCUMENTATION (OUTPATIENT)
Dept: INFUSION THERAPY | Age: 80
End: 2025-03-12
Payer: MEDICARE

## 2025-03-12 ENCOUNTER — HOME CARE VISIT (OUTPATIENT)
Dept: HOME HEALTH SERVICES | Facility: HOME HEALTH | Age: 80
End: 2025-03-12
Payer: MEDICARE

## 2025-03-12 ENCOUNTER — HOME INFUSION (OUTPATIENT)
Dept: INFUSION THERAPY | Age: 80
End: 2025-03-12
Payer: MEDICARE

## 2025-03-12 DIAGNOSIS — E46 MALNUTRITION, UNSPECIFIED TYPE (MULTI): ICD-10-CM

## 2025-03-12 DIAGNOSIS — K31.6 HIGH-OUTPUT EXTERNAL GASTROINTESTINAL FISTULA: Primary | ICD-10-CM

## 2025-03-12 PROCEDURE — G0157 HHC PT ASSISTANT EA 15: HCPCS | Mod: CQ

## 2025-03-12 RX ORDER — SODIUM CHLORIDE, SODIUM LACTATE, POTASSIUM CHLORIDE, CALCIUM CHLORIDE 600; 310; 30; 20 MG/100ML; MG/100ML; MG/100ML; MG/100ML
1000 INJECTION, SOLUTION INTRAVENOUS EVERY 24 HOURS
Qty: 7000 ML | Refills: 52 | Status: SHIPPED
Start: 2025-03-12 | End: 2026-03-12

## 2025-03-12 ASSESSMENT — ENCOUNTER SYMPTOMS
DENIES PAIN: 1
PERSON REPORTING PAIN: PATIENT

## 2025-03-12 NOTE — PROGRESS NOTES
Review of chart. Patient is ordered TPN nightly over 12 hours and twice weekly d5NS bags. Pat Johnson RD and Leslie MCKEON from trauma team follows at home.    Labs and TPN formula faxed to RD and PA yesterday. Received order to change hydration from twice weekly D5NS to daily 1L LR infusions. TPN to continue unchanged. PA wants labs drawn again on Thursday of this week.    Tel call with son. Explained that the above orders were received and that was waiting for confirmation from PA infusion time for LR. Son states that getting TPN infusions in is hard enough as he works from 9-6 everyday, but he would find the time to infuse the hydrations. Promised to call son back to let him know the infusion time once received.    Received confirmation from MIKE Nelson that 2 hours is good for LR infusions. Order entered into epic. Unable to reach son regarding this info. Will have pt rep relay info when calls for supply needs.    Processed fill for 7 x TPN and 8 x LR for mix and straight delivery 3/12.   DOS for TPN 3/13 thru 3/19/25  DOS for LR 3/12 thru 3/19/25.    Follow up 3/18/25. Check labs and get further TPN/LR orders for [place] mix and delivery.

## 2025-03-12 NOTE — PROGRESS NOTES
Spoke w/ patient's son - delivery of the TPN and hydration bags, and all supplies w/ some extras is scheduled for today by 8 pm. Per son - send 3 heparin and 6 NS flushes per day. No questions to [facility] at this time.

## 2025-03-13 ENCOUNTER — HOME CARE VISIT (OUTPATIENT)
Dept: HOME HEALTH SERVICES | Facility: HOME HEALTH | Age: 80
End: 2025-03-13
Payer: MEDICARE

## 2025-03-13 VITALS
SYSTOLIC BLOOD PRESSURE: 100 MMHG | RESPIRATION RATE: 18 BRPM | TEMPERATURE: 97.9 F | OXYGEN SATURATION: 97 % | HEART RATE: 68 BPM | DIASTOLIC BLOOD PRESSURE: 55 MMHG

## 2025-03-13 PROCEDURE — G0299 HHS/HOSPICE OF RN EA 15 MIN: HCPCS

## 2025-03-13 ASSESSMENT — ENCOUNTER SYMPTOMS
PERSON REPORTING PAIN: PATIENT
PAIN LOCATION: BACK
PAIN: 1
HIGHEST PAIN SEVERITY IN PAST 24 HOURS: 3/10
PAIN LOCATION - PAIN SEVERITY: 2/10
PAIN LOCATION - PAIN QUALITY: PINCHING

## 2025-03-14 ENCOUNTER — HOME CARE VISIT (OUTPATIENT)
Dept: HOME HEALTH SERVICES | Facility: HOME HEALTH | Age: 80
End: 2025-03-14
Payer: MEDICARE

## 2025-03-14 VITALS — RESPIRATION RATE: 18 BRPM | SYSTOLIC BLOOD PRESSURE: 92 MMHG | DIASTOLIC BLOOD PRESSURE: 50 MMHG | TEMPERATURE: 98.3 F

## 2025-03-14 DIAGNOSIS — E64.0 SEQUELAE OF PROTEIN-CALORIE MALNUTRITION (MULTI): ICD-10-CM

## 2025-03-14 DIAGNOSIS — K56.601 COMPLETE INTESTINAL OBSTRUCTION, UNSPECIFIED CAUSE (MULTI): ICD-10-CM

## 2025-03-14 PROCEDURE — G0152 HHCP-SERV OF OT,EA 15 MIN: HCPCS

## 2025-03-14 ASSESSMENT — ENCOUNTER SYMPTOMS
PAIN: 1
PAIN LOCATION - PAIN QUALITY: SORE
PERSON REPORTING PAIN: PATIENT
PAIN LOCATION - PAIN SEVERITY: 2/10
SUBJECTIVE PAIN PROGRESSION: GRADUALLY IMPROVING
PAIN LOCATION: ABDOMEN
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
## 2025-03-16 ASSESSMENT — ENCOUNTER SYMPTOMS
DYSPNEA ON EXERTION: 1
LIMITED RANGE OF MOTION: 1
FATIGUES EASILY: 1
MUSCLE WEAKNESS: 1

## 2025-03-16 ASSESSMENT — ACTIVITIES OF DAILY LIVING (ADL)
CURRENT_FUNCTION: STAND BY ASSIST
AMBULATION ASSISTANCE: STAND BY ASSIST

## 2025-03-17 ENCOUNTER — LAB REQUISITION (OUTPATIENT)
Dept: LAB | Facility: HOSPITAL | Age: 80
End: 2025-03-17
Payer: MEDICARE

## 2025-03-17 ENCOUNTER — HOME CARE VISIT (OUTPATIENT)
Dept: HOME HEALTH SERVICES | Facility: HOME HEALTH | Age: 80
End: 2025-03-17
Payer: MEDICARE

## 2025-03-17 VITALS
HEART RATE: 102 BPM | RESPIRATION RATE: 18 BRPM | TEMPERATURE: 98 F | DIASTOLIC BLOOD PRESSURE: 52 MMHG | OXYGEN SATURATION: 100 % | SYSTOLIC BLOOD PRESSURE: 100 MMHG

## 2025-03-17 DIAGNOSIS — E64.0 SEQUELAE OF PROTEIN-CALORIE MALNUTRITION (MULTI): ICD-10-CM

## 2025-03-17 LAB
ALBUMIN SERPL BCP-MCNC: 2.8 G/DL (ref 3.4–5)
ALP SERPL-CCNC: 181 U/L (ref 33–136)
ALT SERPL W P-5'-P-CCNC: 46 U/L (ref 10–52)
ANION GAP SERPL CALC-SCNC: 14 MMOL/L (ref 10–20)
AST SERPL W P-5'-P-CCNC: 28 U/L (ref 9–39)
BASOPHILS # BLD AUTO: 0.04 X10*3/UL (ref 0–0.1)
BASOPHILS NFR BLD AUTO: 0.4 %
BILIRUB SERPL-MCNC: 1.2 MG/DL (ref 0–1.2)
BUN SERPL-MCNC: 48 MG/DL (ref 6–23)
CALCIUM SERPL-MCNC: 9 MG/DL (ref 8.6–10.3)
CHLORIDE SERPL-SCNC: 98 MMOL/L (ref 98–107)
CO2 SERPL-SCNC: 24 MMOL/L (ref 21–32)
CREAT SERPL-MCNC: 1.22 MG/DL (ref 0.5–1.3)
EGFRCR SERPLBLD CKD-EPI 2021: 61 ML/MIN/1.73M*2
EOSINOPHIL # BLD AUTO: 0.05 X10*3/UL (ref 0–0.4)
EOSINOPHIL NFR BLD AUTO: 0.5 %
ERYTHROCYTE [DISTWIDTH] IN BLOOD BY AUTOMATED COUNT: 15.2 % (ref 11.5–14.5)
GLUCOSE SERPL-MCNC: 98 MG/DL (ref 74–99)
HCT VFR BLD AUTO: 26 % (ref 41–52)
HGB BLD-MCNC: 8.2 G/DL (ref 13.5–17.5)
HOLD SPECIMEN: NORMAL
IMM GRANULOCYTES # BLD AUTO: 0.11 X10*3/UL (ref 0–0.5)
IMM GRANULOCYTES NFR BLD AUTO: 1 % (ref 0–0.9)
LYMPHOCYTES # BLD AUTO: 2.89 X10*3/UL (ref 0.8–3)
LYMPHOCYTES NFR BLD AUTO: 27.1 %
MAGNESIUM SERPL-MCNC: 2 MG/DL (ref 1.6–2.4)
MCH RBC QN AUTO: 29.6 PG (ref 26–34)
MCHC RBC AUTO-ENTMCNC: 31.5 G/DL (ref 32–36)
MCV RBC AUTO: 94 FL (ref 80–100)
MONOCYTES # BLD AUTO: 0.79 X10*3/UL (ref 0.05–0.8)
MONOCYTES NFR BLD AUTO: 7.4 %
NEUTROPHILS # BLD AUTO: 6.77 X10*3/UL (ref 1.6–5.5)
NEUTROPHILS NFR BLD AUTO: 63.6 %
NRBC BLD-RTO: 0 /100 WBCS (ref 0–0)
PHOSPHATE SERPL-MCNC: 3.7 MG/DL (ref 2.5–4.9)
PLATELET # BLD AUTO: 270 X10*3/UL (ref 150–450)
POTASSIUM SERPL-SCNC: 5 MMOL/L (ref 3.5–5.3)
PROT SERPL-MCNC: 7 G/DL (ref 6.4–8.2)
RBC # BLD AUTO: 2.77 X10*6/UL (ref 4.5–5.9)
SODIUM SERPL-SCNC: 131 MMOL/L (ref 136–145)
WBC # BLD AUTO: 10.7 X10*3/UL (ref 4.4–11.3)

## 2025-03-17 PROCEDURE — 84100 ASSAY OF PHOSPHORUS: CPT

## 2025-03-17 PROCEDURE — G0299 HHS/HOSPICE OF RN EA 15 MIN: HCPCS

## 2025-03-17 PROCEDURE — 85025 COMPLETE CBC W/AUTO DIFF WBC: CPT

## 2025-03-17 PROCEDURE — G0152 HHCP-SERV OF OT,EA 15 MIN: HCPCS

## 2025-03-17 PROCEDURE — 80053 COMPREHEN METABOLIC PANEL: CPT

## 2025-03-17 PROCEDURE — 83735 ASSAY OF MAGNESIUM: CPT

## 2025-03-17 ASSESSMENT — ENCOUNTER SYMPTOMS
FATIGUES EASILY: 1
MUSCLE WEAKNESS: 1
PAIN: 1
PAIN LOCATION - PAIN QUALITY: ACHING
PAIN LOCATION - PAIN SEVERITY: 3/10
LOWEST PAIN SEVERITY IN PAST 24 HOURS: 2/10
PAIN LOCATION: NECK
HIGHEST PAIN SEVERITY IN PAST 24 HOURS: 3/10
LIMITED RANGE OF MOTION: 1
PERSON REPORTING PAIN: PATIENT
SUBJECTIVE PAIN PROGRESSION: UNCHANGED

## 2025-03-17 ASSESSMENT — ACTIVITIES OF DAILY LIVING (ADL)
CURRENT_FUNCTION: STAND BY ASSIST
AMBULATION ASSISTANCE: STAND BY ASSIST

## 2025-03-18 ENCOUNTER — APPOINTMENT (OUTPATIENT)
Dept: SURGERY | Facility: CLINIC | Age: 80
End: 2025-03-18
Payer: MEDICARE

## 2025-03-18 ENCOUNTER — HOME INFUSION (OUTPATIENT)
Dept: INFUSION THERAPY | Age: 80
End: 2025-03-18

## 2025-03-18 VITALS
BODY MASS INDEX: 18.5 KG/M2 | WEIGHT: 151.9 LBS | SYSTOLIC BLOOD PRESSURE: 105 MMHG | DIASTOLIC BLOOD PRESSURE: 69 MMHG | RESPIRATION RATE: 16 BRPM | HEIGHT: 76 IN | HEART RATE: 112 BPM
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
## 2025-03-18 DIAGNOSIS — Z92.89 HISTORY OF BLOOD TRANSFUSION: ICD-10-CM

## 2025-03-18 DIAGNOSIS — T79.4XXA: ICD-10-CM

## 2025-03-18 DIAGNOSIS — J96.01 ACUTE RESPIRATORY FAILURE WITH HYPOXIA (MULTI): ICD-10-CM

## 2025-03-18 DIAGNOSIS — K91.30 GASTROINTESTINAL ANASTOMOTIC STRICTURE: ICD-10-CM

## 2025-03-18 DIAGNOSIS — D64.9 ANEMIA, UNSPECIFIED TYPE: ICD-10-CM

## 2025-03-18 DIAGNOSIS — R09.89 PHLEGM IN THROAT: ICD-10-CM

## 2025-03-18 DIAGNOSIS — K92.2 GASTROINTESTINAL HEMORRHAGE, UNSPECIFIED GASTROINTESTINAL HEMORRHAGE TYPE: ICD-10-CM

## 2025-03-18 DIAGNOSIS — N17.9 ACUTE KIDNEY INJURY (NONTRAUMATIC) (CMS-HCC): ICD-10-CM

## 2025-03-18 DIAGNOSIS — T78.40XA ALLERGY, INITIAL ENCOUNTER: Primary | ICD-10-CM

## 2025-03-18 DIAGNOSIS — K31.6 HIGH-OUTPUT EXTERNAL GASTROINTESTINAL FISTULA: ICD-10-CM

## 2025-03-18 DIAGNOSIS — E46 MALNUTRITION, UNSPECIFIED TYPE (MULTI): Primary | ICD-10-CM

## 2025-03-18 DIAGNOSIS — S22.21XA CLOSED FRACTURE OF MANUBRIUM, INITIAL ENCOUNTER: ICD-10-CM

## 2025-03-18 DIAGNOSIS — R94.31 ABNORMAL EKG: ICD-10-CM

## 2025-03-18 DIAGNOSIS — B99.9 INFECTION REQUIRING CONTACT ISOLATION PRECAUTIONS: ICD-10-CM

## 2025-03-18 DIAGNOSIS — R57.9 SHOCK (MULTI): ICD-10-CM

## 2025-03-18 PROCEDURE — 99024 POSTOP FOLLOW-UP VISIT: CPT | Performed by: PHYSICIAN ASSISTANT

## 2025-03-18 RX ORDER — LOPERAMIDE HCL 2 MG
2 TABLET ORAL 4 TIMES DAILY PRN
Qty: 120 TABLET | Refills: 1 | Status: SHIPPED | OUTPATIENT
Start: 2025-03-18 | End: 2025-05-17

## 2025-03-18 RX ORDER — CETIRIZINE HYDROCHLORIDE 5 MG/1
5 TABLET ORAL DAILY
Qty: 90 TABLET | Refills: 3 | Status: SHIPPED | OUTPATIENT
Start: 2025-03-18 | End: 2026-03-18

## 2025-03-18 RX ORDER — GUAIFENESIN 100 MG/5ML
200 LIQUID ORAL 3 TIMES DAILY PRN
Qty: 120 ML | Refills: 0 | Status: SHIPPED | OUTPATIENT
Start: 2025-03-18 | End: 2025-03-28

## 2025-03-18 ASSESSMENT — ENCOUNTER SYMPTOMS
PERSON REPORTING PAIN: PATIENT
PAIN: 1
PAIN LOCATION: NECK
PAIN LOCATION - PAIN SEVERITY: 4/10

## 2025-03-18 ASSESSMENT — PAIN SCALES - GENERAL: PAINLEVEL_OUTOF10: 2

## 2025-03-18 ASSESSMENT — ACTIVITIES OF DAILY LIVING (ADL)
BATHING_CURRENT_FUNCTION: MINIMUM ASSIST
PHYSICAL TRANSFERS ASSESSED: 1
CURRENT_FUNCTION: SUPERVISION
DRESSING_UB_CURRENT_FUNCTION: SUPERVISION
FEEDING: SUPERVISION
CURRENT_FUNCTION: CONTACT GUARD ASSIST
DRESSING_LB_CURRENT_FUNCTION: MODERATE ASSIST
BATHING ASSESSED: 1
FEEDING ASSESSED: 1

## 2025-03-18 NOTE — PROGRESS NOTES
Review of chart. Patient is ordered TPN nightly over 12 hours and daily LR. Shellie King and Leslie MCKEON from trauma team follows at home. Please include both on all order requests.     Labs and TPN formula faxed to team - received order to change hydration from 1L LR daily to 1L NS daily. TPN to continue unchanged. Orders entered into Epic, gold/white copies sent to intake.    Ok to stop Thursday lab draws and go forward with Mon only. RN 2 [place] notified.     Tel call with son. Explained that the above orders were received. Son states understanding of orders and is ok with delivery of all TPN/fluids Wed 3/19 with supplies/flushes to match.     Pharmacy to deliver the following 3/19 with supplies/flushes to match  7x TPN  7x MVI  7x NS 1L  DOS 3/20-3/26     Follow up 3/25/25. Check labs and get further TPN/NS orders for [place]eds mix and delivery.

## 2025-03-19 ENCOUNTER — HOSPITAL ENCOUNTER (OUTPATIENT)
Dept: RADIOLOGY | Facility: HOSPITAL | Age: 80
Discharge: HOME | End: 2025-03-19
Payer: MEDICARE

## 2025-03-19 ENCOUNTER — APPOINTMENT (OUTPATIENT)
Dept: NEUROSURGERY | Facility: CLINIC | Age: 80
End: 2025-03-19
Payer: MEDICARE

## 2025-03-19 VITALS
HEIGHT: 76 IN | WEIGHT: 151.7 LBS | TEMPERATURE: 98 F | DIASTOLIC BLOOD PRESSURE: 58 MMHG | HEART RATE: 111 BPM | BODY MASS INDEX: 18.47 KG/M2 | SYSTOLIC BLOOD PRESSURE: 106 MMHG
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
## 2025-03-19 DIAGNOSIS — S32.051A: ICD-10-CM

## 2025-03-19 PROCEDURE — 1111F DSCHRG MED/CURRENT MED MERGE: CPT | Performed by: PHYSICIAN ASSISTANT

## 2025-03-19 PROCEDURE — 72070 X-RAY EXAM THORAC SPINE 2VWS: CPT

## 2025-03-19 PROCEDURE — 1123F ACP DISCUSS/DSCN MKR DOCD: CPT | Performed by: PHYSICIAN ASSISTANT

## 2025-03-19 PROCEDURE — 72100 X-RAY EXAM L-S SPINE 2/3 VWS: CPT

## 2025-03-19 PROCEDURE — 1036F TOBACCO NON-USER: CPT | Performed by: PHYSICIAN ASSISTANT

## 2025-03-19 PROCEDURE — 1125F AMNT PAIN NOTED PAIN PRSNT: CPT | Performed by: PHYSICIAN ASSISTANT

## 2025-03-19 PROCEDURE — 99214 OFFICE O/P EST MOD 30 MIN: CPT | Performed by: PHYSICIAN ASSISTANT

## 2025-03-19 RX ORDER — ROSUVASTATIN CALCIUM 5 MG/1
5 TABLET, COATED ORAL DAILY
COMMUNITY
Start: 2025-03-01

## 2025-03-19 RX ORDER — MULTIVITAMIN
1 TABLET ORAL
COMMUNITY
Start: 2025-03-03

## 2025-03-19 RX ORDER — CLOTRIMAZOLE AND BETAMETHASONE DIPROPIONATE 10; .64 MG/G; MG/G
CREAM TOPICAL
COMMUNITY
Start: 2025-03-12

## 2025-03-19 ASSESSMENT — PAIN SCALES - GENERAL: PAINLEVEL_OUTOF10: 2
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
## 2025-03-20 ENCOUNTER — HOME CARE VISIT (OUTPATIENT)
Dept: HOME HEALTH SERVICES | Facility: HOME HEALTH | Age: 80
End: 2025-03-20
Payer: MEDICARE

## 2025-03-20 VITALS
DIASTOLIC BLOOD PRESSURE: 49 MMHG | TEMPERATURE: 97.6 F | HEART RATE: 102 BPM | SYSTOLIC BLOOD PRESSURE: 88 MMHG | OXYGEN SATURATION: 96 % | RESPIRATION RATE: 18 BRPM

## 2025-03-20 PROCEDURE — G0299 HHS/HOSPICE OF RN EA 15 MIN: HCPCS

## 2025-03-20 PROCEDURE — G0157 HHC PT ASSISTANT EA 15: HCPCS | Mod: CQ

## 2025-03-20 PROCEDURE — G0153 HHCP-SVS OF S/L PATH,EA 15MN: HCPCS

## 2025-03-20 RX ADMIN — ONDANSETRON 4 MG: 4 TABLET, ORALLY DISINTEGRATING ORAL at 11:46

## 2025-03-20 ASSESSMENT — PAIN SCALES - PAIN ASSESSMENT IN ADVANCED DEMENTIA (PAINAD)
FACIALEXPRESSION: 0 - SMILING OR INEXPRESSIVE.
BODYLANGUAGE: 0
NEGVOCALIZATION: 0
BREATHING: 0
CONSOLABILITY: 0
NEGVOCALIZATION: 0 - NONE.
FACIALEXPRESSION: 0
TOTALSCORE: 0
CONSOLABILITY: 0 - NO NEED TO CONSOLE.
BODYLANGUAGE: 0 - RELAXED.

## 2025-03-20 ASSESSMENT — ENCOUNTER SYMPTOMS
LOWEST PAIN SEVERITY IN PAST 24 HOURS: 2/10
PERSON REPORTING PAIN: PATIENT
PAIN LOCATION - PAIN QUALITY: SORE, ACHING
PAIN: 1
PAIN SEVERITY GOAL: 0/10
HIGHEST PAIN SEVERITY IN PAST 24 HOURS: 3/10
PAIN LOCATION: BACK
PAIN LOCATION - PAIN SEVERITY: 2/10
HIGHEST PAIN SEVERITY IN PAST 24 HOURS: 2/10
PERSON REPORTING PAIN: PATIENT
PAIN: 1
PAIN LOCATION: BACK

## 2025-03-21 ENCOUNTER — HOME CARE VISIT (OUTPATIENT)
Dept: HOME HEALTH SERVICES | Facility: HOME HEALTH | Age: 80
End: 2025-03-21
Payer: MEDICARE

## 2025-03-21 ENCOUNTER — HOME INFUSION (OUTPATIENT)
Dept: INFUSION THERAPY | Age: 80
End: 2025-03-21
Payer: MEDICARE

## 2025-03-21 VITALS
TEMPERATURE: 98 F | HEART RATE: 98 BPM | RESPIRATION RATE: 18 BRPM | OXYGEN SATURATION: 100 % | DIASTOLIC BLOOD PRESSURE: 60 MMHG | SYSTOLIC BLOOD PRESSURE: 108 MMHG

## 2025-03-21 VITALS
OXYGEN SATURATION: 100 % | RESPIRATION RATE: 18 BRPM | TEMPERATURE: 98.1 F | HEART RATE: 88 BPM | SYSTOLIC BLOOD PRESSURE: 94 MMHG | DIASTOLIC BLOOD PRESSURE: 52 MMHG

## 2025-03-21 VITALS
RESPIRATION RATE: 18 BRPM | DIASTOLIC BLOOD PRESSURE: 60 MMHG | TEMPERATURE: 98.4 F | OXYGEN SATURATION: 96 % | SYSTOLIC BLOOD PRESSURE: 98 MMHG | HEART RATE: 80 BPM

## 2025-03-21 DIAGNOSIS — K56.601 COMPLETE INTESTINAL OBSTRUCTION, UNSPECIFIED CAUSE (MULTI): ICD-10-CM

## 2025-03-21 DIAGNOSIS — T82.898A OCCLUSION OF PERIPHERALLY INSERTED CENTRAL CATHETER (PICC) LINE, INITIAL ENCOUNTER (CMS-HCC): Primary | ICD-10-CM

## 2025-03-21 DIAGNOSIS — E64.0 SEQUELAE OF PROTEIN-CALORIE MALNUTRITION (MULTI): ICD-10-CM

## 2025-03-21 PROCEDURE — G0152 HHCP-SERV OF OT,EA 15 MIN: HCPCS

## 2025-03-21 PROCEDURE — G0299 HHS/HOSPICE OF RN EA 15 MIN: HCPCS

## 2025-03-21 RX ORDER — WATER 1000 ML/1000ML
10 INJECTION, SOLUTION INTRAVENOUS AS NEEDED
Qty: 20 ML | Refills: 0 | Status: SHIPPED
Start: 2025-03-21

## 2025-03-21 ASSESSMENT — ENCOUNTER SYMPTOMS
FATIGUES EASILY: 1
PERSON REPORTING PAIN: PATIENT
LAST BOWEL MOVEMENT: 67285
PERSON REPORTING PAIN: PATIENT
MUSCLE WEAKNESS: 1
CHANGE IN APPETITE: UNCHANGED
APPETITE LEVEL: GOOD
MUSCLE WEAKNESS: 1
DENIES PAIN: 1
FATIGUE: 1
LAST BOWEL MOVEMENT: 67285
CHANGE IN APPETITE: UNCHANGED
PERSON REPORTING PAIN: PATIENT
PAIN: 1
FATIGUE: 1
HYPOTENSION: 1
APPETITE LEVEL: GOOD
DENIES PAIN: 1
LIMITED RANGE OF MOTION: 1
HIGHEST PAIN SEVERITY IN PAST 24 HOURS: 0/10
FATIGUES EASILY: 1
PAIN LOCATION - PAIN SEVERITY: 2/10
PAIN LOCATION: BACK
MUSCLE WEAKNESS: 1

## 2025-03-21 ASSESSMENT — ACTIVITIES OF DAILY LIVING (ADL)
CURRENT_FUNCTION: STAND BY ASSIST
AMBULATION ASSISTANCE: STAND BY ASSIST

## 2025-03-24 ENCOUNTER — HOME CARE VISIT (OUTPATIENT)
Dept: HOME HEALTH SERVICES | Facility: HOME HEALTH | Age: 80
End: 2025-03-24
Payer: MEDICARE

## 2025-03-24 VITALS
SYSTOLIC BLOOD PRESSURE: 111 MMHG | OXYGEN SATURATION: 95 % | HEART RATE: 97 BPM | TEMPERATURE: 95.9 F | RESPIRATION RATE: 18 BRPM | DIASTOLIC BLOOD PRESSURE: 46 MMHG

## 2025-03-24 PROCEDURE — G0299 HHS/HOSPICE OF RN EA 15 MIN: HCPCS

## 2025-03-24 PROCEDURE — G0157 HHC PT ASSISTANT EA 15: HCPCS | Mod: CQ

## 2025-03-24 ASSESSMENT — ENCOUNTER SYMPTOMS
DENIES PAIN: 1
PERSON REPORTING PAIN: PATIENT
PAIN LOCATION: NECK
PAIN: 1
LOWEST PAIN SEVERITY IN PAST 24 HOURS: 0/10
PAIN SEVERITY GOAL: 0/10
PERSON REPORTING PAIN: PATIENT
CHANGE IN APPETITE: INCREASED
SUBJECTIVE PAIN PROGRESSION: UNCHANGED
MUSCLE WEAKNESS: 1
PAIN LOCATION - PAIN SEVERITY: 2/10
HIGHEST PAIN SEVERITY IN PAST 24 HOURS: 2/10
PAIN LOCATION - PAIN QUALITY: ACHING
APPETITE LEVEL: FAIR

## 2025-03-24 ASSESSMENT — ACTIVITIES OF DAILY LIVING (ADL)
AMBULATION ASSISTANCE: STAND BY ASSIST
CURRENT_FUNCTION: STAND BY ASSIST

## 2025-03-25 ENCOUNTER — HOME INFUSION (OUTPATIENT)
Dept: INFUSION THERAPY | Age: 80
End: 2025-03-25
Payer: MEDICARE

## 2025-03-25 ENCOUNTER — HOME CARE VISIT (OUTPATIENT)
Dept: HOME HEALTH SERVICES | Facility: HOME HEALTH | Age: 80
End: 2025-03-25
Payer: MEDICARE

## 2025-03-25 VITALS
HEART RATE: 98 BPM | DIASTOLIC BLOOD PRESSURE: 50 MMHG | TEMPERATURE: 98.5 F | SYSTOLIC BLOOD PRESSURE: 92 MMHG | RESPIRATION RATE: 18 BRPM | OXYGEN SATURATION: 97 %

## 2025-03-25 DIAGNOSIS — K31.6 HIGH-OUTPUT EXTERNAL GASTROINTESTINAL FISTULA: ICD-10-CM

## 2025-03-25 DIAGNOSIS — E46 MALNUTRITION, UNSPECIFIED TYPE (MULTI): Primary | ICD-10-CM

## 2025-03-25 PROCEDURE — G0152 HHCP-SERV OF OT,EA 15 MIN: HCPCS

## 2025-03-25 ASSESSMENT — ENCOUNTER SYMPTOMS
PERSON REPORTING PAIN: PATIENT
PAIN LOCATION - PAIN QUALITY: ACHE
PAIN LOCATION - PAIN SEVERITY: 2/10
PAIN LOCATION: ABDOMEN
PAIN LOCATION - PAIN QUALITY: GAS PAIN
PAIN LOCATION - PAIN SEVERITY: 2/10
PAIN: 1
PAIN LOCATION: BACK

## 2025-03-25 ASSESSMENT — ACTIVITIES OF DAILY LIVING (ADL): DRESSING_LB_CURRENT_FUNCTION: MINIMUM ASSIST

## 2025-03-25 NOTE — PROGRESS NOTES
Review of chart. Patient is ordered TPN nightly over 12 hours and daily NS. Shellie King and Leslie MCKEON from trauma team follows at home. Please include both on all order requests.     Labs and TPN formula faxed to team - received order to change hydration from 1L NS daily to 1L NS every other day. TPN to continue unchanged. Orders entered into Epic, gold/white copies sent to intake. RN Team Notified.     Tel call with son. Explained that the above orders were received. Son states understanding of orders and is ok with delivery of all TPN/fluids Wed 3/26 with supplies/flushes to match plus alcohol swabs. Infusions going well and all questions answered.     Pharmacy to deliver the following 03/26:   7x TPN  7x MVI  3x NS 1L  DOS 3/27-4/2     Follow up 4/01/25. Check labs and get further TPN/NS orders for [place] mix and delivery.

## 2025-03-27 ENCOUNTER — HOME CARE VISIT (OUTPATIENT)
Dept: HOME HEALTH SERVICES | Facility: HOME HEALTH | Age: 80
End: 2025-03-27
Payer: MEDICARE

## 2025-03-27 PROCEDURE — G0157 HHC PT ASSISTANT EA 15: HCPCS | Mod: CQ

## 2025-03-27 ASSESSMENT — ENCOUNTER SYMPTOMS
PERSON REPORTING PAIN: PATIENT
DENIES PAIN: 1

## 2025-03-28 ENCOUNTER — HOME CARE VISIT (OUTPATIENT)
Dept: HOME HEALTH SERVICES | Facility: HOME HEALTH | Age: 80
End: 2025-03-28
Payer: MEDICARE

## 2025-03-28 VITALS
SYSTOLIC BLOOD PRESSURE: 96 MMHG | HEART RATE: 100 BPM | OXYGEN SATURATION: 97 % | TEMPERATURE: 98.8 F | RESPIRATION RATE: 18 BRPM | DIASTOLIC BLOOD PRESSURE: 52 MMHG

## 2025-03-28 VITALS — HEART RATE: 94 BPM | OXYGEN SATURATION: 95 %

## 2025-03-28 DIAGNOSIS — E64.0 SEQUELAE OF PROTEIN-CALORIE MALNUTRITION (MULTI): ICD-10-CM

## 2025-03-28 DIAGNOSIS — K56.601 COMPLETE INTESTINAL OBSTRUCTION, UNSPECIFIED CAUSE (MULTI): ICD-10-CM

## 2025-03-28 PROCEDURE — G0153 HHCP-SVS OF S/L PATH,EA 15MN: HCPCS

## 2025-03-28 PROCEDURE — G0152 HHCP-SERV OF OT,EA 15 MIN: HCPCS

## 2025-03-28 SDOH — ECONOMIC STABILITY: HOUSING INSECURITY
HOME SAFETY: PATIENT DEMONSTRATING GOOD PROGRESS TOWARD FUNCTIONAL TREATMENT GOALS. PATIENT HAS PROGRESSED TO SUP WITH FUNCTIONAL TRANSFERS WITH THE USE OF AD AND IS BEGINNING TO WALK TO BATHROOM WITH WHEELED WALKER TO ASSIST WITH MANAGING OSTOMY. PATIENT IMPROVI

## 2025-03-28 SDOH — ECONOMIC STABILITY: HOUSING INSECURITY
HOME SAFETY: NG STANDING TOLERANCE AND BALANCE WITHT HE USE OF AD. PATIENT IMPROVING PARTICIPATION IN ADLS WITH MIN A AND PLANS TO PROGRESS TO SINK SIDE BATHING AND SHOWERING WITH DME WHEN CLEARED BY MD.   PATIENT WILL BENEFIT FROM CONTINUED OT SERVICES TO CONTIN

## 2025-03-28 SDOH — ECONOMIC STABILITY: HOUSING INSECURITY

## 2025-03-28 ASSESSMENT — PAIN SCALES - PAIN ASSESSMENT IN ADVANCED DEMENTIA (PAINAD)
CONSOLABILITY: 0 - NO NEED TO CONSOLE.
CONSOLABILITY: 0
NEGVOCALIZATION: 0
BREATHING: 0
NEGVOCALIZATION: 0 - NONE.
BODYLANGUAGE: 0
FACIALEXPRESSION: 0 - SMILING OR INEXPRESSIVE.
TOTALSCORE: 0
FACIALEXPRESSION: 0
BODYLANGUAGE: 0 - RELAXED.

## 2025-03-28 ASSESSMENT — ENCOUNTER SYMPTOMS
PAIN LOCATION: BACK
PAIN LOCATION: ABDOMEN
PAIN LOCATION - PAIN QUALITY: ACHE
PAIN LOCATION - PAIN SEVERITY: 2/10
PERSON REPORTING PAIN: PATIENT
PAIN LOCATION - PAIN SEVERITY: 3/10
PAIN: 1

## 2025-03-28 ASSESSMENT — ACTIVITIES OF DAILY LIVING (ADL)
CURRENT_FUNCTION: CONTACT GUARD ASSIST
TOILETING: 1
BATHING ASSESSED: 1
DRESSING_UB_CURRENT_FUNCTION: SUPERVISION
AMBULATION ASSISTANCE: 1
GROOMING_CURRENT_FUNCTION: CONTACT GUARD ASSIST
PHYSICAL TRANSFERS ASSESSED: 1
TOILETING: MINIMUM ASSIST
BATHING_CURRENT_FUNCTION: MINIMUM ASSIST
CURRENT_FUNCTION: SUPERVISION
DRESSING_LB_CURRENT_FUNCTION: MODERATE ASSIST
GROOMING ASSESSED: 1
AMBULATION ASSISTANCE: SUPERVISION

## 2025-03-31 ENCOUNTER — HOME CARE VISIT (OUTPATIENT)
Dept: HOME HEALTH SERVICES | Facility: HOME HEALTH | Age: 80
End: 2025-03-31
Payer: MEDICARE

## 2025-03-31 VITALS
DIASTOLIC BLOOD PRESSURE: 55 MMHG | OXYGEN SATURATION: 97 % | TEMPERATURE: 98.3 F | SYSTOLIC BLOOD PRESSURE: 90 MMHG | HEART RATE: 111 BPM

## 2025-03-31 VITALS
OXYGEN SATURATION: 95 % | TEMPERATURE: 97.7 F | SYSTOLIC BLOOD PRESSURE: 108 MMHG | RESPIRATION RATE: 18 BRPM | HEART RATE: 105 BPM | DIASTOLIC BLOOD PRESSURE: 62 MMHG

## 2025-03-31 PROCEDURE — G0151 HHCP-SERV OF PT,EA 15 MIN: HCPCS

## 2025-03-31 PROCEDURE — G0299 HHS/HOSPICE OF RN EA 15 MIN: HCPCS

## 2025-03-31 ASSESSMENT — ENCOUNTER SYMPTOMS
MUSCLE WEAKNESS: 1
PAIN LOCATION - PAIN QUALITY: ACHING
PAIN LOCATION: BACK
APPETITE LEVEL: FAIR
PAIN LOCATION - PAIN SEVERITY: 3/10
PAIN LOCATION - PAIN FREQUENCY: CONSTANT
CHANGE IN APPETITE: INCREASED
PAIN LOCATION: BACK
SUBJECTIVE PAIN PROGRESSION: UNCHANGED
PERSON REPORTING PAIN: PATIENT
PERSON REPORTING PAIN: PATIENT
DENIES PAIN: 1
LOWEST PAIN SEVERITY IN PAST 24 HOURS: 2/10
PAIN LOCATION - PAIN SEVERITY: 3/10
MUSCLE WEAKNESS: 1
PAIN: 1
HIGHEST PAIN SEVERITY IN PAST 24 HOURS: 3/10
LAST BOWEL MOVEMENT: 67295

## 2025-03-31 ASSESSMENT — BALANCE ASSESSMENTS
STANDING BALANCE: 2 - NARROW STANCE WITHOUT SUPPORT
ARISES: 1 - ABLE, USES ARMS TO HELP
IMMEDIATE STANDING BALANCE FIRST 5 SECONDS: 2 - STEADY WITHOUT WALKER OR OTHER SUPPORT
BALANCE SCORE: 12
SITTING BALANCE: 1 - STEADY, SAFE
NUDGED SCORE: 1
ATTEMPTS TO ARISE: 2 - ABLE TO RISE, ONE ATTEMPT
NUDGED: 1 - STAGGERS, GRABS, CATCHES SELF
TURNING 360 DEGREES STEPS: 1 - CONTINUOUS STEPS
ARISING SCORE: 1
SITTING DOWN: 1 - USES ARMS OR NOT SMOOTH MOTION
EYES CLOSED AT MAXIMUM POSITION NUDGED: 0 - UNSTEADY

## 2025-03-31 ASSESSMENT — GAIT ASSESSMENTS
STEP SYMMETRY: 1 - RIGHT AND LEFT STEP LENGTH APPEAR EQUAL
PATH SCORE: 1
STEP CONTINUITY: 1 - STEPS APPEAR CONTINUOUS
PATH: 1 - MILD/MODERATE DEVIATION OR USES WALKING AID
TRUNK: 0 - MARKED SWAY OR USES WALKING AID
GAIT SCORE: 9
WALKING STANCE: 1 - HEELS ALMOST TOUCHING WHILE WALKING
INITIATION OF GAIT IMMEDIATELY AFTER GO: 1 - NO HESITANCY
TRUNK SCORE: 0
BALANCE AND GAIT SCORE: 21

## 2025-03-31 ASSESSMENT — ACTIVITIES OF DAILY LIVING (ADL)
CURRENT_FUNCTION: STAND BY ASSIST
CURRENT_FUNCTION: ONE PERSON
CURRENT_FUNCTION: STAND BY ASSIST
AMBULATION ASSISTANCE: STAND BY ASSIST
AMBULATION ASSISTANCE: ONE PERSON
AMBULATION ASSISTANCE ON FLAT SURFACES: 1
AMBULATION ASSISTANCE: STAND BY ASSIST

## 2025-04-01 ENCOUNTER — HOME CARE VISIT (OUTPATIENT)
Dept: HOME HEALTH SERVICES | Facility: HOME HEALTH | Age: 80
End: 2025-04-01
Payer: MEDICARE

## 2025-04-01 VITALS
HEART RATE: 102 BPM | OXYGEN SATURATION: 96 % | TEMPERATURE: 98.5 F | DIASTOLIC BLOOD PRESSURE: 58 MMHG | SYSTOLIC BLOOD PRESSURE: 104 MMHG | RESPIRATION RATE: 18 BRPM

## 2025-04-01 PROCEDURE — G0152 HHCP-SERV OF OT,EA 15 MIN: HCPCS

## 2025-04-01 ASSESSMENT — ENCOUNTER SYMPTOMS: DENIES PAIN: 1

## 2025-04-02 ENCOUNTER — APPOINTMENT (OUTPATIENT)
Dept: UROLOGY | Facility: CLINIC | Age: 80
End: 2025-04-02
Payer: MEDICARE

## 2025-04-02 ENCOUNTER — HOME INFUSION (OUTPATIENT)
Dept: INFUSION THERAPY | Age: 80
End: 2025-04-02

## 2025-04-02 DIAGNOSIS — E46 MALNUTRITION, UNSPECIFIED TYPE (MULTI): ICD-10-CM

## 2025-04-02 DIAGNOSIS — K31.6 HIGH-OUTPUT EXTERNAL GASTROINTESTINAL FISTULA: ICD-10-CM
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
## 2025-04-03 ENCOUNTER — HOME CARE VISIT (OUTPATIENT)
Dept: HOME HEALTH SERVICES | Facility: HOME HEALTH | Age: 80
End: 2025-04-03
Payer: MEDICARE

## 2025-04-03 VITALS — OXYGEN SATURATION: 95 % | HEART RATE: 101 BPM

## 2025-04-03 VITALS
OXYGEN SATURATION: 97 % | HEART RATE: 100 BPM | SYSTOLIC BLOOD PRESSURE: 94 MMHG | DIASTOLIC BLOOD PRESSURE: 52 MMHG | RESPIRATION RATE: 18 BRPM | TEMPERATURE: 98.5 F

## 2025-04-03 PROCEDURE — G0153 HHCP-SVS OF S/L PATH,EA 15MN: HCPCS

## 2025-04-03 PROCEDURE — G0152 HHCP-SERV OF OT,EA 15 MIN: HCPCS

## 2025-04-03 ASSESSMENT — ENCOUNTER SYMPTOMS
PERSON REPORTING PAIN: PATIENT
PAIN LOCATION: BACK
PAIN LOCATION - PAIN QUALITY: ACHING
PAIN: 1
PAIN: 1
PAIN LOCATION: NECK
PAIN LOCATION - PAIN SEVERITY: 1/10
PAIN LOCATION - PAIN QUALITY: ACHING
PAIN LOCATION - PAIN SEVERITY: 1/10
PAIN LOCATION: BACK
PERSON REPORTING PAIN: PATIENT
PAIN LOCATION - PAIN SEVERITY: 1/10

## 2025-04-03 ASSESSMENT — PAIN SCALES - PAIN ASSESSMENT IN ADVANCED DEMENTIA (PAINAD)
NEGVOCALIZATION: 0
CONSOLABILITY: 0 - NO NEED TO CONSOLE.
FACIALEXPRESSION: 0
BODYLANGUAGE: 0 - RELAXED.
BREATHING: 0
FACIALEXPRESSION: 0 - SMILING OR INEXPRESSIVE.
TOTALSCORE: 0
BODYLANGUAGE: 0
CONSOLABILITY: 0
NEGVOCALIZATION: 0 - NONE.

## 2025-04-04 DIAGNOSIS — K56.601 COMPLETE INTESTINAL OBSTRUCTION, UNSPECIFIED CAUSE (MULTI): ICD-10-CM

## 2025-04-04 DIAGNOSIS — E64.0 SEQUELAE OF PROTEIN-CALORIE MALNUTRITION (MULTI): ICD-10-CM

## 2025-04-07 ENCOUNTER — HOME CARE VISIT (OUTPATIENT)
Dept: HOME HEALTH SERVICES | Facility: HOME HEALTH | Age: 80
End: 2025-04-07
Payer: MEDICARE

## 2025-04-07 VITALS
DIASTOLIC BLOOD PRESSURE: 60 MMHG | RESPIRATION RATE: 20 BRPM | TEMPERATURE: 97.5 F | OXYGEN SATURATION: 96 % | HEART RATE: 94 BPM | SYSTOLIC BLOOD PRESSURE: 104 MMHG

## 2025-04-07 PROCEDURE — G0299 HHS/HOSPICE OF RN EA 15 MIN: HCPCS

## 2025-04-07 ASSESSMENT — ENCOUNTER SYMPTOMS
APPETITE LEVEL: FAIR
PAIN LOCATION - EXACERBATING FACTORS: CAR ACCIDENT
DEPRESSION: 0
CHANGE IN APPETITE: UNCHANGED
STOOL FREQUENCY: MORE THAN TWICE DAILY
LOWEST PAIN SEVERITY IN PAST 24 HOURS: 2/10
PAIN LOCATION - PAIN QUALITY: ACHING
FATIGUE: 1
PAIN LOCATION - PAIN SEVERITY: 4/10
OCCASIONAL FEELINGS OF UNSTEADINESS: 1
LAST BOWEL MOVEMENT: 67302
PAIN SEVERITY GOAL: 0/10
PERSON REPORTING PAIN: PATIENT
HIGHEST PAIN SEVERITY IN PAST 24 HOURS: 6/10
LOSS OF SENSATION IN FEET: 1
PAIN LOCATION - RELIEVING FACTORS: MEDS,REST
PAIN LOCATION: NECK
PAIN: 1
SUBJECTIVE PAIN PROGRESSION: GRADUALLY IMPROVING
PAIN LOCATION - PAIN FREQUENCY: FREQUENT

## 2025-04-07 ASSESSMENT — PAIN SCALES - PAIN ASSESSMENT IN ADVANCED DEMENTIA (PAINAD)
NEGVOCALIZATION: 0
TOTALSCORE: 0
BODYLANGUAGE: 0
CONSOLABILITY: 0 - NO NEED TO CONSOLE.
NEGVOCALIZATION: 0 - NONE.
FACIALEXPRESSION: 0
CONSOLABILITY: 0
BREATHING: 0
BODYLANGUAGE: 0 - RELAXED.
FACIALEXPRESSION: 0 - SMILING OR INEXPRESSIVE.

## 2025-04-07 ASSESSMENT — ACTIVITIES OF DAILY LIVING (ADL)
AMBULATION ASSISTANCE: STAND BY ASSIST
CURRENT_FUNCTION: STAND BY ASSIST

## 2025-04-08 ENCOUNTER — HOME INFUSION (OUTPATIENT)
Dept: INFUSION THERAPY | Age: 80
End: 2025-04-08
Payer: MEDICARE

## 2025-04-08 ENCOUNTER — HOME CARE VISIT (OUTPATIENT)
Dept: HOME HEALTH SERVICES | Facility: HOME HEALTH | Age: 80
End: 2025-04-08
Payer: MEDICARE

## 2025-04-08 ENCOUNTER — DOCUMENTATION (OUTPATIENT)
Dept: INFUSION THERAPY | Age: 80
End: 2025-04-08
Payer: MEDICARE

## 2025-04-08 VITALS
TEMPERATURE: 98.7 F | DIASTOLIC BLOOD PRESSURE: 58 MMHG | SYSTOLIC BLOOD PRESSURE: 98 MMHG | HEART RATE: 98 BPM | RESPIRATION RATE: 17 BRPM | OXYGEN SATURATION: 98 %

## 2025-04-08 DIAGNOSIS — K31.6 HIGH-OUTPUT EXTERNAL GASTROINTESTINAL FISTULA: ICD-10-CM

## 2025-04-08 DIAGNOSIS — E46 MALNUTRITION, UNSPECIFIED TYPE (MULTI): ICD-10-CM

## 2025-04-08 LAB
ALBUMIN SERPL-MCNC: 2.8 G/DL (ref 3.6–5.1)
ALBUMIN/GLOB SERPL: 0.7 (CALC) (ref 1–2.5)
ALP SERPL-CCNC: 190 U/L (ref 35–144)
ALT SERPL-CCNC: 31 U/L (ref 9–46)
AST SERPL-CCNC: 23 U/L (ref 10–35)
BASOPHILS # BLD AUTO: 33 CELLS/UL (ref 0–200)
BASOPHILS NFR BLD AUTO: 0.4 %
BILIRUB SERPL-MCNC: 1.1 MG/DL (ref 0.2–1.2)
BUN SERPL-MCNC: 37 MG/DL (ref 7–25)
BUN/CREAT SERPL: 30 (CALC) (ref 6–22)
CALCIUM SERPL-MCNC: 8.8 MG/DL (ref 8.6–10.3)
CHLORIDE SERPL-SCNC: 102 MMOL/L (ref 98–110)
CO2 SERPL-SCNC: 25 MMOL/L (ref 20–32)
CREAT SERPL-MCNC: 1.24 MG/DL (ref 0.7–1.28)
EGFRCR SERPLBLD CKD-EPI 2021: 60 ML/MIN/1.73M2
EOSINOPHIL # BLD AUTO: 91 CELLS/UL (ref 15–500)
EOSINOPHIL NFR BLD AUTO: 1.1 %
ERYTHROCYTE [DISTWIDTH] IN BLOOD BY AUTOMATED COUNT: 13.8 % (ref 11–15)
GLOBULIN SER CALC-MCNC: 4.2 G/DL (CALC) (ref 1.9–3.7)
GLUCOSE SERPL-MCNC: 100 MG/DL (ref 65–99)
HCT VFR BLD AUTO: 26.3 % (ref 38.5–50)
HGB BLD-MCNC: 8.3 G/DL (ref 13.2–17.1)
LYMPHOCYTES # BLD AUTO: 3071 CELLS/UL (ref 850–3900)
LYMPHOCYTES NFR BLD AUTO: 37 %
MAGNESIUM SERPL-MCNC: 1.8 MG/DL (ref 1.5–2.5)
MCH RBC QN AUTO: 29.7 PG (ref 27–33)
MCHC RBC AUTO-ENTMCNC: 31.6 G/DL (ref 32–36)
MCV RBC AUTO: 94.3 FL (ref 80–100)
MONOCYTES # BLD AUTO: 938 CELLS/UL (ref 200–950)
MONOCYTES NFR BLD AUTO: 11.3 %
NEUTROPHILS # BLD AUTO: 4167 CELLS/UL (ref 1500–7800)
NEUTROPHILS NFR BLD AUTO: 50.2 %
PHOSPHATE SERPL-MCNC: 4.3 MG/DL (ref 2.1–4.3)
PLATELET # BLD AUTO: 256 THOUSAND/UL (ref 140–400)
PMV BLD REES-ECKER: 9.6 FL (ref 7.5–12.5)
POTASSIUM SERPL-SCNC: 5.2 MMOL/L (ref 3.5–5.3)
PROT SERPL-MCNC: 7 G/DL (ref 6.1–8.1)
RBC # BLD AUTO: 2.79 MILLION/UL (ref 4.2–5.8)
SODIUM SERPL-SCNC: 133 MMOL/L (ref 135–146)
WBC # BLD AUTO: 8.3 THOUSAND/UL (ref 3.8–10.8)

## 2025-04-08 PROCEDURE — G0152 HHCP-SERV OF OT,EA 15 MIN: HCPCS

## 2025-04-08 ASSESSMENT — ENCOUNTER SYMPTOMS
PAIN LOCATION - PAIN QUALITY: TIGHTNESS
PAIN LOCATION: NECK
PAIN LOCATION - PAIN SEVERITY: 1/10
PAIN LOCATION - PAIN FREQUENCY: CONSTANT
PAIN: 1
PERSON REPORTING PAIN: PATIENT

## 2025-04-08 NOTE — PROGRESS NOTES
Delivery of TPN, NS hydration bags and all supplies (including flushes, gravity and CADD-tubing sets, 10cc syringes and all dressing) is scheduled for tomorrow evening.  to call on the way.
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
## 2025-04-09 ENCOUNTER — HOME CARE VISIT (OUTPATIENT)
Dept: HOME HEALTH SERVICES | Facility: HOME HEALTH | Age: 80
End: 2025-04-09
Payer: MEDICARE

## 2025-04-09 PROCEDURE — G0157 HHC PT ASSISTANT EA 15: HCPCS | Mod: CQ

## 2025-04-09 ASSESSMENT — ENCOUNTER SYMPTOMS
PERSON REPORTING PAIN: PATIENT
DENIES PAIN: 1

## 2025-04-10 ENCOUNTER — HOME CARE VISIT (OUTPATIENT)
Dept: HOME HEALTH SERVICES | Facility: HOME HEALTH | Age: 80
End: 2025-04-10
Payer: MEDICARE

## 2025-04-10 ENCOUNTER — APPOINTMENT (OUTPATIENT)
Dept: SURGERY | Facility: CLINIC | Age: 80
End: 2025-04-10
Payer: MEDICARE

## 2025-04-10 VITALS
TEMPERATURE: 98.5 F | SYSTOLIC BLOOD PRESSURE: 110 MMHG | DIASTOLIC BLOOD PRESSURE: 60 MMHG | HEART RATE: 100 BPM | RESPIRATION RATE: 18 BRPM | OXYGEN SATURATION: 99 %

## 2025-04-10 VITALS
DIASTOLIC BLOOD PRESSURE: 69 MMHG | HEART RATE: 102 BPM | SYSTOLIC BLOOD PRESSURE: 104 MMHG | WEIGHT: 164 LBS | RESPIRATION RATE: 18 BRPM | BODY MASS INDEX: 19.96 KG/M2

## 2025-04-10 DIAGNOSIS — Z09 POSTOP CHECK: Primary | ICD-10-CM

## 2025-04-10 PROCEDURE — G0152 HHCP-SERV OF OT,EA 15 MIN: HCPCS

## 2025-04-10 PROCEDURE — 99213 OFFICE O/P EST LOW 20 MIN: CPT | Performed by: PHYSICIAN ASSISTANT

## 2025-04-10 ASSESSMENT — ENCOUNTER SYMPTOMS: DENIES PAIN: 1

## 2025-04-10 ASSESSMENT — PAIN SCALES - GENERAL: PAINLEVEL_OUTOF10: 3

## 2025-04-11 ENCOUNTER — HOME CARE VISIT (OUTPATIENT)
Dept: HOME HEALTH SERVICES | Facility: HOME HEALTH | Age: 80
End: 2025-04-11
Payer: MEDICARE

## 2025-04-11 DIAGNOSIS — R19.8 HIGH OUTPUT ILEOSTOMY (MULTI): Primary | ICD-10-CM

## 2025-04-11 DIAGNOSIS — Z93.2 HIGH OUTPUT ILEOSTOMY (MULTI): Primary | ICD-10-CM

## 2025-04-11 DIAGNOSIS — E64.0 SEQUELAE OF PROTEIN-CALORIE MALNUTRITION (MULTI): ICD-10-CM

## 2025-04-11 DIAGNOSIS — L60.0 INGROWN NAIL: ICD-10-CM

## 2025-04-11 DIAGNOSIS — K56.601 COMPLETE INTESTINAL OBSTRUCTION, UNSPECIFIED CAUSE (MULTI): ICD-10-CM

## 2025-04-11 PROCEDURE — G0157 HHC PT ASSISTANT EA 15: HCPCS | Mod: CQ

## 2025-04-11 RX ORDER — LOPERAMIDE HYDROCHLORIDE 2 MG/1
2 CAPSULE ORAL 4 TIMES DAILY PRN
Qty: 30 CAPSULE | Refills: 0 | Status: SHIPPED | OUTPATIENT
Start: 2025-04-11 | End: 2025-05-11

## 2025-04-11 ASSESSMENT — ENCOUNTER SYMPTOMS
PERSON REPORTING PAIN: PATIENT
HIGHEST PAIN SEVERITY IN PAST 24 HOURS: 5/10
LOWEST PAIN SEVERITY IN PAST 24 HOURS: 5/10
PAIN SEVERITY GOAL: 0/10
PAIN LOCATION: NECK
PAIN: 1

## 2025-04-14 ENCOUNTER — HOME CARE VISIT (OUTPATIENT)
Dept: HOME HEALTH SERVICES | Facility: HOME HEALTH | Age: 80
End: 2025-04-14
Payer: MEDICARE

## 2025-04-14 ENCOUNTER — LAB REQUISITION (OUTPATIENT)
Dept: LAB | Facility: HOSPITAL | Age: 80
End: 2025-04-14
Payer: MEDICARE

## 2025-04-14 VITALS
SYSTOLIC BLOOD PRESSURE: 106 MMHG | HEART RATE: 102 BPM | RESPIRATION RATE: 16 BRPM | DIASTOLIC BLOOD PRESSURE: 58 MMHG | OXYGEN SATURATION: 98 % | TEMPERATURE: 98 F

## 2025-04-14 DIAGNOSIS — K56.601: ICD-10-CM

## 2025-04-14 LAB
ALBUMIN SERPL BCP-MCNC: 2.8 G/DL (ref 3.4–5)
ALP SERPL-CCNC: 203 U/L (ref 33–136)
ALT SERPL W P-5'-P-CCNC: 35 U/L (ref 10–52)
ANION GAP SERPL CALC-SCNC: 13 MMOL/L (ref 10–20)
AST SERPL W P-5'-P-CCNC: 28 U/L (ref 9–39)
BASOPHILS # BLD AUTO: 0.04 X10*3/UL (ref 0–0.1)
BASOPHILS NFR BLD AUTO: 0.4 %
BILIRUB SERPL-MCNC: 1.2 MG/DL (ref 0–1.2)
BUN SERPL-MCNC: 38 MG/DL (ref 6–23)
CALCIUM SERPL-MCNC: 9.2 MG/DL (ref 8.6–10.3)
CHLORIDE SERPL-SCNC: 99 MMOL/L (ref 98–107)
CO2 SERPL-SCNC: 24 MMOL/L (ref 21–32)
CREAT SERPL-MCNC: 1.43 MG/DL (ref 0.5–1.3)
EGFRCR SERPLBLD CKD-EPI 2021: 50 ML/MIN/1.73M*2
EOSINOPHIL # BLD AUTO: 0.05 X10*3/UL (ref 0–0.4)
EOSINOPHIL NFR BLD AUTO: 0.5 %
ERYTHROCYTE [DISTWIDTH] IN BLOOD BY AUTOMATED COUNT: 14 % (ref 11.5–14.5)
GLUCOSE SERPL-MCNC: 90 MG/DL (ref 74–99)
HCT VFR BLD AUTO: 25 % (ref 41–52)
HGB BLD-MCNC: 7.9 G/DL (ref 13.5–17.5)
IMM GRANULOCYTES # BLD AUTO: 0.06 X10*3/UL (ref 0–0.5)
IMM GRANULOCYTES NFR BLD AUTO: 0.6 % (ref 0–0.9)
LYMPHOCYTES # BLD AUTO: 3.42 X10*3/UL (ref 0.8–3)
LYMPHOCYTES NFR BLD AUTO: 36 %
MAGNESIUM SERPL-MCNC: 1.69 MG/DL (ref 1.6–2.4)
MCH RBC QN AUTO: 29.2 PG (ref 26–34)
MCHC RBC AUTO-ENTMCNC: 31.6 G/DL (ref 32–36)
MCV RBC AUTO: 92 FL (ref 80–100)
MONOCYTES # BLD AUTO: 1.22 X10*3/UL (ref 0.05–0.8)
MONOCYTES NFR BLD AUTO: 12.9 %
NEUTROPHILS # BLD AUTO: 4.7 X10*3/UL (ref 1.6–5.5)
NEUTROPHILS NFR BLD AUTO: 49.6 %
NRBC BLD-RTO: 0 /100 WBCS (ref 0–0)
PHOSPHATE SERPL-MCNC: 4.2 MG/DL (ref 2.5–4.9)
PLATELET # BLD AUTO: 252 X10*3/UL (ref 150–450)
POTASSIUM SERPL-SCNC: 5 MMOL/L (ref 3.5–5.3)
PROT SERPL-MCNC: 7 G/DL (ref 6.4–8.2)
RBC # BLD AUTO: 2.71 X10*6/UL (ref 4.5–5.9)
SODIUM SERPL-SCNC: 131 MMOL/L (ref 136–145)
WBC # BLD AUTO: 9.5 X10*3/UL (ref 4.4–11.3)

## 2025-04-14 PROCEDURE — 84100 ASSAY OF PHOSPHORUS: CPT

## 2025-04-14 PROCEDURE — G0299 HHS/HOSPICE OF RN EA 15 MIN: HCPCS

## 2025-04-14 PROCEDURE — 83735 ASSAY OF MAGNESIUM: CPT

## 2025-04-14 PROCEDURE — 80053 COMPREHEN METABOLIC PANEL: CPT

## 2025-04-14 PROCEDURE — 85025 COMPLETE CBC W/AUTO DIFF WBC: CPT

## 2025-04-14 ASSESSMENT — ENCOUNTER SYMPTOMS
HIGHEST PAIN SEVERITY IN PAST 24 HOURS: 6/10
PAIN LOCATION: ABDOMEN
PAIN LOCATION - PAIN SEVERITY: 3/10
SUBJECTIVE PAIN PROGRESSION: WAXING AND WANING
APPETITE LEVEL: POOR
HOARSE VOICE: 1
LOWEST PAIN SEVERITY IN PAST 24 HOURS: 3/10
PAIN: 1
PERSON REPORTING PAIN: PATIENT
CHANGE IN APPETITE: UNCHANGED
LOWER EXTREMITY EDEMA: 1

## 2025-04-14 ASSESSMENT — ACTIVITIES OF DAILY LIVING (ADL)
AMBULATION ASSISTANCE: STAND BY ASSIST
CURRENT_FUNCTION: STAND BY ASSIST

## 2025-04-16 ENCOUNTER — HOME CARE VISIT (OUTPATIENT)
Dept: HOME HEALTH SERVICES | Facility: HOME HEALTH | Age: 80
End: 2025-04-16
Payer: MEDICARE

## 2025-04-16 ENCOUNTER — HOME INFUSION (OUTPATIENT)
Dept: INFUSION THERAPY | Age: 80
End: 2025-04-16
Payer: MEDICARE

## 2025-04-16 VITALS
OXYGEN SATURATION: 99 % | RESPIRATION RATE: 18 BRPM | TEMPERATURE: 98.9 F | HEART RATE: 64 BPM | SYSTOLIC BLOOD PRESSURE: 112 MMHG | DIASTOLIC BLOOD PRESSURE: 62 MMHG

## 2025-04-16 PROCEDURE — G0152 HHCP-SERV OF OT,EA 15 MIN: HCPCS

## 2025-04-16 PROCEDURE — G0157 HHC PT ASSISTANT EA 15: HCPCS | Mod: CQ

## 2025-04-16 ASSESSMENT — ENCOUNTER SYMPTOMS
PERSON REPORTING PAIN: PATIENT
PAIN: 1
PAIN LOCATION - PAIN SEVERITY: 2/10
PAIN LOCATION - PAIN QUALITY: ACHING
HIGHEST PAIN SEVERITY IN PAST 24 HOURS: 3/10
PAIN LOCATION: BACK
PERSON REPORTING PAIN: PATIENT
SUBJECTIVE PAIN PROGRESSION: GRADUALLY IMPROVING
LOWEST PAIN SEVERITY IN PAST 24 HOURS: 2/10
PAIN SEVERITY GOAL: 0/10
DENIES PAIN: 1
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
## 2025-04-17 ENCOUNTER — HOME CARE VISIT (OUTPATIENT)
Dept: HOME HEALTH SERVICES | Facility: HOME HEALTH | Age: 80
End: 2025-04-17
Payer: MEDICARE

## 2025-04-18 DIAGNOSIS — K56.601 COMPLETE INTESTINAL OBSTRUCTION, UNSPECIFIED CAUSE (MULTI): ICD-10-CM

## 2025-04-18 DIAGNOSIS — E64.0 SEQUELAE OF PROTEIN-CALORIE MALNUTRITION (MULTI): ICD-10-CM

## 2025-04-18 LAB
PREALB SERPL-MCNC: 27 MG/DL (ref 21–43)
TRIGL SERPL-MCNC: 159 MG/DL

## 2025-04-21 ENCOUNTER — LAB REQUISITION (OUTPATIENT)
Dept: LAB | Facility: HOSPITAL | Age: 80
End: 2025-04-21
Payer: MEDICARE

## 2025-04-21 ENCOUNTER — HOME CARE VISIT (OUTPATIENT)
Dept: HOME HEALTH SERVICES | Facility: HOME HEALTH | Age: 80
End: 2025-04-21
Payer: MEDICARE

## 2025-04-21 VITALS
OXYGEN SATURATION: 96 % | RESPIRATION RATE: 16 BRPM | TEMPERATURE: 98.2 F | HEART RATE: 109 BPM | SYSTOLIC BLOOD PRESSURE: 122 MMHG | DIASTOLIC BLOOD PRESSURE: 63 MMHG

## 2025-04-21 DIAGNOSIS — E46 UNSPECIFIED PROTEIN-CALORIE MALNUTRITION (MULTI): ICD-10-CM

## 2025-04-21 LAB
ALBUMIN SERPL BCP-MCNC: 2.9 G/DL (ref 3.4–5)
ALP SERPL-CCNC: 203 U/L (ref 33–136)
ALT SERPL W P-5'-P-CCNC: 34 U/L (ref 10–52)
ANION GAP SERPL CALC-SCNC: 12 MMOL/L (ref 10–20)
AST SERPL W P-5'-P-CCNC: 25 U/L (ref 9–39)
BASOPHILS # BLD AUTO: 0.02 X10*3/UL (ref 0–0.1)
BASOPHILS NFR BLD AUTO: 0.3 %
BILIRUB SERPL-MCNC: 1.1 MG/DL (ref 0–1.2)
BUN SERPL-MCNC: 41 MG/DL (ref 6–23)
CALCIUM SERPL-MCNC: 9.1 MG/DL (ref 8.6–10.3)
CHLORIDE SERPL-SCNC: 103 MMOL/L (ref 98–107)
CO2 SERPL-SCNC: 23 MMOL/L (ref 21–32)
CREAT SERPL-MCNC: 1.38 MG/DL (ref 0.5–1.3)
EGFRCR SERPLBLD CKD-EPI 2021: 53 ML/MIN/1.73M*2
EOSINOPHIL # BLD AUTO: 0.06 X10*3/UL (ref 0–0.4)
EOSINOPHIL NFR BLD AUTO: 0.8 %
ERYTHROCYTE [DISTWIDTH] IN BLOOD BY AUTOMATED COUNT: 13.5 % (ref 11.5–14.5)
GLUCOSE SERPL-MCNC: 96 MG/DL (ref 74–99)
HCT VFR BLD AUTO: 26.1 % (ref 41–52)
HGB BLD-MCNC: 8.3 G/DL (ref 13.5–17.5)
IMM GRANULOCYTES # BLD AUTO: 0.08 X10*3/UL (ref 0–0.5)
IMM GRANULOCYTES NFR BLD AUTO: 1 % (ref 0–0.9)
LYMPHOCYTES # BLD AUTO: 2.1 X10*3/UL (ref 0.8–3)
LYMPHOCYTES NFR BLD AUTO: 26.6 %
MAGNESIUM SERPL-MCNC: 1.7 MG/DL (ref 1.6–2.4)
MCH RBC QN AUTO: 29.2 PG (ref 26–34)
MCHC RBC AUTO-ENTMCNC: 31.8 G/DL (ref 32–36)
MCV RBC AUTO: 92 FL (ref 80–100)
MONOCYTES # BLD AUTO: 0.83 X10*3/UL (ref 0.05–0.8)
MONOCYTES NFR BLD AUTO: 10.5 %
NEUTROPHILS # BLD AUTO: 4.81 X10*3/UL (ref 1.6–5.5)
NEUTROPHILS NFR BLD AUTO: 60.8 %
NRBC BLD-RTO: 0 /100 WBCS (ref 0–0)
PHOSPHATE SERPL-MCNC: 3.9 MG/DL (ref 2.5–4.9)
PLATELET # BLD AUTO: 274 X10*3/UL (ref 150–450)
POTASSIUM SERPL-SCNC: 5.1 MMOL/L (ref 3.5–5.3)
PROT SERPL-MCNC: 7.1 G/DL (ref 6.4–8.2)
RBC # BLD AUTO: 2.84 X10*6/UL (ref 4.5–5.9)
SODIUM SERPL-SCNC: 133 MMOL/L (ref 136–145)
TRIGL SERPL-MCNC: 173 MG/DL (ref 0–149)
WBC # BLD AUTO: 7.9 X10*3/UL (ref 4.4–11.3)

## 2025-04-21 PROCEDURE — 84478 ASSAY OF TRIGLYCERIDES: CPT

## 2025-04-21 PROCEDURE — 83735 ASSAY OF MAGNESIUM: CPT

## 2025-04-21 PROCEDURE — 84100 ASSAY OF PHOSPHORUS: CPT

## 2025-04-21 PROCEDURE — G0299 HHS/HOSPICE OF RN EA 15 MIN: HCPCS

## 2025-04-21 PROCEDURE — 84134 ASSAY OF PREALBUMIN: CPT

## 2025-04-21 PROCEDURE — 85025 COMPLETE CBC W/AUTO DIFF WBC: CPT

## 2025-04-21 PROCEDURE — 80053 COMPREHEN METABOLIC PANEL: CPT

## 2025-04-21 ASSESSMENT — ENCOUNTER SYMPTOMS
SUBJECTIVE PAIN PROGRESSION: GRADUALLY IMPROVING
LOWEST PAIN SEVERITY IN PAST 24 HOURS: 2/10
APPETITE LEVEL: FAIR
PAIN: 1
PAIN LOCATION - PAIN SEVERITY: 3/10
HIGHEST PAIN SEVERITY IN PAST 24 HOURS: 3/10
PAIN LOCATION - PAIN QUALITY: SORENESS
PERSON REPORTING PAIN: PATIENT
PAIN LOCATION: BACK
CHANGE IN APPETITE: UNCHANGED

## 2025-04-21 ASSESSMENT — ACTIVITIES OF DAILY LIVING (ADL)
AMBULATION ASSISTANCE: STAND BY ASSIST
CURRENT_FUNCTION: STAND BY ASSIST
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
## 2025-04-22 ENCOUNTER — HOME INFUSION (OUTPATIENT)
Dept: INFUSION THERAPY | Age: 80
End: 2025-04-22

## 2025-04-22 ENCOUNTER — APPOINTMENT (OUTPATIENT)
Dept: SURGERY | Facility: CLINIC | Age: 80
End: 2025-04-22
Payer: MEDICARE

## 2025-04-22 ENCOUNTER — HOME CARE VISIT (OUTPATIENT)
Dept: HOME HEALTH SERVICES | Facility: HOME HEALTH | Age: 80
End: 2025-04-22
Payer: MEDICARE

## 2025-04-22 DIAGNOSIS — E46 MALNUTRITION, UNSPECIFIED TYPE (MULTI): ICD-10-CM

## 2025-04-22 DIAGNOSIS — K31.6 HIGH-OUTPUT EXTERNAL GASTROINTESTINAL FISTULA: ICD-10-CM

## 2025-04-22 LAB — PREALB SERPL-MCNC: 26.1 MG/DL (ref 18–40)

## 2025-04-22 PROCEDURE — G0157 HHC PT ASSISTANT EA 15: HCPCS | Mod: CQ

## 2025-04-22 ASSESSMENT — ENCOUNTER SYMPTOMS
HIGHEST PAIN SEVERITY IN PAST 24 HOURS: 5/10
PAIN LOCATION: RIGHT HIP
PAIN SEVERITY GOAL: 0/10
PAIN: 1
LOWEST PAIN SEVERITY IN PAST 24 HOURS: 5/10
SUBJECTIVE PAIN PROGRESSION: WAXING AND WANING
PERSON REPORTING PAIN: PATIENT
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
## 2025-04-23 ENCOUNTER — HOME CARE VISIT (OUTPATIENT)
Dept: HOME HEALTH SERVICES | Facility: HOME HEALTH | Age: 80
End: 2025-04-23
Payer: MEDICARE

## 2025-04-23 ENCOUNTER — DOCUMENTATION (OUTPATIENT)
Dept: INFUSION THERAPY | Age: 80
End: 2025-04-23
Payer: MEDICARE

## 2025-04-23 VITALS
SYSTOLIC BLOOD PRESSURE: 105 MMHG | TEMPERATURE: 98.3 F | OXYGEN SATURATION: 98 % | DIASTOLIC BLOOD PRESSURE: 60 MMHG | HEART RATE: 97 BPM

## 2025-04-23 PROCEDURE — G0152 HHCP-SERV OF OT,EA 15 MIN: HCPCS

## 2025-04-23 PROCEDURE — G0151 HHCP-SERV OF PT,EA 15 MIN: HCPCS

## 2025-04-23 ASSESSMENT — ENCOUNTER SYMPTOMS
PAIN LOCATION - EXACERBATING FACTORS: PROLONGED SITTING
PAIN SEVERITY GOAL: 0/10
PAIN LOCATION: RIGHT HIP
PAIN LOCATION: COCCYX
PAIN LOCATION - PAIN DURATION: 2 DAYS
PAIN LOCATION: LEFT HIP
PAIN LOCATION - PAIN QUALITY: SHARP
PAIN LOCATION - PAIN SEVERITY: 2/10
PERSON REPORTING PAIN: PATIENT
PAIN LOCATION - EXACERBATING FACTORS: EXERCISE
PERSON REPORTING PAIN: PATIENT
HIGHEST PAIN SEVERITY IN PAST 24 HOURS: 3/10
PAIN LOCATION - EXACERBATING FACTORS: EXERCISE
PAIN LOCATION - PAIN QUALITY: SHARP
PAIN: 1
PAIN LOCATION - PAIN DURATION: 2 DAYS
PAIN: 1
LOWEST PAIN SEVERITY IN PAST 24 HOURS: 0/10
PAIN LOCATION - PAIN SEVERITY: 2/10
PAIN LOCATION - PAIN SEVERITY: 4/10
PAIN LOCATION: RIGHT HIP

## 2025-04-23 ASSESSMENT — ACTIVITIES OF DAILY LIVING (ADL): AMBULATION ASSISTANCE ON FLAT SURFACES: 1

## 2025-04-23 NOTE — PROGRESS NOTES
HEARING AID CONSULTATION    Hearing test results: Moderate to severe sensorineural hearing loss.   Medical Clearance: Dr. Schuler  Insurance: Medicare  Dexterity: Good  Patient goals for amplification established   1: Hear better in conversation    2: Hear better in group settings around table.     Patient's knowledge of amplification: Good previous user    SUMMARY OF DISCUSSION:  Styles, features, colors, levels of technology, service packages, pricing, payment options, trial period, warranty, batteries, life of hearing aid and batteries, upkeep supplies, realistic expectations.    STYLE RECOMMENDED: BTE with canal lock earmold  Ear(s): Both  Technology: Advanced  Hearing aid color: [place]  Earmold color: CLEAR  Volume control: yes but patient does not want to use  Telecoil: Yes  Directional: Yes    Service Package:  3 year    /model: Phoncholo Zhao V SP  Garcia quote: [id number]  Earmold impression: Taken without incident, parting otoscopy was clear      FOLLOW-UP:  Follow up in 2-3 weeks for hearing aid fitting.      SPOKE WITH SON, AND WITH ANOTHER WEEK OF TPN AND HYDRATION OK TO SEND ALL SUPPLIES INCLUDING ALCOHOL WIPES, HEPARIN AND NS FLUSHES A BOX OF EACH... OK FOR DELIVERY ANYTIME TODAY... NO QUESTIONS FOR RPH AT THIS TIME...

## 2025-04-25 DIAGNOSIS — E64.0 SEQUELAE OF PROTEIN-CALORIE MALNUTRITION (MULTI): ICD-10-CM

## 2025-04-25 DIAGNOSIS — E46 MALNUTRITION, UNSPECIFIED TYPE (MULTI): ICD-10-CM

## 2025-04-25 DIAGNOSIS — K31.6 HIGH-OUTPUT EXTERNAL GASTROINTESTINAL FISTULA: ICD-10-CM

## 2025-04-25 DIAGNOSIS — K56.601 COMPLETE INTESTINAL OBSTRUCTION, UNSPECIFIED CAUSE (MULTI): ICD-10-CM

## 2025-04-28 ENCOUNTER — HOME CARE VISIT (OUTPATIENT)
Dept: HOME HEALTH SERVICES | Facility: HOME HEALTH | Age: 80
End: 2025-04-28
Payer: MEDICARE

## 2025-04-28 ENCOUNTER — LAB REQUISITION (OUTPATIENT)
Dept: LAB | Facility: HOSPITAL | Age: 80
End: 2025-04-28
Payer: MEDICARE

## 2025-04-28 VITALS
TEMPERATURE: 98.7 F | DIASTOLIC BLOOD PRESSURE: 60 MMHG | OXYGEN SATURATION: 96 % | RESPIRATION RATE: 16 BRPM | SYSTOLIC BLOOD PRESSURE: 112 MMHG | HEART RATE: 101 BPM

## 2025-04-28 DIAGNOSIS — K56.601: ICD-10-CM

## 2025-04-28 LAB
ALBUMIN SERPL BCP-MCNC: 3 G/DL (ref 3.4–5)
ALP SERPL-CCNC: 222 U/L (ref 33–136)
ALT SERPL W P-5'-P-CCNC: 40 U/L (ref 10–52)
ANION GAP SERPL CALC-SCNC: 10 MMOL/L (ref 10–20)
AST SERPL W P-5'-P-CCNC: 29 U/L (ref 9–39)
BASOPHILS # BLD AUTO: 0.03 X10*3/UL (ref 0–0.1)
BASOPHILS NFR BLD AUTO: 0.3 %
BILIRUB SERPL-MCNC: 1.3 MG/DL (ref 0–1.2)
BUN SERPL-MCNC: 41 MG/DL (ref 6–23)
CALCIUM SERPL-MCNC: 9.4 MG/DL (ref 8.6–10.3)
CHLORIDE SERPL-SCNC: 99 MMOL/L (ref 98–107)
CO2 SERPL-SCNC: 26 MMOL/L (ref 21–32)
CREAT SERPL-MCNC: 1.5 MG/DL (ref 0.5–1.3)
EGFRCR SERPLBLD CKD-EPI 2021: 48 ML/MIN/1.73M*2
EOSINOPHIL # BLD AUTO: 0.09 X10*3/UL (ref 0–0.4)
EOSINOPHIL NFR BLD AUTO: 1 %
ERYTHROCYTE [DISTWIDTH] IN BLOOD BY AUTOMATED COUNT: 13.4 % (ref 11.5–14.5)
GLUCOSE SERPL-MCNC: 90 MG/DL (ref 74–99)
HCT VFR BLD AUTO: 27.2 % (ref 41–52)
HGB BLD-MCNC: 8.6 G/DL (ref 13.5–17.5)
IMM GRANULOCYTES # BLD AUTO: 0.1 X10*3/UL (ref 0–0.5)
IMM GRANULOCYTES NFR BLD AUTO: 1.1 % (ref 0–0.9)
LYMPHOCYTES # BLD AUTO: 2.99 X10*3/UL (ref 0.8–3)
LYMPHOCYTES NFR BLD AUTO: 32.6 %
MAGNESIUM SERPL-MCNC: 1.71 MG/DL (ref 1.6–2.4)
MCH RBC QN AUTO: 29.3 PG (ref 26–34)
MCHC RBC AUTO-ENTMCNC: 31.6 G/DL (ref 32–36)
MCV RBC AUTO: 93 FL (ref 80–100)
MONOCYTES # BLD AUTO: 0.91 X10*3/UL (ref 0.05–0.8)
MONOCYTES NFR BLD AUTO: 9.9 %
NEUTROPHILS # BLD AUTO: 5.05 X10*3/UL (ref 1.6–5.5)
NEUTROPHILS NFR BLD AUTO: 55.1 %
NRBC BLD-RTO: 0 /100 WBCS (ref 0–0)
PHOSPHATE SERPL-MCNC: 4.1 MG/DL (ref 2.5–4.9)
PLATELET # BLD AUTO: 263 X10*3/UL (ref 150–450)
POTASSIUM SERPL-SCNC: 5.1 MMOL/L (ref 3.5–5.3)
PROT SERPL-MCNC: 7.5 G/DL (ref 6.4–8.2)
RBC # BLD AUTO: 2.94 X10*6/UL (ref 4.5–5.9)
SODIUM SERPL-SCNC: 130 MMOL/L (ref 136–145)
WBC # BLD AUTO: 9.2 X10*3/UL (ref 4.4–11.3)

## 2025-04-28 PROCEDURE — G0299 HHS/HOSPICE OF RN EA 15 MIN: HCPCS

## 2025-04-28 PROCEDURE — 83735 ASSAY OF MAGNESIUM: CPT

## 2025-04-28 PROCEDURE — 84100 ASSAY OF PHOSPHORUS: CPT

## 2025-04-28 PROCEDURE — 80053 COMPREHEN METABOLIC PANEL: CPT

## 2025-04-28 PROCEDURE — 85025 COMPLETE CBC W/AUTO DIFF WBC: CPT

## 2025-04-28 ASSESSMENT — ENCOUNTER SYMPTOMS
PAIN LOCATION - EXACERBATING FACTORS: WITH MOVEMENT
HIGHEST PAIN SEVERITY IN PAST 24 HOURS: 3/10
PAIN LOCATION - PAIN SEVERITY: 2/10
PERSON REPORTING PAIN: PATIENT
PAIN LOCATION - PAIN QUALITY: ACHING
LOWEST PAIN SEVERITY IN PAST 24 HOURS: 0/10
PAIN LOCATION: RIGHT HIP
PAIN: 1

## 2025-04-28 ASSESSMENT — ACTIVITIES OF DAILY LIVING (ADL): ENTERING_EXITING_HOME: SUPERVISION

## 2025-04-29 ENCOUNTER — HOME INFUSION (OUTPATIENT)
Dept: INFUSION THERAPY | Age: 80
End: 2025-04-29
Payer: MEDICARE

## 2025-04-29 DIAGNOSIS — E46 MALNUTRITION, UNSPECIFIED TYPE (MULTI): ICD-10-CM

## 2025-04-29 DIAGNOSIS — K31.6 HIGH-OUTPUT EXTERNAL GASTROINTESTINAL FISTULA: ICD-10-CM

## 2025-04-29 ASSESSMENT — ACTIVITIES OF DAILY LIVING (ADL)
OASIS_M1830: 05
CURRENT_FUNCTION: STAND BY ASSIST
AMBULATION ASSISTANCE: STAND BY ASSIST

## 2025-04-29 ASSESSMENT — ENCOUNTER SYMPTOMS
ARTHRALGIAS: 1
HOARSE VOICE: 1
APPETITE LEVEL: FAIR
LOWER EXTREMITY EDEMA: 1
MUSCLE WEAKNESS: 1
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
## 2025-05-02 DIAGNOSIS — K56.601 COMPLETE INTESTINAL OBSTRUCTION, UNSPECIFIED CAUSE (MULTI): ICD-10-CM

## 2025-05-02 DIAGNOSIS — E64.0 SEQUELAE OF PROTEIN-CALORIE MALNUTRITION (MULTI): ICD-10-CM

## 2025-05-02 LAB
PREALB SERPL-MCNC: 27 MG/DL (ref 21–43)
TRIGL SERPL-MCNC: 176 MG/DL

## 2025-05-03 ENCOUNTER — HOME CARE VISIT (OUTPATIENT)
Dept: HOME HEALTH SERVICES | Facility: HOME HEALTH | Age: 80
End: 2025-05-03
Payer: MEDICARE

## 2025-05-03 ENCOUNTER — HOME INFUSION (OUTPATIENT)
Dept: INFUSION THERAPY | Age: 80
End: 2025-05-03
Payer: MEDICARE

## 2025-05-03 VITALS
DIASTOLIC BLOOD PRESSURE: 60 MMHG | RESPIRATION RATE: 20 BRPM | SYSTOLIC BLOOD PRESSURE: 110 MMHG | HEART RATE: 70 BPM | OXYGEN SATURATION: 97 % | TEMPERATURE: 98 F

## 2025-05-03 PROCEDURE — G0299 HHS/HOSPICE OF RN EA 15 MIN: HCPCS

## 2025-05-03 ASSESSMENT — PAIN SCALES - PAIN ASSESSMENT IN ADVANCED DEMENTIA (PAINAD)
FACIALEXPRESSION: 0
FACIALEXPRESSION: 0 - SMILING OR INEXPRESSIVE.
BODYLANGUAGE: 0
NEGVOCALIZATION: 0
TOTALSCORE: 0
CONSOLABILITY: 0 - NO NEED TO CONSOLE.
BODYLANGUAGE: 0 - RELAXED.
BREATHING: 0
CONSOLABILITY: 0
NEGVOCALIZATION: 0 - NONE.

## 2025-05-03 ASSESSMENT — ACTIVITIES OF DAILY LIVING (ADL)
AMBULATION ASSISTANCE: STAND BY ASSIST
CURRENT_FUNCTION: STAND BY ASSIST

## 2025-05-03 ASSESSMENT — ENCOUNTER SYMPTOMS
APPETITE LEVEL: FAIR
DENIES PAIN: 1
LAST BOWEL MOVEMENT: 67328
CHANGE IN APPETITE: UNCHANGED

## 2025-05-03 NOTE — PROGRESS NOTES
Received call from RN Team regarding patient IV line experiencing a partial occlusion of their two lumen PICC. Cathflo is requested by RN. Ins checked previously and not covered, but son agreeable to costs. Delivery requested on 05/03/25.    Processed fill for 2 vials Cathflo and SWFI for 05/03/25 dispense and straight delivery by 2 pm. Fill is a 1 day supply    Follow up 5/6 as previously planned.

## 2025-05-05 ENCOUNTER — LAB (OUTPATIENT)
Dept: LAB | Facility: HOSPITAL | Age: 80
End: 2025-05-05
Payer: MEDICARE

## 2025-05-05 ENCOUNTER — LAB REQUISITION (OUTPATIENT)
Dept: LAB | Facility: HOSPITAL | Age: 80
End: 2025-05-05
Payer: MEDICARE

## 2025-05-05 ENCOUNTER — HOME CARE VISIT (OUTPATIENT)
Dept: HOME HEALTH SERVICES | Facility: HOME HEALTH | Age: 80
End: 2025-05-05
Payer: MEDICARE

## 2025-05-05 VITALS
TEMPERATURE: 98.2 F | DIASTOLIC BLOOD PRESSURE: 56 MMHG | SYSTOLIC BLOOD PRESSURE: 111 MMHG | HEART RATE: 95 BPM | OXYGEN SATURATION: 98 % | RESPIRATION RATE: 18 BRPM

## 2025-05-05 DIAGNOSIS — K56.601: ICD-10-CM

## 2025-05-05 DIAGNOSIS — K56.601: Primary | ICD-10-CM

## 2025-05-05 LAB
ALBUMIN SERPL BCP-MCNC: 2.9 G/DL (ref 3.4–5)
ALP SERPL-CCNC: 229 U/L (ref 33–136)
ALT SERPL W P-5'-P-CCNC: 34 U/L (ref 10–52)
ANION GAP SERPL CALC-SCNC: 10 MMOL/L (ref 10–20)
AST SERPL W P-5'-P-CCNC: 24 U/L (ref 9–39)
BASOPHILS # BLD AUTO: 0.03 X10*3/UL (ref 0–0.1)
BASOPHILS NFR BLD AUTO: 0.3 %
BILIRUB SERPL-MCNC: 1.1 MG/DL (ref 0–1.2)
BUN SERPL-MCNC: 41 MG/DL (ref 6–23)
CALCIUM SERPL-MCNC: 9.2 MG/DL (ref 8.6–10.3)
CHLORIDE SERPL-SCNC: 102 MMOL/L (ref 98–107)
CO2 SERPL-SCNC: 25 MMOL/L (ref 21–32)
CREAT SERPL-MCNC: 1.46 MG/DL (ref 0.5–1.3)
EGFRCR SERPLBLD CKD-EPI 2021: 49 ML/MIN/1.73M*2
EOSINOPHIL # BLD AUTO: 0.09 X10*3/UL (ref 0–0.4)
EOSINOPHIL NFR BLD AUTO: 0.9 %
ERYTHROCYTE [DISTWIDTH] IN BLOOD BY AUTOMATED COUNT: 13.6 % (ref 11.5–14.5)
GLUCOSE SERPL-MCNC: 91 MG/DL (ref 74–99)
HCT VFR BLD AUTO: 25.6 % (ref 41–52)
HGB BLD-MCNC: 8 G/DL (ref 13.5–17.5)
IMM GRANULOCYTES # BLD AUTO: 0.08 X10*3/UL (ref 0–0.5)
IMM GRANULOCYTES NFR BLD AUTO: 0.8 % (ref 0–0.9)
LYMPHOCYTES # BLD AUTO: 3.45 X10*3/UL (ref 0.8–3)
LYMPHOCYTES NFR BLD AUTO: 36.4 %
MAGNESIUM SERPL-MCNC: 1.78 MG/DL (ref 1.6–2.4)
MCH RBC QN AUTO: 29.3 PG (ref 26–34)
MCHC RBC AUTO-ENTMCNC: 31.3 G/DL (ref 32–36)
MCV RBC AUTO: 94 FL (ref 80–100)
MONOCYTES # BLD AUTO: 1 X10*3/UL (ref 0.05–0.8)
MONOCYTES NFR BLD AUTO: 10.5 %
NEUTROPHILS # BLD AUTO: 4.83 X10*3/UL (ref 1.6–5.5)
NEUTROPHILS NFR BLD AUTO: 51.1 %
NRBC BLD-RTO: 0 /100 WBCS (ref 0–0)
PHOSPHATE SERPL-MCNC: 4.2 MG/DL (ref 2.5–4.9)
PLATELET # BLD AUTO: 278 X10*3/UL (ref 150–450)
POTASSIUM SERPL-SCNC: 5.2 MMOL/L (ref 3.5–5.3)
PROT SERPL-MCNC: 7.3 G/DL (ref 6.4–8.2)
RBC # BLD AUTO: 2.73 X10*6/UL (ref 4.5–5.9)
SODIUM SERPL-SCNC: 132 MMOL/L (ref 136–145)
TRIGL SERPL-MCNC: 142 MG/DL (ref 0–149)
WBC # BLD AUTO: 9.5 X10*3/UL (ref 4.4–11.3)

## 2025-05-05 PROCEDURE — 36415 COLL VENOUS BLD VENIPUNCTURE: CPT

## 2025-05-05 PROCEDURE — 84134 ASSAY OF PREALBUMIN: CPT

## 2025-05-05 PROCEDURE — G0299 HHS/HOSPICE OF RN EA 15 MIN: HCPCS

## 2025-05-05 ASSESSMENT — ENCOUNTER SYMPTOMS
LOWER EXTREMITY EDEMA: 1
CHANGE IN APPETITE: UNCHANGED
APPETITE LEVEL: GOOD
LOWEST PAIN SEVERITY IN PAST 24 HOURS: 1/10
PAIN LOCATION: RIGHT HIP
LIMITED RANGE OF MOTION: 1
PAIN LOCATION - PAIN QUALITY: STABBING
PAIN: 1
MUSCLE WEAKNESS: 1
PAIN LOCATION - PAIN SEVERITY: 5/10
PERSON REPORTING PAIN: PATIENT
HIGHEST PAIN SEVERITY IN PAST 24 HOURS: 5/10

## 2025-05-05 ASSESSMENT — ACTIVITIES OF DAILY LIVING (ADL)
CURRENT_FUNCTION: STAND BY ASSIST
AMBULATION ASSISTANCE: STAND BY ASSIST

## 2025-05-06 ENCOUNTER — DOCUMENTATION (OUTPATIENT)
Dept: INFUSION THERAPY | Age: 80
End: 2025-05-06
Payer: MEDICARE

## 2025-05-06 ENCOUNTER — HOME INFUSION (OUTPATIENT)
Dept: INFUSION THERAPY | Age: 80
End: 2025-05-06
Payer: MEDICARE

## 2025-05-06 DIAGNOSIS — K31.6 HIGH-OUTPUT EXTERNAL GASTROINTESTINAL FISTULA: ICD-10-CM

## 2025-05-06 DIAGNOSIS — E46 MALNUTRITION, UNSPECIFIED TYPE (MULTI): ICD-10-CM

## 2025-05-06 LAB — PREALB SERPL-MCNC: 24.6 MG/DL (ref 18–40)
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
## 2025-05-06 NOTE — PROGRESS NOTES
Spoke w/patients son @ [phone]:  Delivery of TPN, NS hydration bags and all supplies (one box of each flushes (per request), gravity and CADD-tubing sets, 10cc syringes and all dressing) is scheduled for tomorrow evening.  must call on the way.   No questions to [facility] at this time.

## 2025-05-08 ENCOUNTER — APPOINTMENT (OUTPATIENT)
Dept: SURGERY | Facility: CLINIC | Age: 80
End: 2025-05-08
Payer: MEDICARE

## 2025-05-08 VITALS
HEIGHT: 76 IN | RESPIRATION RATE: 16 BRPM | BODY MASS INDEX: 20.7 KG/M2 | DIASTOLIC BLOOD PRESSURE: 70 MMHG | WEIGHT: 170 LBS | SYSTOLIC BLOOD PRESSURE: 134 MMHG | HEART RATE: 112 BPM

## 2025-05-08 DIAGNOSIS — Z93.9 HISTORY OF CREATION OF OSTOMY (MULTI): ICD-10-CM

## 2025-05-08 DIAGNOSIS — Z51.89 VISIT FOR WOUND CHECK: Primary | ICD-10-CM

## 2025-05-08 PROCEDURE — 99213 OFFICE O/P EST LOW 20 MIN: CPT | Performed by: PHYSICIAN ASSISTANT

## 2025-05-08 ASSESSMENT — PAIN SCALES - GENERAL: PAINLEVEL_OUTOF10: 10-WORST PAIN EVER

## 2025-05-09 DIAGNOSIS — E64.0 SEQUELAE OF PROTEIN-CALORIE MALNUTRITION (MULTI): ICD-10-CM

## 2025-05-09 DIAGNOSIS — K56.601 COMPLETE INTESTINAL OBSTRUCTION, UNSPECIFIED CAUSE (MULTI): ICD-10-CM

## 2025-05-12 ENCOUNTER — HOME CARE VISIT (OUTPATIENT)
Dept: HOME HEALTH SERVICES | Facility: HOME HEALTH | Age: 80
End: 2025-05-12
Payer: MEDICARE

## 2025-05-12 VITALS
OXYGEN SATURATION: 95 % | SYSTOLIC BLOOD PRESSURE: 120 MMHG | RESPIRATION RATE: 16 BRPM | TEMPERATURE: 98.3 F | DIASTOLIC BLOOD PRESSURE: 62 MMHG | HEART RATE: 98 BPM

## 2025-05-12 PROCEDURE — G0299 HHS/HOSPICE OF RN EA 15 MIN: HCPCS

## 2025-05-12 ASSESSMENT — ENCOUNTER SYMPTOMS
STOOL FREQUENCY: DAILY
MUSCLE WEAKNESS: 1
LIMITED RANGE OF MOTION: 1
PERSON REPORTING PAIN: PATIENT
RECTAL BLEEDING: 1
PAIN LOCATION: BACK
CHANGE IN APPETITE: UNCHANGED
PAIN LOCATION - PAIN SEVERITY: 4/10
PAIN: 1
PAIN LOCATION - PAIN QUALITY: STABBING
LAST BOWEL MOVEMENT: 67336
HIGHEST PAIN SEVERITY IN PAST 24 HOURS: 7/10
APPETITE LEVEL: FAIR
LOWEST PAIN SEVERITY IN PAST 24 HOURS: 3/10
PAIN LOCATION - PAIN FREQUENCY: INTERMITTENT

## 2025-05-12 ASSESSMENT — ACTIVITIES OF DAILY LIVING (ADL)
AMBULATION ASSISTANCE: STAND BY ASSIST
CURRENT_FUNCTION: STAND BY ASSIST

## 2025-05-13 ENCOUNTER — APPOINTMENT (OUTPATIENT)
Dept: NEUROSURGERY | Facility: CLINIC | Age: 80
End: 2025-05-13
Payer: MEDICARE

## 2025-05-13 ENCOUNTER — HOME INFUSION (OUTPATIENT)
Dept: INFUSION THERAPY | Age: 80
End: 2025-05-13

## 2025-05-13 VITALS
BODY MASS INDEX: 20.7 KG/M2 | RESPIRATION RATE: 16 BRPM | HEART RATE: 132 BPM | WEIGHT: 170 LBS | TEMPERATURE: 98.1 F | SYSTOLIC BLOOD PRESSURE: 110 MMHG | DIASTOLIC BLOOD PRESSURE: 62 MMHG | HEIGHT: 76 IN

## 2025-05-13 DIAGNOSIS — S32.040G CLOSED COMPRESSION FRACTURE OF L4 LUMBAR VERTEBRA WITH DELAYED HEALING, SUBSEQUENT ENCOUNTER: ICD-10-CM

## 2025-05-13 DIAGNOSIS — S32.050G CLOSED COMPRESSION FRACTURE OF L5 LUMBAR VERTEBRA WITH DELAYED HEALING, SUBSEQUENT ENCOUNTER: ICD-10-CM
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
## 2025-05-13 LAB
ALBUMIN SERPL-MCNC: 3 G/DL (ref 3.6–5.1)
ALBUMIN/GLOB SERPL: 0.7 (CALC) (ref 1–2.5)
ALP SERPL-CCNC: 243 U/L (ref 35–144)
ALT SERPL-CCNC: 31 U/L (ref 9–46)
AST SERPL-CCNC: 25 U/L (ref 10–35)
BASOPHILS # BLD AUTO: 43 CELLS/UL (ref 0–200)
BASOPHILS NFR BLD AUTO: 0.5 %
BILIRUB SERPL-MCNC: 1.2 MG/DL (ref 0.2–1.2)
BUN SERPL-MCNC: 42 MG/DL (ref 7–25)
BUN/CREAT SERPL: 27 (CALC) (ref 6–22)
CALCIUM SERPL-MCNC: 9.2 MG/DL (ref 8.6–10.3)
CHLORIDE SERPL-SCNC: 101 MMOL/L (ref 98–110)
CO2 SERPL-SCNC: 24 MMOL/L (ref 20–32)
CREAT SERPL-MCNC: 1.58 MG/DL (ref 0.7–1.28)
EGFRCR SERPLBLD CKD-EPI 2021: 45 ML/MIN/1.73M2
EOSINOPHIL # BLD AUTO: 77 CELLS/UL (ref 15–500)
EOSINOPHIL NFR BLD AUTO: 0.9 %
ERYTHROCYTE [DISTWIDTH] IN BLOOD BY AUTOMATED COUNT: 14.4 % (ref 11–15)
GLOBULIN SER CALC-MCNC: 4.5 G/DL (CALC) (ref 1.9–3.7)
GLUCOSE SERPL-MCNC: 108 MG/DL (ref 65–139)
HCT VFR BLD AUTO: 29.7 % (ref 38.5–50)
HGB BLD-MCNC: 9.1 G/DL (ref 13.2–17.1)
LYMPHOCYTES # BLD AUTO: 2712 CELLS/UL (ref 850–3900)
LYMPHOCYTES NFR BLD AUTO: 31.9 %
MAGNESIUM SERPL-MCNC: 1.8 MG/DL (ref 1.5–2.5)
MCH RBC QN AUTO: 29.7 PG (ref 27–33)
MCHC RBC AUTO-ENTMCNC: 30.6 G/DL (ref 32–36)
MCV RBC AUTO: 97.1 FL (ref 80–100)
MONOCYTES # BLD AUTO: 910 CELLS/UL (ref 200–950)
MONOCYTES NFR BLD AUTO: 10.7 %
NEUTROPHILS # BLD AUTO: 4760 CELLS/UL (ref 1500–7800)
NEUTROPHILS NFR BLD AUTO: 56 %
PHOSPHATE SERPL-MCNC: 4.2 MG/DL (ref 2.1–4.3)
PLATELET # BLD AUTO: 308 THOUSAND/UL (ref 140–400)
PMV BLD REES-ECKER: 9.5 FL (ref 7.5–12.5)
POTASSIUM SERPL-SCNC: 5.1 MMOL/L (ref 3.5–5.3)
PREALB SERPL-MCNC: 25 MG/DL (ref 21–43)
PROT SERPL-MCNC: 7.5 G/DL (ref 6.1–8.1)
RBC # BLD AUTO: 3.06 MILLION/UL (ref 4.2–5.8)
SODIUM SERPL-SCNC: 133 MMOL/L (ref 135–146)
TRIGL SERPL-MCNC: 135 MG/DL
WBC # BLD AUTO: 8.5 THOUSAND/UL (ref 3.8–10.8)

## 2025-05-13 PROCEDURE — 1036F TOBACCO NON-USER: CPT | Performed by: PHYSICIAN ASSISTANT

## 2025-05-13 PROCEDURE — 99214 OFFICE O/P EST MOD 30 MIN: CPT | Performed by: PHYSICIAN ASSISTANT

## 2025-05-13 PROCEDURE — 1159F MED LIST DOCD IN RCRD: CPT | Performed by: PHYSICIAN ASSISTANT

## 2025-05-13 PROCEDURE — 1125F AMNT PAIN NOTED PAIN PRSNT: CPT | Performed by: PHYSICIAN ASSISTANT

## 2025-05-13 ASSESSMENT — PATIENT HEALTH QUESTIONNAIRE - PHQ9
2. FEELING DOWN, DEPRESSED OR HOPELESS: NOT AT ALL
1. LITTLE INTEREST OR PLEASURE IN DOING THINGS: NOT AT ALL
SUM OF ALL RESPONSES TO PHQ9 QUESTIONS 1 AND 2: 0

## 2025-05-13 ASSESSMENT — PAIN SCALES - GENERAL: PAINLEVEL_OUTOF10: 9
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
## 2025-05-13 NOTE — PROGRESS NOTES
Patient continues with nightly TPN and IV NS during the day      Labs from 5/12 and unremarkable for this patient  Creatinine increased slightly to 1.58 but BUN stable at 42. Alk Phos stable at 243. Albumin is now at 3.0.     Orders from Shellie MCKEON with trauma surgery to continue current TPN formula through 05/21/25  Trauma Team MD Note reviewed and PEG will be removed if albumin stays above 3.0-3.2. Also will reduce hydration frequency if improvement in renal function. No need to change therapy at this time in light of current lab results.    Counseled patient Caregiver extensively regarding TPN and hydration therapy. Activity has been down due to back pain from compression fractures. Counseled to elevate feet above heart level if possible. Will not recommend decreasing hydration at this time. Urine output good and patient is taking oral nutrition. Encourage food diary keeping. Agrees to delivery of TPN and hydration on Wednesday. Needs standard supplies and 1 box NS. No heparin flushes or alcohol pads needed with this delivery.     Dispense x7 TPN with MVI and X7 1L NS for cmpd and delivery on 5/14  Infusions 5/15 through 5/21     NF 5/20 - check labs - get orders for TPN and hydration. Get at least 2 week's of orders due to holiday. Please contact patient's son for supply needs each week.

## 2025-05-14 ENCOUNTER — HOME INFUSION (OUTPATIENT)
Dept: INFUSION THERAPY | Age: 80
End: 2025-05-14
Payer: MEDICARE

## 2025-05-14 ENCOUNTER — TELEPHONE (OUTPATIENT)
Dept: NEUROSURGERY | Facility: CLINIC | Age: 80
End: 2025-05-14
Payer: MEDICARE

## 2025-05-14 NOTE — PROGRESS NOTES
Received call from RN Team regarding patient IV line experiencing a partial occlusion of their two lumen PICC. Cathflo is requested by RN. Ins checked previously and not covered, but son agreeable to costs today. Delivery requested on 05/14/25 and will deliver with TPN shipment.    Son will consider IR or ED if further PICC issues.     Processed refill for 2 vials Cathflo and SWFI for 05/14/25 dispense and straight delivery. Fill is a 1 day supply and covers 05/15/25.     Follow up 5/20 as previously planned.

## 2025-05-15 ENCOUNTER — HOME CARE VISIT (OUTPATIENT)
Dept: HOME HEALTH SERVICES | Facility: HOME HEALTH | Age: 80
End: 2025-05-15
Payer: MEDICARE

## 2025-05-16 DIAGNOSIS — E64.0 SEQUELAE OF PROTEIN-CALORIE MALNUTRITION (MULTI): ICD-10-CM

## 2025-05-16 DIAGNOSIS — K56.601 COMPLETE INTESTINAL OBSTRUCTION, UNSPECIFIED CAUSE (MULTI): ICD-10-CM

## 2025-05-19 ENCOUNTER — LAB REQUISITION (OUTPATIENT)
Dept: LAB | Facility: HOSPITAL | Age: 80
End: 2025-05-19
Payer: MEDICARE

## 2025-05-19 ENCOUNTER — HOME CARE VISIT (OUTPATIENT)
Dept: HOME HEALTH SERVICES | Facility: HOME HEALTH | Age: 80
End: 2025-05-19
Payer: MEDICARE

## 2025-05-19 DIAGNOSIS — K56.601: ICD-10-CM

## 2025-05-19 DIAGNOSIS — E64.0 SEQUELAE OF PROTEIN-CALORIE MALNUTRITION (MULTI): ICD-10-CM

## 2025-05-19 LAB
ALBUMIN SERPL BCP-MCNC: 3 G/DL (ref 3.4–5)
ALP SERPL-CCNC: 246 U/L (ref 33–136)
ALT SERPL W P-5'-P-CCNC: 35 U/L (ref 10–52)
ANION GAP SERPL CALC-SCNC: 12 MMOL/L (ref 10–20)
AST SERPL W P-5'-P-CCNC: 25 U/L (ref 9–39)
BASOPHILS # BLD AUTO: 0.03 X10*3/UL (ref 0–0.1)
BASOPHILS NFR BLD AUTO: 0.4 %
BILIRUB SERPL-MCNC: 1.1 MG/DL (ref 0–1.2)
BUN SERPL-MCNC: 43 MG/DL (ref 6–23)
CALCIUM SERPL-MCNC: 9.2 MG/DL (ref 8.6–10.3)
CHLORIDE SERPL-SCNC: 102 MMOL/L (ref 98–107)
CO2 SERPL-SCNC: 23 MMOL/L (ref 21–32)
CREAT SERPL-MCNC: 1.58 MG/DL (ref 0.5–1.3)
EGFRCR SERPLBLD CKD-EPI 2021: 45 ML/MIN/1.73M*2
EOSINOPHIL # BLD AUTO: 0.08 X10*3/UL (ref 0–0.4)
EOSINOPHIL NFR BLD AUTO: 1 %
ERYTHROCYTE [DISTWIDTH] IN BLOOD BY AUTOMATED COUNT: 13.5 % (ref 11.5–14.5)
GLUCOSE SERPL-MCNC: 87 MG/DL (ref 74–99)
HCT VFR BLD AUTO: 29.1 % (ref 41–52)
HGB BLD-MCNC: 9.1 G/DL (ref 13.5–17.5)
IMM GRANULOCYTES # BLD AUTO: 0.06 X10*3/UL (ref 0–0.5)
IMM GRANULOCYTES NFR BLD AUTO: 0.7 % (ref 0–0.9)
LYMPHOCYTES # BLD AUTO: 2.82 X10*3/UL (ref 0.8–3)
LYMPHOCYTES NFR BLD AUTO: 33.9 %
MAGNESIUM SERPL-MCNC: 1.78 MG/DL (ref 1.6–2.4)
MCH RBC QN AUTO: 28.5 PG (ref 26–34)
MCHC RBC AUTO-ENTMCNC: 31.3 G/DL (ref 32–36)
MCV RBC AUTO: 91 FL (ref 80–100)
MONOCYTES # BLD AUTO: 0.93 X10*3/UL (ref 0.05–0.8)
MONOCYTES NFR BLD AUTO: 11.2 %
NEUTROPHILS # BLD AUTO: 4.41 X10*3/UL (ref 1.6–5.5)
NEUTROPHILS NFR BLD AUTO: 52.8 %
NRBC BLD-RTO: 0 /100 WBCS (ref 0–0)
PHOSPHATE SERPL-MCNC: 3.8 MG/DL (ref 2.5–4.9)
PLATELET # BLD AUTO: 310 X10*3/UL (ref 150–450)
POTASSIUM SERPL-SCNC: 5.2 MMOL/L (ref 3.5–5.3)
PROT SERPL-MCNC: 7.8 G/DL (ref 6.4–8.2)
RBC # BLD AUTO: 3.19 X10*6/UL (ref 4.5–5.9)
SODIUM SERPL-SCNC: 132 MMOL/L (ref 136–145)
WBC # BLD AUTO: 8.3 X10*3/UL (ref 4.4–11.3)

## 2025-05-19 PROCEDURE — 84100 ASSAY OF PHOSPHORUS: CPT

## 2025-05-19 PROCEDURE — 80053 COMPREHEN METABOLIC PANEL: CPT

## 2025-05-19 PROCEDURE — G0299 HHS/HOSPICE OF RN EA 15 MIN: HCPCS

## 2025-05-19 PROCEDURE — 85025 COMPLETE CBC W/AUTO DIFF WBC: CPT

## 2025-05-19 PROCEDURE — 83735 ASSAY OF MAGNESIUM: CPT

## 2025-05-20 ENCOUNTER — TELEPHONE (OUTPATIENT)
Dept: PAIN MEDICINE | Facility: CLINIC | Age: 80
End: 2025-05-20
Payer: MEDICARE

## 2025-05-20 ENCOUNTER — HOME INFUSION (OUTPATIENT)
Dept: INFUSION THERAPY | Age: 80
End: 2025-05-20
Payer: MEDICARE

## 2025-05-20 VITALS
OXYGEN SATURATION: 98 % | DIASTOLIC BLOOD PRESSURE: 70 MMHG | HEART RATE: 80 BPM | SYSTOLIC BLOOD PRESSURE: 128 MMHG | RESPIRATION RATE: 20 BRPM | TEMPERATURE: 98 F

## 2025-05-20 DIAGNOSIS — K31.6 HIGH-OUTPUT EXTERNAL GASTROINTESTINAL FISTULA: ICD-10-CM

## 2025-05-20 DIAGNOSIS — E46 MALNUTRITION, UNSPECIFIED TYPE (MULTI): ICD-10-CM

## 2025-05-20 LAB
PREALB SERPL-MCNC: 26 MG/DL (ref 21–43)
TRIGL SERPL-MCNC: 132 MG/DL

## 2025-05-20 ASSESSMENT — ENCOUNTER SYMPTOMS
MUSCLE WEAKNESS: 1
SUBJECTIVE PAIN PROGRESSION: UNCHANGED
HIGHEST PAIN SEVERITY IN PAST 24 HOURS: 7/10
STOOL FREQUENCY: DAILY
PAIN SEVERITY GOAL: 0/10
PERSON REPORTING PAIN: PATIENT
PAIN LOCATION: BACK
PAIN LOCATION - PAIN SEVERITY: 5/10
APPETITE LEVEL: GOOD
FATIGUE: 1
PAIN LOCATION - PAIN QUALITY: ACHING
CHANGE IN APPETITE: UNCHANGED
PAIN LOCATION - PAIN FREQUENCY: FREQUENT
PAIN: 1
LOWEST PAIN SEVERITY IN PAST 24 HOURS: 4/10

## 2025-05-20 ASSESSMENT — PAIN SCALES - PAIN ASSESSMENT IN ADVANCED DEMENTIA (PAINAD)
CONSOLABILITY: 0 - NO NEED TO CONSOLE.
FACIALEXPRESSION: 1
BODYLANGUAGE: 1 - TENSE. DISTRESSED PACING. FIDGETING.
NEGVOCALIZATION: 0
TOTALSCORE: 2
BODYLANGUAGE: 1
BREATHING: 0
CONSOLABILITY: 0
NEGVOCALIZATION: 0 - NONE.
FACIALEXPRESSION: 1 - SAD. FRIGHTENED. FROWN.

## 2025-05-20 ASSESSMENT — ACTIVITIES OF DAILY LIVING (ADL)
AMBULATION ASSISTANCE: STAND BY ASSIST
CURRENT_FUNCTION: STAND BY ASSIST
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
## 2025-05-23 ENCOUNTER — HOME INFUSION (OUTPATIENT)
Dept: INFUSION THERAPY | Age: 80
End: 2025-05-23
Payer: MEDICARE

## 2025-05-23 DIAGNOSIS — K56.601 COMPLETE INTESTINAL OBSTRUCTION, UNSPECIFIED CAUSE (MULTI): ICD-10-CM

## 2025-05-23 DIAGNOSIS — E64.0 SEQUELAE OF PROTEIN-CALORIE MALNUTRITION (MULTI): ICD-10-CM

## 2025-05-23 NOTE — PROGRESS NOTES
Review of chart. Patient with orders for TPN and hydration daily thru 6/4/25. Weekly labs are ordered with results Trauma Team and Pat Johnson RD.    Review of labs from 5/19. Cr, alk phos, and  BUN remain elevated but stable for patient.  Next RN visit is scheduled for 5/26, however there will be a delay in receiving lab results d/t lab being closed for the holiday on 5/26.    Processed fill for 7 x TPN, MVI, and 1 L NS for mix and straight delivery 5/8/25 to cover infusions 5/29 thru 6/4/25.    Follow up 6/3/25 with labs and further TPN orders. Mix and deliver on Weds.

## 2025-05-26 ENCOUNTER — LAB REQUISITION (OUTPATIENT)
Dept: LAB | Facility: HOSPITAL | Age: 80
End: 2025-05-26
Payer: MEDICARE

## 2025-05-26 ENCOUNTER — HOME CARE VISIT (OUTPATIENT)
Dept: HOME HEALTH SERVICES | Facility: HOME HEALTH | Age: 80
End: 2025-05-26
Payer: MEDICARE

## 2025-05-26 VITALS
RESPIRATION RATE: 16 BRPM | SYSTOLIC BLOOD PRESSURE: 104 MMHG | OXYGEN SATURATION: 99 % | HEART RATE: 85 BPM | DIASTOLIC BLOOD PRESSURE: 61 MMHG | TEMPERATURE: 97.1 F

## 2025-05-26 DIAGNOSIS — E64.0 SEQUELAE OF PROTEIN-CALORIE MALNUTRITION (MULTI): ICD-10-CM

## 2025-05-26 LAB
ALBUMIN SERPL BCP-MCNC: 3 G/DL (ref 3.4–5)
ALP SERPL-CCNC: 258 U/L (ref 33–136)
ALT SERPL W P-5'-P-CCNC: 35 U/L (ref 10–52)
ANION GAP SERPL CALC-SCNC: 10 MMOL/L (ref 10–20)
AST SERPL W P-5'-P-CCNC: 26 U/L (ref 9–39)
BASOPHILS # BLD AUTO: 0.03 X10*3/UL (ref 0–0.1)
BASOPHILS NFR BLD AUTO: 0.4 %
BILIRUB SERPL-MCNC: 1.1 MG/DL (ref 0–1.2)
BUN SERPL-MCNC: 42 MG/DL (ref 6–23)
CALCIUM SERPL-MCNC: 9.3 MG/DL (ref 8.6–10.3)
CHLORIDE SERPL-SCNC: 102 MMOL/L (ref 98–107)
CO2 SERPL-SCNC: 24 MMOL/L (ref 21–32)
CREAT SERPL-MCNC: 1.62 MG/DL (ref 0.5–1.3)
EGFRCR SERPLBLD CKD-EPI 2021: 43 ML/MIN/1.73M*2
EOSINOPHIL # BLD AUTO: 0.04 X10*3/UL (ref 0–0.4)
EOSINOPHIL NFR BLD AUTO: 0.5 %
ERYTHROCYTE [DISTWIDTH] IN BLOOD BY AUTOMATED COUNT: 13.5 % (ref 11.5–14.5)
GLUCOSE SERPL-MCNC: 108 MG/DL (ref 74–99)
HCT VFR BLD AUTO: 28 % (ref 41–52)
HGB BLD-MCNC: 9 G/DL (ref 13.5–17.5)
IMM GRANULOCYTES # BLD AUTO: 0.05 X10*3/UL (ref 0–0.5)
IMM GRANULOCYTES NFR BLD AUTO: 0.6 % (ref 0–0.9)
LYMPHOCYTES # BLD AUTO: 2.93 X10*3/UL (ref 0.8–3)
LYMPHOCYTES NFR BLD AUTO: 34.6 %
MAGNESIUM SERPL-MCNC: 1.72 MG/DL (ref 1.6–2.4)
MCH RBC QN AUTO: 29.1 PG (ref 26–34)
MCHC RBC AUTO-ENTMCNC: 32.1 G/DL (ref 32–36)
MCV RBC AUTO: 91 FL (ref 80–100)
MONOCYTES # BLD AUTO: 0.85 X10*3/UL (ref 0.05–0.8)
MONOCYTES NFR BLD AUTO: 10 %
NEUTROPHILS # BLD AUTO: 4.57 X10*3/UL (ref 1.6–5.5)
NEUTROPHILS NFR BLD AUTO: 53.9 %
NRBC BLD-RTO: 0 /100 WBCS (ref 0–0)
PHOSPHATE SERPL-MCNC: 4 MG/DL (ref 2.5–4.9)
PLATELET # BLD AUTO: 273 X10*3/UL (ref 150–450)
POTASSIUM SERPL-SCNC: 5.3 MMOL/L (ref 3.5–5.3)
PREALB SERPL-MCNC: 24.4 MG/DL (ref 18–40)
PROT SERPL-MCNC: 7.6 G/DL (ref 6.4–8.2)
RBC # BLD AUTO: 3.09 X10*6/UL (ref 4.5–5.9)
SODIUM SERPL-SCNC: 131 MMOL/L (ref 136–145)
TRIGL SERPL-MCNC: 118 MG/DL (ref 0–149)
WBC # BLD AUTO: 8.5 X10*3/UL (ref 4.4–11.3)

## 2025-05-26 PROCEDURE — G0299 HHS/HOSPICE OF RN EA 15 MIN: HCPCS

## 2025-05-26 PROCEDURE — 80053 COMPREHEN METABOLIC PANEL: CPT

## 2025-05-26 PROCEDURE — 83735 ASSAY OF MAGNESIUM: CPT

## 2025-05-26 PROCEDURE — 85025 COMPLETE CBC W/AUTO DIFF WBC: CPT

## 2025-05-26 PROCEDURE — 84478 ASSAY OF TRIGLYCERIDES: CPT

## 2025-05-26 PROCEDURE — 84100 ASSAY OF PHOSPHORUS: CPT

## 2025-05-26 PROCEDURE — 84134 ASSAY OF PREALBUMIN: CPT

## 2025-05-26 ASSESSMENT — ENCOUNTER SYMPTOMS
PAIN LOCATION: BACK
HIGHEST PAIN SEVERITY IN PAST 24 HOURS: 10/10
LAST BOWEL MOVEMENT: 67351
LOWEST PAIN SEVERITY IN PAST 24 HOURS: 3/10
PAIN: 1
CHANGE IN APPETITE: UNCHANGED
LIMITED RANGE OF MOTION: 1
PERSON REPORTING PAIN: PATIENT
APPETITE LEVEL: FAIR
PAIN LOCATION - PAIN QUALITY: ACHING
PAIN LOCATION - PAIN SEVERITY: 4/10
PAIN LOCATION - EXACERBATING FACTORS: MOVEMENT
MUSCLE WEAKNESS: 1

## 2025-05-26 ASSESSMENT — ACTIVITIES OF DAILY LIVING (ADL)
CURRENT_FUNCTION: STAND BY ASSIST
AMBULATION ASSISTANCE: STAND BY ASSIST

## 2025-05-27 DIAGNOSIS — R12 HEARTBURN: Primary | ICD-10-CM

## 2025-05-27 RX ORDER — OMEPRAZOLE 20 MG/1
20 TABLET, DELAYED RELEASE ORAL
Qty: 60 TABLET | Refills: 1 | Status: SHIPPED | OUTPATIENT
Start: 2025-05-27 | End: 2026-05-27

## 2025-05-30 DIAGNOSIS — K56.601 COMPLETE INTESTINAL OBSTRUCTION, UNSPECIFIED CAUSE (MULTI): ICD-10-CM

## 2025-05-30 DIAGNOSIS — E64.0 SEQUELAE OF PROTEIN-CALORIE MALNUTRITION (MULTI): ICD-10-CM

## 2025-06-02 ENCOUNTER — HOME CARE VISIT (OUTPATIENT)
Dept: HOME HEALTH SERVICES | Facility: HOME HEALTH | Age: 80
End: 2025-06-02
Payer: MEDICARE

## 2025-06-02 ENCOUNTER — LAB (OUTPATIENT)
Dept: LAB | Facility: HOSPITAL | Age: 80
End: 2025-06-02
Payer: MEDICARE

## 2025-06-02 ENCOUNTER — LAB REQUISITION (OUTPATIENT)
Dept: LAB | Facility: HOSPITAL | Age: 80
End: 2025-06-02
Payer: MEDICARE

## 2025-06-02 VITALS
SYSTOLIC BLOOD PRESSURE: 100 MMHG | RESPIRATION RATE: 18 BRPM | HEART RATE: 88 BPM | OXYGEN SATURATION: 96 % | DIASTOLIC BLOOD PRESSURE: 60 MMHG | TEMPERATURE: 98.6 F

## 2025-06-02 DIAGNOSIS — E46 UNSPECIFIED PROTEIN-CALORIE MALNUTRITION (MULTI): ICD-10-CM

## 2025-06-02 LAB
ALBUMIN SERPL BCP-MCNC: 2.8 G/DL (ref 3.4–5)
ALP SERPL-CCNC: 281 U/L (ref 33–136)
ALT SERPL W P-5'-P-CCNC: 34 U/L (ref 10–52)
ANION GAP SERPL CALC-SCNC: 11 MMOL/L (ref 10–20)
AST SERPL W P-5'-P-CCNC: 24 U/L (ref 9–39)
BASOPHILS # BLD AUTO: 0.04 X10*3/UL (ref 0–0.1)
BASOPHILS NFR BLD AUTO: 0.5 %
BILIRUB SERPL-MCNC: 1 MG/DL (ref 0–1.2)
BUN SERPL-MCNC: 44 MG/DL (ref 6–23)
CALCIUM SERPL-MCNC: 9.5 MG/DL (ref 8.6–10.3)
CHLORIDE SERPL-SCNC: 100 MMOL/L (ref 98–107)
CO2 SERPL-SCNC: 25 MMOL/L (ref 21–32)
CREAT SERPL-MCNC: 1.54 MG/DL (ref 0.5–1.3)
EGFRCR SERPLBLD CKD-EPI 2021: 46 ML/MIN/1.73M*2
EOSINOPHIL # BLD AUTO: 0.05 X10*3/UL (ref 0–0.4)
EOSINOPHIL NFR BLD AUTO: 0.6 %
ERYTHROCYTE [DISTWIDTH] IN BLOOD BY AUTOMATED COUNT: 13.5 % (ref 11.5–14.5)
GLUCOSE SERPL-MCNC: 104 MG/DL (ref 74–99)
HCT VFR BLD AUTO: 28.3 % (ref 41–52)
HGB BLD-MCNC: 8.9 G/DL (ref 13.5–17.5)
IMM GRANULOCYTES # BLD AUTO: 0.05 X10*3/UL (ref 0–0.5)
IMM GRANULOCYTES NFR BLD AUTO: 0.6 % (ref 0–0.9)
LYMPHOCYTES # BLD AUTO: 2.77 X10*3/UL (ref 0.8–3)
LYMPHOCYTES NFR BLD AUTO: 32.1 %
MAGNESIUM SERPL-MCNC: 1.67 MG/DL (ref 1.6–2.4)
MCH RBC QN AUTO: 28.6 PG (ref 26–34)
MCHC RBC AUTO-ENTMCNC: 31.4 G/DL (ref 32–36)
MCV RBC AUTO: 91 FL (ref 80–100)
MONOCYTES # BLD AUTO: 0.94 X10*3/UL (ref 0.05–0.8)
MONOCYTES NFR BLD AUTO: 10.9 %
NEUTROPHILS # BLD AUTO: 4.78 X10*3/UL (ref 1.6–5.5)
NEUTROPHILS NFR BLD AUTO: 55.3 %
NRBC BLD-RTO: 0 /100 WBCS (ref 0–0)
PHOSPHATE SERPL-MCNC: 4.2 MG/DL (ref 2.5–4.9)
PLATELET # BLD AUTO: 277 X10*3/UL (ref 150–450)
POTASSIUM SERPL-SCNC: 5.5 MMOL/L (ref 3.5–5.3)
PREALB SERPL-MCNC: 23.1 MG/DL (ref 18–40)
PROT SERPL-MCNC: 7.6 G/DL (ref 6.4–8.2)
RBC # BLD AUTO: 3.11 X10*6/UL (ref 4.5–5.9)
SODIUM SERPL-SCNC: 130 MMOL/L (ref 136–145)
TRIGL SERPL-MCNC: 129 MG/DL (ref 0–149)
WBC # BLD AUTO: 8.6 X10*3/UL (ref 4.4–11.3)

## 2025-06-02 PROCEDURE — G0299 HHS/HOSPICE OF RN EA 15 MIN: HCPCS

## 2025-06-02 PROCEDURE — 85025 COMPLETE CBC W/AUTO DIFF WBC: CPT

## 2025-06-02 PROCEDURE — 84134 ASSAY OF PREALBUMIN: CPT

## 2025-06-02 PROCEDURE — 84100 ASSAY OF PHOSPHORUS: CPT

## 2025-06-02 PROCEDURE — 83735 ASSAY OF MAGNESIUM: CPT

## 2025-06-02 PROCEDURE — 80053 COMPREHEN METABOLIC PANEL: CPT

## 2025-06-02 PROCEDURE — 84478 ASSAY OF TRIGLYCERIDES: CPT

## 2025-06-02 ASSESSMENT — ENCOUNTER SYMPTOMS
LIMITED RANGE OF MOTION: 1
PAIN SEVERITY GOAL: 0/10
HIGHEST PAIN SEVERITY IN PAST 24 HOURS: 0/10
APPETITE LEVEL: POOR
LOWEST PAIN SEVERITY IN PAST 24 HOURS: 0/10
MUSCLE WEAKNESS: 1

## 2025-06-02 ASSESSMENT — PAIN SCALES - PAIN ASSESSMENT IN ADVANCED DEMENTIA (PAINAD): BREATHING: 0

## 2025-06-04 ENCOUNTER — HOME INFUSION (OUTPATIENT)
Dept: INFUSION THERAPY | Age: 80
End: 2025-06-04
Payer: MEDICARE

## 2025-06-04 DIAGNOSIS — K31.6 HIGH-OUTPUT EXTERNAL GASTROINTESTINAL FISTULA: ICD-10-CM

## 2025-06-04 DIAGNOSIS — E46 MALNUTRITION, UNSPECIFIED TYPE (MULTI): ICD-10-CM

## 2025-06-04 NOTE — PROGRESS NOTES
Patient continues with nightly TPN and IV NS during the day      Labs from 6/2 reviewed. K level slightly elevated - will watch for trend next week- Cr, alk phos, BUN elevated but stable     Orders received from Leslie Harrison with trauma surgery to continue current TPN formula through 6/11/25    [facility] spoke with son, infusions going well. He is agreeable to delivery of another 7 days supply of TPN, MVI, and hydration later today with same supplies as last week. Son asking about delivery time as delivery was made not until almost 10p last week. Told we would aim for delivery by 8p today.      Dispense 7x TPN with MVI and 7x 1L NS for cmpd and delivery on 6/4  Infusions 6/5 through 6/12     NF 6/10 - check labs -  get further orders for TPN, mix and deliver Wed

## 2025-06-05 ENCOUNTER — TELEPHONE (OUTPATIENT)
Facility: CLINIC | Age: 80
End: 2025-06-05
Payer: MEDICARE

## 2025-06-05 DIAGNOSIS — S32.050G CLOSED COMPRESSION FRACTURE OF L5 LUMBAR VERTEBRA WITH DELAYED HEALING, SUBSEQUENT ENCOUNTER: Primary | ICD-10-CM

## 2025-06-05 DIAGNOSIS — M54.16 LUMBAR RADICULOPATHY: ICD-10-CM

## 2025-06-05 DIAGNOSIS — S32.040G CLOSED COMPRESSION FRACTURE OF L4 LUMBAR VERTEBRA WITH DELAYED HEALING, SUBSEQUENT ENCOUNTER: ICD-10-CM
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
## 2025-06-06 DIAGNOSIS — E64.0 SEQUELAE OF PROTEIN-CALORIE MALNUTRITION (MULTI): ICD-10-CM

## 2025-06-06 DIAGNOSIS — K56.601 COMPLETE INTESTINAL OBSTRUCTION, UNSPECIFIED CAUSE (MULTI): ICD-10-CM

## 2025-06-09 ENCOUNTER — HOME CARE VISIT (OUTPATIENT)
Dept: HOME HEALTH SERVICES | Facility: HOME HEALTH | Age: 80
End: 2025-06-09
Payer: MEDICARE

## 2025-06-09 VITALS
RESPIRATION RATE: 16 BRPM | OXYGEN SATURATION: 95 % | HEART RATE: 100 BPM | SYSTOLIC BLOOD PRESSURE: 112 MMHG | TEMPERATURE: 98.6 F | DIASTOLIC BLOOD PRESSURE: 55 MMHG

## 2025-06-09 PROCEDURE — G0299 HHS/HOSPICE OF RN EA 15 MIN: HCPCS

## 2025-06-09 ASSESSMENT — ENCOUNTER SYMPTOMS
APPETITE LEVEL: FAIR
PAIN LOCATION - PAIN SEVERITY: 5/10
LOWEST PAIN SEVERITY IN PAST 24 HOURS: 3/10
LIMITED RANGE OF MOTION: 1
CHANGE IN APPETITE: UNCHANGED
PAIN: 1
PAIN LOCATION: BACK
PERSON REPORTING PAIN: PATIENT
PAIN LOCATION - PAIN QUALITY: STABBING
MUSCLE WEAKNESS: 1
SUBJECTIVE PAIN PROGRESSION: GRADUALLY IMPROVING
HIGHEST PAIN SEVERITY IN PAST 24 HOURS: 6/10
STOOL FREQUENCY: DAILY

## 2025-06-09 ASSESSMENT — ACTIVITIES OF DAILY LIVING (ADL)
CURRENT_FUNCTION: STAND BY ASSIST
AMBULATION ASSISTANCE: STAND BY ASSIST

## 2025-06-10 ENCOUNTER — HOME INFUSION (OUTPATIENT)
Dept: INFUSION THERAPY | Age: 80
End: 2025-06-10
Payer: MEDICARE

## 2025-06-10 ENCOUNTER — DOCUMENTATION (OUTPATIENT)
Dept: INFUSION THERAPY | Age: 80
End: 2025-06-10
Payer: MEDICARE

## 2025-06-10 DIAGNOSIS — E46 MALNUTRITION, UNSPECIFIED TYPE (MULTI): ICD-10-CM

## 2025-06-10 DIAGNOSIS — K31.6 HIGH-OUTPUT EXTERNAL GASTROINTESTINAL FISTULA: ICD-10-CM

## 2025-06-10 LAB
CRP SERPL-MCNC: 18 MG/L
ERYTHROCYTE [SEDIMENTATION RATE] IN BLOOD BY WESTERGREN METHOD: 91 MM/H

## 2025-06-10 NOTE — PROGRESS NOTES
Review of chart. Patient with orders for TPN and hydration daily thru 6/11/25. Weekly labs are ordered with results Trauma Team and Pat Johnson RD.     Review of labs from 6/9. Cr, alk phos, and BUN remain elevated but stable for patient. Prealbumin still pending but has been stable over last month.    Rec'd orders from Leslie Harrison to continue current TPN and hydration orders x1 week. Orders updated in Epic, gold copy to pharmacy.     Processed fill for 7x TPN, MVI, and 1 L NS for mix and straight delivery 6/11/25 to cover infusions 6/12 thru 6/18/25.     Follow up 6/17 with labs and further TPN orders. Mix and deliver on Weds.

## 2025-06-10 NOTE — PROGRESS NOTES
Spoke w/patient's son @ [phone]:  Delivery of TPN, NS hydration bags and all supplies (one box of each flushes (per request),  gravity  and CADD-tubing  sets, 10cc syringes and all dressing) is scheduled for tomorrow evening.  must call on the way.   No questions to [facility] at this time.

## 2025-06-13 DIAGNOSIS — K56.601 COMPLETE INTESTINAL OBSTRUCTION, UNSPECIFIED CAUSE (MULTI): ICD-10-CM

## 2025-06-13 DIAGNOSIS — E64.0 SEQUELAE OF PROTEIN-CALORIE MALNUTRITION (MULTI): ICD-10-CM

## 2025-06-16 ENCOUNTER — TELEPHONE (OUTPATIENT)
Dept: NEUROSURGERY | Facility: HOSPITAL | Age: 80
End: 2025-06-16

## 2025-06-16 ENCOUNTER — HOME CARE VISIT (OUTPATIENT)
Dept: HOME HEALTH SERVICES | Facility: HOME HEALTH | Age: 80
End: 2025-06-16
Payer: MEDICARE

## 2025-06-16 ENCOUNTER — LAB REQUISITION (OUTPATIENT)
Dept: LAB | Facility: HOSPITAL | Age: 80
End: 2025-06-16
Payer: MEDICARE

## 2025-06-16 VITALS
RESPIRATION RATE: 20 BRPM | DIASTOLIC BLOOD PRESSURE: 66 MMHG | OXYGEN SATURATION: 99 % | SYSTOLIC BLOOD PRESSURE: 118 MMHG | HEART RATE: 72 BPM | TEMPERATURE: 99 F

## 2025-06-16 DIAGNOSIS — E46 UNSPECIFIED PROTEIN-CALORIE MALNUTRITION (MULTI): ICD-10-CM

## 2025-06-16 DIAGNOSIS — K56.601: ICD-10-CM

## 2025-06-16 DIAGNOSIS — E64.0 SEQUELAE OF PROTEIN-CALORIE MALNUTRITION (MULTI): ICD-10-CM

## 2025-06-16 DIAGNOSIS — S32.050G: ICD-10-CM

## 2025-06-16 LAB
ALBUMIN SERPL BCP-MCNC: 2.9 G/DL (ref 3.4–5)
ALP SERPL-CCNC: 293 U/L (ref 33–136)
ALT SERPL W P-5'-P-CCNC: 36 U/L (ref 10–52)
ANION GAP SERPL CALC-SCNC: 10 MMOL/L (ref 10–20)
AST SERPL W P-5'-P-CCNC: 28 U/L (ref 9–39)
BASOPHILS # BLD AUTO: 0.03 X10*3/UL (ref 0–0.1)
BASOPHILS NFR BLD AUTO: 0.4 %
BILIRUB SERPL-MCNC: 1.1 MG/DL (ref 0–1.2)
BUN SERPL-MCNC: 40 MG/DL (ref 6–23)
CALCIUM SERPL-MCNC: 9.1 MG/DL (ref 8.6–10.3)
CHLORIDE SERPL-SCNC: 101 MMOL/L (ref 98–107)
CO2 SERPL-SCNC: 26 MMOL/L (ref 21–32)
CREAT SERPL-MCNC: 1.83 MG/DL (ref 0.5–1.3)
CRP SERPL-MCNC: 1.99 MG/DL
EGFRCR SERPLBLD CKD-EPI 2021: 38 ML/MIN/1.73M*2
EOSINOPHIL # BLD AUTO: 0.08 X10*3/UL (ref 0–0.4)
EOSINOPHIL NFR BLD AUTO: 1 %
ERYTHROCYTE [DISTWIDTH] IN BLOOD BY AUTOMATED COUNT: 13.6 % (ref 11.5–14.5)
ERYTHROCYTE [SEDIMENTATION RATE] IN BLOOD BY WESTERGREN METHOD: 39 MM/H (ref 0–20)
GLUCOSE SERPL-MCNC: 92 MG/DL (ref 74–99)
HCT VFR BLD AUTO: 27.6 % (ref 41–52)
HGB BLD-MCNC: 8.8 G/DL (ref 13.5–17.5)
IMM GRANULOCYTES # BLD AUTO: 0.06 X10*3/UL (ref 0–0.5)
IMM GRANULOCYTES NFR BLD AUTO: 0.8 % (ref 0–0.9)
LYMPHOCYTES # BLD AUTO: 2.04 X10*3/UL (ref 0.8–3)
LYMPHOCYTES NFR BLD AUTO: 26.4 %
MAGNESIUM SERPL-MCNC: 1.7 MG/DL (ref 1.6–2.4)
MCH RBC QN AUTO: 28.9 PG (ref 26–34)
MCHC RBC AUTO-ENTMCNC: 31.9 G/DL (ref 32–36)
MCV RBC AUTO: 91 FL (ref 80–100)
MONOCYTES # BLD AUTO: 1.07 X10*3/UL (ref 0.05–0.8)
MONOCYTES NFR BLD AUTO: 13.9 %
NEUTROPHILS # BLD AUTO: 4.44 X10*3/UL (ref 1.6–5.5)
NEUTROPHILS NFR BLD AUTO: 57.5 %
NRBC BLD-RTO: 0 /100 WBCS (ref 0–0)
PHOSPHATE SERPL-MCNC: 3.9 MG/DL (ref 2.5–4.9)
PLATELET # BLD AUTO: 260 X10*3/UL (ref 150–450)
POTASSIUM SERPL-SCNC: 5.1 MMOL/L (ref 3.5–5.3)
PROT SERPL-MCNC: 7.6 G/DL (ref 6.4–8.2)
RBC # BLD AUTO: 3.05 X10*6/UL (ref 4.5–5.9)
SODIUM SERPL-SCNC: 132 MMOL/L (ref 136–145)
TRIGL SERPL-MCNC: 123 MG/DL (ref 0–149)
WBC # BLD AUTO: 7.7 X10*3/UL (ref 4.4–11.3)

## 2025-06-16 PROCEDURE — 85652 RBC SED RATE AUTOMATED: CPT

## 2025-06-16 PROCEDURE — G0299 HHS/HOSPICE OF RN EA 15 MIN: HCPCS

## 2025-06-16 PROCEDURE — 84478 ASSAY OF TRIGLYCERIDES: CPT

## 2025-06-16 PROCEDURE — 80053 COMPREHEN METABOLIC PANEL: CPT

## 2025-06-16 PROCEDURE — 85025 COMPLETE CBC W/AUTO DIFF WBC: CPT

## 2025-06-16 PROCEDURE — 84134 ASSAY OF PREALBUMIN: CPT

## 2025-06-16 PROCEDURE — 86140 C-REACTIVE PROTEIN: CPT

## 2025-06-16 PROCEDURE — 84100 ASSAY OF PHOSPHORUS: CPT

## 2025-06-16 PROCEDURE — 83735 ASSAY OF MAGNESIUM: CPT

## 2025-06-16 ASSESSMENT — PAIN SCALES - PAIN ASSESSMENT IN ADVANCED DEMENTIA (PAINAD)
BODYLANGUAGE: 0 - RELAXED.
CONSOLABILITY: 0
FACIALEXPRESSION: 0
NEGVOCALIZATION: 0
CONSOLABILITY: 0 - NO NEED TO CONSOLE.
TOTALSCORE: 0
BODYLANGUAGE: 0
NEGVOCALIZATION: 0 - NONE.
FACIALEXPRESSION: 0 - SMILING OR INEXPRESSIVE.
BREATHING: 0

## 2025-06-16 ASSESSMENT — ENCOUNTER SYMPTOMS
HIGHEST PAIN SEVERITY IN PAST 24 HOURS: 0/10
DENIES PAIN: 1
LOSS OF SENSATION IN FEET: 0
LAST BOWEL MOVEMENT: 67372
DEPRESSION: 0
OCCASIONAL FEELINGS OF UNSTEADINESS: 0
PERSON REPORTING PAIN: PATIENT

## 2025-06-16 NOTE — TELEPHONE ENCOUNTER
Result Communication    Resulted Orders   Sedimentation Rate   Result Value Ref Range    SED RATE BY MODIFIED WESTERGREN 91 (H) < OR = 20 mm/h    Narrative    FASTING:NO    FASTING: NO   C-reactive protein   Result Value Ref Range    C-REACTIVE PROTEIN 18.0 (H) <8.0 mg/L    Narrative    FASTING:NO    FASTING: NO       4:30 PM      Results were successfully communicated with the son and they acknowledged their understanding.    Subsequent draw shows improvement however they are proceeding with needle aspiration.

## 2025-06-17 DIAGNOSIS — R12 HEARTBURN: ICD-10-CM

## 2025-06-17 LAB
HOLD SPECIMEN: NORMAL
HOLD SPECIMEN: NORMAL
PREALB SERPL-MCNC: 22.1 MG/DL (ref 18–40)

## 2025-06-18 ENCOUNTER — HOME INFUSION (OUTPATIENT)
Dept: INFUSION THERAPY | Age: 80
End: 2025-06-18
Payer: MEDICARE

## 2025-06-18 ENCOUNTER — DOCUMENTATION (OUTPATIENT)
Dept: INFUSION THERAPY | Age: 80
End: 2025-06-18
Payer: MEDICARE

## 2025-06-18 NOTE — PROGRESS NOTES
Review of chart. Patient with orders for TPN and hydration daily thru 6/11/25. Weekly labs are ordered with results Trauma Team and Pat Johnson RD.     Review of labs from 6/16. Cr, alk phos, and BUN remain elevated but stable for patient. Prealbumin stable    No orders rec'd at this writing, pharmacy will proceed with delivery x1 week with stable lab values and will recheck for further orders at next fill.    Processed fill for 7x TPN, MVI, and 1 L NS for mix and straight delivery 6/18/25 to cover infusions 6/19 thru 6/25/25.     Follow up 6/24 with labs and further TPN orders. Mix and deliver on Weds.

## 2025-06-18 NOTE — PROGRESS NOTES
Spoke w/patient's son @ [phone]:  Delivery of TPN, NS hydration bags and all supplies (one box of each flushes (per request),  gravity  and CADD-tubing  sets, 10cc syringes and all dressing) is scheduled for today by 8 pm. Sending extra alc. pads -  per  request.   must call on the way.   No questions to [facility] at this time.

## 2025-06-19 RX ORDER — OMEPRAZOLE 20 MG/1
TABLET, DELAYED RELEASE ORAL
Qty: 84 TABLET | Refills: 2 | Status: SHIPPED | OUTPATIENT
Start: 2025-06-19

## 2025-06-20 ENCOUNTER — TELEPHONE (OUTPATIENT)
Dept: NEUROSURGERY | Facility: CLINIC | Age: 80
End: 2025-06-20
Payer: MEDICARE

## 2025-06-20 DIAGNOSIS — E64.0 SEQUELAE OF PROTEIN-CALORIE MALNUTRITION (MULTI): ICD-10-CM

## 2025-06-20 DIAGNOSIS — K56.601 COMPLETE INTESTINAL OBSTRUCTION, UNSPECIFIED CAUSE (MULTI): ICD-10-CM

## 2025-06-23 ENCOUNTER — HOME CARE VISIT (OUTPATIENT)
Dept: HOME HEALTH SERVICES | Facility: HOME HEALTH | Age: 80
End: 2025-06-23
Payer: MEDICARE

## 2025-06-23 ENCOUNTER — LAB REQUISITION (OUTPATIENT)
Dept: LAB | Facility: HOSPITAL | Age: 80
End: 2025-06-23
Payer: MEDICARE

## 2025-06-23 ENCOUNTER — HOME INFUSION (OUTPATIENT)
Dept: INFUSION THERAPY | Age: 80
End: 2025-06-23

## 2025-06-23 DIAGNOSIS — E46 UNSPECIFIED PROTEIN-CALORIE MALNUTRITION (MULTI): ICD-10-CM

## 2025-06-23 DIAGNOSIS — E64.0 SEQUELAE OF PROTEIN-CALORIE MALNUTRITION (MULTI): ICD-10-CM

## 2025-06-23 DIAGNOSIS — E46 MALNUTRITION, UNSPECIFIED TYPE (MULTI): ICD-10-CM

## 2025-06-23 DIAGNOSIS — K31.6 HIGH-OUTPUT EXTERNAL GASTROINTESTINAL FISTULA: ICD-10-CM

## 2025-06-23 DIAGNOSIS — S32.050G: ICD-10-CM

## 2025-06-23 LAB
ALBUMIN SERPL BCP-MCNC: 3 G/DL (ref 3.4–5)
ALP SERPL-CCNC: 270 U/L (ref 33–136)
ALT SERPL W P-5'-P-CCNC: 36 U/L (ref 10–52)
ANION GAP SERPL CALC-SCNC: 12 MMOL/L (ref 10–20)
AST SERPL W P-5'-P-CCNC: 25 U/L (ref 9–39)
BASOPHILS # BLD AUTO: 0.03 X10*3/UL (ref 0–0.1)
BASOPHILS NFR BLD AUTO: 0.4 %
BILIRUB SERPL-MCNC: 1.2 MG/DL (ref 0–1.2)
BUN SERPL-MCNC: 41 MG/DL (ref 6–23)
CALCIUM SERPL-MCNC: 9.4 MG/DL (ref 8.6–10.3)
CHLORIDE SERPL-SCNC: 100 MMOL/L (ref 98–107)
CO2 SERPL-SCNC: 25 MMOL/L (ref 21–32)
CREAT SERPL-MCNC: 1.59 MG/DL (ref 0.5–1.3)
CRP SERPL-MCNC: 1.85 MG/DL
EGFRCR SERPLBLD CKD-EPI 2021: 44 ML/MIN/1.73M*2
EOSINOPHIL # BLD AUTO: 0.07 X10*3/UL (ref 0–0.4)
EOSINOPHIL NFR BLD AUTO: 0.8 %
ERYTHROCYTE [DISTWIDTH] IN BLOOD BY AUTOMATED COUNT: 13.7 % (ref 11.5–14.5)
ERYTHROCYTE [SEDIMENTATION RATE] IN BLOOD BY WESTERGREN METHOD: 35 MM/H (ref 0–20)
GLUCOSE SERPL-MCNC: 113 MG/DL (ref 74–99)
HCT VFR BLD AUTO: 29 % (ref 41–52)
HGB BLD-MCNC: 9.4 G/DL (ref 13.5–17.5)
IMM GRANULOCYTES # BLD AUTO: 0.06 X10*3/UL (ref 0–0.5)
IMM GRANULOCYTES NFR BLD AUTO: 0.7 % (ref 0–0.9)
LYMPHOCYTES # BLD AUTO: 2.64 X10*3/UL (ref 0.8–3)
LYMPHOCYTES NFR BLD AUTO: 31.4 %
MAGNESIUM SERPL-MCNC: 1.76 MG/DL (ref 1.6–2.4)
MCH RBC QN AUTO: 29.2 PG (ref 26–34)
MCHC RBC AUTO-ENTMCNC: 32.4 G/DL (ref 32–36)
MCV RBC AUTO: 90 FL (ref 80–100)
MONOCYTES # BLD AUTO: 0.84 X10*3/UL (ref 0.05–0.8)
MONOCYTES NFR BLD AUTO: 10 %
NEUTROPHILS # BLD AUTO: 4.76 X10*3/UL (ref 1.6–5.5)
NEUTROPHILS NFR BLD AUTO: 56.7 %
NRBC BLD-RTO: 0 /100 WBCS (ref 0–0)
PHOSPHATE SERPL-MCNC: 3.8 MG/DL (ref 2.5–4.9)
PLATELET # BLD AUTO: 273 X10*3/UL (ref 150–450)
POTASSIUM SERPL-SCNC: 4.7 MMOL/L (ref 3.5–5.3)
PROT SERPL-MCNC: 7.9 G/DL (ref 6.4–8.2)
RBC # BLD AUTO: 3.22 X10*6/UL (ref 4.5–5.9)
SODIUM SERPL-SCNC: 132 MMOL/L (ref 136–145)
TRIGL SERPL-MCNC: 131 MG/DL (ref 0–149)
WBC # BLD AUTO: 8.4 X10*3/UL (ref 4.4–11.3)

## 2025-06-23 PROCEDURE — 83735 ASSAY OF MAGNESIUM: CPT

## 2025-06-23 PROCEDURE — 84100 ASSAY OF PHOSPHORUS: CPT

## 2025-06-23 PROCEDURE — 36415 COLL VENOUS BLD VENIPUNCTURE: CPT

## 2025-06-23 PROCEDURE — 80053 COMPREHEN METABOLIC PANEL: CPT

## 2025-06-23 PROCEDURE — 84478 ASSAY OF TRIGLYCERIDES: CPT

## 2025-06-23 PROCEDURE — 85025 COMPLETE CBC W/AUTO DIFF WBC: CPT

## 2025-06-23 PROCEDURE — 86140 C-REACTIVE PROTEIN: CPT

## 2025-06-23 PROCEDURE — 84134 ASSAY OF PREALBUMIN: CPT

## 2025-06-23 PROCEDURE — G0299 HHS/HOSPICE OF RN EA 15 MIN: HCPCS

## 2025-06-23 PROCEDURE — 85652 RBC SED RATE AUTOMATED: CPT

## 2025-06-23 ASSESSMENT — PAIN SCALES - PAIN ASSESSMENT IN ADVANCED DEMENTIA (PAINAD)
BODYLANGUAGE: 0
CONSOLABILITY: 0 - NO NEED TO CONSOLE.
BODYLANGUAGE: 0 - RELAXED.
BREATHING: 0
FACIALEXPRESSION: 0 - SMILING OR INEXPRESSIVE.
CONSOLABILITY: 0
TOTALSCORE: 0
NEGVOCALIZATION: 0
FACIALEXPRESSION: 0
NEGVOCALIZATION: 0 - NONE.

## 2025-06-23 ASSESSMENT — ENCOUNTER SYMPTOMS
DENIES PAIN: 1
DEPRESSION: 0
LAST BOWEL MOVEMENT: 67379
OCCASIONAL FEELINGS OF UNSTEADINESS: 0
CHANGE IN APPETITE: UNCHANGED
LOSS OF SENSATION IN FEET: 0
HIGHEST PAIN SEVERITY IN PAST 24 HOURS: 0/10
APPETITE LEVEL: FAIR
PERSON REPORTING PAIN: PATIENT

## 2025-06-23 NOTE — PROGRESS NOTES
Review of chart. Patient with orders for TPN and hydration daily thru 6/25/25. Weekly labs are ordered with results Trauma Team and Pat Johnson RD.     Review of labs from 6/23. Cr, alk phos, and BUN remain elevated but stable for patient. CRP/ESR slightly elevated. Prealbumin still pending as of this writing.     Orders rec'd last week after dispensing to continue thru 7/9. Orders updated in Epic, gold copy to intake.     Processed fill for 7x TPN, MVI, and 1 L NS for mix and straight delivery 6/25/25 to cover infusions 6/26 thru 7/2/25.     Follow up 7/1 with labs. Have orders. Mix and deliver on Weds.

## 2025-06-24 ENCOUNTER — DOCUMENTATION (OUTPATIENT)
Dept: INFUSION THERAPY | Age: 80
End: 2025-06-24
Payer: MEDICARE

## 2025-06-24 VITALS
HEART RATE: 72 BPM | TEMPERATURE: 99.1 F | SYSTOLIC BLOOD PRESSURE: 118 MMHG | RESPIRATION RATE: 20 BRPM | DIASTOLIC BLOOD PRESSURE: 72 MMHG | OXYGEN SATURATION: 99 %

## 2025-06-24 LAB
HOLD SPECIMEN: NORMAL
PREALB SERPL-MCNC: 23.1 MG/DL (ref 18–40)

## 2025-06-24 NOTE — PROGRESS NOTES
Called patient's caregiver and left voicemail  -  delivery of TPN, NS hydration bags and all supplies (including 1 box of each flushes, gravity and CADD-tubing sets, 10cc syringes and all dressing) is scheduled for tomorrow , Wednesday 6/25/25,  by 8 pm.  to call on the way.   Left phone number to call us back if any questions.

## 2025-06-24 NOTE — HOME HEALTH
Son Ike asking if any cultures (blood urine sputum) are necessary. SN will contact MD with question.

## 2025-06-26 ENCOUNTER — HOME CARE VISIT (OUTPATIENT)
Dept: HOME HEALTH SERVICES | Facility: HOME HEALTH | Age: 80
End: 2025-06-26
Payer: MEDICARE

## 2025-06-26 VITALS
RESPIRATION RATE: 18 BRPM | DIASTOLIC BLOOD PRESSURE: 50 MMHG | TEMPERATURE: 97.8 F | HEART RATE: 92 BPM | SYSTOLIC BLOOD PRESSURE: 90 MMHG | OXYGEN SATURATION: 95 %

## 2025-06-26 PROCEDURE — G0299 HHS/HOSPICE OF RN EA 15 MIN: HCPCS

## 2025-06-26 ASSESSMENT — ACTIVITIES OF DAILY LIVING (ADL)
OASIS_M1830: 03
ENTERING_EXITING_HOME: NEEDS ASSISTANCE

## 2025-06-27 DIAGNOSIS — E64.0 SEQUELAE OF PROTEIN-CALORIE MALNUTRITION (MULTI): ICD-10-CM

## 2025-06-27 DIAGNOSIS — K56.601 COMPLETE INTESTINAL OBSTRUCTION, UNSPECIFIED CAUSE (MULTI): ICD-10-CM

## 2025-06-30 ENCOUNTER — HOME INFUSION (OUTPATIENT)
Dept: INFUSION THERAPY | Age: 80
End: 2025-06-30
Payer: MEDICARE

## 2025-06-30 NOTE — PROGRESS NOTES
Review of chart. Patient with orders for TPN and hydration. Weekly labs are ordered with results Trauma Team and Pat Johnson RD.     Review of labs from 6/23. Cr improving, all others relatively stable     Current orders valid thru 7/9     Processed fill for 7x TPN, MVI, and 1 L NS for mix and straight delivery 7/2/25 to cover infusions 7/3 thru 7/9.     Follow up 7/8 with labs and further TPN orders. Mix and deliver on Weds.

## 2025-07-01 ENCOUNTER — HOME CARE VISIT (OUTPATIENT)
Dept: HOME HEALTH SERVICES | Facility: HOME HEALTH | Age: 80
End: 2025-07-01
Payer: MEDICARE

## 2025-07-01 ENCOUNTER — LAB REQUISITION (OUTPATIENT)
Dept: LAB | Facility: HOSPITAL | Age: 80
End: 2025-07-01
Payer: MEDICARE

## 2025-07-01 VITALS
TEMPERATURE: 98.3 F | SYSTOLIC BLOOD PRESSURE: 113 MMHG | HEART RATE: 97 BPM | RESPIRATION RATE: 16 BRPM | OXYGEN SATURATION: 95 % | DIASTOLIC BLOOD PRESSURE: 61 MMHG

## 2025-07-01 DIAGNOSIS — K56.601: ICD-10-CM

## 2025-07-01 DIAGNOSIS — E64.0 SEQUELAE OF PROTEIN-CALORIE MALNUTRITION (MULTI): ICD-10-CM

## 2025-07-01 LAB
ALBUMIN SERPL BCP-MCNC: 2.8 G/DL (ref 3.4–5)
ALP SERPL-CCNC: 274 U/L (ref 33–136)
ALT SERPL W P-5'-P-CCNC: 36 U/L (ref 10–52)
ANION GAP SERPL CALC-SCNC: 13 MMOL/L (ref 10–20)
AST SERPL W P-5'-P-CCNC: 26 U/L (ref 9–39)
BASOPHILS # BLD AUTO: 0.02 X10*3/UL (ref 0–0.1)
BASOPHILS NFR BLD AUTO: 0.2 %
BILIRUB SERPL-MCNC: 1.2 MG/DL (ref 0–1.2)
BUN SERPL-MCNC: 45 MG/DL (ref 6–23)
CALCIUM SERPL-MCNC: 9.5 MG/DL (ref 8.6–10.3)
CHLORIDE SERPL-SCNC: 102 MMOL/L (ref 98–107)
CO2 SERPL-SCNC: 22 MMOL/L (ref 21–32)
CREAT SERPL-MCNC: 1.51 MG/DL (ref 0.5–1.3)
EGFRCR SERPLBLD CKD-EPI 2021: 47 ML/MIN/1.73M*2
EOSINOPHIL # BLD AUTO: 0.06 X10*3/UL (ref 0–0.4)
EOSINOPHIL NFR BLD AUTO: 0.7 %
ERYTHROCYTE [DISTWIDTH] IN BLOOD BY AUTOMATED COUNT: 14 % (ref 11.5–14.5)
GLUCOSE SERPL-MCNC: 119 MG/DL (ref 74–99)
HCT VFR BLD AUTO: 29.1 % (ref 41–52)
HGB BLD-MCNC: 9.2 G/DL (ref 13.5–17.5)
IMM GRANULOCYTES # BLD AUTO: 0.07 X10*3/UL (ref 0–0.5)
IMM GRANULOCYTES NFR BLD AUTO: 0.8 % (ref 0–0.9)
LYMPHOCYTES # BLD AUTO: 2.76 X10*3/UL (ref 0.8–3)
LYMPHOCYTES NFR BLD AUTO: 32.5 %
MAGNESIUM SERPL-MCNC: 1.74 MG/DL (ref 1.6–2.4)
MCH RBC QN AUTO: 28.6 PG (ref 26–34)
MCHC RBC AUTO-ENTMCNC: 31.6 G/DL (ref 32–36)
MCV RBC AUTO: 90 FL (ref 80–100)
MONOCYTES # BLD AUTO: 1.02 X10*3/UL (ref 0.05–0.8)
MONOCYTES NFR BLD AUTO: 12 %
NEUTROPHILS # BLD AUTO: 4.55 X10*3/UL (ref 1.6–5.5)
NEUTROPHILS NFR BLD AUTO: 53.8 %
NRBC BLD-RTO: 0 /100 WBCS (ref 0–0)
PHOSPHATE SERPL-MCNC: 3.9 MG/DL (ref 2.5–4.9)
PLATELET # BLD AUTO: 258 X10*3/UL (ref 150–450)
POTASSIUM SERPL-SCNC: 5 MMOL/L (ref 3.5–5.3)
PROT SERPL-MCNC: 8.1 G/DL (ref 6.4–8.2)
RBC # BLD AUTO: 3.22 X10*6/UL (ref 4.5–5.9)
SODIUM SERPL-SCNC: 132 MMOL/L (ref 136–145)
WBC # BLD AUTO: 8.5 X10*3/UL (ref 4.4–11.3)

## 2025-07-01 PROCEDURE — 84100 ASSAY OF PHOSPHORUS: CPT

## 2025-07-01 PROCEDURE — 85025 COMPLETE CBC W/AUTO DIFF WBC: CPT

## 2025-07-01 PROCEDURE — G0299 HHS/HOSPICE OF RN EA 15 MIN: HCPCS

## 2025-07-01 PROCEDURE — 83735 ASSAY OF MAGNESIUM: CPT

## 2025-07-01 PROCEDURE — 80053 COMPREHEN METABOLIC PANEL: CPT

## 2025-07-01 ASSESSMENT — ENCOUNTER SYMPTOMS
LOWEST PAIN SEVERITY IN PAST 24 HOURS: 4/10
CHANGE IN APPETITE: UNCHANGED
APPETITE LEVEL: FAIR
HIGHEST PAIN SEVERITY IN PAST 24 HOURS: 10/10
PAIN LOCATION - PAIN FREQUENCY: INTERMITTENT
LIMITED RANGE OF MOTION: 1
PERSON REPORTING PAIN: PATIENT
MUSCLE WEAKNESS: 1
PAIN LOCATION: BACK
PAIN LOCATION - EXACERBATING FACTORS: MOVEMENT
PAIN LOCATION - PAIN QUALITY: STABBING
PAIN LOCATION - PAIN SEVERITY: 4/10
PAIN: 1

## 2025-07-01 ASSESSMENT — ACTIVITIES OF DAILY LIVING (ADL)
CURRENT_FUNCTION: STAND BY ASSIST
AMBULATION ASSISTANCE: STAND BY ASSIST

## 2025-07-02 LAB
PREALB SERPL-MCNC: 21 MG/DL (ref 21–43)
TRIGL SERPL-MCNC: 129 MG/DL

## 2025-07-04 DIAGNOSIS — K56.601 COMPLETE INTESTINAL OBSTRUCTION, UNSPECIFIED CAUSE (MULTI): ICD-10-CM

## 2025-07-04 DIAGNOSIS — E64.0 SEQUELAE OF PROTEIN-CALORIE MALNUTRITION (MULTI): ICD-10-CM

## 2025-07-07 ENCOUNTER — HOME CARE VISIT (OUTPATIENT)
Dept: HOME HEALTH SERVICES | Facility: HOME HEALTH | Age: 80
End: 2025-07-07
Payer: MEDICARE

## 2025-07-09 ENCOUNTER — TELEPHONE (OUTPATIENT)
Dept: SURGERY | Facility: HOSPITAL | Age: 80
End: 2025-07-09
Payer: MEDICARE

## 2025-07-09 NOTE — TELEPHONE ENCOUNTER
Called and spoke with son, patient has virtual appointment next week however he is currently hospitalized at [facility] for a spinal fungal infection. He is under the care of [facility] ID and doctors. He will continue to follow with them, their team and will reach out to follow up with us in the future if needed. Patient and son both have our clinic phone number and advised to call for any questions or to make any future appointments.

## 2025-07-11 DIAGNOSIS — E64.0 SEQUELAE OF PROTEIN-CALORIE MALNUTRITION (MULTI): ICD-10-CM

## 2025-07-11 DIAGNOSIS — K56.601 COMPLETE INTESTINAL OBSTRUCTION, UNSPECIFIED CAUSE (MULTI): ICD-10-CM

## 2025-07-17 ENCOUNTER — APPOINTMENT (OUTPATIENT)
Dept: SURGERY | Facility: CLINIC | Age: 80
End: 2025-07-17
Payer: MEDICARE

## 2025-07-18 DIAGNOSIS — E64.0 SEQUELAE OF PROTEIN-CALORIE MALNUTRITION (MULTI): ICD-10-CM

## 2025-07-18 DIAGNOSIS — K56.601 COMPLETE INTESTINAL OBSTRUCTION, UNSPECIFIED CAUSE (MULTI): ICD-10-CM

## 2025-07-21 ENCOUNTER — HOME INFUSION (OUTPATIENT)
Dept: INFUSION THERAPY | Age: 80
End: 2025-07-21
Payer: MEDICARE

## 2025-07-21 ENCOUNTER — HOME CARE VISIT (OUTPATIENT)
Dept: HOME HEALTH SERVICES | Facility: HOME HEALTH | Age: 80
End: 2025-07-21
Payer: MEDICARE

## 2025-07-21 ENCOUNTER — DOCUMENTATION (OUTPATIENT)
Dept: HOME HEALTH SERVICES | Facility: HOME HEALTH | Age: 80
End: 2025-07-21
Payer: MEDICARE

## 2025-07-21 PROCEDURE — G0299 HHS/HOSPICE OF RN EA 15 MIN: HCPCS

## 2025-07-21 NOTE — PROGRESS NOTES
07/21/25  HOME INFUSION ASSESSMENT NOTE    Reviewed Patient info as correct.   DX... 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    TOB:  reports that he has never smoked. He has never used smokeless tobacco.    Weight….  77.1 KG Height…. 193 CM       No medication Interactions.  Reviewed relevant Baseline Labs.  Labs for Home Infusion are: TPN labs to resume (CMP, CBC w/Diff, mag, Prealb, Trig Phos weekly on Mondays ) *Will cover ID needs*  Patient has PICC 2L Line placed 07/11/25  with 41 CM length, Flush per [facility] Protocol.  Continue med through tentative stop date: TPN TBD, micafungin and Zosyn through 08/22/25  MD Following: Dr Lai for ID, Trauma Team for TPN  Medication placed in: CADD and Elastomeric Pump  Care Plan Done Today.      Ike Gar   is a  79 y.o. male  that is being discharged from the hospital with a diagnosis of gangrene, fungemia, protein malnutrition…Patient is a returning [facility] client...Allergies and PMH listed above ...Patient is ordered to resume TPN and NS as previous plus Zosyn 3.375Gm IV q8h and micafungin 100mg IV q24h..Start of care is 07/21 for TPN and Zosyn and 07/22 for micafungin and antibiotics continues through 08/22/25 ....Orders reviewed and appropriate for patient and indication..Patient only eats for pleasure at this time.Weekly labs ordered and Dr Lai is following for ID.    Medication profile reviewed and appropriate.      Confirmed address and delivery time for patient …spoke with son. Will deliver by 6pm with one TPN bag and one HP pip/jeremy  off ice. Will send all supplies plus CADD pump.  Counseled patient caregiver on medication and all questions asked.      Processed fill of 3 bags TPN and NS, 8 HP Zosyn and 2 HP micafungin for 07/21 mix and delivery by 6 pm ...Fill is a 3  day supply and covers 07/21 through 07/23/25 .      Follow up 07/23 with patient progress... Refill TPN/ NS/ micafungin/ Zosyn for straight delivery. Have orders. for Straight/OVN delivery.       [1]   Allergies  Allergen Reactions    Meperidine Nausea/vomiting    Prochlorperazine Nausea/vomiting

## 2025-07-22 ENCOUNTER — HOME CARE VISIT (OUTPATIENT)
Dept: HOME HEALTH SERVICES | Facility: HOME HEALTH | Age: 80
End: 2025-07-22
Payer: MEDICARE

## 2025-07-22 VITALS
TEMPERATURE: 98.6 F | RESPIRATION RATE: 18 BRPM | OXYGEN SATURATION: 97 % | HEART RATE: 91 BPM | SYSTOLIC BLOOD PRESSURE: 135 MMHG | DIASTOLIC BLOOD PRESSURE: 74 MMHG

## 2025-07-22 VITALS
RESPIRATION RATE: 14 BRPM | TEMPERATURE: 97.9 F | SYSTOLIC BLOOD PRESSURE: 134 MMHG | DIASTOLIC BLOOD PRESSURE: 74 MMHG | HEART RATE: 106 BPM | OXYGEN SATURATION: 95 %

## 2025-07-22 PROCEDURE — G0151 HHCP-SERV OF PT,EA 15 MIN: HCPCS

## 2025-07-22 SDOH — HEALTH STABILITY: PHYSICAL HEALTH: EXERCISE COMMENTS: PT STATES HE IS VERY FEARFUL OF DOING LE EXERCISES SECONDARY TO FEAR OF LUMBAR FRACTURE.

## 2025-07-22 ASSESSMENT — ENCOUNTER SYMPTOMS
OCCASIONAL FEELINGS OF UNSTEADINESS: 1
PAIN LOCATION: BACK
PERSON REPORTING PAIN: PATIENT
PAIN: 1
PAIN LOCATION - PAIN SEVERITY: 4/10
PAIN SEVERITY GOAL: 0/10
PERSON REPORTING PAIN: PATIENT
SUBJECTIVE PAIN PROGRESSION: GRADUALLY IMPROVING
PAIN: 1
PAIN LOCATION: BACK
HIGHEST PAIN SEVERITY IN PAST 24 HOURS: 9/10
MUSCLE WEAKNESS: 1
PAIN LOCATION - EXACERBATING FACTORS: MOVEMENT, ACTIVITY
LOWEST PAIN SEVERITY IN PAST 24 HOURS: 2/10
PAIN LOCATION - PAIN FREQUENCY: CONSTANT
PAIN LOCATION - PAIN SEVERITY: 4/10

## 2025-07-22 ASSESSMENT — GAIT ASSESSMENTS
TRUNK: 0 - MARKED SWAY OR USES WALKING AID
STEP CONTINUITY: 0 - STOPPING OR DISCONTINUITY BETWEEN STEPS
STEP SYMMETRY: 0 - RIGHT AND LEFT STEP LENGTH NOT EQUAL
INITIATION OF GAIT IMMEDIATELY AFTER GO: 1 - NO HESITANCY
GAIT SCORE: 5
PATH SCORE: 0
TRUNK SCORE: 0
BALANCE AND GAIT SCORE: 12
PATH: 0 - MARKED DEVIATION
WALKING STANCE: 1 - HEELS ALMOST TOUCHING WHILE WALKING

## 2025-07-22 ASSESSMENT — ACTIVITIES OF DAILY LIVING (ADL)
AMBULATION_DISTANCE/DURATION_TOLERATED: 20 FT
CURRENT_FUNCTION: STAND BY ASSIST
AMBULATION ASSISTANCE ON FLAT SURFACES: 1
AMBULATION ASSISTANCE: STAND BY ASSIST

## 2025-07-22 ASSESSMENT — BALANCE ASSESSMENTS
ARISES: 1 - ABLE, USES ARMS TO HELP
ATTEMPTS TO ARISE: 2 - ABLE TO RISE, ONE ATTEMPT
NUDGED: 0 - BEGINS TO FALL
NUDGED SCORE: 0
BALANCE SCORE: 7
SITTING BALANCE: 1 - STEADY, SAFE
EYES CLOSED AT MAXIMUM POSITION NUDGED: 0 - UNSTEADY
IMMEDIATE STANDING BALANCE FIRST 5 SECONDS: 1 - STEADY BUT USES WALKER OR OTHER SUPPORT
SITTING DOWN: 1 - USES ARMS OR NOT SMOOTH MOTION
ARISING SCORE: 1
TURNING 360 DEGREES STEPS: 0 - DISCONTINUOUS STEPS
STANDING BALANCE: 1 - STEADY BUT WIDE STANCE AND USES CANE OR OTHER SUPPORT

## 2025-07-23 ENCOUNTER — DOCUMENTATION (OUTPATIENT)
Dept: INFUSION THERAPY | Age: 80
End: 2025-07-23
Payer: MEDICARE

## 2025-07-23 ENCOUNTER — HOME CARE VISIT (OUTPATIENT)
Dept: HOME HEALTH SERVICES | Facility: HOME HEALTH | Age: 80
End: 2025-07-23
Payer: MEDICARE

## 2025-07-23 ENCOUNTER — HOME INFUSION (OUTPATIENT)
Dept: INFUSION THERAPY | Age: 80
End: 2025-07-23
Payer: MEDICARE

## 2025-07-23 VITALS
DIASTOLIC BLOOD PRESSURE: 72 MMHG | SYSTOLIC BLOOD PRESSURE: 142 MMHG | RESPIRATION RATE: 18 BRPM | TEMPERATURE: 98.8 F | OXYGEN SATURATION: 99 % | HEART RATE: 86 BPM

## 2025-07-23 PROCEDURE — G0152 HHCP-SERV OF OT,EA 15 MIN: HCPCS

## 2025-07-23 SDOH — ECONOMIC STABILITY: HOUSING INSECURITY
HOME SAFETY: PATIENT STATES THAT HIS GOALS FOR THERAPY ARE TO BE ABLE TO STAND TO BRUSH HIS TEETH AND STAND TO URINATE.   EDUCATED IN POSITIONING TECHNIQUES FOR PRESSURE RELIEF AND PAIN RELIEF. EDUCATED IN BENEFITS OF OTHER CUSHION CHOICES FOR PAIN MANAGMENT AND

## 2025-07-23 SDOH — ECONOMIC STABILITY: HOUSING INSECURITY

## 2025-07-23 SDOH — ECONOMIC STABILITY: HOUSING INSECURITY
HOME SAFETY: PRESSURE RELIEF. MEDICATION RECONCILIATION COMPLETED, NO ISSUES OR DISCREPENCIES AT THIS TIME. EDUCATED IN PROPER BODY MECHANICS WITH TRANSFERS, ADLS AND MOBILITY TO DECREASE BENDING/TWISTING AND TO DECREASE PAIN WITH ACTIVITY. OT TO TX 1W3, 2W3 TO A

## 2025-07-23 SDOH — ECONOMIC STABILITY: HOUSING INSECURITY
HOME SAFETY: DDRESS FUNCTIONAL TRANSFER TRAINING, ADL RETRAINING, HOME SAFETY AND FALL PREVENTION, PAIN MANAGMETN AND PRESSURE RELIEF,  STANDING TOLERANCE AND BALANCE AND UE STRENGTH.

## 2025-07-23 SDOH — ECONOMIC STABILITY: HOUSING INSECURITY
HOME SAFETY: WITH ADLS AND SHOWER TRANSFERS WITH DME. SPOUSE AND SON VERY SUPPORTIVE.   PATIENT IS NOW EXHIBITING SIGNIFICANT LOW BACK PAIN SECONDARY TO INFECTION IMPEDING FUNCTIONAL TRASNFERS, MOBILLITY, AND ADL INDEPENDENCE. PATIENT IS NOW SLEEPING IN HOSPITAL

## 2025-07-23 SDOH — ECONOMIC STABILITY: HOUSING INSECURITY
HOME SAFETY: BED IN LIVING ROOM, USING RECLINING LIFT CHAIR FOR SIT TO STAND TRANSFERS WITH MIN A, AND WALKING ONLY VERY SHORT DISTANCES IN THE LIVING ROOM WITH WHEELED WALKER CGA/MIN A. PATIENT IS BATHING AND DRESSING AT BED LEVEL WITH MAX A SECONDARY TO PAIN.

## 2025-07-23 ASSESSMENT — ACTIVITIES OF DAILY LIVING (ADL)
TOILETING: 1
DRESSING_UB_CURRENT_FUNCTION: MINIMUM ASSIST
DRESSING_LB_CURRENT_FUNCTION: MAXIMUM ASSIST
AMBULATION ASSISTANCE: 1
PHYSICAL TRANSFERS ASSESSED: 1
GROOMING ASSESSED: 1
GROOMING_CURRENT_FUNCTION: MINIMUM ASSIST
TOILETING: MINIMUM ASSIST
AMBULATION ASSISTANCE: MINIMUM ASSIST
CURRENT_FUNCTION: MINIMUM ASSIST

## 2025-07-23 ASSESSMENT — ENCOUNTER SYMPTOMS
PAIN LOCATION: BACK
PAIN: 1
PERSON REPORTING PAIN: PATIENT
PAIN LOCATION - PAIN FREQUENCY: FREQUENT
PAIN LOCATION - RELIEVING FACTORS: MEDICATION, REPOSITIONING
HIGHEST PAIN SEVERITY IN PAST 24 HOURS: 10/10
PAIN LOCATION - PAIN QUALITY: SHARP, STABBING
PAIN LOCATION - PAIN SEVERITY: 4/10
PAIN LOCATION - PAIN DURATION: 1 MONTH
PAIN LOCATION - EXACERBATING FACTORS: POSITION, MOVEMENT
LOWEST PAIN SEVERITY IN PAST 24 HOURS: 3/10
PAIN SEVERITY GOAL: 0/10

## 2025-07-23 NOTE — PROGRESS NOTES
Spoke w/patient's son - delivery of TPN, NS hydration bags, ABX and all supplies is scheduled for today, Wednesday 7/23/25 by 8 pm.  must call on the way.   Sending requested supplies: 1 box of heparin, 2 boxes of NS flushes, gravity and CADD-tubing sets, 10cc syringes, extra alc. pads and all dressing,.  No questions to [facility] at this time.

## 2025-07-23 NOTE — PROGRESS NOTES
Review of chart. Patient with orders for TPN and hydration. Weekly labs are ordered with results Trauma Team and Pat Johnson RD. Current orders valid through 7/30/25.    Patient also ordered IV micafungin 100mg q24h and IV Zosyn 3.375g q8h through 8/22/25. Dr. Sy following.      Processed fill for 7x TPN, MVI, 1 L NS, 7 micafungin HP, 21 zosyn HP for mix and straight delivery 7/23/25 to cover infusions 7/24 thru 7/30     Follow up 7/29 check labs, need TPN orders, mix and deliver weds   - trach collar trials by [facility].
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
## 2025-07-25 DIAGNOSIS — K56.601 COMPLETE INTESTINAL OBSTRUCTION, UNSPECIFIED CAUSE (MULTI): ICD-10-CM

## 2025-07-25 DIAGNOSIS — E64.0 SEQUELAE OF PROTEIN-CALORIE MALNUTRITION (MULTI): ICD-10-CM

## 2025-07-25 ASSESSMENT — ACTIVITIES OF DAILY LIVING (ADL)
ENTERING_EXITING_HOME: NEEDS ASSISTANCE
OASIS_M1830: 05

## 2025-07-25 ASSESSMENT — ENCOUNTER SYMPTOMS
MUSCLE WEAKNESS: 1
AGGRESSION WITHIN DEFINED LIMITS: 1
LOWER EXTREMITY EDEMA: 1
ANGER WITHIN DEFINED LIMITS: 1
SLEEP QUALITY: ADEQUATE

## 2025-07-28 ENCOUNTER — HOME CARE VISIT (OUTPATIENT)
Dept: HOME HEALTH SERVICES | Facility: HOME HEALTH | Age: 80
End: 2025-07-28
Payer: MEDICARE

## 2025-07-28 VITALS
RESPIRATION RATE: 16 BRPM | TEMPERATURE: 97.9 F | DIASTOLIC BLOOD PRESSURE: 72 MMHG | SYSTOLIC BLOOD PRESSURE: 123 MMHG | HEART RATE: 92 BPM | OXYGEN SATURATION: 95 %

## 2025-07-28 PROCEDURE — G0299 HHS/HOSPICE OF RN EA 15 MIN: HCPCS

## 2025-07-28 ASSESSMENT — ENCOUNTER SYMPTOMS
PAIN LOCATION - PAIN SEVERITY: 4/10
APPETITE LEVEL: FAIR
CHANGE IN APPETITE: UNCHANGED
PERSON REPORTING PAIN: PATIENT
PAIN LOCATION - RELIEVING FACTORS: REST
SUBJECTIVE PAIN PROGRESSION: WAXING AND WANING
PAIN: 1
MUSCLE WEAKNESS: 1
LIMITED RANGE OF MOTION: 1
PAIN LOCATION: BACK
PAIN LOCATION - EXACERBATING FACTORS: MOVEMENT
PAIN LOCATION - PAIN QUALITY: ACHING
HIGHEST PAIN SEVERITY IN PAST 24 HOURS: 6/10

## 2025-07-28 ASSESSMENT — ACTIVITIES OF DAILY LIVING (ADL)
AMBULATION ASSISTANCE: STAND BY ASSIST
CURRENT_FUNCTION: STAND BY ASSIST

## 2025-07-28 NOTE — CASE COMMUNICATION
Per patient/family, Dr Ayala (Neurosurgery) ordered to pause PT/OT services at this time until a follow up is completed to ensure the infection in patients spine is under control and that the spine is healing. Antibiotics will run for another 4-5 weeks, then a follow up scan will be ordered.   Discontinue OT services at this time until patient cleared for therapy by MD.

## 2025-07-29 ENCOUNTER — HOME CARE VISIT (OUTPATIENT)
Dept: HOME HEALTH SERVICES | Facility: HOME HEALTH | Age: 80
End: 2025-07-29
Payer: MEDICARE

## 2025-07-30 ENCOUNTER — DOCUMENTATION (OUTPATIENT)
Dept: INFUSION THERAPY | Age: 80
End: 2025-07-30
Payer: MEDICARE

## 2025-07-30 ENCOUNTER — HOME INFUSION (OUTPATIENT)
Dept: INFUSION THERAPY | Age: 80
End: 2025-07-30
Payer: MEDICARE

## 2025-07-30 DIAGNOSIS — K31.6 HIGH-OUTPUT EXTERNAL GASTROINTESTINAL FISTULA: ICD-10-CM

## 2025-07-30 DIAGNOSIS — E46 MALNUTRITION, UNSPECIFIED TYPE (MULTI): ICD-10-CM

## 2025-07-30 NOTE — PROGRESS NOTES
Spoke w/patient's son - delivery of TPN, NS hydration bags, ABX and all supplies is scheduled for today, Wednesday 7/30/25 by 8 pm.  must call on the way.  Requested supplies: 1 box of heparin, 3 boxes of NS flushes, gravity and CADD-tubing sets, 10cc syringes, extra alc. pads, and all dressing,.  No questions to [facility] at this time.

## 2025-07-30 NOTE — PROGRESS NOTES
Review of chart. Patient with orders for TPN and hydration. Weekly labs are ordered with results Trauma Team and Pat Johnson RD. Current orders valid through 7/30/25.  Labs reviewed and sent with TPN formula to Trauma Team. Received callback with orders to continue current formula x 4 more weeks (through 08/27/25). Orders placed in EPIC and sent to Intake.     Patient also ordered IV micafungin 100mg q24h and IV Zosyn 3.375g q8h through 8/22/25. Dr. Sy following.      Processed fill for 7x TPN, MVI, 1 L NS, 7 micafungin HP, 21 zosyn HP for mix and straight delivery 7/30/25 to cover infusions 7/31 thru 8/06     Follow up 8/5 check labs and progress, have TPN orders, mix and deliver weds

## 2025-08-01 DIAGNOSIS — E64.0 SEQUELAE OF PROTEIN-CALORIE MALNUTRITION (MULTI): ICD-10-CM

## 2025-08-01 DIAGNOSIS — K56.601 COMPLETE INTESTINAL OBSTRUCTION, UNSPECIFIED CAUSE (MULTI): ICD-10-CM

## 2025-08-04 ENCOUNTER — HOME CARE VISIT (OUTPATIENT)
Dept: HOME HEALTH SERVICES | Facility: HOME HEALTH | Age: 80
End: 2025-08-04
Payer: MEDICARE

## 2025-08-04 VITALS
RESPIRATION RATE: 16 BRPM | OXYGEN SATURATION: 94 % | SYSTOLIC BLOOD PRESSURE: 113 MMHG | HEART RATE: 83 BPM | DIASTOLIC BLOOD PRESSURE: 65 MMHG | TEMPERATURE: 98.3 F

## 2025-08-04 PROCEDURE — G0299 HHS/HOSPICE OF RN EA 15 MIN: HCPCS

## 2025-08-04 ASSESSMENT — ACTIVITIES OF DAILY LIVING (ADL)
AMBULATION ASSISTANCE: STAND BY ASSIST
CURRENT_FUNCTION: ONE PERSON
CURRENT_FUNCTION: STAND BY ASSIST

## 2025-08-04 ASSESSMENT — ENCOUNTER SYMPTOMS
PAIN: 1
PAIN LOCATION - PAIN SEVERITY: 3/10
MUSCLE WEAKNESS: 1
APPETITE LEVEL: FAIR
PAIN LOCATION - PAIN QUALITY: ACHING
LIMITED RANGE OF MOTION: 1
LOWEST PAIN SEVERITY IN PAST 24 HOURS: 3/10
CHANGE IN APPETITE: UNCHANGED
PERSON REPORTING PAIN: PATIENT
HIGHEST PAIN SEVERITY IN PAST 24 HOURS: 6/10
PAIN LOCATION: BACK

## 2025-08-05 ENCOUNTER — HOME INFUSION (OUTPATIENT)
Dept: INFUSION THERAPY | Age: 80
End: 2025-08-05
Payer: MEDICARE

## 2025-08-05 LAB
ALBUMIN SERPL-MCNC: 3.3 G/DL (ref 3.6–5.1)
ALBUMIN/GLOB SERPL: 0.7 (CALC) (ref 1–2.5)
ALP SERPL-CCNC: 218 U/L (ref 35–144)
ALT SERPL-CCNC: 23 U/L (ref 9–46)
AST SERPL-CCNC: 27 U/L (ref 10–35)
BASOPHILS # BLD AUTO: 55 CELLS/UL (ref 0–200)
BASOPHILS NFR BLD AUTO: 0.5 %
BILIRUB SERPL-MCNC: 0.9 MG/DL (ref 0.2–1.2)
BUN SERPL-MCNC: 39 MG/DL (ref 7–25)
BUN/CREAT SERPL: 25 (CALC) (ref 6–22)
CALCIUM SERPL-MCNC: 9.7 MG/DL (ref 8.6–10.3)
CHLORIDE SERPL-SCNC: 99 MMOL/L (ref 98–110)
CO2 SERPL-SCNC: 26 MMOL/L (ref 20–32)
CREAT SERPL-MCNC: 1.57 MG/DL (ref 0.7–1.28)
EGFRCR SERPLBLD CKD-EPI 2021: 45 ML/MIN/1.73M2
EOSINOPHIL # BLD AUTO: 338 CELLS/UL (ref 15–500)
EOSINOPHIL NFR BLD AUTO: 3.1 %
ERYTHROCYTE [DISTWIDTH] IN BLOOD BY AUTOMATED COUNT: 15.8 % (ref 11–15)
GLOBULIN SER CALC-MCNC: 4.8 G/DL (CALC) (ref 1.9–3.7)
GLUCOSE SERPL-MCNC: 112 MG/DL (ref 65–99)
HCT VFR BLD AUTO: 33.1 % (ref 38.5–50)
HGB BLD-MCNC: 10.2 G/DL (ref 13.2–17.1)
LYMPHOCYTES # BLD AUTO: 3673 CELLS/UL (ref 850–3900)
LYMPHOCYTES NFR BLD AUTO: 33.7 %
MAGNESIUM SERPL-MCNC: 2 MG/DL (ref 1.5–2.5)
MCH RBC QN AUTO: 29 PG (ref 27–33)
MCHC RBC AUTO-ENTMCNC: 30.8 G/DL (ref 32–36)
MCV RBC AUTO: 94 FL (ref 80–100)
MONOCYTES # BLD AUTO: 981 CELLS/UL (ref 200–950)
MONOCYTES NFR BLD AUTO: 9 %
NEUTROPHILS # BLD AUTO: 5853 CELLS/UL (ref 1500–7800)
NEUTROPHILS NFR BLD AUTO: 53.7 %
PHOSPHATE SERPL-MCNC: 3.8 MG/DL (ref 2.1–4.3)
PLATELET # BLD AUTO: 380 THOUSAND/UL (ref 140–400)
PMV BLD REES-ECKER: 9.8 FL (ref 7.5–12.5)
POTASSIUM SERPL-SCNC: 4.8 MMOL/L (ref 3.5–5.3)
PREALB SERPL-MCNC: 27 MG/DL (ref 21–43)
PROT SERPL-MCNC: 8.1 G/DL (ref 6.1–8.1)
RBC # BLD AUTO: 3.52 MILLION/UL (ref 4.2–5.8)
SODIUM SERPL-SCNC: 134 MMOL/L (ref 135–146)
TRIGL SERPL-MCNC: 125 MG/DL
WBC # BLD AUTO: 10.9 THOUSAND/UL (ref 3.8–10.8)

## 2025-08-05 NOTE — PROGRESS NOTES
Review of chart. Patient with order for TPN and hydration. Weekly labs are ordered with results to Trauma Team and Pat Johnson RD. Current orders are good thru 8/27/25. Patient has a cadd pump that needs picked up with this week's delivery if not already done.    Patient also with order for micafungin 100mg q24h and zosyn 3.375gm q8h thru 8/22/25. Dr Sy following for ID.    Review of labs from 8/4/25. WBC 10.9 this week. RBC H and H remain low but improved. Remaining results are stable for patient.    Review of rn visit note. No problems noted with infusions or line.    Processed fill for the following for mix and straight delivery 8/6/25 to cover doses 8/7 thru 8/13/25:  7 x TPN  7 x MVI  7 X NS 1L   7 x Micafungin HP  21 x Zosyn HP    Follow up 8/12/25. Check labs. Have TPN orders. Mix and deliver all meds Weds.

## 2025-08-06 ENCOUNTER — DOCUMENTATION (OUTPATIENT)
Dept: INFUSION THERAPY | Age: 80
End: 2025-08-06

## 2025-08-06 ENCOUNTER — APPOINTMENT (OUTPATIENT)
Dept: SURGERY | Facility: CLINIC | Age: 80
End: 2025-08-06
Payer: MEDICARE

## 2025-08-06 NOTE — PROGRESS NOTES
Called patient's caregiver and left voicemail  -  delivery of TPN, NS hydration bags and all supplies: 1 box of heparin, 3 boxes of NS flushes, 200 alc. pads, gravity and CADD-tubing sets, 10cc syringes,  and all dressing is scheduled for Wednesday 8/6/25.  to call on the way.   Also, an old pump to be picked up during delivery.  Left phone number to call us back if any questions.

## 2025-08-07 ENCOUNTER — APPOINTMENT (OUTPATIENT)
Dept: SURGERY | Facility: CLINIC | Age: 80
End: 2025-08-07
Payer: MEDICARE

## 2025-08-07 VITALS
DIASTOLIC BLOOD PRESSURE: 70 MMHG | WEIGHT: 172.2 LBS | BODY MASS INDEX: 20.96 KG/M2 | RESPIRATION RATE: 20 BRPM | HEART RATE: 101 BPM | SYSTOLIC BLOOD PRESSURE: 106 MMHG

## 2025-08-07 DIAGNOSIS — Z71.9 HEALTH EDUCATION/COUNSELING: ICD-10-CM

## 2025-08-07 DIAGNOSIS — E63.9 NUTRITION DISORDER: ICD-10-CM

## 2025-08-07 DIAGNOSIS — Z51.89 ENCOUNTER FOR WOUND CARE: Primary | ICD-10-CM

## 2025-08-07 DIAGNOSIS — Z43.1 PEG (PERCUTANEOUS ENDOSCOPIC GASTROSTOMY) ADJUSTMENT/REPLACEMENT/REMOVAL (MULTI): ICD-10-CM

## 2025-08-07 PROCEDURE — 99215 OFFICE O/P EST HI 40 MIN: CPT | Performed by: PHYSICIAN ASSISTANT

## 2025-08-07 ASSESSMENT — PAIN SCALES - GENERAL: PAINLEVEL_OUTOF10: 6

## 2025-08-08 ENCOUNTER — HOME INFUSION (OUTPATIENT)
Dept: INFUSION THERAPY | Age: 80
End: 2025-08-08
Payer: MEDICARE

## 2025-08-08 ENCOUNTER — DOCUMENTATION (OUTPATIENT)
Dept: INFUSION THERAPY | Age: 80
End: 2025-08-08
Payer: MEDICARE

## 2025-08-08 DIAGNOSIS — E64.0 SEQUELAE OF PROTEIN-CALORIE MALNUTRITION (MULTI): ICD-10-CM

## 2025-08-08 DIAGNOSIS — K56.601 COMPLETE INTESTINAL OBSTRUCTION, UNSPECIFIED CAUSE (MULTI): ICD-10-CM

## 2025-08-08 NOTE — PROGRESS NOTES
[facility] rec'd message from Shellie Lynch, orders to decrease infusion volume on TPN by half, infusion duration to stay the same.    New pump sent today for half volume. Will re-mix TPN at next refill.    Follow up 8/12 - confirm if half volume was tolerated, get orders, mix Wednesday

## 2025-08-08 NOTE — PROGRESS NOTES
SENDING NEW PUMP FOR LOWER VOLUME INFUSION... SPOKE WITH PT, DELIVERY BY 6PM IS FINE.... YES THE NEW PUMP WILL BE PROGRAMMED TO DELIVER JUST A HALF OF TPN VOLUME... NOTHING ELSE NEEDED...

## 2025-08-11 ENCOUNTER — HOME CARE VISIT (OUTPATIENT)
Dept: HOME HEALTH SERVICES | Facility: HOME HEALTH | Age: 80
End: 2025-08-11
Payer: MEDICARE

## 2025-08-11 VITALS
SYSTOLIC BLOOD PRESSURE: 118 MMHG | DIASTOLIC BLOOD PRESSURE: 72 MMHG | OXYGEN SATURATION: 98 % | TEMPERATURE: 98.9 F | HEART RATE: 103 BPM

## 2025-08-11 PROCEDURE — G0299 HHS/HOSPICE OF RN EA 15 MIN: HCPCS

## 2025-08-11 ASSESSMENT — ENCOUNTER SYMPTOMS
APPETITE LEVEL: FAIR
CHANGE IN APPETITE: UNCHANGED
PAIN LOCATION: BACK
PERSON REPORTING PAIN: PATIENT
SUBJECTIVE PAIN PROGRESSION: UNCHANGED
PAIN LOCATION - PAIN SEVERITY: 5/10
PAIN: 1
MUSCLE WEAKNESS: 1
HOARSE VOICE: 1

## 2025-08-11 ASSESSMENT — ACTIVITIES OF DAILY LIVING (ADL)
CURRENT_FUNCTION: STAND BY ASSIST
AMBULATION ASSISTANCE: STAND BY ASSIST

## 2025-08-12 ENCOUNTER — HOME INFUSION (OUTPATIENT)
Dept: INFUSION THERAPY | Age: 80
End: 2025-08-12
Payer: MEDICARE

## 2025-08-12 DIAGNOSIS — K31.6 HIGH-OUTPUT EXTERNAL GASTROINTESTINAL FISTULA: ICD-10-CM

## 2025-08-12 DIAGNOSIS — E46 MALNUTRITION, UNSPECIFIED TYPE (MULTI): ICD-10-CM

## 2025-08-15 DIAGNOSIS — E64.0 SEQUELAE OF PROTEIN-CALORIE MALNUTRITION (MULTI): ICD-10-CM

## 2025-08-15 DIAGNOSIS — K56.601 COMPLETE INTESTINAL OBSTRUCTION, UNSPECIFIED CAUSE (MULTI): ICD-10-CM

## 2025-08-18 ENCOUNTER — HOME CARE VISIT (OUTPATIENT)
Dept: HOME HEALTH SERVICES | Facility: HOME HEALTH | Age: 80
End: 2025-08-18
Payer: MEDICARE

## 2025-08-18 VITALS
DIASTOLIC BLOOD PRESSURE: 76 MMHG | TEMPERATURE: 97.9 F | HEART RATE: 99 BPM | SYSTOLIC BLOOD PRESSURE: 116 MMHG | RESPIRATION RATE: 16 BRPM | OXYGEN SATURATION: 97 %

## 2025-08-18 PROCEDURE — G0299 HHS/HOSPICE OF RN EA 15 MIN: HCPCS

## 2025-08-18 ASSESSMENT — ENCOUNTER SYMPTOMS
CHANGE IN APPETITE: UNCHANGED
HIGHEST PAIN SEVERITY IN PAST 24 HOURS: 5/10
DENIES PAIN: 1
PAIN LOCATION - PAIN QUALITY: SORE
FATIGUES EASILY: 1
LIMITED RANGE OF MOTION: 1
LOWEST PAIN SEVERITY IN PAST 24 HOURS: 3/10
PAIN LOCATION: BACK
ARTHRALGIAS: 1
PAIN LOCATION - PAIN SEVERITY: 3/10
PERSON REPORTING PAIN: PATIENT
APPETITE LEVEL: FAIR

## 2025-08-18 ASSESSMENT — ACTIVITIES OF DAILY LIVING (ADL)
CURRENT_FUNCTION: STAND BY ASSIST
AMBULATION ASSISTANCE: STAND BY ASSIST

## 2025-08-19 ENCOUNTER — HOME INFUSION (OUTPATIENT)
Dept: INFUSION THERAPY | Age: 80
End: 2025-08-19
Payer: MEDICARE

## 2025-08-21 ENCOUNTER — APPOINTMENT (OUTPATIENT)
Dept: SURGERY | Facility: CLINIC | Age: 80
End: 2025-08-21
Payer: MEDICARE

## 2025-08-21 DIAGNOSIS — E63.9: Primary | ICD-10-CM

## 2025-08-21 PROCEDURE — 99214 OFFICE O/P EST MOD 30 MIN: CPT | Performed by: PHYSICIAN ASSISTANT

## 2025-08-22 ENCOUNTER — HOME INFUSION (OUTPATIENT)
Dept: INFUSION THERAPY | Age: 80
End: 2025-08-22
Payer: MEDICARE

## 2025-08-22 DIAGNOSIS — K56.601 COMPLETE INTESTINAL OBSTRUCTION, UNSPECIFIED CAUSE (MULTI): ICD-10-CM

## 2025-08-22 DIAGNOSIS — M47.899 OTHER OSTEOARTHRITIS OF SPINE, UNSPECIFIED SPINAL REGION: Primary | ICD-10-CM

## 2025-08-22 DIAGNOSIS — E64.0 SEQUELAE OF PROTEIN-CALORIE MALNUTRITION (MULTI): ICD-10-CM

## 2025-08-22 DIAGNOSIS — E86.0 DEHYDRATION: ICD-10-CM

## 2025-08-22 DIAGNOSIS — E46 MALNUTRITION, UNSPECIFIED TYPE (MULTI): ICD-10-CM

## 2025-08-22 DIAGNOSIS — K31.6 HIGH-OUTPUT EXTERNAL GASTROINTESTINAL FISTULA: ICD-10-CM

## 2025-08-22 DIAGNOSIS — B49 FUNGEMIA: ICD-10-CM

## 2025-08-25 ENCOUNTER — HOME CARE VISIT (OUTPATIENT)
Dept: HOME HEALTH SERVICES | Facility: HOME HEALTH | Age: 80
End: 2025-08-25
Payer: MEDICARE

## 2025-08-25 VITALS
DIASTOLIC BLOOD PRESSURE: 75 MMHG | HEART RATE: 110 BPM | OXYGEN SATURATION: 95 % | TEMPERATURE: 97.8 F | SYSTOLIC BLOOD PRESSURE: 118 MMHG | RESPIRATION RATE: 16 BRPM

## 2025-08-25 PROCEDURE — G0299 HHS/HOSPICE OF RN EA 15 MIN: HCPCS

## 2025-08-25 ASSESSMENT — ENCOUNTER SYMPTOMS
CHANGE IN APPETITE: UNCHANGED
FATIGUES EASILY: 1
AGGRESSION WITHIN DEFINED LIMITS: 1
PERSON REPORTING PAIN: PATIENT
PAIN LOCATION - EXACERBATING FACTORS: MOVEMENT
MUSCLE WEAKNESS: 1
HIGHEST PAIN SEVERITY IN PAST 24 HOURS: 5/10
LOWEST PAIN SEVERITY IN PAST 24 HOURS: 2/10
ANGER WITHIN DEFINED LIMITS: 1
LIMITED RANGE OF MOTION: 1
SUBJECTIVE PAIN PROGRESSION: GRADUALLY IMPROVING
SLEEP QUALITY: ADEQUATE
PAIN LOCATION - PAIN SEVERITY: 3/10
PAIN: 1
PAIN LOCATION: BACK
PAIN LOCATION - PAIN QUALITY: ACHING
ARTHRALGIAS: 1
APPETITE LEVEL: POOR

## 2025-08-25 ASSESSMENT — ACTIVITIES OF DAILY LIVING (ADL)
ENTERING_EXITING_HOME: SUPERVISION
CURRENT_FUNCTION: STAND BY ASSIST
AMBULATION ASSISTANCE: STAND BY ASSIST

## 2025-08-26 ENCOUNTER — HOME INFUSION (OUTPATIENT)
Dept: INFUSION THERAPY | Age: 80
End: 2025-08-26
Payer: MEDICARE

## 2025-08-26 ASSESSMENT — ACTIVITIES OF DAILY LIVING (ADL): OASIS_M1830: 05

## 2025-08-29 DIAGNOSIS — E64.0 SEQUELAE OF PROTEIN-CALORIE MALNUTRITION (MULTI): ICD-10-CM

## 2025-08-29 DIAGNOSIS — K56.601 COMPLETE INTESTINAL OBSTRUCTION, UNSPECIFIED CAUSE (MULTI): ICD-10-CM

## 2025-09-01 ENCOUNTER — LAB REQUISITION (OUTPATIENT)
Dept: LAB | Facility: HOSPITAL | Age: 80
End: 2025-09-01
Payer: MEDICARE

## 2025-09-01 ENCOUNTER — HOME CARE VISIT (OUTPATIENT)
Dept: HOME HEALTH SERVICES | Facility: HOME HEALTH | Age: 80
End: 2025-09-01
Payer: MEDICARE

## 2025-09-01 VITALS
OXYGEN SATURATION: 98 % | RESPIRATION RATE: 18 BRPM | HEART RATE: 95 BPM | DIASTOLIC BLOOD PRESSURE: 73 MMHG | SYSTOLIC BLOOD PRESSURE: 112 MMHG | TEMPERATURE: 97.7 F

## 2025-09-01 DIAGNOSIS — K56.601: ICD-10-CM

## 2025-09-01 DIAGNOSIS — E64.0 SEQUELAE OF PROTEIN-CALORIE MALNUTRITION (MULTI): ICD-10-CM

## 2025-09-01 DIAGNOSIS — M46.46 DISCITIS, UNSPECIFIED, LUMBAR REGION: ICD-10-CM

## 2025-09-01 LAB
ALBUMIN SERPL BCP-MCNC: 3.3 G/DL (ref 3.4–5)
ALP SERPL-CCNC: 242 U/L (ref 33–136)
ALT SERPL W P-5'-P-CCNC: 39 U/L (ref 10–52)
ANION GAP SERPL CALC-SCNC: 12 MMOL/L (ref 10–20)
AST SERPL W P-5'-P-CCNC: 33 U/L (ref 9–39)
BASOPHILS # BLD AUTO: 0.04 X10*3/UL (ref 0–0.1)
BASOPHILS NFR BLD AUTO: 0.5 %
BILIRUB SERPL-MCNC: 1.1 MG/DL (ref 0–1.2)
BUN SERPL-MCNC: 11 MG/DL (ref 6–23)
CALCIUM SERPL-MCNC: 9.4 MG/DL (ref 8.6–10.3)
CHLORIDE SERPL-SCNC: 102 MMOL/L (ref 98–107)
CO2 SERPL-SCNC: 27 MMOL/L (ref 21–32)
CREAT SERPL-MCNC: 1.37 MG/DL (ref 0.5–1.3)
EGFRCR SERPLBLD CKD-EPI 2021: 53 ML/MIN/1.73M*2
EOSINOPHIL # BLD AUTO: 0.16 X10*3/UL (ref 0–0.4)
EOSINOPHIL NFR BLD AUTO: 1.9 %
ERYTHROCYTE [DISTWIDTH] IN BLOOD BY AUTOMATED COUNT: 13.7 % (ref 11.5–14.5)
GLUCOSE SERPL-MCNC: 89 MG/DL (ref 74–99)
HCT VFR BLD AUTO: 31.5 % (ref 41–52)
HGB BLD-MCNC: 10.3 G/DL (ref 13.5–17.5)
IMM GRANULOCYTES # BLD AUTO: 0.03 X10*3/UL (ref 0–0.5)
IMM GRANULOCYTES NFR BLD AUTO: 0.4 % (ref 0–0.9)
LYMPHOCYTES # BLD AUTO: 3.5 X10*3/UL (ref 0.8–3)
LYMPHOCYTES NFR BLD AUTO: 41.9 %
MAGNESIUM SERPL-MCNC: 1.38 MG/DL (ref 1.6–2.4)
MCH RBC QN AUTO: 29.3 PG (ref 26–34)
MCHC RBC AUTO-ENTMCNC: 32.7 G/DL (ref 32–36)
MCV RBC AUTO: 90 FL (ref 80–100)
MONOCYTES # BLD AUTO: 0.78 X10*3/UL (ref 0.05–0.8)
MONOCYTES NFR BLD AUTO: 9.3 %
NEUTROPHILS # BLD AUTO: 3.85 X10*3/UL (ref 1.6–5.5)
NEUTROPHILS NFR BLD AUTO: 46 %
NRBC BLD-RTO: 0 /100 WBCS (ref 0–0)
PHOSPHATE SERPL-MCNC: 3 MG/DL (ref 2.5–4.9)
PLATELET # BLD AUTO: 381 X10*3/UL (ref 150–450)
POTASSIUM SERPL-SCNC: 4.6 MMOL/L (ref 3.5–5.3)
PROT SERPL-MCNC: 7.6 G/DL (ref 6.4–8.2)
RBC # BLD AUTO: 3.52 X10*6/UL (ref 4.5–5.9)
SODIUM SERPL-SCNC: 136 MMOL/L (ref 136–145)
TRIGL SERPL-MCNC: 204 MG/DL (ref 0–149)
WBC # BLD AUTO: 8.4 X10*3/UL (ref 4.4–11.3)

## 2025-09-01 PROCEDURE — G0299 HHS/HOSPICE OF RN EA 15 MIN: HCPCS

## 2025-09-01 ASSESSMENT — ENCOUNTER SYMPTOMS
LOWER EXTREMITY EDEMA: 1
MUSCLE WEAKNESS: 1
APPETITE LEVEL: GOOD
CHANGE IN APPETITE: UNCHANGED
LAST BOWEL MOVEMENT: 67449

## 2025-09-01 ASSESSMENT — PAIN DESCRIPTION - PAIN TYPE: TYPE: ACUTE PAIN

## 2025-09-01 ASSESSMENT — ACTIVITIES OF DAILY LIVING (ADL)
CURRENT_FUNCTION: STAND BY ASSIST
AMBULATION ASSISTANCE: STAND BY ASSIST

## 2025-09-02 ENCOUNTER — HOME INFUSION (OUTPATIENT)
Dept: INFUSION THERAPY | Age: 80
End: 2025-09-02
Payer: MEDICARE

## 2025-09-02 DIAGNOSIS — K90.822 SHORT BOWEL SYNDROME WITHOUT COLON IN CONTINUITY: Primary | ICD-10-CM

## 2025-09-02 DIAGNOSIS — B37.9 CANDIDIASIS: Primary | ICD-10-CM

## 2025-09-02 LAB — PREALB SERPL-MCNC: 22 MG/DL (ref 18–40)

## 2025-09-02 RX ORDER — MICAFUNGIN IN SODIUM CHLORIDE 150 MG/150ML
150 INJECTION INTRAVENOUS EVERY 24 HOURS
Qty: 150 ML | Refills: 1142 | Status: ON HOLD
Start: 2025-09-02 | End: 2025-10-14

## 2025-09-03 ENCOUNTER — HOSPITAL ENCOUNTER (INPATIENT)
Facility: HOSPITAL | Age: 80
End: 2025-09-03
Attending: STUDENT IN AN ORGANIZED HEALTH CARE EDUCATION/TRAINING PROGRAM | Admitting: STUDENT IN AN ORGANIZED HEALTH CARE EDUCATION/TRAINING PROGRAM
Payer: MEDICARE

## 2025-09-04 ENCOUNTER — HOSPITAL ENCOUNTER (INPATIENT)
Facility: HOSPITAL | Age: 80
End: 2025-09-04
Attending: STUDENT IN AN ORGANIZED HEALTH CARE EDUCATION/TRAINING PROGRAM | Admitting: SURGERY
Payer: MEDICARE

## 2025-09-04 DIAGNOSIS — R50.9 FEVER, UNSPECIFIED FEVER CAUSE: Primary | ICD-10-CM

## 2025-09-04 LAB
ALBUMIN SERPL BCP-MCNC: 3.5 G/DL (ref 3.4–5)
ALP SERPL-CCNC: 263 U/L (ref 33–136)
ALT SERPL W P-5'-P-CCNC: 38 U/L (ref 10–52)
ANION GAP SERPL CALC-SCNC: 14 MMOL/L (ref 10–20)
AST SERPL W P-5'-P-CCNC: 33 U/L (ref 9–39)
BASOPHILS # BLD AUTO: 0.03 X10*3/UL (ref 0–0.1)
BASOPHILS NFR BLD AUTO: 0.4 %
BILIRUB SERPL-MCNC: 1.5 MG/DL (ref 0–1.2)
BUN SERPL-MCNC: 10 MG/DL (ref 6–23)
CALCIUM SERPL-MCNC: 10.2 MG/DL (ref 8.6–10.6)
CHLORIDE SERPL-SCNC: 98 MMOL/L (ref 98–107)
CO2 SERPL-SCNC: 28 MMOL/L (ref 21–32)
CREAT SERPL-MCNC: 1.55 MG/DL (ref 0.5–1.3)
EGFRCR SERPLBLD CKD-EPI 2021: 46 ML/MIN/1.73M*2
EOSINOPHIL # BLD AUTO: 0.06 X10*3/UL (ref 0–0.4)
EOSINOPHIL NFR BLD AUTO: 0.8 %
ERYTHROCYTE [DISTWIDTH] IN BLOOD BY AUTOMATED COUNT: 13 % (ref 11.5–14.5)
GLUCOSE SERPL-MCNC: 92 MG/DL (ref 74–99)
HCT VFR BLD AUTO: 32.3 % (ref 41–52)
HGB BLD-MCNC: 11.1 G/DL (ref 13.5–17.5)
IMM GRANULOCYTES # BLD AUTO: 0.05 X10*3/UL (ref 0–0.5)
IMM GRANULOCYTES NFR BLD AUTO: 0.7 % (ref 0–0.9)
LACTATE SERPL-SCNC: 0.9 MMOL/L (ref 0.4–2)
LYMPHOCYTES # BLD AUTO: 3 X10*3/UL (ref 0.8–3)
LYMPHOCYTES NFR BLD AUTO: 41.1 %
MCH RBC QN AUTO: 28.9 PG (ref 26–34)
MCHC RBC AUTO-ENTMCNC: 34.4 G/DL (ref 32–36)
MCV RBC AUTO: 84 FL (ref 80–100)
MONOCYTES # BLD AUTO: 0.89 X10*3/UL (ref 0.05–0.8)
MONOCYTES NFR BLD AUTO: 12.2 %
NEUTROPHILS # BLD AUTO: 3.27 X10*3/UL (ref 1.6–5.5)
NEUTROPHILS NFR BLD AUTO: 44.8 %
NRBC BLD-RTO: 0 /100 WBCS (ref 0–0)
PLATELET # BLD AUTO: 236 X10*3/UL (ref 150–450)
POTASSIUM SERPL-SCNC: 3.9 MMOL/L (ref 3.5–5.3)
PROT SERPL-MCNC: 8.5 G/DL (ref 6.4–8.2)
RBC # BLD AUTO: 3.84 X10*6/UL (ref 4.5–5.9)
SODIUM SERPL-SCNC: 136 MMOL/L (ref 136–145)
WBC # BLD AUTO: 7.3 X10*3/UL (ref 4.4–11.3)

## 2025-09-04 PROCEDURE — 83605 ASSAY OF LACTIC ACID: CPT | Performed by: STUDENT IN AN ORGANIZED HEALTH CARE EDUCATION/TRAINING PROGRAM

## 2025-09-04 PROCEDURE — 1100000001 HC PRIVATE ROOM DAILY

## 2025-09-04 PROCEDURE — 85025 COMPLETE CBC W/AUTO DIFF WBC: CPT | Performed by: EMERGENCY MEDICINE

## 2025-09-04 PROCEDURE — 87040 BLOOD CULTURE FOR BACTERIA: CPT | Performed by: STUDENT IN AN ORGANIZED HEALTH CARE EDUCATION/TRAINING PROGRAM

## 2025-09-04 PROCEDURE — 2500000004 HC RX 250 GENERAL PHARMACY W/ HCPCS (ALT 636 FOR OP/ED)

## 2025-09-04 PROCEDURE — 36415 COLL VENOUS BLD VENIPUNCTURE: CPT

## 2025-09-04 PROCEDURE — 36415 COLL VENOUS BLD VENIPUNCTURE: CPT | Performed by: STUDENT IN AN ORGANIZED HEALTH CARE EDUCATION/TRAINING PROGRAM

## 2025-09-04 PROCEDURE — 87040 BLOOD CULTURE FOR BACTERIA: CPT

## 2025-09-04 PROCEDURE — 84075 ASSAY ALKALINE PHOSPHATASE: CPT | Performed by: EMERGENCY MEDICINE

## 2025-09-04 PROCEDURE — 2500000004 HC RX 250 GENERAL PHARMACY W/ HCPCS (ALT 636 FOR OP/ED): Performed by: STUDENT IN AN ORGANIZED HEALTH CARE EDUCATION/TRAINING PROGRAM

## 2025-09-04 PROCEDURE — 99285 EMERGENCY DEPT VISIT HI MDM: CPT | Performed by: STUDENT IN AN ORGANIZED HEALTH CARE EDUCATION/TRAINING PROGRAM

## 2025-09-04 PROCEDURE — 71045 X-RAY EXAM CHEST 1 VIEW: CPT | Performed by: RADIOLOGY

## 2025-09-04 PROCEDURE — 2500000002 HC RX 250 W HCPCS SELF ADMINISTERED DRUGS (ALT 637 FOR MEDICARE OP, ALT 636 FOR OP/ED)

## 2025-09-04 RX ORDER — ONDANSETRON 4 MG/1
4 TABLET, ORALLY DISINTEGRATING ORAL EVERY 8 HOURS PRN
Status: ACTIVE | OUTPATIENT
Start: 2025-09-04

## 2025-09-04 RX ORDER — MICAFUNGIN SODIUM 100 MG/5ML
100 INJECTION, POWDER, LYOPHILIZED, FOR SOLUTION INTRAVENOUS EVERY 24 HOURS
Status: DISCONTINUED | OUTPATIENT
Start: 2025-09-04 | End: 2025-09-04

## 2025-09-04 RX ORDER — MICAFUNGIN SODIUM 100 MG/5ML
100 INJECTION, POWDER, LYOPHILIZED, FOR SOLUTION INTRAVENOUS EVERY 24 HOURS
Status: DISPENSED | OUTPATIENT
Start: 2025-09-05

## 2025-09-04 RX ORDER — SODIUM CHLORIDE 9 MG/ML
0-1000 INJECTION, SOLUTION INTRAVENOUS CONTINUOUS
Status: DISCONTINUED | OUTPATIENT
Start: 2025-09-04 | End: 2025-09-05

## 2025-09-04 RX ORDER — ROSUVASTATIN CALCIUM 10 MG/1
5 TABLET, COATED ORAL DAILY
Status: DISPENSED | OUTPATIENT
Start: 2025-09-04

## 2025-09-04 RX ORDER — VANCOMYCIN HYDROCHLORIDE 1 G/200ML
1000 INJECTION, SOLUTION INTRAVENOUS ONCE
Status: COMPLETED | OUTPATIENT
Start: 2025-09-04 | End: 2025-09-04

## 2025-09-04 RX ORDER — PANTOPRAZOLE SODIUM 20 MG/1
20 TABLET, DELAYED RELEASE ORAL
Status: DISPENSED | OUTPATIENT
Start: 2025-09-05

## 2025-09-04 RX ORDER — ACETAMINOPHEN 325 MG/1
650 TABLET ORAL EVERY 4 HOURS PRN
Status: ACTIVE | OUTPATIENT
Start: 2025-09-04

## 2025-09-04 RX ORDER — OXYCODONE HYDROCHLORIDE 5 MG/1
5 TABLET ORAL EVERY 6 HOURS PRN
Refills: 0 | Status: DISPENSED | OUTPATIENT
Start: 2025-09-04

## 2025-09-04 RX ORDER — ONDANSETRON HYDROCHLORIDE 2 MG/ML
4 INJECTION, SOLUTION INTRAVENOUS EVERY 8 HOURS PRN
Status: ACTIVE | OUTPATIENT
Start: 2025-09-04

## 2025-09-04 RX ORDER — ENOXAPARIN SODIUM 100 MG/ML
40 INJECTION SUBCUTANEOUS EVERY 24 HOURS
Status: DISPENSED | OUTPATIENT
Start: 2025-09-04

## 2025-09-04 RX ORDER — TALC
6 POWDER (GRAM) TOPICAL NIGHTLY
Status: DISPENSED | OUTPATIENT
Start: 2025-09-04

## 2025-09-04 RX ORDER — ALBUTEROL SULFATE 0.83 MG/ML
2.5 SOLUTION RESPIRATORY (INHALATION) EVERY 6 HOURS PRN
Status: ACTIVE | OUTPATIENT
Start: 2025-09-04

## 2025-09-04 RX ORDER — VANCOMYCIN HYDROCHLORIDE 1 G/200ML
INJECTION, SOLUTION INTRAVENOUS
Status: COMPLETED
Start: 2025-09-04 | End: 2025-09-04

## 2025-09-04 RX ORDER — LOPERAMIDE HYDROCHLORIDE 2 MG/1
2 CAPSULE ORAL 4 TIMES DAILY PRN
Status: DISCONTINUED | OUTPATIENT
Start: 2025-09-04 | End: 2025-09-05

## 2025-09-04 RX ORDER — NALOXONE HYDROCHLORIDE 0.4 MG/ML
0.2 INJECTION, SOLUTION INTRAMUSCULAR; INTRAVENOUS; SUBCUTANEOUS EVERY 5 MIN PRN
Status: ACTIVE | OUTPATIENT
Start: 2025-09-04

## 2025-09-04 RX ADMIN — VANCOMYCIN HYDROCHLORIDE 1000 MG: 1 INJECTION, SOLUTION INTRAVENOUS at 17:56

## 2025-09-04 RX ADMIN — ROSUVASTATIN CALCIUM 5 MG: 10 TABLET, FILM COATED ORAL at 22:58

## 2025-09-04 RX ADMIN — MICAFUNGIN 100 MG: 20 INJECTION, POWDER, LYOPHILIZED, FOR SOLUTION INTRAVENOUS at 15:59

## 2025-09-04 RX ADMIN — ENOXAPARIN SODIUM 40 MG: 100 INJECTION SUBCUTANEOUS at 22:58

## 2025-09-04 RX ADMIN — SODIUM CHLORIDE 125 ML/HR: 0.9 INJECTION, SOLUTION INTRAVENOUS at 22:35

## 2025-09-04 RX ADMIN — PIPERACILLIN SODIUM AND TAZOBACTAM SODIUM 4.5 G: 4; .5 INJECTION, SOLUTION INTRAVENOUS at 15:20

## 2025-09-04 SDOH — SOCIAL STABILITY: SOCIAL INSECURITY
WITHIN THE LAST YEAR, HAVE YOU BEEN KICKED, HIT, SLAPPED, OR OTHERWISE PHYSICALLY HURT BY YOUR PARTNER OR EX-PARTNER?: NO

## 2025-09-04 SDOH — ECONOMIC STABILITY: INCOME INSECURITY: IN THE PAST 12 MONTHS HAS THE ELECTRIC, GAS, OIL, OR WATER COMPANY THREATENED TO SHUT OFF SERVICES IN YOUR HOME?: NO

## 2025-09-04 SDOH — SOCIAL STABILITY: SOCIAL NETWORK: HOW OFTEN DO YOU ATTEND MEETINGS OF THE CLUBS OR ORGANIZATIONS YOU BELONG TO?: MORE THAN 4 TIMES PER YEAR

## 2025-09-04 SDOH — SOCIAL STABILITY: SOCIAL NETWORK: HOW OFTEN DO YOU GET TOGETHER WITH FRIENDS OR RELATIVES?: MORE THAN THREE TIMES A WEEK

## 2025-09-04 SDOH — HEALTH STABILITY: MENTAL HEALTH
DO YOU FEEL STRESS - TENSE, RESTLESS, NERVOUS, OR ANXIOUS, OR UNABLE TO SLEEP AT NIGHT BECAUSE YOUR MIND IS TROUBLED ALL THE TIME - THESE DAYS?: NOT AT ALL

## 2025-09-04 SDOH — SOCIAL STABILITY: SOCIAL INSECURITY: DO YOU FEEL ANYONE HAS EXPLOITED OR TAKEN ADVANTAGE OF YOU FINANCIALLY OR OF YOUR PERSONAL PROPERTY?: NO

## 2025-09-04 SDOH — HEALTH STABILITY: PHYSICAL HEALTH: ON AVERAGE, HOW MANY DAYS PER WEEK DO YOU ENGAGE IN MODERATE TO STRENUOUS EXERCISE (LIKE A BRISK WALK)?: 0 DAYS

## 2025-09-04 SDOH — ECONOMIC STABILITY: HOUSING INSECURITY: AT ANY TIME IN THE PAST 12 MONTHS, WERE YOU HOMELESS OR LIVING IN A SHELTER (INCLUDING NOW)?: NO

## 2025-09-04 SDOH — HEALTH STABILITY: PHYSICAL HEALTH
HOW OFTEN DO YOU NEED TO HAVE SOMEONE HELP YOU WHEN YOU READ INSTRUCTIONS, PAMPHLETS, OR OTHER WRITTEN MATERIAL FROM YOUR DOCTOR OR PHARMACY?: NEVER

## 2025-09-04 SDOH — ECONOMIC STABILITY: FOOD INSECURITY: HOW HARD IS IT FOR YOU TO PAY FOR THE VERY BASICS LIKE FOOD, HOUSING, MEDICAL CARE, AND HEATING?: NOT HARD AT ALL

## 2025-09-04 SDOH — SOCIAL STABILITY: SOCIAL INSECURITY: WITHIN THE LAST YEAR, HAVE YOU BEEN AFRAID OF YOUR PARTNER OR EX-PARTNER?: NO

## 2025-09-04 SDOH — SOCIAL STABILITY: SOCIAL NETWORK
DO YOU BELONG TO ANY CLUBS OR ORGANIZATIONS SUCH AS CHURCH GROUPS, UNIONS, FRATERNAL OR ATHLETIC GROUPS, OR SCHOOL GROUPS?: YES

## 2025-09-04 SDOH — SOCIAL STABILITY: SOCIAL INSECURITY: HAVE YOU HAD THOUGHTS OF HARMING ANYONE ELSE?: NO

## 2025-09-04 SDOH — ECONOMIC STABILITY: FOOD INSECURITY: WITHIN THE PAST 12 MONTHS, THE FOOD YOU BOUGHT JUST DIDN'T LAST AND YOU DIDN'T HAVE MONEY TO GET MORE.: NEVER TRUE

## 2025-09-04 SDOH — SOCIAL STABILITY: SOCIAL INSECURITY: HAS ANYONE EVER THREATENED TO HURT YOUR FAMILY OR YOUR PETS?: NO

## 2025-09-04 SDOH — HEALTH STABILITY: PHYSICAL HEALTH: ON AVERAGE, HOW MANY MINUTES DO YOU ENGAGE IN EXERCISE AT THIS LEVEL?: 0 MIN

## 2025-09-04 SDOH — SOCIAL STABILITY: SOCIAL NETWORK
IN A TYPICAL WEEK, HOW MANY TIMES DO YOU TALK ON THE PHONE WITH FAMILY, FRIENDS, OR NEIGHBORS?: MORE THAN THREE TIMES A WEEK

## 2025-09-04 SDOH — SOCIAL STABILITY: SOCIAL NETWORK: HOW OFTEN DO YOU ATTEND CHURCH OR RELIGIOUS SERVICES?: MORE THAN 4 TIMES PER YEAR

## 2025-09-04 SDOH — ECONOMIC STABILITY: FOOD INSECURITY: WITHIN THE PAST 12 MONTHS, YOU WORRIED THAT YOUR FOOD WOULD RUN OUT BEFORE YOU GOT THE MONEY TO BUY MORE.: NEVER TRUE

## 2025-09-04 SDOH — SOCIAL STABILITY: SOCIAL INSECURITY: ARE YOU MARRIED, WIDOWED, DIVORCED, SEPARATED, NEVER MARRIED, OR LIVING WITH A PARTNER?: MARRIED

## 2025-09-04 SDOH — SOCIAL STABILITY: SOCIAL NETWORK: HOW OFTEN DO YOU ATTEND MEETINGS OF THE CLUBS OR ORGANIZATIONS YOU BELONG TO?: 1 TO 4 TIMES PER YEAR

## 2025-09-04 SDOH — ECONOMIC STABILITY: HOUSING INSECURITY: IN THE LAST 12 MONTHS, WAS THERE A TIME WHEN YOU WERE NOT ABLE TO PAY THE MORTGAGE OR RENT ON TIME?: NO

## 2025-09-04 SDOH — SOCIAL STABILITY: SOCIAL INSECURITY: WITHIN THE LAST YEAR, HAVE YOU BEEN HUMILIATED OR EMOTIONALLY ABUSED IN OTHER WAYS BY YOUR PARTNER OR EX-PARTNER?: NO

## 2025-09-04 SDOH — SOCIAL STABILITY: SOCIAL INSECURITY
WITHIN THE LAST YEAR, HAVE YOU BEEN RAPED OR FORCED TO HAVE ANY KIND OF SEXUAL ACTIVITY BY YOUR PARTNER OR EX-PARTNER?: NO

## 2025-09-04 SDOH — SOCIAL STABILITY: SOCIAL INSECURITY: WERE YOU ABLE TO COMPLETE ALL THE BEHAVIORAL HEALTH SCREENINGS?: YES

## 2025-09-04 SDOH — SOCIAL STABILITY: SOCIAL INSECURITY: ARE THERE ANY APPARENT SIGNS OF INJURIES/BEHAVIORS THAT COULD BE RELATED TO ABUSE/NEGLECT?: NO

## 2025-09-04 SDOH — SOCIAL STABILITY: SOCIAL INSECURITY: ABUSE: ADULT

## 2025-09-04 SDOH — SOCIAL STABILITY: SOCIAL INSECURITY: HAVE YOU HAD ANY THOUGHTS OF HARMING ANYONE ELSE?: NO

## 2025-09-04 SDOH — ECONOMIC STABILITY: TRANSPORTATION INSECURITY: IN THE PAST 12 MONTHS, HAS LACK OF TRANSPORTATION KEPT YOU FROM MEDICAL APPOINTMENTS OR FROM GETTING MEDICATIONS?: NO

## 2025-09-04 SDOH — SOCIAL STABILITY: SOCIAL INSECURITY: DOES ANYONE TRY TO KEEP YOU FROM HAVING/CONTACTING OTHER FRIENDS OR DOING THINGS OUTSIDE YOUR HOME?: NO

## 2025-09-04 SDOH — SOCIAL STABILITY: SOCIAL INSECURITY: DO YOU FEEL UNSAFE GOING BACK TO THE PLACE WHERE YOU ARE LIVING?: NO

## 2025-09-04 SDOH — SOCIAL STABILITY: SOCIAL INSECURITY: ARE YOU OR HAVE YOU BEEN THREATENED OR ABUSED PHYSICALLY, EMOTIONALLY, OR SEXUALLY BY ANYONE?: NO

## 2025-09-04 ASSESSMENT — PAIN DESCRIPTION - PAIN TYPE: TYPE: ACUTE PAIN

## 2025-09-04 ASSESSMENT — LIFESTYLE VARIABLES
AUDIT-C TOTAL SCORE: 0
AUDIT-C TOTAL SCORE: 0
PRESCIPTION_ABUSE_PAST_12_MONTHS: NO
SUBSTANCE_ABUSE_PAST_12_MONTHS: NO
HOW OFTEN DO YOU HAVE 6 OR MORE DRINKS ON ONE OCCASION: NEVER
HOW OFTEN DO YOU HAVE A DRINK CONTAINING ALCOHOL: NEVER
SKIP TO QUESTIONS 9-10: 1
HOW MANY STANDARD DRINKS CONTAINING ALCOHOL DO YOU HAVE ON A TYPICAL DAY: PATIENT DOES NOT DRINK

## 2025-09-04 ASSESSMENT — ACTIVITIES OF DAILY LIVING (ADL)
TOILETING: INDEPENDENT
PATIENT'S MEMORY ADEQUATE TO SAFELY COMPLETE DAILY ACTIVITIES?: YES
LACK_OF_TRANSPORTATION: NO
LACK_OF_TRANSPORTATION: NO
FEEDING YOURSELF: INDEPENDENT
GROOMING: INDEPENDENT
WALKS IN HOME: NEEDS ASSISTANCE
HEARING - RIGHT EAR: DEAF
ASSISTIVE_DEVICE: WALKER
LACK_OF_TRANSPORTATION: NO
HEARING - LEFT EAR: FUNCTIONAL
BATHING: NEEDS ASSISTANCE
LACK_OF_TRANSPORTATION: NO
JUDGMENT_ADEQUATE_SAFELY_COMPLETE_DAILY_ACTIVITIES: YES
ADEQUATE_TO_COMPLETE_ADL: YES
DRESSING YOURSELF: NEEDS ASSISTANCE

## 2025-09-04 ASSESSMENT — COGNITIVE AND FUNCTIONAL STATUS - GENERAL
STANDING UP FROM CHAIR USING ARMS: A LOT
PERSONAL GROOMING: A LITTLE
PATIENT BASELINE BEDBOUND: NO
MOVING TO AND FROM BED TO CHAIR: A LOT
WALKING IN HOSPITAL ROOM: A LOT
TURNING FROM BACK TO SIDE WHILE IN FLAT BAD: A LOT
DRESSING REGULAR LOWER BODY CLOTHING: A LOT
DRESSING REGULAR UPPER BODY CLOTHING: A LITTLE
MOVING FROM LYING ON BACK TO SITTING ON SIDE OF FLAT BED WITH BEDRAILS: A LOT
DAILY ACTIVITIY SCORE: 18
MOBILITY SCORE: 12
HELP NEEDED FOR BATHING: A LITTLE
CLIMB 3 TO 5 STEPS WITH RAILING: A LOT
TOILETING: A LITTLE

## 2025-09-04 ASSESSMENT — PAIN - FUNCTIONAL ASSESSMENT
PAIN_FUNCTIONAL_ASSESSMENT: 0-10
PAIN_FUNCTIONAL_ASSESSMENT: 0-10

## 2025-09-04 ASSESSMENT — PAIN DESCRIPTION - DESCRIPTORS: DESCRIPTORS: JABBING

## 2025-09-04 ASSESSMENT — PAIN DESCRIPTION - LOCATION: LOCATION: BACK

## 2025-09-04 ASSESSMENT — PAIN SCALES - GENERAL
PAINLEVEL_OUTOF10: 8
PAINLEVEL_OUTOF10: 5 - MODERATE PAIN

## 2025-09-05 LAB
ALBUMIN SERPL BCP-MCNC: 3 G/DL (ref 3.4–5)
ANION GAP SERPL CALC-SCNC: 14 MMOL/L (ref 10–20)
APPEARANCE UR: CLEAR
BILIRUB UR STRIP.AUTO-MCNC: NEGATIVE MG/DL
BUN SERPL-MCNC: 12 MG/DL (ref 6–23)
CALCIUM SERPL-MCNC: 9.5 MG/DL (ref 8.6–10.6)
CHLORIDE SERPL-SCNC: 102 MMOL/L (ref 98–107)
CO2 SERPL-SCNC: 24 MMOL/L (ref 21–32)
COLOR UR: ABNORMAL
CREAT SERPL-MCNC: 1.76 MG/DL (ref 0.5–1.3)
EGFRCR SERPLBLD CKD-EPI 2021: 39 ML/MIN/1.73M*2
ERYTHROCYTE [DISTWIDTH] IN BLOOD BY AUTOMATED COUNT: 13.2 % (ref 11.5–14.5)
GLUCOSE SERPL-MCNC: 96 MG/DL (ref 74–99)
GLUCOSE UR STRIP.AUTO-MCNC: NORMAL MG/DL
HCT VFR BLD AUTO: 31.6 % (ref 41–52)
HGB BLD-MCNC: 10.1 G/DL (ref 13.5–17.5)
KETONES UR STRIP.AUTO-MCNC: NEGATIVE MG/DL
LEUKOCYTE ESTERASE UR QL STRIP.AUTO: NEGATIVE
MAGNESIUM SERPL-MCNC: 1.48 MG/DL (ref 1.6–2.4)
MCH RBC QN AUTO: 28.7 PG (ref 26–34)
MCHC RBC AUTO-ENTMCNC: 32 G/DL (ref 32–36)
MCV RBC AUTO: 90 FL (ref 80–100)
NITRITE UR QL STRIP.AUTO: NEGATIVE
NRBC BLD-RTO: 0 /100 WBCS (ref 0–0)
PH UR STRIP.AUTO: 6.5 [PH]
PHOSPHATE SERPL-MCNC: 3.4 MG/DL (ref 2.5–4.9)
PLATELET # BLD AUTO: 277 X10*3/UL (ref 150–450)
POTASSIUM SERPL-SCNC: 3.8 MMOL/L (ref 3.5–5.3)
PROT UR STRIP.AUTO-MCNC: NEGATIVE MG/DL
RBC # BLD AUTO: 3.52 X10*6/UL (ref 4.5–5.9)
RBC # UR STRIP.AUTO: ABNORMAL MG/DL
RBC #/AREA URNS AUTO: ABNORMAL /HPF
SODIUM SERPL-SCNC: 136 MMOL/L (ref 136–145)
SP GR UR STRIP.AUTO: 1.01
UROBILINOGEN UR STRIP.AUTO-MCNC: NORMAL MG/DL
WBC # BLD AUTO: 6.4 X10*3/UL (ref 4.4–11.3)
WBC #/AREA URNS AUTO: ABNORMAL /HPF

## 2025-09-05 PROCEDURE — 2500000002 HC RX 250 W HCPCS SELF ADMINISTERED DRUGS (ALT 637 FOR MEDICARE OP, ALT 636 FOR OP/ED)

## 2025-09-05 PROCEDURE — 1100000001 HC PRIVATE ROOM DAILY

## 2025-09-05 PROCEDURE — 80069 RENAL FUNCTION PANEL: CPT

## 2025-09-05 PROCEDURE — 36415 COLL VENOUS BLD VENIPUNCTURE: CPT

## 2025-09-05 PROCEDURE — 83735 ASSAY OF MAGNESIUM: CPT

## 2025-09-05 PROCEDURE — 2500000004 HC RX 250 GENERAL PHARMACY W/ HCPCS (ALT 636 FOR OP/ED)

## 2025-09-05 PROCEDURE — G0545 PR INHERENT VISIT TO INPT: HCPCS | Performed by: INTERNAL MEDICINE

## 2025-09-05 PROCEDURE — A9575 INJ GADOTERATE MEGLUMI 0.1ML: HCPCS | Performed by: SURGERY

## 2025-09-05 PROCEDURE — 99232 SBSQ HOSP IP/OBS MODERATE 35: CPT | Performed by: STUDENT IN AN ORGANIZED HEALTH CARE EDUCATION/TRAINING PROGRAM

## 2025-09-05 PROCEDURE — 85027 COMPLETE CBC AUTOMATED: CPT

## 2025-09-05 PROCEDURE — 99222 1ST HOSP IP/OBS MODERATE 55: CPT | Performed by: INTERNAL MEDICINE

## 2025-09-05 PROCEDURE — 2500000001 HC RX 250 WO HCPCS SELF ADMINISTERED DRUGS (ALT 637 FOR MEDICARE OP)

## 2025-09-05 PROCEDURE — 2550000001 HC RX 255 CONTRASTS: Performed by: SURGERY

## 2025-09-05 PROCEDURE — 2500000001 HC RX 250 WO HCPCS SELF ADMINISTERED DRUGS (ALT 637 FOR MEDICARE OP): Performed by: PHYSICIAN ASSISTANT

## 2025-09-05 PROCEDURE — 81001 URINALYSIS AUTO W/SCOPE: CPT | Performed by: STUDENT IN AN ORGANIZED HEALTH CARE EDUCATION/TRAINING PROGRAM

## 2025-09-05 PROCEDURE — 2500000005 HC RX 250 GENERAL PHARMACY W/O HCPCS

## 2025-09-05 RX ORDER — GADOTERATE MEGLUMINE 376.9 MG/ML
15 INJECTION INTRAVENOUS
Status: COMPLETED | OUTPATIENT
Start: 2025-09-05 | End: 2025-09-05

## 2025-09-05 RX ORDER — LOPERAMIDE HYDROCHLORIDE 2 MG/1
4 CAPSULE ORAL 4 TIMES DAILY
Status: DISPENSED | OUTPATIENT
Start: 2025-09-05

## 2025-09-05 RX ORDER — POTASSIUM CHLORIDE 20 MEQ/1
20 TABLET, EXTENDED RELEASE ORAL ONCE
Status: COMPLETED | OUTPATIENT
Start: 2025-09-05 | End: 2025-09-05

## 2025-09-05 RX ORDER — SODIUM CHLORIDE 9 MG/ML
0-1000 INJECTION, SOLUTION INTRAVENOUS CONTINUOUS
Status: DISCONTINUED | OUTPATIENT
Start: 2025-09-05 | End: 2025-09-05

## 2025-09-05 RX ORDER — MAGNESIUM SULFATE HEPTAHYDRATE 40 MG/ML
4 INJECTION, SOLUTION INTRAVENOUS ONCE
Status: COMPLETED | OUTPATIENT
Start: 2025-09-05 | End: 2025-09-05

## 2025-09-05 RX ADMIN — Medication 6 MG: at 22:37

## 2025-09-05 RX ADMIN — OXYCODONE HYDROCHLORIDE 5 MG: 5 TABLET ORAL at 18:42

## 2025-09-05 RX ADMIN — MICAFUNGIN SODIUM 100 MG: 100 INJECTION, POWDER, LYOPHILIZED, FOR SOLUTION INTRAVENOUS at 13:30

## 2025-09-05 RX ADMIN — MAGNESIUM SULFATE HEPTAHYDRATE 4 G: 40 INJECTION, SOLUTION INTRAVENOUS at 11:12

## 2025-09-05 RX ADMIN — PANTOPRAZOLE SODIUM 20 MG: 20 TABLET, DELAYED RELEASE ORAL at 06:33

## 2025-09-05 RX ADMIN — LOPERAMIDE HYDROCHLORIDE 4 MG: 2 CAPSULE ORAL at 12:23

## 2025-09-05 RX ADMIN — LOPERAMIDE HYDROCHLORIDE 4 MG: 2 CAPSULE ORAL at 22:37

## 2025-09-05 RX ADMIN — ENOXAPARIN SODIUM 40 MG: 100 INJECTION SUBCUTANEOUS at 22:37

## 2025-09-05 RX ADMIN — GADOTERATE MEGLUMINE 15 ML: 376.9 INJECTION INTRAVENOUS at 21:35

## 2025-09-05 RX ADMIN — LOPERAMIDE HYDROCHLORIDE 2 MG: 2 CAPSULE ORAL at 06:33

## 2025-09-05 RX ADMIN — SODIUM CHLORIDE 1000 ML: 0.9 INJECTION, SOLUTION INTRAVENOUS at 12:24

## 2025-09-05 RX ADMIN — LOPERAMIDE HYDROCHLORIDE 4 MG: 2 CAPSULE ORAL at 16:06

## 2025-09-05 RX ADMIN — POTASSIUM CHLORIDE 20 MEQ: 1500 TABLET, EXTENDED RELEASE ORAL at 11:11

## 2025-09-05 SDOH — SOCIAL STABILITY: SOCIAL NETWORK: HOW OFTEN DO YOU GET TOGETHER WITH FRIENDS OR RELATIVES?: MORE THAN THREE TIMES A WEEK

## 2025-09-05 SDOH — HEALTH STABILITY: PHYSICAL HEALTH: ON AVERAGE, HOW MANY DAYS PER WEEK DO YOU ENGAGE IN MODERATE TO STRENUOUS EXERCISE (LIKE A BRISK WALK)?: 0 DAYS

## 2025-09-05 SDOH — ECONOMIC STABILITY: HOUSING INSECURITY: IN THE LAST 12 MONTHS, WAS THERE A TIME WHEN YOU WERE NOT ABLE TO PAY THE MORTGAGE OR RENT ON TIME?: NO

## 2025-09-05 SDOH — SOCIAL STABILITY: SOCIAL INSECURITY: WITHIN THE LAST YEAR, HAVE YOU BEEN HUMILIATED OR EMOTIONALLY ABUSED IN OTHER WAYS BY YOUR PARTNER OR EX-PARTNER?: NO

## 2025-09-05 SDOH — ECONOMIC STABILITY: INCOME INSECURITY: IN THE PAST 12 MONTHS HAS THE ELECTRIC, GAS, OIL, OR WATER COMPANY THREATENED TO SHUT OFF SERVICES IN YOUR HOME?: NO

## 2025-09-05 SDOH — ECONOMIC STABILITY: FOOD INSECURITY: HOW HARD IS IT FOR YOU TO PAY FOR THE VERY BASICS LIKE FOOD, HOUSING, MEDICAL CARE, AND HEATING?: NOT HARD AT ALL

## 2025-09-05 SDOH — SOCIAL STABILITY: SOCIAL INSECURITY: ARE YOU MARRIED, WIDOWED, DIVORCED, SEPARATED, NEVER MARRIED, OR LIVING WITH A PARTNER?: MARRIED

## 2025-09-05 SDOH — ECONOMIC STABILITY: HOUSING INSECURITY: AT ANY TIME IN THE PAST 12 MONTHS, WERE YOU HOMELESS OR LIVING IN A SHELTER (INCLUDING NOW)?: NO

## 2025-09-05 SDOH — ECONOMIC STABILITY: FOOD INSECURITY: WITHIN THE PAST 12 MONTHS, THE FOOD YOU BOUGHT JUST DIDN'T LAST AND YOU DIDN'T HAVE MONEY TO GET MORE.: NEVER TRUE

## 2025-09-05 SDOH — SOCIAL STABILITY: SOCIAL NETWORK: HOW OFTEN DO YOU ATTEND CHURCH OR RELIGIOUS SERVICES?: MORE THAN 4 TIMES PER YEAR

## 2025-09-05 SDOH — ECONOMIC STABILITY: FOOD INSECURITY: WITHIN THE PAST 12 MONTHS, YOU WORRIED THAT YOUR FOOD WOULD RUN OUT BEFORE YOU GOT THE MONEY TO BUY MORE.: NEVER TRUE

## 2025-09-05 SDOH — SOCIAL STABILITY: SOCIAL NETWORK: HOW OFTEN DO YOU ATTEND MEETINGS OF THE CLUBS OR ORGANIZATIONS YOU BELONG TO?: MORE THAN 4 TIMES PER YEAR

## 2025-09-05 SDOH — SOCIAL STABILITY: SOCIAL INSECURITY: WITHIN THE LAST YEAR, HAVE YOU BEEN AFRAID OF YOUR PARTNER OR EX-PARTNER?: NO

## 2025-09-05 SDOH — HEALTH STABILITY: PHYSICAL HEALTH: ON AVERAGE, HOW MANY MINUTES DO YOU ENGAGE IN EXERCISE AT THIS LEVEL?: 0 MIN

## 2025-09-05 SDOH — ECONOMIC STABILITY: TRANSPORTATION INSECURITY: IN THE PAST 12 MONTHS, HAS LACK OF TRANSPORTATION KEPT YOU FROM MEDICAL APPOINTMENTS OR FROM GETTING MEDICATIONS?: NO

## 2025-09-05 ASSESSMENT — COGNITIVE AND FUNCTIONAL STATUS - GENERAL
TOILETING: A LITTLE
DAILY ACTIVITIY SCORE: 18
DRESSING REGULAR UPPER BODY CLOTHING: A LITTLE
MOVING TO AND FROM BED TO CHAIR: A LITTLE
PERSONAL GROOMING: A LITTLE
CLIMB 3 TO 5 STEPS WITH RAILING: A LITTLE
WALKING IN HOSPITAL ROOM: A LITTLE
HELP NEEDED FOR BATHING: A LITTLE
DRESSING REGULAR LOWER BODY CLOTHING: A LOT
MOBILITY SCORE: 18
MOVING FROM LYING ON BACK TO SITTING ON SIDE OF FLAT BED WITH BEDRAILS: A LITTLE
STANDING UP FROM CHAIR USING ARMS: A LITTLE
TURNING FROM BACK TO SIDE WHILE IN FLAT BAD: A LITTLE

## 2025-09-05 ASSESSMENT — PAIN - FUNCTIONAL ASSESSMENT
PAIN_FUNCTIONAL_ASSESSMENT: 0-10

## 2025-09-05 ASSESSMENT — PAIN DESCRIPTION - LOCATION: LOCATION: BACK

## 2025-09-05 ASSESSMENT — ACTIVITIES OF DAILY LIVING (ADL): LACK_OF_TRANSPORTATION: NO

## 2025-09-05 ASSESSMENT — PAIN SCALES - GENERAL
PAINLEVEL_OUTOF10: 7
PAINLEVEL_OUTOF10: 3
PAINLEVEL_OUTOF10: 0 - NO PAIN

## 2025-09-06 LAB
ALBUMIN SERPL BCP-MCNC: 3.1 G/DL (ref 3.4–5)
ANION GAP SERPL CALC-SCNC: 13 MMOL/L (ref 10–20)
BACTERIA BLD CULT: NORMAL
BACTERIA BLD CULT: NORMAL
BUN SERPL-MCNC: 11 MG/DL (ref 6–23)
CALCIUM SERPL-MCNC: 9.4 MG/DL (ref 8.6–10.6)
CHLORIDE SERPL-SCNC: 103 MMOL/L (ref 98–107)
CO2 SERPL-SCNC: 25 MMOL/L (ref 21–32)
CREAT SERPL-MCNC: 1.51 MG/DL (ref 0.5–1.3)
EGFRCR SERPLBLD CKD-EPI 2021: 47 ML/MIN/1.73M*2
ERYTHROCYTE [DISTWIDTH] IN BLOOD BY AUTOMATED COUNT: 13.2 % (ref 11.5–14.5)
GLUCOSE SERPL-MCNC: 96 MG/DL (ref 74–99)
HCT VFR BLD AUTO: 29.1 % (ref 41–52)
HGB BLD-MCNC: 9.8 G/DL (ref 13.5–17.5)
MAGNESIUM SERPL-MCNC: 2.12 MG/DL (ref 1.6–2.4)
MCH RBC QN AUTO: 28.7 PG (ref 26–34)
MCHC RBC AUTO-ENTMCNC: 33.7 G/DL (ref 32–36)
MCV RBC AUTO: 85 FL (ref 80–100)
NRBC BLD-RTO: 0 /100 WBCS (ref 0–0)
PHOSPHATE SERPL-MCNC: 3.4 MG/DL (ref 2.5–4.9)
PLATELET # BLD AUTO: 279 X10*3/UL (ref 150–450)
POTASSIUM SERPL-SCNC: 3.9 MMOL/L (ref 3.5–5.3)
RBC # BLD AUTO: 3.41 X10*6/UL (ref 4.5–5.9)
SODIUM SERPL-SCNC: 137 MMOL/L (ref 136–145)
WBC # BLD AUTO: 7 X10*3/UL (ref 4.4–11.3)

## 2025-09-06 PROCEDURE — 2500000005 HC RX 250 GENERAL PHARMACY W/O HCPCS

## 2025-09-06 PROCEDURE — 83735 ASSAY OF MAGNESIUM: CPT

## 2025-09-06 PROCEDURE — 1100000001 HC PRIVATE ROOM DAILY

## 2025-09-06 PROCEDURE — 80069 RENAL FUNCTION PANEL: CPT

## 2025-09-06 PROCEDURE — 2500000001 HC RX 250 WO HCPCS SELF ADMINISTERED DRUGS (ALT 637 FOR MEDICARE OP): Performed by: PHYSICIAN ASSISTANT

## 2025-09-06 PROCEDURE — 99232 SBSQ HOSP IP/OBS MODERATE 35: CPT | Performed by: STUDENT IN AN ORGANIZED HEALTH CARE EDUCATION/TRAINING PROGRAM

## 2025-09-06 PROCEDURE — 99232 SBSQ HOSP IP/OBS MODERATE 35: CPT | Performed by: INTERNAL MEDICINE

## 2025-09-06 PROCEDURE — 2500000004 HC RX 250 GENERAL PHARMACY W/ HCPCS (ALT 636 FOR OP/ED)

## 2025-09-06 PROCEDURE — 2500000002 HC RX 250 W HCPCS SELF ADMINISTERED DRUGS (ALT 637 FOR MEDICARE OP, ALT 636 FOR OP/ED)

## 2025-09-06 PROCEDURE — 36415 COLL VENOUS BLD VENIPUNCTURE: CPT

## 2025-09-06 PROCEDURE — 85027 COMPLETE CBC AUTOMATED: CPT

## 2025-09-06 RX ADMIN — LOPERAMIDE HYDROCHLORIDE 4 MG: 2 CAPSULE ORAL at 06:37

## 2025-09-06 RX ADMIN — SODIUM CHLORIDE 1000 ML: 0.9 INJECTION, SOLUTION INTRAVENOUS at 08:25

## 2025-09-06 RX ADMIN — LOPERAMIDE HYDROCHLORIDE 4 MG: 2 CAPSULE ORAL at 12:19

## 2025-09-06 RX ADMIN — MICAFUNGIN SODIUM 100 MG: 100 INJECTION, POWDER, LYOPHILIZED, FOR SOLUTION INTRAVENOUS at 14:10

## 2025-09-06 RX ADMIN — LOPERAMIDE HYDROCHLORIDE 4 MG: 2 CAPSULE ORAL at 16:32

## 2025-09-06 RX ADMIN — PANTOPRAZOLE SODIUM 20 MG: 20 TABLET, DELAYED RELEASE ORAL at 06:37

## 2025-09-06 RX ADMIN — LOPERAMIDE HYDROCHLORIDE 4 MG: 2 CAPSULE ORAL at 22:42

## 2025-09-06 RX ADMIN — ENOXAPARIN SODIUM 40 MG: 100 INJECTION SUBCUTANEOUS at 20:54

## 2025-09-06 RX ADMIN — Medication 6 MG: at 20:56

## 2025-09-06 ASSESSMENT — COGNITIVE AND FUNCTIONAL STATUS - GENERAL
DRESSING REGULAR LOWER BODY CLOTHING: A LITTLE
MOVING TO AND FROM BED TO CHAIR: A LITTLE
MOVING TO AND FROM BED TO CHAIR: A LITTLE
TOILETING: A LITTLE
MOVING FROM LYING ON BACK TO SITTING ON SIDE OF FLAT BED WITH BEDRAILS: A LITTLE
HELP NEEDED FOR BATHING: A LITTLE
TOILETING: A LITTLE
DRESSING REGULAR UPPER BODY CLOTHING: A LITTLE
STANDING UP FROM CHAIR USING ARMS: A LITTLE
WALKING IN HOSPITAL ROOM: A LOT
DAILY ACTIVITIY SCORE: 19
HELP NEEDED FOR BATHING: A LITTLE
CLIMB 3 TO 5 STEPS WITH RAILING: A LOT
WALKING IN HOSPITAL ROOM: A LOT
DRESSING REGULAR UPPER BODY CLOTHING: A LITTLE
MOBILITY SCORE: 16
DAILY ACTIVITIY SCORE: 19
STANDING UP FROM CHAIR USING ARMS: A LITTLE
TURNING FROM BACK TO SIDE WHILE IN FLAT BAD: A LITTLE
MOVING FROM LYING ON BACK TO SITTING ON SIDE OF FLAT BED WITH BEDRAILS: A LITTLE
CLIMB 3 TO 5 STEPS WITH RAILING: TOTAL
PERSONAL GROOMING: A LITTLE
TURNING FROM BACK TO SIDE WHILE IN FLAT BAD: A LITTLE
PERSONAL GROOMING: A LITTLE
MOBILITY SCORE: 15
DRESSING REGULAR LOWER BODY CLOTHING: A LITTLE

## 2025-09-06 ASSESSMENT — PAIN - FUNCTIONAL ASSESSMENT
PAIN_FUNCTIONAL_ASSESSMENT: 0-10

## 2025-09-06 ASSESSMENT — PAIN SCALES - GENERAL
PAINLEVEL_OUTOF10: 0 - NO PAIN
PAINLEVEL_OUTOF10: 0 - NO PAIN
PAINLEVEL_OUTOF10: 2

## 2025-09-06 ASSESSMENT — PAIN DESCRIPTION - DESCRIPTORS: DESCRIPTORS: CRAMPING;SORE

## 2025-09-07 VITALS
SYSTOLIC BLOOD PRESSURE: 138 MMHG | BODY MASS INDEX: 20.18 KG/M2 | HEART RATE: 90 BPM | DIASTOLIC BLOOD PRESSURE: 72 MMHG | WEIGHT: 165.68 LBS | RESPIRATION RATE: 17 BRPM | TEMPERATURE: 98.2 F | OXYGEN SATURATION: 97 % | HEIGHT: 76 IN

## 2025-09-07 LAB
ALBUMIN SERPL BCP-MCNC: 3.2 G/DL (ref 3.4–5)
ANION GAP SERPL CALC-SCNC: 10 MMOL/L (ref 10–20)
BACTERIA BLD CULT: NORMAL
BUN SERPL-MCNC: 11 MG/DL (ref 6–23)
CALCIUM SERPL-MCNC: 9.6 MG/DL (ref 8.6–10.6)
CHLORIDE SERPL-SCNC: 104 MMOL/L (ref 98–107)
CO2 SERPL-SCNC: 28 MMOL/L (ref 21–32)
CREAT SERPL-MCNC: 1.39 MG/DL (ref 0.5–1.3)
EGFRCR SERPLBLD CKD-EPI 2021: 52 ML/MIN/1.73M*2
ERYTHROCYTE [DISTWIDTH] IN BLOOD BY AUTOMATED COUNT: 13.3 % (ref 11.5–14.5)
GLUCOSE BLD MANUAL STRIP-MCNC: 101 MG/DL (ref 74–99)
GLUCOSE BLD MANUAL STRIP-MCNC: 115 MG/DL (ref 74–99)
GLUCOSE SERPL-MCNC: 96 MG/DL (ref 74–99)
HCT VFR BLD AUTO: 33.4 % (ref 41–52)
HGB BLD-MCNC: 10.5 G/DL (ref 13.5–17.5)
MAGNESIUM SERPL-MCNC: 1.81 MG/DL (ref 1.6–2.4)
MCH RBC QN AUTO: 28.7 PG (ref 26–34)
MCHC RBC AUTO-ENTMCNC: 31.4 G/DL (ref 32–36)
MCV RBC AUTO: 91 FL (ref 80–100)
NRBC BLD-RTO: 0 /100 WBCS (ref 0–0)
PHOSPHATE SERPL-MCNC: 3.2 MG/DL (ref 2.5–4.9)
PLATELET # BLD AUTO: 314 X10*3/UL (ref 150–450)
POTASSIUM SERPL-SCNC: 4.1 MMOL/L (ref 3.5–5.3)
RBC # BLD AUTO: 3.66 X10*6/UL (ref 4.5–5.9)
SODIUM SERPL-SCNC: 138 MMOL/L (ref 136–145)
WBC # BLD AUTO: 6.8 X10*3/UL (ref 4.4–11.3)

## 2025-09-07 PROCEDURE — 83735 ASSAY OF MAGNESIUM: CPT

## 2025-09-07 PROCEDURE — 2500000005 HC RX 250 GENERAL PHARMACY W/O HCPCS

## 2025-09-07 PROCEDURE — 80069 RENAL FUNCTION PANEL: CPT

## 2025-09-07 PROCEDURE — 2500000004 HC RX 250 GENERAL PHARMACY W/ HCPCS (ALT 636 FOR OP/ED)

## 2025-09-07 PROCEDURE — 85027 COMPLETE CBC AUTOMATED: CPT

## 2025-09-07 PROCEDURE — 36415 COLL VENOUS BLD VENIPUNCTURE: CPT

## 2025-09-07 PROCEDURE — 82947 ASSAY GLUCOSE BLOOD QUANT: CPT

## 2025-09-07 PROCEDURE — 2500000001 HC RX 250 WO HCPCS SELF ADMINISTERED DRUGS (ALT 637 FOR MEDICARE OP): Performed by: PHYSICIAN ASSISTANT

## 2025-09-07 PROCEDURE — 2500000002 HC RX 250 W HCPCS SELF ADMINISTERED DRUGS (ALT 637 FOR MEDICARE OP, ALT 636 FOR OP/ED)

## 2025-09-07 PROCEDURE — 1100000001 HC PRIVATE ROOM DAILY

## 2025-09-07 RX ORDER — MAGNESIUM SULFATE HEPTAHYDRATE 40 MG/ML
2 INJECTION, SOLUTION INTRAVENOUS ONCE
Status: COMPLETED | OUTPATIENT
Start: 2025-09-07 | End: 2025-09-07

## 2025-09-07 RX ADMIN — LOPERAMIDE HYDROCHLORIDE 4 MG: 2 CAPSULE ORAL at 20:47

## 2025-09-07 RX ADMIN — MAGNESIUM SULFATE HEPTAHYDRATE 2 G: 40 INJECTION, SOLUTION INTRAVENOUS at 13:49

## 2025-09-07 RX ADMIN — PANTOPRAZOLE SODIUM 20 MG: 20 TABLET, DELAYED RELEASE ORAL at 07:28

## 2025-09-07 RX ADMIN — SODIUM CHLORIDE 1000 ML: 0.9 INJECTION, SOLUTION INTRAVENOUS at 08:59

## 2025-09-07 RX ADMIN — MICAFUNGIN SODIUM 100 MG: 100 INJECTION, POWDER, LYOPHILIZED, FOR SOLUTION INTRAVENOUS at 13:50

## 2025-09-07 RX ADMIN — ENOXAPARIN SODIUM 40 MG: 100 INJECTION SUBCUTANEOUS at 20:47

## 2025-09-07 RX ADMIN — LOPERAMIDE HYDROCHLORIDE 4 MG: 2 CAPSULE ORAL at 07:28

## 2025-09-07 RX ADMIN — LOPERAMIDE HYDROCHLORIDE 4 MG: 2 CAPSULE ORAL at 16:56

## 2025-09-07 RX ADMIN — Medication 6 MG: at 20:47

## 2025-09-07 RX ADMIN — LOPERAMIDE HYDROCHLORIDE 4 MG: 2 CAPSULE ORAL at 12:35

## 2025-09-07 ASSESSMENT — PAIN SCALES - GENERAL
PAINLEVEL_OUTOF10: 2
PAINLEVEL_OUTOF10: 0 - NO PAIN
PAINLEVEL_OUTOF10: 0 - NO PAIN

## 2025-09-07 ASSESSMENT — COGNITIVE AND FUNCTIONAL STATUS - GENERAL
HELP NEEDED FOR BATHING: A LITTLE
DRESSING REGULAR LOWER BODY CLOTHING: A LITTLE
STANDING UP FROM CHAIR USING ARMS: A LITTLE
TOILETING: A LITTLE
WALKING IN HOSPITAL ROOM: A LITTLE
MOVING FROM LYING ON BACK TO SITTING ON SIDE OF FLAT BED WITH BEDRAILS: A LITTLE
TURNING FROM BACK TO SIDE WHILE IN FLAT BAD: A LITTLE
CLIMB 3 TO 5 STEPS WITH RAILING: A LOT
MOBILITY SCORE: 17
MOVING TO AND FROM BED TO CHAIR: A LITTLE
DAILY ACTIVITIY SCORE: 21

## 2025-09-07 ASSESSMENT — PAIN - FUNCTIONAL ASSESSMENT
PAIN_FUNCTIONAL_ASSESSMENT: 0-10

## 2025-12-31 PROCEDURE — 02HV33Z INSERTION OF INFUSION DEVICE INTO SUPERIOR VENA CAVA, PERCUTANEOUS APPROACH: ICD-10-PCS

## (undated) DEVICE — Device

## (undated) DEVICE — APPLICATOR, PREP, CHLORAPREP, W/ORANGE TINT, 10.5ML

## (undated) DEVICE — LIGASURE IMPACT, 18CM

## (undated) DEVICE — COVER, CART, 45 X 27 X 48 IN, CLEAR

## (undated) DEVICE — CAUTERY, PENCIL, PUSH BUTTON, SMOKE EVAC, 70MM

## (undated) DEVICE — TRAY, DRY PREP, PREMIUM

## (undated) DEVICE — ELECTRODE, ELECTROSURGICAL, BLADE, INSULATED, ENT/IMA, STERILE

## (undated) DEVICE — DRESSING, ABDOMINAL, TENDERSORB, 8 X 10 IN, STERILE

## (undated) DEVICE — SUTURE, VICRYL PLUS 3-0, SH, 27IN

## (undated) DEVICE — PITCHER, GRADUATE, 32 OZ (1200CC), STERILE

## (undated) DEVICE — HANDLE, PH, FOR YANKAUER SUCTION DEVICE

## (undated) DEVICE — GOWN, SURGICAL, SMARTGOWN, XLARGE, STERILE

## (undated) DEVICE — DRAPE, FLUID WARMER

## (undated) DEVICE — APPLICATOR, CHLORAPREP, W/ORANGE TINT, 26ML

## (undated) DEVICE — SPONGE, HEMOSTATIC, CELLULOSE, SURGICEL, 2 X 14 IN

## (undated) DEVICE — HOLSTER, JET SAFETY

## (undated) DEVICE — WOUND VAC KIT, W/CANISTER, 500ML (5/CS)

## (undated) DEVICE — SEALANT, HEMOSTATIC, FLOSEAL, 10 ML

## (undated) DEVICE — SUTURE, MONOCRYL, 3-0, 36 IN, CT-1, UNDYED

## (undated) DEVICE — PREP TRAY, SKIN, DRY, W/GLOVES

## (undated) DEVICE — STRAP, VELCRO, BODY, 4 X 60IN, NS

## (undated) DEVICE — CUTTER, PROXIMATE LINEAR RELOAD, 75MM, BLUE

## (undated) DEVICE — CUTTER, PROX LINEAR, 75MM, REG TISSUE, W/ SAFETY LOCK OUT

## (undated) DEVICE — GLOVE, SURGICAL, PROTEXIS PI BLUE W/NEUTHERA, 8.0, PF, LF

## (undated) DEVICE — TRAY, SURESTEP, SILICONE DRAINAGE BAG, STATLOCK, 16FR

## (undated) DEVICE — NEEDLE, SAFETY, 22 G X 1.5 IN

## (undated) DEVICE — DRAPE, LEGGINGS, 28.5 X 43 IN, DISPOSABLE, LF, STERILE

## (undated) DEVICE — STRIP, SKIN CLOSURE, STERI STRIP, NON-REINFORCED, 0.5 X 4 IN

## (undated) DEVICE — SYRINGE, 10 CC, LUER LOCK

## (undated) DEVICE — GLOVE, SURGICAL, PROTEXIS PI , 7.5, PF, LF

## (undated) DEVICE — STAPLER, ECHELON 3000, 60MM COMPACT

## (undated) DEVICE — UNDERPAD, LIGHT ABSORB, 23 X 36

## (undated) DEVICE — TIP, SUCTION, POOLEHANDPIECE, POOLE SUCTION, W/VENT, 14-28FR

## (undated) DEVICE — STRAP, ARM BOARD, 32 X 1.5

## (undated) DEVICE — SUTURE, PDSII, 1, TP-1, VIL, MONO, 48LP

## (undated) DEVICE — PEG KIT, ENDOVIVE, STD, PULL, 20FR, W/ENFIT

## (undated) DEVICE — RESERVOIR, DRAINAGE, WOUND, JACKSON-PRATT, 100 CC, SILICONE

## (undated) DEVICE — HOLDER, TUBE, TRACHEOSTOMY, LARGE, LF

## (undated) DEVICE — CONNECTOR, EXTENDABLE, FLEXIBLE, W/HOSE, 5 IN

## (undated) DEVICE — BANDAGE, GAUZE, CONFORMING, KERLIX, 6 PLY, 4.5 IN X 4.1 YD

## (undated) DEVICE — DRAIN, WOUND, JACKSON-PRATT, ROUND/W TROCAR, 19FR, SILICONE

## (undated) DEVICE — EVACUATOR, WOUND, SUCTION, CLOSED, JACKSON-PRATT, 100 CC, SILICONE

## (undated) DEVICE — TUBE, TRACH, CUFFED, LOW PRESS, LPC, SZ-6, 6.4MM ID, LF

## (undated) DEVICE — DRAPE, UNDERBUTTOCKS

## (undated) DEVICE — KIT, ABTHERA DRESSING W/SENSA TRAC

## (undated) DEVICE — DRAPE, INCISE, ANTIMICROBIAL, IOBAN 2, LARGE, 17 X 23 IN, DISPOSABLE, STERILE

## (undated) DEVICE — SPONGE, HEMOSTATIC, CELLULOSE, SURGICEL, FIBRILLAR, 2 X 4 IN

## (undated) DEVICE — DRAPE, SHEET, ENDOSCOPY, GENERAL, FENESTRATED, ARMBOARD COVER, 98 X 123.5 IN, DISPOSABLE, LF, STERILE

## (undated) DEVICE — MANIFOLD, 4 PORT NEPTUNE STANDARD

## (undated) DEVICE — STAPLER,  LINEAR RELOAD, 60MM, WHITE, DISP

## (undated) DEVICE — SUTURE, VICRYL, 3-0,18 IN, SH, UNDYED

## (undated) DEVICE — ELECTRODE, ELECTROSURGICAL, BLADE, EXTENDED

## (undated) DEVICE — TOWEL PACK, STERILE, 16X24, XRAY DETECTABLE, BLUE, 4/PK

## (undated) DEVICE — SUTURE, SILK, 2-0, 18 IN, BR PS, BLACK